# Patient Record
Sex: FEMALE | Race: WHITE | HISPANIC OR LATINO | Employment: OTHER | URBAN - METROPOLITAN AREA
[De-identification: names, ages, dates, MRNs, and addresses within clinical notes are randomized per-mention and may not be internally consistent; named-entity substitution may affect disease eponyms.]

---

## 2019-11-05 ENCOUNTER — HOSPITAL ENCOUNTER (INPATIENT)
Facility: HOSPITAL | Age: 54
LOS: 13 days | Discharge: NURSING, REHAB OR LONG-TERM CARE | DRG: 208 | End: 2019-11-18
Attending: EMERGENCY MEDICINE | Admitting: FAMILY MEDICINE
Payer: MEDICAID

## 2019-11-05 DIAGNOSIS — R79.89 POSITIVE D DIMER: ICD-10-CM

## 2019-11-05 DIAGNOSIS — Z11.3 SCREEN FOR STD (SEXUALLY TRANSMITTED DISEASE): ICD-10-CM

## 2019-11-05 DIAGNOSIS — R79.89 ELEVATED TROPONIN: ICD-10-CM

## 2019-11-05 DIAGNOSIS — Z76.89 ENCOUNTER TO ESTABLISH CARE: ICD-10-CM

## 2019-11-05 DIAGNOSIS — J96.90 RESPIRATORY FAILURE REQUIRING INTUBATION: ICD-10-CM

## 2019-11-05 DIAGNOSIS — Z01.818 ENCOUNTER FOR INTUBATION: ICD-10-CM

## 2019-11-05 DIAGNOSIS — J96.02 ACUTE RESPIRATORY FAILURE WITH HYPOXIA AND HYPERCARBIA: ICD-10-CM

## 2019-11-05 DIAGNOSIS — I50.9 NEW ONSET OF CONGESTIVE HEART FAILURE: ICD-10-CM

## 2019-11-05 DIAGNOSIS — E87.70 HYPERVOLEMIA, UNSPECIFIED HYPERVOLEMIA TYPE: ICD-10-CM

## 2019-11-05 DIAGNOSIS — J96.01 ACUTE RESPIRATORY FAILURE WITH HYPOXIA AND HYPERCARBIA: ICD-10-CM

## 2019-11-05 DIAGNOSIS — R06.02 SHORTNESS OF BREATH: ICD-10-CM

## 2019-11-05 DIAGNOSIS — I21.4 NSTEMI (NON-ST ELEVATED MYOCARDIAL INFARCTION): ICD-10-CM

## 2019-11-05 DIAGNOSIS — I50.9 CHF (CONGESTIVE HEART FAILURE): ICD-10-CM

## 2019-11-05 DIAGNOSIS — N17.9 AKI (ACUTE KIDNEY INJURY): ICD-10-CM

## 2019-11-05 DIAGNOSIS — I16.1 HYPERTENSIVE EMERGENCY: Primary | ICD-10-CM

## 2019-11-05 DIAGNOSIS — N17.9 ACUTE RENAL FAILURE, UNSPECIFIED ACUTE RENAL FAILURE TYPE: ICD-10-CM

## 2019-11-05 DIAGNOSIS — N04.9 NEPHROTIC SYNDROME: ICD-10-CM

## 2019-11-05 DIAGNOSIS — N17.9 ACUTE RENAL FAILURE: ICD-10-CM

## 2019-11-05 DIAGNOSIS — N17.0 ACUTE RENAL FAILURE WITH TUBULAR NECROSIS: ICD-10-CM

## 2019-11-05 LAB
ALBUMIN SERPL BCP-MCNC: 2.7 G/DL (ref 3.5–5.2)
ALLENS TEST: ABNORMAL
ALLENS TEST: ABNORMAL
ALP SERPL-CCNC: 89 U/L (ref 55–135)
ALT SERPL W/O P-5'-P-CCNC: 15 U/L (ref 10–44)
AMORPH CRY URNS QL MICRO: ABNORMAL
AMPHET+METHAMPHET UR QL: NEGATIVE
ANION GAP SERPL CALC-SCNC: 12 MMOL/L (ref 8–16)
ANION GAP SERPL CALC-SCNC: 8 MMOL/L (ref 8–16)
ANION GAP SERPL CALC-SCNC: 9 MMOL/L (ref 8–16)
AST SERPL-CCNC: 25 U/L (ref 10–40)
BACTERIA #/AREA URNS HPF: ABNORMAL /HPF
BARBITURATES UR QL SCN>200 NG/ML: NEGATIVE
BASOPHILS # BLD AUTO: 0.03 K/UL (ref 0–0.2)
BASOPHILS NFR BLD: 0.4 % (ref 0–1.9)
BENZODIAZ UR QL SCN>200 NG/ML: NEGATIVE
BILIRUB SERPL-MCNC: 0.3 MG/DL (ref 0.1–1)
BILIRUB UR QL STRIP: NEGATIVE
BNP SERPL-MCNC: 1165 PG/ML (ref 0–99)
BUN SERPL-MCNC: 47 MG/DL (ref 6–20)
BUN SERPL-MCNC: 50 MG/DL (ref 6–20)
BUN SERPL-MCNC: 51 MG/DL (ref 6–20)
BZE UR QL SCN: NEGATIVE
CALCIUM SERPL-MCNC: 8.4 MG/DL (ref 8.7–10.5)
CALCIUM SERPL-MCNC: 8.6 MG/DL (ref 8.7–10.5)
CALCIUM SERPL-MCNC: 8.7 MG/DL (ref 8.7–10.5)
CANNABINOIDS UR QL SCN: NEGATIVE
CHLORIDE SERPL-SCNC: 112 MMOL/L (ref 95–110)
CHLORIDE SERPL-SCNC: 113 MMOL/L (ref 95–110)
CHLORIDE SERPL-SCNC: 114 MMOL/L (ref 95–110)
CLARITY UR: CLEAR
CO2 SERPL-SCNC: 16 MMOL/L (ref 23–29)
CO2 SERPL-SCNC: 19 MMOL/L (ref 23–29)
CO2 SERPL-SCNC: 20 MMOL/L (ref 23–29)
COLOR UR: YELLOW
CREAT SERPL-MCNC: 3.2 MG/DL (ref 0.5–1.4)
CREAT SERPL-MCNC: 3.3 MG/DL (ref 0.5–1.4)
CREAT SERPL-MCNC: 3.4 MG/DL (ref 0.5–1.4)
CREAT UR-MCNC: 21.7 MG/DL (ref 15–325)
CREAT UR-MCNC: 23.1 MG/DL (ref 15–325)
D DIMER PPP IA.FEU-MCNC: 2.6 MG/L FEU
DELSYS: ABNORMAL
DELSYS: ABNORMAL
DIFFERENTIAL METHOD: ABNORMAL
EOSINOPHIL # BLD AUTO: 0.1 K/UL (ref 0–0.5)
EOSINOPHIL NFR BLD: 1.7 % (ref 0–8)
ERYTHROCYTE [DISTWIDTH] IN BLOOD BY AUTOMATED COUNT: 14.1 % (ref 11.5–14.5)
ERYTHROCYTE [SEDIMENTATION RATE] IN BLOOD BY WESTERGREN METHOD: 16 MM/H
ERYTHROCYTE [SEDIMENTATION RATE] IN BLOOD BY WESTERGREN METHOD: 28 MM/H
EST. GFR  (AFRICAN AMERICAN): 17 ML/MIN/1.73 M^2
EST. GFR  (AFRICAN AMERICAN): 17 ML/MIN/1.73 M^2
EST. GFR  (AFRICAN AMERICAN): 18 ML/MIN/1.73 M^2
EST. GFR  (NON AFRICAN AMERICAN): 14 ML/MIN/1.73 M^2
EST. GFR  (NON AFRICAN AMERICAN): 15 ML/MIN/1.73 M^2
EST. GFR  (NON AFRICAN AMERICAN): 16 ML/MIN/1.73 M^2
ESTIMATED AVG GLUCOSE: 120 MG/DL (ref 68–131)
FIO2: 50
FIO2: 60
GLUCOSE SERPL-MCNC: 126 MG/DL (ref 70–110)
GLUCOSE SERPL-MCNC: 186 MG/DL (ref 70–110)
GLUCOSE SERPL-MCNC: 80 MG/DL (ref 70–110)
GLUCOSE UR QL STRIP: ABNORMAL
HAV IGM SERPL QL IA: NEGATIVE
HBA1C MFR BLD HPLC: 5.8 % (ref 4–5.6)
HBV CORE IGM SERPL QL IA: NEGATIVE
HBV SURFACE AG SERPL QL IA: NEGATIVE
HCO3 UR-SCNC: 18.3 MMOL/L (ref 24–28)
HCO3 UR-SCNC: 19.1 MMOL/L (ref 24–28)
HCT VFR BLD AUTO: 28.2 % (ref 37–48.5)
HCV AB SERPL QL IA: NEGATIVE
HGB BLD-MCNC: 8.7 G/DL (ref 12–16)
HGB UR QL STRIP: ABNORMAL
HIV 1+2 AB+HIV1 P24 AG SERPL QL IA: NEGATIVE
HYALINE CASTS #/AREA URNS LPF: 0 /LPF
INR PPP: 1.1 (ref 0.8–1.2)
KETONES UR QL STRIP: NEGATIVE
LACTATE SERPL-SCNC: 0.6 MMOL/L (ref 0.5–2.2)
LEUKOCYTE ESTERASE UR QL STRIP: NEGATIVE
LYMPHOCYTES # BLD AUTO: 0.9 K/UL (ref 1–4.8)
LYMPHOCYTES NFR BLD: 12.7 % (ref 18–48)
MAGNESIUM SERPL-MCNC: 1.8 MG/DL (ref 1.6–2.6)
MCH RBC QN AUTO: 27 PG (ref 27–31)
MCHC RBC AUTO-ENTMCNC: 30.9 G/DL (ref 32–36)
MCV RBC AUTO: 88 FL (ref 82–98)
METHADONE UR QL SCN>300 NG/ML: NEGATIVE
MICROSCOPIC COMMENT: ABNORMAL
MIN VOL: 11
MODE: ABNORMAL
MODE: ABNORMAL
MONOCYTES # BLD AUTO: 0.6 K/UL (ref 0.3–1)
MONOCYTES NFR BLD: 8.3 % (ref 4–15)
NEUTROPHILS # BLD AUTO: 5.5 K/UL (ref 1.8–7.7)
NEUTROPHILS NFR BLD: 76.9 % (ref 38–73)
NITRITE UR QL STRIP: NEGATIVE
OPIATES UR QL SCN: NEGATIVE
PCO2 BLDA: 38.7 MMHG (ref 35–45)
PCO2 BLDA: 38.8 MMHG (ref 35–45)
PCP UR QL SCN>25 NG/ML: NEGATIVE
PEEP: 10
PEEP: 5
PH SMN: 7.28 [PH] (ref 7.35–7.45)
PH SMN: 7.3 [PH] (ref 7.35–7.45)
PH UR STRIP: 6 [PH] (ref 5–8)
PHOSPHATE SERPL-MCNC: 4.6 MG/DL (ref 2.7–4.5)
PIP: 35
PLATELET # BLD AUTO: 195 K/UL (ref 150–350)
PMV BLD AUTO: 13.1 FL (ref 9.2–12.9)
PO2 BLDA: 103 MMHG (ref 80–100)
PO2 BLDA: 214 MMHG (ref 80–100)
POC BE: -7 MMOL/L
POC BE: -8 MMOL/L
POC SATURATED O2: 100 % (ref 95–100)
POC SATURATED O2: 97 % (ref 95–100)
POC TCO2: 19 MMOL/L (ref 23–27)
POC TCO2: 20 MMOL/L (ref 23–27)
POCT GLUCOSE: 112 MG/DL (ref 70–110)
POCT GLUCOSE: 169 MG/DL (ref 70–110)
POCT GLUCOSE: 84 MG/DL (ref 70–110)
POTASSIUM SERPL-SCNC: 3.9 MMOL/L (ref 3.5–5.1)
POTASSIUM SERPL-SCNC: 4 MMOL/L (ref 3.5–5.1)
POTASSIUM SERPL-SCNC: 4.6 MMOL/L (ref 3.5–5.1)
PROT SERPL-MCNC: 6.1 G/DL (ref 6–8.4)
PROT UR QL STRIP: ABNORMAL
PROT UR-MCNC: 191 MG/DL (ref 0–15)
PROT/CREAT UR: 8.8 MG/G{CREAT} (ref 0–0.2)
PROTHROMBIN TIME: 11.4 SEC (ref 9–12.5)
RBC # BLD AUTO: 3.22 M/UL (ref 4–5.4)
RBC #/AREA URNS HPF: 19 /HPF (ref 0–4)
RHEUMATOID FACT SERPL-ACNC: <10 IU/ML (ref 0–15)
SAMPLE: ABNORMAL
SAMPLE: ABNORMAL
SITE: ABNORMAL
SITE: ABNORMAL
SODIUM SERPL-SCNC: 140 MMOL/L (ref 136–145)
SODIUM SERPL-SCNC: 141 MMOL/L (ref 136–145)
SODIUM SERPL-SCNC: 142 MMOL/L (ref 136–145)
SODIUM UR-SCNC: 119 MMOL/L (ref 20–250)
SP GR UR STRIP: 1.02 (ref 1–1.03)
SP02: 100
SQUAMOUS #/AREA URNS HPF: 6 /HPF
TOXICOLOGY INFORMATION: NORMAL
TROPONIN I SERPL DL<=0.01 NG/ML-MCNC: 0.18 NG/ML (ref 0–0.03)
TROPONIN I SERPL DL<=0.01 NG/ML-MCNC: 0.2 NG/ML (ref 0–0.03)
TROPONIN I SERPL DL<=0.01 NG/ML-MCNC: 0.2 NG/ML (ref 0–0.03)
TROPONIN I SERPL DL<=0.01 NG/ML-MCNC: 0.21 NG/ML (ref 0–0.03)
TSH SERPL DL<=0.005 MIU/L-ACNC: 1.94 UIU/ML (ref 0.4–4)
URN SPEC COLLECT METH UR: ABNORMAL
UROBILINOGEN UR STRIP-ACNC: NEGATIVE EU/DL
UUN UR-MCNC: 145 MG/DL (ref 140–1050)
VT: 360
VT: 450
WBC # BLD AUTO: 7.15 K/UL (ref 3.9–12.7)
WBC #/AREA URNS HPF: 1 /HPF (ref 0–5)

## 2019-11-05 PROCEDURE — 93010 ELECTROCARDIOGRAM REPORT: CPT | Mod: 76,59,, | Performed by: STUDENT IN AN ORGANIZED HEALTH CARE EDUCATION/TRAINING PROGRAM

## 2019-11-05 PROCEDURE — 80048 BASIC METABOLIC PNL TOTAL CA: CPT

## 2019-11-05 PROCEDURE — 83605 ASSAY OF LACTIC ACID: CPT

## 2019-11-05 PROCEDURE — 83735 ASSAY OF MAGNESIUM: CPT

## 2019-11-05 PROCEDURE — 93005 ELECTROCARDIOGRAM TRACING: CPT

## 2019-11-05 PROCEDURE — 94660 CPAP INITIATION&MGMT: CPT

## 2019-11-05 PROCEDURE — 84484 ASSAY OF TROPONIN QUANT: CPT

## 2019-11-05 PROCEDURE — 25000003 PHARM REV CODE 250: Performed by: STUDENT IN AN ORGANIZED HEALTH CARE EDUCATION/TRAINING PROGRAM

## 2019-11-05 PROCEDURE — 86703 HIV-1/HIV-2 1 RESULT ANTBDY: CPT

## 2019-11-05 PROCEDURE — 85025 COMPLETE CBC W/AUTO DIFF WBC: CPT

## 2019-11-05 PROCEDURE — 63600175 PHARM REV CODE 636 W HCPCS: Performed by: STUDENT IN AN ORGANIZED HEALTH CARE EDUCATION/TRAINING PROGRAM

## 2019-11-05 PROCEDURE — 25000003 PHARM REV CODE 250: Performed by: EMERGENCY MEDICINE

## 2019-11-05 PROCEDURE — 99900035 HC TECH TIME PER 15 MIN (STAT)

## 2019-11-05 PROCEDURE — 84100 ASSAY OF PHOSPHORUS: CPT

## 2019-11-05 PROCEDURE — 84156 ASSAY OF PROTEIN URINE: CPT

## 2019-11-05 PROCEDURE — 83880 ASSAY OF NATRIURETIC PEPTIDE: CPT

## 2019-11-05 PROCEDURE — 82803 BLOOD GASES ANY COMBINATION: CPT

## 2019-11-05 PROCEDURE — 80053 COMPREHEN METABOLIC PANEL: CPT

## 2019-11-05 PROCEDURE — 86038 ANTINUCLEAR ANTIBODIES: CPT

## 2019-11-05 PROCEDURE — 96365 THER/PROPH/DIAG IV INF INIT: CPT | Mod: 59

## 2019-11-05 PROCEDURE — 36600 WITHDRAWAL OF ARTERIAL BLOOD: CPT

## 2019-11-05 PROCEDURE — 83036 HEMOGLOBIN GLYCOSYLATED A1C: CPT

## 2019-11-05 PROCEDURE — 93010 EKG 12-LEAD: ICD-10-PCS | Mod: ,,, | Performed by: STUDENT IN AN ORGANIZED HEALTH CARE EDUCATION/TRAINING PROGRAM

## 2019-11-05 PROCEDURE — 85610 PROTHROMBIN TIME: CPT

## 2019-11-05 PROCEDURE — 84443 ASSAY THYROID STIM HORMONE: CPT

## 2019-11-05 PROCEDURE — 99291 CRITICAL CARE FIRST HOUR: CPT | Mod: 25

## 2019-11-05 PROCEDURE — 93010 ELECTROCARDIOGRAM REPORT: CPT | Mod: ,,, | Performed by: STUDENT IN AN ORGANIZED HEALTH CARE EDUCATION/TRAINING PROGRAM

## 2019-11-05 PROCEDURE — 20000000 HC ICU ROOM

## 2019-11-05 PROCEDURE — S0028 INJECTION, FAMOTIDINE, 20 MG: HCPCS | Performed by: STUDENT IN AN ORGANIZED HEALTH CARE EDUCATION/TRAINING PROGRAM

## 2019-11-05 PROCEDURE — 80074 ACUTE HEPATITIS PANEL: CPT

## 2019-11-05 PROCEDURE — 51702 INSERT TEMP BLADDER CATH: CPT | Mod: 59

## 2019-11-05 PROCEDURE — 84484 ASSAY OF TROPONIN QUANT: CPT | Mod: 91

## 2019-11-05 PROCEDURE — 94761 N-INVAS EAR/PLS OXIMETRY MLT: CPT

## 2019-11-05 PROCEDURE — 96375 TX/PRO/DX INJ NEW DRUG ADDON: CPT | Mod: 59

## 2019-11-05 PROCEDURE — 80048 BASIC METABOLIC PNL TOTAL CA: CPT | Mod: 91

## 2019-11-05 PROCEDURE — 94002 VENT MGMT INPAT INIT DAY: CPT

## 2019-11-05 PROCEDURE — 27000221 HC OXYGEN, UP TO 24 HOURS

## 2019-11-05 PROCEDURE — 63600175 PHARM REV CODE 636 W HCPCS: Performed by: EMERGENCY MEDICINE

## 2019-11-05 PROCEDURE — 87040 BLOOD CULTURE FOR BACTERIA: CPT

## 2019-11-05 PROCEDURE — 80307 DRUG TEST PRSMV CHEM ANLYZR: CPT

## 2019-11-05 PROCEDURE — 84300 ASSAY OF URINE SODIUM: CPT

## 2019-11-05 PROCEDURE — 31500 INSERT EMERGENCY AIRWAY: CPT

## 2019-11-05 PROCEDURE — 27000190 HC CPAP FULL FACE MASK W/VALVE

## 2019-11-05 PROCEDURE — 84540 ASSAY OF URINE/UREA-N: CPT

## 2019-11-05 PROCEDURE — 85379 FIBRIN DEGRADATION QUANT: CPT

## 2019-11-05 PROCEDURE — 36415 COLL VENOUS BLD VENIPUNCTURE: CPT

## 2019-11-05 PROCEDURE — 81000 URINALYSIS NONAUTO W/SCOPE: CPT | Mod: 59

## 2019-11-05 PROCEDURE — 63600175 PHARM REV CODE 636 W HCPCS

## 2019-11-05 PROCEDURE — 96366 THER/PROPH/DIAG IV INF ADDON: CPT | Mod: 59

## 2019-11-05 PROCEDURE — 86431 RHEUMATOID FACTOR QUANT: CPT

## 2019-11-05 RX ORDER — PROPOFOL 10 MG/ML
INJECTION, EMULSION INTRAVENOUS
Status: COMPLETED
Start: 2019-11-05 | End: 2019-11-05

## 2019-11-05 RX ORDER — PROPOFOL 10 MG/ML
10 INJECTION, EMULSION INTRAVENOUS CONTINUOUS
Status: DISCONTINUED | OUTPATIENT
Start: 2019-11-05 | End: 2019-11-06

## 2019-11-05 RX ORDER — PROPOFOL 10 MG/ML
10 INJECTION, EMULSION INTRAVENOUS
Status: COMPLETED | OUTPATIENT
Start: 2019-11-05 | End: 2019-11-05

## 2019-11-05 RX ORDER — FUROSEMIDE 10 MG/ML
120 INJECTION INTRAMUSCULAR; INTRAVENOUS 3 TIMES DAILY
Status: DISCONTINUED | OUTPATIENT
Start: 2019-11-05 | End: 2019-11-05

## 2019-11-05 RX ORDER — IBUPROFEN 200 MG
16 TABLET ORAL
Status: DISCONTINUED | OUTPATIENT
Start: 2019-11-05 | End: 2019-11-09

## 2019-11-05 RX ORDER — INSULIN ASPART 100 [IU]/ML
1-10 INJECTION, SOLUTION INTRAVENOUS; SUBCUTANEOUS
Status: DISCONTINUED | OUTPATIENT
Start: 2019-11-05 | End: 2019-11-09

## 2019-11-05 RX ORDER — SODIUM BICARBONATE 650 MG/1
1330 TABLET ORAL 3 TIMES DAILY
Status: DISCONTINUED | OUTPATIENT
Start: 2019-11-05 | End: 2019-11-06

## 2019-11-05 RX ORDER — RAMELTEON 8 MG/1
8 TABLET ORAL NIGHTLY PRN
Status: DISCONTINUED | OUTPATIENT
Start: 2019-11-05 | End: 2019-11-18 | Stop reason: HOSPADM

## 2019-11-05 RX ORDER — GLUCAGON 1 MG
1 KIT INJECTION
Status: DISCONTINUED | OUTPATIENT
Start: 2019-11-05 | End: 2019-11-09

## 2019-11-05 RX ORDER — ROCURONIUM BROMIDE 10 MG/ML
80 INJECTION, SOLUTION INTRAVENOUS ONCE
Status: DISCONTINUED | OUTPATIENT
Start: 2019-11-05 | End: 2019-11-05

## 2019-11-05 RX ORDER — NITROGLYCERIN 20 MG/100ML
10 INJECTION INTRAVENOUS CONTINUOUS
Status: DISCONTINUED | OUTPATIENT
Start: 2019-11-05 | End: 2019-11-06

## 2019-11-05 RX ORDER — SODIUM CHLORIDE 0.9 % (FLUSH) 0.9 %
5 SYRINGE (ML) INJECTION
Status: DISCONTINUED | OUTPATIENT
Start: 2019-11-05 | End: 2019-11-18 | Stop reason: HOSPADM

## 2019-11-05 RX ORDER — FUROSEMIDE 10 MG/ML
120 INJECTION INTRAMUSCULAR; INTRAVENOUS ONCE
Status: COMPLETED | OUTPATIENT
Start: 2019-11-05 | End: 2019-11-05

## 2019-11-05 RX ORDER — ROCURONIUM BROMIDE 10 MG/ML
80 INJECTION, SOLUTION INTRAVENOUS
Status: COMPLETED | OUTPATIENT
Start: 2019-11-05 | End: 2019-11-05

## 2019-11-05 RX ORDER — ACETAMINOPHEN 325 MG/1
650 TABLET ORAL EVERY 4 HOURS PRN
Status: DISCONTINUED | OUTPATIENT
Start: 2019-11-05 | End: 2019-11-18 | Stop reason: HOSPADM

## 2019-11-05 RX ORDER — AMLODIPINE BESYLATE 5 MG/1
10 TABLET ORAL DAILY
Status: DISCONTINUED | OUTPATIENT
Start: 2019-11-05 | End: 2019-11-18 | Stop reason: HOSPADM

## 2019-11-05 RX ORDER — FENTANYL CITRATE-0.9 % NACL/PF 10 MCG/ML
20 PLASTIC BAG, INJECTION (ML) INTRAVENOUS CONTINUOUS
Status: DISCONTINUED | OUTPATIENT
Start: 2019-11-05 | End: 2019-11-06

## 2019-11-05 RX ORDER — NITROGLYCERIN 20 MG/100ML
10 INJECTION INTRAVENOUS CONTINUOUS
Status: DISCONTINUED | OUTPATIENT
Start: 2019-11-05 | End: 2019-11-05

## 2019-11-05 RX ORDER — ETOMIDATE 2 MG/ML
20 INJECTION INTRAVENOUS
Status: COMPLETED | OUTPATIENT
Start: 2019-11-05 | End: 2019-11-05

## 2019-11-05 RX ORDER — ACETAMINOPHEN 325 MG/1
650 TABLET ORAL EVERY 8 HOURS PRN
Status: DISCONTINUED | OUTPATIENT
Start: 2019-11-05 | End: 2019-11-18 | Stop reason: HOSPADM

## 2019-11-05 RX ORDER — FUROSEMIDE 10 MG/ML
80 INJECTION INTRAMUSCULAR; INTRAVENOUS
Status: COMPLETED | OUTPATIENT
Start: 2019-11-05 | End: 2019-11-05

## 2019-11-05 RX ORDER — HEPARIN SODIUM 5000 [USP'U]/ML
5000 INJECTION, SOLUTION INTRAVENOUS; SUBCUTANEOUS EVERY 8 HOURS
Status: DISCONTINUED | OUTPATIENT
Start: 2019-11-05 | End: 2019-11-07

## 2019-11-05 RX ORDER — CEFEPIME HYDROCHLORIDE 1 G/50ML
1 INJECTION, SOLUTION INTRAVENOUS
Status: DISCONTINUED | OUTPATIENT
Start: 2019-11-05 | End: 2019-11-06

## 2019-11-05 RX ORDER — IBUPROFEN 200 MG
24 TABLET ORAL
Status: DISCONTINUED | OUTPATIENT
Start: 2019-11-05 | End: 2019-11-09

## 2019-11-05 RX ORDER — FAMOTIDINE 10 MG/ML
20 INJECTION INTRAVENOUS DAILY
Status: DISCONTINUED | OUTPATIENT
Start: 2019-11-05 | End: 2019-11-06

## 2019-11-05 RX ORDER — NAPROXEN SODIUM 220 MG/1
324 TABLET, FILM COATED ORAL
Status: COMPLETED | OUTPATIENT
Start: 2019-11-05 | End: 2019-11-05

## 2019-11-05 RX ORDER — ONDANSETRON 2 MG/ML
4 INJECTION INTRAMUSCULAR; INTRAVENOUS EVERY 8 HOURS PRN
Status: DISCONTINUED | OUTPATIENT
Start: 2019-11-05 | End: 2019-11-18 | Stop reason: HOSPADM

## 2019-11-05 RX ADMIN — NITROGLYCERIN 10 MCG/MIN: 20 INJECTION INTRAVENOUS at 03:11

## 2019-11-05 RX ADMIN — PROPOFOL 50 MCG/KG/MIN: 10 INJECTION, EMULSION INTRAVENOUS at 03:11

## 2019-11-05 RX ADMIN — ETOMIDATE 20 MG: 2 INJECTION INTRAVENOUS at 05:11

## 2019-11-05 RX ADMIN — AMLODIPINE BESYLATE 10 MG: 5 TABLET ORAL at 01:11

## 2019-11-05 RX ADMIN — SODIUM BICARBONATE 650 MG TABLET 1300 MG: at 09:11

## 2019-11-05 RX ADMIN — PROPOFOL 10 MCG/KG/MIN: 10 INJECTION, EMULSION INTRAVENOUS at 05:11

## 2019-11-05 RX ADMIN — Medication 20 MCG/HR: at 06:11

## 2019-11-05 RX ADMIN — FUROSEMIDE 120 MG: 10 INJECTION, SOLUTION INTRAVENOUS at 07:11

## 2019-11-05 RX ADMIN — ROCURONIUM BROMIDE 80 MG: 10 INJECTION, SOLUTION INTRAVENOUS at 05:11

## 2019-11-05 RX ADMIN — FAMOTIDINE 20 MG: 10 INJECTION, SOLUTION INTRAVENOUS at 09:11

## 2019-11-05 RX ADMIN — HEPARIN SODIUM 5000 UNITS: 5000 INJECTION, SOLUTION INTRAVENOUS; SUBCUTANEOUS at 01:11

## 2019-11-05 RX ADMIN — PROPOFOL: 10 INJECTION, EMULSION INTRAVENOUS at 06:11

## 2019-11-05 RX ADMIN — Medication 20 MCG/HR: at 12:11

## 2019-11-05 RX ADMIN — FUROSEMIDE 10 MG/HR: 10 INJECTION, SOLUTION INTRAMUSCULAR; INTRAVENOUS at 10:11

## 2019-11-05 RX ADMIN — PROPOFOL 50 MCG/KG/MIN: 10 INJECTION, EMULSION INTRAVENOUS at 11:11

## 2019-11-05 RX ADMIN — PROPOFOL 50 MCG/KG/MIN: 10 INJECTION, EMULSION INTRAVENOUS at 09:11

## 2019-11-05 RX ADMIN — SODIUM BICARBONATE 650 MG TABLET 1300 MG: at 03:11

## 2019-11-05 RX ADMIN — FUROSEMIDE 80 MG: 10 INJECTION, SOLUTION INTRAVENOUS at 03:11

## 2019-11-05 RX ADMIN — ASPIRIN 81 MG 324 MG: 81 TABLET ORAL at 07:11

## 2019-11-05 RX ADMIN — HEPARIN SODIUM 5000 UNITS: 5000 INJECTION, SOLUTION INTRAVENOUS; SUBCUTANEOUS at 09:11

## 2019-11-05 RX ADMIN — CEFEPIME HYDROCHLORIDE 1 G: 1 INJECTION, SOLUTION INTRAVENOUS at 09:11

## 2019-11-05 RX ADMIN — PROPOFOL 40 MCG/KG/MIN: 10 INJECTION, EMULSION INTRAVENOUS at 06:11

## 2019-11-05 NOTE — ASSESSMENT & PLAN NOTE
- likely secondary to chronic HTN with acute HTN crisis  - urine lytes, renal US  - avoid nephrotoxic medications and monitor K+ daily, BUN  - control BP as above

## 2019-11-05 NOTE — PROGRESS NOTES
Pre-intubation, patient is hyperoxygenated with 100% FiO2 via Bipap. Patient received meds from RN and patient was bagged by RT. Patient intubated with 7.5 ETT 24 cm at the lip by ED MD. Color change, even chest rise, and condensation in the tube are all noted. Bilateral breath sounds auscultated. Tube is secured with commercial tube hartman. Patient placed on ventilator with documented settings and parameters. Alarms are on and functioning. Ambu bag at the bedside. ABG drawn and reported to ED MD. No new orders at this time.

## 2019-11-05 NOTE — ED NOTES
Pt left hand was moving to grab ETT. Soft wrist restraints placed on patient and propofol increased. Will continue to monitor.

## 2019-11-05 NOTE — ED NOTES
Pt moving extremities and opening eyes. Spoke to Dr. Haas about another medication for sedation because propofol is maxed out at 50mcg. Dr. Haas said he will be adding medication.

## 2019-11-05 NOTE — SUBJECTIVE & OBJECTIVE
No past medical history on file.    No past surgical history on file.    Review of patient's allergies indicates:  No Known Allergies    No current facility-administered medications on file prior to encounter.      Current Outpatient Medications on File Prior to Encounter   Medication Sig    nystatin-triamcinolone (MYCOLOG II) cream Apply topically once daily.     Family History     None        Tobacco Use    Smoking status: Not on file   Substance and Sexual Activity    Alcohol use: Not on file    Drug use: Not on file    Sexual activity: Not on file     Review of Systems   Unable to perform ROS: Intubated     Objective:     Vital Signs (Most Recent):  Pulse: 86 (11/05/19 0602)  Resp: 16 (11/05/19 0602)  BP: (!) 197/92 (11/05/19 0602)  SpO2: 100 % (11/05/19 0602) Vital Signs (24h Range):  Pulse:  [86-94] 86  Resp:  [16-32] 16  SpO2:  [100 %] 100 %  BP: (168-201)/(77-92) 197/92     Weight: 75 kg (165 lb 5.5 oz)  Body mass index is 30.24 kg/m².    Physical Exam   Constitutional:   Intubated and sedated   Cardiovascular: Normal rate and regular rhythm. Exam reveals no friction rub.   No murmur heard.  Pulmonary/Chest:   Crackles on exam   Abdominal: Bowel sounds are normal. She exhibits distension.   Musculoskeletal: She exhibits edema (3+ pitting edema to the abdomen).           Significant Labs:   CBC:   Recent Labs   Lab 11/05/19 0322   WBC 7.15   HGB 8.7*   HCT 28.2*        CMP:   Recent Labs   Lab 11/05/19 0322      K 4.6   *   CO2 16*   *   BUN 47*   CREATININE 3.4*   CALCIUM 8.6*   PROT 6.1   ALBUMIN 2.7*   BILITOT 0.3   ALKPHOS 89   AST 25   ALT 15   ANIONGAP 12   EGFRNONAA 14*     Coagulation:   Recent Labs   Lab 11/05/19 0322   INR 1.1     Lactic Acid: No results for input(s): LACTATE in the last 48 hours.  Troponin:   Recent Labs   Lab 11/05/19 0322   TROPONINI 0.206*       Significant Imaging:   Imaging Results          X-Ray Chest 1 View (Final result)  Result time  11/05/19 06:16:39    Final result by Umberto Quinn MD (11/05/19 06:16:39)                 Impression:      As above.      Electronically signed by: Umberto Quinn MD  Date:    11/05/2019  Time:    06:16             Narrative:    EXAMINATION:  XR CHEST 1 VIEW    CLINICAL HISTORY:  Respiratory failure, unspecified, unspecified whether with hypoxia or hypercapnia    TECHNIQUE:  Single frontal view of the chest was performed.    COMPARISON:  11/05/2019 04:07 hours    FINDINGS:  There has been interval placement of an endotracheal tube with tip terminating approximately 4.0 cm above the lindsay.  Esophagogastric tube has been intervally placed extending below the diaphragm, extending beyond the field of view of the examination.  No significant change in cardiopulmonary status compared to prior examination, noting interstitial edema bilateral pleural effusions.  No evidence of pneumothorax.                               X-Ray Chest AP Portable (Final result)  Result time 11/05/19 05:11:22    Final result by Umberto Quinn MD (11/05/19 05:11:22)                 Impression:      Radiographic findings suggestive of edema/CHF with bilateral pleural effusions and atelectasis/consolidation.      Electronically signed by: Umberto Quinn MD  Date:    11/05/2019  Time:    05:11             Narrative:    EXAMINATION:  XR CHEST AP PORTABLE    CLINICAL HISTORY:  CHF;    TECHNIQUE:  Single frontal view of the chest was performed.    COMPARISON:  None    FINDINGS:  Cardiac monitoring leads overlie the chest.  Cardiomediastinal silhouette appears mildly enlarged.  There are bilateral interstitial opacities suggesting edema.  There is opacification of the bilateral lower lung zones, right more so than left, suggestive of a combination of pleural fluid with associated atelectasis and/or consolidation.  There is no evidence of pneumothorax.  Visualized osseous structures appear intact.

## 2019-11-05 NOTE — SUBJECTIVE & OBJECTIVE
No past medical history on file.    No past surgical history on file.    Review of patient's allergies indicates:  No Known Allergies    Family History     None        Tobacco Use    Smoking status: Not on file   Substance and Sexual Activity    Alcohol use: Not on file    Drug use: Not on file    Sexual activity: Not on file         Review of Systems   Unable to perform ROS: Intubated     Objective:     Vital Signs (Most Recent):  Temp: (S) (!) 95.2 °F (35.1 °C) (11/05/19 0805)  Pulse: 66 (11/05/19 0940)  Resp: (!) 28 (11/05/19 0940)  BP: (!) 156/79 (11/05/19 0940)  SpO2: 99 % (11/05/19 0940) Vital Signs (24h Range):  Temp:  [95.2 °F (35.1 °C)] 95.2 °F (35.1 °C)  Pulse:  [66-94] 66  Resp:  [16-32] 28  SpO2:  [99 %-100 %] 99 %  BP: (140-201)/(70-92) 156/79     Weight: 75 kg (165 lb 5.5 oz)  Body mass index is 30.24 kg/m².      Intake/Output Summary (Last 24 hours) at 11/5/2019 1015  Last data filed at 11/5/2019 0858  Gross per 24 hour   Intake --   Output 525 ml   Net -525 ml       Physical Exam   Constitutional: She appears well-developed and well-nourished.   HENT:   Head: Normocephalic and atraumatic.   Eyes: Pupils are equal, round, and reactive to light. Conjunctivae and EOM are normal.   Neck: Normal range of motion. Neck supple. JVD present.   Cardiovascular: Normal rate, regular rhythm and normal heart sounds. Exam reveals no gallop and no friction rub.   No murmur heard.  Pulmonary/Chest: She exhibits no tenderness.   Intubated, b/l rhonchi in anterior lung fields and crackles in bilateral lateral lung fields. No wheezes noted.    Abdominal: Soft. Bowel sounds are normal. She exhibits no distension. There is no tenderness.   Musculoskeletal: Normal range of motion. She exhibits edema.   +2 pitting edema   Neurological: She is alert.   Unable to assess orientation. RASS -1   Skin: Skin is warm and dry. Capillary refill takes less than 2 seconds.       Vents:  Vent Mode: A/C (11/05/19 0732)  Ventilator  Initiated: Yes (11/05/19 0547)  Set Rate: 16 bmp (11/05/19 0732)  Vt Set: 450 mL (11/05/19 0732)  PEEP/CPAP: 10 cmH20 (11/05/19 0732)  Oxygen Concentration (%): 60 (11/05/19 0547)  Peak Airway Pressure: 30 cmH2O (11/05/19 0732)  Total Ve: 7.54 mL (11/05/19 0732)  F/VT Ratio<105 (RSBI): (!) 35.86 (11/05/19 0732)    Lines/Drains/Airways     Drain                 NG/OG Tube 11/05/19 0535 nasogastric 14 Fr. Right nostril less than 1 day         Urethral Catheter 11/05/19 0601 Latex 14 Fr. less than 1 day          Airway                 Airway - Non-Surgical 11/05/19 0520 Endotracheal Tube less than 1 day          Peripheral Intravenous Line                 Peripheral IV - Single Lumen 11/05/19 0322 18 G Left Antecubital less than 1 day         Peripheral IV - Single Lumen 11/05/19 0549 20 G Right Forearm less than 1 day         Peripheral IV - Single Lumen 11/05/19 0718 20 G Right Hand less than 1 day                Significant Labs:    CBC/Anemia Profile:  Recent Labs   Lab 11/05/19 0322   WBC 7.15   HGB 8.7*   HCT 28.2*      MCV 88   RDW 14.1        Chemistries:  Recent Labs   Lab 11/05/19 0322 11/05/19 0837     --    K 4.6  --    *  --    CO2 16*  --    BUN 47*  --    CREATININE 3.4*  --    CALCIUM 8.6*  --    ALBUMIN 2.7*  --    PROT 6.1  --    BILITOT 0.3  --    ALKPHOS 89  --    ALT 15  --    AST 25  --    MG  --  1.8   PHOS  --  4.6*       ABGs:   Recent Labs   Lab 11/05/19 0553   PH 7.282*   PCO2 38.8   HCO3 18.3*   POCSATURATED 100   BE -8     Blood Culture: No results for input(s): LABBLOO in the last 48 hours.  CMP:   Recent Labs   Lab 11/05/19 0322      K 4.6   *   CO2 16*   *   BUN 47*   CREATININE 3.4*   CALCIUM 8.6*   PROT 6.1   ALBUMIN 2.7*   BILITOT 0.3   ALKPHOS 89   AST 25   ALT 15   ANIONGAP 12   EGFRNONAA 14*     Coagulation:   Recent Labs   Lab 11/05/19  0322   INR 1.1     Lactic Acid:   Recent Labs   Lab 11/05/19  0936   LACTATE 0.6     Respiratory  Culture: No results for input(s): GSRESP, RESPIRATORYC in the last 48 hours.  Troponin:   Recent Labs   Lab 11/05/19  0322 11/05/19  0837   TROPONINI 0.206* 0.184*  0.195*  0.195*       Significant Imaging:   I have reviewed and interpreted all pertinent imaging results/findings within the past 24 hours.

## 2019-11-05 NOTE — H&P
[facility] Medicine  History & Physical    Patient Name: Ping Davenport  MRN: [id number]  Admission Date: 11/5/2019  Attending Physician: Dr. Hanson  Primary Care Provider: Primary Doctor No         Patient information was obtained from relative(s) and ER records.     Subjective:     Principal Problem:Hypertensive emergency    Chief Complaint:   Chief Complaint   Patient presents with    Shortness of Breath     To ER per EMS with c/o SOB and peripheral edema.          HPI: 53 yo female with no past medical history due to being in the ED presenting with 2 week history of worsening of SOB and peripheral edema. Per sister, patient has had elevated blood pressures at home and sister has tried to give Losartan to the patient but it has not improved her blood pressures. Patient has not been able to lay flat due to dyspnea.     In the ED, patient's blood pressure was in the 200/100s. Received Lasix 80mg IV. Patient presented in respiratory distress and placed on Bipap. When patient did not improvement clinically on Bipap, patient was intubated and sedated with Fentanyl and Nitroglycerine drip. Bun/Cr elevated at 47/3.4 up from a baseline of 11/0.9. Trop elevated at 0.206.     No past medical history on file.    No past surgical history on file.    Review of patient's allergies indicates:  No Known Allergies    No current facility-administered medications on file prior to encounter.      Current Outpatient Medications on File Prior to Encounter   Medication Sig    nystatin-triamcinolone (MYCOLOG II) cream Apply topically once daily.     Family History     None        Tobacco Use    Smoking status: Not on file   Substance and Sexual Activity    Alcohol use: Not on file    Drug use: Not on file    Sexual activity: Not on file     Review of Systems   Unable to perform ROS: Intubated     Objective:     Vital Signs (Most Recent):  Pulse: 86 (11/05/19 0602)  Resp: 16 (11/05/19 0602)  BP:  (!) 197/92 (11/05/19 0602)  SpO2: 100 % (11/05/19 0602) Vital Signs (24h Range):  Pulse:  [86-94] 86  Resp:  [16-32] 16  SpO2:  [100 %] 100 %  BP: (168-201)/(77-92) 197/92     Weight: 75 kg (165 lb 5.5 oz)  Body mass index is 30.24 kg/m².    Physical Exam   Constitutional:   Intubated and sedated   Cardiovascular: Normal rate and regular rhythm. Exam reveals no friction rub.   No murmur heard.  Pulmonary/Chest:   Crackles on exam   Abdominal: Bowel sounds are normal. She exhibits distension.   Musculoskeletal: She exhibits edema (3+ pitting edema to the abdomen).           Significant Labs:   CBC:   Recent Labs   Lab 11/05/19 0322   WBC 7.15   HGB 8.7*   HCT 28.2*        CMP:   Recent Labs   Lab 11/05/19 0322      K 4.6   *   CO2 16*   *   BUN 47*   CREATININE 3.4*   CALCIUM 8.6*   PROT 6.1   ALBUMIN 2.7*   BILITOT 0.3   ALKPHOS 89   AST 25   ALT 15   ANIONGAP 12   EGFRNONAA 14*     Coagulation:   Recent Labs   Lab 11/05/19 0322   INR 1.1     Lactic Acid: No results for input(s): LACTATE in the last 48 hours.  Troponin:   Recent Labs   Lab 11/05/19 0322   TROPONINI 0.206*       Significant Imaging:   Imaging Results          X-Ray Chest 1 View (Final result)  Result time 11/05/19 06:16:39    Final result by Umberto Quinn MD (11/05/19 06:16:39)                 Impression:      As above.      Electronically signed by: Umberto Quinn MD  Date:    11/05/2019  Time:    06:16             Narrative:    EXAMINATION:  XR CHEST 1 VIEW    CLINICAL HISTORY:  Respiratory failure, unspecified, unspecified whether with hypoxia or hypercapnia    TECHNIQUE:  Single frontal view of the chest was performed.    COMPARISON:  11/05/2019 04:07 hours    FINDINGS:  There has been interval placement of an endotracheal tube with tip terminating approximately 4.0 cm above the lindsay.  Esophagogastric tube has been intervally placed extending below the diaphragm, extending beyond the field of view of the  examination.  No significant change in cardiopulmonary status compared to prior examination, noting interstitial edema bilateral pleural effusions.  No evidence of pneumothorax.                               X-Ray Chest AP Portable (Final result)  Result time 11/05/19 05:11:22    Final result by Umberto Quinn MD (11/05/19 05:11:22)                 Impression:      Radiographic findings suggestive of edema/CHF with bilateral pleural effusions and atelectasis/consolidation.      Electronically signed by: Umberto Quinn MD  Date:    11/05/2019  Time:    05:11             Narrative:    EXAMINATION:  XR CHEST AP PORTABLE    CLINICAL HISTORY:  CHF;    TECHNIQUE:  Single frontal view of the chest was performed.    COMPARISON:  None    FINDINGS:  Cardiac monitoring leads overlie the chest.  Cardiomediastinal silhouette appears mildly enlarged.  There are bilateral interstitial opacities suggesting edema.  There is opacification of the bilateral lower lung zones, right more so than left, suggestive of a combination of pleural fluid with associated atelectasis and/or consolidation.  There is no evidence of pneumothorax.  Visualized osseous structures appear intact.                                  Assessment/Plan:     53 yo female with no past medical history presenting with a 2 week history of worsening dyspnea and peripheral edema found to be in hypertensive emergency and acute respiratory distress requiring intubation.     NEURO & PSYCH:    Intubated and sedated      CVS:    * Hypertensive emergency  On presentation, BP in the 200/100s  Troponin elevated at 0.206   EKG: NSR  Was placed on Bipap and given Nitro with improvement in BP to the 170/70  Currently on Nitroglycerin drip  Consult Pulmonary  Consult Cardiology     New onset of congestive heart failure  No ECHO   Chest X-ray: Suggestive of edema/CHF with bilateral effusions  3+ pitting edema in bilateral lower extremity to the abdomen  Hx of worsening dyspnea  per sister at bedside  Received Lasix 80mg IV in the ED  Give 1 additional Lasix 120mg IV  Strict I&Os  Fluid restriction 1500cc  Will need ECHO when not critically ill      RESP:      Acute respiratory failure with hypoxia and hypercarbia  2 week history of worsening of breath  Crackles on exam  No improvement with Bipap  Intubated in the ED  Consult Pulm      GI-FEN:   NPO      RENAL & :    Acute renal failure  On admission, BUN/Cr: 47/3.4 elevated from a baseline of 11/0.9  Most likely 2/2 to hypertensive emergency  Avoid nephrotoxic agents  F/U K, Phos, Mg  May need Nephrology consult    ID/HEME:  WBC: 7.15  H/H 8.7/28.2    PPx:  -VTE: Heparin  -GI: Famotidine    CODE STATUS: Full    DISPO: Pending improvement in respiratory status, pulm consult, cards consult    Alisa Resendiz, PGY-2  [facility]  11/05/2019 7:54 AM

## 2019-11-05 NOTE — ASSESSMENT & PLAN NOTE
On presentation, BP in the 200/100s  Troponin elevated at 0.206   EKG: NSR  Was placed on Bipap and given Nitro with improvement in BP to the 170/70  Currently on Nitroglycerin drip

## 2019-11-05 NOTE — ED NOTES
Per Dr. Michele ordered to stop nitro infusion at this time and restarted fentanyl infusion at 20g

## 2019-11-05 NOTE — PROGRESS NOTES
Patient placed on Bipap with documented settings and parameters per Dr. Graff. Alarms are on and functioning. Ambu bag and mask at the bedside. Will continue to monitor.

## 2019-11-05 NOTE — ED NOTES
Dr. Michele paged about pt's SBPof 144. Verbal order to titrate Nitro down and to discontinue fentanyl infusion.

## 2019-11-05 NOTE — ED NOTES
Spoke to Dr Michele in family medicine and ordered to keep SBP above 160. Ordered to titrate the fentanyl infusion.

## 2019-11-05 NOTE — ASSESSMENT & PLAN NOTE
Start PO medications in order to titrate Nitro  - suggest ARB for heart failure as well as HCTZ for hypertension and PO PRN medications such as clonidine or hydralazine and titrate standard medications for SBP <150 in setting of chronic HTN and HTN emergency

## 2019-11-05 NOTE — HPI
Pt is a 55 yo HF pmh HTN, DM who presented to ED with SOB and worsening peripheral edema for 2 weeks with acute worsening of SOB last night. Pt was BIBA and placed on bipap. Received 80 mg and then 120 mg lasix Was ultimately intubated due to respiratory failure. Pulmonology was consulted for vent and critical care management.

## 2019-11-05 NOTE — HPI
53 yo female with no past medical history due to being in the ED presenting with 2 week history of worsening of SOB and peripheral edema. Per sister, patient has had elevated blood pressures at home and sister has tried to give Losartan to the patient but it has not improved her blood pressures. Patient has not been able to lay flat due to dyspnea.     In the ED, patient's blood pressure was in the 200/100s. Received Lasix 80mg IV. Patient presented in respiratory distress and placed on Bipap. When patient did not improvement clinically on Bipap, patient was intubated and sedated with Fentanyl and Nitroglycerine drip. Bun/Cr elevated at 47/3.4 up from a baseline of 11/0.9. Trop elevated at 0.206.

## 2019-11-05 NOTE — ASSESSMENT & PLAN NOTE
2 week history of worsening of breath  Crackles on exam  No improvement with Bipap  Intubated in the ED

## 2019-11-05 NOTE — CONSULTS
[facility]  Pulmonology  Consult Note    Patient Name: Ping Davenport  MRN: [id number]  Admission Date: 11/5/2019  Hospital Length of Stay: 0 days  Code Status: Full Code  Attending Physician: Bucky Chapin MD  Primary Care Provider: Primary Doctor No   Principal Problem: Hypertensive emergency    Inpatient consult to Pulmonology  Consult performed by: Shyam Vogt MD  Consult ordered by: Alisa Resendiz MD        Subjective:     HPI:  Pt is a 53 yo HF pmh HTN, DM who presented to ED with SOB and worsening peripheral edema for 2 weeks with acute worsening of SOB last night. Pt was BIBA and placed on bipap. Received 80 mg and then 120 mg lasix Was ultimately intubated due to respiratory failure. Pulmonology was consulted for vent and critical care management.     No past medical history on file.    No past surgical history on file.    Review of patient's allergies indicates:  No Known Allergies    Family History     None        Tobacco Use    Smoking status: Not on file   Substance and Sexual Activity    Alcohol use: Not on file    Drug use: Not on file    Sexual activity: Not on file         Review of Systems   Unable to perform ROS: Intubated     Objective:     Vital Signs (Most Recent):  Temp: (S) (!) 95.2 °F (35.1 °C) (11/05/19 0805)  Pulse: 66 (11/05/19 0940)  Resp: (!) 28 (11/05/19 0940)  BP: (!) 156/79 (11/05/19 0940)  SpO2: 99 % (11/05/19 0940) Vital Signs (24h Range):  Temp:  [95.2 °F (35.1 °C)] 95.2 °F (35.1 °C)  Pulse:  [66-94] 66  Resp:  [16-32] 28  SpO2:  [99 %-100 %] 99 %  BP: (140-201)/(70-92) 156/79     Weight: 75 kg (165 lb 5.5 oz)  Body mass index is 30.24 kg/m².      Intake/Output Summary (Last 24 hours) at 11/5/2019 1015  Last data filed at 11/5/2019 0858  Gross per 24 hour   Intake --   Output 525 ml   Net -525 ml       Physical Exam   Constitutional: She appears well-developed and well-nourished.   HENT:   Head: Normocephalic and atraumatic.   Eyes: Pupils are  equal, round, and reactive to light. Conjunctivae and EOM are normal.   Neck: Normal range of motion. Neck supple. JVD present.   Cardiovascular: Normal rate, regular rhythm and normal heart sounds. Exam reveals no gallop and no friction rub.   No murmur heard.  Pulmonary/Chest: She exhibits no tenderness.   Intubated, b/l rhonchi in anterior lung fields and crackles in bilateral lateral lung fields. No wheezes noted.    Abdominal: Soft. Bowel sounds are normal. She exhibits no distension. There is no tenderness.   Musculoskeletal: Normal range of motion. She exhibits edema.   +2 pitting edema   Neurological: She is alert.   Unable to assess orientation. RASS -1   Skin: Skin is warm and dry. Capillary refill takes less than 2 seconds.       Vents:  Vent Mode: A/C (11/05/19 0732)  Ventilator Initiated: Yes (11/05/19 0547)  Set Rate: 16 bmp (11/05/19 0732)  Vt Set: 450 mL (11/05/19 0732)  PEEP/CPAP: 10 cmH20 (11/05/19 0732)  Oxygen Concentration (%): 60 (11/05/19 0547)  Peak Airway Pressure: 30 cmH2O (11/05/19 0732)  Total Ve: 7.54 mL (11/05/19 0732)  F/VT Ratio<105 (RSBI): (!) 35.86 (11/05/19 0732)    Lines/Drains/Airways     Drain                 NG/OG Tube 11/05/19 0535 nasogastric 14 Fr. Right nostril less than 1 day         Urethral Catheter 11/05/19 0601 Latex 14 Fr. less than 1 day          Airway                 Airway - Non-Surgical 11/05/19 0520 Endotracheal Tube less than 1 day          Peripheral Intravenous Line                 Peripheral IV - Single Lumen 11/05/19 0322 18 G Left Antecubital less than 1 day         Peripheral IV - Single Lumen 11/05/19 0549 20 G Right Forearm less than 1 day         Peripheral IV - Single Lumen 11/05/19 0718 20 G Right Hand less than 1 day                Significant Labs:    CBC/Anemia Profile:  Recent Labs   Lab 11/05/19 0322   WBC 7.15   HGB 8.7*   HCT 28.2*      MCV 88   RDW 14.1        Chemistries:  Recent Labs   Lab 11/05/19 0322 11/05/19 0837     --     K 4.6  --    *  --    CO2 16*  --    BUN 47*  --    CREATININE 3.4*  --    CALCIUM 8.6*  --    ALBUMIN 2.7*  --    PROT 6.1  --    BILITOT 0.3  --    ALKPHOS 89  --    ALT 15  --    AST 25  --    MG  --  1.8   PHOS  --  4.6*       ABGs:   Recent Labs   Lab 11/05/19  0553   PH 7.282*   PCO2 38.8   HCO3 18.3*   POCSATURATED 100   BE -8     Blood Culture: No results for input(s): LABBLOO in the last 48 hours.  CMP:   Recent Labs   Lab 11/05/19  0322      K 4.6   *   CO2 16*   *   BUN 47*   CREATININE 3.4*   CALCIUM 8.6*   PROT 6.1   ALBUMIN 2.7*   BILITOT 0.3   ALKPHOS 89   AST 25   ALT 15   ANIONGAP 12   EGFRNONAA 14*     Coagulation:   Recent Labs   Lab 11/05/19  0322   INR 1.1     Lactic Acid:   Recent Labs   Lab 11/05/19  0936   LACTATE 0.6     Respiratory Culture: No results for input(s): GSRESP, RESPIRATORYC in the last 48 hours.  Troponin:   Recent Labs   Lab 11/05/19  0322 11/05/19  0837   TROPONINI 0.206* 0.184*  0.195*  0.195*       Significant Imaging:   I have reviewed and interpreted all pertinent imaging results/findings within the past 24 hours.    Assessment/Plan:     * Hypertensive emergency  Start PO medications in order to titrate Nitro  - suggest ARB for heart failure as well as HCTZ for hypertension and PO PRN medications such as clonidine or hydralazine and titrate standard medications for SBP <150 in setting of chronic HTN and HTN emergency  - check UDS and pregnancy test      NSTEMI (non-ST elevated myocardial infarction)  Troponin elevation in setting of Hypertensive emergency and new onset CHF  - trend troponin and EKG q4-6 hour    Acute renal failure  - likely secondary to chronic HTN with acute HTN crisis  - urine lytes, renal US  - avoid nephrotoxic medications and monitor K+ daily, BUN  - control BP as above    Acute respiratory failure with hypoxia and hypercarbia  - secondary to hypertensive emergency in setting of severe HTN and pulmonary edema on XR  -  diurese and blood pressure control as above  - SAT/SBT this evening and tomorrow AM if not able to extubate this evening  - 6ml/kg TV for lung protection   - wean O2 to >90% at lowest possible FIO2    New onset of congestive heart failure  - obtain echo  - spot dose diuresis with lasix 120 mg with net goal of -1.5 to 2L     Neuro:   Sedated with propofol and fentanyl  - perform SAT this evening and in AM if not extubated    CV:   New onset CHF  NSTEMI   HTN emergency   - see above    Pulmonary:  Pulmonary edema  Hypoxic respiratory failure    Renal:   Acute renal failure  - no emergent needs for dialysis; monitor with daily labs    Endo:   - goal -180 while inpatient  - check A1c  - suggest SSI and hypoglycemic protocol    MSK:   - no acute problems    GI:   - no acute issues        Thank you for your consult. I will follow-up with patient. Please contact us if you have any additional questions.     Shyam Vogt MD  Pulmonology  [facility]  Pager: [phone]

## 2019-11-05 NOTE — ASSESSMENT & PLAN NOTE
No ECHO   Chest X-ray: Suggestive of edema/CHF with bilateral effusions  3+ pitting edema in bilateral lower extremity to the abdomen  Hx of worsening dyspnea per sister at bedside  Received Lasix 80mg IV in the ED  Give 1 additional Lasix 120mg IV  Strict I&Os  Fluid restriction 1500cc  Will need ECHO when not critically ill

## 2019-11-05 NOTE — ASSESSMENT & PLAN NOTE
Troponin elevation in setting of Hypertensive emergency and new onset CHF  - trend troponin and EKG q4-6 hour

## 2019-11-05 NOTE — ASSESSMENT & PLAN NOTE
- secondary to hypertensive emergency in setting of severe HTN and pulmonary edema on XR  - diurese and blood pressure control as above  - SAT/SBT this evening and tomorrow AM if not able to extubate this evening  - 6ml/kg TV for lung protection   - wean O2 to >90% at lowest possible FIO2

## 2019-11-05 NOTE — CONSULTS
"[facility] Consult Note    Date of Admit: 2019    Chief Complaint/Reason for Consult     AXEL stage III vs CKD 5    Subjective:      History of Present Illness:  53 yo no PMH, who we were consulted on for AXEL stage III vs CKD 5. History from sister as pt intubated. She says pt has been getting progressively sob for 2 weeks, a/w generalized swelling, most pronounced in LE. She reports before last two weeks she has had episodes of edema and was previously being worked up for SLE though never follow up to get labs. Sister has been giving pt her BP meds. On admit BP was 201/86 and she was intubated for resp distress and Cr was 3.4 (0.6 in ). She was given lasix 80 IV however UOP was minimal and then given 120mg IV with about 150ml UOP over two hrs. BNP 1,165 and cxr consistent w/ volume overload. UA with 2+ protein. Has AGMA with NAGMA. ROS unable to be performed    Past Medical History:  No past medical history on file.    Past Surgical History:  No past surgical history on file.    Allergies:  Review of patient's allergies indicates:  No Known Allergies    Home Medications:  Prior to Admission medications    Medication Sig Start Date End Date Taking? Authorizing Provider   nystatin-triamcinolone (MYCOLOG II) cream Apply topically once daily. 10/13/14   Elise Christianson MD       Family History:  No family history on file.    Social History:  Social History     Tobacco Use    Smoking status: Not on file   Substance Use Topics    Alcohol use: Not on file    Drug use: Not on file       Review of Systems:  Pertinent positives and negatives are listed in HPI.  All other systems are reviewed and are negative.     Objective:   Last 24 Hour Vital Signs:  BP  Min: 150/70  Max: 201/86  Temp  Av.2 °F (35.1 °C)  Min: 95.2 °F (35.1 °C)  Max: 95.2 °F (35.1 °C)  Pulse  Av.7  Min: 71  Max: 94  Resp  Av.1  Min: 16  Max: 32  SpO2  Av.8 %  Min: 99 %  Max: 100 %  Height  Av' 2" (157.5 cm)  Min: 5' " "2" (157.5 cm)  Max: 5' 2" (157.5 cm)  Weight  Av kg (165 lb 5.5 oz)  Min: 75 kg (165 lb 5.5 oz)  Max: 75 kg (165 lb 5.5 oz)  Body mass index is 30.24 kg/m².  I/O last 3 completed shifts:  In: -   Out: 150 [Urine:150]    Physical Examination:  Gen: intubated, sedated  HEENT: ETT in place, dry [place]  Neck: no thyroidmegaly, no LAD  Cardio: RRR, normal S1/S2, no MRG, JVP wnl  Lungs: CTAB  Abd: 2+ pitting edema   Ext: 2+ pitting edema LE  Skin: warm, dry, no rashes noted  Neuro: on propofol, sedated        Laboratory:  Most Recent Data:  CBC:   Lab Results   Component Value Date    WBC 7.15 2019    HGB 8.7 (L) 2019    HCT 28.2 (L) 2019     2019    MCV 88 2019    RDW 14.1 2019     BMP:   Lab Results   Component Value Date     2019    K 4.6 2019     (H) 2019    CO2 16 (L) 2019    BUN 47 (H) 2019    CREATININE 3.4 (H) 2019     (H) 2019    CALCIUM 8.6 (L) 2019     LFTs:   Lab Results   Component Value Date    PROT 6.1 2019    ALBUMIN 2.7 (L) 2019    BILITOT 0.3 2019    AST 25 2019    ALKPHOS 89 2019    ALT 15 2019     Coags:   Lab Results   Component Value Date    INR 1.1 2019     Urinalysis:   Lab Results   Component Value Date    COLORU Yellow 2019    SPECGRAV 1.025 2019    NITRITE Negative 2019    KETONESU Negative 2019    UROBILINOGEN Negative 2019    WBCUA 1 2019       Trended Lab Data:  Recent Labs   Lab 19  0322   WBC 7.15   HGB 8.7*   HCT 28.2*      MCV 88   RDW 14.1      K 4.6   *   CO2 16*   BUN 47*   CREATININE 3.4*   *   PROT 6.1   ALBUMIN 2.7*   BILITOT 0.3   AST 25   ALKPHOS 89   ALT 15       Trended Cardiac Data:  Recent Labs   Lab 19  0322 19  0837   TROPONINI 0.206* 0.195*  0.195*   BNP 1,165*  --          Radiology:  Imaging Results          US Retroperitoneal Complete " (Kidney and (In process)                X-Ray Chest 1 View (Final result)  Result time 11/05/19 06:16:39    Final result by Umberto Quinn MD (11/05/19 06:16:39)                 Impression:      As above.      Electronically signed by: Umberto Quinn MD  Date:    11/05/2019  Time:    06:16             Narrative:    EXAMINATION:  XR CHEST 1 VIEW    CLINICAL HISTORY:  Respiratory failure, unspecified, unspecified whether with hypoxia or hypercapnia    TECHNIQUE:  Single frontal view of the chest was performed.    COMPARISON:  11/05/2019 04:07 hours    FINDINGS:  There has been interval placement of an endotracheal tube with tip terminating approximately 4.0 cm above the lindsay.  Esophagogastric tube has been intervally placed extending below the diaphragm, extending beyond the field of view of the examination.  No significant change in cardiopulmonary status compared to prior examination, noting interstitial edema bilateral pleural effusions.  No evidence of pneumothorax.                               X-Ray Chest AP Portable (Final result)  Result time 11/05/19 05:11:22    Final result by Umberto Quinn MD (11/05/19 05:11:22)                 Impression:      Radiographic findings suggestive of edema/CHF with bilateral pleural effusions and atelectasis/consolidation.      Electronically signed by: Umberto Quinn MD  Date:    11/05/2019  Time:    05:11             Narrative:    EXAMINATION:  XR CHEST AP PORTABLE    CLINICAL HISTORY:  CHF;    TECHNIQUE:  Single frontal view of the chest was performed.    COMPARISON:  None    FINDINGS:  Cardiac monitoring leads overlie the chest.  Cardiomediastinal silhouette appears mildly enlarged.  There are bilateral interstitial opacities suggesting edema.  There is opacification of the bilateral lower lung zones, right more so than left, suggestive of a combination of pleural fluid with associated atelectasis and/or consolidation.  There is no evidence of pneumothorax.   Visualized osseous structures appear intact.                                   Assessment and Plan:      Ping Davenport is a 54 y.o.female with  Patient Active Problem List    Diagnosis Date Noted    Hypertensive emergency 11/05/2019    New onset of congestive heart failure 11/05/2019    Acute respiratory failure with hypoxia and hypercarbia 11/05/2019    Acute renal failure 11/05/2019    Rash 10/13/2014    Encounter to establish care 10/13/2014    Screen for STD (sexually transmitted disease) 10/13/2014        AXEL stage III vs CKD 5  -Etiologies can include cardiorenal, HTN emergency, SLE? (hx from sister)  -AGMA, NAGMA, Cr on admit 3.4  -[place], Needs aggressive diuresis, would add lasix gtt for titration throughout the day to UOP of 1ml/kg/hr. If her UOP doesn't  will need HD. Check FENA, FEUrea and pc ratio as I am concerned she may have nephrotic syndrome and might need future renal bx    AGMA/NAGMA  -Add NaHCO3 tabs for now, recheck BMP in 4 hrs, if no change in acidosis, may initiate HD as above    Hypertensive  Emergency  -On nitro gtt, can add PO regimen enmanuel    [facility] HO IV

## 2019-11-05 NOTE — ED PROVIDER NOTES
Encounter Date: 11/5/2019       History     Chief Complaint   Patient presents with    Shortness of Breath     To ER per EMS with c/o SOB and peripheral edema.       Ping Davenport is a 54 y.o. female who  has no past medical history on file.     Time seen by provider: 3:15 AM.    The patient presents to the ED due to SOB and peripheral edema.   Patient reports symptoms started 2 days ago, and have only worsened. Patient had nausea, but denies fever, chills, chest pain, or vomiting. Patient denies history of heart failure. Patient was given nitrate by EMS PTA with little improvement.  Patient arrived in the ER, moderate respiratory distress, speaks few words sentences, further history review of systems was limited due to acuity of patient condition.    The history is provided by the patient and the EMS personnel.     Review of patient's allergies indicates:  No Known Allergies  No past medical history on file.  No past surgical history on file.  No family history on file.  Social History     Tobacco Use    Smoking status: Not on file   Substance Use Topics    Alcohol use: Not on file    Drug use: Not on file     Review of Systems   Reason unable to perform ROS: Review of systems and history is limited due to patient acute respiratory distress.   Constitutional: Negative for chills and fever.   Respiratory: Positive for shortness of breath.    Cardiovascular: Negative for chest pain.   Gastrointestinal: Positive for nausea. Negative for abdominal pain and vomiting.   All other systems reviewed and are negative.      Physical Exam     Initial Vitals [11/05/19 0325]   BP Pulse Resp Temp SpO2   (!) 201/86 94 (!) 30 -- 100 %      MAP       --         Physical Exam    Nursing note and vitals reviewed.  Constitutional: She is not diaphoretic. No distress.   Chronically ill appearing.   HENT:   Head: Normocephalic and atraumatic.   Eyes: Conjunctivae and EOM are normal.   Neck: Normal range of motion. Neck supple.    Cardiovascular: Regular rhythm and normal heart sounds. Tachycardia present.    Pulmonary/Chest: She is in respiratory distress.   Diffuse crackles bilaterally. Moderate respiratory distress.   Abdominal: Soft. There is no tenderness.   2+ pitting edema on the abdomen.   Musculoskeletal: Normal range of motion. She exhibits no edema or tenderness.   Accessory muscle use. 3+ pitting edema bilaterally.   Neurological: She is alert and oriented to person, place, and time. She has normal strength.   Speaking short sentences.   Skin: Skin is warm and dry. Capillary refill takes less than 2 seconds.         ED Course   Intubation  Date/Time: 11/5/2019 5:47 AM  Performed by: Melissa Graff MD  Authorized by: Melissa Graff MD   Description of findings: Due to no improvement of treatment for CHF exacerbation, patient was intubated   Intubation method: video-assisted  Patient status: paralyzed (RSI)  Preoxygenation: mask  Sedatives: etomidate  Paralytic: rocuronium  Laryngoscope size: Vogt 3  Tube size: 7.5 mm  Tube type: cuffed  Number of attempts: 1  Cricoid pressure: yes  Cords visualized: yes  Post-procedure assessment: chest rise,  ETCO2 monitor and CO2 detector  Breath sounds: rales/crackles,  equal and equal and absent over the epigastrium  Cuff inflated: yes  Tube secured with: ETT hartman  Chest x-ray interpreted by me.  Chest x-ray findings: endotracheal tube in appropriate position  Complications: No  Specimens: No  Implants: No  Comments: Due to no clinical improvement, decision was made to intubate, patient was preoxygenated using BiPAP, she was successfully intubated with 1 attempt with the video mac, no complications.  ET tube confirmed by x-ray, breath sounds ,rising co2        Labs Reviewed   CBC W/ AUTO DIFFERENTIAL - Abnormal; Notable for the following components:       Result Value    RBC 3.22 (*)     Hemoglobin 8.7 (*)     Hematocrit 28.2 (*)     Mean Corpuscular Hemoglobin Conc 30.9 (*)      MPV 13.1 (*)     Lymph # 0.9 (*)     Gran% 76.9 (*)     Lymph% 12.7 (*)     All other components within normal limits   COMPREHENSIVE METABOLIC PANEL - Abnormal; Notable for the following components:    Chloride 112 (*)     CO2 16 (*)     Glucose 186 (*)     BUN, Bld 47 (*)     Creatinine 3.4 (*)     Calcium 8.6 (*)     Albumin 2.7 (*)     eGFR if  17 (*)     eGFR if non  14 (*)     All other components within normal limits   TROPONIN I - Abnormal; Notable for the following components:    Troponin I 0.206 (*)     All other components within normal limits   B-TYPE NATRIURETIC PEPTIDE - Abnormal; Notable for the following components:    BNP 1,165 (*)     All other components within normal limits   PROTIME-INR   D DIMER, QUANTITATIVE   D DIMER, QUANTITATIVE     EKG Readings: (Independently Interpreted)   Sinus rhythm at 94 beats per minute normal intervals, normal axis, no acute ST elevations       Imaging Results          X-Ray Chest AP Portable (Final result)  Result time 11/05/19 05:11:22    Final result by Umberto Quinn MD (11/05/19 05:11:22)                 Impression:      Radiographic findings suggestive of edema/CHF with bilateral pleural effusions and atelectasis/consolidation.      Electronically signed by: Umberto Quinn MD  Date:    11/05/2019  Time:    05:11             Narrative:    EXAMINATION:  XR CHEST AP PORTABLE    CLINICAL HISTORY:  CHF;    TECHNIQUE:  Single frontal view of the chest was performed.    COMPARISON:  None    FINDINGS:  Cardiac monitoring leads overlie the chest.  Cardiomediastinal silhouette appears mildly enlarged.  There are bilateral interstitial opacities suggesting edema.  There is opacification of the bilateral lower lung zones, right more so than left, suggestive of a combination of pleural fluid with associated atelectasis and/or consolidation.  There is no evidence of pneumothorax.  Visualized osseous structures appear intact.                                  Medical Decision Making:   Initial Assessment:   54-year-old female, who just reports a history of hypertension, presents the ER for evaluation shortness of breath. Onset 2 days, progressively worsening, denies any systemic symptoms. On arrival, patient in acute respiratory distress, accessory muscle use, diffuse bilateral crackles noted. Noted to be anasarca with pitting edema in to the abdomen.  She denies ever having an exacerbation like this before.  Noted to have a blood pressure of approximately 200 systolic.  Differential is broad including hypertensive emergency, flash pulmonary edema, CHF exacerbation, pneumonia, ACS/NSTEMI, jeremiah, renal failure versus other cause.  Patient was immediately placed on BiPAP, will start nitro drip and Lasix.  Will obtain blood work and will plan admission.  She remains critically ill.  Clinical Tests:   Lab Tests: Ordered and Reviewed  Radiological Study: Ordered and Reviewed  Medical Tests: Ordered and Reviewed            Scribe Attestation:   Scribe #1: I performed the above scribed service and the documentation accurately describes the services I performed. I attest to the accuracy of the note.    Attending Attestation:         Attending Critical Care:   Critical Care Times:   ==============================================================  · Total Critical Care Time - exclusive of procedural time: 65 minutes.  ==============================================================  Critical care was necessary to treat or prevent imminent or life-threatening deterioration of the following conditions: congestive heart failure and respiratory failure.   The following critical care procedures were done by me (see procedure notes): pulse oximetry.   Critical care was time spent personally by me on the following activities: obtaining history from patient or relative, examination of patient, review of x-rays / CT sent with the patient, review of old charts, ordering lab,  x-rays, and/or EKG, development of treatment plan with patient or relative, ordering and performing treatments and interventions, evaluation of patient's response to treatment, discussions with primary provider, interpretation of cardiac measurements, re-evaluation of patient's conition and end of life discussions.   Critical Care Condition: life-threatening     Physician Attestation for Scribe:  Physician Attestation Statement for Scribe #1: I, Dr. Graff, reviewed documentation, as scribed by Jessica Banerjee in my presence, and it is both accurate and complete.                 ED Course as of Nov 05 0547   Tue Nov 05, 2019   0401 Patient resting comfortably in bed, no acute distress, tolerating BiPAP, blood pressure is improving.  On nitro drip, given Lasix.  Awaiting blood work x-ray will plan admission.    [SE]   0424 Daughter arrived bedside discussed with me, mother asthma in position time.  Daughter has been treating patient with losartan from a her own prescriptions.  Was supposedly supposed to follow-up for lupus, but never did.  I discussed with her patient's critical condition, she understands.  Patient remains critically ill but stable.    [SE]   0444 Patient blood pressure increased to approximately 200 systolic, she remains tachypneic, with minimal improvement in her respiratory status.  We readjusted her BiPAP settings, to 20 and 8, will up titrate her nitro, Lasix already given.  Discussed my concerns with patient and daughter, they agreed to intubation if needed.  Awaiting blood work.    [SE]   0503 RestingIn bed, continues to become fatigued.  Labs reviewed, elevated BNP, elevated troponin, a KI with increased creatinine and BUN.  Patient not improving with treatments.  I will plan to intubate patient.  Family understands and agrees with plan.    [SE]   0531 Patient's successfully intubated one attempt without any complications.  Will place NG tube, confirm placement with x-ray, patient admitted  to [facility].  She remains critically ill.    [SE]      ED Course User Index  [SE] Melissa Graff MD     Clinical Impression:       ICD-10-CM ICD-9-CM   1. NSTEMI (non-ST elevated myocardial infarction) I21.4 410.70   2. Shortness of breath R06.02 786.05   3. Respiratory failure requiring intubation J96.90 518.81   4. Encounter for intubation Z01.818 V72.83   5. AXEL (acute kidney injury) N17.9 584.9   6. Hypertensive emergency I16.1 401.9         Disposition:   Disposition: Admitted  Condition: Serious                       Melissa Graff MD  11/05/19 0550

## 2019-11-05 NOTE — ASSESSMENT & PLAN NOTE
On admission, BUN/Cr: 47/3.4 elevated from a baseline of 11/0.9  Most likely 2/2 to hypertensive emergency  Avoid nephrotoxic agents  F/U K, Phos, Mg

## 2019-11-05 NOTE — ED NOTES
Physician is at the bedside for re-evaluation. Patient appears calm but is still tachypnic. Daughter at the bedside. /81. 200 cc of urine noted on purewick collection container at this time. Will continue to monitor.

## 2019-11-05 NOTE — ED NOTES
Pt report received from OFELIA Alvarez. Pt is intubated with size 7 tube and 23 1/2 at the lip. Pt has NG tube in the right nostril. Pt is sedated and has nitro, propofol, and fentanyl infusing at this time. VSS. Will continue to monitor.

## 2019-11-05 NOTE — ED TRIAGE NOTES
Patient presents to the ED via  EMS from home with reports of having shortness of breath. Patient appears distressed speaking in short sentences. No hx of CHF that the patient is aware of. Denies any fever or chest pain.     Review of patient's allergies indicates:  No Known Allergies     APPEARANCE: Patient is alert, oriented x 4, and restless.  SKIN: Skin is normal for race, warm, and dry. Normal skin turgor and mucous membranes moist.  CARDIAC: +Tachycardia, no murmur heard.   RESPIRATORY: Tachypnea. Breath sounds coarse bilaterally to the bases. Respirations are labored. NRB mask per EMS. Respiratory at the bedside with bipap machine. Dr. Graff speaking to the patient about placing the bipap machine.    GASTRO: Bowel sounds normal, abdomen is soft, no tenderness, and no abdominal distention.  MUSCLE: Full ROM. No bony tenderness or soft tissue tenderness. No obvious deformity.  PERIPHERAL VASCULAR: peripheral pulses present. Normal cap refill. Warm to touch. Pitting edema from the feet radiating up to the lower abdomen  MENTAL STATUS: awake, alert and aware of environment.  ENT: EARS: no obvious drainage. NOSE: no active bleeding. THROAT: no redness or swelling.

## 2019-11-05 NOTE — PROGRESS NOTES
This is an attestation of a separate MICU house staff note from today.    Presented with HTN and acute hypoxic resp failure.  Intubated, pulm edema on cxr.  Family says she has had HTN for some time with no prescribed treatment.  UDS pending, would also check Upreg.  Diuresis, start per tube ARB and HCTZ, wean nitro, TTE now, likely SAT/SBT soon and extubate to bipap.  Renal ultrasound with dopplers.    90 minutes of critical care time was spent in the care of the patient. This included management of organ failures related to critical illness, titration of continuous infusions, management of mechanical ventilation, review of pertinent labs and imaging studies, discussion of the patient with consulting services, and discussion of the patients condition with the family.

## 2019-11-06 LAB
ALBUMIN SERPL BCP-MCNC: 2.2 G/DL (ref 3.5–5.2)
ALP SERPL-CCNC: 68 U/L (ref 55–135)
ALT SERPL W/O P-5'-P-CCNC: 11 U/L (ref 10–44)
ANA SER QL IF: NORMAL
ANION GAP SERPL CALC-SCNC: 9 MMOL/L (ref 8–16)
AORTIC ROOT ANNULUS: 2.88 CM
ASCENDING AORTA: 2.76 CM
AST SERPL-CCNC: 21 U/L (ref 10–40)
AV INDEX (PROSTH): 0.61
AV MEAN GRADIENT: 8 MMHG
AV PEAK GRADIENT: 14 MMHG
AV VALVE AREA: 1.67 CM2
AV VELOCITY RATIO: 0.61
B-HCG UR QL: NEGATIVE
BASOPHILS # BLD AUTO: 0.02 K/UL (ref 0–0.2)
BASOPHILS NFR BLD: 0.2 % (ref 0–1.9)
BILIRUB SERPL-MCNC: 0.2 MG/DL (ref 0.1–1)
BSA FOR ECHO PROCEDURE: 1.81 M2
BUN SERPL-MCNC: 50 MG/DL (ref 6–20)
C3 SERPL-MCNC: 107 MG/DL (ref 50–180)
C4 SERPL-MCNC: 31 MG/DL (ref 11–44)
CALCIUM SERPL-MCNC: 8.7 MG/DL (ref 8.7–10.5)
CHLORIDE SERPL-SCNC: 112 MMOL/L (ref 95–110)
CHOLEST SERPL-MCNC: 218 MG/DL (ref 120–199)
CHOLEST/HDLC SERPL: 5.7 {RATIO} (ref 2–5)
CO2 SERPL-SCNC: 19 MMOL/L (ref 23–29)
CREAT SERPL-MCNC: 3.4 MG/DL (ref 0.5–1.4)
CV ECHO LV RWT: 0.65 CM
DIFFERENTIAL METHOD: ABNORMAL
DOP CALC AO PEAK VEL: 1.86 M/S
DOP CALC AO VTI: 43.18 CM
DOP CALC LVOT AREA: 2.7 CM2
DOP CALC LVOT DIAMETER: 1.86 CM
DOP CALC LVOT PEAK VEL: 1.13 M/S
DOP CALC LVOT STROKE VOLUME: 72.1 CM3
DOP CALCLVOT PEAK VEL VTI: 26.55 CM
E WAVE DECELERATION TIME: 159.41 MSEC
E/A RATIO: 1.11
ECHO LV POSTERIOR WALL: 1.47 CM (ref 0.6–1.1)
EOSINOPHIL # BLD AUTO: 0.2 K/UL (ref 0–0.5)
EOSINOPHIL NFR BLD: 2.4 % (ref 0–8)
ERYTHROCYTE [DISTWIDTH] IN BLOOD BY AUTOMATED COUNT: 14.1 % (ref 11.5–14.5)
EST. GFR  (AFRICAN AMERICAN): 17 ML/MIN/1.73 M^2
EST. GFR  (NON AFRICAN AMERICAN): 14 ML/MIN/1.73 M^2
FRACTIONAL SHORTENING: 23 % (ref 28–44)
GLUCOSE SERPL-MCNC: 79 MG/DL (ref 70–110)
HCT VFR BLD AUTO: 25.8 % (ref 37–48.5)
HDLC SERPL-MCNC: 38 MG/DL (ref 40–75)
HDLC SERPL: 17.4 % (ref 20–50)
HGB BLD-MCNC: 8.1 G/DL (ref 12–16)
INTERVENTRICULAR SEPTUM: 1.19 CM (ref 0.6–1.1)
LA MAJOR: 5.52 CM
LA MINOR: 4.9 CM
LA WIDTH: 4 CM
LDLC SERPL CALC-MCNC: 151.4 MG/DL (ref 63–159)
LEFT ATRIUM SIZE: 4.07 CM
LEFT ATRIUM VOLUME INDEX: 40.7 ML/M2
LEFT ATRIUM VOLUME: 71.84 CM3
LEFT INTERNAL DIMENSION IN SYSTOLE: 3.51 CM (ref 2.1–4)
LEFT VENTRICLE DIASTOLIC VOLUME INDEX: 53.27 ML/M2
LEFT VENTRICLE DIASTOLIC VOLUME: 93.91 ML
LEFT VENTRICLE MASS INDEX: 132 G/M2
LEFT VENTRICLE SYSTOLIC VOLUME INDEX: 29.1 ML/M2
LEFT VENTRICLE SYSTOLIC VOLUME: 51.34 ML
LEFT VENTRICULAR INTERNAL DIMENSION IN DIASTOLE: 4.53 CM (ref 3.5–6)
LEFT VENTRICULAR MASS: 232.51 G
LYMPHOCYTES # BLD AUTO: 1.3 K/UL (ref 1–4.8)
LYMPHOCYTES NFR BLD: 15.1 % (ref 18–48)
MAGNESIUM SERPL-MCNC: 2 MG/DL (ref 1.6–2.6)
MCH RBC QN AUTO: 27.1 PG (ref 27–31)
MCHC RBC AUTO-ENTMCNC: 31.4 G/DL (ref 32–36)
MCV RBC AUTO: 86 FL (ref 82–98)
MONOCYTES # BLD AUTO: 0.7 K/UL (ref 0.3–1)
MONOCYTES NFR BLD: 8 % (ref 4–15)
MV PEAK A VEL: 0.96 M/S
MV PEAK E VEL: 1.07 M/S
NEUTROPHILS # BLD AUTO: 6.4 K/UL (ref 1.8–7.7)
NEUTROPHILS NFR BLD: 74.3 % (ref 38–73)
NONHDLC SERPL-MCNC: 180 MG/DL
PHOSPHATE SERPL-MCNC: 4.6 MG/DL (ref 2.7–4.5)
PISA TR MAX VEL: 2.78 M/S
PLATELET # BLD AUTO: 171 K/UL (ref 150–350)
PMV BLD AUTO: 12.5 FL (ref 9.2–12.9)
POCT GLUCOSE: 79 MG/DL (ref 70–110)
POCT GLUCOSE: 80 MG/DL (ref 70–110)
POCT GLUCOSE: 80 MG/DL (ref 70–110)
POCT GLUCOSE: 99 MG/DL (ref 70–110)
POTASSIUM SERPL-SCNC: 4.4 MMOL/L (ref 3.5–5.1)
PROT 24H UR-MRATE: 36 MG/SPEC (ref 0–100)
PROT SERPL-MCNC: 5.7 G/DL (ref 6–8.4)
PROT UR-MCNC: 144 MG/DL (ref 0–15)
PULM VEIN S/D RATIO: 1.25
PV PEAK D VEL: 0.56 M/S
PV PEAK S VEL: 0.7 M/S
RA MAJOR: 4.54 CM
RA WIDTH: 3.6 CM
RBC # BLD AUTO: 2.99 M/UL (ref 4–5.4)
RETICS/RBC NFR AUTO: 1.8 % (ref 0.5–2.5)
RIGHT VENTRICULAR END-DIASTOLIC DIMENSION: 2.98 CM
SODIUM SERPL-SCNC: 140 MMOL/L (ref 136–145)
STJ: 2.26 CM
TR MAX PG: 31 MMHG
TRICUSPID ANNULAR PLANE SYSTOLIC EXCURSION: 2.04 CM
TRIGL SERPL-MCNC: 143 MG/DL (ref 30–150)
URINE COLLECTION DURATION: ABNORMAL HR
URINE VOLUME: 25 ML
WBC # BLD AUTO: 8.6 K/UL (ref 3.9–12.7)

## 2019-11-06 PROCEDURE — 25000003 PHARM REV CODE 250: Performed by: STUDENT IN AN ORGANIZED HEALTH CARE EDUCATION/TRAINING PROGRAM

## 2019-11-06 PROCEDURE — 20000000 HC ICU ROOM

## 2019-11-06 PROCEDURE — 80061 LIPID PANEL: CPT

## 2019-11-06 PROCEDURE — 86160 COMPLEMENT ANTIGEN: CPT | Mod: 59

## 2019-11-06 PROCEDURE — 84166 PATHOLOGIST INTERPRETATION UPE: ICD-10-PCS | Mod: 26,,, | Performed by: PATHOLOGY

## 2019-11-06 PROCEDURE — 85045 AUTOMATED RETICULOCYTE COUNT: CPT

## 2019-11-06 PROCEDURE — 83735 ASSAY OF MAGNESIUM: CPT

## 2019-11-06 PROCEDURE — 82595 ASSAY OF CRYOGLOBULIN: CPT

## 2019-11-06 PROCEDURE — 84165 PROTEIN E-PHORESIS SERUM: CPT | Mod: 26,,, | Performed by: PATHOLOGY

## 2019-11-06 PROCEDURE — S0028 INJECTION, FAMOTIDINE, 20 MG: HCPCS | Performed by: STUDENT IN AN ORGANIZED HEALTH CARE EDUCATION/TRAINING PROGRAM

## 2019-11-06 PROCEDURE — 84156 ASSAY OF PROTEIN URINE: CPT

## 2019-11-06 PROCEDURE — 81025 URINE PREGNANCY TEST: CPT

## 2019-11-06 PROCEDURE — 84165 PATHOLOGIST INTERPRETATION SPE: ICD-10-PCS | Mod: 26,,, | Performed by: PATHOLOGY

## 2019-11-06 PROCEDURE — 86160 COMPLEMENT ANTIGEN: CPT

## 2019-11-06 PROCEDURE — 84165 PROTEIN E-PHORESIS SERUM: CPT

## 2019-11-06 PROCEDURE — 83520 IMMUNOASSAY QUANT NOS NONAB: CPT | Mod: 59

## 2019-11-06 PROCEDURE — 85025 COMPLETE CBC W/AUTO DIFF WBC: CPT

## 2019-11-06 PROCEDURE — 86780 TREPONEMA PALLIDUM: CPT

## 2019-11-06 PROCEDURE — 63600175 PHARM REV CODE 636 W HCPCS: Performed by: STUDENT IN AN ORGANIZED HEALTH CARE EDUCATION/TRAINING PROGRAM

## 2019-11-06 PROCEDURE — 36415 COLL VENOUS BLD VENIPUNCTURE: CPT

## 2019-11-06 PROCEDURE — 86255 FLUORESCENT ANTIBODY SCREEN: CPT

## 2019-11-06 PROCEDURE — 86060 ANTISTREPTOLYSIN O TITER: CPT

## 2019-11-06 PROCEDURE — 83520 IMMUNOASSAY QUANT NOS NONAB: CPT

## 2019-11-06 PROCEDURE — 80053 COMPREHEN METABOLIC PANEL: CPT

## 2019-11-06 PROCEDURE — 99900035 HC TECH TIME PER 15 MIN (STAT)

## 2019-11-06 PROCEDURE — 84100 ASSAY OF PHOSPHORUS: CPT

## 2019-11-06 PROCEDURE — 84166 PROTEIN E-PHORESIS/URINE/CSF: CPT

## 2019-11-06 PROCEDURE — 84166 PROTEIN E-PHORESIS/URINE/CSF: CPT | Mod: 26,,, | Performed by: PATHOLOGY

## 2019-11-06 RX ORDER — LOSARTAN POTASSIUM 50 MG/1
100 TABLET ORAL DAILY
Status: DISCONTINUED | OUTPATIENT
Start: 2019-11-07 | End: 2019-11-10

## 2019-11-06 RX ORDER — FUROSEMIDE 10 MG/ML
120 INJECTION INTRAMUSCULAR; INTRAVENOUS 2 TIMES DAILY
Status: DISCONTINUED | OUTPATIENT
Start: 2019-11-06 | End: 2019-11-11

## 2019-11-06 RX ORDER — FUROSEMIDE 10 MG/ML
40 INJECTION INTRAMUSCULAR; INTRAVENOUS 3 TIMES DAILY
Status: DISCONTINUED | OUTPATIENT
Start: 2019-11-06 | End: 2019-11-06

## 2019-11-06 RX ORDER — CARVEDILOL 25 MG/1
25 TABLET ORAL 2 TIMES DAILY WITH MEALS
Status: DISCONTINUED | OUTPATIENT
Start: 2019-11-06 | End: 2019-11-06

## 2019-11-06 RX ORDER — FAMOTIDINE 20 MG/1
20 TABLET, FILM COATED ORAL DAILY
Status: DISCONTINUED | OUTPATIENT
Start: 2019-11-07 | End: 2019-11-07

## 2019-11-06 RX ORDER — HYDRALAZINE HYDROCHLORIDE 25 MG/1
25 TABLET, FILM COATED ORAL EVERY 8 HOURS
Status: DISCONTINUED | OUTPATIENT
Start: 2019-11-06 | End: 2019-11-06

## 2019-11-06 RX ORDER — SODIUM BICARBONATE 650 MG/1
2600 TABLET ORAL 3 TIMES DAILY
Status: DISCONTINUED | OUTPATIENT
Start: 2019-11-06 | End: 2019-11-10

## 2019-11-06 RX ORDER — HYDRALAZINE HYDROCHLORIDE 25 MG/1
25 TABLET, FILM COATED ORAL EVERY 8 HOURS PRN
Status: DISCONTINUED | OUTPATIENT
Start: 2019-11-06 | End: 2019-11-06

## 2019-11-06 RX ORDER — LOSARTAN POTASSIUM 50 MG/1
50 TABLET ORAL DAILY
Status: DISCONTINUED | OUTPATIENT
Start: 2019-11-06 | End: 2019-11-06

## 2019-11-06 RX ORDER — CARVEDILOL 25 MG/1
25 TABLET ORAL 2 TIMES DAILY WITH MEALS
Status: DISCONTINUED | OUTPATIENT
Start: 2019-11-06 | End: 2019-11-10

## 2019-11-06 RX ORDER — HYDRALAZINE HYDROCHLORIDE 25 MG/1
50 TABLET, FILM COATED ORAL EVERY 8 HOURS
Status: DISCONTINUED | OUTPATIENT
Start: 2019-11-06 | End: 2019-11-07

## 2019-11-06 RX ADMIN — FUROSEMIDE 120 MG: 10 INJECTION, SOLUTION INTRAVENOUS at 05:11

## 2019-11-06 RX ADMIN — HYDRALAZINE HYDROCHLORIDE 25 MG: 25 TABLET, FILM COATED ORAL at 01:11

## 2019-11-06 RX ADMIN — PROPOFOL 50 MCG/KG/MIN: 10 INJECTION, EMULSION INTRAVENOUS at 02:11

## 2019-11-06 RX ADMIN — CARVEDILOL 25 MG: 25 TABLET, FILM COATED ORAL at 09:11

## 2019-11-06 RX ADMIN — HEPARIN SODIUM 5000 UNITS: 5000 INJECTION, SOLUTION INTRAVENOUS; SUBCUTANEOUS at 05:11

## 2019-11-06 RX ADMIN — SODIUM BICARBONATE 650 MG TABLET 2600 MG: at 02:11

## 2019-11-06 RX ADMIN — LOSARTAN POTASSIUM 50 MG: 50 TABLET ORAL at 09:11

## 2019-11-06 RX ADMIN — AMLODIPINE BESYLATE 10 MG: 5 TABLET ORAL at 08:11

## 2019-11-06 RX ADMIN — FUROSEMIDE 120 MG: 10 INJECTION, SOLUTION INTRAVENOUS at 09:11

## 2019-11-06 RX ADMIN — FUROSEMIDE 20 MG/HR: 10 INJECTION, SOLUTION INTRAMUSCULAR; INTRAVENOUS at 02:11

## 2019-11-06 RX ADMIN — SODIUM BICARBONATE 650 MG TABLET 1300 MG: at 08:11

## 2019-11-06 RX ADMIN — HEPARIN SODIUM 5000 UNITS: 5000 INJECTION, SOLUTION INTRAVENOUS; SUBCUTANEOUS at 01:11

## 2019-11-06 RX ADMIN — HEPARIN SODIUM 5000 UNITS: 5000 INJECTION, SOLUTION INTRAVENOUS; SUBCUTANEOUS at 08:11

## 2019-11-06 RX ADMIN — FAMOTIDINE 20 MG: 10 INJECTION, SOLUTION INTRAVENOUS at 08:11

## 2019-11-06 RX ADMIN — HYDRALAZINE HYDROCHLORIDE 50 MG: 25 TABLET, FILM COATED ORAL at 08:11

## 2019-11-06 RX ADMIN — CARVEDILOL 25 MG: 25 TABLET, FILM COATED ORAL at 05:11

## 2019-11-06 RX ADMIN — SODIUM BICARBONATE 650 MG TABLET 2600 MG: at 08:11

## 2019-11-06 RX ADMIN — PROPOFOL 50 MCG/KG/MIN: 10 INJECTION, EMULSION INTRAVENOUS at 05:11

## 2019-11-06 NOTE — PROGRESS NOTES
Pharmacist Intervention IV to PO Note    Ping Davenport is a 54 y.o. female being treated with IV medication famotidine    Patient Data:    Vital Signs (Most Recent):  Temp: 98.7 °F (37.1 °C) (11/06/19 1530)  Pulse: 74 (11/06/19 1600)  Resp: 19 (11/06/19 1600)  BP: (!) 153/70 (11/06/19 1600)  SpO2: 97 % (11/06/19 1600)   Vital Signs (72h Range):  Temp:  [95.2 °F (35.1 °C)-98.7 °F (37.1 °C)]   Pulse:  []   Resp:  [0-38]   BP: (140-201)/()   SpO2:  [90 %-100 %]      CBC:  Recent Labs   Lab 11/05/19  0322 11/06/19  0318   WBC 7.15 8.60   RBC 3.22* 2.99*   HGB 8.7* 8.1*   HCT 28.2* 25.8*    171   MCV 88 86   MCH 27.0 27.1   MCHC 30.9* 31.4*     CMP:     Recent Labs   Lab 11/05/19  0322 11/05/19  1200 11/05/19  1744 11/06/19  0318   * 126* 80 79   CALCIUM 8.6* 8.7 8.4* 8.7   ALBUMIN 2.7*  --   --  2.2*   PROT 6.1  --   --  5.7*    141 142 140   K 4.6 4.0 3.9 4.4   CO2 16* 19* 20* 19*   * 114* 113* 112*   BUN 47* 51* 50* 50*   CREATININE 3.4* 3.3* 3.2* 3.4*   ALKPHOS 89  --   --  68   ALT 15  --   --  11   AST 25  --   --  21   BILITOT 0.3  --   --  0.2       Dietary Orders:  Diet Orders            Diet full liquid: Full Liquid starting at 11/06 1457            Based on the following criteria, this patient qualifies for intravenous to oral conversion:  [x] The patients gastrointestinal tract is functioning (tolerating medications via oral or enteral route for 24 hours and tolerating food or enteral feeds for 24 hours).  [x] The patient is hemodynamically stable for 24 hours (heart rate <100 beats per minute, systolic blood pressure >99 mm Hg, and respiratory rate <20 breaths per minute).  [x] The patient shows clinical improvement (afebrile for at least 24 hours and white blood cell count downtrending or normalized). Additionally, the patient must be non-neutropenic (absolute neutrophil count >500 cells/mm3).  [x] For antimicrobials, the patient has received IV therapy for at  least 24 hours.    IV medication famotidine 20mg q12h will be changed to oral medication famotidine 20mg daily (renal dose adjusted)    Pharmacist's Name: Charmaine Morfin  Pharmacist's Extension: [phone]

## 2019-11-06 NOTE — PLAN OF CARE
TN met with pt   lives with adult daughter Freedom (can't remember her phone #); sister Alla   and her disabled elderly mother.    Independent prior to admit - no hh, no dme   pt drives -- no pcp      per [facility] notes - pt has been interviewed by Medical Assistance Personnel - should qualify for Medicaid     dx:   SOB, Peripheral edema     This pt will need pcp f/u at d/c    PT/OT pending        11/06/19 5096   Discharge Assessment   Assessment Type Discharge Planning Assessment   Confirmed/corrected address and phone number on facesheet? Yes   Assessment information obtained from? Patient   Expected Length of Stay (days) 3   Communicated expected length of stay with patient/caregiver yes   Prior to hospitilization cognitive status: Alert/Oriented   Prior to hospitalization functional status: Independent   Current cognitive status: Alert/Oriented   Current Functional Status: Needs Assistance   Lives With parent(s);sibling(s);child(valdez), adult   Able to Return to Prior Arrangements yes   Is patient able to care for self after discharge? Unable to determine at this time (comments)   Patient's perception of discharge disposition home or selfcare   Readmission Within the Last 30 Days no previous admission in last 30 days   Patient currently being followed by outpatient case management? No   Patient currently receives any other outside agency services? No   Equipment Currently Used at Home none   Do you have any problems affording any of your prescribed medications? TBD  ([facility] )   Does the patient have transportation home? No  (pt is the primary  in the family )   Discharge Plan A Home;Home with family   DME Needed Upon Discharge    (tbd )   Patient/Family in Agreement with Plan yes

## 2019-11-06 NOTE — PLAN OF CARE
Patient on  with documented settings.  Alarms are set and functioning with adequate volumes.  AMBU bag and mask at bedside.  Will continue to monitor.

## 2019-11-06 NOTE — PLAN OF CARE
Pt received as charted on ventilator flowsheet. Ambu bag and mask at bedside. All alarms are on and functioning properly with adequate volume. Cuff pressure monitored via MLT. ETT size #7.5. Pt with no apparent distress noted. Will continue to monitor.

## 2019-11-06 NOTE — PROGRESS NOTES
This is an attestation of a separate MICU house staff note from today.    On nitro and lasix drips, passing SBT.  RASS 0, CAM -.  Has had norvasc PT so far.  Would give ARB now as, even in the setting of kidney disease, this improves outcomes in patients with HTN and likely CKD and shouldn't adversely affect the patient in the setting of A on CKD.  Then extubate after giving ARB.  If HF on echo, would then start coreg later today.  Wean nitro drip off during all of this.  Continue diuresis.    90 minutes of critical care time was spent in the care of the patient. This included management of organ failures related to critical illness, titration of continuous infusions, management of mechanical ventilation, review of pertinent labs and imaging studies, discussion of the patient with consulting services, and discussion of the patients condition with the patient and family.

## 2019-11-06 NOTE — PROGRESS NOTES
[facility] Consult Note    Date of Admit: 2019        Subjective:    Still intubated though on SBT this AM, on nitro gtt, LE edema mildly improved, good UOP. Follows commands   Objective:   Last 24 Hour Vital Signs:  BP  Min: 140/74  Max: 199/87  Temp  Av.8 °F (36.6 °C)  Min: 96.4 °F (35.8 °C)  Max: 98.3 °F (36.8 °C)  Pulse  Av.9  Min: 57  Max: 97  Resp  Av.2  Min: 0  Max: 38  SpO2  Av.2 %  Min: 93 %  Max: 100 %  Weight  Av.5 kg (192 lb 14.4 oz)  Min: 87.5 kg (192 lb 14.4 oz)  Max: 87.5 kg (192 lb 14.4 oz)  Body mass index is 35.28 kg/m².  I/O last 3 completed shifts:  In: 968.2 [I.V.:908.2; NG/GT:60]  Out: 3455 [Urine:3455]    Physical Examination:  Gen: intubated, appears comfortable  HEENT: ETT in place, dry [place]  Neck: no thyroidmegaly, no LAD  Cardio: RRR, normal S1/S2, no MRG, JVP wnl  Lungs: CTAB  Abd: 2+ pitting edema   Ext: 2+ pitting edema LE  Skin: warm, dry, no rashes noted  Neuro: moves ext        Laboratory:  Most Recent Data:  CBC:   Lab Results   Component Value Date    WBC 8.60 2019    HGB 8.1 (L) 2019    HCT 25.8 (L) 2019     2019    MCV 86 2019    RDW 14.1 2019     BMP:   Lab Results   Component Value Date     2019    K 4.4 2019     (H) 2019    CO2 19 (L) 2019    BUN 50 (H) 2019    CREATININE 3.4 (H) 2019    GLU 79 2019    CALCIUM 8.7 2019    MG 2.0 2019    PHOS 4.6 (H) 2019     LFTs:   Lab Results   Component Value Date    PROT 5.7 (L) 2019    ALBUMIN 2.2 (L) 2019    BILITOT 0.2 2019    AST 21 2019    ALKPHOS 68 2019    ALT 11 2019     Coags:   Lab Results   Component Value Date    INR 1.1 2019     Urinalysis:   Lab Results   Component Value Date    COLORU Yellow 2019    SPECGRAV 1.025 2019    NITRITE Negative 2019    KETONESU Negative 2019    UROBILINOGEN Negative 2019     WBCUA 1 11/05/2019       Trended Lab Data:  Recent Labs   Lab 11/05/19  0322 11/05/19  1200 11/05/19  1744 11/06/19  0318   WBC 7.15  --   --  8.60   HGB 8.7*  --   --  8.1*   HCT 28.2*  --   --  25.8*     --   --  171   MCV 88  --   --  86   RDW 14.1  --   --  14.1    141 142 140   K 4.6 4.0 3.9 4.4   * 114* 113* 112*   CO2 16* 19* 20* 19*   BUN 47* 51* 50* 50*   CREATININE 3.4* 3.3* 3.2* 3.4*   * 126* 80 79   PROT 6.1  --   --  5.7*   ALBUMIN 2.7*  --   --  2.2*   BILITOT 0.3  --   --  0.2   AST 25  --   --  21   ALKPHOS 89  --   --  68   ALT 15  --   --  11       Trended Cardiac Data:  Recent Labs   Lab 11/05/19  0322 11/05/19  0837 11/05/19  1200 11/05/19  1514   TROPONINI 0.206* 0.184*  0.195*  0.195* 0.184*  0.184* 0.179*   BNP 1,165*  --   --   --          Radiology:  Imaging Results          US Lower Extremity Veins Bilateral (Final result)  Result time 11/05/19 16:21:54    Final result by Suzanne Berrios MD (11/05/19 16:21:54)                 Impression:      No evidence of deep venous thrombosis in either lower extremity.      Electronically signed by: Suzanne Berrios MD  Date:    11/05/2019  Time:    16:21             Narrative:    EXAMINATION:  US LOWER EXTREMITY VEINS BILATERAL    CLINICAL HISTORY:  rule out dvt; Acute respiratory failure with hypoxia    TECHNIQUE:  Duplex and color flow Doppler and dynamic compression was performed of the bilateral lower extremity veins was performed.    COMPARISON:  None    FINDINGS:  Right thigh veins: The common femoral, femoral, popliteal, upper greater saphenous, and deep femoral veins are patent and free of thrombus. The veins are normally compressible and have normal phasic flow and augmentation response.    Right calf veins: The visualized calf veins are patent.    Left thigh veins: The common femoral, femoral, popliteal, upper greater saphenous, and deep femoral veins are patent and free of thrombus. The veins are  normally compressible and have normal phasic flow and augmentation response.    Left calf veins: The visualized calf veins are patent.    Miscellaneous: Subcutaneous edema noted lower extremities.                               US Retroperitoneal Complete (Kidney and (Final result)  Result time 11/05/19 09:44:48    Final result by Fransico Yun MD (11/05/19 09:44:48)                 Impression:      No hydronephrosis.    Elevated left renal resistive index, a nonspecific indicator of medical renal disease.    Incidental left pleural effusion.      Electronically signed by: Fransico Yun  Date:    11/05/2019  Time:    09:44             Narrative:    EXAMINATION:  US RETROPERITONEAL COMPLETE    CLINICAL HISTORY:  acute renal failure;    TECHNIQUE:  Ultrasound of the kidneys and urinary bladder was performed including color flow and Doppler evaluation of the kidneys.    COMPARISON:  None available    FINDINGS:  Right kidney: Right kidney measures 12.4 cm.  Resistive index of 0.63.  No hydronephrosis.    Left kidney: Left kidney measures 11.6 cm.  Resistive index of 0.80.  No hydronephrosis    Urinary bladder is unremarkable.  Splenic resistive index of 0.75.  Incidental left pleural effusion.                               X-Ray Chest 1 View (Final result)  Result time 11/05/19 06:16:39    Final result by Umberto Quinn MD (11/05/19 06:16:39)                 Impression:      As above.      Electronically signed by: Umberto Quinn MD  Date:    11/05/2019  Time:    06:16             Narrative:    EXAMINATION:  XR CHEST 1 VIEW    CLINICAL HISTORY:  Respiratory failure, unspecified, unspecified whether with hypoxia or hypercapnia    TECHNIQUE:  Single frontal view of the chest was performed.    COMPARISON:  11/05/2019 04:07 hours    FINDINGS:  There has been interval placement of an endotracheal tube with tip terminating approximately 4.0 cm above the lindsay.  Esophagogastric tube has been intervally placed  extending below the diaphragm, extending beyond the field of view of the examination.  No significant change in cardiopulmonary status compared to prior examination, noting interstitial edema bilateral pleural effusions.  No evidence of pneumothorax.                               X-Ray Chest AP Portable (Final result)  Result time 11/05/19 05:11:22    Final result by Umberto Quinn MD (11/05/19 05:11:22)                 Impression:      Radiographic findings suggestive of edema/CHF with bilateral pleural effusions and atelectasis/consolidation.      Electronically signed by: Umberto Quinn MD  Date:    11/05/2019  Time:    05:11             Narrative:    EXAMINATION:  XR CHEST AP PORTABLE    CLINICAL HISTORY:  CHF;    TECHNIQUE:  Single frontal view of the chest was performed.    COMPARISON:  None    FINDINGS:  Cardiac monitoring leads overlie the chest.  Cardiomediastinal silhouette appears mildly enlarged.  There are bilateral interstitial opacities suggesting edema.  There is opacification of the bilateral lower lung zones, right more so than left, suggestive of a combination of pleural fluid with associated atelectasis and/or consolidation.  There is no evidence of pneumothorax.  Visualized osseous structures appear intact.                                   Assessment and Plan:      Ping Davenport is a 54 y.o.female with  Patient Active Problem List    Diagnosis Date Noted    Hypertensive emergency 11/05/2019    New onset of congestive heart failure 11/05/2019    Acute respiratory failure with hypoxia and hypercarbia 11/05/2019    Acute renal failure 11/05/2019    NSTEMI (non-ST elevated myocardial infarction) 11/05/2019    Rash 10/13/2014    Encounter to establish care 10/13/2014    Screen for STD (sexually transmitted disease) 10/13/2014        AXEL stage III vs CKD 5  -Etiologies can include cardiorenal, HTN emergency, SLE? (hx from sister)  -Initially AGMA, NAGMA, Cr on admit 3.4, has 8.8g  proteinuria  -Nephrotic syndrome. Likely also HF component too.   -3.3L UOP yesterday, Cr slightly inc. . Agree with switching to IV pushes of lasix, would give 120mg bid. Will order serologies and when she is better will need likely need kidney bx    AGMA/NAGMA  -Inc NaHCO3 tabs to 2600 tid, will monitor BP with Na content    Hypertensive  Emergency  -On nitro gtt, and bridging to PO (on coreg, amlodipine). Can add ARB however need to watch K+ closely as although likely CKD, cannot say there is not an AXEL component. Will help with nephrotic range proteinuria    Waldemar Carlson MD  [facility]

## 2019-11-06 NOTE — PLAN OF CARE
Problem: Infection  Goal: Infection Symptom Resolution  Outcome: Ongoing, Progressing     Problem: Adult Inpatient Plan of Care  Goal: Plan of Care Review  Outcome: Ongoing, Progressing  Flowsheets (Taken 11/6/2019 8633)  Plan of Care Reviewed With: patient; sibling     Problem: Restraint, Nonbehavioral (Nonviolent)  Goal: Discontinuation Criteria Achieved  Outcome: Met

## 2019-11-06 NOTE — PLAN OF CARE
Nephrology consult appreciated, will increase Lasix to 120 BID. Drip was discontinued, initiation of Hydralazine 25 TID PO.  NaHCO3 tabs increased to 2600 TID. Patient UOP was approximately 1L today as of 4pm, will continue to monitor. NG tube removed, patient will undergo bedside swallow evaluation. Patient MEGAN negative, complement C-3 and C-4 WNL. Patient Hep A, Hep B, Hep C negative and HIV negative. I patient continues to progress appropriately, we anticipate step down to the floor tonight.

## 2019-11-06 NOTE — PROGRESS NOTES
[facility]  Critical Care Medicine  Progress Note  Patient Name: Ping Davenport  MRN: [id number]  Patient Class: IP- Inpatient  Admission Date: 11/5/2019  3:13 AM  Length of Stay: 1  Attending Physician: Bucky Chapin MD  Primary Care Provider: Primary Doctor No    Subjective:     Principal Problem: Hypertensive emergency    HPI:   55 yo female with no past medical history due to being in the ED presenting with 2 week history of worsening of SOB and peripheral edema. Per sister, patient has had elevated blood pressures at home and sister has tried to give Losartan to the patient but it has not improved her blood pressures. Patient has not been able to lay flat due to dyspnea In the ED, patient's blood pressure was in the 200/100s. Received Lasix 80mg IV. Patient presented in respiratory distress and placed on Bipap. When patient did not improvement clinically on Bipap, patient was intubated and sedated with Fentanyl and Nitroglycerine drip. Bun/Cr elevated at 47/3.4 up from a baseline of 11/0.9. Trop elevated at 0.206.     Interval History: This is day 1 of Ms. Davenport hospital stay. Patient currently intubated and sedated. UOP 3,155 while on the lasix drip. Nephrology on board and will evaluate for possible dialysis. Blood gases improved. CXR to be repeated this morning.     Review of patient's allergies indicates:  No Known Allergies   Scheduled Meds:   amLODIPine  10 mg Oral Daily    ceFEPime (MAXIPIME) IVPB  1 g Intravenous Q24H    famotidine (PF)  20 mg Intravenous Daily    heparin (porcine)  5,000 Units Subcutaneous Q8H    sodium bicarbonate  1,300 mg Oral TID     Continuous Infusions:   fentanyl Stopped (11/06/19 0641)    furosemide (LASIX) 2 mg/mL infusion (titrating) 20 mg/hr (11/06/19 0251)    nitroGLYCERIN 60 mcg/min (11/06/19 0000)    propofol 50 mcg/kg/min (11/06/19 0518)     PRN Meds:.acetaminophen, acetaminophen, Dextrose 10% Bolus, Dextrose 10% Bolus, glucagon  (human recombinant), glucose, glucose, insulin aspart U-100, ondansetron, promethazine (PHENERGAN) IVPB, ramelteon, sodium chloride 0.9%     Review of Systems   Unable to complete     Objective:      Vital Signs (Most Recent):  Temp: 98.1 °F (36.7 °C) (11/06/19 0500)  Pulse: 66 (11/06/19 0630)  Resp: (!) 27 (11/06/19 0630)  BP: (!) 149/70 (11/06/19 0630)  SpO2: 100 % (11/06/19 0630) Vital Signs (24h Range):  Temp:  [95.2 °F (35.1 °C)-98.1 °F (36.7 °C)]   Pulse:  [57-81]   Resp:  [0-38]   BP: (140-199)/()   SpO2:  [96 %-100 %]      Body mass index is 35.28 kg/m².    I & O (Last 24H):    Intake/Output Summary (Last 24 hours) at 11/6/2019 0711  Last data filed at 11/6/2019 0641  Gross per 24 hour   Intake 917.74 ml   Output 3155 ml   Net -2237.26 ml       Physical Exam  General: Intubated and sedated   HEENT: NC/ AT PERRL  Neck: Supple. No JVD. No LAD. No thyromegaly or masses. No bruits.   CV: RRR. NL S1/S2. No m/r/g   Chest: Crackles in the lower lobes b/l   Abd: +BS x 4. Soft. ND/NT. No rebound or guarding. No HSM. No CVA tenderness.    Ext: Peripheral pulses intact. NL ROM.  Neuro:  No focal deficit. DTRs 2+. Neurologic exam limited.   Skin: Intact. No rash. No lesions. Overall color, texture, and turgor wnl for race & age.         Peripheral IV - Single Lumen 11/05/19 0322 18 G Left Antecubital (Active)   Site Assessment Clean;Dry;Intact;No redness;No swelling 11/6/2019  3:07 AM   Line Status Infusing 11/6/2019  3:07 AM   Dressing Status Clean;Dry;Intact 11/6/2019  3:07 AM   Dressing Intervention Dressing reinforced 11/6/2019  3:07 AM   Dressing Change Due 11/09/19 11/6/2019  3:07 AM   Site Change Due 11/09/19 11/5/2019  7:07 PM   Reason Not Rotated Not due 11/6/2019  3:07 AM   Number of days: 1            Peripheral IV - Single Lumen 11/05/19 0549 20 G Right Forearm (Active)   Site Assessment Clean;Dry;Intact;No redness;No swelling 11/6/2019  3:07 AM   Line Status Infusing 11/6/2019  3:07 AM   Dressing  "Status Clean;Dry;Intact 11/6/2019  3:07 AM   Dressing Intervention Dressing reinforced 11/6/2019  3:07 AM   Dressing Change Due 11/09/19 11/6/2019  3:07 AM   Site Change Due 11/09/19 11/5/2019  7:07 PM   Reason Not Rotated Not due 11/6/2019  3:07 AM   Number of days: 1            Peripheral IV - Single Lumen 11/05/19 0718 20 G Right Hand (Active)   Site Assessment Clean;Dry;Intact;No redness;No swelling 11/6/2019  3:07 AM   Line Status Infusing 11/6/2019  3:07 AM   Dressing Status Clean;Dry;Intact 11/6/2019  3:07 AM   Dressing Intervention Dressing reinforced 11/6/2019  3:07 AM   Dressing Change Due 11/09/19 11/6/2019  3:07 AM   Site Change Due 11/09/19 11/5/2019  7:07 PM   Reason Not Rotated Not due 11/6/2019  3:07 AM   Number of days: 0            NG/OG Tube 11/05/19 0535 nasogastric 14 Fr. Right nostril (Active)   Placement Check placement verified by distal tube length measurement;placement verified by aspirate characteristics 11/6/2019  3:07 AM   Tolerance no signs/symptoms of discomfort 11/6/2019  3:07 AM   Securement secured to nostril center 11/6/2019  3:07 AM   Clamp Status/Tolerance clamped 11/6/2019  3:07 AM   Insertion Site Appearance no redness, warmth, tenderness, skin breakdown, drainage 11/6/2019  3:07 AM   Flush/Irrigation flushed w/;water;no resistance met 11/6/2019  3:07 AM   Intake (mL) 30 mL 11/6/2019  3:00 AM   Water Bolus (mL) 30 mL 11/5/2019  1:00 PM   Number of days: 1            Urethral Catheter 11/05/19 0601 Latex 14 Fr. (Active)   Site Assessment Clean;Intact 11/6/2019  3:07 AM   Collection Container Urimeter 11/6/2019  3:07 AM   Securement Method secured to top of thigh w/ adhesive device 11/6/2019  3:07 AM   Catheter Care Performed yes 11/6/2019  3:07 AM   Reason for Continuing Urinary Catheterization Critically ill in ICU requiring intensive monitoring 11/6/2019  3:07 AM   CAUTI Prevention Bundle StatLock in place w 1" slack;Intact seal between catheter & drainage tubing;Drainage " bag/urimeter off the floor;Green sheeting clip in use;No dependent loops or kinks;Drainage bag/urimeter not overfilled (<2/3 full);Drainage bag/urimeter below bladder 11/6/2019  3:07 AM   Output (mL) 150 mL 11/6/2019  6:00 AM   Number of days: 1     Vent Mode: A/C  Oxygen Concentration (%):  [50-60] 50  Resp Rate Total:  [16 br/min-36 br/min] 28 br/min  Vt Set:  [360 mL-450 mL] 360 mL  PEEP/CPAP:  [5 cmH20-10 cmH20] 5 cmH20  Pressure Support:  [0 cmH20] 0 cmH20  Mean Airway Pressure:  [11 cmH20-15 cmH20] 12 cmH20    Recent Labs   Lab 11/05/19 0322 11/06/19 0318   WBC 7.15 8.60   HGB 8.7* 8.1*   HCT 28.2* 25.8*   MCV 88 86   RBC 3.22* 2.99*   MCH 27.0 27.1   MCHC 30.9* 31.4*   RDW 14.1 14.1    171   MPV 13.1* 12.5   GRAN 76.9*  5.5 74.3*  6.4   LYMPH 12.7*  0.9* 15.1*  1.3   MONO 8.3  0.6 8.0  0.7   EOSINOPHIL 1.7 2.4   BASOPHIL 0.4 0.2     Recent Labs   Lab 11/05/19  0322 11/05/19  0837 11/05/19  1200 11/05/19  1744 11/06/19 0318     --  141 142 140   K 4.6  --  4.0 3.9 4.4   *  --  114* 113* 112*   CO2 16*  --  19* 20* 19*   BUN 47*  --  51* 50* 50*   CREATININE 3.4*  --  3.3* 3.2* 3.4*   *  --  126* 80 79   CALCIUM 8.6*  --  8.7 8.4* 8.7   PROT 6.1  --   --   --  5.7*   ALBUMIN 2.7*  --   --   --  2.2*   ALKPHOS 89  --   --   --  68   BILITOT 0.3  --   --   --  0.2   ALT 15  --   --   --  11   AST 25  --   --   --  21   ESTGFRAFRICA 17*  --  17* 18* 17*   EGFRNONAA 14*  --  15* 16* 14*   ANIONGAP 12  --  8 9 9   MG  --  1.8  --   --  2.0   PHOS  --  4.6*  --   --  4.6*     Recent Labs   Lab 11/05/19  0601   COLORU Yellow   APPEARANCEUA Clear   PHUR 6.0   SPECGRAV 1.025   PROTEINUA 2+*   GLUCUA 1+*   KETONESU Negative   BILIRUBINUA Negative   OCCULTUA 2+*   UROBILINOGEN Negative   NITRITE Negative   LEUKOCYTESUR Negative   RBCUA 19*   WBCUA 1   BACTERIA None   SQUAMEPITHEL 6   HYALINECASTS 0   MICROCMT SEE COMMENT     Recent Labs   Lab 11/05/19  0322 11/05/19  0837 11/05/19  1200  11/05/19  1514   TROPONINI 0.206* 0.184*  0.195*  0.195* 0.184*  0.184* 0.179*     Recent Labs   Lab 11/05/19  0322   INR 1.1     Recent Labs   Lab 11/05/19  0837   TSH 1.941     X-ray Chest 1 View    Result Date: 11/5/2019  EXAMINATION: XR CHEST 1 VIEW CLINICAL HISTORY: Respiratory failure, unspecified, unspecified whether with hypoxia or hypercapnia TECHNIQUE: Single frontal view of the chest was performed. COMPARISON: 11/05/2019 04:07 hours FINDINGS: There has been interval placement of an endotracheal tube with tip terminating approximately 4.0 cm above the lindsay.  Esophagogastric tube has been intervally placed extending below the diaphragm, extending beyond the field of view of the examination.  No significant change in cardiopulmonary status compared to prior examination, noting interstitial edema bilateral pleural effusions.  No evidence of pneumothorax.     As above. Electronically signed by: Umberto Quinn MD Date:    11/05/2019 Time:    06:16    Us Retroperitoneal Complete (kidney And    Result Date: 11/5/2019  EXAMINATION: US RETROPERITONEAL COMPLETE CLINICAL HISTORY: acute renal failure; TECHNIQUE: Ultrasound of the kidneys and urinary bladder was performed including color flow and Doppler evaluation of the kidneys. COMPARISON: None available FINDINGS: Right kidney: Right kidney measures 12.4 cm.  Resistive index of 0.63.  No hydronephrosis. Left kidney: Left kidney measures 11.6 cm.  Resistive index of 0.80.  No hydronephrosis Urinary bladder is unremarkable.  Splenic resistive index of 0.75.  Incidental left pleural effusion.     No hydronephrosis. Elevated left renal resistive index, a nonspecific indicator of medical renal disease. Incidental left pleural effusion. Electronically signed by: Fransico Yun Date:    11/05/2019 Time:    09:44    X-ray Chest Ap Portable    Result Date: 11/5/2019  EXAMINATION: XR CHEST AP PORTABLE CLINICAL HISTORY: CHF; TECHNIQUE: Single frontal view of the chest  was performed. COMPARISON: None FINDINGS: Cardiac monitoring leads overlie the chest.  Cardiomediastinal silhouette appears mildly enlarged.  There are bilateral interstitial opacities suggesting edema.  There is opacification of the bilateral lower lung zones, right more so than left, suggestive of a combination of pleural fluid with associated atelectasis and/or consolidation.  There is no evidence of pneumothorax.  Visualized osseous structures appear intact.     Radiographic findings suggestive of edema/CHF with bilateral pleural effusions and atelectasis/consolidation. Electronically signed by: Umberto Quinn MD Date:    11/05/2019 Time:    05:11    Us Lower Extremity Veins Bilateral    Result Date: 11/5/2019  EXAMINATION: US LOWER EXTREMITY VEINS BILATERAL CLINICAL HISTORY: rule out dvt; Acute respiratory failure with hypoxia TECHNIQUE: Duplex and color flow Doppler and dynamic compression was performed of the bilateral lower extremity veins was performed. COMPARISON: None FINDINGS: Right thigh veins: The common femoral, femoral, popliteal, upper greater saphenous, and deep femoral veins are patent and free of thrombus. The veins are normally compressible and have normal phasic flow and augmentation response. Right calf veins: The visualized calf veins are patent. Left thigh veins: The common femoral, femoral, popliteal, upper greater saphenous, and deep femoral veins are patent and free of thrombus. The veins are normally compressible and have normal phasic flow and augmentation response. Left calf veins: The visualized calf veins are patent. Miscellaneous: Subcutaneous edema noted lower extremities.     No evidence of deep venous thrombosis in either lower extremity. Electronically signed by: Suzanne Berrios MD Date:    11/05/2019 Time:    16:21    Microbiology Results (last 7 days)     Procedure Component Value Units Date/Time    Blood culture [[id number]] Collected:  11/05/19 0928    Order Status:   Completed Specimen:  Blood Updated:  11/05/19 2115     Blood Culture, Routine No Growth to date    Blood culture [[id number]] Collected:  11/05/19 0936    Order Status:  Completed Specimen:  Blood Updated:  11/05/19 2115     Blood Culture, Routine No Growth to date          ASSESSMENT/PLAN:  Ms. Davenport is a 54 y.o. female who has no past medical history on file.     Admitted for:     Active Hospital Problems       Hypertensive emergency    New onset of congestive heart failure    Acute respiratory failure with hypoxia and hypercarbia    Acute renal failure    NSTEMI (non-ST elevated myocardial infarction)      NEURO-PSYCH:  Intubated & sedated: Fentanyl and propafol   RASS: 0     PULM:    Flash Pulmonary Edema   CXR with biltaeral pleural effusions   Patient remains on ventilator  Patient continues to receive lasix drip   Will  Follow up repeat CXR this AM    ABG improved.    Possible SBT today     Recent Labs   Lab 11/05/19 0553 11/05/19  1129   PH 7.282* 7.302*   PCO2 38.8 38.7   PO2 214* 103*   HCO3 18.3* 19.1*   POCSATURATED 100 97   BE -8 -7     Vent Mode: A/C  Oxygen Concentration (%):  [50-60] 50  Resp Rate Total:  [16 br/min-36 br/min] 28 br/min  Vt Set:  [360 mL-450 mL] 360 mL  PEEP/CPAP:  [5 cmH20-10 cmH20] 5 cmH20  Pressure Support:  [0 cmH20] 0 cmH20  Mean Airway Pressure:  [11 cmH20-15 cmH20] 12 cmH20    CV:    HTN Emergency   Patient's blood pressure reduced 20% over the first hour   Continue to lower BP 5-15% over the next 23 hours as per protocol   Will begin to reduce Nitroglycerin drip and propofol and transition patient to oral medications   Will follow up with nephrology prior to administration of oral meds   No prior home regimen. However patient was taking losartan prior to arrival  Will continue to monitor     NSTEMI   Troponin .184 and .179 on repeat   No acute ST segment changes   Likely type 2 NSTEMI   Will continue Cardiac monitoring     RENAL-GI-FEN:    Acute Kidney Injury   Possible  acute on chronic Kidney disease   FeUrea 43.2%   Creatinine Increased from 3.2 to 3.4 yesterday   Monitor UOP   UOP net negative 2,2237   Avoid Nephrotoxic medications   Nephrology concern for possible nephrotic syndrome   Possible Dialysis pending nephro eval       ID-HEME-ONC:    Normocytic Anemia   H/H 8.1/25.8 MCV 86   Follow up reticulocyte count  Occult blood stool ordered     ENDO:    Prediabetes   A1c 5.8   BG goal while inpatient 140-180  Mod dose ss   Accuchecks ACHS       SKIN-MSK:  ROM: Unable to evaluate   Rash: none   Ulcers: No ulcers     PPx:  -VTE: Heparin 5,000 subcut qhrs    -GI: Famotidine   -Skin breakdown: Turn pt Q2hrs.   -Deconditioning: Pt/OT   -Aspiration: HOB>30    CODE STATUS: Full Code    DISPO: Will continue lasix drip and optimize blood pressure control on oral meds. Will follow up with nephrology for possible hemodialysis. Patient with good UOP. Follow up CXR. Will need close monitoring for assessment of fluid status.     Carlito Michele MD   [employer] [facility], PGY-2

## 2019-11-06 NOTE — PROGRESS NOTES
U Pulmonology HO-II Progress Note    Subjective:      Ping Davenport is a 54 y.o. female who is being followed by the [facility] IM service at [facility] for acute hypoxic respiratory failure secondary to pulmonary edema.     Patient continues to be intubated. Nitro drip was resumed due to continued elevated BP. Patient thus far has diuresed 3.5 L with Lasix drip. No acute events overnight.      Objective:   Last 24 Hour Vital Signs:  BP  Min: 140/74  Max: 199/87  Temp  Av.2 °F (36.2 °C)  Min: 95.2 °F (35.1 °C)  Max: 98.1 °F (36.7 °C)  Pulse  Av.8  Min: 57  Max: 81  Resp  Av.8  Min: 0  Max: 38  SpO2  Av.3 %  Min: 93 %  Max: 100 %  Weight  Av.5 kg (192 lb 14.4 oz)  Min: 87.5 kg (192 lb 14.4 oz)  Max: 87.5 kg (192 lb 14.4 oz)  I/O last 3 completed shifts:  In: 968.2 [I.V.:908.2; NG/GT:60]  Out: 3455 [Urine:3455]    Physical Examination:   GEN- Intubated and sedated, lying in bed  HEENT- PERRL, corneal present, gag present  NECK- No thyromegaly or other masses  CARDIAC- RRR, no murmurs or rubs  RESP- Coarse breath sounds bilaterally, no wheezes  ABD- Soft, NT, ND, +BS  EXT- No clubbing, cyanosis, or edema; 2+ DP/PT pulses  NEURO- As above, responds to pain, PERRL, corneal and gag present  SKIN- Warm and dry; no rashes    Laboratory:  Laboratory Data:  Trended Lab Data:   Recent Labs   Lab 19  0322 19  0837 19  1200 19  1744 19  0318   WBC 7.15  --   --   --  8.60   HGB 8.7*  --   --   --  8.1*   HCT 28.2*  --   --   --  25.8*     --   --   --  171   MCV 88  --   --   --  86   RDW 14.1  --   --   --  14.1     --  141 142 140   K 4.6  --  4.0 3.9 4.4   *  --  114* 113* 112*   CO2 16*  --  19* 20* 19*   BUN 47*  --  51* 50* 50*   CREATININE 3.4*  --  3.3* 3.2* 3.4*   *  --  126* 80 79   CALCIUM 8.6*  --  8.7 8.4* 8.7   MG  --  1.8  --   --  2.0   PHOS  --  4.6*  --   --  4.6*   PROT 6.1  --   --   --  5.7*   ALBUMIN 2.7*  --    --   --  2.2*   AST 25  --   --   --  21   ALT 15  --   --   --  11   ALKPHOS 89  --   --   --  68   BILITOT 0.3  --   --   --  0.2      Trended Cardiac Data:   Recent Labs   Lab 11/05/19  0322 11/05/19  0837 11/05/19  1200 11/05/19  1514   TROPONINI 0.206* 0.184*  0.195*  0.195* 0.184*  0.184* 0.179*   BNP 1,165*  --   --   --       Trended Cardiac Data:   Recent Labs   Lab 11/05/19  0812 11/05/19  1251 11/05/19 2013   POCTGLUCOSE 169* 112* 84      Other Results:  EKG (my interpretation):none new     Radiology Data:  TTE 11/5/2019 pending final read     Current Medications:     Infusions:   fentanyl Stopped (11/06/19 0641)    furosemide (LASIX) 2 mg/mL infusion (titrating) 20 mg/hr (11/06/19 0700)    nitroGLYCERIN 60 mcg/min (11/06/19 0700)    propofol Stopped (11/06/19 0727)        Scheduled:   amLODIPine  10 mg Oral Daily    ceFEPime (MAXIPIME) IVPB  1 g Intravenous Q24H    famotidine (PF)  20 mg Intravenous Daily    heparin (porcine)  5,000 Units Subcutaneous Q8H    sodium bicarbonate  1,300 mg Oral TID        PRN:  acetaminophen, acetaminophen, Dextrose 10% Bolus, Dextrose 10% Bolus, glucagon (human recombinant), glucose, glucose, insulin aspart U-100, ondansetron, promethazine (PHENERGAN) IVPB, ramelteon, sodium chloride 0.9%    Antibiotics and Day Number of Therapy:  Cefepime 11/5 - current    Assessment:     Patient is a 54 yr old female here with acute hypoxic respiratory failure secondary to hypertensive emergency and new onset heart failure with AXEL vs CKD.     Plan:     Neuro:   Sedated with propofol and fentanyl     CV:   Acute hypoxic respiratory failure 2/2 hypertensive emergency and new-onset heart failure  - Suggest starting ARB today  - Continue diuresis with Lasix. Net neg 2.5 L.   - Follow up TTE. If heart failure present, patient will need to follow up with Cardiology and start Coreg in the future     NSTEMI (non-ST elevated myocardial infarction)  - Troponin elevation in setting  of Hypertensive emergency and new onset CHF  - Troponin remaining stable, no longer need to trend     Pulmonary:  Acute respiratory failure with hypoxia and hypercarbia  - Secondary to hypertensive emergency in setting of severe HTN and pulmonary edema on XR  - diurese and blood pressure control as above  - Current vent settings this AM AC/360/28/5/30%  - SAT/SBT this morning with plans to extubate today     Renal:   Acute renal failure  - No emergent needs for dialysis; monitor with daily labs  - Cr 3.4 today, good urine output. US of kidney with no hydronephrosis or significant abnormality  - Continue diuresis  - Renal on board working up AXEL/CKD and proteinuria     Endo:   - goal -180 while inpatient  - A1c 5.8  - Suggest SSI and hypoglycemic protocol     GI:   - no acute issues    Ppx: Heparin    Duane Zapata MD  Internal/Emergency Medicine, PGY-II

## 2019-11-07 LAB
25(OH)D3+25(OH)D2 SERPL-MCNC: 7 NG/ML (ref 30–96)
ALBUMIN SERPL BCP-MCNC: 2.3 G/DL (ref 3.5–5.2)
ALBUMIN SERPL ELPH-MCNC: 2.42 G/DL (ref 3.35–5.55)
ALP SERPL-CCNC: 73 U/L (ref 55–135)
ALPHA1 GLOB SERPL ELPH-MCNC: 0.39 G/DL (ref 0.17–0.41)
ALPHA2 GLOB SERPL ELPH-MCNC: 0.69 G/DL (ref 0.43–0.99)
ALT SERPL W/O P-5'-P-CCNC: 11 U/L (ref 10–44)
ANION GAP SERPL CALC-SCNC: 13 MMOL/L (ref 8–16)
AST SERPL-CCNC: 23 U/L (ref 10–40)
B-GLOBULIN SERPL ELPH-MCNC: 0.95 G/DL (ref 0.5–1.1)
BASOPHILS # BLD AUTO: 0.02 K/UL (ref 0–0.2)
BASOPHILS NFR BLD: 0.2 % (ref 0–1.9)
BILIRUB SERPL-MCNC: 0.2 MG/DL (ref 0.1–1)
BM IGG SER-ACNC: <0.2 U
BUN SERPL-MCNC: 53 MG/DL (ref 6–20)
CALCIUM SERPL-MCNC: 8.6 MG/DL (ref 8.7–10.5)
CHLORIDE SERPL-SCNC: 109 MMOL/L (ref 95–110)
CK SERPL-CCNC: 172 U/L (ref 20–180)
CO2 SERPL-SCNC: 19 MMOL/L (ref 23–29)
CREAT SERPL-MCNC: 3.9 MG/DL (ref 0.5–1.4)
DIFFERENTIAL METHOD: ABNORMAL
EOSINOPHIL # BLD AUTO: 0.2 K/UL (ref 0–0.5)
EOSINOPHIL NFR BLD: 1.9 % (ref 0–8)
ERYTHROCYTE [DISTWIDTH] IN BLOOD BY AUTOMATED COUNT: 14.5 % (ref 11.5–14.5)
EST. GFR  (AFRICAN AMERICAN): 14 ML/MIN/1.73 M^2
EST. GFR  (NON AFRICAN AMERICAN): 12 ML/MIN/1.73 M^2
GAMMA GLOB SERPL ELPH-MCNC: 0.75 G/DL (ref 0.67–1.58)
GLUCOSE SERPL-MCNC: 80 MG/DL (ref 70–110)
HCT VFR BLD AUTO: 24.7 % (ref 37–48.5)
HGB BLD-MCNC: 7.8 G/DL (ref 12–16)
KAPPA LC SER QL IA: 16.54 MG/DL (ref 0.33–1.94)
KAPPA LC/LAMBDA SER IA: 2.02 (ref 0.26–1.65)
LAMBDA LC SER QL IA: 8.18 MG/DL (ref 0.57–2.63)
LYMPHOCYTES # BLD AUTO: 1.1 K/UL (ref 1–4.8)
LYMPHOCYTES NFR BLD: 11.9 % (ref 18–48)
MAGNESIUM SERPL-MCNC: 1.8 MG/DL (ref 1.6–2.6)
MCH RBC QN AUTO: 27.4 PG (ref 27–31)
MCHC RBC AUTO-ENTMCNC: 31.6 G/DL (ref 32–36)
MCV RBC AUTO: 87 FL (ref 82–98)
MONOCYTES # BLD AUTO: 1.1 K/UL (ref 0.3–1)
MONOCYTES NFR BLD: 11.5 % (ref 4–15)
NEUTROPHILS # BLD AUTO: 6.9 K/UL (ref 1.8–7.7)
NEUTROPHILS NFR BLD: 74.5 % (ref 38–73)
PATHOLOGIST INTERPRETATION SPE: NORMAL
PATHOLOGIST INTERPRETATION UPE: NORMAL
PHOSPHATE SERPL-MCNC: 5.4 MG/DL (ref 2.7–4.5)
PLATELET # BLD AUTO: 173 K/UL (ref 150–350)
PMV BLD AUTO: 13.5 FL (ref 9.2–12.9)
POCT GLUCOSE: 131 MG/DL (ref 70–110)
POCT GLUCOSE: 73 MG/DL (ref 70–110)
POTASSIUM SERPL-SCNC: 4.2 MMOL/L (ref 3.5–5.1)
PROT PATTERN UR ELPH-IMP: NORMAL
PROT SERPL-MCNC: 5.2 G/DL (ref 6–8.4)
PROT SERPL-MCNC: 6.1 G/DL (ref 6–8.4)
PTH-INTACT SERPL-MCNC: 199.5 PG/ML (ref 9–77)
RBC # BLD AUTO: 2.85 M/UL (ref 4–5.4)
SODIUM SERPL-SCNC: 141 MMOL/L (ref 136–145)
WBC # BLD AUTO: 9.29 K/UL (ref 3.9–12.7)

## 2019-11-07 PROCEDURE — 25000003 PHARM REV CODE 250: Performed by: STUDENT IN AN ORGANIZED HEALTH CARE EDUCATION/TRAINING PROGRAM

## 2019-11-07 PROCEDURE — 84100 ASSAY OF PHOSPHORUS: CPT

## 2019-11-07 PROCEDURE — 63600175 PHARM REV CODE 636 W HCPCS: Performed by: STUDENT IN AN ORGANIZED HEALTH CARE EDUCATION/TRAINING PROGRAM

## 2019-11-07 PROCEDURE — 94799 UNLISTED PULMONARY SVC/PX: CPT

## 2019-11-07 PROCEDURE — 99900035 HC TECH TIME PER 15 MIN (STAT)

## 2019-11-07 PROCEDURE — 80053 COMPREHEN METABOLIC PANEL: CPT

## 2019-11-07 PROCEDURE — 86225 DNA ANTIBODY NATIVE: CPT

## 2019-11-07 PROCEDURE — 36415 COLL VENOUS BLD VENIPUNCTURE: CPT

## 2019-11-07 PROCEDURE — 83735 ASSAY OF MAGNESIUM: CPT

## 2019-11-07 PROCEDURE — 83970 ASSAY OF PARATHORMONE: CPT

## 2019-11-07 PROCEDURE — 97163 PT EVAL HIGH COMPLEX 45 MIN: CPT

## 2019-11-07 PROCEDURE — 97530 THERAPEUTIC ACTIVITIES: CPT

## 2019-11-07 PROCEDURE — 82550 ASSAY OF CK (CPK): CPT

## 2019-11-07 PROCEDURE — 94761 N-INVAS EAR/PLS OXIMETRY MLT: CPT

## 2019-11-07 PROCEDURE — 85025 COMPLETE CBC W/AUTO DIFF WBC: CPT

## 2019-11-07 PROCEDURE — 92610 EVALUATE SWALLOWING FUNCTION: CPT

## 2019-11-07 PROCEDURE — 11000001 HC ACUTE MED/SURG PRIVATE ROOM

## 2019-11-07 PROCEDURE — 82306 VITAMIN D 25 HYDROXY: CPT

## 2019-11-07 PROCEDURE — 92526 ORAL FUNCTION THERAPY: CPT

## 2019-11-07 RX ORDER — AMOXICILLIN 250 MG
1 CAPSULE ORAL DAILY PRN
Status: DISCONTINUED | OUTPATIENT
Start: 2019-11-07 | End: 2019-11-18 | Stop reason: HOSPADM

## 2019-11-07 RX ORDER — HYDRALAZINE HYDROCHLORIDE 25 MG/1
75 TABLET, FILM COATED ORAL EVERY 8 HOURS
Status: DISCONTINUED | OUTPATIENT
Start: 2019-11-07 | End: 2019-11-08

## 2019-11-07 RX ADMIN — CARVEDILOL 25 MG: 25 TABLET, FILM COATED ORAL at 05:11

## 2019-11-07 RX ADMIN — HYDRALAZINE HYDROCHLORIDE 50 MG: 25 TABLET, FILM COATED ORAL at 05:11

## 2019-11-07 RX ADMIN — HEPARIN SODIUM 5000 UNITS: 5000 INJECTION, SOLUTION INTRAVENOUS; SUBCUTANEOUS at 01:11

## 2019-11-07 RX ADMIN — AMLODIPINE BESYLATE 10 MG: 5 TABLET ORAL at 09:11

## 2019-11-07 RX ADMIN — SODIUM BICARBONATE 650 MG TABLET 2600 MG: at 09:11

## 2019-11-07 RX ADMIN — FUROSEMIDE 120 MG: 10 INJECTION, SOLUTION INTRAVENOUS at 05:11

## 2019-11-07 RX ADMIN — CARVEDILOL 25 MG: 25 TABLET, FILM COATED ORAL at 04:11

## 2019-11-07 RX ADMIN — HYDRALAZINE HYDROCHLORIDE 75 MG: 25 TABLET, FILM COATED ORAL at 01:11

## 2019-11-07 RX ADMIN — SENNOSIDES AND DOCUSATE SODIUM 1 TABLET: 8.6; 5 TABLET ORAL at 09:11

## 2019-11-07 RX ADMIN — FUROSEMIDE 120 MG: 10 INJECTION, SOLUTION INTRAVENOUS at 09:11

## 2019-11-07 RX ADMIN — ACETAMINOPHEN 650 MG: 325 TABLET ORAL at 01:11

## 2019-11-07 RX ADMIN — SODIUM BICARBONATE 650 MG TABLET 2600 MG: at 04:11

## 2019-11-07 RX ADMIN — HEPARIN SODIUM 5000 UNITS: 5000 INJECTION, SOLUTION INTRAVENOUS; SUBCUTANEOUS at 05:11

## 2019-11-07 RX ADMIN — LOSARTAN POTASSIUM 100 MG: 50 TABLET ORAL at 09:11

## 2019-11-07 NOTE — PLAN OF CARE
Problem: Physical Therapy Goal  Goal: Physical Therapy Goal  Description  Goals to be met by: 2019     Patient will increase functional independence with mobility by performin. Supine to sit with Modified [place]  2. Sit to stand transfer with Modified [place]  3. Bed to chair transfer with Modified [place] using Rolling Walker  4. Gait  x 100 feet with Modified [place] using Rolling Walker.   5. Lower extremity exercise program x12 reps per handout, with supervision     Outcome: Ongoing, Progressing   PT initial evaluation completed. Plan of care and goals established and discussed with patient. Pt with significant deconditioning and generalized weakness; required mod A for sitting postural control; and 1-2 person assist for bed mobility. Pt unable to stand or ambulate at this time d/t weakness and poor arousal.  Pt also needs Occupational Therapy services.    Discharge Recommendation: Inpatient Rehabilitation  DME Recommendation: Defer to IPR

## 2019-11-07 NOTE — NURSING
Dr Michele ([facility]) informed of T 100.  No new orders given. Will continue to monitor

## 2019-11-07 NOTE — NURSING
Pt. Arrived up to floor. VS taken. Oriented to floor and plan of care. Pt verbalized understanding.

## 2019-11-07 NOTE — PROGRESS NOTES
"U Nephrology Progress Note    Date of Admit: 2019        Subjective:   On NC. Reports her arms and back are hurting. Good UOP  Objective:   Last 24 Hour Vital Signs:  BP  Min: 124/58  Max: 168/89  Temp  Av.7 °F (37.1 °C)  Min: 98.2 °F (36.8 °C)  Max: 100 °F (37.8 °C)  Pulse  Av.2  Min: 67  Max: 103  Resp  Av.5  Min: 14  Max: 36  SpO2  Av.3 %  Min: 88 %  Max: 99 %  Height  Av' 2" (157.5 cm)  Min: 5' 2" (157.5 cm)  Max: 5' 2" (157.5 cm)  Body mass index is 35.28 kg/m².  I/O last 3 completed shifts:  In: 977.1 [P.O.:250; I.V.:697.1; NG/GT:30]  Out: 3010 [Urine:3010]    Physical Examination:  Gen: NAD, AAOx3  HEENT: NC in place, dry [place]  Neck: no thyroidmegaly, no LAD  Cardio: RRR, normal S1/S2, no MRG, JVP wnl  Lungs: bibasilar crackles  Abd: abd edema improved  Ext: 2+ pitting edema LE  Skin: warm, dry, no rashes noted  Neuro: moves ext        Laboratory:  Most Recent Data:  CBC:   Lab Results   Component Value Date    WBC 9.29 2019    HGB 7.8 (L) 2019    HCT 24.7 (L) 2019     2019    MCV 87 2019    RDW 14.5 2019     BMP:   Lab Results   Component Value Date     2019    K 4.2 2019     2019    CO2 19 (L) 2019    BUN 53 (H) 2019    CREATININE 3.9 (H) 2019    GLU 80 2019    CALCIUM 8.6 (L) 2019    MG 1.8 2019    PHOS 5.4 (H) 2019     LFTs:   Lab Results   Component Value Date    PROT 6.1 2019    ALBUMIN 2.3 (L) 2019    BILITOT 0.2 2019    AST 23 2019    ALKPHOS 73 2019    ALT 11 2019     Coags:   Lab Results   Component Value Date    INR 1.1 2019     Urinalysis:   Lab Results   Component Value Date    COLORU Yellow 2019    SPECGRAV 1.025 2019    NITRITE Negative 2019    KETONESU Negative 2019    UROBILINOGEN Negative 2019    WBCUA 1 2019       Trended Lab Data:  Recent Labs   Lab 19  0322  " 11/05/19  1744 11/06/19  0318 11/07/19  0417   WBC 7.15  --   --  8.60 9.29   HGB 8.7*  --   --  8.1* 7.8*   HCT 28.2*  --   --  25.8* 24.7*     --   --  171 173   MCV 88  --   --  86 87   RDW 14.1  --   --  14.1 14.5      < > 142 140 141   K 4.6   < > 3.9 4.4 4.2   *   < > 113* 112* 109   CO2 16*   < > 20* 19* 19*   BUN 47*   < > 50* 50* 53*   CREATININE 3.4*   < > 3.2* 3.4* 3.9*   *   < > 80 79 80   PROT 6.1  --   --  5.7* 6.1   ALBUMIN 2.7*  --   --  2.2* 2.3*   BILITOT 0.3  --   --  0.2 0.2   AST 25  --   --  21 23   ALKPHOS 89  --   --  68 73   ALT 15  --   --  11 11    < > = values in this interval not displayed.       Trended Cardiac Data:  Recent Labs   Lab 11/05/19  0322 11/05/19  0837 11/05/19  1200 11/05/19  1514   TROPONINI 0.206* 0.184*  0.195*  0.195* 0.184*  0.184* 0.179*   BNP 1,165*  --   --   --          Radiology:  Imaging Results          US Lower Extremity Veins Bilateral (Final result)  Result time 11/05/19 16:21:54    Final result by Suzanne Berrios MD (11/05/19 16:21:54)                 Impression:      No evidence of deep venous thrombosis in either lower extremity.      Electronically signed by: Suzanne Berrios MD  Date:    11/05/2019  Time:    16:21             Narrative:    EXAMINATION:  US LOWER EXTREMITY VEINS BILATERAL    CLINICAL HISTORY:  rule out dvt; Acute respiratory failure with hypoxia    TECHNIQUE:  Duplex and color flow Doppler and dynamic compression was performed of the bilateral lower extremity veins was performed.    COMPARISON:  None    FINDINGS:  Right thigh veins: The common femoral, femoral, popliteal, upper greater saphenous, and deep femoral veins are patent and free of thrombus. The veins are normally compressible and have normal phasic flow and augmentation response.    Right calf veins: The visualized calf veins are patent.    Left thigh veins: The common femoral, femoral, popliteal, upper greater saphenous, and deep femoral  veins are patent and free of thrombus. The veins are normally compressible and have normal phasic flow and augmentation response.    Left calf veins: The visualized calf veins are patent.    Miscellaneous: Subcutaneous edema noted lower extremities.                               US Retroperitoneal Complete (Kidney and (Final result)  Result time 11/05/19 09:44:48    Final result by Fransico Yun MD (11/05/19 09:44:48)                 Impression:      No hydronephrosis.    Elevated left renal resistive index, a nonspecific indicator of medical renal disease.    Incidental left pleural effusion.      Electronically signed by: Fransico Yun  Date:    11/05/2019  Time:    09:44             Narrative:    EXAMINATION:  US RETROPERITONEAL COMPLETE    CLINICAL HISTORY:  acute renal failure;    TECHNIQUE:  Ultrasound of the kidneys and urinary bladder was performed including color flow and Doppler evaluation of the kidneys.    COMPARISON:  None available    FINDINGS:  Right kidney: Right kidney measures 12.4 cm.  Resistive index of 0.63.  No hydronephrosis.    Left kidney: Left kidney measures 11.6 cm.  Resistive index of 0.80.  No hydronephrosis    Urinary bladder is unremarkable.  Splenic resistive index of 0.75.  Incidental left pleural effusion.                               X-Ray Chest 1 View (Final result)  Result time 11/05/19 06:16:39    Final result by Umberto Quinn MD (11/05/19 06:16:39)                 Impression:      As above.      Electronically signed by: Umberto Quinn MD  Date:    11/05/2019  Time:    06:16             Narrative:    EXAMINATION:  XR CHEST 1 VIEW    CLINICAL HISTORY:  Respiratory failure, unspecified, unspecified whether with hypoxia or hypercapnia    TECHNIQUE:  Single frontal view of the chest was performed.    COMPARISON:  11/05/2019 04:07 hours    FINDINGS:  There has been interval placement of an endotracheal tube with tip terminating approximately 4.0 cm above the  lindsay.  Esophagogastric tube has been intervally placed extending below the diaphragm, extending beyond the field of view of the examination.  No significant change in cardiopulmonary status compared to prior examination, noting interstitial edema bilateral pleural effusions.  No evidence of pneumothorax.                               X-Ray Chest AP Portable (Final result)  Result time 11/05/19 05:11:22    Final result by Umberto Quinn MD (11/05/19 05:11:22)                 Impression:      Radiographic findings suggestive of edema/CHF with bilateral pleural effusions and atelectasis/consolidation.      Electronically signed by: Umberto Quinn MD  Date:    11/05/2019  Time:    05:11             Narrative:    EXAMINATION:  XR CHEST AP PORTABLE    CLINICAL HISTORY:  CHF;    TECHNIQUE:  Single frontal view of the chest was performed.    COMPARISON:  None    FINDINGS:  Cardiac monitoring leads overlie the chest.  Cardiomediastinal silhouette appears mildly enlarged.  There are bilateral interstitial opacities suggesting edema.  There is opacification of the bilateral lower lung zones, right more so than left, suggestive of a combination of pleural fluid with associated atelectasis and/or consolidation.  There is no evidence of pneumothorax.  Visualized osseous structures appear intact.                                   Assessment and Plan:      Ping Davenport is a 54 y.o.female with  Patient Active Problem List    Diagnosis Date Noted    Hypertensive emergency 11/05/2019    New onset of congestive heart failure 11/05/2019    Acute respiratory failure with hypoxia and hypercarbia 11/05/2019    Acute renal failure 11/05/2019    NSTEMI (non-ST elevated myocardial infarction) 11/05/2019    Rash 10/13/2014    Encounter to establish care 10/13/2014    Screen for STD (sexually transmitted disease) 10/13/2014        AXEL stage III vs CKD 5  -Etiologies include cardiorenal, HTN emergency, reports SLE like sx  though MEGAN neg, concerned this is chronic  -Initially AGMA, NAGMA, Cr on admit 3.4, has 8.8g proteinuria  -Nephrotic syndrome. Likely also HF component too.   -1.6L UOP yesterday, net -3.5L, Cr inc to 3.9. ARB can lower GFR however Cr inc significantly. She might have active GN going on. Still awaiting all serologies and will need bx when she is improved clinically. Would cont lasix as current dose    NAGMA  -Likely CKD  -Inc NaHCO3 tabs to 2600 tid, will monitor BP with Na content    Hypertensive  Emergency  -BP well controlled on hydralazine, losartan, coreg and amlodipine    Waldemar Carlson MD  [facility]ph[facility] HO IV

## 2019-11-07 NOTE — NURSING TRANSFER
Nursing Transfer Note      11/7/2019     Transfer To: [id number]    Transfer via bed    Transfer with cardiac monitoring    Transported by patient transporter & this RN    Medicines sent: na     Chart send with patient: Yes    Notified: Alla (sister) @ bedside    Upon arrival to floor: patient oriented to room, call bell in reach and bed in lowest position

## 2019-11-07 NOTE — PT/OT/SLP EVAL
Physical Therapy Evaluation    Patient Name:  Ping Davenport   MRN:  [id number]    Recommendations:     Discharge Recommendations:  rehabilitation facility   Discharge Equipment Recommendations: (Defer to IPR)   Barriers to discharge: Decreased caregiver support    Assessment:     Ping Davenport is a 54 y.o. female admitted with a medical diagnosis of Hypertensive emergency.  She presents with the following impairments/functional limitations:  weakness, impaired functional mobilty, impaired cognition, decreased safety awareness, impaired coordination, impaired cardiopulmonary response to activity, impaired endurance, gait instability, decreased coordination, pain, impaired fine motor, impaired balance, decreased upper extremity function, impaired self care skills, decreased lower extremity function, edema.  PT initial evaluation completed. Plan of care and goals established and discussed with patient. Pt with significant deconditioning and generalized weakness; required mod A for sitting postural control; and 1-2 person assist for bed mobility. Pt unable to stand or ambulate at this time d/t weakness and poor arousal.    Rehab Prognosis: Good; patient would benefit from acute skilled PT services to address these deficits and reach maximum level of function.    Recent Surgery: * No surgery found *      Plan:     During this hospitalization, patient to be seen 6 x/week to address the identified rehab impairments via gait training, therapeutic activities, therapeutic exercises, neuromuscular re-education and progress toward the following goals:    · Plan of Care Expires:  12/07/19    Subjective     Chief Complaint: pain in lower back  Patient/Family Comments/goals: to [place] [facility] [place] have   Pain/Comfort:  · Pain Rating 1: 7/10  · Location - Orientation 1: lower  · Location 1: back    Patients cultural, spiritual, Druze conflicts given the current situation: no    Living Environment:  Pt r/o she lives in  2-floor condo c/ family- daughter, sister and mother; Bed and bath access on 2nd floor. Pt r/o she is caregiver for her sister&mother.  Prior to admission, patients level of function was Independent ADLS, no DME use; drives. Pt r/o inability to climb flight of stairs to 2nd floor nor drive x2 weeks d/t SOB and fatigue; r/o she has been sleeping in recliner on 1st floor.  Equipment used at home: none.  DME owned (not currently used): none.  Upon discharge, patient will have assistance from: limited assistance from family.    Objective:     Communicated with OFELIA Cooney prior to session.  Patient found HOB elevated with bed alarm, telemetry  upon PT entry to room.    General Precautions: Standard, fall   Orthopedic Precautions:N/A   Braces: N/A     Exams:  · Cognitive Exam:  Patient is oriented to Person, Place and drowsy c/ poor arousal; sleeps off during conversations  · Gross Motor Coordination:  impaired c/ functional mobility  · Postural Exam:  Patient presented with the following abnormalities: -       Rounded shoulders  · -       Forward head  · -       Posterior pelvic tilt  · -       unable to maintain sitting postural control c/ forward, side and post lean d/t ?reduced tone or weakness  · RLE ROM: WFL  · RLE Strength: 2-/5 grossly  · LLE ROM: WFL  · LLE Strength: 2-/5 grossly    Functional Mobility:  · Bed Mobility:     · Rolling Right: moderate assistance  · Scooting: maximal assistance  · Supine to Sit: maximal assistance  · Sit to Supine: maximal assistance and of 2 persons  · Transfers:     · Sit to Stand:  total assistance with hand-held assist; pt unable to clear weight off bed; unable to forward weight-shift; activity not completed at this time d/t poor awareness and impaired safety.   · Gait: NA at this time d/t safety  · Balance: static sitting balance- poor- at Mod <> max x12 mins EOB.       Therapeutic Activities and Exercises:  PT aquiles completed c/ progressive mobility as detailed above. Pt  educated on PT role/POC.   BP supine 113/54mmHG; MAP 78 ; BP sitting EOB x 2nd min 110/55mmHG MAP 77; sitting EOB x10th min 118/578 MAP 83; HR maintained at 77/min c/ mobility.  Pt transferred back to supine and bed pan placed c/ Nsg assist.     AM-PAC 6 CLICK MOBILITY  Total Score:10     Patient left HOB elevated with all lines intact, call button in reach, bed alarm on, RN notified and . present.    GOALS:   Multidisciplinary Problems     Physical Therapy Goals        Problem: Physical Therapy Goal    Goal Priority Disciplines Outcome Goal Variances Interventions   Physical Therapy Goal     PT, PT/OT Ongoing, Progressing     Description:  Goals to be met by: 2019     Patient will increase functional independence with mobility by performin. Supine to sit with Modified [place]  2. Sit to stand transfer with Modified [place]  3. Bed to chair transfer with Modified [place] using Rolling Walker  4. Gait  x 100 feet with Modified [place] using Rolling Walker.   5. Lower extremity exercise program x12 reps per handout, with supervision                      History:     History reviewed. No pertinent past medical history.    History reviewed. No pertinent surgical history.    Time Tracking:     PT Received On: 19  PT Start Time: 1448     PT Stop Time: 1514  PT Total Time (min): 26 min c/ Nsg assist    Billable Minutes: Evaluation 16 and Therapeutic Activity 10      Pat Giron PT, DPT  2019

## 2019-11-07 NOTE — PLAN OF CARE
Pt seen in room, sister present. Pt tolerated regular solid texture diet with no audible s/s of dysphagia. SLP did call MD to make aware.   Problem: SLP Goal  Goal: SLP Goal  Description  Short Term Goals:  1. Pt will participate in swallow eval to determine safest PO diet level.    Outcome: Met

## 2019-11-07 NOTE — PROGRESS NOTES
"[facility] Pulmonology HO-II Progress Note    Subjective:      Ping Davenport is a 54 y.o. female who is being followed by the [facility] IM service at [facility] for acute hypoxic respiratory failure secondary to pulmonary edema.       Pt extubated and increased PO anti-hypertensive medications. BP better controlled off nitro gtt on current regimen. Denies chest pain, abdominal pain, SOB, fever, chills, dysuria.      Objective:   Last 24 Hour Vital Signs:  BP  Min: 124/81  Max: 184/86  Temp  Av.4 °F (36.9 °C)  Min: 98.2 °F (36.8 °C)  Max: 98.7 °F (37.1 °C)  Pulse  Av.9  Min: 67  Max: 103  Resp  Av.6  Min: 14  Max: 33  SpO2  Av.8 %  Min: 90 %  Max: 99 %  Height  Av' 2" (157.5 cm)  Min: 5' 2" (157.5 cm)  Max: 5' 2" (157.5 cm)  I/O last 3 completed shifts:  In: 977.1 [P.O.:250; I.V.:697.1; NG/GT:30]  Out: 3010 [Urine:3010]    Physical Examination:   GEN- AOOx3, well developed, well nourished  HEENT- PERRL, atraumatic, normocephalic  NECK- No thyromegaly or other masses  CARDIAC- RRR, no murmurs, rubs or gallops, 1+ pitting edema b/l  RESP- improved breath sounds bilaterally, no respiratory distress or accessory muscle use  ABD- Soft, NT, ND, +BS  EXT- No clubbing, cyanosis, or edema; 2+ DP/PT pulses  NEURO- AAox3, PERRL  SKIN- Warm and dry; no rashes    Laboratory:  Laboratory Data:  Trended Lab Data:   Recent Labs   Lab 19  0322 19  0837  19  1744 19  0318 19  0417   WBC 7.15  --   --   --  8.60 9.29   HGB 8.7*  --   --   --  8.1* 7.8*   HCT 28.2*  --   --   --  25.8* 24.7*     --   --   --  171 173   MCV 88  --   --   --  86 87   RDW 14.1  --   --   --  14.1 14.5     --    < > 142 140 141   K 4.6  --    < > 3.9 4.4 4.2   *  --    < > 113* 112* 109   CO2 16*  --    < > 20* 19* 19*   BUN 47*  --    < > 50* 50* 53*   CREATININE 3.4*  --    < > 3.2* 3.4* 3.9*   *  --    < > 80 79 80   CALCIUM 8.6*  --    < > 8.4* 8.7 8.6*   MG  --  " 1.8  --   --  2.0 1.8   PHOS  --  4.6*  --   --  4.6* 5.4*   PROT 6.1  --   --   --  5.7* 6.1   ALBUMIN 2.7*  --   --   --  2.2* 2.3*   AST 25  --   --   --  21 23   ALT 15  --   --   --  11 11   ALKPHOS 89  --   --   --  68 73   BILITOT 0.3  --   --   --  0.2 0.2    < > = values in this interval not displayed.      Trended Cardiac Data:   Recent Labs   Lab 11/05/19  0322 11/05/19  0837 11/05/19  1200 11/05/19  1514   TROPONINI 0.206* 0.184*  0.195*  0.195* 0.184*  0.184* 0.179*   BNP 1,165*  --   --   --       Trended Cardiac Data:   Recent Labs   Lab 11/05/19  1251 11/05/19  2013 11/06/19  0808 11/06/19  1102 11/06/19  1559 11/06/19  1957   POCTGLUCOSE 112* 84 80 79 80 99      Other Results:  EKG (my interpretation):none new     Radiology Data:  TTE 11/5/2019 showing nml EF with grade 1 diastolic dysfunction.    Current Medications:     Infusions:       Scheduled:   amLODIPine  10 mg Oral Daily    carvedilol  25 mg Oral BID WM    famotidine  20 mg Oral Daily    furosemide  120 mg Intravenous BID    heparin (porcine)  5,000 Units Subcutaneous Q8H    hydrALAZINE  50 mg Oral Q8H    losartan  100 mg Oral Daily    sodium bicarbonate  2,600 mg Oral TID        PRN:  acetaminophen, acetaminophen, Dextrose 10% Bolus, Dextrose 10% Bolus, glucagon (human recombinant), glucose, glucose, insulin aspart U-100, ondansetron, promethazine (PHENERGAN) IVPB, ramelteon, sodium chloride 0.9%    Antibiotics and Day Number of Therapy:  Cefepime 11/5 - 11/6; discontinued    Assessment:     Patient is a 54 yr old female here with acute hypoxic respiratory failure secondary to hypertensive emergency and newly diagnosed grade 1 diastolic dysfunction with AXEL on/vs CKD.     Plan:     Neuro:   - no acute issues     CV:   Acute hypoxic respiratory failure 2/2 hypertensive emergency  - resolved on PO medications, off nitro gtt  - Continue diuresis with Lasix. Goal -1.5-2L  - Grade 1 diastolic dysfunction noted on echo with normal EF,  fluid overload more likely due to severe increase in systolic pressures causing acute CHF in addition to AXEL on/vs CKD     NSTEMI (non-ST elevated myocardial infarction)  - Troponin elevation in setting of Hypertensive emergency  - Troponin remaining stable, no longer need to trend     Pulmonary:  Acute respiratory failure with hypoxia and hypercarbia  - Secondary to hypertensive emergency in setting of severe HTN and pulmonary edema on XR  - diurese and blood pressure control as above  - Currently on RA satting >95%     Renal:   Acute renal failure  - No emergent needs for dialysis; monitor with daily labs  - Cr 3.4 today, good urine output. US of kidney with no hydronephrosis or significant abnormality  - Continue diuresis  - Renal on board working up AXEL/CKD and proteinuria    Metabolic acidosis  - consider RTA in setting of acute renal failure vs CKD  - no diarrhea or signs of shock, lactate on arrival 0.6, no intoxication   - urine lytes will be skewed due to current diuresis  - receiving bicarb per nephro recommendations     Endo:   - goal -180 while inpatient  - A1c 5.8  - can discontinue accuchecks in setting of normal A1c     GI:   - no acute issues    Ppx: Heparin    Can transfer to floor. Will sign off. Thank you for the consult. Call with any questions.     Shyam Vogt MD PGY IV  [facility]  Pager: [phone]

## 2019-11-07 NOTE — PT/OT/SLP EVAL
Speech Language Pathology Evaluation  Bedside Swallow/Discharge     Patient Name:  Ping Davenport   MRN:  [id number]  Admitting Diagnosis: Hypertensive emergency    Recommendations:                 General Recommendations:  Follow-up not indicated  Diet recommendations:  Regular, Thin   Aspiration Precautions: upright for meals, standard guidelines, whole meds with water   General Precautions: Standard, fall(Mosotho speaking)  Communication strategies:  Pashto speaking, broken English noted    History:   Principal Problem:Hypertensive emergency     Chief Complaint:        Chief Complaint   Patient presents with    Shortness of Breath       To ER per EMS with c/o SOB and peripheral edema.           HPI: 53 yo female with no past medical history due to being in the ED presenting with 2 week history of worsening of SOB and peripheral edema. Per sister, patient has had elevated blood pressures at home and sister has tried to give Losartan to the patient but it has not improved her blood pressures. Patient has not been able to lay flat due to dyspnea.      In the ED, patient's blood pressure was in the 200/100s. Received Lasix 80mg IV. Patient presented in respiratory distress and placed on Bipap. When patient did not improvement clinically on Bipap, patient was intubated and sedated with Fentanyl and Nitroglycerine drip. Bun/Cr elevated at 47/3.4 up from a baseline of 11/0.9.    History reviewed. No pertinent past medical history.    History reviewed. No pertinent surgical history.    Social History: Patient lives at home with sister.     Prior Intubation HX:  Intubated 11/5, extubated on 11/7    Modified Barium Swallow: none per EMR    Chest X-Rays:    No significant change in cardiopulmonary status compared to prior examination, noting interstitial edema bilateral pleural effusions.  No evidence of pneumothorax.   Impression:       Prior diet: regular diet and thin liquids    Subjective     Consult received for  "clinical swallow eval this date, SLP did communicate with RN prior to eval/treat.    Patient goals: Pt reported significant back pain, asking for back support while lying supine in bed.   Sister present and stated "she needs a pain pill."    Pain/Comfort:  · Pain Rating 1: 0/10    Objective:   Pt found in room, she is moaning in pain about back. Sister present, attempting to reposition patient.   Pt agreed to PO trials, she had been asking for regular solids earlier.   Pt alert, able to state name and location of where she is currently.   Pt able to state sister's name.     Oral Musculature Evaluation  · Oral Musculature: WFL  · Dentition: present and adequate  · Mucosal Quality: good, adequate  · Mandibular Strength and Mobility: WFL  · Oral Labial Strength and Mobility: WFL  · Lingual Strength and Mobility: WFL  · Buccal Strength and Mobility: WFL  · Volitional Cough: elicited  · Volitional Swallow: timely   · Voice Prior to PO Intake: clear voice    Bedside Swallow Eval: Pt assessed with lunch tray in room.   Consistencies Assessed:  · Thin liquids lemonade by cup, straw sips  · Puree vanilla pudding  · Solids cracker, chicken leg presented to her to hold     Oral Phase:   · WFL   · Timely mastication  · Good labial seal  · Adequate ap transfer    Pharyngeal Phase:   · no overt clinical signs/symptoms of aspiration  · no overt clinical signs/symptoms of pharyngeal dysphagia  · multiple spontaneous swallows   · Timely swallow reflex   · No coughing/choking on PO trials     Treatment: no further ST needs indicated    Assessment:     Ping Davenport is a 54 y.o. female with WFL oral and pharyngeal phase of the swallow, no audible s/s of dysphagia present.     Goals:   Multidisciplinary Problems     SLP Goals     Not on file          Multidisciplinary Problems (Resolved)        Problem: SLP Goal    Goal Priority Disciplines Outcome   SLP Goal   (Resolved)     SLP Met   Description:  Short Term Goals:  1. Pt will " participate in swallow eval to determine safest PO diet level.                     Plan:     · Patient to be seen:      · Plan of Care expires:     · Plan of Care reviewed with:  patient(sister)   · SLP Follow-Up:  No       Discharge recommendations:  (TBD)       Time Tracking:     SLP Treatment Date:   11/07/19  Speech Start Time:  1250  Speech Stop Time:  1310     Speech Total Time (min):  20 min    Billable Minutes: Treatment Swallowing Dysfunction 11 and Eval Swallow and Oral Function 9    TJ Kern, CCC-SLP  11/07/2019

## 2019-11-07 NOTE — PROGRESS NOTES
[facility]  Critical Care Medicine  Progress Note  Patient Name: Ping Davenport  MRN: [id number]  Patient Class: IP- Inpatient  Admission Date: 11/5/2019  3:13 AM  Length of Stay: 2  Attending Physician: Bucky Chapin MD  Primary Care Provider: Primary Doctor No    Subjective:     Principal Problem: Hypertensive emergency    HPI:   53 yo female with no past medical history due to being in the ED presenting with 2 week history of worsening of SOB and peripheral edema. Per sister, patient has had elevated blood pressures at home and sister has tried to give Losartan to the patient but it has not improved her blood pressures. Patient has not been able to lay flat due to dyspnea In the ED, patient's blood pressure was in the 200/100s. Received Lasix 80mg IV. Patient presented in respiratory distress and placed on Bipap. When patient did not improvement clinically on Bipap, patient was intubated and sedated with Fentanyl and Nitroglycerine drip. Bun/Cr elevated at 47/3.4 up from a baseline of 11/0.9. Trop elevated at 0.206.     Interval History:   11/7/19: Yesterday, patient passed SBT and was extubated. Patient has be transitioned off of nitro drip and switched to oral medications. Patient been tolerating diuresis well with 120mg IV lasix pushes. Patient currently on amlodipine, losartan, hydralazine and coreg. Patient doing well this morning with no acute complaints. UOP 1,740. Last BM 11.6. Patient to be transferred to the floor later today.         11/6/19:  Patient currently intubated and sedated. UOP 3,155 while on the lasix drip. Nephrology on board and will evaluate for possible dialysis. Blood gases improved. CXR to be repeated this morning.     Review of patient's allergies indicates:  No Known Allergies   Scheduled Meds:   amLODIPine  10 mg Oral Daily    carvedilol  25 mg Oral BID WM    famotidine  20 mg Oral Daily    furosemide  120 mg Intravenous BID    heparin (porcine)  5,000  Units Subcutaneous Q8H    hydrALAZINE  50 mg Oral Q8H    losartan  100 mg Oral Daily    sodium bicarbonate  2,600 mg Oral TID     Continuous Infusions:    PRN Meds:.acetaminophen, acetaminophen, Dextrose 10% Bolus, Dextrose 10% Bolus, glucagon (human recombinant), glucose, glucose, insulin aspart U-100, ondansetron, promethazine (PHENERGAN) IVPB, ramelteon, sodium chloride 0.9%     Review of Systems   Constitutional: Negative for activity change and appetite change.   HENT: Negative for trouble swallowing.    Eyes: Negative for visual disturbance.   Respiratory: Negative for shortness of breath.    Cardiovascular: Negative for chest pain and palpitations.   Gastrointestinal: Negative for abdominal distention, diarrhea, nausea and vomiting.   Endocrine: Negative for cold intolerance and heat intolerance.   Genitourinary: Negative for flank pain.   Musculoskeletal: Negative for arthralgias.   Skin: Negative for color change.   Allergic/Immunologic: Negative for immunocompromised state.   Neurological: Negative for headaches.   Hematological: Negative for adenopathy.   Psychiatric/Behavioral: Negative for agitation.      Objective:      Vital Signs (Most Recent):  Temp: 98.2 °F (36.8 °C) (11/07/19 0500)  Pulse: 79 (11/07/19 0500)  Resp: 17 (11/07/19 0500)  BP: (!) 162/73 (11/07/19 0500)  SpO2: 99 % (11/07/19 0500) Vital Signs (24h Range):  Temp:  [98.2 °F (36.8 °C)-98.7 °F (37.1 °C)]   Pulse:  []   Resp:  [14-33]   BP: (124-184)/(59-94)   SpO2:  [90 %-100 %]      Body mass index is 35.28 kg/m².    I & O (Last 24H):    Intake/Output Summary (Last 24 hours) at 11/7/2019 0601  Last data filed at 11/7/2019 0500  Gross per 24 hour   Intake 171.71 ml   Output 1740 ml   Net -1568.29 ml       Physical Exam  General: Intubated and sedated   HEENT: NC/ AT PERRL  Neck: Supple. No JVD. No LAD. No thyromegaly or masses. No bruits.   CV: RRR. NL S1/S2. No m/r/g   Chest: Crackles in the lower lobes b/l   Abd: +BS x 4. Soft.  ND/NT. No rebound or guarding. No HSM. No CVA tenderness.    Ext: Peripheral pulses intact. NL ROM.  Neuro:  No focal deficit. DTRs 2+. Neurologic exam limited.   Skin: Intact. No rash. No lesions. Overall color, texture, and turgor wnl for race & age.         Peripheral IV - Single Lumen 11/05/19 0322 18 G Left Antecubital (Active)   Site Assessment Clean;Dry;Intact;No redness;No swelling 11/6/2019  3:07 AM   Line Status Infusing 11/6/2019  3:07 AM   Dressing Status Clean;Dry;Intact 11/6/2019  3:07 AM   Dressing Intervention Dressing reinforced 11/6/2019  3:07 AM   Dressing Change Due 11/09/19 11/6/2019  3:07 AM   Site Change Due 11/09/19 11/5/2019  7:07 PM   Reason Not Rotated Not due 11/6/2019  3:07 AM   Number of days: 1            Peripheral IV - Single Lumen 11/05/19 0549 20 G Right Forearm (Active)   Site Assessment Clean;Dry;Intact;No redness;No swelling 11/6/2019  3:07 AM   Line Status Infusing 11/6/2019  3:07 AM   Dressing Status Clean;Dry;Intact 11/6/2019  3:07 AM   Dressing Intervention Dressing reinforced 11/6/2019  3:07 AM   Dressing Change Due 11/09/19 11/6/2019  3:07 AM   Site Change Due 11/09/19 11/5/2019  7:07 PM   Reason Not Rotated Not due 11/6/2019  3:07 AM   Number of days: 1            Peripheral IV - Single Lumen 11/05/19 0718 20 G Right Hand (Active)   Site Assessment Clean;Dry;Intact;No redness;No swelling 11/6/2019  3:07 AM   Line Status Infusing 11/6/2019  3:07 AM   Dressing Status Clean;Dry;Intact 11/6/2019  3:07 AM   Dressing Intervention Dressing reinforced 11/6/2019  3:07 AM   Dressing Change Due 11/09/19 11/6/2019  3:07 AM   Site Change Due 11/09/19 11/5/2019  7:07 PM   Reason Not Rotated Not due 11/6/2019  3:07 AM   Number of days: 0            NG/OG Tube 11/05/19 0535 nasogastric 14 Fr. Right nostril (Active)   Placement Check placement verified by distal tube length measurement;placement verified by aspirate characteristics 11/6/2019  3:07 AM   Tolerance no signs/symptoms of  "discomfort 11/6/2019  3:07 AM   Securement secured to nostril center 11/6/2019  3:07 AM   Clamp Status/Tolerance clamped 11/6/2019  3:07 AM   Insertion Site Appearance no redness, warmth, tenderness, skin breakdown, drainage 11/6/2019  3:07 AM   Flush/Irrigation flushed w/;water;no resistance met 11/6/2019  3:07 AM   Intake (mL) 30 mL 11/6/2019  3:00 AM   Water Bolus (mL) 30 mL 11/5/2019  1:00 PM   Number of days: 1            Urethral Catheter 11/05/19 0601 Latex 14 Fr. (Active)   Site Assessment Clean;Intact 11/6/2019  3:07 AM   Collection Container Urimeter 11/6/2019  3:07 AM   Securement Method secured to top of thigh w/ adhesive device 11/6/2019  3:07 AM   Catheter Care Performed yes 11/6/2019  3:07 AM   Reason for Continuing Urinary Catheterization Critically ill in ICU requiring intensive monitoring 11/6/2019  3:07 AM   CAUTI Prevention Bundle StatLock in place w 1" slack;Intact seal between catheter & drainage tubing;Drainage bag/urimeter off the floor;Green sheeting clip in use;No dependent loops or kinks;Drainage bag/urimeter not overfilled (<2/3 full);Drainage bag/urimeter below bladder 11/6/2019  3:07 AM   Output (mL) 150 mL 11/6/2019  6:00 AM   Number of days: 1     Vent Mode: Spont  Oxygen Concentration (%):  [30-50] 40  Resp Rate Total:  [11 br/min-28 br/min] 11 br/min  Vt Set:  [360 mL] 360 mL  PEEP/CPAP:  [5 cmH20] 5 cmH20  Pressure Support:  [0 cmH20-5 cmH20] 5 cmH20  Mean Airway Pressure:  [7.2 lsK96-25 cmH20] 7.2 cmH20    Recent Labs   Lab 11/05/19 0322 11/06/19 0318   WBC 7.15 8.60   HGB 8.7* 8.1*   HCT 28.2* 25.8*   MCV 88 86   RBC 3.22* 2.99*   MCH 27.0 27.1   MCHC 30.9* 31.4*   RDW 14.1 14.1    171   MPV 13.1* 12.5   GRAN 76.9*  5.5 74.3*  6.4   LYMPH 12.7*  0.9* 15.1*  1.3   MONO 8.3  0.6 8.0  0.7   EOSINOPHIL 1.7 2.4   BASOPHIL 0.4 0.2     Recent Labs   Lab 11/05/19  0322 11/05/19  0837 11/05/19  1200 11/05/19  1744 11/06/19  0318 11/07/19  0417     --  141 142 140 141 "   K 4.6  --  4.0 3.9 4.4 4.2   *  --  114* 113* 112* 109   CO2 16*  --  19* 20* 19* 19*   BUN 47*  --  51* 50* 50* 53*   CREATININE 3.4*  --  3.3* 3.2* 3.4* 3.9*   *  --  126* 80 79 80   CALCIUM 8.6*  --  8.7 8.4* 8.7 8.6*   PROT 6.1  --   --   --  5.7* 6.1   ALBUMIN 2.7*  --   --   --  2.2* 2.3*   ALKPHOS 89  --   --   --  68 73   BILITOT 0.3  --   --   --  0.2 0.2   ALT 15  --   --   --  11 11   AST 25  --   --   --  21 23   ESTGFRAFRICA 17*  --  17* 18* 17* 14*   EGFRNONAA 14*  --  15* 16* 14* 12*   ANIONGAP 12  --  8 9 9 13   MG  --  1.8  --   --  2.0 1.8   PHOS  --  4.6*  --   --  4.6* 5.4*     Recent Labs   Lab 11/05/19  0601   COLORU Yellow   APPEARANCEUA Clear   PHUR 6.0   SPECGRAV 1.025   PROTEINUA 2+*   GLUCUA 1+*   KETONESU Negative   BILIRUBINUA Negative   OCCULTUA 2+*   UROBILINOGEN Negative   NITRITE Negative   LEUKOCYTESUR Negative   RBCUA 19*   WBCUA 1   BACTERIA None   SQUAMEPITHEL 6   HYALINECASTS 0   MICROCMT SEE COMMENT     Recent Labs   Lab 11/05/19  0322 11/05/19  0837 11/05/19  1200 11/05/19  1514   TROPONINI 0.206* 0.184*  0.195*  0.195* 0.184*  0.184* 0.179*     Recent Labs   Lab 11/05/19  0322   INR 1.1     Recent Labs   Lab 11/05/19  0837   TSH 1.941     X-ray Chest 1 View    Result Date: 11/5/2019  EXAMINATION: XR CHEST 1 VIEW CLINICAL HISTORY: Respiratory failure, unspecified, unspecified whether with hypoxia or hypercapnia TECHNIQUE: Single frontal view of the chest was performed. COMPARISON: 11/05/2019 04:07 hours FINDINGS: There has been interval placement of an endotracheal tube with tip terminating approximately 4.0 cm above the lindsay.  Esophagogastric tube has been intervally placed extending below the diaphragm, extending beyond the field of view of the examination.  No significant change in cardiopulmonary status compared to prior examination, noting interstitial edema bilateral pleural effusions.  No evidence of pneumothorax.     As above. Electronically signed  by: Umberto Quinn MD Date:    11/05/2019 Time:    06:16    Us Retroperitoneal Complete (kidney And    Result Date: 11/5/2019  EXAMINATION: US RETROPERITONEAL COMPLETE CLINICAL HISTORY: acute renal failure; TECHNIQUE: Ultrasound of the kidneys and urinary bladder was performed including color flow and Doppler evaluation of the kidneys. COMPARISON: None available FINDINGS: Right kidney: Right kidney measures 12.4 cm.  Resistive index of 0.63.  No hydronephrosis. Left kidney: Left kidney measures 11.6 cm.  Resistive index of 0.80.  No hydronephrosis Urinary bladder is unremarkable.  Splenic resistive index of 0.75.  Incidental left pleural effusion.     No hydronephrosis. Elevated left renal resistive index, a nonspecific indicator of medical renal disease. Incidental left pleural effusion. Electronically signed by: Fransico Yun Date:    11/05/2019 Time:    09:44    X-ray Chest Ap Portable    Result Date: 11/5/2019  EXAMINATION: XR CHEST AP PORTABLE CLINICAL HISTORY: CHF; TECHNIQUE: Single frontal view of the chest was performed. COMPARISON: None FINDINGS: Cardiac monitoring leads overlie the chest.  Cardiomediastinal silhouette appears mildly enlarged.  There are bilateral interstitial opacities suggesting edema.  There is opacification of the bilateral lower lung zones, right more so than left, suggestive of a combination of pleural fluid with associated atelectasis and/or consolidation.  There is no evidence of pneumothorax.  Visualized osseous structures appear intact.     Radiographic findings suggestive of edema/CHF with bilateral pleural effusions and atelectasis/consolidation. Electronically signed by: Umberto Quinn MD Date:    11/05/2019 Time:    05:11    Us Lower Extremity Veins Bilateral    Result Date: 11/5/2019  EXAMINATION: US LOWER EXTREMITY VEINS BILATERAL CLINICAL HISTORY: rule out dvt; Acute respiratory failure with hypoxia TECHNIQUE: Duplex and color flow Doppler and dynamic compression was  performed of the bilateral lower extremity veins was performed. COMPARISON: None FINDINGS: Right thigh veins: The common femoral, femoral, popliteal, upper greater saphenous, and deep femoral veins are patent and free of thrombus. The veins are normally compressible and have normal phasic flow and augmentation response. Right calf veins: The visualized calf veins are patent. Left thigh veins: The common femoral, femoral, popliteal, upper greater saphenous, and deep femoral veins are patent and free of thrombus. The veins are normally compressible and have normal phasic flow and augmentation response. Left calf veins: The visualized calf veins are patent. Miscellaneous: Subcutaneous edema noted lower extremities.     No evidence of deep venous thrombosis in either lower extremity. Electronically signed by: Suzanne Berrios MD Date:    11/05/2019 Time:    16:21    Microbiology Results (last 7 days)     Procedure Component Value Units Date/Time    Blood culture [[id number]] Collected:  11/05/19 0928    Order Status:  Completed Specimen:  Blood Updated:  11/06/19 1612     Blood Culture, Routine No Growth to date      No Growth to date    Blood culture [[id number]] Collected:  11/05/19 0936    Order Status:  Completed Specimen:  Blood Updated:  11/06/19 1612     Blood Culture, Routine No Growth to date      No Growth to date          ASSESSMENT/PLAN:  Ms. Davenport is a 54 y.o. female who has no past medical history on file.     Admitted for:     Active Hospital Problems       Hypertensive emergency    New onset of congestive heart failure    Acute respiratory failure with hypoxia and hypercarbia    Acute renal failure    NSTEMI (non-ST elevated myocardial infarction)      NEURO-PSYCH:  Intubated & sedated: Fentanyl and propafol   RASS: 0     PULM:    Flash Pulmonary Edema   CXR with biltaeral pleural effusions   Patient remains on ventilator  Patient currently on Lasix 120mg IV BID   CXR on 11/6 showed improved  pulmonary effusions from prior study    Patient extubated on 11/6 and tolerated well.   Patient maintaining O2 sats of 98% on 1.5L o2.     Recent Labs   Lab 11/05/19  0553 11/05/19  1129   PH 7.282* 7.302*   PCO2 38.8 38.7   PO2 214* 103*   HCO3 18.3* 19.1*   POCSATURATED 100 97   BE -8 -7     CV:    HTN Emergency   Patient's blood pressure reduced 20% over the first hour   Continue to lower BP 5-15% over the next 23 hours as per protocol   Will begin to reduce Nitroglycerin drip and propofol and transition patient to oral medications   Will follow up with nephrology prior to administration of oral meds   No prior home regimen. However patient was taking losartan prior to arrival  Patient transitioned to oral amlodipine, coreg, hydralazine and losartan on 11/6   Will adjust regimen to attain goal BP > 140/90   Will continue to monitor     Acute decompensated Heart Failure with preserved ejection Fraction   TTE from 11/5/19: shows EF of 50% and grade 1 diastolic dysfunction   Continue current BP regimen as above   Monitor UOP  Daily weights   Salt restriction   Fluid restriction  > 1,500 ml   Will continue dieurisis     NSTEMI   Troponin .184 and .179 on repeat   No acute ST segment changes   Likely type 2 NSTEMI   Will continue Cardiac monitoring     RENAL-GI-FEN:    Acute Kidney Injury   Possible acute on chronic Kidney disease   FeUrea 43.2%   Creatinine Increased from 3.4 to 3.9   Monitor UOP   UOP net negative 2,7844   Avoid Nephrotoxic medications   Nephrology concern for possible nephrotic syndrome   Will follow up Nephrology recs later today     ID-HEME-ONC:    Normocytic Anemia   H/H 8.1/25.8 MCV 86   Follow up reticulocyte count  Occult blood stool ordered     ENDO:    Prediabetes   A1c 5.8   BG goal while inpatient 140-180  Mod dose ss   Accuchecks ACHS     SKIN-MSK:  ROM: Unable to evaluate   Rash: none   Ulcers: No ulcers     PPx:  -VTE: Heparin 5,000 subcut qhrs    -GI: Famotidine   -Skin breakdown:  Turn pt Q2hrs.   -Deconditioning: Pt/OT   -Aspiration: HOB>30    CODE STATUS: Full Code    DISPO: Currently on Lasix pushes. and optimize blood pressure control on oral meds. Will follow up with nephrology for rising creatinine. Patient with good UOP. Will monitor BP. Likely transfer out of the ICU today.     Carlito Michele MD   [employer] [facility], PGY-2

## 2019-11-08 LAB
ALBUMIN SERPL BCP-MCNC: 2.1 G/DL (ref 3.5–5.2)
ALLENS TEST: ABNORMAL
ALP SERPL-CCNC: 67 U/L (ref 55–135)
ALT SERPL W/O P-5'-P-CCNC: 13 U/L (ref 10–44)
ANCA AB TITR SER IF: NORMAL TITER
ANION GAP SERPL CALC-SCNC: 18 MMOL/L (ref 8–16)
ASO AB SERPL-ACNC: <14 IU/ML
AST SERPL-CCNC: 33 U/L (ref 10–40)
BASOPHILS # BLD AUTO: 0.03 K/UL (ref 0–0.2)
BASOPHILS NFR BLD: 0.3 % (ref 0–1.9)
BILIRUB SERPL-MCNC: 0.2 MG/DL (ref 0.1–1)
BUN SERPL-MCNC: 60 MG/DL (ref 6–20)
CALCIUM SERPL-MCNC: 8.5 MG/DL (ref 8.7–10.5)
CHLORIDE SERPL-SCNC: 107 MMOL/L (ref 95–110)
CO2 SERPL-SCNC: 15 MMOL/L (ref 23–29)
CREAT SERPL-MCNC: 4.7 MG/DL (ref 0.5–1.4)
DELSYS: ABNORMAL
DIFFERENTIAL METHOD: ABNORMAL
DSDNA AB SER-ACNC: NORMAL [IU]/ML
EOSINOPHIL # BLD AUTO: 0.1 K/UL (ref 0–0.5)
EOSINOPHIL NFR BLD: 1 % (ref 0–8)
ERYTHROCYTE [DISTWIDTH] IN BLOOD BY AUTOMATED COUNT: 14.3 % (ref 11.5–14.5)
EST. GFR  (AFRICAN AMERICAN): 11 ML/MIN/1.73 M^2
EST. GFR  (NON AFRICAN AMERICAN): 10 ML/MIN/1.73 M^2
GLUCOSE SERPL-MCNC: 106 MG/DL (ref 70–110)
HCO3 UR-SCNC: 22.7 MMOL/L (ref 24–28)
HCT VFR BLD AUTO: 26 % (ref 37–48.5)
HCT VFR BLD CALC: 23 %PCV (ref 36–54)
HGB BLD-MCNC: 8 G/DL
HGB BLD-MCNC: 8.2 G/DL (ref 12–16)
LACTATE SERPL-SCNC: 0.8 MMOL/L (ref 0.5–2.2)
LYMPHOCYTES # BLD AUTO: 1.4 K/UL (ref 1–4.8)
LYMPHOCYTES NFR BLD: 14.5 % (ref 18–48)
MAGNESIUM SERPL-MCNC: 1.8 MG/DL (ref 1.6–2.6)
MCH RBC QN AUTO: 27 PG (ref 27–31)
MCHC RBC AUTO-ENTMCNC: 31.5 G/DL (ref 32–36)
MCV RBC AUTO: 86 FL (ref 82–98)
MODE: ABNORMAL
MONOCYTES # BLD AUTO: 0.9 K/UL (ref 0.3–1)
MONOCYTES NFR BLD: 9.5 % (ref 4–15)
NEUTROPHILS # BLD AUTO: 7.2 K/UL (ref 1.8–7.7)
NEUTROPHILS NFR BLD: 74.7 % (ref 38–73)
OB PNL STL: POSITIVE
P-ANCA TITR SER IF: NORMAL TITER
PCO2 BLDA: 33 MMHG (ref 35–45)
PH SMN: 7.45 [PH] (ref 7.35–7.45)
PHOSPHATE SERPL-MCNC: 6 MG/DL (ref 2.7–4.5)
PLATELET # BLD AUTO: 189 K/UL (ref 150–350)
PMV BLD AUTO: 12.2 FL (ref 9.2–12.9)
PO2 BLDA: 57 MMHG (ref 40–60)
POC BE: -1 MMOL/L
POC IONIZED CALCIUM: 1.11 MMOL/L (ref 1.06–1.42)
POC SATURATED O2: 91 % (ref 95–100)
POC TCO2: 24 MMOL/L (ref 24–29)
POCT GLUCOSE: 128 MG/DL (ref 70–110)
POCT GLUCOSE: 173 MG/DL (ref 70–110)
POCT GLUCOSE: 90 MG/DL (ref 70–110)
POTASSIUM BLD-SCNC: 3.8 MMOL/L (ref 3.5–5.1)
POTASSIUM SERPL-SCNC: 4.4 MMOL/L (ref 3.5–5.1)
PROT SERPL-MCNC: 5.8 G/DL (ref 6–8.4)
RBC # BLD AUTO: 3.04 M/UL (ref 4–5.4)
SAMPLE: ABNORMAL
SITE: ABNORMAL
SODIUM BLD-SCNC: 138 MMOL/L (ref 136–145)
SODIUM SERPL-SCNC: 140 MMOL/L (ref 136–145)
WBC # BLD AUTO: 9.77 K/UL (ref 3.9–12.7)

## 2019-11-08 PROCEDURE — 97535 SELF CARE MNGMENT TRAINING: CPT

## 2019-11-08 PROCEDURE — 82803 BLOOD GASES ANY COMBINATION: CPT

## 2019-11-08 PROCEDURE — 63600175 PHARM REV CODE 636 W HCPCS: Performed by: STUDENT IN AN ORGANIZED HEALTH CARE EDUCATION/TRAINING PROGRAM

## 2019-11-08 PROCEDURE — 11000001 HC ACUTE MED/SURG PRIVATE ROOM

## 2019-11-08 PROCEDURE — 63600175 PHARM REV CODE 636 W HCPCS: Performed by: FAMILY MEDICINE

## 2019-11-08 PROCEDURE — 97110 THERAPEUTIC EXERCISES: CPT

## 2019-11-08 PROCEDURE — 25000003 PHARM REV CODE 250: Performed by: STUDENT IN AN ORGANIZED HEALTH CARE EDUCATION/TRAINING PROGRAM

## 2019-11-08 PROCEDURE — 84100 ASSAY OF PHOSPHORUS: CPT

## 2019-11-08 PROCEDURE — 97116 GAIT TRAINING THERAPY: CPT

## 2019-11-08 PROCEDURE — 94761 N-INVAS EAR/PLS OXIMETRY MLT: CPT

## 2019-11-08 PROCEDURE — 83605 ASSAY OF LACTIC ACID: CPT

## 2019-11-08 PROCEDURE — 85025 COMPLETE CBC W/AUTO DIFF WBC: CPT

## 2019-11-08 PROCEDURE — 82272 OCCULT BLD FECES 1-3 TESTS: CPT

## 2019-11-08 PROCEDURE — 94799 UNLISTED PULMONARY SVC/PX: CPT

## 2019-11-08 PROCEDURE — 83735 ASSAY OF MAGNESIUM: CPT

## 2019-11-08 PROCEDURE — 63600175 PHARM REV CODE 636 W HCPCS: Mod: JG | Performed by: STUDENT IN AN ORGANIZED HEALTH CARE EDUCATION/TRAINING PROGRAM

## 2019-11-08 PROCEDURE — 36415 COLL VENOUS BLD VENIPUNCTURE: CPT

## 2019-11-08 PROCEDURE — 80053 COMPREHEN METABOLIC PANEL: CPT

## 2019-11-08 PROCEDURE — P9047 ALBUMIN (HUMAN), 25%, 50ML: HCPCS | Mod: JG | Performed by: STUDENT IN AN ORGANIZED HEALTH CARE EDUCATION/TRAINING PROGRAM

## 2019-11-08 PROCEDURE — 97165 OT EVAL LOW COMPLEX 30 MIN: CPT

## 2019-11-08 RX ORDER — ALBUMIN HUMAN 250 G/1000ML
25 SOLUTION INTRAVENOUS 2 TIMES DAILY
Status: COMPLETED | OUTPATIENT
Start: 2019-11-08 | End: 2019-11-09

## 2019-11-08 RX ORDER — SEVELAMER CARBONATE 800 MG/1
800 TABLET, FILM COATED ORAL
Status: DISCONTINUED | OUTPATIENT
Start: 2019-11-08 | End: 2019-11-11

## 2019-11-08 RX ORDER — HYDRALAZINE HYDROCHLORIDE 25 MG/1
50 TABLET, FILM COATED ORAL EVERY 8 HOURS
Status: DISCONTINUED | OUTPATIENT
Start: 2019-11-08 | End: 2019-11-10

## 2019-11-08 RX ORDER — METHYLPREDNISOLONE SODIUM SUCCINATE 1 G/16ML
1000 INJECTION, POWDER, LYOPHILIZED, FOR SOLUTION INTRAMUSCULAR; INTRAVENOUS DAILY
Status: DISCONTINUED | OUTPATIENT
Start: 2019-11-08 | End: 2019-11-08

## 2019-11-08 RX ORDER — ERGOCALCIFEROL 1.25 MG/1
50000 CAPSULE ORAL
Status: DISCONTINUED | OUTPATIENT
Start: 2019-11-08 | End: 2019-11-14

## 2019-11-08 RX ADMIN — LOSARTAN POTASSIUM 100 MG: 50 TABLET ORAL at 09:11

## 2019-11-08 RX ADMIN — FUROSEMIDE 120 MG: 10 INJECTION, SOLUTION INTRAVENOUS at 09:11

## 2019-11-08 RX ADMIN — ALBUMIN HUMAN 25 G: 0.25 SOLUTION INTRAVENOUS at 04:11

## 2019-11-08 RX ADMIN — SODIUM BICARBONATE 650 MG TABLET 2600 MG: at 09:11

## 2019-11-08 RX ADMIN — AMLODIPINE BESYLATE 10 MG: 5 TABLET ORAL at 09:11

## 2019-11-08 RX ADMIN — SODIUM BICARBONATE 650 MG TABLET 2600 MG: at 04:11

## 2019-11-08 RX ADMIN — HYDRALAZINE HYDROCHLORIDE 75 MG: 25 TABLET, FILM COATED ORAL at 06:11

## 2019-11-08 RX ADMIN — FUROSEMIDE 120 MG: 10 INJECTION, SOLUTION INTRAVENOUS at 05:11

## 2019-11-08 RX ADMIN — DEXTROSE: 50 INJECTION, SOLUTION INTRAVENOUS at 06:11

## 2019-11-08 RX ADMIN — CARVEDILOL 25 MG: 25 TABLET, FILM COATED ORAL at 05:11

## 2019-11-08 RX ADMIN — SEVELAMER CARBONATE 800 MG: 800 TABLET, FILM COATED ORAL at 05:11

## 2019-11-08 RX ADMIN — HYDRALAZINE HYDROCHLORIDE 50 MG: 25 TABLET, FILM COATED ORAL at 09:11

## 2019-11-08 RX ADMIN — ERGOCALCIFEROL 50000 UNITS: 1.25 CAPSULE ORAL at 09:11

## 2019-11-08 RX ADMIN — CARVEDILOL 25 MG: 25 TABLET, FILM COATED ORAL at 09:11

## 2019-11-08 RX ADMIN — ACETAMINOPHEN 650 MG: 325 TABLET ORAL at 03:11

## 2019-11-08 NOTE — PROGRESS NOTES
Pt at risk for impaired skin integrity/pressure wounds, no issues at this time, prevention orders in place.

## 2019-11-08 NOTE — PLAN OF CARE
Reviewed Plan of care with patient.  Patient verbalized understanding.  Patient resting quietly. Quinn catheter was placed as ordered for urinary retention.  Pt exhibits generalized edema.  No c/o resp distress or chest pain.  Patientl's   Renal Bx delayed until Monday.  VSS.  Safety measures in place.  No falls or injuries noted.  Patient was encouraged to call for assistance if needed. Will continue to monitor patient's care this shift.

## 2019-11-08 NOTE — PT/OT/SLP EVAL
Occupational Therapy   Evaluation    Name: Ping Davenport  MRN: [id number]  Admitting Diagnosis:  Hypertensive emergency      Recommendations:     Discharge Recommendations: rehabilitation facility  Discharge Equipment Recommendations:  (defer to next level of care )  Barriers to discharge:  Decreased caregiver support, Inaccessible home environment    Assessment:     Ping Davenport is a 54 y.o. female with a medical diagnosis of Hypertensive emergency.  She presents with swelling; deconditioning. Performance deficits affecting function: weakness, impaired self care skills, impaired balance, impaired functional mobilty, impaired endurance, gait instability, decreased lower extremity function, decreased upper extremity function, edema, impaired coordination, impaired skin, pain.       Despite patient's co-morbidities, including HTN, patient was living in community setting functioning at independent level for ADLs, and mobility prior to this event.  There is an expectation of returning to prior level of function to maintain independence thus avoiding readmission.  Patient's clinical condition meets full Inpatient Rehab (IPR) criteria; including the ability to actively participate in 3 hours of therapy.  A lower level of care(SNF) cannot provide the interdisciplinary treatment approach needed.       Rehab Prognosis: Good; patient would benefit from acute skilled OT services to address these deficits and reach maximum level of function.       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    General Precautions: Standard, fall   Orthopedic Precautions:N/A   Braces: N/A     Occupational Performance:    Bed Mobility:    · Patient completed Scooting/Bridging with moderate assistance  · Patient completed Supine to Sit with maximal assistance    Functional Mobility/Transfers:  · Patient completed Sit <> Stand Transfer with moderate assistance  with  rolling walker   · Patient completed Bed <> Chair Transfer using Step Transfer technique with moderate assistance of 2 for safety with rolling walker  · Functional Mobility: Mod A with RW; assist of 2 for safety     Activities of Daily Living:  · Grooming: contact guard assistance hair   · Upper Body Dressing: minimum assistance /mod A   · Lower Body Dressing: total assistance    · Toileting: maximal assistance ; pt with BM on sheets; requesting to perform hygiene; unable ot safely maintain balance and perform at the same time     Cognitive/Visual Perceptual:  Cognitive/Psychosocial Skills:     -       Oriented to: Person, Place, Time and Situation   -       Follows Commands/attention:Follows multistep  commands  -       Communication: clear/fluent  -       Memory: No Deficits noted  -       Safety awareness/insight to disability: intact   -       Mood/Affect/Coping skills/emotional control: Appropriate to situation    Physical Exam:  Balance:    -       fair -   Postural examination/scapula alignment:    -       Rounded shoulders  -       Forward head  Skin integrity: Bruising of BUEs   Edema:  Pitting BUEs and BLEs; weeping noted   Dominant hand:    -       right  Upper Extremity Range of Motion:   BUE ROM WFL for pt's needs    Upper Extremity Strength:  BUE strength grossly 3+/5    Strength:  Fair +   Fine Motor Coordination:    -       Intact    AMPAC 6 Click ADL:  AMPAC Total Score: 15    Treatment & Education:  Pt performing skills as above   Increased time for axs 2/2 deconditiong   Pt reporting dizziness throughout session, however BP WNL   Stood from EOB x 2 trials with Mod A; hygiene performed with max A while standing   Seated rest breaks between standing trials; sat to perform G&H axs with CGA   T/f to b/s chair Mod A of 2 for safety with RW; seated rest break required prior to ambulation trial mod A with RW   Sat in b/s chair with seated rest break prior to completing G&H axs   Education:    Patient left up in chair with all lines intact, call button in reach, chair alarm on, nsg notified and sister present    GOALS:   Multidisciplinary Problems     Occupational Therapy Goals        Problem: Occupational Therapy Goal    Goal Priority Disciplines Outcome Interventions   Occupational Therapy Goal     OT, PT/OT Ongoing, Progressing    Description:  Goals to be met by: 12/8/19     Patient will increase functional independence with ADLs by performing:    LE Dressing with Minimal Assistance.  Grooming while standing with Contact Guard Assistance.  Toileting from toilet with Contact Guard Assistance for hygiene and clothing management.   Supine to sit with Contact Guard Assistance.  Step transfer with Contact Guard Assistance  Toilet transfer to toilet with Contact Guard Assistance.  Increased functional strength to WFL for self care skills and functional mobility.  Upper extremity exercise program x10 reps per handout, with independence.                      History:     History reviewed. No pertinent past medical history.    History reviewed. No pertinent surgical history.    Time Tracking:     OT Date of Treatment: 11/08/19  OT Start Time: 1111  OT Stop Time: 1153  OT Total Time (min): 42 min    Billable Minutes:Evaluation  8  Self Care/Home Management 10 co treatment with PT     Juana Epps OT  11/8/2019

## 2019-11-08 NOTE — PROGRESS NOTES
Pharmacy New Medication Education    Patient and/or Caregiver ACCEPTED medication education.    Pharmacy has provided education on the name, indication, and possible side effects of the medication(s) prescribed, using teach-back method.     Learners of pharmacy medication education includes:  patient      The following medications have also been discussed, during this admission.     Current Facility-Administered Medications   Medication Frequency    acetaminophen tablet 650 mg Q4H PRN    acetaminophen tablet 650 mg Q8H PRN    amLODIPine tablet 10 mg Daily    carvedilol tablet 25 mg BID WM    dextrose 10% (D10W) Bolus PRN    dextrose 10% (D10W) Bolus PRN    ergocalciferol capsule 50,000 Units Q7 Days    furosemide injection 120 mg BID    glucagon (human recombinant) injection 1 mg PRN    glucose chewable tablet 16 g PRN    glucose chewable tablet 24 g PRN    hydrALAZINE tablet 50 mg Q8H    influenza (QUADRIVALENT PF) vaccine 0.5 mL vaccine x 1 dose    insulin aspart U-100 pen 1-10 Units QID (AC + HS) PRN    losartan tablet 100 mg Daily    ondansetron injection 4 mg Q8H PRN    promethazine (PHENERGAN) 6.25 mg in dextrose 5 % 50 mL IVPB Q6H PRN    ramelteon tablet 8 mg Nightly PRN    senna-docusate 8.6-50 mg per tablet 1 tablet Daily PRN    sodium bicarbonate tablet 2,600 mg TID    sodium chloride 0.9% flush 5 mL PRN          Thank you  Pasha Irizarry, PharmD

## 2019-11-08 NOTE — PLAN OF CARE
1915H Patient is awake and orientedx4. Care plan explained and verbalized understanding. VIP care model explained. On room air, O2 saturation 93%. On heart monitor running Sinus Rhythm at 72bpm. IV site to the left antecubital 18G; flushed and saline locked. Right hand 20G infiltrated; removed. Maintained on renal diet. Instructed on NPO after midnight. Maintained on fall precaution. Bed in lowest position, bed alarms on, call light within reach and instructed to call for help when needed. Will continue  to monitor.    Due medications given. Swallows pill one at a time. Female external catheter applied.   2230H No urine output noted. Bladder scan showed >405ml, notified MD with order for in and out catheter.

## 2019-11-08 NOTE — PLAN OF CARE
Problem: Occupational Therapy Goal  Goal: Occupational Therapy Goal  Description  Goals to be met by: 12/8/19     Patient will increase functional independence with ADLs by performing:    LE Dressing with Minimal Assistance.  Grooming while standing with Contact Guard Assistance.  Toileting from toilet with Contact Guard Assistance for hygiene and clothing management.   Supine to sit with Contact Guard Assistance.  Step transfer with Contact Guard Assistance  Toilet transfer to toilet with Contact Guard Assistance.  Increased functional strength to WFL for self care skills and functional mobility.  Upper extremity exercise program x10 reps per handout, with independence.     Outcome: Ongoing, Progressing      Despite patient's co-morbidities, including HTN, patient was living in community setting functioning at independent level for ADLs, and mobility prior to this event.  There is an expectation of returning to prior level of function to maintain independence thus avoiding readmission.  Patient's clinical condition meets full Inpatient Rehab (IPR) criteria; including the ability to actively participate in 3 hours of therapy.  A lower level of care(SNF) cannot provide the interdisciplinary treatment approach needed.

## 2019-11-08 NOTE — CONSULTS
Consult acknowledged. Will renal bx attempt tomorrow. Hold anticoagulation starting now and for at least 24 hrs after bx. NPO at midnight.    Thank you for the opportunity to assist in the care of this patient.      Marcio Holloway MD  [facility]  Pager [phone]

## 2019-11-08 NOTE — PT/OT/SLP PROGRESS
"Physical Therapy Treatment    Patient Name:  Ping Davenport   MRN:  [id number]    Recommendations:     Discharge Recommendations:  rehabilitation facility   Discharge Equipment Recommendations: (Defer to SNF)   Barriers to discharge: Decreased caregiver support    Assessment:     Ping Davenport is a 54 y.o. female admitted with a medical diagnosis of Hypertensive emergency.  She presents with the following impairments/functional limitations:  weakness, gait instability, decreased upper extremity function, impaired cardiopulmonary response to activity, impaired endurance, impaired balance, decreased lower extremity function, impaired coordination, impaired joint extensibility, decreased safety awareness, impaired self care skills, pain, decreased coordination, impaired functional mobilty. Patient is making progress towards goals; all goals addressed as appropriate. Continue with PT POC. Pt able to ambulate 10ft x2 c/ RW at Mod A.; 2nd person for safety.    Rehab Prognosis: Good; patient would benefit from acute skilled PT services to address these deficits and reach maximum level of function.    Recent Surgery: * No surgery found *      Plan:     During this hospitalization, patient to be seen 6 x/week to address the identified rehab impairments via gait training, therapeutic activities, therapeutic exercises, neuromuscular re-education and progress toward the following goals:    · Plan of Care Expires:  12/07/19    Subjective     Chief Complaint: "feels dizzy:"  Patient/Family Comments/goals: Pt agreed to PT POC  Pain/Comfort:  · Pain Rating 1: 0/10  · Pain Rating Post-Intervention 2: 0/10      Objective:     Communicated with [place] prior to session.  Patient found HOB elevated with bed alarm, telemetry upon PT entry to room.     General Precautions: Standard, fall   Orthopedic Precautions:N/A   Braces: N/A     Functional Mobility:  · Bed Mobility:     · Rolling Right: Moderate assistance  · Scooting: " maximal assistance  · Supine to Sit: maximal assistance  · Transfers:     · Sit to Stand:  moderate assistance and of 1-2 persons with rolling walker; c/ bed elevated  · Bed to Chair: minimum assistance and moderate assistance 2 persons with  rolling walker  using  Step Transfer  · Gait: 10ft x2 c/ RW; Pt demo NBOS and reduced laurie step length; Min A to advance RW; VC to increase step laurie step length; postural control c/ gait; safety c/ sit <> stand transition  · Balance: dynamic balance: fair-      AM-PAC 6 CLICK MOBILITY  Turning over in bed (including adjusting bedclothes, sheets and blankets)?: 2  Sitting down on and standing up from a chair with arms (e.g., wheelchair, bedside commode, etc.): 2  Moving from lying on back to sitting on the side of the bed?: 2  Moving to and from a bed to a chair (including a wheelchair)?: 2  Need to walk in hospital room?: 2  Climbing 3-5 steps with a railing?: 2  Basic Mobility Total Score: 12       Therapeutic Activities and Exercises:  Pt instructed in progressive mobility as detailed above; sitting EOB- LE strength exercises LAQx5 4reps ea; ankle pump ex x30 reps c/ rest breaks as needed. Pt r/o being whoozy c/ mobility. BP in supine - 140/63mmHg MAP 91; sitting /61mmHg MAP 88; post transfer to chair 128/59mmHg MAP 85; HR 78/min c/ mobility.    Patient left up in chair with all lines intact, call button in reach, chair alarm on, RN notified and sister present..    GOALS:   Multidisciplinary Problems     Physical Therapy Goals        Problem: Physical Therapy Goal    Goal Priority Disciplines Outcome Goal Variances Interventions   Physical Therapy Goal     PT, PT/OT Ongoing, Progressing     Description:  Goals to be met by: 2019     Patient will increase functional independence with mobility by performin. Supine to sit with Modified [place]  2. Sit to stand transfer with Modified [place]  3. Bed to chair transfer with Modified [place] using  Rolling Walker  4. Gait  x 100 feet with Modified [place] using Rolling Walker.   5. Lower extremity exercise program x12 reps per handout, with supervision                      Time Tracking:     PT Received On: 11/08/19  PT Start Time: 1111     PT Stop Time: 1153  PT Total Time (min): 42 min co-tx c/ OT    Billable Minutes: Gait Training 13, Therapeutic Exercise 10 and Total Time 23    Treatment Type: Treatment  PT/PTA: PT           Pat Giron PT, DPT  11/8/2019

## 2019-11-08 NOTE — PROGRESS NOTES
Progress Note  [facility]  Admit Date: 11/5/2019   LOS: 3 days   SUBJECTIVE:   Follow-up For: Acute Flash Pulmonary Edema     Overnight, no acute events. Patient seen and examined this AM. NPO. Renal Biopsy today by IR. . O2 sats improving, Nephrology is following. PT/OT rec rehab facility.     Review of Systems   Constitutional: Negative for chills and fever.   HENT: Negative for sore throat.    Eyes: Negative for blurred vision and double vision.   Respiratory: Negative for cough and shortness of breath.    Cardiovascular: Negative for chest pain.   Gastrointestinal: Negative for abdominal pain, constipation, diarrhea, heartburn, nausea and vomiting.   Genitourinary: Negative for dysuria and urgency.   Musculoskeletal: Negative for back pain and falls.   Skin: Negative for itching and rash.   Neurological: Negative for headaches.   Endo/Heme/Allergies: Does not bruise/bleed easily.   Psychiatric/Behavioral: The patient is not nervous/anxious.      OBJECTIVE:   Vital Signs (Most Recent)  Temp: 99.3 °F (37.4 °C) (11/08/19 0344)  Pulse: 72 (11/08/19 0604)  Resp: 20 (11/08/19 0344)  BP: (!) 140/65 (11/08/19 0604)  SpO2: 95 % (11/08/19 0420)    I & O (Last 24H):    Intake/Output Summary (Last 24 hours) at 11/8/2019 0642  Last data filed at 11/8/2019 0615  Gross per 24 hour   Intake 555 ml   Output 1029 ml   Net -474 ml     Wt Readings from Last 3 Encounters:   11/05/19 87.5 kg (192 lb 14.4 oz)   10/13/14 72 kg (158 lb 11.2 oz)       Current Diet Order   Procedures    Diet NPO        Physical Exam   Constitutional: She is oriented to person, place, and time and well-developed, well-nourished, and in no distress.   HENT:   Head: Normocephalic and atraumatic.   Eyes: Pupils are equal, round, and reactive to light. EOM are normal.   Neck: Normal range of motion. Neck supple.   Cardiovascular: Normal rate and regular rhythm.   Pulmonary/Chest: Effort normal. She has rales.   Crackles in the lower lobes b/l.     Abdominal: Soft. Bowel sounds are normal.   Musculoskeletal:   1+ pitting edema in the legs b/l.    Neurological: She is alert and oriented to person, place, and time.   Skin: Skin is warm and dry.   Psychiatric: Affect normal.     Laboratory Data:  CBC  Recent Labs   Lab 11/05/19 0322 11/06/19 0318 11/07/19 0417   WBC 7.15 8.60 9.29   RBC 3.22* 2.99* 2.85*   HGB 8.7* 8.1* 7.8*   HCT 28.2* 25.8* 24.7*    171 173   MCV 88 86 87   MCH 27.0 27.1 27.4   MCHC 30.9* 31.4* 31.6*     CMP  Recent Labs   Lab 11/05/19 0322 11/05/19 1744 11/06/19 0318 11/07/19 0417   CALCIUM 8.6*   < > 8.4* 8.7 8.6*   PROT 6.1  --   --  5.7* 6.1      < > 142 140 141   K 4.6   < > 3.9 4.4 4.2   CO2 16*   < > 20* 19* 19*   *   < > 113* 112* 109   BUN 47*   < > 50* 50* 53*   CREATININE 3.4*   < > 3.2* 3.4* 3.9*   ALKPHOS 89  --   --  68 73   ALT 15  --   --  11 11   AST 25  --   --  21 23   BILITOT 0.3  --   --  0.2 0.2    < > = values in this interval not displayed.     POCT-Glucose  POCT Glucose   Date Value Ref Range Status   11/07/2019 131 (H) 70 - 110 mg/dL Final   11/07/2019 73 70 - 110 mg/dL Final   11/06/2019 99 70 - 110 mg/dL Final   11/06/2019 80 70 - 110 mg/dL Final   11/06/2019 79 70 - 110 mg/dL Final   11/06/2019 80 70 - 110 mg/dL Final   11/05/2019 84 70 - 110 mg/dL Final   11/05/2019 112 (H) 70 - 110 mg/dL Final   11/05/2019 169 (H) 70 - 110 mg/dL Final     COAGS  Recent Labs   Lab 11/05/19 0322   INR 1.1     UA  No results for input(s): COLORU, CLARITYU, SPECGRAV, PHUR, PROTEINUA, GLUCOSEU, BLOODU, WBCU, RBCU, BACTERIA, MUCUS in the last 24 hours.    Invalid input(s):  BILIRUBINCON  MICRO  Microbiology Results (last 7 days)     Procedure Component Value Units Date/Time    Blood culture [[id number]] Collected:  11/05/19 0928    Order Status:  Completed Specimen:  Blood Updated:  11/07/19 1612     Blood Culture, Routine No Growth to date      No Growth to date      No Growth to date    Blood culture  [[id number]] Collected:  11/05/19 0936    Order Status:  Completed Specimen:  Blood Updated:  11/07/19 1612     Blood Culture, Routine No Growth to date      No Growth to date      No Growth to date        LIPID PANEL  Lab Results   Component Value Date    CHOL 218 (H) 11/06/2019     Lab Results   Component Value Date    HDL 38 (L) 11/06/2019     Lab Results   Component Value Date    LDLCALC 151.4 11/06/2019     Lab Results   Component Value Date    TRIG 143 11/06/2019     Lab Results   Component Value Date    CHOLHDL 17.4 (L) 11/06/2019       Diagnostic Results:  Imaging Results          US Lower Extremity Veins Bilateral (Final result)  Result time 11/05/19 16:21:54    Final result by Suzanne Berrios MD (11/05/19 16:21:54)                 Impression:      No evidence of deep venous thrombosis in either lower extremity.      Electronically signed by: Suzanne Berrios MD  Date:    11/05/2019  Time:    16:21             Narrative:    EXAMINATION:  US LOWER EXTREMITY VEINS BILATERAL    CLINICAL HISTORY:  rule out dvt; Acute respiratory failure with hypoxia    TECHNIQUE:  Duplex and color flow Doppler and dynamic compression was performed of the bilateral lower extremity veins was performed.    COMPARISON:  None    FINDINGS:  Right thigh veins: The common femoral, femoral, popliteal, upper greater saphenous, and deep femoral veins are patent and free of thrombus. The veins are normally compressible and have normal phasic flow and augmentation response.    Right calf veins: The visualized calf veins are patent.    Left thigh veins: The common femoral, femoral, popliteal, upper greater saphenous, and deep femoral veins are patent and free of thrombus. The veins are normally compressible and have normal phasic flow and augmentation response.    Left calf veins: The visualized calf veins are patent.    Miscellaneous: Subcutaneous edema noted lower extremities.                               US Retroperitoneal  Complete (Kidney and (Final result)  Result time 11/05/19 09:44:48    Final result by Fransico Yun MD (11/05/19 09:44:48)                 Impression:      No hydronephrosis.    Elevated left renal resistive index, a nonspecific indicator of medical renal disease.    Incidental left pleural effusion.      Electronically signed by: Fransico Yun  Date:    11/05/2019  Time:    09:44             Narrative:    EXAMINATION:  US RETROPERITONEAL COMPLETE    CLINICAL HISTORY:  acute renal failure;    TECHNIQUE:  Ultrasound of the kidneys and urinary bladder was performed including color flow and Doppler evaluation of the kidneys.    COMPARISON:  None available    FINDINGS:  Right kidney: Right kidney measures 12.4 cm.  Resistive index of 0.63.  No hydronephrosis.    Left kidney: Left kidney measures 11.6 cm.  Resistive index of 0.80.  No hydronephrosis    Urinary bladder is unremarkable.  Splenic resistive index of 0.75.  Incidental left pleural effusion.                               X-Ray Chest 1 View (Final result)  Result time 11/05/19 06:16:39    Final result by Umberto Quinn MD (11/05/19 06:16:39)                 Impression:      As above.      Electronically signed by: Umberto Quinn MD  Date:    11/05/2019  Time:    06:16             Narrative:    EXAMINATION:  XR CHEST 1 VIEW    CLINICAL HISTORY:  Respiratory failure, unspecified, unspecified whether with hypoxia or hypercapnia    TECHNIQUE:  Single frontal view of the chest was performed.    COMPARISON:  11/05/2019 04:07 hours    FINDINGS:  There has been interval placement of an endotracheal tube with tip terminating approximately 4.0 cm above the lindsay.  Esophagogastric tube has been intervally placed extending below the diaphragm, extending beyond the field of view of the examination.  No significant change in cardiopulmonary status compared to prior examination, noting interstitial edema bilateral pleural effusions.  No evidence of  pneumothorax.                               X-Ray Chest AP Portable (Final result)  Result time 11/05/19 05:11:22    Final result by Umberto uQinn MD (11/05/19 05:11:22)                 Impression:      Radiographic findings suggestive of edema/CHF with bilateral pleural effusions and atelectasis/consolidation.      Electronically signed by: Umberto Quinn MD  Date:    11/05/2019  Time:    05:11             Narrative:    EXAMINATION:  XR CHEST AP PORTABLE    CLINICAL HISTORY:  CHF;    TECHNIQUE:  Single frontal view of the chest was performed.    COMPARISON:  None    FINDINGS:  Cardiac monitoring leads overlie the chest.  Cardiomediastinal silhouette appears mildly enlarged.  There are bilateral interstitial opacities suggesting edema.  There is opacification of the bilateral lower lung zones, right more so than left, suggestive of a combination of pleural fluid with associated atelectasis and/or consolidation.  There is no evidence of pneumothorax.  Visualized osseous structures appear intact.                                ASSESSMENT/PLAN:   Ms. Davenport is a 54 y.o. female who was admitted for HTN emergency which proceeded acute flash pulmonary edema.    Flash Pulmonary Edema   CXR with biltaeral pleural effusions   Patient remains on ventilator  Patient currently on Lasix 120mg IV BID   CXR on 11/6 showed improved pulmonary effusions from prior study    Patient extubated on 11/6 and tolerated well.   Patient maintaining O2 sats of 95% on room air   Edema continues to improve     HTN Emergency   Patient's blood pressure reduced 20% over the first hour   Continue to lower BP 5-15% over the next 23 hours as per protocol   Will begin to reduce Nitroglycerin drip and propofol and transition patient to oral medications   Will follow up with nephrology prior to administration of oral meds   No prior home regimen. However patient was taking losartan prior to arrival  Patient transitioned to oral amlodipine, coreg,  hydralazine and losartan on 11/6   Will adjust regimen to attain goal BP > 140/90      Acute decompensated Heart Failure with preserved ejection Fraction   TTE from 11/5/19: shows EF of 50% and grade 1 diastolic dysfunction   Continue current BP regimen as above   Monitor UOP  Daily weights   Salt restriction   Fluid restriction < 1,500 ml   Will continue dieurisis     Nephrotic Syndrome   Likely 2/2 to HTN   MEGAN, RF negative  C3/C4 normal   Will follow up remaining Serology   Nephrology on board   Renal Biopsy today      NSTEMI   Troponin .184 and .179 on repeat   No acute ST segment changes   Likely type 2 NSTEMI   Will continue Cardiac monitoring      Acute Kidney Injury   Possible acute on chronic Kidney disease   FeUrea 43.2%   Creatinine Increased from 3.4 to 3.9   Monitor UOP   UOP net negative 2,2237   Avoid Nephrotoxic medications   Nephrology concern for possible nephrotic syndrome   Will follow up Nephrology recs later today      Normocytic Anemia   H/H 8.1/25.8 MCV 86   Follow up reticulocyte count  Occult blood stool ordered   Will continue to monitor     Prediabetes   A1c 5.8   BG goal while inpatient 140-180  Mod dose ss   Accuchecks ACHS      -VTE: Heparin 5,000 subcut qhrs held for procedure   -GI: Famotidine   -Skin breakdown: Turn pt Q2hrs.   -Deconditioning: Pt/OT   -Aspiration: HOB>30     CODE STATUS: Full Code     DISPO: Currently on Lasix pushes. and optimize blood pressure control on oral meds. Will follow up with nephrology for rising creatinine. Patient to get Renal Biopsy today. Hold anticoagulation for 24hrs.        Carlito Michele MD   [employer] [facility], PGY-2

## 2019-11-08 NOTE — PLAN OF CARE
Pt awake and alert.  Suction and o2 hooked up at bedside.  Scheduled hydralazine given to help control BP.  Awaiting blood culture.  Complaint of nausea today x1. Diet advanced; tolerated well.  Pt. Stated nausea went away after eating. Informed on NPO status at midnight for procedure.  Pt indicated understanding.

## 2019-11-08 NOTE — PLAN OF CARE
Problem: Physical Therapy Goal  Goal: Physical Therapy Goal  Description  Goals to be met by: 2019     Patient will increase functional independence with mobility by performin. Supine to sit with Modified [place]  2. Sit to stand transfer with Modified [place]  3. Bed to chair transfer with Modified [place] using Rolling Walker  4. Gait  x 100 feet with Modified [place] using Rolling Walker.   5. Lower extremity exercise program x12 reps per handout, with supervision     Outcome: Ongoing, Progressing     Patient is making progress towards goals; all goals addressed as appropriate. Continue with PT POC. Pt able to ambulate 10ft x2 c/ RW at Mod A; 2nd person for safety

## 2019-11-08 NOTE — PROGRESS NOTES
[facility] Progress Note    Date of Admit: 2019        Subjective:   Less sob today than prior days. Kidney bx scheduled today. Bladder scan had ~400ml of urine and in and out was done with ~300. She will get apodaca  Objective:   Last 24 Hour Vital Signs:  BP  Min: 115/59  Max: 140/65  Temp  Av.6 °F (37 °C)  Min: 98.2 °F (36.8 °C)  Max: 99.3 °F (37.4 °C)  Pulse  Av.1  Min: 71  Max: 77  Resp  Av.5  Min: 20  Max: 22  SpO2  Av.6 %  Min: 92 %  Max: 95 %  Body mass index is 35.28 kg/m².  I/O last 3 completed shifts:  In: 805 [P.O.:805]  Out: 1524 [Urine:1524]    Physical Examination:  Gen: NAD, AAOx3  HEENT: NC in place, dry [place]  Neck: no thyroidmegaly, no LAD  Cardio: RRR, normal S1/S2, no MRG, JVP wnl  Lungs: bibasilar crackles though improved  Abd: abd edema improved  Ext: 2+ pitting edema LE  Skin: warm, dry, no rashes noted  Neuro: moves ext        Laboratory:  Most Recent Data:  CBC:   Lab Results   Component Value Date    WBC 9.77 2019    HGB 8.2 (L) 2019    HCT 26.0 (L) 2019     2019    MCV 86 2019    RDW 14.3 2019     BMP:   Lab Results   Component Value Date     2019    K 4.2 2019     2019    CO2 19 (L) 2019    BUN 53 (H) 2019    CREATININE 3.9 (H) 2019    GLU 80 2019    CALCIUM 8.6 (L) 2019    MG 1.8 2019    PHOS 5.4 (H) 2019     LFTs:   Lab Results   Component Value Date    PROT 6.1 2019    ALBUMIN 2.3 (L) 2019    BILITOT 0.2 2019    AST 23 2019    ALKPHOS 73 2019    ALT 11 2019     Coags:   Lab Results   Component Value Date    INR 1.1 2019     Urinalysis:   Lab Results   Component Value Date    COLORU Yellow 2019    SPECGRAV 1.025 2019    NITRITE Negative 2019    KETONESU Negative 2019    UROBILINOGEN Negative 2019    WBCUA 1 2019       Trended Lab Data:  Recent Labs   Lab  11/05/19  0322  11/05/19  1744 11/06/19  0318 11/07/19  0417 11/08/19  0755   WBC 7.15  --   --  8.60 9.29 9.77   HGB 8.7*  --   --  8.1* 7.8* 8.2*   HCT 28.2*  --   --  25.8* 24.7* 26.0*     --   --  171 173 189   MCV 88  --   --  86 87 86   RDW 14.1  --   --  14.1 14.5 14.3      < > 142 140 141  --    K 4.6   < > 3.9 4.4 4.2  --    *   < > 113* 112* 109  --    CO2 16*   < > 20* 19* 19*  --    BUN 47*   < > 50* 50* 53*  --    CREATININE 3.4*   < > 3.2* 3.4* 3.9*  --    *   < > 80 79 80  --    PROT 6.1  --   --  5.7* 6.1  --    ALBUMIN 2.7*  --   --  2.2* 2.3*  --    BILITOT 0.3  --   --  0.2 0.2  --    AST 25  --   --  21 23  --    ALKPHOS 89  --   --  68 73  --    ALT 15  --   --  11 11  --     < > = values in this interval not displayed.       Trended Cardiac Data:  Recent Labs   Lab 11/05/19  0322 11/05/19  0837 11/05/19  1200 11/05/19  1514   TROPONINI 0.206* 0.184*  0.195*  0.195* 0.184*  0.184* 0.179*   BNP 1,165*  --   --   --          Radiology:  Imaging Results          US Lower Extremity Veins Bilateral (Final result)  Result time 11/05/19 16:21:54    Final result by Suzanne Berrios MD (11/05/19 16:21:54)                 Impression:      No evidence of deep venous thrombosis in either lower extremity.      Electronically signed by: Suzanne Berrios MD  Date:    11/05/2019  Time:    16:21             Narrative:    EXAMINATION:  US LOWER EXTREMITY VEINS BILATERAL    CLINICAL HISTORY:  rule out dvt; Acute respiratory failure with hypoxia    TECHNIQUE:  Duplex and color flow Doppler and dynamic compression was performed of the bilateral lower extremity veins was performed.    COMPARISON:  None    FINDINGS:  Right thigh veins: The common femoral, femoral, popliteal, upper greater saphenous, and deep femoral veins are patent and free of thrombus. The veins are normally compressible and have normal phasic flow and augmentation response.    Right calf veins: The visualized  calf veins are patent.    Left thigh veins: The common femoral, femoral, popliteal, upper greater saphenous, and deep femoral veins are patent and free of thrombus. The veins are normally compressible and have normal phasic flow and augmentation response.    Left calf veins: The visualized calf veins are patent.    Miscellaneous: Subcutaneous edema noted lower extremities.                               US Retroperitoneal Complete (Kidney and (Final result)  Result time 11/05/19 09:44:48    Final result by Fransico Yun MD (11/05/19 09:44:48)                 Impression:      No hydronephrosis.    Elevated left renal resistive index, a nonspecific indicator of medical renal disease.    Incidental left pleural effusion.      Electronically signed by: Fransico Yun  Date:    11/05/2019  Time:    09:44             Narrative:    EXAMINATION:  US RETROPERITONEAL COMPLETE    CLINICAL HISTORY:  acute renal failure;    TECHNIQUE:  Ultrasound of the kidneys and urinary bladder was performed including color flow and Doppler evaluation of the kidneys.    COMPARISON:  None available    FINDINGS:  Right kidney: Right kidney measures 12.4 cm.  Resistive index of 0.63.  No hydronephrosis.    Left kidney: Left kidney measures 11.6 cm.  Resistive index of 0.80.  No hydronephrosis    Urinary bladder is unremarkable.  Splenic resistive index of 0.75.  Incidental left pleural effusion.                               X-Ray Chest 1 View (Final result)  Result time 11/05/19 06:16:39    Final result by Umberto Quinn MD (11/05/19 06:16:39)                 Impression:      As above.      Electronically signed by: Umberto Quinn MD  Date:    11/05/2019  Time:    06:16             Narrative:    EXAMINATION:  XR CHEST 1 VIEW    CLINICAL HISTORY:  Respiratory failure, unspecified, unspecified whether with hypoxia or hypercapnia    TECHNIQUE:  Single frontal view of the chest was performed.    COMPARISON:  11/05/2019 04:07  hours    FINDINGS:  There has been interval placement of an endotracheal tube with tip terminating approximately 4.0 cm above the lindsay.  Esophagogastric tube has been intervally placed extending below the diaphragm, extending beyond the field of view of the examination.  No significant change in cardiopulmonary status compared to prior examination, noting interstitial edema bilateral pleural effusions.  No evidence of pneumothorax.                               X-Ray Chest AP Portable (Final result)  Result time 11/05/19 05:11:22    Final result by Umberto Quinn MD (11/05/19 05:11:22)                 Impression:      Radiographic findings suggestive of edema/CHF with bilateral pleural effusions and atelectasis/consolidation.      Electronically signed by: Umberto Quinn MD  Date:    11/05/2019  Time:    05:11             Narrative:    EXAMINATION:  XR CHEST AP PORTABLE    CLINICAL HISTORY:  CHF;    TECHNIQUE:  Single frontal view of the chest was performed.    COMPARISON:  None    FINDINGS:  Cardiac monitoring leads overlie the chest.  Cardiomediastinal silhouette appears mildly enlarged.  There are bilateral interstitial opacities suggesting edema.  There is opacification of the bilateral lower lung zones, right more so than left, suggestive of a combination of pleural fluid with associated atelectasis and/or consolidation.  There is no evidence of pneumothorax.  Visualized osseous structures appear intact.                                   Assessment and Plan:      Ping Davenport is a 54 y.o.female with  Patient Active Problem List    Diagnosis Date Noted    Hypertensive emergency 11/05/2019    New onset of congestive heart failure 11/05/2019    Acute respiratory failure with hypoxia and hypercarbia 11/05/2019    Acute renal failure 11/05/2019    NSTEMI (non-ST elevated myocardial infarction) 11/05/2019    Rash 10/13/2014    Encounter to establish care 10/13/2014    Screen for STD (sexually  transmitted disease) 10/13/2014        AXEL stage III vs CKD 5  -Etiologies include cardiorenal, HTN emergency, reports SLE like sx though MEGAN neg, concerned this is chronic as her PTH his elevated   -Initially AGMA NAGMA, Cr on admit 3.4, has 8.8g proteinuria  -Nephrotic syndrome. Likely also HF component too.   -UOP ok, would cont diuresis as she is still overloaded. Kidney bx today for etiology of CKD and nephrotic syndrome    NAGMA  -Likely CKD  -Cont NaHCO3 tabs to 2600 tid, will monitor BP with Na content    Hypertensive  Emergency  -BP well controlled on hydralazine, losartan, coreg and amlodipine    Waldemar Carlson MD  [facility]

## 2019-11-08 NOTE — PROGRESS NOTES
Pt says she last took ASA 81 mg on 11/5/19 (3 days ago). Recommended hold time on ASA is at least 5 days prior to routine renal biopsy.    1. Will re-attempt on Monday, 11/11/19.   2. Pls hold ASA now and porcine heparin on 11/10/19.   3. NPO at midnight on 11/10/19.    Thank you for the opportunity to assist in the care of this patient.      Marcio Holloway MD  [facility]  Pager [phone]

## 2019-11-09 LAB
ALBUMIN SERPL BCP-MCNC: 2.4 G/DL (ref 3.5–5.2)
ALP SERPL-CCNC: 64 U/L (ref 55–135)
ALT SERPL W/O P-5'-P-CCNC: 15 U/L (ref 10–44)
ANION GAP SERPL CALC-SCNC: 14 MMOL/L (ref 8–16)
AST SERPL-CCNC: 35 U/L (ref 10–40)
BASOPHILS # BLD AUTO: 0.01 K/UL (ref 0–0.2)
BASOPHILS NFR BLD: 0.1 % (ref 0–1.9)
BILIRUB SERPL-MCNC: 0.3 MG/DL (ref 0.1–1)
BUN SERPL-MCNC: 67 MG/DL (ref 6–20)
CALCIUM SERPL-MCNC: 8.3 MG/DL (ref 8.7–10.5)
CHLORIDE SERPL-SCNC: 102 MMOL/L (ref 95–110)
CO2 SERPL-SCNC: 22 MMOL/L (ref 23–29)
CREAT SERPL-MCNC: 5.1 MG/DL (ref 0.5–1.4)
CREAT UR-MCNC: 85.4 MG/DL (ref 15–325)
DIFFERENTIAL METHOD: ABNORMAL
EOSINOPHIL # BLD AUTO: 0 K/UL (ref 0–0.5)
EOSINOPHIL NFR BLD: 0 % (ref 0–8)
ERYTHROCYTE [DISTWIDTH] IN BLOOD BY AUTOMATED COUNT: 14.3 % (ref 11.5–14.5)
EST. GFR  (AFRICAN AMERICAN): 10 ML/MIN/1.73 M^2
EST. GFR  (NON AFRICAN AMERICAN): 9 ML/MIN/1.73 M^2
GLUCOSE SERPL-MCNC: 206 MG/DL (ref 70–110)
HCT VFR BLD AUTO: 27.9 % (ref 37–48.5)
HGB BLD-MCNC: 8.8 G/DL (ref 12–16)
LYMPHOCYTES # BLD AUTO: 0.6 K/UL (ref 1–4.8)
LYMPHOCYTES NFR BLD: 9.1 % (ref 18–48)
MAGNESIUM SERPL-MCNC: 1.9 MG/DL (ref 1.6–2.6)
MCH RBC QN AUTO: 27.1 PG (ref 27–31)
MCHC RBC AUTO-ENTMCNC: 31.5 G/DL (ref 32–36)
MCV RBC AUTO: 86 FL (ref 82–98)
MONOCYTES # BLD AUTO: 0.1 K/UL (ref 0.3–1)
MONOCYTES NFR BLD: 1 % (ref 4–15)
NEUTROPHILS # BLD AUTO: 5.9 K/UL (ref 1.8–7.7)
NEUTROPHILS NFR BLD: 89.8 % (ref 38–73)
PHOSPHATE SERPL-MCNC: 7.1 MG/DL (ref 2.7–4.5)
PLATELET # BLD AUTO: 231 K/UL (ref 150–350)
PMV BLD AUTO: 12.1 FL (ref 9.2–12.9)
POCT GLUCOSE: 189 MG/DL (ref 70–110)
POCT GLUCOSE: 339 MG/DL (ref 70–110)
POTASSIUM SERPL-SCNC: 4.3 MMOL/L (ref 3.5–5.1)
PROT SERPL-MCNC: 6 G/DL (ref 6–8.4)
PROT UR-MCNC: 357 MG/DL (ref 0–15)
PROT/CREAT UR: 4.18 MG/G{CREAT} (ref 0–0.2)
RBC # BLD AUTO: 3.25 M/UL (ref 4–5.4)
SODIUM SERPL-SCNC: 138 MMOL/L (ref 136–145)
WBC # BLD AUTO: 6.67 K/UL (ref 3.9–12.7)

## 2019-11-09 PROCEDURE — 83735 ASSAY OF MAGNESIUM: CPT

## 2019-11-09 PROCEDURE — 97530 THERAPEUTIC ACTIVITIES: CPT

## 2019-11-09 PROCEDURE — 86592 SYPHILIS TEST NON-TREP QUAL: CPT

## 2019-11-09 PROCEDURE — 25000003 PHARM REV CODE 250: Performed by: STUDENT IN AN ORGANIZED HEALTH CARE EDUCATION/TRAINING PROGRAM

## 2019-11-09 PROCEDURE — 80053 COMPREHEN METABOLIC PANEL: CPT

## 2019-11-09 PROCEDURE — 85025 COMPLETE CBC W/AUTO DIFF WBC: CPT

## 2019-11-09 PROCEDURE — 84100 ASSAY OF PHOSPHORUS: CPT

## 2019-11-09 PROCEDURE — 63600175 PHARM REV CODE 636 W HCPCS: Performed by: STUDENT IN AN ORGANIZED HEALTH CARE EDUCATION/TRAINING PROGRAM

## 2019-11-09 PROCEDURE — 11000001 HC ACUTE MED/SURG PRIVATE ROOM

## 2019-11-09 PROCEDURE — 97116 GAIT TRAINING THERAPY: CPT

## 2019-11-09 PROCEDURE — 99900035 HC TECH TIME PER 15 MIN (STAT)

## 2019-11-09 PROCEDURE — 94761 N-INVAS EAR/PLS OXIMETRY MLT: CPT

## 2019-11-09 PROCEDURE — 94799 UNLISTED PULMONARY SVC/PX: CPT

## 2019-11-09 PROCEDURE — P9047 ALBUMIN (HUMAN), 25%, 50ML: HCPCS | Mod: JG

## 2019-11-09 PROCEDURE — 84156 ASSAY OF PROTEIN URINE: CPT

## 2019-11-09 PROCEDURE — 36415 COLL VENOUS BLD VENIPUNCTURE: CPT

## 2019-11-09 PROCEDURE — 63600175 PHARM REV CODE 636 W HCPCS: Mod: JG

## 2019-11-09 PROCEDURE — 63600175 PHARM REV CODE 636 W HCPCS: Performed by: FAMILY MEDICINE

## 2019-11-09 RX ORDER — ALBUMIN HUMAN 250 G/1000ML
SOLUTION INTRAVENOUS
Status: COMPLETED
Start: 2019-11-09 | End: 2019-11-09

## 2019-11-09 RX ORDER — HEPARIN SODIUM 5000 [USP'U]/ML
5000 INJECTION, SOLUTION INTRAVENOUS; SUBCUTANEOUS EVERY 8 HOURS
Status: DISCONTINUED | OUTPATIENT
Start: 2019-11-09 | End: 2019-11-10

## 2019-11-09 RX ORDER — INSULIN ASPART 100 [IU]/ML
6 INJECTION, SOLUTION INTRAVENOUS; SUBCUTANEOUS ONCE
Status: COMPLETED | OUTPATIENT
Start: 2019-11-09 | End: 2019-11-09

## 2019-11-09 RX ADMIN — HYDRALAZINE HYDROCHLORIDE 50 MG: 25 TABLET, FILM COATED ORAL at 09:11

## 2019-11-09 RX ADMIN — FUROSEMIDE 120 MG: 10 INJECTION, SOLUTION INTRAVENOUS at 10:11

## 2019-11-09 RX ADMIN — HYDRALAZINE HYDROCHLORIDE 50 MG: 25 TABLET, FILM COATED ORAL at 05:11

## 2019-11-09 RX ADMIN — LOSARTAN POTASSIUM 100 MG: 50 TABLET ORAL at 10:11

## 2019-11-09 RX ADMIN — DEXTROSE: 50 INJECTION, SOLUTION INTRAVENOUS at 10:11

## 2019-11-09 RX ADMIN — HEPARIN SODIUM 5000 UNITS: 5000 INJECTION, SOLUTION INTRAVENOUS; SUBCUTANEOUS at 01:11

## 2019-11-09 RX ADMIN — CARVEDILOL 25 MG: 25 TABLET, FILM COATED ORAL at 10:11

## 2019-11-09 RX ADMIN — FUROSEMIDE 120 MG: 10 INJECTION, SOLUTION INTRAVENOUS at 05:11

## 2019-11-09 RX ADMIN — INSULIN ASPART 6 UNITS: 100 INJECTION, SOLUTION INTRAVENOUS; SUBCUTANEOUS at 11:11

## 2019-11-09 RX ADMIN — ALBUMIN HUMAN 25 G: 0.25 SOLUTION INTRAVENOUS at 10:11

## 2019-11-09 RX ADMIN — AMLODIPINE BESYLATE 10 MG: 5 TABLET ORAL at 10:11

## 2019-11-09 RX ADMIN — SEVELAMER CARBONATE 800 MG: 800 TABLET, FILM COATED ORAL at 10:11

## 2019-11-09 RX ADMIN — SODIUM BICARBONATE 650 MG TABLET 2600 MG: at 10:11

## 2019-11-09 RX ADMIN — SEVELAMER CARBONATE 800 MG: 800 TABLET, FILM COATED ORAL at 05:11

## 2019-11-09 RX ADMIN — SODIUM BICARBONATE 650 MG TABLET 2600 MG: at 09:11

## 2019-11-09 RX ADMIN — HYDRALAZINE HYDROCHLORIDE 50 MG: 25 TABLET, FILM COATED ORAL at 01:11

## 2019-11-09 RX ADMIN — SODIUM BICARBONATE 650 MG TABLET 2600 MG: at 01:11

## 2019-11-09 RX ADMIN — HEPARIN SODIUM 5000 UNITS: 5000 INJECTION, SOLUTION INTRAVENOUS; SUBCUTANEOUS at 09:11

## 2019-11-09 NOTE — PLAN OF CARE
Problem: Physical Therapy Goal  Goal: Physical Therapy Goal  Description  Goals to be met by: 2019     Patient will increase functional independence with mobility by performin. Supine to sit with Modified [place]  2. Sit to stand transfer with Modified [place]  3. Bed to chair transfer with Modified [place] using Rolling Walker  4. Gait  x 100 feet with Modified [place] using Rolling Walker.   5. Lower extremity exercise program x12 reps per handout, with supervision     Outcome: Ongoing, Progressing    Pt continues to work and progress toward all established goals.

## 2019-11-09 NOTE — PLAN OF CARE
POC reviewed with patient. Patient AAOx4 and denies any pain. IV albumin and methylprednisolone administered. Quinn catheter in place draining yellow cloudy urine. Strict intake and output maintained. Tele monitor in place reading NSR. No red alarms or ectopy noted. Patient placed on renal diet. Urine sample for protein/creatine ratio sent to lab. Bed alarm on, bed locked and low, side rails up x2, and call bell in reach. Plan is for patient to possibly have trialysis catheter placed, if done it would be Monday. Patient verbalize clear understanding of POC.

## 2019-11-09 NOTE — PT/OT/SLP PROGRESS
"Physical Therapy Treatment    Patient Name:  Ping Davenport   MRN:  [id number]    Recommendations:     Discharge Recommendations:  rehabilitation facility   Discharge Equipment Recommendations: (Defer to next level of care)   Barriers to discharge: Inaccessible home and Decreased caregiver support    Assessment:     Ping Davenport is a 54 y.o. female admitted with a medical diagnosis of Hypertensive emergency.  She presents with the following impairments/functional limitations:  weakness, impaired endurance, impaired self care skills, impaired functional mobilty, gait instability, impaired balance, decreased coordination, decreased lower extremity function, pain, decreased safety awareness, decreased ROM, impaired coordination, edema. Pt able to perform ambulation training ~15-20 ft with RW requiring min/mod A with pt demonstrating 2 episodes of LOB posteriorly which required min A to correct. Would benefit from continued PT services to increase pt's independent functional mobility to level prior to admission.    Rehab Prognosis: Good; patient would benefit from acute skilled PT services to address these deficits and reach maximum level of function.    Recent Surgery: * No surgery found *      Plan:     During this hospitalization, patient to be seen 6 x/week to address the identified rehab impairments via gait training, therapeutic activities, therapeutic exercises, neuromuscular re-education and progress toward the following goals:    · Plan of Care Expires:  12/07/19    Subjective     Chief Complaint: None expressed  Patient/Family Comments/goals: "I am worried. I don't think I need to have that procedure. I am urinating fine now".  Pain/Comfort:  · Pain Rating 1: (Did not complain of pain)  · Pain Rating Post-Intervention 1: 0/10      Objective:     Communicated with  nurse prior to session.  Patient found supine with bed alarm, peripheral IV, telemetry upon PT entry to room.     General Precautions: " Standard, fall   Orthopedic Precautions:N/A   Braces: N/A     Functional Mobility:  · Bed Mobility:     · Rolling Left:  contact guard assistance, minimum assistance and  using bed rail  · Rolling Right: contact guard assistance, minimum assistance and using bed rail  · Scooting: contact guard assistance and minimum assistance  · Supine to Sit: minimum assistance, moderate assistance and  HOB elevated using bed rail  · Transfers:     · Sit to Stand:  moderate assistance with rolling walker  · Gait:  ~15-20 ft with RW and requiring min/mod A. 2 posterior losses of balance requiring min A to recover      AM-PAC 6 CLICK MOBILITY  Turning over in bed (including adjusting bedclothes, sheets and blankets)?: 2  Sitting down on and standing up from a chair with arms (e.g., wheelchair, bedside commode, etc.): 3  Moving from lying on back to sitting on the side of the bed?: 2  Moving to and from a bed to a chair (including a wheelchair)?: 3  Need to walk in hospital room?: 3  Climbing 3-5 steps with a railing?: 2  Basic Mobility Total Score: 15       Therapeutic Activities and Exercises:   When entered room pt nervous stating she did not feel she needed to have procedure done as she was urinating fine now. Speaking of a medical procedure discussed earlier with doctors. Reiterated this to nurse(Shakila). Pt requested to be cleaned as she had slight bowel movement. PTA encouraged pt to stand within RW to be cleaned and pt wanted to stay supine and roll/turn left to right. Performed ambulation training ~15-20 ft with RW requiring min/mod A. 2 posterior losses of balance which required min A to correct. Demonstrates decreased step length bilaterally and slow anthony with a slight increased trunk flexion.     Patient left up in chair with all lines intact, call button in reach, chair alarm on,  nurse notified and  nurse present..    GOALS:   Multidisciplinary Problems     Physical Therapy Goals        Problem: Physical Therapy Goal     Goal Priority Disciplines Outcome Goal Variances Interventions   Physical Therapy Goal     PT, PT/OT Ongoing, Progressing     Description:  Goals to be met by: 2019     Patient will increase functional independence with mobility by performin. Supine to sit with Modified [place]  2. Sit to stand transfer with Modified [place]  3. Bed to chair transfer with Modified [place] using Rolling Walker  4. Gait  x 100 feet with Modified [place] using Rolling Walker.   5. Lower extremity exercise program x12 reps per handout, with supervision                      Time Tracking:     PT Received On: 19  PT Start Time: 1143     PT Stop Time: 1210  PT Total Time (min): 27 min     Billable Minutes: Gait Training  15 and Therapeutic Activity  12    Treatment Type: Treatment  PT/PTA: PTA     PTA Visit Number: 1     Yecenia Dunham, VASILIY  2019

## 2019-11-09 NOTE — PLAN OF CARE
1901H Patient is awake and orientedx4. Care plan explained and verbalized understanding. VIP care model explained. On room air, no complaints of shortness of breath. On heart monitor running Sinus Rhythm at 69bpm. IV site to the left antecubital 18G; methylprednisolone running. Fr.1 apodaca catheter to urimeter bag draining yellow urine. Generalized edema still noted. Maintained on renal diet. Instructed on NPO after midnight for possible trialysis catheter placement. Maintained on fall precaution. Bed in lowest position, bed alarms on, call light within reach and instructed to call for help when needed. Will continue  to monitor.     Due medications given. Swallows pill one at a time. Turning every 2 hours, wedge utilized.

## 2019-11-09 NOTE — PROGRESS NOTES
[facility] Medicine Progress Note    Admit Date: 11/5/2019   LOS: 4 days     SUBJECTIVE:     Overnight/interval history:  Patient seen examined this morning on rounds.  94% oxygen saturation on room air.  Urine output approximately 200 cc per 24 hr.  No acute events occurred overnight.  NPO at midnight.    Scheduled Meds:   amLODIPine  10 mg Oral Daily    carvedilol  25 mg Oral BID WM    ergocalciferol  50,000 Units Oral Q7 Days    furosemide  120 mg Intravenous BID    heparin (porcine)  5,000 Units Subcutaneous Q8H    hydrALAZINE  50 mg Oral Q8H    losartan  100 mg Oral Daily    methylPREDNISolone (SOLU-Medrol) IVPB (doses > 250 mg)   Intravenous Daily    sevelamer carbonate  800 mg Oral TID WM    sodium bicarbonate  2,600 mg Oral TID     Continuous Infusions:  PRN Meds:acetaminophen, acetaminophen, influenza, ondansetron, promethazine (PHENERGAN) IVPB, ramelteon, senna-docusate 8.6-50 mg, sodium chloride 0.9%    Review of patient's allergies indicates:  No Known Allergies    Review of Systems  Constitutional: Negative for chills and fever.   HENT: Negative for sore throat.    Eyes: Negative for blurred vision and double vision.   Respiratory: Negative for cough and shortness of breath.    Cardiovascular: Negative for chest pain.   Gastrointestinal: Negative for abdominal pain, constipation, diarrhea, heartburn, nausea and vomiting.   Genitourinary: Negative for dysuria and urgency.   Musculoskeletal: Negative for back pain and falls.   Skin: Negative for itching and rash.   Neurological: Negative for headaches.   Endo/Heme/Allergies: Does not bruise/bleed easily.   Psychiatric/Behavioral: The patient is not nervous/anxious.    OBJECTIVE:     Vital Signs (Most Recent)  Temp: 98.3 °F (36.8 °C) (11/09/19 1153)  Pulse: 71 (11/09/19 1153)  Resp: 18 (11/09/19 1153)  BP: 134/61 (11/09/19 1153)  SpO2: 95 % (11/09/19 1153)    Vital Signs Range (Last 24H):  Temp:  [97.5 °F (36.4 °C)-99.1 °F (37.3 °C)]   Pulse:   [69-78]   Resp:  [16-18]   BP: (129-163)/(60-72)   SpO2:  [93 %-96 %]     I & O (Last 24H):    Intake/Output Summary (Last 24 hours) at 11/9/2019 1156  Last data filed at 11/9/2019 1003  Gross per 24 hour   Intake 430 ml   Output 650 ml   Net -220 ml     Physical Exam:  Constitutional: She is oriented to person, place, and time and well-developed, well-nourished, and in no distress.   HENT:   Head: Normocephalic and atraumatic.   Eyes: Pupils are equal, round, and reactive to light. EOM are normal.   Neck: Normal range of motion. Neck supple.   Cardiovascular: Normal rate and regular rhythm.   Pulmonary/Chest: Effort normal.  No acute respiratory distress  Abdominal: Soft. Bowel sounds are normal.   Musculoskeletal:   1+ pitting edema  Neurological: She is alert and oriented to person, place, and time.   Skin: Skin is warm and dry.   Psychiatric: Affect normal.     Laboratory:  CBC:   Recent Labs   Lab 11/09/19  0552   WBC 6.67   RBC 3.25*   HGB 8.8*   HCT 27.9*      MCV 86   MCH 27.1   MCHC 31.5*     BMP:   Recent Labs   Lab 11/09/19  0552   *      K 4.3      CO2 22*   BUN 67*   CREATININE 5.1*   CALCIUM 8.3*   MG 1.9     CMP:   Recent Labs   Lab 11/09/19  0552   *   CALCIUM 8.3*   ALBUMIN 2.4*   PROT 6.0      K 4.3   CO2 22*      BUN 67*   CREATININE 5.1*   ALKPHOS 64   ALT 15   AST 35   BILITOT 0.3     LFTs:   Recent Labs   Lab 11/09/19  0552   ALT 15   AST 35   ALKPHOS 64   BILITOT 0.3   PROT 6.0   ALBUMIN 2.4*     Coagulation:   Recent Labs   Lab 11/05/19  0322   LABPROT 11.4   INR 1.1     Cardiac markers:   Recent Labs   Lab 11/05/19  1514   TROPONINI 0.179*     ABGs:   Recent Labs   Lab 11/08/19  1653   PH 7.445   PCO2 33.0*   PO2 57   HCO3 22.7*   POCSATURATED 91*   BE -1     Microbiology Results (last 7 days)     Procedure Component Value Units Date/Time    Blood culture [[id number]] Collected:  11/05/19 0928    Order Status:  Completed Specimen:  Blood Updated:   11/08/19 1612     Blood Culture, Routine No Growth to date      No Growth to date      No Growth to date      No Growth to date    Blood culture [[id number]] Collected:  11/05/19 0936    Order Status:  Completed Specimen:  Blood Updated:  11/08/19 1612     Blood Culture, Routine No Growth to date      No Growth to date      No Growth to date      No Growth to date        Specimen (12h ago, onward)    None        Recent Labs   Lab 11/05/19  0601   COLORU Yellow   SPECGRAV 1.025   PHUR 6.0   PROTEINUA 2+*   BACTERIA None   NITRITE Negative   LEUKOCYTESUR Negative   UROBILINOGEN Negative   HYALINECASTS 0       ASSESSMENT/PLAN:     Ms. Davenport is a 54 y.o. female who was admitted for HTN emergency which proceeded acute flash pulmonary edema.     Flash Pulmonary Edema   CXR with bilateral pleural effusions upon admission  Resolving     HTN Emergency   Current BP:  140/65  Resolved     Acute decompensated Heart Failure with preserved ejection Fraction   TTE from 11/5/19: shows EF of 50% and grade 1 diastolic dysfunction   Monitor UOP  Daily weights   Fluid restriction < 1,500 ml   Continue Lasix 120 b.i.d.  Resolving     Nephrotic Syndrome   Most likely secondary to hypertensive nephropathy  MEGAN, RF negative  C3/C4 normal   Follow up Nephrology recommendations  Possible renal biopsy scheduled for Monday November 11, 2019  Please hold heparin on November 10th     NSTEMI   Most likely secondary to type 2 demand ischemia  Resolved     Acute Kidney Injury   FeUrea 43.2%   Creatinine 5.1 this morning  Monitor UOP   Avoid Nephrotoxic medications   Follow up on Nephrology recommendations  Continue to monitor     Normocytic Anemia   Hemoglobin 8.8  Most likely secondary to anemia of chronic disease  Continue to monitor     Prediabetes   A1c 5.8   Fasting glucose 206 this morning most likely secondary to steroid induced hyperglycemia and not diabetes   Discontinue POCT glucose checks    VTE prophylaxis: Heparin     CODE  STATUS: Full Code    Disposition:  Patient is scheduled for renal biopsy on November 11, 2019.    Case discussed both upper level resident as well as attending physician    Gareth Green Jr., D.O.  [facility], [place], [facility]

## 2019-11-10 LAB
ALBUMIN SERPL BCP-MCNC: 2.6 G/DL (ref 3.5–5.2)
ALP SERPL-CCNC: 60 U/L (ref 55–135)
ALT SERPL W/O P-5'-P-CCNC: 17 U/L (ref 10–44)
ANION GAP SERPL CALC-SCNC: 10 MMOL/L (ref 8–16)
AST SERPL-CCNC: 31 U/L (ref 10–40)
BACTERIA BLD CULT: NORMAL
BACTERIA BLD CULT: NORMAL
BASOPHILS # BLD AUTO: 0.01 K/UL (ref 0–0.2)
BASOPHILS NFR BLD: 0.2 % (ref 0–1.9)
BILIRUB SERPL-MCNC: 0.2 MG/DL (ref 0.1–1)
BUN SERPL-MCNC: 86 MG/DL (ref 6–20)
CALCIUM SERPL-MCNC: 8.1 MG/DL (ref 8.7–10.5)
CHLORIDE SERPL-SCNC: 99 MMOL/L (ref 95–110)
CO2 SERPL-SCNC: 27 MMOL/L (ref 23–29)
CREAT SERPL-MCNC: 5.7 MG/DL (ref 0.5–1.4)
DIFFERENTIAL METHOD: ABNORMAL
EOSINOPHIL # BLD AUTO: 0 K/UL (ref 0–0.5)
EOSINOPHIL NFR BLD: 0.2 % (ref 0–8)
ERYTHROCYTE [DISTWIDTH] IN BLOOD BY AUTOMATED COUNT: 14.1 % (ref 11.5–14.5)
EST. GFR  (AFRICAN AMERICAN): 9 ML/MIN/1.73 M^2
EST. GFR  (NON AFRICAN AMERICAN): 8 ML/MIN/1.73 M^2
GLUCOSE SERPL-MCNC: 344 MG/DL (ref 70–110)
HCT VFR BLD AUTO: 25.3 % (ref 37–48.5)
HGB BLD-MCNC: 7.8 G/DL (ref 12–16)
LYMPHOCYTES # BLD AUTO: 0.6 K/UL (ref 1–4.8)
LYMPHOCYTES NFR BLD: 8.8 % (ref 18–48)
MAGNESIUM SERPL-MCNC: 2 MG/DL (ref 1.6–2.6)
MCH RBC QN AUTO: 26.5 PG (ref 27–31)
MCHC RBC AUTO-ENTMCNC: 30.8 G/DL (ref 32–36)
MCV RBC AUTO: 86 FL (ref 82–98)
MONOCYTES # BLD AUTO: 0.5 K/UL (ref 0.3–1)
MONOCYTES NFR BLD: 7.2 % (ref 4–15)
NEUTROPHILS # BLD AUTO: 5.3 K/UL (ref 1.8–7.7)
NEUTROPHILS NFR BLD: 83.6 % (ref 38–73)
PHOSPHATE SERPL-MCNC: 6.7 MG/DL (ref 2.7–4.5)
PLATELET # BLD AUTO: 220 K/UL (ref 150–350)
PMV BLD AUTO: 11.6 FL (ref 9.2–12.9)
POCT GLUCOSE: 309 MG/DL (ref 70–110)
POCT GLUCOSE: 430 MG/DL (ref 70–110)
POTASSIUM SERPL-SCNC: 4.3 MMOL/L (ref 3.5–5.1)
PROT SERPL-MCNC: 5.9 G/DL (ref 6–8.4)
RBC # BLD AUTO: 2.94 M/UL (ref 4–5.4)
SODIUM SERPL-SCNC: 136 MMOL/L (ref 136–145)
WBC # BLD AUTO: 6.38 K/UL (ref 3.9–12.7)

## 2019-11-10 PROCEDURE — 25000003 PHARM REV CODE 250: Performed by: STUDENT IN AN ORGANIZED HEALTH CARE EDUCATION/TRAINING PROGRAM

## 2019-11-10 PROCEDURE — 80053 COMPREHEN METABOLIC PANEL: CPT

## 2019-11-10 PROCEDURE — 63600175 PHARM REV CODE 636 W HCPCS: Performed by: FAMILY MEDICINE

## 2019-11-10 PROCEDURE — 94761 N-INVAS EAR/PLS OXIMETRY MLT: CPT

## 2019-11-10 PROCEDURE — 63600175 PHARM REV CODE 636 W HCPCS: Performed by: STUDENT IN AN ORGANIZED HEALTH CARE EDUCATION/TRAINING PROGRAM

## 2019-11-10 PROCEDURE — 11000001 HC ACUTE MED/SURG PRIVATE ROOM

## 2019-11-10 PROCEDURE — 94799 UNLISTED PULMONARY SVC/PX: CPT

## 2019-11-10 PROCEDURE — 36415 COLL VENOUS BLD VENIPUNCTURE: CPT

## 2019-11-10 PROCEDURE — 83735 ASSAY OF MAGNESIUM: CPT

## 2019-11-10 PROCEDURE — 99900035 HC TECH TIME PER 15 MIN (STAT)

## 2019-11-10 PROCEDURE — 85025 COMPLETE CBC W/AUTO DIFF WBC: CPT

## 2019-11-10 PROCEDURE — 84100 ASSAY OF PHOSPHORUS: CPT

## 2019-11-10 RX ORDER — IBUPROFEN 200 MG
16 TABLET ORAL
Status: DISCONTINUED | OUTPATIENT
Start: 2019-11-10 | End: 2019-11-18 | Stop reason: HOSPADM

## 2019-11-10 RX ORDER — GLUCAGON 1 MG
1 KIT INJECTION
Status: DISCONTINUED | OUTPATIENT
Start: 2019-11-10 | End: 2019-11-10

## 2019-11-10 RX ORDER — INSULIN ASPART 100 [IU]/ML
1-10 INJECTION, SOLUTION INTRAVENOUS; SUBCUTANEOUS
Status: DISCONTINUED | OUTPATIENT
Start: 2019-11-10 | End: 2019-11-18 | Stop reason: HOSPADM

## 2019-11-10 RX ORDER — IBUPROFEN 200 MG
24 TABLET ORAL
Status: DISCONTINUED | OUTPATIENT
Start: 2019-11-10 | End: 2019-11-10

## 2019-11-10 RX ORDER — IBUPROFEN 200 MG
16 TABLET ORAL
Status: DISCONTINUED | OUTPATIENT
Start: 2019-11-10 | End: 2019-11-10

## 2019-11-10 RX ORDER — IBUPROFEN 200 MG
24 TABLET ORAL
Status: DISCONTINUED | OUTPATIENT
Start: 2019-11-10 | End: 2019-11-18 | Stop reason: HOSPADM

## 2019-11-10 RX ORDER — GLUCAGON 1 MG
1 KIT INJECTION
Status: DISCONTINUED | OUTPATIENT
Start: 2019-11-10 | End: 2019-11-18 | Stop reason: HOSPADM

## 2019-11-10 RX ORDER — INSULIN ASPART 100 [IU]/ML
0-5 INJECTION, SOLUTION INTRAVENOUS; SUBCUTANEOUS
Status: DISCONTINUED | OUTPATIENT
Start: 2019-11-10 | End: 2019-11-10

## 2019-11-10 RX ORDER — LOSARTAN POTASSIUM 50 MG/1
50 TABLET ORAL DAILY
Status: DISCONTINUED | OUTPATIENT
Start: 2019-11-11 | End: 2019-11-13

## 2019-11-10 RX ADMIN — DEXTROSE: 50 INJECTION, SOLUTION INTRAVENOUS at 09:11

## 2019-11-10 RX ADMIN — INSULIN ASPART 4 UNITS: 100 INJECTION, SOLUTION INTRAVENOUS; SUBCUTANEOUS at 09:11

## 2019-11-10 RX ADMIN — HEPARIN SODIUM 5000 UNITS: 5000 INJECTION, SOLUTION INTRAVENOUS; SUBCUTANEOUS at 05:11

## 2019-11-10 RX ADMIN — CARVEDILOL 25 MG: 25 TABLET, FILM COATED ORAL at 09:11

## 2019-11-10 RX ADMIN — SEVELAMER CARBONATE 800 MG: 800 TABLET, FILM COATED ORAL at 04:11

## 2019-11-10 RX ADMIN — LOSARTAN POTASSIUM 100 MG: 50 TABLET ORAL at 09:11

## 2019-11-10 RX ADMIN — AMLODIPINE BESYLATE 10 MG: 5 TABLET ORAL at 09:11

## 2019-11-10 RX ADMIN — SEVELAMER CARBONATE 800 MG: 800 TABLET, FILM COATED ORAL at 12:11

## 2019-11-10 RX ADMIN — SEVELAMER CARBONATE 800 MG: 800 TABLET, FILM COATED ORAL at 09:11

## 2019-11-10 RX ADMIN — FUROSEMIDE 120 MG: 10 INJECTION, SOLUTION INTRAVENOUS at 09:11

## 2019-11-10 RX ADMIN — INSULIN ASPART 5 UNITS: 100 INJECTION, SOLUTION INTRAVENOUS; SUBCUTANEOUS at 09:11

## 2019-11-10 RX ADMIN — FUROSEMIDE 120 MG: 10 INJECTION, SOLUTION INTRAVENOUS at 04:11

## 2019-11-10 RX ADMIN — SODIUM BICARBONATE 650 MG TABLET 2600 MG: at 09:11

## 2019-11-10 RX ADMIN — HYDRALAZINE HYDROCHLORIDE 50 MG: 25 TABLET, FILM COATED ORAL at 05:11

## 2019-11-10 NOTE — PROGRESS NOTES
"[facility] Progress Note    Date of Admit: 2019        Subjective:     Reports she continues to feel well today. Reports her breathing is more comfortable. She is responding to diuresis and says she can "see [her] knees again". Denies abdominal pain, chest pain, or subjective fever/chills overnight. No new complaints today.    Objective:   Last 24 Hour Vital Signs:  BP  Min: 119/56  Max: 163/72  Temp  Av.5 °F (36.9 °C)  Min: 97.9 °F (36.6 °C)  Max: 99.1 °F (37.3 °C)  Pulse  Av.8  Min: 67  Max: 78  Resp  Av.2  Min: 16  Max: 18  SpO2  Av.6 %  Min: 93 %  Max: 95 %  Weight  Av.7 kg (184 lb 8.4 oz)  Min: 83.7 kg (184 lb 8.4 oz)  Max: 83.7 kg (184 lb 8.4 oz)  Body mass index is 33.75 kg/m².  I/O last 3 completed shifts:  In: 830 [P.O.:680; I.V.:100; IV Piggyback:50]  Out: 950 [Urine:950]    Physical Examination:  Gen: NAD, AAOx3  HEENT: NC in place, dry [place]  Neck: no thyroidmegaly, no LAD  Cardio: RRR, normal S1/S2, no MRG, JVP wnl  Lungs: bibasilar crackles improving  Abd: abd edema improved  Ext: pitting edema of lower extremities up to shins bilaterally  Skin: warm, dry, no rashes noted  Neuro: moves ext    Laboratory:  Most Recent Data:  CBC:   Lab Results   Component Value Date    WBC 6.67 2019    HGB 8.8 (L) 2019    HCT 27.9 (L) 2019     2019    MCV 86 2019    RDW 14.3 2019     BMP:   Lab Results   Component Value Date     2019    K 4.3 2019     2019    CO2 22 (L) 2019    BUN 67 (H) 2019    CREATININE 5.1 (H) 2019     (H) 2019    CALCIUM 8.3 (L) 2019    MG 1.9 2019    PHOS 7.1 (H) 2019     LFTs:   Lab Results   Component Value Date    PROT 6.0 2019    ALBUMIN 2.4 (L) 2019    BILITOT 0.3 2019    AST 35 2019    ALKPHOS 64 2019    ALT 15 2019     Coags:   Lab Results   Component Value Date    INR 1.1 2019     Urinalysis: "   Lab Results   Component Value Date    COLORU Yellow 11/05/2019    SPECGRAV 1.025 11/05/2019    NITRITE Negative 11/05/2019    KETONESU Negative 11/05/2019    UROBILINOGEN Negative 11/05/2019    WBCUA 1 11/05/2019       Trended Lab Data:  Recent Labs   Lab 11/07/19  0417 11/08/19  0658 11/08/19  0755 11/08/19  1653 11/09/19  0552   WBC 9.29  --  9.77  --  6.67   HGB 7.8*  --  8.2*  --  8.8*   HCT 24.7*  --  26.0* 23* 27.9*     --  189  --  231   MCV 87  --  86  --  86   RDW 14.5  --  14.3  --  14.3    140  --   --  138   K 4.2 4.4  --   --  4.3    107  --   --  102   CO2 19* 15*  --   --  22*   BUN 53* 60*  --   --  67*   CREATININE 3.9* 4.7*  --   --  5.1*   GLU 80 106  --   --  206*   PROT 6.1 5.8*  --   --  6.0   ALBUMIN 2.3* 2.1*  --   --  2.4*   BILITOT 0.2 0.2  --   --  0.3   AST 23 33  --   --  35   ALKPHOS 73 67  --   --  64   ALT 11 13  --   --  15       Trended Cardiac Data:  Recent Labs   Lab 11/05/19  0322 11/05/19  0837 11/05/19  1200 11/05/19  1514   TROPONINI 0.206* 0.184*  0.195*  0.195* 0.184*  0.184* 0.179*   BNP 1,165*  --   --   --          Radiology:  Imaging Results          US Lower Extremity Veins Bilateral (Final result)  Result time 11/05/19 16:21:54    Final result by Suzanne Berrios MD (11/05/19 16:21:54)                 Impression:      No evidence of deep venous thrombosis in either lower extremity.      Electronically signed by: Suzanne Berrios MD  Date:    11/05/2019  Time:    16:21             Narrative:    EXAMINATION:  US LOWER EXTREMITY VEINS BILATERAL    CLINICAL HISTORY:  rule out dvt; Acute respiratory failure with hypoxia    TECHNIQUE:  Duplex and color flow Doppler and dynamic compression was performed of the bilateral lower extremity veins was performed.    COMPARISON:  None    FINDINGS:  Right thigh veins: The common femoral, femoral, popliteal, upper greater saphenous, and deep femoral veins are patent and free of thrombus. The veins are  normally compressible and have normal phasic flow and augmentation response.    Right calf veins: The visualized calf veins are patent.    Left thigh veins: The common femoral, femoral, popliteal, upper greater saphenous, and deep femoral veins are patent and free of thrombus. The veins are normally compressible and have normal phasic flow and augmentation response.    Left calf veins: The visualized calf veins are patent.    Miscellaneous: Subcutaneous edema noted lower extremities.                               US Retroperitoneal Complete (Kidney and (Final result)  Result time 11/05/19 09:44:48    Final result by Fransico Yun MD (11/05/19 09:44:48)                 Impression:      No hydronephrosis.    Elevated left renal resistive index, a nonspecific indicator of medical renal disease.    Incidental left pleural effusion.      Electronically signed by: Fransico Yun  Date:    11/05/2019  Time:    09:44             Narrative:    EXAMINATION:  US RETROPERITONEAL COMPLETE    CLINICAL HISTORY:  acute renal failure;    TECHNIQUE:  Ultrasound of the kidneys and urinary bladder was performed including color flow and Doppler evaluation of the kidneys.    COMPARISON:  None available    FINDINGS:  Right kidney: Right kidney measures 12.4 cm.  Resistive index of 0.63.  No hydronephrosis.    Left kidney: Left kidney measures 11.6 cm.  Resistive index of 0.80.  No hydronephrosis    Urinary bladder is unremarkable.  Splenic resistive index of 0.75.  Incidental left pleural effusion.                               X-Ray Chest 1 View (Final result)  Result time 11/05/19 06:16:39    Final result by Umberto Quinn MD (11/05/19 06:16:39)                 Impression:      As above.      Electronically signed by: Umberto Quinn MD  Date:    11/05/2019  Time:    06:16             Narrative:    EXAMINATION:  XR CHEST 1 VIEW    CLINICAL HISTORY:  Respiratory failure, unspecified, unspecified whether with hypoxia or  hypercapnia    TECHNIQUE:  Single frontal view of the chest was performed.    COMPARISON:  11/05/2019 04:07 hours    FINDINGS:  There has been interval placement of an endotracheal tube with tip terminating approximately 4.0 cm above the lindsay.  Esophagogastric tube has been intervally placed extending below the diaphragm, extending beyond the field of view of the examination.  No significant change in cardiopulmonary status compared to prior examination, noting interstitial edema bilateral pleural effusions.  No evidence of pneumothorax.                               X-Ray Chest AP Portable (Final result)  Result time 11/05/19 05:11:22    Final result by Umberto Quinn MD (11/05/19 05:11:22)                 Impression:      Radiographic findings suggestive of edema/CHF with bilateral pleural effusions and atelectasis/consolidation.      Electronically signed by: Umberto Quinn MD  Date:    11/05/2019  Time:    05:11             Narrative:    EXAMINATION:  XR CHEST AP PORTABLE    CLINICAL HISTORY:  CHF;    TECHNIQUE:  Single frontal view of the chest was performed.    COMPARISON:  None    FINDINGS:  Cardiac monitoring leads overlie the chest.  Cardiomediastinal silhouette appears mildly enlarged.  There are bilateral interstitial opacities suggesting edema.  There is opacification of the bilateral lower lung zones, right more so than left, suggestive of a combination of pleural fluid with associated atelectasis and/or consolidation.  There is no evidence of pneumothorax.  Visualized osseous structures appear intact.                                   Assessment and Plan:      Ping Davenport is a 54 y.o.female with  Patient Active Problem List    Diagnosis Date Noted    Hypertensive emergency 11/05/2019    New onset of congestive heart failure 11/05/2019    Acute respiratory failure with hypoxia and hypercarbia 11/05/2019    Acute renal failure 11/05/2019    NSTEMI (non-ST elevated myocardial  infarction) 11/05/2019    Rash 10/13/2014    Encounter to establish care 10/13/2014    Screen for STD (sexually transmitted disease) 10/13/2014        AXEL stage III vs CKD 5  -Etiologies include cardiorenal, HTN emergency, reports SLE like sx though MEGAN neg, concerned this is chronic as her PTH his elevated   -Initially AGMA, NAGMA, Cr on admit 3.4, has 8.8g proteinuria  -Nephrotic syndrome. Likely also HF component too.   -UOP ok, would cont diuresis as she is still overloaded. Kidney bx targeted for Monday  -continue pulse solumedrol x3 days  -may need trialysis line placement on Monday for projected RRT  -proteinuria improving 8g ->4g  -renal dose meds; avoid NSAIDs    NAGMA  -Likely CKD  -Cont NaHCO3 tabs to 2600 tid, will monitor BP with Na content    Hypertensive  Emergency  -BP better controlled on hydralazine, losartan, coreg and amlodipine    Mineral Bone Disease/CKD eval  Lab Results   Component Value Date    .5 (H) 11/07/2019    CALCIUM 8.3 (L) 11/09/2019    MG 1.9 11/09/2019    PHOS 7.1 (H) 11/09/2019    [id number] 7 (L) 11/07/2019    CO2 22 (L) 11/09/2019   continue vitamin D and sevelamer supplement        Tae Rosa MD  [facility]  IV  [phone]

## 2019-11-10 NOTE — PROGRESS NOTES
Per PT notes, they have made rec's for IPR. Pt has been approved for Medicaid- [facility]. [place] provided pt with list of IPR facilities and asked her to choose at least 3. Pt was very sleepy, thanked the [place] and stated she would look at the list.

## 2019-11-10 NOTE — PLAN OF CARE
1905H Patient is awake and orientedx4. Care plan explained and verbalized understanding. VIP care model explained. On room air, no complaints of shortness of breath. On heart monitor running Sinus Rhythm at 60s-70s. IV site to the left antecubital 18G; flushed and saline locked. Generalized edema still noted. Maintained on renal diet. Maintained on fall precaution. Bed in lowest position, bed alarms on, call light within reach and instructed to call for help when needed. Will continue  to monitor.     Due medications given. Swallows pill one at a time. Turning every 2 hours, wedge utilized.

## 2019-11-10 NOTE — PLAN OF CARE
Patient and VN rounded and discussed various topics including plan of care and medication regimen.  Patient reports seeing her physician today and knows that she will be getting a renal biopsy on Monday.  Education given on NPO status for tomorrow night.  She reports pain currently in her lower back but states that she does not need medication only a pillow for support and positioning help.  Floor nurse Yvette GILBERT notified and aide Maureen for patients request.  All safety measures maintained with the bed locked and in lowest position with alarm on.  The call light and all personal items is within reach. Education given on fall risk and patient verbalizes understanding of call light use for assistance.

## 2019-11-10 NOTE — PLAN OF CARE
1030: VN attempted to round on patient at this time. Patient currently on the phone and requesting VN to return at a later time. Will continue to monitor and attempt to round at a later time.     @1326: VN cued into patients room for rounding. Patient states she is voiding in small amounts; VN notified pt we will continue to monitor and perform bladder scans as ordered. Denies pain at this time.  Fall education provided. Allotted time given for questions - all questions answered. Will continue to monitor and intervene PRN.

## 2019-11-10 NOTE — PROGRESS NOTES
"[facility] Progress Note    Date of Admit: 2019        Subjective:     Reports she continues to feel well today. Reports her breathing is more comfortable. She is responding to diuresis and says she can "see [her] knees again". Denies abdominal pain, chest pain, or subjective fever/chills overnight. No new complaints today.    Objective:   Last 24 Hour Vital Signs:  BP  Min: 112/54  Max: 138/65  Temp  Av.6 °F (36.4 °C)  Min: 97 °F (36.1 °C)  Max: 98.1 °F (36.7 °C)  Pulse  Av.5  Min: 63  Max: 72  Resp  Av  Min: 16  Max: 18  SpO2  Av.6 %  Min: 92 %  Max: 95 %  Body mass index is 33.75 kg/m².  I/O last 3 completed shifts:  In: 675 [P.O.:675]  Out: 950 [Urine:950]    Physical Examination:  Gen: NAD, AAOx3  HEENT: NC in place, dry [place]  Neck: no thyroidmegaly, no LAD  Cardio: RRR, normal S1/S2, no MRG, JVP wnl  Lungs: bibasilar crackles improving  Abd: abd edema improved  Ext: pitting edema of lower extremities up to shins bilaterally  Skin: warm, dry, no rashes noted  Neuro: moves ext    Laboratory:  Most Recent Data:  CBC:   Lab Results   Component Value Date    WBC 6.38 11/10/2019    HGB 7.8 (L) 11/10/2019    HCT 25.3 (L) 11/10/2019     11/10/2019    MCV 86 11/10/2019    RDW 14.1 11/10/2019     BMP:   Lab Results   Component Value Date     11/10/2019    K 4.3 11/10/2019    CL 99 11/10/2019    CO2 27 11/10/2019    BUN 86 (H) 11/10/2019    CREATININE 5.7 (H) 11/10/2019     (H) 11/10/2019    CALCIUM 8.1 (L) 11/10/2019    MG 2.0 11/10/2019    PHOS 6.7 (H) 11/10/2019     LFTs:   Lab Results   Component Value Date    PROT 5.9 (L) 11/10/2019    ALBUMIN 2.6 (L) 11/10/2019    BILITOT 0.2 11/10/2019    AST 31 11/10/2019    ALKPHOS 60 11/10/2019    ALT 17 11/10/2019     Coags:   Lab Results   Component Value Date    INR 1.1 2019     Urinalysis:   Lab Results   Component Value Date    COLORU Yellow 2019    SPECGRAV 1.025 2019    NITRITE Negative 2019    " KETONESU Negative 11/05/2019    UROBILINOGEN Negative 11/05/2019    WBCUA 1 11/05/2019       Trended Lab Data:  Recent Labs   Lab 11/08/19  0658 11/08/19  0755 11/08/19  1653 11/09/19  0552 11/10/19  0526   WBC  --  9.77  --  6.67 6.38   HGB  --  8.2*  --  8.8* 7.8*   HCT  --  26.0* 23* 27.9* 25.3*   PLT  --  189  --  231 220   MCV  --  86  --  86 86   RDW  --  14.3  --  14.3 14.1     --   --  138 136   K 4.4  --   --  4.3 4.3     --   --  102 99   CO2 15*  --   --  22* 27   BUN 60*  --   --  67* 86*   CREATININE 4.7*  --   --  5.1* 5.7*     --   --  206* 344*   PROT 5.8*  --   --  6.0 5.9*   ALBUMIN 2.1*  --   --  2.4* 2.6*   BILITOT 0.2  --   --  0.3 0.2   AST 33  --   --  35 31   ALKPHOS 67  --   --  64 60   ALT 13  --   --  15 17       Trended Cardiac Data:  Recent Labs   Lab 11/05/19  0322 11/05/19  0837 11/05/19  1200 11/05/19  1514   TROPONINI 0.206* 0.184*  0.195*  0.195* 0.184*  0.184* 0.179*   BNP 1,165*  --   --   --          Radiology:  Imaging Results          US Lower Extremity Veins Bilateral (Final result)  Result time 11/05/19 16:21:54    Final result by Suzanne Berrios MD (11/05/19 16:21:54)                 Impression:      No evidence of deep venous thrombosis in either lower extremity.      Electronically signed by: Suzanne Berrios MD  Date:    11/05/2019  Time:    16:21             Narrative:    EXAMINATION:  US LOWER EXTREMITY VEINS BILATERAL    CLINICAL HISTORY:  rule out dvt; Acute respiratory failure with hypoxia    TECHNIQUE:  Duplex and color flow Doppler and dynamic compression was performed of the bilateral lower extremity veins was performed.    COMPARISON:  None    FINDINGS:  Right thigh veins: The common femoral, femoral, popliteal, upper greater saphenous, and deep femoral veins are patent and free of thrombus. The veins are normally compressible and have normal phasic flow and augmentation response.    Right calf veins: The visualized calf veins are  patent.    Left thigh veins: The common femoral, femoral, popliteal, upper greater saphenous, and deep femoral veins are patent and free of thrombus. The veins are normally compressible and have normal phasic flow and augmentation response.    Left calf veins: The visualized calf veins are patent.    Miscellaneous: Subcutaneous edema noted lower extremities.                               US Retroperitoneal Complete (Kidney and (Final result)  Result time 11/05/19 09:44:48    Final result by Fransico Yun MD (11/05/19 09:44:48)                 Impression:      No hydronephrosis.    Elevated left renal resistive index, a nonspecific indicator of medical renal disease.    Incidental left pleural effusion.      Electronically signed by: Fransico Yun  Date:    11/05/2019  Time:    09:44             Narrative:    EXAMINATION:  US RETROPERITONEAL COMPLETE    CLINICAL HISTORY:  acute renal failure;    TECHNIQUE:  Ultrasound of the kidneys and urinary bladder was performed including color flow and Doppler evaluation of the kidneys.    COMPARISON:  None available    FINDINGS:  Right kidney: Right kidney measures 12.4 cm.  Resistive index of 0.63.  No hydronephrosis.    Left kidney: Left kidney measures 11.6 cm.  Resistive index of 0.80.  No hydronephrosis    Urinary bladder is unremarkable.  Splenic resistive index of 0.75.  Incidental left pleural effusion.                               X-Ray Chest 1 View (Final result)  Result time 11/05/19 06:16:39    Final result by Umberto Quinn MD (11/05/19 06:16:39)                 Impression:      As above.      Electronically signed by: Umberto Quinn MD  Date:    11/05/2019  Time:    06:16             Narrative:    EXAMINATION:  XR CHEST 1 VIEW    CLINICAL HISTORY:  Respiratory failure, unspecified, unspecified whether with hypoxia or hypercapnia    TECHNIQUE:  Single frontal view of the chest was performed.    COMPARISON:  11/05/2019 04:07 hours    FINDINGS:  There  has been interval placement of an endotracheal tube with tip terminating approximately 4.0 cm above the lindsay.  Esophagogastric tube has been intervally placed extending below the diaphragm, extending beyond the field of view of the examination.  No significant change in cardiopulmonary status compared to prior examination, noting interstitial edema bilateral pleural effusions.  No evidence of pneumothorax.                               X-Ray Chest AP Portable (Final result)  Result time 11/05/19 05:11:22    Final result by Umberto Quinn MD (11/05/19 05:11:22)                 Impression:      Radiographic findings suggestive of edema/CHF with bilateral pleural effusions and atelectasis/consolidation.      Electronically signed by: Umberto Quinn MD  Date:    11/05/2019  Time:    05:11             Narrative:    EXAMINATION:  XR CHEST AP PORTABLE    CLINICAL HISTORY:  CHF;    TECHNIQUE:  Single frontal view of the chest was performed.    COMPARISON:  None    FINDINGS:  Cardiac monitoring leads overlie the chest.  Cardiomediastinal silhouette appears mildly enlarged.  There are bilateral interstitial opacities suggesting edema.  There is opacification of the bilateral lower lung zones, right more so than left, suggestive of a combination of pleural fluid with associated atelectasis and/or consolidation.  There is no evidence of pneumothorax.  Visualized osseous structures appear intact.                                   Assessment and Plan:      Ping Davenport is a 54 y.o.female with  Patient Active Problem List    Diagnosis Date Noted    Hypertensive emergency 11/05/2019    New onset of congestive heart failure 11/05/2019    Acute respiratory failure with hypoxia and hypercarbia 11/05/2019    Acute renal failure 11/05/2019    NSTEMI (non-ST elevated myocardial infarction) 11/05/2019    Rash 10/13/2014    Encounter to establish care 10/13/2014    Screen for STD (sexually transmitted disease)  10/13/2014        AXEL stage III vs CKD 5 ft nephrotic syndrome  -Etiologies include cardiorenal, HTN emergency, reports SLE like sx though MEGAN neg, concerned this is chronic as her PTH his elevated   -Initially AGMA, NAGMA, Cr on admit 3.4, had 8.8g proteinuria  -Complements wnl. Hep and HIV negative. K/L chains elevated with low total protein (non-specific). FTA antibodies pending  -responding well to methylprednisolone IV x3 days, last day today.  -on losartan + amlodipine for proteinuria  -UOP ok, would cont diuresis as she is still overloaded ft pitting edema.  -Kidney bx targeted for Monday 11/11  -may need trialysis line placement on Monday/Tuesday for projected RRT  -proteinuria improving 8g ->4g w/ steroids  -renal dose meds; avoid NSAIDs    Hypertensive  Emergency  -BP better controlled with volume removal via diuresis; on  losartan, and amlodipine    Mineral Bone Disease/CKD eval  Lab Results   Component Value Date    .5 (H) 11/07/2019    CALCIUM 8.1 (L) 11/10/2019    MG 2.0 11/10/2019    PHOS 6.7 (H) 11/10/2019    [id number] 7 (L) 11/07/2019    CO2 27 11/10/2019   -continue vitamin D and sevelamer supplement  -holding bicarbonate now that NAGMA resolved        Tae Rosa MD  [facility]  IV  [phone]

## 2019-11-10 NOTE — NURSING
Dr. Benjamin notified that bladder scan showed 100 cc of urine and patient only voided 20 cc of urine after 120 mg of IV Lasix. Instructed to continue completing bladder scans Q6 and to notify physician of scan >300 cc. Will continue to monitor.

## 2019-11-10 NOTE — PROGRESS NOTES
Progress Note  [facility]  Admit Date: 11/5/2019   LOS: 5 days   SUBJECTIVE:       No acute overnight events. BM 11/9/19.  CC. Patient seen and examined this AM. Now receiving day 3/3 of steroids. Patient tolerating PO intake. Tolerating PT/OT. Currently with Purewick in place.        Review of Systems   Constitutional: Negative for chills and fever.   HENT: Negative for sore throat.    Eyes: Negative for blurred vision and double vision.   Respiratory: Negative for cough and shortness of breath.    Cardiovascular: Negative for chest pain.   Gastrointestinal: Negative for abdominal pain, constipation, diarrhea, heartburn, nausea and vomiting.   Genitourinary: Negative for dysuria and urgency.   Musculoskeletal: Negative for back pain and falls.   Skin: Negative for itching and rash.   Neurological: Negative for headaches.   Endo/Heme/Allergies: Does not bruise/bleed easily.   Psychiatric/Behavioral: The patient is not nervous/anxious.        OBJECTIVE:   Vital Signs (Most Recent)  Temp: 98.1 °F (36.7 °C) (11/10/19 0450)  Pulse: 72 (11/10/19 0450)  Resp: 16 (11/10/19 0450)  BP: 138/65 (11/10/19 0450)  SpO2: (!) 92 % (11/10/19 0450)    I & O (Last 24H):    Intake/Output Summary (Last 24 hours) at 11/10/2019 0646  Last data filed at 11/10/2019 0600  Gross per 24 hour   Intake 425 ml   Output 950 ml   Net -525 ml     Wt Readings from Last 3 Encounters:   11/09/19 83.7 kg (184 lb 8.4 oz)   10/13/14 72 kg (158 lb 11.2 oz)       Current Diet Order   Procedures    Diet renal        Physical Exam   Constitutional: She is oriented to person, place, and time and well-developed, well-nourished, and in no distress.   HENT:   Head: Normocephalic and atraumatic.   Eyes: Pupils are equal, round, and reactive to light. EOM are normal.   Neck: Normal range of motion. Neck supple.   Cardiovascular: Normal rate, regular rhythm and normal heart sounds.   Pulmonary/Chest: Effort normal.   Crackles in the lower lobes b/l     Abdominal: Soft. Bowel sounds are normal.   Musculoskeletal: Normal range of motion. She exhibits edema.   1 + pitting edema in the lower extremities b/l.    Neurological: She is alert and oriented to person, place, and time.   Skin: Skin is warm and dry.   Psychiatric: Affect normal.     Laboratory Data:  CBC  Recent Labs   Lab 11/08/19  0755 11/08/19  1653 11/09/19  0552 11/10/19  0526   WBC 9.77  --  6.67 6.38   RBC 3.04*  --  3.25* 2.94*   HGB 8.2*  --  8.8* 7.8*   HCT 26.0* 23* 27.9* 25.3*     --  231 220   MCV 86  --  86 86   MCH 27.0  --  27.1 26.5*   MCHC 31.5*  --  31.5* 30.8*     CMP  Recent Labs   Lab 11/07/19  0417 11/08/19  0658 11/09/19  0552   CALCIUM 8.6* 8.5* 8.3*   PROT 6.1 5.8* 6.0    140 138   K 4.2 4.4 4.3   CO2 19* 15* 22*    107 102   BUN 53* 60* 67*   CREATININE 3.9* 4.7* 5.1*   ALKPHOS 73 67 64   ALT 11 13 15   AST 23 33 35   BILITOT 0.2 0.2 0.3     POCT-Glucose  POCT Glucose   Date Value Ref Range Status   11/10/2019 309 (H) 70 - 110 mg/dL Final   11/09/2019 339 (H) 70 - 110 mg/dL Final   11/09/2019 189 (H) 70 - 110 mg/dL Final   11/08/2019 173 (H) 70 - 110 mg/dL Final   11/08/2019 128 (H) 70 - 110 mg/dL Final   11/08/2019 90 70 - 110 mg/dL Final   11/07/2019 131 (H) 70 - 110 mg/dL Final   11/07/2019 73 70 - 110 mg/dL Final     COAGS  Recent Labs   Lab 11/05/19  0322   INR 1.1     UA  No results for input(s): COLORU, CLARITYU, SPECGRAV, PHUR, PROTEINUA, GLUCOSEU, BLOODU, WBCU, RBCU, BACTERIA, MUCUS in the last 24 hours.    Invalid input(s):  [facility]  MICRO  Microbiology Results (last 7 days)     Procedure Component Value Units Date/Time    Blood culture [[id number]] Collected:  11/05/19 0928    Order Status:  Completed Specimen:  Blood Updated:  11/09/19 1612     Blood Culture, Routine No Growth to date      No Growth to date      No Growth to date      No Growth to date      No Growth to date    Blood culture [[id number]] Collected:  11/05/19 0936    Order Status:   Completed Specimen:  Blood Updated:  11/09/19 1612     Blood Culture, Routine No Growth to date      No Growth to date      No Growth to date      No Growth to date      No Growth to date        LIPID PANEL  Lab Results   Component Value Date    CHOL 218 (H) 11/06/2019     Lab Results   Component Value Date    HDL 38 (L) 11/06/2019     Lab Results   Component Value Date    LDLCALC 151.4 11/06/2019     Lab Results   Component Value Date    TRIG 143 11/06/2019     Lab Results   Component Value Date    CHOLHDL 17.4 (L) 11/06/2019       Diagnostic Results:  Imaging Results          US Lower Extremity Veins Bilateral (Final result)  Result time 11/05/19 16:21:54    Final result by Suzanne Berrios MD (11/05/19 16:21:54)                 Impression:      No evidence of deep venous thrombosis in either lower extremity.      Electronically signed by: Suzanne Berrios MD  Date:    11/05/2019  Time:    16:21             Narrative:    EXAMINATION:  US LOWER EXTREMITY VEINS BILATERAL    CLINICAL HISTORY:  rule out dvt; Acute respiratory failure with hypoxia    TECHNIQUE:  Duplex and color flow Doppler and dynamic compression was performed of the bilateral lower extremity veins was performed.    COMPARISON:  None    FINDINGS:  Right thigh veins: The common femoral, femoral, popliteal, upper greater saphenous, and deep femoral veins are patent and free of thrombus. The veins are normally compressible and have normal phasic flow and augmentation response.    Right calf veins: The visualized calf veins are patent.    Left thigh veins: The common femoral, femoral, popliteal, upper greater saphenous, and deep femoral veins are patent and free of thrombus. The veins are normally compressible and have normal phasic flow and augmentation response.    Left calf veins: The visualized calf veins are patent.    Miscellaneous: Subcutaneous edema noted lower extremities.                               US Retroperitoneal Complete (Kidney  and (Final result)  Result time 11/05/19 09:44:48    Final result by Fransico Yun MD (11/05/19 09:44:48)                 Impression:      No hydronephrosis.    Elevated left renal resistive index, a nonspecific indicator of medical renal disease.    Incidental left pleural effusion.      Electronically signed by: Fransico Yun  Date:    11/05/2019  Time:    09:44             Narrative:    EXAMINATION:  US RETROPERITONEAL COMPLETE    CLINICAL HISTORY:  acute renal failure;    TECHNIQUE:  Ultrasound of the kidneys and urinary bladder was performed including color flow and Doppler evaluation of the kidneys.    COMPARISON:  None available    FINDINGS:  Right kidney: Right kidney measures 12.4 cm.  Resistive index of 0.63.  No hydronephrosis.    Left kidney: Left kidney measures 11.6 cm.  Resistive index of 0.80.  No hydronephrosis    Urinary bladder is unremarkable.  Splenic resistive index of 0.75.  Incidental left pleural effusion.                               X-Ray Chest 1 View (Final result)  Result time 11/05/19 06:16:39    Final result by Umberto Quinn MD (11/05/19 06:16:39)                 Impression:      As above.      Electronically signed by: Umberto Quinn MD  Date:    11/05/2019  Time:    06:16             Narrative:    EXAMINATION:  XR CHEST 1 VIEW    CLINICAL HISTORY:  Respiratory failure, unspecified, unspecified whether with hypoxia or hypercapnia    TECHNIQUE:  Single frontal view of the chest was performed.    COMPARISON:  11/05/2019 04:07 hours    FINDINGS:  There has been interval placement of an endotracheal tube with tip terminating approximately 4.0 cm above the lindsay.  Esophagogastric tube has been intervally placed extending below the diaphragm, extending beyond the field of view of the examination.  No significant change in cardiopulmonary status compared to prior examination, noting interstitial edema bilateral pleural effusions.  No evidence of pneumothorax.                                X-Ray Chest AP Portable (Final result)  Result time 11/05/19 05:11:22    Final result by Umberto Quinn MD (11/05/19 05:11:22)                 Impression:      Radiographic findings suggestive of edema/CHF with bilateral pleural effusions and atelectasis/consolidation.      Electronically signed by: Umberto Quinn MD  Date:    11/05/2019  Time:    05:11             Narrative:    EXAMINATION:  XR CHEST AP PORTABLE    CLINICAL HISTORY:  CHF;    TECHNIQUE:  Single frontal view of the chest was performed.    COMPARISON:  None    FINDINGS:  Cardiac monitoring leads overlie the chest.  Cardiomediastinal silhouette appears mildly enlarged.  There are bilateral interstitial opacities suggesting edema.  There is opacification of the bilateral lower lung zones, right more so than left, suggestive of a combination of pleural fluid with associated atelectasis and/or consolidation.  There is no evidence of pneumothorax.  Visualized osseous structures appear intact.                                ASSESSMENT/PLAN:   Ms. Davenport is a 54 y.o. female who was admitted for HTN emergency which proceeded acute flash pulmonary edema.     Flash Pulmonary Edema   CXR with bilateral pleural effusions upon admission  Will continue Lasix 120mg IV BID   Resolving     HTN Emergency   Current BP: 138/65   Remains controlled   Will continue current regimen: Losartan, Coreg, Amlodipine and Hydralazine       Acute decompensated Heart Failure with preserved ejection Fraction   TTE from 11/5/19: shows EF of 50% and grade 1 diastolic dysfunction   Monitor UOP  Daily weights   Fluid restriction < 1,500 ml   Continue Lasix 120 b.i.d.  Resolving     Nephrotic Syndrome   Most likely secondary to hypertensive nephropathy  MEGAN, RF negative  C3/C4 normal   Possible renal biopsy scheduled for Monday November 11, 2019 with IR   Will hold Heparin tonight and no anticoagulation for 24hrs following procedure as per IR recs   May need dialysis    Will monitor UOP  Avoid Nephrotoxic medications   Proteinuria decreasing   Will follow up with Nephrology     NSTEMI   Most likely secondary to type 2 demand ischemia  Resolved  Will obtain Trops and EKG in the setting of Angina      Acute Kidney Injury   FeUrea 43.2%   Creatinine 5.1 this morning  Monitor UOP   Avoid Nephrotoxic medications   Follow up on Nephrology recommendations  Triaylsis/BX Monday   Continue to monitor     Normocytic Anemia   Hemoglobin 8.8  Most likely secondary to anemia of chronic disease  Will continue to monitor      Prediabetes   A1c 5.8   Low dose SS  BG goal 140-180 while inpatient   Accuchecks ACHS.    this AM 2/2 steroids   Will continue to Monitor      VTE prophylaxis: Heparin      CODE STATUS: Full Code     Disposition: Patient continues to receive lasix for resolving b/l pleural effusions occurring 2/2 to HTN emergency. Renal biopsy Monday. Will hold anticoagulation today.  Possible need for dialysis. Will follow up nephro recs. Will need nephrology follow up at [facility].      Case discussed both upper level resident as well as attending physician    Carlito Michele MD   [facility], PGY-2

## 2019-11-10 NOTE — NURSING
Patient still not urinating post apodaca removal. Bladder scan showed >220ml. Notified Dr. Avelar, straight catheter ordered. 200ml output noted from straight catheter.

## 2019-11-10 NOTE — PLAN OF CARE
POC reviewed with patient. Patient AAOx4 and denies any pain. IV methylprednisolone administered. Bladder scan completed Q6 hours. Strict intake and output maintained. Patient has very little urine output. Physician states to notify team if patient retains >300cc of urine. Tele monitor in place reading NSR. No red alarms or ectopy noted. Patient placed on renal diet. Bed alarm on, bed locked and low, side rails up x2, and call bell in reach. Plan is for patient to have trialysis catheter placed tomorrow. Patient will be NPO at midnight for procedure. Patient verbalize clear understanding of POC.

## 2019-11-11 LAB
ALBUMIN SERPL BCP-MCNC: 2.7 G/DL (ref 3.5–5.2)
ALP SERPL-CCNC: 57 U/L (ref 55–135)
ALT SERPL W/O P-5'-P-CCNC: 20 U/L (ref 10–44)
ANION GAP SERPL CALC-SCNC: 14 MMOL/L (ref 8–16)
AST SERPL-CCNC: 26 U/L (ref 10–40)
BASOPHILS # BLD AUTO: 0 K/UL (ref 0–0.2)
BASOPHILS NFR BLD: 0 % (ref 0–1.9)
BILIRUB SERPL-MCNC: 0.3 MG/DL (ref 0.1–1)
BUN SERPL-MCNC: 105 MG/DL (ref 6–20)
CALCIUM SERPL-MCNC: 8.2 MG/DL (ref 8.7–10.5)
CHLORIDE SERPL-SCNC: 97 MMOL/L (ref 95–110)
CO2 SERPL-SCNC: 24 MMOL/L (ref 23–29)
CREAT SERPL-MCNC: 6.5 MG/DL (ref 0.5–1.4)
DIFFERENTIAL METHOD: ABNORMAL
EOSINOPHIL # BLD AUTO: 0 K/UL (ref 0–0.5)
EOSINOPHIL NFR BLD: 0 % (ref 0–8)
ERYTHROCYTE [DISTWIDTH] IN BLOOD BY AUTOMATED COUNT: 14.1 % (ref 11.5–14.5)
EST. GFR  (AFRICAN AMERICAN): 8 ML/MIN/1.73 M^2
EST. GFR  (NON AFRICAN AMERICAN): 7 ML/MIN/1.73 M^2
GLUCOSE SERPL-MCNC: 369 MG/DL (ref 70–110)
HCT VFR BLD AUTO: 25.8 % (ref 37–48.5)
HGB BLD-MCNC: 8.1 G/DL (ref 12–16)
LYMPHOCYTES # BLD AUTO: 0.5 K/UL (ref 1–4.8)
LYMPHOCYTES NFR BLD: 6 % (ref 18–48)
MAGNESIUM SERPL-MCNC: 2.1 MG/DL (ref 1.6–2.6)
MCH RBC QN AUTO: 26.8 PG (ref 27–31)
MCHC RBC AUTO-ENTMCNC: 31.4 G/DL (ref 32–36)
MCV RBC AUTO: 85 FL (ref 82–98)
MONOCYTES # BLD AUTO: 0.5 K/UL (ref 0.3–1)
MONOCYTES NFR BLD: 6.1 % (ref 4–15)
NEUTROPHILS # BLD AUTO: 7 K/UL (ref 1.8–7.7)
NEUTROPHILS NFR BLD: 87.9 % (ref 38–73)
PHOSPHATE SERPL-MCNC: 6.9 MG/DL (ref 2.7–4.5)
PLATELET # BLD AUTO: 236 K/UL (ref 150–350)
PMV BLD AUTO: 11.9 FL (ref 9.2–12.9)
POCT GLUCOSE: 310 MG/DL (ref 70–110)
POCT GLUCOSE: 325 MG/DL (ref 70–110)
POCT GLUCOSE: 390 MG/DL (ref 70–110)
POCT GLUCOSE: 404 MG/DL (ref 70–110)
POCT GLUCOSE: 467 MG/DL (ref 70–110)
POTASSIUM SERPL-SCNC: 4.1 MMOL/L (ref 3.5–5.1)
PROT SERPL-MCNC: 6 G/DL (ref 6–8.4)
RBC # BLD AUTO: 3.02 M/UL (ref 4–5.4)
RPR SER QL: NORMAL
SODIUM SERPL-SCNC: 135 MMOL/L (ref 136–145)
T PALLIDUM AB SER QL IF: NORMAL
WBC # BLD AUTO: 8.14 K/UL (ref 3.9–12.7)

## 2019-11-11 PROCEDURE — 63600175 PHARM REV CODE 636 W HCPCS: Performed by: STUDENT IN AN ORGANIZED HEALTH CARE EDUCATION/TRAINING PROGRAM

## 2019-11-11 PROCEDURE — 84100 ASSAY OF PHOSPHORUS: CPT

## 2019-11-11 PROCEDURE — 97530 THERAPEUTIC ACTIVITIES: CPT

## 2019-11-11 PROCEDURE — 97535 SELF CARE MNGMENT TRAINING: CPT

## 2019-11-11 PROCEDURE — 27000221 HC OXYGEN, UP TO 24 HOURS

## 2019-11-11 PROCEDURE — 11000001 HC ACUTE MED/SURG PRIVATE ROOM

## 2019-11-11 PROCEDURE — 94761 N-INVAS EAR/PLS OXIMETRY MLT: CPT

## 2019-11-11 PROCEDURE — 83735 ASSAY OF MAGNESIUM: CPT

## 2019-11-11 PROCEDURE — 25000003 PHARM REV CODE 250: Performed by: STUDENT IN AN ORGANIZED HEALTH CARE EDUCATION/TRAINING PROGRAM

## 2019-11-11 PROCEDURE — 80053 COMPREHEN METABOLIC PANEL: CPT

## 2019-11-11 PROCEDURE — 99900035 HC TECH TIME PER 15 MIN (STAT)

## 2019-11-11 PROCEDURE — 94799 UNLISTED PULMONARY SVC/PX: CPT

## 2019-11-11 PROCEDURE — 36415 COLL VENOUS BLD VENIPUNCTURE: CPT

## 2019-11-11 PROCEDURE — 97116 GAIT TRAINING THERAPY: CPT

## 2019-11-11 PROCEDURE — 85025 COMPLETE CBC W/AUTO DIFF WBC: CPT

## 2019-11-11 PROCEDURE — 25000003 PHARM REV CODE 250: Performed by: RADIOLOGY

## 2019-11-11 PROCEDURE — 51798 US URINE CAPACITY MEASURE: CPT

## 2019-11-11 PROCEDURE — 63600175 PHARM REV CODE 636 W HCPCS: Performed by: RADIOLOGY

## 2019-11-11 RX ORDER — FENTANYL CITRATE 50 UG/ML
INJECTION, SOLUTION INTRAMUSCULAR; INTRAVENOUS CODE/TRAUMA/SEDATION MEDICATION
Status: COMPLETED | OUTPATIENT
Start: 2019-11-11 | End: 2019-11-11

## 2019-11-11 RX ORDER — FUROSEMIDE 10 MG/ML
160 INJECTION INTRAMUSCULAR; INTRAVENOUS 2 TIMES DAILY
Status: DISCONTINUED | OUTPATIENT
Start: 2019-11-11 | End: 2019-11-18 | Stop reason: HOSPADM

## 2019-11-11 RX ORDER — INSULIN ASPART 100 [IU]/ML
10 INJECTION, SOLUTION INTRAVENOUS; SUBCUTANEOUS ONCE
Status: DISCONTINUED | OUTPATIENT
Start: 2019-11-11 | End: 2019-11-11

## 2019-11-11 RX ORDER — SEVELAMER CARBONATE 800 MG/1
1600 TABLET, FILM COATED ORAL
Status: DISCONTINUED | OUTPATIENT
Start: 2019-11-11 | End: 2019-11-18 | Stop reason: HOSPADM

## 2019-11-11 RX ORDER — PREDNISONE 20 MG/1
80 TABLET ORAL DAILY
Status: DISCONTINUED | OUTPATIENT
Start: 2019-11-11 | End: 2019-11-14

## 2019-11-11 RX ORDER — METOLAZONE 2.5 MG/1
5 TABLET ORAL DAILY
Status: DISCONTINUED | OUTPATIENT
Start: 2019-11-12 | End: 2019-11-13

## 2019-11-11 RX ORDER — METOLAZONE 2.5 MG/1
5 TABLET ORAL DAILY
Status: DISCONTINUED | OUTPATIENT
Start: 2019-11-11 | End: 2019-11-11

## 2019-11-11 RX ORDER — LIDOCAINE HYDROCHLORIDE 10 MG/ML
INJECTION INFILTRATION; PERINEURAL CODE/TRAUMA/SEDATION MEDICATION
Status: COMPLETED | OUTPATIENT
Start: 2019-11-11 | End: 2019-11-11

## 2019-11-11 RX ORDER — INSULIN ASPART 100 [IU]/ML
10 INJECTION, SOLUTION INTRAVENOUS; SUBCUTANEOUS ONCE
Status: COMPLETED | OUTPATIENT
Start: 2019-11-11 | End: 2019-11-11

## 2019-11-11 RX ORDER — MIDAZOLAM HYDROCHLORIDE 1 MG/ML
INJECTION INTRAMUSCULAR; INTRAVENOUS CODE/TRAUMA/SEDATION MEDICATION
Status: COMPLETED | OUTPATIENT
Start: 2019-11-11 | End: 2019-11-11

## 2019-11-11 RX ADMIN — FUROSEMIDE 160 MG: 10 INJECTION, SOLUTION INTRAVENOUS at 05:11

## 2019-11-11 RX ADMIN — INSULIN ASPART 10 UNITS: 100 INJECTION, SOLUTION INTRAVENOUS; SUBCUTANEOUS at 10:11

## 2019-11-11 RX ADMIN — LIDOCAINE HYDROCHLORIDE 5 ML: 10 INJECTION, SOLUTION INFILTRATION; PERINEURAL at 11:11

## 2019-11-11 RX ADMIN — SEVELAMER CARBONATE 1600 MG: 800 TABLET, FILM COATED ORAL at 05:11

## 2019-11-11 RX ADMIN — INSULIN ASPART 10 UNITS: 100 INJECTION, SOLUTION INTRAVENOUS; SUBCUTANEOUS at 12:11

## 2019-11-11 RX ADMIN — AMLODIPINE BESYLATE 10 MG: 5 TABLET ORAL at 10:11

## 2019-11-11 RX ADMIN — INSULIN ASPART 10 UNITS: 100 INJECTION, SOLUTION INTRAVENOUS; SUBCUTANEOUS at 05:11

## 2019-11-11 RX ADMIN — SEVELAMER CARBONATE 1600 MG: 800 TABLET, FILM COATED ORAL at 02:11

## 2019-11-11 RX ADMIN — MIDAZOLAM HYDROCHLORIDE 1 MG: 1 INJECTION, SOLUTION INTRAMUSCULAR; INTRAVENOUS at 11:11

## 2019-11-11 RX ADMIN — INSULIN ASPART 5 UNITS: 100 INJECTION, SOLUTION INTRAVENOUS; SUBCUTANEOUS at 10:11

## 2019-11-11 RX ADMIN — FENTANYL CITRATE 50 MCG: 50 INJECTION, SOLUTION INTRAMUSCULAR; INTRAVENOUS at 11:11

## 2019-11-11 RX ADMIN — ACETAMINOPHEN 650 MG: 325 TABLET ORAL at 02:11

## 2019-11-11 RX ADMIN — LOSARTAN POTASSIUM 50 MG: 50 TABLET ORAL at 10:11

## 2019-11-11 RX ADMIN — PREDNISONE 80 MG: 20 TABLET ORAL at 10:11

## 2019-11-11 RX ADMIN — METOLAZONE 5 MG: 2.5 TABLET ORAL at 10:11

## 2019-11-11 NOTE — PLAN OF CARE
11/11/19 1518   Post-Acute Status   Post-Acute Authorization Placement  ([facility])   Post-Acute Placement Status Referrals Sent  (The sw faxed the pt's info to [facility])

## 2019-11-11 NOTE — PLAN OF CARE
Problem: Physical Therapy Goal  Goal: Physical Therapy Goal  Description  Goals to be met by: 2019     Patient will increase functional independence with mobility by performin. Supine to sit with Modified [place]  2. Sit to stand transfer with Modified [place]  3. Bed to chair transfer with Modified [place] using Rolling Walker  4. Gait  x 100 feet with Modified [place] using Rolling Walker.   5. Lower extremity exercise program x12 reps per handout, with supervision     Outcome: Ongoing, Progressing   Continue working toward goals.

## 2019-11-11 NOTE — PT/OT/SLP PROGRESS
Occupational Therapy   Treatment    Name: Ping Davenport  MRN: [id number]  Admitting Diagnosis:  Hypertensive emergency       Recommendations:     Discharge Recommendations: rehabilitation facility  Discharge Equipment Recommendations:  none  Barriers to discharge:  Decreased caregiver support    Assessment:   Pt found in bed. Pt pleasant and agreeable to working with therapy upon advice of nurse. Pt open to education and compliant in all requests made by therapy.      Ping Davenport is a 54 y.o. female with a medical diagnosis of Hypertensive emergency. Performance deficits affecting function are weakness, impaired endurance, impaired self care skills, impaired functional mobilty, gait instability, impaired balance, decreased coordination, decreased upper extremity function, decreased lower extremity function, decreased ROM.     Rehab Prognosis:  Fair; patient would benefit from acute skilled OT services to address these deficits and reach maximum level of function.       Plan:     Patient to be seen 5 x/week to address the above listed problems via self-care/home management, therapeutic activities, therapeutic exercises  · Plan of Care Expires: 12/08/19  · Plan of Care Reviewed with: patient, sibling    Subjective     Pain/Comfort:  · Pain Rating 1: 0/10  · Pain Rating Post-Intervention 1: 0/10  · Pain Rating 2: 0/10  · Pain Rating Post-Intervention 2: 0/10    Objective:     Communicated with: nurse Andujar prior to session.  Patient found right sidelying with bed alarm, telemetry upon OT entry to room.    General Precautions: Standard, fall   Orthopedic Precautions:N/A   Braces: N/A     Occupational Performance:     Bed Mobility:    · Patient completed Rolling/Turning to Right with contact guard assistance  · Patient completed Supine to Sit with minimum assistance     Functional Mobility/Transfers:  · Patient completed Sit <> Stand Transfer with contact guard assistance  with  rolling walker       Activities  of Daily Living:  · Toileting: contact guard assistance        [facility] 6 Click ADL: 19    Treatment & Education:  Pt found R side lying 2/2 sacral wound. Pt initially hesitant to participate in therapy but willing after nurse advised her that it would be good for her to move. Pt pleasant and compliant in all requests by therapy. Pt educated on necessity of bed mobility to avoid further skin break down. Pt R side lying to EOB /c CGA. Pt requested to attempt to toilet, Pt seated EOB required CGA for safety. Pt ambulated using RW to bedside commode requiring Malik for stability. Pt completed pericare /c set up.Pt ambulated to EOB for acucheck /c CGA. Pt tolerated session well. Cont OT POC.    Patient left seated EOB with nurse Pratima and sister presentEducation:      GOALS:   Multidisciplinary Problems     Occupational Therapy Goals        Problem: Occupational Therapy Goal    Goal Priority Disciplines Outcome Interventions   Occupational Therapy Goal     OT, PT/OT Ongoing, Progressing    Description:  Goals to be met by: 12/8/19     Patient will increase functional independence with ADLs by performing:    LE Dressing with Minimal Assistance.  Grooming while standing with Contact Guard Assistance.  Toileting from toilet with Contact Guard Assistance for hygiene and clothing management.   Supine to sit with Contact Guard Assistance.--MET 11/11/19  Step transfer with Contact Guard Assistance  Toilet transfer to toilet with Contact Guard Assistance.  Increased functional strength to WFL for self care skills and functional mobility.  Upper extremity exercise program x10 reps per handout, with independence.                        Time Tracking:     OT Date of Treatment: 11/11/19  OT Start Time: 1018  OT Stop Time: 1044  OT Total Time (min): 26 min    Billable Minutes:Self Care/Home Management 12  Therapeutic Activity 14    TAYLOR Palmer  11/11/2019

## 2019-11-11 NOTE — PLAN OF CARE
VN rounds: VN cued into pt's room with pt's permission. Pt resting in bed. Fall risk protocol discussed with pt. VN instructed pt to call for assistance. Pt aware and agreeable. NAD noted. Allowed time for questions. No c/o pain. Patient is asking for diet tray. Will call dietary for tray.  Will cont to be available and intervene as needed.     11/11/19 1440   Type of Frequent Check   Type Patient Rounds;Other (see comments)  (vn round)   Safety/Activity   Patient Rounds visualized patient;call light in patient/parent reach   Safety Promotion/Fall Prevention instructed to call staff for mobility;Fall Risk reviewed with patient/family   Pain/Comfort/Sleep   Preferred Pain Scale number (Numeric Rating Pain Scale)   Pain Rating (0-10): Rest 0   Sleep/Rest/Relaxation awake   Assessments (Pre/Post)   Level of Consciousness (AVPU) alert

## 2019-11-11 NOTE — PROGRESS NOTES
Progress Note  [facility]  Admit Date: 11/5/2019   LOS: 6 days   SUBJECTIVE:     No acute overnight events. Patient with UOP 520cc. BM x 1 yesterday. Patient currently NPO for rnela biopsy with IR today. General surgery consulted and triaylsis line to be placed for pending dialysis later today following the IR procedure. Patient states that she otherwise feels well with no new complaints.     Review of Systems   Constitutional: Negative for chills and fever.   HENT: Negative for sore throat.    Eyes: Negative for blurred vision and double vision.   Respiratory: Negative for cough and shortness of breath.    Cardiovascular: Negative for chest pain.   Gastrointestinal: Negative for abdominal pain, constipation, diarrhea, heartburn, nausea and vomiting.   Genitourinary: Negative for dysuria and urgency.   Musculoskeletal: Negative for back pain and falls.   Skin: Negative for itching and rash.   Neurological: Negative for headaches.   Endo/Heme/Allergies: Does not bruise/bleed easily.   Psychiatric/Behavioral: The patient is not nervous/anxious.      OBJECTIVE:   Vital Signs (Most Recent)  Temp: 97.6 °F (36.4 °C) (11/11/19 0714)  Pulse: 69 (11/11/19 0730)  Resp: 18 (11/11/19 0714)  BP: (!) 166/71 (11/11/19 0714)  SpO2: (!) 94 % (11/11/19 0714)    I & O (Last 24H):    Intake/Output Summary (Last 24 hours) at 11/11/2019 0820  Last data filed at 11/11/2019 0521  Gross per 24 hour   Intake 900 ml   Output 520 ml   Net 380 ml     Wt Readings from Last 3 Encounters:   11/11/19 85.5 kg (188 lb 7.9 oz)   10/13/14 72 kg (158 lb 11.2 oz)       Current Diet Order   Procedures    Diet NPO        Physical Exam   Constitutional: She is oriented to person, place, and time and well-developed, well-nourished, and in no distress.   HENT:   Head: Normocephalic and atraumatic.   Eyes: Pupils are equal, round, and reactive to light. EOM are normal.   Neck: Normal range of motion. Neck supple.   Cardiovascular: Normal rate,  regular rhythm and normal heart sounds.   Pulmonary/Chest: Effort normal.   Crackles in the lower lobes b/l    Abdominal: Soft. Bowel sounds are normal.   Musculoskeletal: Normal range of motion. She exhibits edema.   1 + pitting edema in the lower extremities b/l as on prior assessment.    Neurological: She is alert and oriented to person, place, and time.   Skin: Skin is warm and dry.   Psychiatric: Affect normal.     Laboratory Data:  CBC  Recent Labs   Lab 11/09/19  0552 11/10/19  0526 11/11/19  0633   WBC 6.67 6.38 8.14   RBC 3.25* 2.94* 3.02*   HGB 8.8* 7.8* 8.1*   HCT 27.9* 25.3* 25.8*    220 236   MCV 86 86 85   MCH 27.1 26.5* 26.8*   MCHC 31.5* 30.8* 31.4*     CMP  Recent Labs   Lab 11/09/19  0552 11/10/19  0526 11/11/19  0633   CALCIUM 8.3* 8.1* 8.2*   PROT 6.0 5.9* 6.0    136 135*   K 4.3 4.3 4.1   CO2 22* 27 24    99 97   BUN 67* 86* 105*   CREATININE 5.1* 5.7* 6.5*   ALKPHOS 64 60 57   ALT 15 17 20   AST 35 31 26   BILITOT 0.3 0.2 0.3     POCT-Glucose  POCT Glucose   Date Value Ref Range Status   11/11/2019 325 (H) 70 - 110 mg/dL Final   11/10/2019 404 (H) 70 - 110 mg/dL Final   11/10/2019 430 (H) 70 - 110 mg/dL Final   11/10/2019 309 (H) 70 - 110 mg/dL Final   11/09/2019 339 (H) 70 - 110 mg/dL Final   11/09/2019 189 (H) 70 - 110 mg/dL Final   11/08/2019 173 (H) 70 - 110 mg/dL Final   11/08/2019 128 (H) 70 - 110 mg/dL Final   11/08/2019 90 70 - 110 mg/dL Final     COAGS  Recent Labs   Lab 11/05/19  0322   INR 1.1     UA  No results for input(s): COLORU, CLARITYU, SPECGRAV, PHUR, PROTEINUA, GLUCOSEU, BLOODU, WBCU, RBCU, BACTERIA, MUCUS in the last 24 hours.    Invalid input(s):  [facility]  MICRO  Microbiology Results (last 7 days)     Procedure Component Value Units Date/Time    Blood culture [[id number]] Collected:  11/05/19 0936    Order Status:  Completed Specimen:  Blood Updated:  11/10/19 1812     Blood Culture, Routine No growth after 5 days.    Blood culture [[id number]]  Collected:  11/05/19 0928    Order Status:  Completed Specimen:  Blood Updated:  11/10/19 1812     Blood Culture, Routine No growth after 5 days.        LIPID PANEL  Lab Results   Component Value Date    CHOL 218 (H) 11/06/2019     Lab Results   Component Value Date    HDL 38 (L) 11/06/2019     Lab Results   Component Value Date    LDLCALC 151.4 11/06/2019     Lab Results   Component Value Date    TRIG 143 11/06/2019     Lab Results   Component Value Date    CHOLHDL 17.4 (L) 11/06/2019       Diagnostic Results:  Imaging Results          US Lower Extremity Veins Bilateral (Final result)  Result time 11/05/19 16:21:54    Final result by Suzanne Berrios MD (11/05/19 16:21:54)                 Impression:      No evidence of deep venous thrombosis in either lower extremity.      Electronically signed by: Suzanne Berrios MD  Date:    11/05/2019  Time:    16:21             Narrative:    EXAMINATION:  US LOWER EXTREMITY VEINS BILATERAL    CLINICAL HISTORY:  rule out dvt; Acute respiratory failure with hypoxia    TECHNIQUE:  Duplex and color flow Doppler and dynamic compression was performed of the bilateral lower extremity veins was performed.    COMPARISON:  None    FINDINGS:  Right thigh veins: The common femoral, femoral, popliteal, upper greater saphenous, and deep femoral veins are patent and free of thrombus. The veins are normally compressible and have normal phasic flow and augmentation response.    Right calf veins: The visualized calf veins are patent.    Left thigh veins: The common femoral, femoral, popliteal, upper greater saphenous, and deep femoral veins are patent and free of thrombus. The veins are normally compressible and have normal phasic flow and augmentation response.    Left calf veins: The visualized calf veins are patent.    Miscellaneous: Subcutaneous edema noted lower extremities.                               US Retroperitoneal Complete (Kidney and (Final result)  Result time 11/05/19  09:44:48    Final result by Fransico Yun MD (11/05/19 09:44:48)                 Impression:      No hydronephrosis.    Elevated left renal resistive index, a nonspecific indicator of medical renal disease.    Incidental left pleural effusion.      Electronically signed by: Fransico Yun  Date:    11/05/2019  Time:    09:44             Narrative:    EXAMINATION:  US RETROPERITONEAL COMPLETE    CLINICAL HISTORY:  acute renal failure;    TECHNIQUE:  Ultrasound of the kidneys and urinary bladder was performed including color flow and Doppler evaluation of the kidneys.    COMPARISON:  None available    FINDINGS:  Right kidney: Right kidney measures 12.4 cm.  Resistive index of 0.63.  No hydronephrosis.    Left kidney: Left kidney measures 11.6 cm.  Resistive index of 0.80.  No hydronephrosis    Urinary bladder is unremarkable.  Splenic resistive index of 0.75.  Incidental left pleural effusion.                               X-Ray Chest 1 View (Final result)  Result time 11/05/19 06:16:39    Final result by Umberto Quinn MD (11/05/19 06:16:39)                 Impression:      As above.      Electronically signed by: Umberto Quinn MD  Date:    11/05/2019  Time:    06:16             Narrative:    EXAMINATION:  XR CHEST 1 VIEW    CLINICAL HISTORY:  Respiratory failure, unspecified, unspecified whether with hypoxia or hypercapnia    TECHNIQUE:  Single frontal view of the chest was performed.    COMPARISON:  11/05/2019 04:07 hours    FINDINGS:  There has been interval placement of an endotracheal tube with tip terminating approximately 4.0 cm above the lindsay.  Esophagogastric tube has been intervally placed extending below the diaphragm, extending beyond the field of view of the examination.  No significant change in cardiopulmonary status compared to prior examination, noting interstitial edema bilateral pleural effusions.  No evidence of pneumothorax.                               X-Ray Chest AP Portable  (Final result)  Result time 11/05/19 05:11:22    Final result by Umberto Quinn MD (11/05/19 05:11:22)                 Impression:      Radiographic findings suggestive of edema/CHF with bilateral pleural effusions and atelectasis/consolidation.      Electronically signed by: Umberto Quinn MD  Date:    11/05/2019  Time:    05:11             Narrative:    EXAMINATION:  XR CHEST AP PORTABLE    CLINICAL HISTORY:  CHF;    TECHNIQUE:  Single frontal view of the chest was performed.    COMPARISON:  None    FINDINGS:  Cardiac monitoring leads overlie the chest.  Cardiomediastinal silhouette appears mildly enlarged.  There are bilateral interstitial opacities suggesting edema.  There is opacification of the bilateral lower lung zones, right more so than left, suggestive of a combination of pleural fluid with associated atelectasis and/or consolidation.  There is no evidence of pneumothorax.  Visualized osseous structures appear intact.                                ASSESSMENT/PLAN:   Ms. Davenport is a 54 y.o. female who was admitted for HTN emergency which proceeded acute flash pulmonary edema.     Flash Pulmonary Edema   CXR with bilateral pleural effusions upon admission  Will continue Lasix 120mg IV BID   Resolving   Will continue to monitor      HTN Emergency   Current BP controlled   Remains controlled   Will continue current regimen: Losartan, Coreg, Amlodipine and Hydralazine    Currently on losartan and amlodipine      Acute decompensated Heart Failure with preserved ejection Fraction   TTE from 11/5/19: shows EF of 50% and grade 1 diastolic dysfunction   Monitor UOP  Daily weights   Fluid restriction < 1,500 ml   Continue Lasix 120 b.i.d.  Resolving     Nephrotic Syndrome   Most likely secondary to hypertensive nephropathy  MEGAN, RF negative  C3/C4 normal   renal biopsy scheduled for Monday November 11, 2019 with IR   Will hold Heparin tonight and no anticoagulation for 24hrs following procedure as per IR  recs   Will monitor UOP  Avoid Nephrotoxic medications   Trialysis line today   Will follow up with Nephrology     NSTEMI   Most likely secondary to type 2 demand ischemia  Resolved  Will obtain Trops and EKG in the setting of Angina      Acute Kidney Injury   FeUrea 43.2%   Creatinine 5.1 this morning  Monitor UOP   Avoid Nephrotoxic medications   Follow up on Nephrology recommendations  Triaylsis/BX Monday   Continue to monitor     Normocytic Anemia   Hemoglobin 8.1   Most likely secondary to anemia of chronic disease  Will continue to monitor      Prediabetes   A1c 5.8   Low dose SS  BG goal 140-180 while inpatient   Accuchecks ACHS.    this AM 2/2 steroids and 10 units of aspart given   Will continue to Monitor      VTE prophylaxis: Heparin      CODE STATUS: Full Code     Disposition: Renal biopsy today. General surgery consulted for trialysis line for increasing creatinine and worsening renal function. Dialysis today. Will follow up Nephrology recs.      Case discussed both upper level resident as well as attending physician    Carlito Michele MD   [employer] [facility], PGY-2

## 2019-11-11 NOTE — CONSULTS
Inpatient Radiology Pre-procedure Note    History of Present Illness:  Ping Davenport is a 54 y.o. female who presents for native kidney biopsy.  Admission H&P reviewed.  History reviewed. No pertinent past medical history.  History reviewed. No pertinent surgical history.    Review of Systems:   As documented in primary team H&P    Home Meds:   Prior to Admission medications    Medication Sig Start Date End Date Taking? Authorizing Provider   nystatin-triamcinolone (MYCOLOG II) cream Apply topically once daily. 10/13/14   Elise Christianson MD     Scheduled Meds:    amLODIPine  10 mg Oral Daily    ergocalciferol  50,000 Units Oral Q7 Days    furosemide  160 mg Intravenous BID    losartan  50 mg Oral Daily    metOLazone  5 mg Oral Daily    predniSONE  80 mg Oral Daily    sevelamer carbonate  1,600 mg Oral TID WM     Continuous Infusions:   PRN Meds:acetaminophen, acetaminophen, Dextrose 10% Bolus, Dextrose 10% Bolus, glucagon (human recombinant), glucose, glucose, influenza, insulin aspart U-100, ondansetron, promethazine (PHENERGAN) IVPB, ramelteon, senna-docusate 8.6-50 mg, sodium chloride 0.9%  Anticoagulants/Antiplatelets: no anticoagulation    Allergies: Review of patient's allergies indicates:  No Known Allergies  Sedation Hx: have not been any systemic reactions    Labs:  Recent Labs   Lab 11/05/19  0322   INR 1.1       Recent Labs   Lab 11/11/19 0633   WBC 8.14   HGB 8.1*   HCT 25.8*   MCV 85         Recent Labs   Lab 11/11/19 0633   *   *   K 4.1   CL 97   CO2 24   *   CREATININE 6.5*   CALCIUM 8.2*   MG 2.1   ALT 20   AST 26   ALBUMIN 2.7*   BILITOT 0.3         Vitals:  Temp: 97.6 °F (36.4 °C) (11/11/19 0714)  Pulse: 69 (11/11/19 0730)  Resp: 18 (11/11/19 0714)  BP: (!) 166/71 (11/11/19 0714)  SpO2: 95 % (11/11/19 0829)     Physical Exam:  ASA: 3  Mallampati: 2    General: no acute distress  Mental Status: alert and oriented to person, place and time  HEENT:  normocephalic, atraumatic  Chest: unlabored breathing  Heart: regular heart rate  Abdomen: nondistended  Extremity: moves all extremities    Plan: CT guided native kidney biopsy  Sedation Plan: Moderate    Ayush Patel M.D.  Diagnostic and Interventional Radiologist  Department of Radiology  Pager: [phone]

## 2019-11-11 NOTE — PLAN OF CARE
Pt lives at home with sister and daughter, independent with all ADL's prior to admission.  Kidney bx is pending, Nephrology following for possible need for HD.  Referral has been sent to [facility] for review for criteria.  Will cont to follow.       11/11/19 1488   Discharge Reassessment   Assessment Type Discharge Planning Reassessment   Provided patient/caregiver education on the expected discharge date and the discharge plan Yes   Discharge Plan A Home;Home with family   Discharge Plan B Rehab   DME Needed Upon Discharge    (TBD)   Patient choice form signed by patient/caregiver N/A   Can the patient answer the patient profile reliably? Yes, cognitively intact   How does the patient rate their overall health at the present time? Good   Describe the patient's ability to walk at the present time. No restrictions   How often would a person be available to care for the patient? Occasionally   During the past month, has the patient often been bothered by feeling down, depressed or hopeless? No   During the past month, has the patient often been bothered by little interest or pleasure in doing things? No       Hanny Cevallos RN    [phone]

## 2019-11-11 NOTE — PROGRESS NOTES
The Sw met with the pt and she informed the Sw she wants to go to [facility] at d/c. The Sw informed her the info will be sent to [facility]. The pt lives with her dtr [phone])and Alla([phone]. The Sw spoke to Jennie at [facility] and she states she will start working the pt's evaluation up but will not submit until the pt's medically ready. She also states she will come to meet the pt tomorrow.

## 2019-11-11 NOTE — NURSING
Procedure complete. Patient tolerated well. No complications. Patho at bedside to collect specimen. Puncture site to left kidney covered with band aid. No bleeding or hematoma noted. VSS. I will call report to RN on 4th floor.

## 2019-11-11 NOTE — PROCEDURES
Radiology Post-Procedure Note    Pre Op Diagnosis: CKD    Post Op Diagnosis: Same    Procedure: Native kidney biopsy    Procedure performed by: Ayush Patel MD    Written Informed Consent Obtained: Yes  Specimen Removed: YES 4 x 18 gauge cores  Estimated Blood Loss: Minimal    Findings:   Under CT guidance, a 17/18 gauge biopsy system was used to obtain 4 core samples. Tract embolized with Gelfoam slurry. No complications.    Patient tolerated procedure well.    Ayush Patel M.D.  Diagnostic and Interventional Radiologist  Department of Radiology  Pager: [phone]

## 2019-11-11 NOTE — PLAN OF CARE
Problem: Occupational Therapy Goal  Goal: Occupational Therapy Goal  Description  Goals to be met by: 12/8/19     Patient will increase functional independence with ADLs by performing:    LE Dressing with Minimal Assistance.  Grooming while standing with Contact Guard Assistance.  Toileting from toilet with Contact Guard Assistance for hygiene and clothing management.   Supine to sit with Contact Guard Assistance.--MET 11/11/19  Step transfer with Contact Guard Assistance  Toilet transfer to toilet with Contact Guard Assistance.  Increased functional strength to WFL for self care skills and functional mobility.  Upper extremity exercise program x10 reps per handout, with independence.      Outcome: Ongoing, Progressing    Pt found in bed. Pt pleasant and agreeable to working with therapy upon advice of nurse. Pt open to education and compliant in all requests made by therapy.

## 2019-11-11 NOTE — PT/OT/SLP PROGRESS
Physical Therapy Treatment    Patient Name:  Ping Davenport   MRN:  [id number]    Recommendations:     Discharge Recommendations:  rehabilitation facility   Discharge Equipment Recommendations: (defer to [facility])   Barriers to discharge: Inaccessible home and Decreased caregiver support    Assessment:     Ping Davenport is a 54 y.o. female admitted with a medical diagnosis of Hypertensive emergency.  She presents with the following impairments/functional limitations:  weakness, impaired endurance, impaired functional mobilty, impaired self care skills, gait instability, impaired balance, decreased coordination, decreased upper extremity function, decreased lower extremity function, decreased ROM.  Pt demonstrates decreased hip flexion strength when sitting EOB and requires Malik for marching in place.  Pt ambulates with decreased step length and limited distance secondary to decreased strength and endurance.  Pt would continue to benefit from P.T. To address impairments listed above.      Rehab Prognosis: Good; patient would benefit from acute skilled PT services to address these deficits and reach maximum level of function.    Recent Surgery: * No surgery found *      Plan:     During this hospitalization, patient to be seen 6 x/week to address the identified rehab impairments via gait training, therapeutic activities, therapeutic exercises, neuromuscular re-education and progress toward the following goals:    · Plan of Care Expires:  12/07/19    Subjective     Patient/Family Comments/goals: Pt agreed to tx.  Pain/Comfort:  · Pain Rating 1: 0/10  · Pain Rating Post-Intervention 1: 0/10      Objective:     Communicated with RN (Jessie) prior to session.  Patient found supine with bed alarm, telemetry upon PT entry to room.     General Precautions: Standard, fall   Orthopedic Precautions:N/A   Braces:       Functional Mobility:  · Bed Mobility:     · Scooting: stand by assistance and to EOB and back into  bed.  · Supine to Sit: minimum assistance from long sit position, increased time to perform  · Sit to Supine: moderate assistance and BLEs  · Transfers:     · Sit to Stand:  minimum assistance with rolling walker and vc's for hand placement  · Gait: 4ft forward/backward with RW and Malik/close CGA with vc's for sequencing.  Malik for occasional RW management.  After a seated rest, pt ambulated 20ft with Rw and Malik/Close CGA.  Pt ambulates with decreased step length, vc's cues to push RW a little further out prior to stepping, and occasional assist for RW management, especially when turning.  · Balance: sitting fair+/good-,  standing fair/ gait fair-      AM-PAC 6 CLICK MOBILITY  Turning over in bed (including adjusting bedclothes, sheets and blankets)?: 3  Sitting down on and standing up from a chair with arms (e.g., wheelchair, bedside commode, etc.): 3  Moving from lying on back to sitting on the side of the bed?: 3  Moving to and from a bed to a chair (including a wheelchair)?: 3  Need to walk in hospital room?: 3  Climbing 3-5 steps with a railing?: 2  Basic Mobility Total Score: 17       Therapeutic Activities and Exercises:   Seated BLE therex: AP, LAQ, hip flexion(Malik/CGA - R weaker than L), hip ABD/ADD x 15 reps with vc's for proper technique.      Patient left supine with all lines intact, call button in reach, bed alarm on and RN notified..    GOALS:   Multidisciplinary Problems     Physical Therapy Goals        Problem: Physical Therapy Goal    Goal Priority Disciplines Outcome Goal Variances Interventions   Physical Therapy Goal     PT, PT/OT Ongoing, Progressing     Description:  Goals to be met by: 2019     Patient will increase functional independence with mobility by performin. Supine to sit with Modified [place]  2. Sit to stand transfer with Modified [place]  3. Bed to chair transfer with Modified [place] using Rolling Walker  4. Gait  x 100 feet with Modified  [place] using Rolling Walker.   5. Lower extremity exercise program x12 reps per handout, with supervision                      Time Tracking:     PT Received On: 11/11/19  PT Start Time: 1531     PT Stop Time: 1556  PT Total Time (min): 25 min     Billable Minutes: Gait Training 13 and Therapeutic [id number]    Treatment Type: Treatment  PT/PTA: PTA     PTA Visit Number: 2     Ania Hernandez, PTA  11/11/2019

## 2019-11-11 NOTE — PLAN OF CARE
1905H Patient is awake and orientedx4. Care plan explained and verbalized understanding. VIP care model explained. On room air, no complaints of shortness of breath. On heart monitor running Sinus Rhythm at 80s. IV site to the left antecubital 18G; flushed and saline locked. Generalized edema still noted. Maintained on renal diet. Instructed on NPO after midnight. Maintained on fall precaution. Turning every 2 hours, wedge utilized. Bed in lowest position, bed alarms on, call light within reach and instructed to call for help when needed. Will continue  to monitor.

## 2019-11-11 NOTE — CONSULTS
Today`s Date: 11/11/2019     Admit Date: 11/5/2019    Admitting Physician: Bucky Chapin MD    Patient`s Name: Ping Davenport , 54 y.o. female    Reason for consultation  Possible line placement   Patient Active Problem List:     Rash     Encounter to establish care     Screen for STD (sexually transmitted disease)     Hypertensive emergency     New onset of congestive heart failure     Acute respiratory failure with hypoxia and hypercarbia     Acute renal failure     NSTEMI (non-ST elevated myocardial infarction)      History reviewed. No pertinent past medical history.    History reviewed. No pertinent surgical history.    Prior to Admission medications :  Medication nystatin-triamcinolone (MYCOLOG II) cream, Sig Apply topically once daily., Start Date 10/13/14, End Date , Taking? , Authorizing Provider Elise Christianson MD      No current facility-administered medications on file prior to encounter.   Current Outpatient Medications on File Prior to Encounter:  nystatin-triamcinolone (MYCOLOG II) cream, Apply topically once daily., Disp: 15 g, Rfl: 1         Review of patient's allergies indicates:  No Known Allergies    Social History:   reports that she has never smoked. She does not have any smokeless tobacco history on file. She reports that she drank alcohol. She reports that she does not use drugs.     History reviewed.  No pertinent family history.      PHYSICAL EXAMINATION  Temp:  [97 °F (36.1 °C)-97.8 °F (36.6 °C)] 97.6 °F (36.4 °C)  Pulse:  [61-72] 69  Resp:  [16-18] 18  SpO2:  [93 %-100 %] 95 %  BP: (112-166)/(54-74) 166/71    General Condition:   alert x 3    Head & Neck  Anemia: None  Jaundice: None  Neck vein: Not distended  Carotid Bruits: none  Lymph nodes: none palpable  Thyroid: normal    Chest: normal    Heart: normal    Rt. Breast: not examined  Lt. Breast: not examined  Axillary lymph nodes: none    Abdomen: Soft,  None tender with no palpable mass or organ  Hernia: none    Rectal:  Defered    Extremities: normal    Vascular: normal    Specific focus Examination    Imp: crf dm , htn    Plan: patient wants to wait till pathology report comes back for kidney.

## 2019-11-11 NOTE — NURSING
Report to incoming nurse provided, pt stable, denies any pain or discomfort at this time, call light within her reach, bed in low position, bed alarm on. Encourage to call as needed, voices understanding. Continues NPO as ordered.

## 2019-11-11 NOTE — PROGRESS NOTES
The Sw left a message for Florina at [facility] in reference to if they accept this pt's insurance.      2:39pm [facility] received a call from Florina stating they do accept the pt's insurance.

## 2019-11-11 NOTE — PROGRESS NOTES
VN rounds: VN cued into pt's room with pt's permission. Pt resting in bed.Nurse requested to round later, performing bladder scan. Progress notes reviewed.   Will cont to be available and intervene as needed.

## 2019-11-11 NOTE — PROGRESS NOTES
[facility] Progress Note    Date of Admit: 2019        Subjective:     Renal bx today. UOP has not been that good. She reports her breathing feels better and has been walking with no sob.    Objective:   Last 24 Hour Vital Signs:  BP  Min: 112/54  Max: 166/71  Temp  Av.5 °F (36.4 °C)  Min: 97 °F (36.1 °C)  Max: 97.8 °F (36.6 °C)  Pulse  Av.7  Min: 61  Max: 72  Resp  Av  Min: 16  Max: 18  SpO2  Av.9 %  Min: 93 %  Max: 100 %  Weight  Av.5 kg (188 lb 7.9 oz)  Min: 85.5 kg (188 lb 7.9 oz)  Max: 85.5 kg (188 lb 7.9 oz)  Body mass index is 34.48 kg/m².  I/O last 3 completed shifts:  In: 1075 [P.O.:975; I.V.:100]  Out: 720 [Urine:720]    Physical Examination:  Gen: NAD, AAOx3  HEENT: NC in place, dry [place]  Neck: no thyroidmegaly, no LAD  Cardio: RRR, normal S1/S2, no MRG, JVP wnl  Lungs: bibasilar crackles   Abd: abd edema improved  Ext: pitting edema of lower extremities up to shins bilaterally  Skin: warm, dry, no rashes noted  Neuro: moves ext    Laboratory:  Most Recent Data:  CBC:   Lab Results   Component Value Date    WBC 8.14 2019    HGB 8.1 (L) 2019    HCT 25.8 (L) 2019     2019    MCV 85 2019    RDW 14.1 2019     BMP:   Lab Results   Component Value Date     (L) 2019    K 4.1 2019    CL 97 2019    CO2 24 2019     (H) 2019    CREATININE 6.5 (H) 2019     (H) 2019    CALCIUM 8.2 (L) 2019    MG 2.1 2019    PHOS 6.9 (H) 2019     LFTs:   Lab Results   Component Value Date    PROT 6.0 2019    ALBUMIN 2.7 (L) 2019    BILITOT 0.3 2019    AST 26 2019    ALKPHOS 57 2019    ALT 20 2019     Coags:   Lab Results   Component Value Date    INR 1.1 2019     Urinalysis:   Lab Results   Component Value Date    COLORU Yellow 2019    SPECGRAV 1.025 2019    NITRITE Negative 2019    KETONESU Negative 2019     UROBILINOGEN Negative 11/05/2019    WBCUA 1 11/05/2019       Trended Lab Data:  Recent Labs   Lab 11/09/19  0552 11/10/19  0526 11/11/19  0633   WBC 6.67 6.38 8.14   HGB 8.8* 7.8* 8.1*   HCT 27.9* 25.3* 25.8*    220 236   MCV 86 86 85   RDW 14.3 14.1 14.1    136 135*   K 4.3 4.3 4.1    99 97   CO2 22* 27 24   BUN 67* 86* 105*   CREATININE 5.1* 5.7* 6.5*   * 344* 369*   PROT 6.0 5.9* 6.0   ALBUMIN 2.4* 2.6* 2.7*   BILITOT 0.3 0.2 0.3   AST 35 31 26   ALKPHOS 64 60 57   ALT 15 17 20       Trended Cardiac Data:  Recent Labs   Lab 11/05/19  0322 11/05/19  0837 11/05/19  1200 11/05/19  1514   TROPONINI 0.206* 0.184*  0.195*  0.195* 0.184*  0.184* 0.179*   BNP 1,165*  --   --   --          Radiology:  Imaging Results          US Lower Extremity Veins Bilateral (Final result)  Result time 11/05/19 16:21:54    Final result by Suzanne Berrios MD (11/05/19 16:21:54)                 Impression:      No evidence of deep venous thrombosis in either lower extremity.      Electronically signed by: Suzanne Berrios MD  Date:    11/05/2019  Time:    16:21             Narrative:    EXAMINATION:  US LOWER EXTREMITY VEINS BILATERAL    CLINICAL HISTORY:  rule out dvt; Acute respiratory failure with hypoxia    TECHNIQUE:  Duplex and color flow Doppler and dynamic compression was performed of the bilateral lower extremity veins was performed.    COMPARISON:  None    FINDINGS:  Right thigh veins: The common femoral, femoral, popliteal, upper greater saphenous, and deep femoral veins are patent and free of thrombus. The veins are normally compressible and have normal phasic flow and augmentation response.    Right calf veins: The visualized calf veins are patent.    Left thigh veins: The common femoral, femoral, popliteal, upper greater saphenous, and deep femoral veins are patent and free of thrombus. The veins are normally compressible and have normal phasic flow and augmentation response.    Left  calf veins: The visualized calf veins are patent.    Miscellaneous: Subcutaneous edema noted lower extremities.                               US Retroperitoneal Complete (Kidney and (Final result)  Result time 11/05/19 09:44:48    Final result by Fransico Yun MD (11/05/19 09:44:48)                 Impression:      No hydronephrosis.    Elevated left renal resistive index, a nonspecific indicator of medical renal disease.    Incidental left pleural effusion.      Electronically signed by: Fransico Yun  Date:    11/05/2019  Time:    09:44             Narrative:    EXAMINATION:  US RETROPERITONEAL COMPLETE    CLINICAL HISTORY:  acute renal failure;    TECHNIQUE:  Ultrasound of the kidneys and urinary bladder was performed including color flow and Doppler evaluation of the kidneys.    COMPARISON:  None available    FINDINGS:  Right kidney: Right kidney measures 12.4 cm.  Resistive index of 0.63.  No hydronephrosis.    Left kidney: Left kidney measures 11.6 cm.  Resistive index of 0.80.  No hydronephrosis    Urinary bladder is unremarkable.  Splenic resistive index of 0.75.  Incidental left pleural effusion.                               X-Ray Chest 1 View (Final result)  Result time 11/05/19 06:16:39    Final result by Umberto Quinn MD (11/05/19 06:16:39)                 Impression:      As above.      Electronically signed by: Umberto Quinn MD  Date:    11/05/2019  Time:    06:16             Narrative:    EXAMINATION:  XR CHEST 1 VIEW    CLINICAL HISTORY:  Respiratory failure, unspecified, unspecified whether with hypoxia or hypercapnia    TECHNIQUE:  Single frontal view of the chest was performed.    COMPARISON:  11/05/2019 04:07 hours    FINDINGS:  There has been interval placement of an endotracheal tube with tip terminating approximately 4.0 cm above the lindsay.  Esophagogastric tube has been intervally placed extending below the diaphragm, extending beyond the field of view of the examination.   No significant change in cardiopulmonary status compared to prior examination, noting interstitial edema bilateral pleural effusions.  No evidence of pneumothorax.                               X-Ray Chest AP Portable (Final result)  Result time 11/05/19 05:11:22    Final result by Umberto Quinn MD (11/05/19 05:11:22)                 Impression:      Radiographic findings suggestive of edema/CHF with bilateral pleural effusions and atelectasis/consolidation.      Electronically signed by: Umberto Quinn MD  Date:    11/05/2019  Time:    05:11             Narrative:    EXAMINATION:  XR CHEST AP PORTABLE    CLINICAL HISTORY:  CHF;    TECHNIQUE:  Single frontal view of the chest was performed.    COMPARISON:  None    FINDINGS:  Cardiac monitoring leads overlie the chest.  Cardiomediastinal silhouette appears mildly enlarged.  There are bilateral interstitial opacities suggesting edema.  There is opacification of the bilateral lower lung zones, right more so than left, suggestive of a combination of pleural fluid with associated atelectasis and/or consolidation.  There is no evidence of pneumothorax.  Visualized osseous structures appear intact.                                   Assessment and Plan:      Ping Davenport is a 54 y.o.female with  Patient Active Problem List    Diagnosis Date Noted    Hypertensive emergency 11/05/2019    New onset of congestive heart failure 11/05/2019    Acute respiratory failure with hypoxia and hypercarbia 11/05/2019    Acute renal failure 11/05/2019    NSTEMI (non-ST elevated myocardial infarction) 11/05/2019    Rash 10/13/2014    Encounter to establish care 10/13/2014    Screen for STD (sexually transmitted disease) 10/13/2014        AXEL stage III vs CKD 5 with nephrotic syndrome  -Possible etiologies include cardiorenal, HTN emergency, GN  -Initially AGMA, NAGMA, Cr on admit 3.4, had 8.8g proteinuria  -Complements wnl. Hep and HIV negative. K/L chains elevated with  low total protein (non-specific). FTA antibodies pending  -S/p solumedrol 1g x 3 days as concerned for rapid GN process. P/C ratio improved though could be ARB, all serologies neg  -Will go for renal bx, no indication for HD now but may need at some point. Switch to daily prednisone. Will go up on lasix to 160 bid with metolazone in AM    Hyperglycemia  -2/2 steroids, may need small dose of basal insulin in addition to SSI though BG should improve with lower dose of steroids    Hypertensive  Emergency  -BP better controlled with volume removal via diuresis; on  losartan, and amlodipine    Mineral Bone Disease/CKD eval  -Continue vitamin D, inc sevelamer to 1600 tid, please ensure renal diet        Waldemar Carlson MD  [facility]  [facility]

## 2019-11-12 LAB
ALBUMIN SERPL BCP-MCNC: 2.4 G/DL (ref 3.5–5.2)
ALP SERPL-CCNC: 58 U/L (ref 55–135)
ALT SERPL W/O P-5'-P-CCNC: 18 U/L (ref 10–44)
ANION GAP SERPL CALC-SCNC: 16 MMOL/L (ref 8–16)
AST SERPL-CCNC: 19 U/L (ref 10–40)
BASOPHILS # BLD AUTO: 0.01 K/UL (ref 0–0.2)
BASOPHILS NFR BLD: 0.1 % (ref 0–1.9)
BILIRUB SERPL-MCNC: 0.2 MG/DL (ref 0.1–1)
BUN SERPL-MCNC: 118 MG/DL (ref 6–20)
CALCIUM SERPL-MCNC: 8.2 MG/DL (ref 8.7–10.5)
CHLORIDE SERPL-SCNC: 95 MMOL/L (ref 95–110)
CO2 SERPL-SCNC: 20 MMOL/L (ref 23–29)
CREAT SERPL-MCNC: 6.7 MG/DL (ref 0.5–1.4)
DIFFERENTIAL METHOD: ABNORMAL
EOSINOPHIL # BLD AUTO: 0 K/UL (ref 0–0.5)
EOSINOPHIL NFR BLD: 0 % (ref 0–8)
ERYTHROCYTE [DISTWIDTH] IN BLOOD BY AUTOMATED COUNT: 14.2 % (ref 11.5–14.5)
EST. GFR  (AFRICAN AMERICAN): 7 ML/MIN/1.73 M^2
EST. GFR  (NON AFRICAN AMERICAN): 6 ML/MIN/1.73 M^2
GLUCOSE SERPL-MCNC: 583 MG/DL (ref 70–110)
HCT VFR BLD AUTO: 26.3 % (ref 37–48.5)
HGB BLD-MCNC: 8.4 G/DL (ref 12–16)
LYMPHOCYTES # BLD AUTO: 1.6 K/UL (ref 1–4.8)
LYMPHOCYTES NFR BLD: 12.9 % (ref 18–48)
MAGNESIUM SERPL-MCNC: 2 MG/DL (ref 1.6–2.6)
MCH RBC QN AUTO: 27.7 PG (ref 27–31)
MCHC RBC AUTO-ENTMCNC: 31.9 G/DL (ref 32–36)
MCV RBC AUTO: 87 FL (ref 82–98)
MONOCYTES # BLD AUTO: 0.7 K/UL (ref 0.3–1)
MONOCYTES NFR BLD: 5.5 % (ref 4–15)
NEUTROPHILS # BLD AUTO: 9.6 K/UL (ref 1.8–7.7)
NEUTROPHILS NFR BLD: 81.5 % (ref 38–73)
PHOSPHATE SERPL-MCNC: 6.2 MG/DL (ref 2.7–4.5)
PLATELET # BLD AUTO: 234 K/UL (ref 150–350)
PMV BLD AUTO: 12.3 FL (ref 9.2–12.9)
POCT GLUCOSE: 424 MG/DL (ref 70–110)
POCT GLUCOSE: 432 MG/DL (ref 70–110)
POCT GLUCOSE: 482 MG/DL (ref 70–110)
POTASSIUM SERPL-SCNC: 4 MMOL/L (ref 3.5–5.1)
PROT SERPL-MCNC: 5.3 G/DL (ref 6–8.4)
RBC # BLD AUTO: 3.03 M/UL (ref 4–5.4)
SODIUM SERPL-SCNC: 131 MMOL/L (ref 136–145)
WBC # BLD AUTO: 12.05 K/UL (ref 3.9–12.7)

## 2019-11-12 PROCEDURE — 63600175 PHARM REV CODE 636 W HCPCS: Performed by: STUDENT IN AN ORGANIZED HEALTH CARE EDUCATION/TRAINING PROGRAM

## 2019-11-12 PROCEDURE — 80053 COMPREHEN METABOLIC PANEL: CPT

## 2019-11-12 PROCEDURE — 94799 UNLISTED PULMONARY SVC/PX: CPT

## 2019-11-12 PROCEDURE — 83735 ASSAY OF MAGNESIUM: CPT

## 2019-11-12 PROCEDURE — 99900035 HC TECH TIME PER 15 MIN (STAT)

## 2019-11-12 PROCEDURE — 36415 COLL VENOUS BLD VENIPUNCTURE: CPT

## 2019-11-12 PROCEDURE — 25000003 PHARM REV CODE 250: Performed by: STUDENT IN AN ORGANIZED HEALTH CARE EDUCATION/TRAINING PROGRAM

## 2019-11-12 PROCEDURE — 84100 ASSAY OF PHOSPHORUS: CPT

## 2019-11-12 PROCEDURE — 94761 N-INVAS EAR/PLS OXIMETRY MLT: CPT

## 2019-11-12 PROCEDURE — 85025 COMPLETE CBC W/AUTO DIFF WBC: CPT

## 2019-11-12 PROCEDURE — 97535 SELF CARE MNGMENT TRAINING: CPT

## 2019-11-12 PROCEDURE — 97530 THERAPEUTIC ACTIVITIES: CPT

## 2019-11-12 PROCEDURE — 11000001 HC ACUTE MED/SURG PRIVATE ROOM

## 2019-11-12 RX ORDER — CLOTRIMAZOLE AND BETAMETHASONE DIPROPIONATE 10; .64 MG/G; MG/G
CREAM TOPICAL 2 TIMES DAILY
COMMUNITY
End: 2023-01-01

## 2019-11-12 RX ORDER — INSULIN ASPART 100 [IU]/ML
10 INJECTION, SOLUTION INTRAVENOUS; SUBCUTANEOUS ONCE
Status: COMPLETED | OUTPATIENT
Start: 2019-11-12 | End: 2019-11-12

## 2019-11-12 RX ORDER — INSULIN ASPART 100 [IU]/ML
10 INJECTION, SOLUTION INTRAVENOUS; SUBCUTANEOUS
Status: DISCONTINUED | OUTPATIENT
Start: 2019-11-12 | End: 2019-11-12

## 2019-11-12 RX ADMIN — INSULIN ASPART 10 UNITS: 100 INJECTION, SOLUTION INTRAVENOUS; SUBCUTANEOUS at 10:11

## 2019-11-12 RX ADMIN — AMLODIPINE BESYLATE 10 MG: 5 TABLET ORAL at 08:11

## 2019-11-12 RX ADMIN — FUROSEMIDE 160 MG: 10 INJECTION, SOLUTION INTRAVENOUS at 08:11

## 2019-11-12 RX ADMIN — SEVELAMER CARBONATE 1600 MG: 800 TABLET, FILM COATED ORAL at 01:11

## 2019-11-12 RX ADMIN — PREDNISONE 80 MG: 20 TABLET ORAL at 08:11

## 2019-11-12 RX ADMIN — INSULIN ASPART 10 UNITS: 100 INJECTION, SOLUTION INTRAVENOUS; SUBCUTANEOUS at 01:11

## 2019-11-12 RX ADMIN — METOLAZONE 5 MG: 2.5 TABLET ORAL at 04:11

## 2019-11-12 RX ADMIN — INSULIN ASPART 10 UNITS: 100 INJECTION, SOLUTION INTRAVENOUS; SUBCUTANEOUS at 05:11

## 2019-11-12 RX ADMIN — INSULIN ASPART 10 UNITS: 100 INJECTION, SOLUTION INTRAVENOUS; SUBCUTANEOUS at 07:11

## 2019-11-12 RX ADMIN — SEVELAMER CARBONATE 1600 MG: 800 TABLET, FILM COATED ORAL at 05:11

## 2019-11-12 RX ADMIN — SEVELAMER CARBONATE 1600 MG: 800 TABLET, FILM COATED ORAL at 08:11

## 2019-11-12 RX ADMIN — LOSARTAN POTASSIUM 50 MG: 50 TABLET ORAL at 08:11

## 2019-11-12 RX ADMIN — FUROSEMIDE 160 MG: 10 INJECTION, SOLUTION INTRAVENOUS at 05:11

## 2019-11-12 RX ADMIN — INSULIN HUMAN 30 UNITS: 100 INJECTION, SUSPENSION SUBCUTANEOUS at 05:11

## 2019-11-12 NOTE — PLAN OF CARE
Problem: Occupational Therapy Goal  Goal: Occupational Therapy Goal  Description  Goals to be met by: 12/8/19     Patient will increase functional independence with ADLs by performing:    LE Dressing with Minimal Assistance.  Grooming while standing with Contact Guard Assistance.  Toileting from toilet with Contact Guard Assistance for hygiene and clothing management.   Supine to sit with Contact Guard Assistance.--MET 11/11/19  Step transfer with Contact Guard Assistance  Toilet transfer to toilet with Contact Guard Assistance.  Increased functional strength to WFL for self care skills and functional mobility.  Upper extremity exercise program x10 reps per handout, with independence.       Outcome: Ongoing, Progressing   Pt pleasant and happy to work with therapy. Pt willing to participate in short ambulation task prior to sitting in chair. Pt compliant in all requests made by therapy. Pt tolerated session well.

## 2019-11-12 NOTE — PROGRESS NOTES
[facility] Progress Note    Date of Admit: 2019        Subjective:     Tolerated renal bx. Cr continues to increase and only 200ml urine documented. Reports the IV is hurting her    Objective:   Last 24 Hour Vital Signs:  BP  Min: 127/60  Max: 166/72  Temp  Av.7 °F (36.5 °C)  Min: 96.6 °F (35.9 °C)  Max: 98.7 °F (37.1 °C)  Pulse  Av.1  Min: 60  Max: 76  Resp  Av.1  Min: 16  Max: 18  SpO2  Av.1 %  Min: 91 %  Max: 99 %  Weight  Av.3 kg (199 lb 1.2 oz)  Min: 90.3 kg (199 lb 1.2 oz)  Max: 90.3 kg (199 lb 1.2 oz)  Body mass index is 36.41 kg/m².  I/O last 3 completed shifts:  In: 350 [P.O.:350]  Out: 601 [Urine:600; Stool:1]    Physical Examination:  Gen: NAD, AAOx3  HEENT: NC in place, dry [place]  Neck: no thyroidmegaly, no LAD  Cardio: RRR, normal S1/S2, no MRG, JVP wnl  Lungs: bibasilar crackles   Abd: abd edema improved  Ext: pitting edema of lower extremities up to shins bilaterally  Skin: warm, dry, no rashes noted  Neuro: moves ext    Laboratory:  Most Recent Data:  CBC:   Lab Results   Component Value Date    WBC 12.05 2019    HGB 8.4 (L) 2019    HCT 26.3 (L) 2019     2019    MCV 87 2019    RDW 14.2 2019     BMP:   Lab Results   Component Value Date     (L) 2019    K 4.0 2019    CL 95 2019    CO2 20 (L) 2019     (H) 2019    CREATININE 6.7 (H) 2019     (HH) 2019    CALCIUM 8.2 (L) 2019    MG 2.0 2019    PHOS 6.2 (H) 2019     LFTs:   Lab Results   Component Value Date    PROT 5.3 (L) 2019    ALBUMIN 2.4 (L) 2019    BILITOT 0.2 2019    AST 19 2019    ALKPHOS 58 2019    ALT 18 2019     Coags:   Lab Results   Component Value Date    INR 1.1 2019     Urinalysis:   Lab Results   Component Value Date    COLORU Yellow 2019    SPECGRAV 1.025 2019    NITRITE Negative 2019    KETONESU Negative 2019     UROBILINOGEN Negative 11/05/2019    WBCUA 1 11/05/2019       Trended Lab Data:  Recent Labs   Lab 11/10/19  0526 11/11/19  0633 11/12/19  0513   WBC 6.38 8.14 12.05   HGB 7.8* 8.1* 8.4*   HCT 25.3* 25.8* 26.3*    236 234   MCV 86 85 87   RDW 14.1 14.1 14.2    135* 131*   K 4.3 4.1 4.0   CL 99 97 95   CO2 27 24 20*   BUN 86* 105* 118*   CREATININE 5.7* 6.5* 6.7*   * 369* 583*   PROT 5.9* 6.0 5.3*   ALBUMIN 2.6* 2.7* 2.4*   BILITOT 0.2 0.3 0.2   AST 31 26 19   ALKPHOS 60 57 58   ALT 17 20 18       Trended Cardiac Data:  Recent Labs   Lab 11/05/19  1200 11/05/19  1514   TROPONINI 0.184*  0.184* 0.179*         Radiology:  Imaging Results          US Lower Extremity Veins Bilateral (Final result)  Result time 11/05/19 16:21:54    Final result by Suzanne Berrios MD (11/05/19 16:21:54)                 Impression:      No evidence of deep venous thrombosis in either lower extremity.      Electronically signed by: Suzanne Berrios MD  Date:    11/05/2019  Time:    16:21             Narrative:    EXAMINATION:  US LOWER EXTREMITY VEINS BILATERAL    CLINICAL HISTORY:  rule out dvt; Acute respiratory failure with hypoxia    TECHNIQUE:  Duplex and color flow Doppler and dynamic compression was performed of the bilateral lower extremity veins was performed.    COMPARISON:  None    FINDINGS:  Right thigh veins: The common femoral, femoral, popliteal, upper greater saphenous, and deep femoral veins are patent and free of thrombus. The veins are normally compressible and have normal phasic flow and augmentation response.    Right calf veins: The visualized calf veins are patent.    Left thigh veins: The common femoral, femoral, popliteal, upper greater saphenous, and deep femoral veins are patent and free of thrombus. The veins are normally compressible and have normal phasic flow and augmentation response.    Left calf veins: The visualized calf veins are patent.    Miscellaneous: Subcutaneous edema  noted lower extremities.                               US Retroperitoneal Complete (Kidney and (Final result)  Result time 11/05/19 09:44:48    Final result by Fransico Yun MD (11/05/19 09:44:48)                 Impression:      No hydronephrosis.    Elevated left renal resistive index, a nonspecific indicator of medical renal disease.    Incidental left pleural effusion.      Electronically signed by: Fransico Yun  Date:    11/05/2019  Time:    09:44             Narrative:    EXAMINATION:  US RETROPERITONEAL COMPLETE    CLINICAL HISTORY:  acute renal failure;    TECHNIQUE:  Ultrasound of the kidneys and urinary bladder was performed including color flow and Doppler evaluation of the kidneys.    COMPARISON:  None available    FINDINGS:  Right kidney: Right kidney measures 12.4 cm.  Resistive index of 0.63.  No hydronephrosis.    Left kidney: Left kidney measures 11.6 cm.  Resistive index of 0.80.  No hydronephrosis    Urinary bladder is unremarkable.  Splenic resistive index of 0.75.  Incidental left pleural effusion.                               X-Ray Chest 1 View (Final result)  Result time 11/05/19 06:16:39    Final result by Umberto Quinn MD (11/05/19 06:16:39)                 Impression:      As above.      Electronically signed by: Umberto Quinn MD  Date:    11/05/2019  Time:    06:16             Narrative:    EXAMINATION:  XR CHEST 1 VIEW    CLINICAL HISTORY:  Respiratory failure, unspecified, unspecified whether with hypoxia or hypercapnia    TECHNIQUE:  Single frontal view of the chest was performed.    COMPARISON:  11/05/2019 04:07 hours    FINDINGS:  There has been interval placement of an endotracheal tube with tip terminating approximately 4.0 cm above the lindsay.  Esophagogastric tube has been intervally placed extending below the diaphragm, extending beyond the field of view of the examination.  No significant change in cardiopulmonary status compared to prior examination, noting  interstitial edema bilateral pleural effusions.  No evidence of pneumothorax.                               X-Ray Chest AP Portable (Final result)  Result time 11/05/19 05:11:22    Final result by Umberto Quinn MD (11/05/19 05:11:22)                 Impression:      Radiographic findings suggestive of edema/CHF with bilateral pleural effusions and atelectasis/consolidation.      Electronically signed by: Umberto Quinn MD  Date:    11/05/2019  Time:    05:11             Narrative:    EXAMINATION:  XR CHEST AP PORTABLE    CLINICAL HISTORY:  CHF;    TECHNIQUE:  Single frontal view of the chest was performed.    COMPARISON:  None    FINDINGS:  Cardiac monitoring leads overlie the chest.  Cardiomediastinal silhouette appears mildly enlarged.  There are bilateral interstitial opacities suggesting edema.  There is opacification of the bilateral lower lung zones, right more so than left, suggestive of a combination of pleural fluid with associated atelectasis and/or consolidation.  There is no evidence of pneumothorax.  Visualized osseous structures appear intact.                                   Assessment and Plan:      Ping Davenport is a 54 y.o.female with  Patient Active Problem List    Diagnosis Date Noted    Hypertensive emergency 11/05/2019    New onset of congestive heart failure 11/05/2019    Acute respiratory failure with hypoxia and hypercarbia 11/05/2019    Acute renal failure 11/05/2019    NSTEMI (non-ST elevated myocardial infarction) 11/05/2019    Rash 10/13/2014    Encounter to establish care 10/13/2014    Screen for STD (sexually transmitted disease) 10/13/2014        AXEL stage III vs CKD 5 with nephrotic syndrome  -Possible etiologies include cardiorenal, HTN emergency, GN  -Initially AGMA, NAGMA, Cr on admit 3.4, had 8.8g proteinuria  -Complements wnl. Hep and HIV negative. K/L chains elevated with low total protein (non-specific). FTA antibodies pending  -S/p solumedrol 1g x 3 days  as concerned for rapid GN process. P/C ratio improved though could be ARB, all serologies neg, renal bx 11/11, awaiting read  -Cr appears to be peaking though UOP only -22 and she is still volume overloaded despite lasix and metalozone.  Cont steroids for now until path comes back. Keep NPO at midnight, may need HD enmanuel, will reassess    Hyperglycemia  -2/2 steroids, primary team has added NPH. Will cont to monitor    Hypertensive  Emergency  -BP better controlled with volume removal via diuresis; on losartan, and amlodipine. Would increase losartan as steroids likely elevating BP    Mineral Bone Disease/CKD eval  -Continue vitamin D, cont sevelamer to 1600 tid, please ensure renal/diabetic diet        Waldemar Carlson MD  [facility]

## 2019-11-12 NOTE — PROGRESS NOTES
Progress Note  [facility]  Admit Date: 11/5/2019   LOS: 7 days   SUBJECTIVE:     Overnight . Patient given 10 units of aspart will check BG Later today.. Patient with UOP 201cc.  No BM yesterday. Patient tolerated renal biopsy well. Remains on renal diet. Concerning of decreased UOP and will follow up with nephro later today. Patient tolerating PO intake. Ambulating with PT. No new complaints today.             Review of Systems   Constitutional: Negative for chills and fever.   HENT: Negative for sore throat.    Eyes: Negative for blurred vision and double vision.   Respiratory: Negative for cough and shortness of breath.    Cardiovascular: Negative for chest pain.   Gastrointestinal: Negative for abdominal pain, constipation, diarrhea, heartburn, nausea and vomiting.   Genitourinary: Negative for dysuria and urgency.   Musculoskeletal: Negative for back pain and falls.   Skin: Negative for itching and rash.   Neurological: Negative for headaches.   Endo/Heme/Allergies: Does not bruise/bleed easily.   Psychiatric/Behavioral: The patient is not nervous/anxious.      OBJECTIVE:   Vital Signs (Most Recent)  Temp: 97.5 °F (36.4 °C) (11/12/19 0350)  Pulse: 72 (11/12/19 0350)  Resp: 16 (11/12/19 0125)  BP: 138/65 (11/12/19 0350)  SpO2: (!) 94 % (11/12/19 0406)    I & O (Last 24H):    Intake/Output Summary (Last 24 hours) at 11/12/2019 0657  Last data filed at 11/12/2019 0600  Gross per 24 hour   Intake 300 ml   Output 201 ml   Net 99 ml     Wt Readings from Last 3 Encounters:   11/12/19 90.3 kg (199 lb 1.2 oz)   10/13/14 72 kg (158 lb 11.2 oz)       Current Diet Order   Procedures    Diet renal        Physical Exam   Constitutional: She is oriented to person, place, and time and well-developed, well-nourished, and in no distress.   HENT:   Head: Normocephalic and atraumatic.   Eyes: Pupils are equal, round, and reactive to light. EOM are normal.   Neck: Normal range of motion. Neck supple.    Cardiovascular: Normal rate, regular rhythm and normal heart sounds.   Pulmonary/Chest: Effort normal.   Crackles in the lower lobes b/l    Abdominal: Soft. Bowel sounds are normal.   Musculoskeletal: Normal range of motion. She exhibits edema.   1 + pitting edema in the lower extremities b/l as on prior assessment.    Neurological: She is alert and oriented to person, place, and time.   Skin: Skin is warm and dry.   Psychiatric: Affect normal.     Laboratory Data:  CBC  Recent Labs   Lab 11/10/19  0526 11/11/19  0633 11/12/19  0513   WBC 6.38 8.14 12.05   RBC 2.94* 3.02* 3.03*   HGB 7.8* 8.1* 8.4*   HCT 25.3* 25.8* 26.3*    236 234   MCV 86 85 87   MCH 26.5* 26.8* 27.7   MCHC 30.8* 31.4* 31.9*     CMP  Recent Labs   Lab 11/09/19  0552 11/10/19  0526 11/11/19  0633   CALCIUM 8.3* 8.1* 8.2*   PROT 6.0 5.9* 6.0    136 135*   K 4.3 4.3 4.1   CO2 22* 27 24    99 97   BUN 67* 86* 105*   CREATININE 5.1* 5.7* 6.5*   ALKPHOS 64 60 57   ALT 15 17 20   AST 35 31 26   BILITOT 0.3 0.2 0.3     POCT-Glucose  POCT Glucose   Date Value Ref Range Status   11/12/2019 482 (HH) 70 - 110 mg/dL Final   11/11/2019 467 (HH) 70 - 110 mg/dL Final   11/11/2019 390 (H) 70 - 110 mg/dL Final   11/11/2019 310 (H) 70 - 110 mg/dL Final   11/11/2019 325 (H) 70 - 110 mg/dL Final   11/10/2019 404 (H) 70 - 110 mg/dL Final   11/10/2019 430 (H) 70 - 110 mg/dL Final   11/10/2019 309 (H) 70 - 110 mg/dL Final   11/09/2019 339 (H) 70 - 110 mg/dL Final     COAGS  No results for input(s): PT, INR, APTT in the last 168 hours.  UA  No results for input(s): COLORU, CLARITYU, SPECGRAV, PHUR, PROTEINUA, GLUCOSEU, BLOODU, WBCU, RBCU, BACTERIA, MUCUS in the last 24 hours.    Invalid input(s):  BILIRUBINCON  MICRO  Microbiology Results (last 7 days)     Procedure Component Value Units Date/Time    Blood culture [[id number]] Collected:  11/05/19 0936    Order Status:  Completed Specimen:  Blood Updated:  11/10/19 1812     Blood Culture, Routine No  growth after 5 days.    Blood culture [[id number]] Collected:  11/05/19 0928    Order Status:  Completed Specimen:  Blood Updated:  11/10/19 1812     Blood Culture, Routine No growth after 5 days.        LIPID PANEL  Lab Results   Component Value Date    CHOL 218 (H) 11/06/2019     Lab Results   Component Value Date    HDL 38 (L) 11/06/2019     Lab Results   Component Value Date    LDLCALC 151.4 11/06/2019     Lab Results   Component Value Date    TRIG 143 11/06/2019     Lab Results   Component Value Date    CHOLHDL 17.4 (L) 11/06/2019       Diagnostic Results:  Imaging Results          US Lower Extremity Veins Bilateral (Final result)  Result time 11/05/19 16:21:54    Final result by Suzanne Berrios MD (11/05/19 16:21:54)                 Impression:      No evidence of deep venous thrombosis in either lower extremity.      Electronically signed by: Suzanne Berrios MD  Date:    11/05/2019  Time:    16:21             Narrative:    EXAMINATION:  US LOWER EXTREMITY VEINS BILATERAL    CLINICAL HISTORY:  rule out dvt; Acute respiratory failure with hypoxia    TECHNIQUE:  Duplex and color flow Doppler and dynamic compression was performed of the bilateral lower extremity veins was performed.    COMPARISON:  None    FINDINGS:  Right thigh veins: The common femoral, femoral, popliteal, upper greater saphenous, and deep femoral veins are patent and free of thrombus. The veins are normally compressible and have normal phasic flow and augmentation response.    Right calf veins: The visualized calf veins are patent.    Left thigh veins: The common femoral, femoral, popliteal, upper greater saphenous, and deep femoral veins are patent and free of thrombus. The veins are normally compressible and have normal phasic flow and augmentation response.    Left calf veins: The visualized calf veins are patent.    Miscellaneous: Subcutaneous edema noted lower extremities.                               US Retroperitoneal Complete  (Kidney and (Final result)  Result time 11/05/19 09:44:48    Final result by Fransico Yun MD (11/05/19 09:44:48)                 Impression:      No hydronephrosis.    Elevated left renal resistive index, a nonspecific indicator of medical renal disease.    Incidental left pleural effusion.      Electronically signed by: Fransico Yun  Date:    11/05/2019  Time:    09:44             Narrative:    EXAMINATION:  US RETROPERITONEAL COMPLETE    CLINICAL HISTORY:  acute renal failure;    TECHNIQUE:  Ultrasound of the kidneys and urinary bladder was performed including color flow and Doppler evaluation of the kidneys.    COMPARISON:  None available    FINDINGS:  Right kidney: Right kidney measures 12.4 cm.  Resistive index of 0.63.  No hydronephrosis.    Left kidney: Left kidney measures 11.6 cm.  Resistive index of 0.80.  No hydronephrosis    Urinary bladder is unremarkable.  Splenic resistive index of 0.75.  Incidental left pleural effusion.                               X-Ray Chest 1 View (Final result)  Result time 11/05/19 06:16:39    Final result by Umberto Quinn MD (11/05/19 06:16:39)                 Impression:      As above.      Electronically signed by: Umberto Quinn MD  Date:    11/05/2019  Time:    06:16             Narrative:    EXAMINATION:  XR CHEST 1 VIEW    CLINICAL HISTORY:  Respiratory failure, unspecified, unspecified whether with hypoxia or hypercapnia    TECHNIQUE:  Single frontal view of the chest was performed.    COMPARISON:  11/05/2019 04:07 hours    FINDINGS:  There has been interval placement of an endotracheal tube with tip terminating approximately 4.0 cm above the lindsay.  Esophagogastric tube has been intervally placed extending below the diaphragm, extending beyond the field of view of the examination.  No significant change in cardiopulmonary status compared to prior examination, noting interstitial edema bilateral pleural effusions.  No evidence of pneumothorax.                                X-Ray Chest AP Portable (Final result)  Result time 11/05/19 05:11:22    Final result by Umberto Quinn MD (11/05/19 05:11:22)                 Impression:      Radiographic findings suggestive of edema/CHF with bilateral pleural effusions and atelectasis/consolidation.      Electronically signed by: Umberto Quinn MD  Date:    11/05/2019  Time:    05:11             Narrative:    EXAMINATION:  XR CHEST AP PORTABLE    CLINICAL HISTORY:  CHF;    TECHNIQUE:  Single frontal view of the chest was performed.    COMPARISON:  None    FINDINGS:  Cardiac monitoring leads overlie the chest.  Cardiomediastinal silhouette appears mildly enlarged.  There are bilateral interstitial opacities suggesting edema.  There is opacification of the bilateral lower lung zones, right more so than left, suggestive of a combination of pleural fluid with associated atelectasis and/or consolidation.  There is no evidence of pneumothorax.  Visualized osseous structures appear intact.                                ASSESSMENT/PLAN:   Ms. Davenport is a 54 y.o. female who was admitted for HTN emergency which proceeded acute flash pulmonary edema.     Flash Pulmonary Edema   CXR with bilateral pleural effusions upon admission  Will continue Lasix 120mg IV BID   Resolving   Sating 94% on room air   Will continue to monitor      HTN Emergency   Current BP controlled   Remains controlled   Will continue current regimen: Losartan, Coreg, Amlodipine and Hydralazine    Currently on losartan 50mg and amlodipine 10mg PO daily       Acute decompensated Heart Failure with preserved ejection Fraction   TTE from 11/5/19: shows EF of 50% and grade 1 diastolic dysfunction   Monitor UOP  Daily weights   Fluid restriction < 1,500 ml   Continue Lasix 120 b.i.d.  Resolving     Nephrotic Syndrome   Most likely secondary to hypertensive nephropathy  MEGAN, RF negative  C3/C4 normal   renal biopsy scheduled for Monday November 11, 2019 with IR    Will hold Heparin tonight and no anticoagulation for 24hrs following procedure as per IR recs   Will monitor UOP  Avoid Nephrotoxic medications   Will follow up with Nephrology     NSTEMI   Most likely secondary to type 2 demand ischemia  Resolved  Will obtain Trops and EKG in the setting of Angina      Acute Kidney Injury   FeUrea 43.2%   Creatinine 5.1 this morning  Monitor UOP   Avoid Nephrotoxic medications   Follow up on Nephrology recommendations  Biopsy   Continue to monitor     Normocytic Anemia   Hemoglobin 8.4   Most likely secondary to anemia of chronic disease  Will continue to monitor      Prediabetes   A1c 5.8   Low dose SS  BG goal 140-180 while inpatient   Accuchecks ACHS.    this AM 2/2 steroids and 10 units of aspart given   Will continue to Monitor      VTE prophylaxis: Heparin      CODE STATUS: Full Code     Disposition: Tolerated renal biopsy well. BG remains uncontrolled 2/2 steroid use. Will follow up with nephrology.       Case discussed both upper level resident as well as attending physician    Carlito Michele MD   [employer] [facility], PGY-2

## 2019-11-12 NOTE — PT/OT/SLP PROGRESS
Physical Therapy      Patient Name:  Ping Davenport   MRN:  [id number]    Patient declined tx stating she ambulated a lot with O.T. And did not want to ambulate at this time (0431). Will follow-up later this afternoon if time permits.    Ania Hernandez, PTA

## 2019-11-12 NOTE — PLAN OF CARE
11/12/19 1433   Post-Acute Status   Post-Acute Authorization Placement   Post-Acute Placement Status Pending Post-Acute Medical Review  (the sw spoke to Florina at [facility] who states that she will present this pt to her medical director later today. )

## 2019-11-12 NOTE — PLAN OF CARE
VN rounds: VN cued into pt's room with pt's permission. Pt sitting up in chair. . Fall risk protocol discussed with pt. VN instructed pt to call for assistance. Pt aware and agreeable.pt c/o right side chest discomfort when touch area. Stated pain comes and goes and has been present since admission.pt is asymptomatic and   NAD noted.bedside nurse notified.  Allowed time for questions.  Will cont to be available and intervene as needed.     11/12/19 2400   Type of Frequent Check   Type Patient Rounds;Other (see comments)  (vn round)   Safety/Activity   Patient Rounds visualized patient;call light in patient/parent reach   Safety Promotion/Fall Prevention instructed to call staff for mobility;Fall Risk reviewed with patient/family   Activity Management up in chair   Pain/Comfort/Sleep   Preferred Pain Scale number (Numeric Rating Pain Scale)   Pain Body Location chest   Pain Rating (0-10): Rest 1   Sleep/Rest/Relaxation awake   Assessments (Pre/Post)   Level of Consciousness (AVPU) alert

## 2019-11-12 NOTE — PLAN OF CARE
CM visited with pt, verbalizes knowledge about her care plan and treatment.  Remains in agreement with plan for [facility] if approved by insurance. Pt has no other needs at this time.    Hanny Cevallos RN    [phone]

## 2019-11-12 NOTE — NURSING
Plan of care reviewed with patient. Voiced understanding. NSR on monitor with no red alarms noted. Elevated blood sugar today. Insulin adjustments made. No acute distress noted at this time. Side rails x3, bed low, call bell within reach. Maintain bed alarm for patient safety. Patient will be monitored overnight.

## 2019-11-12 NOTE — PLAN OF CARE
Patient aao x3, vss, upset for diet order delay, renal biopsy done, Band-Aid in place, no sob or fatigue noted. BG little elevated , received insulin by sliding scale, family at bedside. Will cont to monitor

## 2019-11-12 NOTE — PLAN OF CARE
0045H Received report Lory YOUNGBLOOD,RN. Patient is on right-side lying position.   0108H Blood sugar recheck >482mg/dl, notified Dr. Cheek with order to give another 10units of aspart. Patient is hungry, MD is okay to give sandwich. IV access intact. Will continue to monitor.

## 2019-11-13 ENCOUNTER — ANESTHESIA (OUTPATIENT)
Dept: SURGERY | Facility: HOSPITAL | Age: 54
DRG: 208 | End: 2019-11-13
Payer: MEDICAID

## 2019-11-13 ENCOUNTER — ANESTHESIA EVENT (OUTPATIENT)
Dept: SURGERY | Facility: HOSPITAL | Age: 54
DRG: 208 | End: 2019-11-13
Payer: MEDICAID

## 2019-11-13 LAB
ALBUMIN SERPL BCP-MCNC: 2.5 G/DL (ref 3.5–5.2)
ALP SERPL-CCNC: 55 U/L (ref 55–135)
ALT SERPL W/O P-5'-P-CCNC: 19 U/L (ref 10–44)
ANION GAP SERPL CALC-SCNC: 15 MMOL/L (ref 8–16)
AST SERPL-CCNC: 17 U/L (ref 10–40)
BASOPHILS # BLD AUTO: 0.01 K/UL (ref 0–0.2)
BASOPHILS NFR BLD: 0.1 % (ref 0–1.9)
BILIRUB SERPL-MCNC: 0.2 MG/DL (ref 0.1–1)
BUN SERPL-MCNC: 110 MG/DL (ref 6–20)
CALCIUM SERPL-MCNC: 8.8 MG/DL (ref 8.7–10.5)
CHLORIDE SERPL-SCNC: 94 MMOL/L (ref 95–110)
CO2 SERPL-SCNC: 22 MMOL/L (ref 23–29)
CREAT SERPL-MCNC: 6.8 MG/DL (ref 0.5–1.4)
DIFFERENTIAL METHOD: ABNORMAL
EOSINOPHIL # BLD AUTO: 0 K/UL (ref 0–0.5)
EOSINOPHIL NFR BLD: 0 % (ref 0–8)
ERYTHROCYTE [DISTWIDTH] IN BLOOD BY AUTOMATED COUNT: 13.8 % (ref 11.5–14.5)
EST. GFR  (AFRICAN AMERICAN): 7 ML/MIN/1.73 M^2
EST. GFR  (NON AFRICAN AMERICAN): 6 ML/MIN/1.73 M^2
GLUCOSE SERPL-MCNC: 556 MG/DL (ref 70–110)
HCT VFR BLD AUTO: 27.2 % (ref 37–48.5)
HGB BLD-MCNC: 8.7 G/DL (ref 12–16)
INR PPP: 1.1 (ref 0.8–1.2)
LYMPHOCYTES # BLD AUTO: 1.4 K/UL (ref 1–4.8)
LYMPHOCYTES NFR BLD: 9.7 % (ref 18–48)
MAGNESIUM SERPL-MCNC: 2.2 MG/DL (ref 1.6–2.6)
MCH RBC QN AUTO: 27.3 PG (ref 27–31)
MCHC RBC AUTO-ENTMCNC: 32 G/DL (ref 32–36)
MCV RBC AUTO: 85 FL (ref 82–98)
MONOCYTES # BLD AUTO: 0.6 K/UL (ref 0.3–1)
MONOCYTES NFR BLD: 4.4 % (ref 4–15)
NEUTROPHILS # BLD AUTO: 11.8 K/UL (ref 1.8–7.7)
NEUTROPHILS NFR BLD: 85.8 % (ref 38–73)
PHOSPHATE SERPL-MCNC: 6.2 MG/DL (ref 2.7–4.5)
PLATELET # BLD AUTO: 229 K/UL (ref 150–350)
PMV BLD AUTO: 11.7 FL (ref 9.2–12.9)
POCT GLUCOSE: 366 MG/DL (ref 70–110)
POCT GLUCOSE: 374 MG/DL (ref 70–110)
POCT GLUCOSE: 398 MG/DL (ref 70–110)
POCT GLUCOSE: 403 MG/DL (ref 70–110)
POCT GLUCOSE: 449 MG/DL (ref 70–110)
POCT GLUCOSE: 467 MG/DL (ref 70–110)
POTASSIUM SERPL-SCNC: 3.7 MMOL/L (ref 3.5–5.1)
PROT SERPL-MCNC: 5.6 G/DL (ref 6–8.4)
PROTHROMBIN TIME: 11.3 SEC (ref 9–12.5)
RBC # BLD AUTO: 3.19 M/UL (ref 4–5.4)
SODIUM SERPL-SCNC: 131 MMOL/L (ref 136–145)
WBC # BLD AUTO: 14.11 K/UL (ref 3.9–12.7)

## 2019-11-13 PROCEDURE — 84100 ASSAY OF PHOSPHORUS: CPT

## 2019-11-13 PROCEDURE — 63600175 PHARM REV CODE 636 W HCPCS: Performed by: STUDENT IN AN ORGANIZED HEALTH CARE EDUCATION/TRAINING PROGRAM

## 2019-11-13 PROCEDURE — 71000033 HC RECOVERY, INTIAL HOUR: Performed by: SURGERY

## 2019-11-13 PROCEDURE — 97530 THERAPEUTIC ACTIVITIES: CPT

## 2019-11-13 PROCEDURE — 94799 UNLISTED PULMONARY SVC/PX: CPT

## 2019-11-13 PROCEDURE — 85025 COMPLETE CBC W/AUTO DIFF WBC: CPT

## 2019-11-13 PROCEDURE — 37000009 HC ANESTHESIA EA ADD 15 MINS: Performed by: SURGERY

## 2019-11-13 PROCEDURE — 63600175 PHARM REV CODE 636 W HCPCS: Performed by: SURGERY

## 2019-11-13 PROCEDURE — 11000001 HC ACUTE MED/SURG PRIVATE ROOM

## 2019-11-13 PROCEDURE — 36000706: Performed by: SURGERY

## 2019-11-13 PROCEDURE — 97110 THERAPEUTIC EXERCISES: CPT

## 2019-11-13 PROCEDURE — 83735 ASSAY OF MAGNESIUM: CPT

## 2019-11-13 PROCEDURE — 25000003 PHARM REV CODE 250: Performed by: STUDENT IN AN ORGANIZED HEALTH CARE EDUCATION/TRAINING PROGRAM

## 2019-11-13 PROCEDURE — 63600175 PHARM REV CODE 636 W HCPCS: Performed by: NURSE ANESTHETIST, CERTIFIED REGISTERED

## 2019-11-13 PROCEDURE — 94761 N-INVAS EAR/PLS OXIMETRY MLT: CPT

## 2019-11-13 PROCEDURE — C1750 CATH, HEMODIALYSIS,LONG-TERM: HCPCS | Performed by: SURGERY

## 2019-11-13 PROCEDURE — 25000003 PHARM REV CODE 250: Performed by: SURGERY

## 2019-11-13 PROCEDURE — 80053 COMPREHEN METABOLIC PANEL: CPT

## 2019-11-13 PROCEDURE — 97530 THERAPEUTIC ACTIVITIES: CPT | Performed by: PHYSICAL THERAPIST

## 2019-11-13 PROCEDURE — C9399 UNCLASSIFIED DRUGS OR BIOLOG: HCPCS | Performed by: STUDENT IN AN ORGANIZED HEALTH CARE EDUCATION/TRAINING PROGRAM

## 2019-11-13 PROCEDURE — 97110 THERAPEUTIC EXERCISES: CPT | Performed by: PHYSICAL THERAPIST

## 2019-11-13 PROCEDURE — 36000707: Performed by: SURGERY

## 2019-11-13 PROCEDURE — 37000008 HC ANESTHESIA 1ST 15 MINUTES: Performed by: SURGERY

## 2019-11-13 PROCEDURE — 94664 DEMO&/EVAL PT USE INHALER: CPT

## 2019-11-13 PROCEDURE — 36415 COLL VENOUS BLD VENIPUNCTURE: CPT

## 2019-11-13 PROCEDURE — 85610 PROTHROMBIN TIME: CPT

## 2019-11-13 DEVICE — CATH DIALYSIS HEMOSPLIT16FR 23: Type: IMPLANTABLE DEVICE | Site: CHEST  WALL | Status: FUNCTIONAL

## 2019-11-13 RX ORDER — LIDOCAINE HCL/PF 100 MG/5ML
SYRINGE (ML) INTRAVENOUS
Status: DISCONTINUED | OUTPATIENT
Start: 2019-11-13 | End: 2019-11-13

## 2019-11-13 RX ORDER — ACETAMINOPHEN 10 MG/ML
1000 INJECTION, SOLUTION INTRAVENOUS ONCE
Status: DISCONTINUED | OUTPATIENT
Start: 2019-11-13 | End: 2019-11-13 | Stop reason: HOSPADM

## 2019-11-13 RX ORDER — INSULIN ASPART 100 [IU]/ML
15 INJECTION, SOLUTION INTRAVENOUS; SUBCUTANEOUS ONCE
Status: COMPLETED | OUTPATIENT
Start: 2019-11-13 | End: 2019-11-13

## 2019-11-13 RX ORDER — HEPARIN SODIUM 1000 [USP'U]/ML
INJECTION, SOLUTION INTRAVENOUS; SUBCUTANEOUS
Status: DISCONTINUED | OUTPATIENT
Start: 2019-11-13 | End: 2019-11-13 | Stop reason: HOSPADM

## 2019-11-13 RX ORDER — INSULIN ASPART 100 [IU]/ML
10 INJECTION, SOLUTION INTRAVENOUS; SUBCUTANEOUS ONCE
Status: DISCONTINUED | OUTPATIENT
Start: 2019-11-13 | End: 2019-11-13

## 2019-11-13 RX ORDER — SODIUM CHLORIDE 9 MG/ML
INJECTION, SOLUTION INTRAVENOUS CONTINUOUS PRN
Status: DISCONTINUED | OUTPATIENT
Start: 2019-11-13 | End: 2019-11-13

## 2019-11-13 RX ORDER — HYDROMORPHONE HYDROCHLORIDE 2 MG/ML
0.5 INJECTION, SOLUTION INTRAMUSCULAR; INTRAVENOUS; SUBCUTANEOUS EVERY 5 MIN PRN
Status: DISCONTINUED | OUTPATIENT
Start: 2019-11-13 | End: 2019-11-13 | Stop reason: HOSPADM

## 2019-11-13 RX ORDER — ONDANSETRON 2 MG/ML
4 INJECTION INTRAMUSCULAR; INTRAVENOUS DAILY PRN
Status: DISCONTINUED | OUTPATIENT
Start: 2019-11-13 | End: 2019-11-13 | Stop reason: HOSPADM

## 2019-11-13 RX ORDER — LOSARTAN POTASSIUM 50 MG/1
100 TABLET ORAL DAILY
Status: DISCONTINUED | OUTPATIENT
Start: 2019-11-13 | End: 2019-11-18 | Stop reason: HOSPADM

## 2019-11-13 RX ORDER — SODIUM CHLORIDE 0.9 % (FLUSH) 0.9 %
3 SYRINGE (ML) INJECTION
Status: DISCONTINUED | OUTPATIENT
Start: 2019-11-13 | End: 2019-11-13 | Stop reason: HOSPADM

## 2019-11-13 RX ORDER — METOLAZONE 2.5 MG/1
10 TABLET ORAL DAILY
Status: DISCONTINUED | OUTPATIENT
Start: 2019-11-14 | End: 2019-11-18 | Stop reason: HOSPADM

## 2019-11-13 RX ORDER — PROPOFOL 10 MG/ML
VIAL (ML) INTRAVENOUS CONTINUOUS PRN
Status: DISCONTINUED | OUTPATIENT
Start: 2019-11-13 | End: 2019-11-13

## 2019-11-13 RX ORDER — DIPHENHYDRAMINE HYDROCHLORIDE 50 MG/ML
12.5 INJECTION INTRAMUSCULAR; INTRAVENOUS EVERY 6 HOURS PRN
Status: DISCONTINUED | OUTPATIENT
Start: 2019-11-13 | End: 2019-11-13 | Stop reason: HOSPADM

## 2019-11-13 RX ORDER — INSULIN ASPART 100 [IU]/ML
10 INJECTION, SOLUTION INTRAVENOUS; SUBCUTANEOUS ONCE
Status: COMPLETED | OUTPATIENT
Start: 2019-11-13 | End: 2019-11-13

## 2019-11-13 RX ADMIN — FUROSEMIDE 160 MG: 10 INJECTION, SOLUTION INTRAVENOUS at 05:11

## 2019-11-13 RX ADMIN — METOLAZONE 5 MG: 2.5 TABLET ORAL at 10:11

## 2019-11-13 RX ADMIN — INSULIN ASPART 10 UNITS: 100 INJECTION, SOLUTION INTRAVENOUS; SUBCUTANEOUS at 11:11

## 2019-11-13 RX ADMIN — SEVELAMER CARBONATE 1600 MG: 800 TABLET, FILM COATED ORAL at 08:11

## 2019-11-13 RX ADMIN — INSULIN ASPART 10 UNITS: 100 INJECTION, SOLUTION INTRAVENOUS; SUBCUTANEOUS at 05:11

## 2019-11-13 RX ADMIN — LOSARTAN POTASSIUM 100 MG: 50 TABLET ORAL at 08:11

## 2019-11-13 RX ADMIN — SEVELAMER CARBONATE 1600 MG: 800 TABLET, FILM COATED ORAL at 05:11

## 2019-11-13 RX ADMIN — SODIUM CHLORIDE: 0.9 INJECTION, SOLUTION INTRAVENOUS at 03:11

## 2019-11-13 RX ADMIN — ACETAMINOPHEN 650 MG: 325 TABLET ORAL at 05:11

## 2019-11-13 RX ADMIN — PREDNISONE 80 MG: 20 TABLET ORAL at 08:11

## 2019-11-13 RX ADMIN — FUROSEMIDE 160 MG: 10 INJECTION, SOLUTION INTRAVENOUS at 08:11

## 2019-11-13 RX ADMIN — INSULIN ASPART 5 UNITS: 100 INJECTION, SOLUTION INTRAVENOUS; SUBCUTANEOUS at 11:11

## 2019-11-13 RX ADMIN — LIDOCAINE HYDROCHLORIDE 100 MG: 20 INJECTION, SOLUTION INTRAVENOUS at 03:11

## 2019-11-13 RX ADMIN — ONDANSETRON 4 MG: 2 INJECTION INTRAMUSCULAR; INTRAVENOUS at 08:11

## 2019-11-13 RX ADMIN — INSULIN ASPART 10 UNITS: 100 INJECTION, SOLUTION INTRAVENOUS; SUBCUTANEOUS at 07:11

## 2019-11-13 RX ADMIN — INSULIN ASPART 15 UNITS: 100 INJECTION, SOLUTION INTRAVENOUS; SUBCUTANEOUS at 06:11

## 2019-11-13 RX ADMIN — PROPOFOL 150 MCG/KG/MIN: 10 INJECTION, EMULSION INTRAVENOUS at 03:11

## 2019-11-13 RX ADMIN — INSULIN DETEMIR 35 UNITS: 100 INJECTION, SOLUTION SUBCUTANEOUS at 11:11

## 2019-11-13 RX ADMIN — AMLODIPINE BESYLATE 10 MG: 5 TABLET ORAL at 08:11

## 2019-11-13 RX ADMIN — DEXTROSE 2 G: 50 INJECTION, SOLUTION INTRAVENOUS at 03:11

## 2019-11-13 RX ADMIN — INSULIN ASPART 10 UNITS: 100 INJECTION, SOLUTION INTRAVENOUS; SUBCUTANEOUS at 08:11

## 2019-11-13 NOTE — PLAN OF CARE
VN rounds: VN cued into pt's room with pt's permission. Pt sitting up in chair. Pt speaks both English and Amharic, however VN communicated with pt in [place] to ensure that she understands her POC. Also provided diabetes education and discussed why she is getting a trialysis cath for dialysis. Pt provided teachback and understands POC. Allowed time for questions. VN instructed pt to call for assistance. Pt aware and agreeable. NAD noted. Bedside nurse notified. Will cont to be available and intervene as needed.        11/13/19 1202   Type of Frequent Check   Type Patient Rounds;Other (see comments)  (VN rounds)   Safety/Activity   Patient Rounds visualized patient;call light in patient/parent reach   Safety Promotion/Fall Prevention assistive device/personal item within reach;instructed to call staff for mobility   Activity Management up in chair   Pain/Comfort/Sleep   Preferred Pain Scale word (verbal rating pain scale)   Pain Rating: Rest 0 - no pain   Sleep/Rest/Relaxation awake;no problem identified   Assessments (Pre/Post)   Level of Consciousness (AVPU) alert

## 2019-11-13 NOTE — PLAN OF CARE
I certify that I was present in the room directing the student in service delivery and guiding them using my skilled judgment. As the co-signing therapist I have reviewed the students documentation and am responsible for the treatment, assessment, and plan.     Problem: Occupational Therapy Goal  Goal: Occupational Therapy Goal  Description  Goals to be met by: 12/8/19     Patient will increase functional independence with ADLs by performing:    LE Dressing with Minimal Assistance.  Grooming while standing with Contact Guard Assistance.  Toileting from toilet with Contact Guard Assistance for hygiene and clothing management.   Supine to sit with Contact Guard Assistance.--MET 11/11/19  Step transfer with Contact Guard Assistance  Toilet transfer to toilet with Contact Guard Assistance.  Increased functional strength to WFL for self care skills and functional mobility.  Upper extremity exercise program x10 reps per handout, with independence.       Outcome: Ongoing, Progressing     Pt pleasant and agreeable to therapy. Pt compliant in all requests by therapy. Pt requested to walk with OT. OT educated pt on PT/OT roles and encouraged pt to wait and walk with PT. Pt tolerated session well.  TAYLOR Palmer

## 2019-11-13 NOTE — PLAN OF CARE
1900 reported no pain, sitting up in chair.  2100 accu ck 500. Dr Blanc ordered 10 u aspart.      2130 Dr Trujillo called, nurse was instructed to ask pt if she wanted the dialysis cath which was scheduled for 11/13 am.  Pt seemed uncertain. She reported that nepho team had discussed cath w/her but she seemed to think that nepho team wanted to still wait.  Dr Trujillo then instructed nurse to contact family practice to  reeducate pt.  Dr Blanc discussed procedure w/pt but still pt was unable to agree to procedure while Dr Blanc was in room. Dr Blanc advised that she will get the nepho team to again reeducate & discuss dialysis cath placement w/pt in morning (11/13).      0530 while changing IV site, nurse casually asked if pt had given any more thought to dialysis cath.  Pt's response was that she had giving it more thought but felt like she wanted to wait til biopsy resulted.     Tele: SR,  HR 80,  No alarms.     Bed in lowest position, wheels locked, non skid socks, ID band worn, personal items and call bell with in reach, bed alarm set.

## 2019-11-13 NOTE — CARE UPDATE
11/13/19 0732   Incentive Spirometer   $ Incentive Spirometer Charges done with encouragement   Administration (IS) mouthpiece   Number of Repetitions (IS) 10   Level Incentive Spirometer (mL) 1000   Patient Tolerance (IS) good

## 2019-11-13 NOTE — ANESTHESIA PREPROCEDURE EVALUATION
11/13/2019  Ping Davenport is a 54 y.o., female with AXEL on CKD5 with nephrotic syndrome requiring HD scheduled for right tunneled catheter insertion 11/13. PMHX of HTN, HFpEF, anemia.     Anesthesia Evaluation    I have reviewed the Patient Summary Reports.    I have reviewed the Nursing Notes.   I have reviewed the Medications.     Review of Systems  Anesthesia Hx:  No problems with previous Anesthesia  History of prior surgery of interest to airway management or planning: Previous anesthesia: MAC Denies Family Hx of Anesthesia complications.   Denies Personal Hx of Anesthesia complications.   Cardiovascular:   Hypertension, poorly controlled Past MI  CHF ECG has been reviewed.    Pulmonary:  Pulmonary Normal    Renal/:   Chronic Renal Disease, ARF, CRI Nephrotic syndrome on steroids   Neurological:  Neurology Normal    Endocrine:   Hyperglycemia 300s-500. On steroids for nephrotic syndrome.       Physical Exam  General:  Well nourished, Obesity    Airway/Jaw/Neck:  Airway Findings: Mouth Opening: Normal Tongue: Normal  General Airway Assessment: Adult  Mallampati: II  TM Distance: 4 - 6 cm  Jaw/Neck Findings:  Neck ROM: Normal ROM      Dental:  Dental Findings: In tact   Chest/Lungs:  Chest/Lungs Findings: Clear to auscultation, Normal Respiratory Rate     Heart/Vascular:  Heart Findings: Rate: Normal  Rhythm: Regular Rhythm  Sounds: Normal        Mental Status:  Mental Status Findings:  Cooperative, Alert and Oriented     ·       TTE 11/5/19    · Concentric left ventricular hypertrophy.  · Mild left atrial enlargement.  · Normal right ventricular systolic function.  · Mild tricuspid regurgitation.  · Left ventricular systolic function. The estimated ejection fraction is 50%  · Mild-to-moderate mitral regurgitation.  · Grade I (mild) left ventricular diastolic dysfunction consistent with impaired  relaxation.  Mechanically ventilated; cannot use inferior caval vein diameter to estimate central venous pressure.    Anesthesia Plan  Type of Anesthesia, risks & benefits discussed:  Anesthesia Type:  MAC  Patient's Preference:   Intra-op Monitoring Plan: standard ASA monitors  Intra-op Monitoring Plan Comments:   Post Op Pain Control Plan: per primary service following discharge from PACU  Post Op Pain Control Plan Comments:   Induction:   IV  Beta Blocker:         Informed Consent: Patient understands risks and agrees with Anesthesia plan.  Questions answered. Anesthesia consent signed with patient.  ASA Score: 3     Day of Surgery Review of History & Physical:  There are no significant changes.          Ready For Surgery From Anesthesia Perspective.

## 2019-11-13 NOTE — PROGRESS NOTES
Progress Note  [facility]  Admit Date: 11/5/2019   LOS: 8 days   SUBJECTIVE:     Overnight  and patient 10 units of aspart given. BG remained elevated at 449 and received 20 units of aspart. Patient to get Determir 35units qPM.   Patient has had increased UOP from yesterday of 801. Patient tolerating PO intake. Concern for worsening renal function explained to patient this morning.     Review of Systems   Constitutional: Negative for chills and fever.   HENT: Negative for sore throat.    Eyes: Negative for blurred vision and double vision.   Respiratory: Negative for cough and shortness of breath.    Cardiovascular: Negative for chest pain.   Gastrointestinal: Negative for abdominal pain, constipation, diarrhea, heartburn, nausea and vomiting.   Genitourinary: Negative for dysuria and urgency.   Musculoskeletal: Negative for back pain and falls.   Skin: Negative for itching and rash.   Neurological: Negative for headaches.   Endo/Heme/Allergies: Does not bruise/bleed easily.   Psychiatric/Behavioral: The patient is not nervous/anxious.      OBJECTIVE:   Vital Signs (Most Recent)  Temp: 97.3 °F (36.3 °C) (11/13/19 0744)  Pulse: 74 (11/13/19 0744)  Resp: 18 (11/13/19 0744)  BP: (!) 152/68 (11/13/19 0744)  SpO2: 95 % (11/13/19 0732)    I & O (Last 24H):    Intake/Output Summary (Last 24 hours) at 11/13/2019 0847  Last data filed at 11/13/2019 0556  Gross per 24 hour   Intake 185 ml   Output 601 ml   Net -416 ml     Wt Readings from Last 3 Encounters:   11/13/19 90.9 kg (200 lb 6.4 oz)   10/13/14 72 kg (158 lb 11.2 oz)       Current Diet Order   Procedures    Diet NPO Except for: Ice Chips, Medication     Order Specific Question:   Except for     Answer:   Ice Chips     Order Specific Question:   Except for     Answer:   Medication        Physical Exam   Constitutional: She is oriented to person, place, and time and well-developed, well-nourished, and in no distress.   HENT:   Head: Normocephalic and  atraumatic.   Eyes: Pupils are equal, round, and reactive to light. EOM are normal.   Neck: Normal range of motion. Neck supple.   Cardiovascular: Normal rate, regular rhythm and normal heart sounds.   Pulmonary/Chest: Effort normal.   Crackles in the lower lobes b/l    Abdominal: Soft. Bowel sounds are normal.   Musculoskeletal: Normal range of motion. She exhibits edema.   2 + pitting edema in the lower extremities b/l as on prior assessment.    Neurological: She is alert and oriented to person, place, and time.   Skin: Skin is warm and dry.   Psychiatric: Affect normal.     Laboratory Data:  CBC  Recent Labs   Lab 11/11/19  0633 11/12/19  0513 11/13/19  0649   WBC 8.14 12.05 14.11*   RBC 3.02* 3.03* 3.19*   HGB 8.1* 8.4* 8.7*   HCT 25.8* 26.3* 27.2*    234 229   MCV 85 87 85   MCH 26.8* 27.7 27.3   MCHC 31.4* 31.9* 32.0     CMP  Recent Labs   Lab 11/11/19  0633 11/12/19  0513 11/13/19  0648   CALCIUM 8.2* 8.2* 8.8   PROT 6.0 5.3* 5.6*   * 131* 131*   K 4.1 4.0 3.7   CO2 24 20* 22*   CL 97 95 94*   * 118* 110*   CREATININE 6.5* 6.7* 6.8*   ALKPHOS 57 58 55   ALT 20 18 19   AST 26 19 17   BILITOT 0.3 0.2 0.2     POCT-Glucose  POCT Glucose   Date Value Ref Range Status   11/13/2019 449 (H) 70 - 110 mg/dL Final   11/13/2019 467 (HH) 70 - 110 mg/dL Final   11/12/2019 432 (H) 70 - 110 mg/dL Final   11/12/2019 424 (H) 70 - 110 mg/dL Final   11/12/2019 482 (HH) 70 - 110 mg/dL Final   11/11/2019 467 (HH) 70 - 110 mg/dL Final   11/11/2019 390 (H) 70 - 110 mg/dL Final   11/11/2019 310 (H) 70 - 110 mg/dL Final   11/11/2019 325 (H) 70 - 110 mg/dL Final   11/10/2019 404 (H) 70 - 110 mg/dL Final   11/10/2019 430 (H) 70 - 110 mg/dL Final     COAGS  No results for input(s): PT, INR, APTT in the last 168 hours.  UA  No results for input(s): COLORU, CLARITYU, SPECGRAV, PHUR, PROTEINUA, GLUCOSEU, BLOODU, WBCU, RBCU, BACTERIA, MUCUS in the last 24 hours.    Invalid input(s):  BILIRUBINCON  MICRO  Microbiology  Results (last 7 days)     Procedure Component Value Units Date/Time    Blood culture [[id number]] Collected:  11/05/19 0936    Order Status:  Completed Specimen:  Blood Updated:  11/10/19 1812     Blood Culture, Routine No growth after 5 days.    Blood culture [[id number]] Collected:  11/05/19 0928    Order Status:  Completed Specimen:  Blood Updated:  11/10/19 1812     Blood Culture, Routine No growth after 5 days.        LIPID PANEL  Lab Results   Component Value Date    CHOL 218 (H) 11/06/2019     Lab Results   Component Value Date    HDL 38 (L) 11/06/2019     Lab Results   Component Value Date    LDLCALC 151.4 11/06/2019     Lab Results   Component Value Date    TRIG 143 11/06/2019     Lab Results   Component Value Date    CHOLHDL 17.4 (L) 11/06/2019       Diagnostic Results:  Imaging Results          US Lower Extremity Veins Bilateral (Final result)  Result time 11/05/19 16:21:54    Final result by Suzanne Berrios MD (11/05/19 16:21:54)                 Impression:      No evidence of deep venous thrombosis in either lower extremity.      Electronically signed by: Suzanne Berrios MD  Date:    11/05/2019  Time:    16:21             Narrative:    EXAMINATION:  US LOWER EXTREMITY VEINS BILATERAL    CLINICAL HISTORY:  rule out dvt; Acute respiratory failure with hypoxia    TECHNIQUE:  Duplex and color flow Doppler and dynamic compression was performed of the bilateral lower extremity veins was performed.    COMPARISON:  None    FINDINGS:  Right thigh veins: The common femoral, femoral, popliteal, upper greater saphenous, and deep femoral veins are patent and free of thrombus. The veins are normally compressible and have normal phasic flow and augmentation response.    Right calf veins: The visualized calf veins are patent.    Left thigh veins: The common femoral, femoral, popliteal, upper greater saphenous, and deep femoral veins are patent and free of thrombus. The veins are normally compressible and have  normal phasic flow and augmentation response.    Left calf veins: The visualized calf veins are patent.    Miscellaneous: Subcutaneous edema noted lower extremities.                               US Retroperitoneal Complete (Kidney and (Final result)  Result time 11/05/19 09:44:48    Final result by Fransico Yun MD (11/05/19 09:44:48)                 Impression:      No hydronephrosis.    Elevated left renal resistive index, a nonspecific indicator of medical renal disease.    Incidental left pleural effusion.      Electronically signed by: Fransico Yun  Date:    11/05/2019  Time:    09:44             Narrative:    EXAMINATION:  US RETROPERITONEAL COMPLETE    CLINICAL HISTORY:  acute renal failure;    TECHNIQUE:  Ultrasound of the kidneys and urinary bladder was performed including color flow and Doppler evaluation of the kidneys.    COMPARISON:  None available    FINDINGS:  Right kidney: Right kidney measures 12.4 cm.  Resistive index of 0.63.  No hydronephrosis.    Left kidney: Left kidney measures 11.6 cm.  Resistive index of 0.80.  No hydronephrosis    Urinary bladder is unremarkable.  Splenic resistive index of 0.75.  Incidental left pleural effusion.                               X-Ray Chest 1 View (Final result)  Result time 11/05/19 06:16:39    Final result by Umberto Quinn MD (11/05/19 06:16:39)                 Impression:      As above.      Electronically signed by: Umberto Quinn MD  Date:    11/05/2019  Time:    06:16             Narrative:    EXAMINATION:  XR CHEST 1 VIEW    CLINICAL HISTORY:  Respiratory failure, unspecified, unspecified whether with hypoxia or hypercapnia    TECHNIQUE:  Single frontal view of the chest was performed.    COMPARISON:  11/05/2019 04:07 hours    FINDINGS:  There has been interval placement of an endotracheal tube with tip terminating approximately 4.0 cm above the lindsay.  Esophagogastric tube has been intervally placed extending below the diaphragm,  extending beyond the field of view of the examination.  No significant change in cardiopulmonary status compared to prior examination, noting interstitial edema bilateral pleural effusions.  No evidence of pneumothorax.                               X-Ray Chest AP Portable (Final result)  Result time 11/05/19 05:11:22    Final result by Umberto Quinn MD (11/05/19 05:11:22)                 Impression:      Radiographic findings suggestive of edema/CHF with bilateral pleural effusions and atelectasis/consolidation.      Electronically signed by: Umberto Quinn MD  Date:    11/05/2019  Time:    05:11             Narrative:    EXAMINATION:  XR CHEST AP PORTABLE    CLINICAL HISTORY:  CHF;    TECHNIQUE:  Single frontal view of the chest was performed.    COMPARISON:  None    FINDINGS:  Cardiac monitoring leads overlie the chest.  Cardiomediastinal silhouette appears mildly enlarged.  There are bilateral interstitial opacities suggesting edema.  There is opacification of the bilateral lower lung zones, right more so than left, suggestive of a combination of pleural fluid with associated atelectasis and/or consolidation.  There is no evidence of pneumothorax.  Visualized osseous structures appear intact.                                ASSESSMENT/PLAN:   Ms. Davenport is a 54 y.o. female who was admitted for HTN emergency which proceeded acute flash pulmonary edema.     Flash Pulmonary Edema   CXR with bilateral pleural effusions upon admission  Will continue Lasix 120mg IV BID   Resolving   Sating 94% on room air   Will continue to monitor      HTN Emergency   Current BP controlled   Remains controlled   Currently on losartan 100mg and amlodipine 10mg PO daily       Acute decompensated Heart Failure with preserved ejection Fraction   TTE from 11/5/19: shows EF of 50% and grade 1 diastolic dysfunction   Monitor UOP  Daily weights   Fluid restriction < 1,500 ml   Continue Lasix 160mg IV b.i.d.  Metolazone 5mg PO daily    Resolving     Nephrotic Syndrome   Most likely secondary to hypertensive nephropathy  MEGAN, RF negative  C3/C4 normal   FTA Abs neg   GBM Ab neg   HIV neg   Will hold Heparin tonight and no anticoagulation for 24hrs following procedure as per IR recs   Will monitor UOP  Avoid Nephrotoxic medications   Will follow up Renal Biopsy   Will follow up with Nephrology     NSTEMI   Most likely secondary to type 2 demand ischemia  Resolved  Will obtain Trops and EKG in the setting of Angina      Acute Kidney Injury on CKD   FeUrea 43.2%   Creatinine   Monitor UOP   Cr worsening  Avoid Nephrotoxic medications   Follow up on Nephrology recommendations   Will continue to monitor     Normocytic Anemia   Stable   Most likely secondary to anemia of chronic disease  Will continue to monitor      Prediabetes   A1c 5.8   BG goal 140-180 while inpatient   NPH 30 unuts   Accuchecks ACHS.   Steroid induced hyperglycemia   Will give aspart PRN   Will continue to Monitor      VTE prophylaxis: Heparin      CODE STATUS: Full Code     Disposition: Cr stabilized. Possible cath placement today. Will follow up with Nephro recs.      Case discussed both upper level resident as well as attending physician    Carlito Michele MD   [employer] [facility], PGY-2

## 2019-11-13 NOTE — PLAN OF CARE
Problem: Physical Therapy Goal  Goal: Physical Therapy Goal  Description  Goals to be met by: 2019     Patient will increase functional independence with mobility by performin. Supine to sit with Modified [place]  2. Sit to stand transfer with Modified [place]  3. Bed to chair transfer with Modified [place] using Rolling Walker  4. Gait  x 100 feet with Modified [place] using Rolling Walker.   5. Lower extremity exercise program x12 reps per handout, with supervision     Outcome: Ongoing, Progressing

## 2019-11-13 NOTE — CONSULTS
[facility]/General Surgery  Consultation Note/ History and Physical      SUBJECTIVE:     Reason for Consultation:   Shortness of Breath (To ER per EMS with c/o SOB and peripheral edema.  )      Chief Complaint:   Per triage note--Shortness of Breath (To ER per EMS with c/o SOB and peripheral edema.  )      History of Present Illness:  Ms. Davenport is a 54 y.o. female with history of CKD, who presented 11/6 with HTN emergency and severe fluid overload requiring intubation in the ED. The patient was placed in the intensive care unit on lasix gtt with nitro gtt. She has since been recovering on the floor with medical management for fluid overload. Surgery has been consulted for placement of a tunneled cath.      lab significant for a cr 6.8, k 3.7, bun 110, phos 6.2, glucose severely elevated at 500 this am.       Allergies:  Review of patient's allergies indicates:   Allergen Reactions    Ketorolac Palpitations       Home Medications:  No current facility-administered medications on file prior to encounter.      Current Outpatient Medications on File Prior to Encounter   Medication Sig    clotrimazole-betamethasone 1-0.05% (LOTRISONE) cream Apply topically 2 (two) times daily.    nystatin-triamcinolone (MYCOLOG II) cream Apply topically once daily.       History reviewed. No pertinent past medical history.  History reviewed. No pertinent surgical history.  History reviewed. No pertinent family history.  Social History     Tobacco Use    Smoking status: Never Smoker   Substance Use Topics    Alcohol use: Not Currently    Drug use: Never        Review of Systems:  Constitutional: no fever or chills  Eyes: no visual changes  ENT: no nasal congestion or sore throat  Respiratory: no cough or shortness of breath  Cardiovascular: no chest pain or palpitations  Gastrointestinal: no nausea or vomiting, no abdominal pain or change in bowel habits  Genitourinary: no hematuria or dysuria  Integument/Breast:  no rash or pruritis  Hematologic/Lymphatic: no easy bruising or lymphadenopathy  Musculoskeletal: no arthralgias or myalgias  Neurological: no seizures or tremors  Behavioral/Psych: no auditory or visual hallucinations  Endocrine: no heat or cold intolerance    OBJECTIVE:     Vital Signs:  Temp: 97.3 °F (36.3 °C) (11/13/19 0744)  Pulse: 74 (11/13/19 0744)  Resp: 18 (11/13/19 0744)  BP: (!) 152/68 (11/13/19 0744)  SpO2: 95 % (11/13/19 0732)    Physical Exam:  General: well developed, well nourished, no distress  HEENT: normocephalic, atraumatic, hearing grossly normal bilaterally, mucous membranes moist, EOM intact, no scleral icterus  Neck: supple, symmetrical, trachea midline, no JVD  Lungs:  clear to auscultation bilaterally and normal respiratory effort  Cardiovascular: regular rate and rhythm.  Extremities: no cyanosis or edema, or clubbing, distal pulses palpable and symmetric  Abdomen: soft, non-tender to palpation, no distention, no masses/hernias  Skin:  Pitting edema 3+  Musculoskeletal:no clubbing, cyanosis, no deformities  Neurologic: No focal numbness or weakness  Psych/Behavioral:  Alert and oriented, appropriate affect.    Laboratory:  Labs Reviewed   CBC W/ AUTO DIFFERENTIAL - Abnormal; Notable for the following components:       Result Value    RBC 3.22 (*)     Hemoglobin 8.7 (*)     Hematocrit 28.2 (*)     Mean Corpuscular Hemoglobin Conc 30.9 (*)     MPV 13.1 (*)     Lymph # 0.9 (*)     Gran% 76.9 (*)     Lymph% 12.7 (*)     All other components within normal limits   COMPREHENSIVE METABOLIC PANEL - Abnormal; Notable for the following components:    Chloride 112 (*)     CO2 16 (*)     Glucose 186 (*)     BUN, Bld 47 (*)     Creatinine 3.4 (*)     Calcium 8.6 (*)     Albumin 2.7 (*)     eGFR if  17 (*)     eGFR if non  14 (*)     All other components within normal limits   TROPONIN I - Abnormal; Notable for the following components:    Troponin I 0.206 (*)     All  other components within normal limits   B-TYPE NATRIURETIC PEPTIDE - Abnormal; Notable for the following components:    BNP 1,165 (*)     All other components within normal limits   D DIMER, QUANTITATIVE - Abnormal; Notable for the following components:    D-Dimer 2.60 (*)     All other components within normal limits   URINALYSIS, REFLEX TO URINE CULTURE - Abnormal; Notable for the following components:    Protein, UA 2+ (*)     Glucose, UA 1+ (*)     Occult Blood UA 2+ (*)     All other components within normal limits    Narrative:     Preferred Collection Type->Urine, Clean Catch   PHOSPHORUS - Abnormal; Notable for the following components:    Phosphorus 4.6 (*)     All other components within normal limits   TROPONIN I - Abnormal; Notable for the following components:    Troponin I 0.184 (*)     All other components within normal limits   HEMOGLOBIN A1C - Abnormal; Notable for the following components:    Hemoglobin A1C 5.8 (*)     All other components within normal limits   URINALYSIS MICROSCOPIC - Abnormal; Notable for the following components:    RBC, UA 19 (*)     Amorphous, UA Many (*)     All other components within normal limits    Narrative:     Preferred Collection Type->Urine, Clean Catch   TROPONIN I - Abnormal; Notable for the following components:    Troponin I 0.195 (*)     All other components within normal limits    Narrative:     STAT, if not done in ED, then at 2nd and 6th hour from  initial draw.   TROPONIN I - Abnormal; Notable for the following components:    Troponin I 0.195 (*)     All other components within normal limits    Narrative:     STAT, if not done in ED, then at 2nd and 6th hour from  initial draw.   PROTEIN / CREATININE RATIO, URINE - Abnormal; Notable for the following components:    Protein, Urine Random 191 (*)     Prot/Creat Ratio, Ur 8.80 (*)     All other components within normal limits   TROPONIN I - Abnormal; Notable for the following components:    Troponin I 0.184  (*)     All other components within normal limits    Narrative:     STAT, if not done in ED, then at 2nd and 6th hour from  initial draw.   TROPONIN I - Abnormal; Notable for the following components:    Troponin I 0.184 (*)     All other components within normal limits    Narrative:     STAT, if not done in ED, then at 2nd and 6th hour from  initial draw.   ISTAT PROCEDURE - Abnormal; Notable for the following components:    POC PH 7.282 (*)     POC PO2 214 (*)     POC HCO3 18.3 (*)     POC TCO2 19 (*)     All other components within normal limits   POCT GLUCOSE - Abnormal; Notable for the following components:    POCT Glucose 169 (*)     All other components within normal limits   ISTAT PROCEDURE - Abnormal; Notable for the following components:    POC PH 7.302 (*)     POC PO2 103 (*)     POC HCO3 19.1 (*)     POC TCO2 20 (*)     All other components within normal limits   POCT GLUCOSE - Abnormal; Notable for the following components:    POCT Glucose 112 (*)     All other components within normal limits   PROTIME-INR   D DIMER, QUANTITATIVE   TSH   MAGNESIUM   DRUG SCREEN PANEL, URINE EMERGENCY   DRUG SCREEN PANEL, URINE EMERGENCY    Narrative:     Preferred Collection Type->Urine, Clean Catch   SODIUM, URINE, RANDOM   UREA NITROGEN, URINE, RANDOM   LACTIC ACID, PLASMA       Diagnostic Results:  Imaging Results          US Lower Extremity Veins Bilateral (Final result)  Result time 11/05/19 16:21:54    Final result by Suzanne Berrios MD (11/05/19 16:21:54)                 Impression:      No evidence of deep venous thrombosis in either lower extremity.      Electronically signed by: Suzanne Berrios MD  Date:    11/05/2019  Time:    16:21             Narrative:    EXAMINATION:  US LOWER EXTREMITY VEINS BILATERAL    CLINICAL HISTORY:  rule out dvt; Acute respiratory failure with hypoxia    TECHNIQUE:  Duplex and color flow Doppler and dynamic compression was performed of the bilateral lower extremity veins  was performed.    COMPARISON:  None    FINDINGS:  Right thigh veins: The common femoral, femoral, popliteal, upper greater saphenous, and deep femoral veins are patent and free of thrombus. The veins are normally compressible and have normal phasic flow and augmentation response.    Right calf veins: The visualized calf veins are patent.    Left thigh veins: The common femoral, femoral, popliteal, upper greater saphenous, and deep femoral veins are patent and free of thrombus. The veins are normally compressible and have normal phasic flow and augmentation response.    Left calf veins: The visualized calf veins are patent.    Miscellaneous: Subcutaneous edema noted lower extremities.                               US Retroperitoneal Complete (Kidney and (Final result)  Result time 11/05/19 09:44:48    Final result by Fransico Yun MD (11/05/19 09:44:48)                 Impression:      No hydronephrosis.    Elevated left renal resistive index, a nonspecific indicator of medical renal disease.    Incidental left pleural effusion.      Electronically signed by: Fransico Yun  Date:    11/05/2019  Time:    09:44             Narrative:    EXAMINATION:  US RETROPERITONEAL COMPLETE    CLINICAL HISTORY:  acute renal failure;    TECHNIQUE:  Ultrasound of the kidneys and urinary bladder was performed including color flow and Doppler evaluation of the kidneys.    COMPARISON:  None available    FINDINGS:  Right kidney: Right kidney measures 12.4 cm.  Resistive index of 0.63.  No hydronephrosis.    Left kidney: Left kidney measures 11.6 cm.  Resistive index of 0.80.  No hydronephrosis    Urinary bladder is unremarkable.  Splenic resistive index of 0.75.  Incidental left pleural effusion.                               X-Ray Chest 1 View (Final result)  Result time 11/05/19 06:16:39    Final result by Umberto Quinn MD (11/05/19 06:16:39)                 Impression:      As above.      Electronically signed  by: Umberto Quinn MD  Date:    11/05/2019  Time:    06:16             Narrative:    EXAMINATION:  XR CHEST 1 VIEW    CLINICAL HISTORY:  Respiratory failure, unspecified, unspecified whether with hypoxia or hypercapnia    TECHNIQUE:  Single frontal view of the chest was performed.    COMPARISON:  11/05/2019 04:07 hours    FINDINGS:  There has been interval placement of an endotracheal tube with tip terminating approximately 4.0 cm above the lindsay.  Esophagogastric tube has been intervally placed extending below the diaphragm, extending beyond the field of view of the examination.  No significant change in cardiopulmonary status compared to prior examination, noting interstitial edema bilateral pleural effusions.  No evidence of pneumothorax.                               X-Ray Chest AP Portable (Final result)  Result time 11/05/19 05:11:22    Final result by Umberto Quinn MD (11/05/19 05:11:22)                 Impression:      Radiographic findings suggestive of edema/CHF with bilateral pleural effusions and atelectasis/consolidation.      Electronically signed by: Umberto Quinn MD  Date:    11/05/2019  Time:    05:11             Narrative:    EXAMINATION:  XR CHEST AP PORTABLE    CLINICAL HISTORY:  CHF;    TECHNIQUE:  Single frontal view of the chest was performed.    COMPARISON:  None    FINDINGS:  Cardiac monitoring leads overlie the chest.  Cardiomediastinal silhouette appears mildly enlarged.  There are bilateral interstitial opacities suggesting edema.  There is opacification of the bilateral lower lung zones, right more so than left, suggestive of a combination of pleural fluid with associated atelectasis and/or consolidation.  There is no evidence of pneumothorax.  Visualized osseous structures appear intact.                                ASSESSMENT/PLAN:     Ms. Davenport is a 54 y.o. female who presented with HTN emergency now with worsening renal function necessatiting dialysis    - PT/INR  ordered  - will place tunneled cath in OR today  - patient consented, discussed r/b/a  - NPO  - rest of management per primary    1. Hypertensive emergency    2. Shortness of breath    3. Respiratory failure requiring intubation    4. Encounter for intubation    5. NSTEMI (non-ST elevated myocardial infarction)    6. AXEL (acute kidney injury)    7. Elevated troponin    8. CHF (congestive heart failure)    9. Positive D dimer    10. Acute respiratory failure with hypoxia and hypercarbia    11. Nephrotic syndrome    12. New onset of congestive heart failure    13. Acute renal failure, unspecified acute renal failure type    14. Encounter to establish care    15. Screen for STD (sexually transmitted disease)        Jefry Mead MD  [facility] Surgery PGY2  11/13/2019     Discussed with Nephrology  Acute renal failure    Will place PermCath    Risk and benefits and consent obtained

## 2019-11-13 NOTE — PT/OT/SLP PROGRESS
Occupational Therapy   Treatment    Name: Ping Davenport  MRN: [id number]  Admitting Diagnosis:  Hypertensive emergency       Recommendations:     Discharge Recommendations: rehabilitation facility  Discharge Equipment Recommendations:  none  Barriers to discharge:  Decreased caregiver support    Assessment:   Pt pleasant and agreeable to therapy. Pt compliant in all requests by therapy. Pt requested to walk with OT. OT educated pt on PT/OT rolls and encouraged pt to wait and walk with PT. Pt tolerated session well.      Ping Davenport is a 54 y.o. female with a medical diagnosis of Hypertensive emergency. Performance deficits affecting function are weakness, impaired self care skills, impaired functional mobilty, gait instability, impaired coordination, impaired balance, decreased upper extremity function, decreased lower extremity function, impaired cardiopulmonary response to activity.     Rehab Prognosis:  Fair; patient would benefit from acute skilled OT services to address these deficits and reach maximum level of function.       Plan:     Patient to be seen 5 x/week to address the above listed problems via self-care/home management, therapeutic activities, therapeutic exercises  · Plan of Care Expires: 12/08/19  · Plan of Care Reviewed with: patient    Subjective     Pain/Comfort:  · Pain Rating 1: 0/10  · Pain Rating Post-Intervention 1: 0/10  · Pain Rating 2: 0/10  · Pain Rating Post-Intervention 2: 0/10    Objective:     Communicated with: Angelica mcmanus prior to session.  Patient found supine with bed alarm, telemetry, peripheral IV upon OT entry to room.    General Precautions: Standard, fall   Orthopedic Precautions:N/A   Braces: N/A     Occupational Performance:     Bed Mobility:    · Patient completed Rolling/Turning to Right with stand by assistance  · Patient completed Supine to Sit with stand by assistance     Functional Mobility/Transfers:  · Patient completed Sit <> Stand Transfer with contact  guard assistance  with  rolling walker   · Patient completed Bed <> Chair Transfer using Step Transfer technique with contact guard assistance with rolling walker    Activities of Daily Living:  · Grooming: stand by assistance pt seated completed G/H /c set up      [facility] 6 Click ADL: 19    Treatment & Education:  Pt found supine in bed. Pt pleasant and agreeable to therapy after encouragement that HD may leave her too tired to participate later this date. Pt completed bed mobility /c SBA. Pt reported no pain but stated she is feeling stronger. Pt noted to have continued edema in extremities. Pt seated EOB educated on importance of movement in recovery process.  Pt EOB>stance /c CGA for safety. Pt ambulated to chair using RW requiring CGA for safety. Pt requested to walk more but pt educated on OT roll in rehab and encouraged to wait and walk with PT. Pt seated in chair completed shoulder ab/ad,horizontal ab/ad using BUE 10x1 rep. Pt completed 1x10 reps of punch outs and bicep curls /c L arm 2/2 new IV in right elbow. Pt tolerated session well. Cont OT POC.    Patient left up in chair with all lines intact and call button in reachEducation:      GOALS:   Multidisciplinary Problems     Occupational Therapy Goals        Problem: Occupational Therapy Goal    Goal Priority Disciplines Outcome Interventions   Occupational Therapy Goal     OT, PT/OT Ongoing, Progressing    Description:  Goals to be met by: 12/8/19     Patient will increase functional independence with ADLs by performing:    LE Dressing with Minimal Assistance.  Grooming while standing with Contact Guard Assistance.  Toileting from toilet with Contact Guard Assistance for hygiene and clothing management.   Supine to sit with Contact Guard Assistance.--MET 11/11/19  Step transfer with Contact Guard Assistance  Toilet transfer to toilet with Contact Guard Assistance.  Increased functional strength to WFL for self care skills and functional mobility.  Upper  extremity exercise program x10 reps per handout, with independence.                        Time Tracking:     OT Date of Treatment: 11/13/19  OT Start Time: 0953  OT Stop Time: 1018  OT Total Time (min): 25 min    Billable Minutes:Therapeutic Activity 13  Therapeutic Exercise 12    TAYLOR Palmer  11/13/2019

## 2019-11-13 NOTE — NURSING
Plan of care reviewed with patient today. Voiced understanding. NSR on monitor with no red alarms noted. Diabetes and dialysis education given by Slovak speaking nurse today to clarify patient understands treatment plan. In surgery at this time for trialysis cath placement.

## 2019-11-13 NOTE — OP NOTE
[facility]  Surgery Department  Operative Note    SUMMARY     Date of Procedure: 11/13/2019     Procedure:   1.  Intraoperative ultrasound interpretation done by me  2.  Fluoroscopy less than 1 our interpretation done by me  3.  Right IJ access tunneled 16 [place]i for 23 cm PermCath placement fluoro guided     Surgeon(s) and Role:     * William Beltran MD - Primary    Assisting Surgeon: Wm Donald    Pre-Operative Diagnosis: AXEL (acute kidney injury) [N17.9]    Post-Operative Diagnosis: Post-Op Diagnosis Codes:     * AXEL (acute kidney injury) [N17.9]    Anesthesia:  Local mac    History and indications:  This is a 54-year-old female was admitted a few days back with acute renal failure.  She was consulted to us for up PermCath placement for dialysis needs.  This was discussed with her.  the benefits and risk clearly explained to her and consent was obtained    Procedure:  The patient was brought to the operating room and was placed in a supine position.  She was given sedation the head was turned to the left side the right side of the neck and the chest was prepped and draped in a usual sterile manner. Time-out was done to verify the ID of the patient the procedure and everyone concurred    Using ultrasound the internal jugular vein on the right side was identified and was found to be patent and compressible.  1% xylocaine was infiltrated and using a 14 gauge needle access was accomplished.  Through the needle a guidewire was inserted and the position was verified using fluoroscopy.  An exit site was chosen 1% xylocaine was infiltrated at the site as well as the trach.  A tunnel was made between the entrance and exit site. The proximal tip of the catheter was brought through the tunnel.  Using fluoroscopy dilators were passed over the guidewire followed by placement of dilator with the sheath.  The dilator and the guidewire was taken out the proximal tip of the dialysis catheter  was inserted through the sheath and the sheath was peeled off.  The tip of the catheter was at the junction of SVC and right atrium.  Blood was withdrawn without any problem and was flushed with heparin saline followed by straight heparin.  The catheter was secured to the skin using a nylon stitches. Fluoroscopy was done to verify the tip as well as the contour of the catheter.  Pressure dressing was applied.  Patient tolerated the procedure well was transferred to the recovery room in stable condition with no complication    Technical Procedures Used:  Seldinger technique    Complications: no    Estimated Blood Loss (EBL): minimal           Implants: permacath 16 fr 23 cms   Specimens:   Specimen (12h ago, onward)    None                  Condition: Stable    Disposition: PACU - hemodynamically stable.    Attestation: I was present for the entire procedure.

## 2019-11-13 NOTE — PLAN OF CARE
Patient AAOX3. VSS. Denies any pain at this time. Dr.Eng Singh to release patient from PACU. Report to Angelica GILBERT with time for questions, verbalized understanding. Patient discharged to floor via stretcher.

## 2019-11-13 NOTE — PROGRESS NOTES
[facility] Progress Note    Date of Admit: 2019        Subjective:     Still only minimal UOP. Very edematous though she reports her breathing is ok.    Objective:   Last 24 Hour Vital Signs:  BP  Min: 127/60  Max: 168/75  Temp  Av.6 °F (36.4 °C)  Min: 96.9 °F (36.1 °C)  Max: 98 °F (36.7 °C)  Pulse  Av.3  Min: 64  Max: 80  Resp  Av.2  Min: 16  Max: 18  SpO2  Av %  Min: 92 %  Max: 95 %  Weight  Av.9 kg (200 lb 6.4 oz)  Min: 90.9 kg (200 lb 6.4 oz)  Max: 90.9 kg (200 lb 6.4 oz)  Body mass index is 36.65 kg/m².  I/O last 3 completed shifts:  In: 485 [P.O.:485]  Out: 1002 [Urine:1000; Stool:2]    Physical Examination:  Gen: NAD, AAOx3  HEENT: NC in place, dry [place]  Neck: no thyroidmegaly, no LAD  Cardio: RRR, normal S1/S2, no MRG, JVP wnl  Lungs: bibasilar crackles   Abd: abd edema improved  Ext: pitting edema of lower extremities up to shins bilaterally  Skin: warm, dry, no rashes noted  Neuro: moves ext    Laboratory:  Most Recent Data:  CBC:   Lab Results   Component Value Date    WBC 14.11 (H) 2019    HGB 8.7 (L) 2019    HCT 27.2 (L) 2019     2019    MCV 85 2019    RDW 13.8 2019     BMP:   Lab Results   Component Value Date     (L) 2019    K 3.7 2019    CL 94 (L) 2019    CO2 22 (L) 2019     (H) 2019    CREATININE 6.8 (H) 2019     (HH) 2019    CALCIUM 8.8 2019    MG 2.2 2019    PHOS 6.2 (H) 2019     LFTs:   Lab Results   Component Value Date    PROT 5.6 (L) 2019    ALBUMIN 2.5 (L) 2019    BILITOT 0.2 2019    AST 17 2019    ALKPHOS 55 2019    ALT 19 2019     Coags:   Lab Results   Component Value Date    INR 1.1 2019     Urinalysis:   Lab Results   Component Value Date    COLORU Yellow 2019    SPECGRAV 1.025 2019    NITRITE Negative 2019    KETONESU Negative 2019    UROBILINOGEN Negative  11/05/2019    WBCUA 1 11/05/2019       Trended Lab Data:  Recent Labs   Lab 11/11/19  0633 11/12/19  0513 11/13/19  0648 11/13/19  0649   WBC 8.14 12.05  --  14.11*   HGB 8.1* 8.4*  --  8.7*   HCT 25.8* 26.3*  --  27.2*    234  --  229   MCV 85 87  --  85   RDW 14.1 14.2  --  13.8   * 131* 131*  --    K 4.1 4.0 3.7  --    CL 97 95 94*  --    CO2 24 20* 22*  --    * 118* 110*  --    CREATININE 6.5* 6.7* 6.8*  --    * 583* 556*  --    PROT 6.0 5.3* 5.6*  --    ALBUMIN 2.7* 2.4* 2.5*  --    BILITOT 0.3 0.2 0.2  --    AST 26 19 17  --    ALKPHOS 57 58 55  --    ALT 20 18 19  --        Trended Cardiac Data:  No results for input(s): TROPONINI, CKTOTAL, CKMB, BNP in the last 168 hours.      Radiology:  Imaging Results          US Lower Extremity Veins Bilateral (Final result)  Result time 11/05/19 16:21:54    Final result by Suzanne Berrios MD (11/05/19 16:21:54)                 Impression:      No evidence of deep venous thrombosis in either lower extremity.      Electronically signed by: Suzanne Berrios MD  Date:    11/05/2019  Time:    16:21             Narrative:    EXAMINATION:  US LOWER EXTREMITY VEINS BILATERAL    CLINICAL HISTORY:  rule out dvt; Acute respiratory failure with hypoxia    TECHNIQUE:  Duplex and color flow Doppler and dynamic compression was performed of the bilateral lower extremity veins was performed.    COMPARISON:  None    FINDINGS:  Right thigh veins: The common femoral, femoral, popliteal, upper greater saphenous, and deep femoral veins are patent and free of thrombus. The veins are normally compressible and have normal phasic flow and augmentation response.    Right calf veins: The visualized calf veins are patent.    Left thigh veins: The common femoral, femoral, popliteal, upper greater saphenous, and deep femoral veins are patent and free of thrombus. The veins are normally compressible and have normal phasic flow and augmentation response.    Left calf  veins: The visualized calf veins are patent.    Miscellaneous: Subcutaneous edema noted lower extremities.                               US Retroperitoneal Complete (Kidney and (Final result)  Result time 11/05/19 09:44:48    Final result by Fransico Yun MD (11/05/19 09:44:48)                 Impression:      No hydronephrosis.    Elevated left renal resistive index, a nonspecific indicator of medical renal disease.    Incidental left pleural effusion.      Electronically signed by: Fransico Yun  Date:    11/05/2019  Time:    09:44             Narrative:    EXAMINATION:  US RETROPERITONEAL COMPLETE    CLINICAL HISTORY:  acute renal failure;    TECHNIQUE:  Ultrasound of the kidneys and urinary bladder was performed including color flow and Doppler evaluation of the kidneys.    COMPARISON:  None available    FINDINGS:  Right kidney: Right kidney measures 12.4 cm.  Resistive index of 0.63.  No hydronephrosis.    Left kidney: Left kidney measures 11.6 cm.  Resistive index of 0.80.  No hydronephrosis    Urinary bladder is unremarkable.  Splenic resistive index of 0.75.  Incidental left pleural effusion.                               X-Ray Chest 1 View (Final result)  Result time 11/05/19 06:16:39    Final result by Umberto Quinn MD (11/05/19 06:16:39)                 Impression:      As above.      Electronically signed by: Umberto Quinn MD  Date:    11/05/2019  Time:    06:16             Narrative:    EXAMINATION:  XR CHEST 1 VIEW    CLINICAL HISTORY:  Respiratory failure, unspecified, unspecified whether with hypoxia or hypercapnia    TECHNIQUE:  Single frontal view of the chest was performed.    COMPARISON:  11/05/2019 04:07 hours    FINDINGS:  There has been interval placement of an endotracheal tube with tip terminating approximately 4.0 cm above the lindsay.  Esophagogastric tube has been intervally placed extending below the diaphragm, extending beyond the field of view of the examination.  No  significant change in cardiopulmonary status compared to prior examination, noting interstitial edema bilateral pleural effusions.  No evidence of pneumothorax.                               X-Ray Chest AP Portable (Final result)  Result time 11/05/19 05:11:22    Final result by Umberto Quinn MD (11/05/19 05:11:22)                 Impression:      Radiographic findings suggestive of edema/CHF with bilateral pleural effusions and atelectasis/consolidation.      Electronically signed by: Umberto Quinn MD  Date:    11/05/2019  Time:    05:11             Narrative:    EXAMINATION:  XR CHEST AP PORTABLE    CLINICAL HISTORY:  CHF;    TECHNIQUE:  Single frontal view of the chest was performed.    COMPARISON:  None    FINDINGS:  Cardiac monitoring leads overlie the chest.  Cardiomediastinal silhouette appears mildly enlarged.  There are bilateral interstitial opacities suggesting edema.  There is opacification of the bilateral lower lung zones, right more so than left, suggestive of a combination of pleural fluid with associated atelectasis and/or consolidation.  There is no evidence of pneumothorax.  Visualized osseous structures appear intact.                                   Assessment and Plan:      Ping Davenport is a 54 y.o.female with  Patient Active Problem List    Diagnosis Date Noted    AXEL (acute kidney injury)     Nephrotic syndrome     Hypertensive emergency 11/05/2019    New onset of congestive heart failure 11/05/2019    Acute respiratory failure with hypoxia and hypercarbia 11/05/2019    Acute renal failure 11/05/2019    NSTEMI (non-ST elevated myocardial infarction) 11/05/2019    Rash 10/13/2014    Encounter to establish care 10/13/2014    Screen for STD (sexually transmitted disease) 10/13/2014        AXEL stage III vs CKD 5 with nephrotic syndrome  -Possible etiologies include cardiorenal, HTN emergency, GN  -Initially AGMA, NAGMA, Cr on admit 3.4, had 8.8g proteinuria  -Complements  wnl. Hep and HIV negative. K/L chains elevated with low total protein (non-specific). FTA antibodies pending  -S/p solumedrol 1g x 3 days as concerned for rapid GN process. P/C ratio improved though could be ARB, all serologies neg, renal bx 11/11, awaiting read  -Cr appears to be peaked however very edematous despite metolazone and lasix. Will initiate HD today after tunneled line placement. Awaiting on renal path still. Cont steroids    Hyperglycemia  -2/2 steroids, primary team has added basal. Will cont to monitor    HTN  -BP better controlled though still above goal. Likely steroids. 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

## 2019-11-13 NOTE — PT/OT/SLP PROGRESS
Physical Therapy Treatment    Patient Name:  Ping Davenport   MRN:  [id number]    Recommendations:     Discharge Recommendations:  rehabilitation facility   Discharge Equipment Recommendations: (defer to IPR)   Barriers to discharge: decreased functional mobility     Assessment:     Ping Davenport is a 54 y.o. female admitted with a medical diagnosis of Hypertensive emergency.  She presents with the following impairments/functional limitations:  weakness, impaired self care skills, impaired balance, impaired endurance, impaired functional mobilty, gait instability, decreased lower extremity function, impaired cardiopulmonary response to activity. Pt sitting in b/s chair at noon, having not eaten today 2/2 pending procedure and sitting for almost 2 hours.  Pt required Mod assist of 1 for sit to stand x 3 with RW.  Pt reported her legs feel weak and did not want to walk 2/2 feeling weak.  Pt performed BLE knee flex with heel lift unilaterally 10 x 2 in standing.  Pt performed BLE LAQ 8 x 2, hip flexion 6 x 2, AP 12 x 2, hip abd 10 x 2 in sitting.      Rehab Prognosis: Good; patient would benefit from acute skilled PT services to address these deficits and reach maximum level of function.    Recent Surgery: * No surgery found *      Plan:     During this hospitalization, patient to be seen 6 x/week to address the identified rehab impairments via gait training, therapeutic activities, therapeutic exercises, neuromuscular re-education, wheelchair management/training and progress toward the following goals:    · Plan of Care Expires:  12/13/19    Subjective     Chief Complaint: legs feel weak, have not been able to eat today  Patient/Family Comments/goals: go for my procedure  Pain/Comfort:  · Pain Rating 1: 0/10      Objective:     Communicated with nurse prior to session.  Patient found up in chair with bed alarm, telemetry, peripheral IV upon PT entry to room.     General Precautions: Standard, fall   Orthopedic  Precautions:N/A   Braces:       Functional Mobility:   Pt sitting in b/s chair at noon, having not eaten today 2/2 pending procedure and sitting for almost 2 hours.  Pt required Mod assist of 1 for sit to stand x 3 with RW.  Pt reported her legs feel weak and did not want to walk 2/2 feeling weak.  Pt performed BLE knee flex with heel lift unilaterally 10 x 2 in standing.  Pt performed BLE LAQ 8 x 2, hip flexion 6 x 2, AP 12 x 2, hip abd 10 x 2 in sitting.        AM-PAC 6 CLICK MOBILITY  Turning over in bed (including adjusting bedclothes, sheets and blankets)?: 3  Sitting down on and standing up from a chair with arms (e.g., wheelchair, bedside commode, etc.): 2  Moving from lying on back to sitting on the side of the bed?: 3  Moving to and from a bed to a chair (including a wheelchair)?: 2  Need to walk in hospital room?: 2  Climbing 3-5 steps with a railing?: 2  Basic Mobility Total Score: 14       Therapeutic Activities and Exercises:   See Functional mobility    Patient left up in chair with call button in reach, chair alarm on and nurse notified..    GOALS:   Multidisciplinary Problems     Physical Therapy Goals        Problem: Physical Therapy Goal    Goal Priority Disciplines Outcome Goal Variances Interventions   Physical Therapy Goal     PT, PT/OT Ongoing, Progressing     Description:  Goals to be met by: 2019     Patient will increase functional independence with mobility by performin. Supine to sit with Modified [place]  2. Sit to stand transfer with Modified [place]  3. Bed to chair transfer with Modified [place] using Rolling Walker  4. Gait  x 100 feet with Modified [place] using Rolling Walker.   5. Lower extremity exercise program x12 reps per handout, with supervision                      Time Tracking:     PT Received On: 19  PT Start Time: 1123     PT Stop Time: 1147  PT Total Time (min): 24 min     Billable Minutes: Therapeutic Activity 12 and Therapeutic  Exercise 12    Treatment Type: Treatment  PT/PTA: PT     PTA Visit Number: 0     Jen Yip, PT  11/13/2019

## 2019-11-13 NOTE — TRANSFER OF CARE
"Anesthesia Transfer of Care Note    Patient: Ping Davenport    Procedure(s) Performed: Procedure(s) (LRB):  Insertion,catheter,tunneled (Right)    Patient location: PACU    Anesthesia Type: MAC    Transport from OR: Transported from OR on room air with adequate spontaneous ventilation    Post pain: adequate analgesia    Post assessment: no apparent anesthetic complications and tolerated procedure well    Post vital signs: stable    Level of consciousness: awake, alert and oriented    Nausea/Vomiting: no nausea/vomiting    Complications: none    Transfer of care protocol was followed      Last vitals:   Visit Vitals  BP (!) 146/65   Pulse 70   Temp 36.4 °C (97.6 °F) (Oral)   Resp 12   Ht 5' 2" (1.575 m)   Wt 90.9 kg (200 lb 6.4 oz)   SpO2 (!) 94%   Breastfeeding? No   BMI 36.65 kg/m²     "

## 2019-11-14 LAB
ALBUMIN SERPL BCP-MCNC: 2.6 G/DL (ref 3.5–5.2)
ALP SERPL-CCNC: 61 U/L (ref 55–135)
ALT SERPL W/O P-5'-P-CCNC: 20 U/L (ref 10–44)
ANION GAP SERPL CALC-SCNC: 13 MMOL/L (ref 8–16)
AST SERPL-CCNC: 20 U/L (ref 10–40)
BASOPHILS # BLD AUTO: 0.01 K/UL (ref 0–0.2)
BASOPHILS NFR BLD: 0.1 % (ref 0–1.9)
BILIRUB SERPL-MCNC: 0.2 MG/DL (ref 0.1–1)
BUN SERPL-MCNC: 112 MG/DL (ref 6–20)
CALCIUM SERPL-MCNC: 9.2 MG/DL (ref 8.7–10.5)
CHLORIDE SERPL-SCNC: 94 MMOL/L (ref 95–110)
CO2 SERPL-SCNC: 24 MMOL/L (ref 23–29)
CREAT SERPL-MCNC: 6.7 MG/DL (ref 0.5–1.4)
DIFFERENTIAL METHOD: ABNORMAL
EOSINOPHIL # BLD AUTO: 0 K/UL (ref 0–0.5)
EOSINOPHIL NFR BLD: 0 % (ref 0–8)
ERYTHROCYTE [DISTWIDTH] IN BLOOD BY AUTOMATED COUNT: 13.8 % (ref 11.5–14.5)
EST. GFR  (AFRICAN AMERICAN): 7 ML/MIN/1.73 M^2
EST. GFR  (NON AFRICAN AMERICAN): 6 ML/MIN/1.73 M^2
GLUCOSE SERPL-MCNC: 376 MG/DL (ref 70–110)
HCT VFR BLD AUTO: 30.1 % (ref 37–48.5)
HGB BLD-MCNC: 9.6 G/DL (ref 12–16)
LYMPHOCYTES # BLD AUTO: 0.6 K/UL (ref 1–4.8)
LYMPHOCYTES NFR BLD: 4.2 % (ref 18–48)
MAGNESIUM SERPL-MCNC: 2.2 MG/DL (ref 1.6–2.6)
MCH RBC QN AUTO: 26.8 PG (ref 27–31)
MCHC RBC AUTO-ENTMCNC: 31.9 G/DL (ref 32–36)
MCV RBC AUTO: 84 FL (ref 82–98)
MONOCYTES # BLD AUTO: 1.4 K/UL (ref 0.3–1)
MONOCYTES NFR BLD: 9.1 % (ref 4–15)
NEUTROPHILS # BLD AUTO: 12.2 K/UL (ref 1.8–7.7)
NEUTROPHILS NFR BLD: 86.6 % (ref 38–73)
PHOSPHATE SERPL-MCNC: 6.2 MG/DL (ref 2.7–4.5)
PLATELET # BLD AUTO: 245 K/UL (ref 150–350)
PMV BLD AUTO: 12.4 FL (ref 9.2–12.9)
POCT GLUCOSE: 222 MG/DL (ref 70–110)
POCT GLUCOSE: 276 MG/DL (ref 70–110)
POCT GLUCOSE: 327 MG/DL (ref 70–110)
POCT GLUCOSE: 353 MG/DL (ref 70–110)
POTASSIUM SERPL-SCNC: 4 MMOL/L (ref 3.5–5.1)
PROT SERPL-MCNC: 5.9 G/DL (ref 6–8.4)
RBC # BLD AUTO: 3.58 M/UL (ref 4–5.4)
SODIUM SERPL-SCNC: 131 MMOL/L (ref 136–145)
WBC # BLD AUTO: 14.86 K/UL (ref 3.9–12.7)

## 2019-11-14 PROCEDURE — 27000221 HC OXYGEN, UP TO 24 HOURS

## 2019-11-14 PROCEDURE — 25000003 PHARM REV CODE 250: Performed by: SURGERY

## 2019-11-14 PROCEDURE — 63600175 PHARM REV CODE 636 W HCPCS: Performed by: STUDENT IN AN ORGANIZED HEALTH CARE EDUCATION/TRAINING PROGRAM

## 2019-11-14 PROCEDURE — 86580 TB INTRADERMAL TEST: CPT | Performed by: STUDENT IN AN ORGANIZED HEALTH CARE EDUCATION/TRAINING PROGRAM

## 2019-11-14 PROCEDURE — 25000003 PHARM REV CODE 250: Performed by: STUDENT IN AN ORGANIZED HEALTH CARE EDUCATION/TRAINING PROGRAM

## 2019-11-14 PROCEDURE — 94799 UNLISTED PULMONARY SVC/PX: CPT

## 2019-11-14 PROCEDURE — 30200315 PPD INTRADERMAL TEST REV CODE 302: Performed by: STUDENT IN AN ORGANIZED HEALTH CARE EDUCATION/TRAINING PROGRAM

## 2019-11-14 PROCEDURE — 84100 ASSAY OF PHOSPHORUS: CPT

## 2019-11-14 PROCEDURE — 63600175 PHARM REV CODE 636 W HCPCS: Performed by: SURGERY

## 2019-11-14 PROCEDURE — 90935 HEMODIALYSIS ONE EVALUATION: CPT

## 2019-11-14 PROCEDURE — 80053 COMPREHEN METABOLIC PANEL: CPT

## 2019-11-14 PROCEDURE — 94761 N-INVAS EAR/PLS OXIMETRY MLT: CPT

## 2019-11-14 PROCEDURE — 97110 THERAPEUTIC EXERCISES: CPT

## 2019-11-14 PROCEDURE — 86704 HEP B CORE ANTIBODY TOTAL: CPT

## 2019-11-14 PROCEDURE — 36415 COLL VENOUS BLD VENIPUNCTURE: CPT

## 2019-11-14 PROCEDURE — 99900035 HC TECH TIME PER 15 MIN (STAT)

## 2019-11-14 PROCEDURE — 11000001 HC ACUTE MED/SURG PRIVATE ROOM

## 2019-11-14 PROCEDURE — 97530 THERAPEUTIC ACTIVITIES: CPT

## 2019-11-14 PROCEDURE — 85025 COMPLETE CBC W/AUTO DIFF WBC: CPT

## 2019-11-14 PROCEDURE — 83735 ASSAY OF MAGNESIUM: CPT

## 2019-11-14 PROCEDURE — 87340 HEPATITIS B SURFACE AG IA: CPT

## 2019-11-14 RX ORDER — SODIUM CHLORIDE 9 MG/ML
INJECTION, SOLUTION INTRAVENOUS
Status: DISCONTINUED | OUTPATIENT
Start: 2019-11-14 | End: 2019-11-18 | Stop reason: HOSPADM

## 2019-11-14 RX ORDER — PREDNISONE 20 MG/1
60 TABLET ORAL DAILY
Status: DISCONTINUED | OUTPATIENT
Start: 2019-11-15 | End: 2019-11-18

## 2019-11-14 RX ORDER — INSULIN ASPART 100 [IU]/ML
15 INJECTION, SOLUTION INTRAVENOUS; SUBCUTANEOUS ONCE
Status: COMPLETED | OUTPATIENT
Start: 2019-11-14 | End: 2019-11-14

## 2019-11-14 RX ORDER — SODIUM CHLORIDE 9 MG/ML
INJECTION, SOLUTION INTRAVENOUS ONCE
Status: COMPLETED | OUTPATIENT
Start: 2019-11-14 | End: 2019-11-14

## 2019-11-14 RX ORDER — INSULIN ASPART 100 [IU]/ML
15 INJECTION, SOLUTION INTRAVENOUS; SUBCUTANEOUS
Status: DISCONTINUED | OUTPATIENT
Start: 2019-11-14 | End: 2019-11-14

## 2019-11-14 RX ORDER — INSULIN ASPART 100 [IU]/ML
10 INJECTION, SOLUTION INTRAVENOUS; SUBCUTANEOUS
Status: DISCONTINUED | OUTPATIENT
Start: 2019-11-15 | End: 2019-11-15

## 2019-11-14 RX ORDER — MUPIROCIN 20 MG/G
OINTMENT TOPICAL 2 TIMES DAILY
Status: DISCONTINUED | OUTPATIENT
Start: 2019-11-14 | End: 2019-11-18 | Stop reason: HOSPADM

## 2019-11-14 RX ORDER — HEPARIN SODIUM 1000 [USP'U]/ML
4100 INJECTION, SOLUTION INTRAVENOUS; SUBCUTANEOUS
Status: DISCONTINUED | OUTPATIENT
Start: 2019-11-14 | End: 2019-11-18 | Stop reason: HOSPADM

## 2019-11-14 RX ADMIN — INSULIN ASPART 3 UNITS: 100 INJECTION, SOLUTION INTRAVENOUS; SUBCUTANEOUS at 09:11

## 2019-11-14 RX ADMIN — TUBERCULIN PURIFIED PROTEIN DERIVATIVE 5 UNITS: 5 INJECTION INTRADERMAL at 04:11

## 2019-11-14 RX ADMIN — LOSARTAN POTASSIUM 100 MG: 50 TABLET ORAL at 08:11

## 2019-11-14 RX ADMIN — METOLAZONE 10 MG: 2.5 TABLET ORAL at 08:11

## 2019-11-14 RX ADMIN — HEPARIN SODIUM 4100 UNITS: 1000 INJECTION, SOLUTION INTRAVENOUS; SUBCUTANEOUS at 01:11

## 2019-11-14 RX ADMIN — AMLODIPINE BESYLATE 10 MG: 5 TABLET ORAL at 08:11

## 2019-11-14 RX ADMIN — INSULIN ASPART 4 UNITS: 100 INJECTION, SOLUTION INTRAVENOUS; SUBCUTANEOUS at 05:11

## 2019-11-14 RX ADMIN — ONDANSETRON 4 MG: 2 INJECTION INTRAMUSCULAR; INTRAVENOUS at 04:11

## 2019-11-14 RX ADMIN — INSULIN ASPART 15 UNITS: 100 INJECTION, SOLUTION INTRAVENOUS; SUBCUTANEOUS at 05:11

## 2019-11-14 RX ADMIN — SODIUM CHLORIDE 500 ML: 0.9 INJECTION, SOLUTION INTRAVENOUS at 12:11

## 2019-11-14 RX ADMIN — INSULIN DETEMIR 35 UNITS: 100 INJECTION, SOLUTION SUBCUTANEOUS at 09:11

## 2019-11-14 RX ADMIN — MUPIROCIN: 20 OINTMENT TOPICAL at 09:11

## 2019-11-14 RX ADMIN — FUROSEMIDE 160 MG: 10 INJECTION, SOLUTION INTRAVENOUS at 05:11

## 2019-11-14 RX ADMIN — SEVELAMER CARBONATE 1600 MG: 800 TABLET, FILM COATED ORAL at 08:11

## 2019-11-14 RX ADMIN — PREDNISONE 80 MG: 20 TABLET ORAL at 08:11

## 2019-11-14 RX ADMIN — SEVELAMER CARBONATE 1600 MG: 800 TABLET, FILM COATED ORAL at 05:11

## 2019-11-14 RX ADMIN — INSULIN ASPART 15 UNITS: 100 INJECTION, SOLUTION INTRAVENOUS; SUBCUTANEOUS at 07:11

## 2019-11-14 NOTE — PLAN OF CARE
POC reviewed with the pt, verbalized understanding.  AAOx3, VSS.  No complaint of pain or any other distress noted throughout night.  On continuous telemetry monitor, SR.  No ectopy noted.  Blood glucose monitored, insulin given as ordered.  HD access remained dry/clean/intact.  Purewick initiated.  Encouraged to turn frequently.  Safety maintained, free of falls throughout shift.  Instructed to call for any assistance.  Will continue to monitor.

## 2019-11-14 NOTE — ANESTHESIA POSTPROCEDURE EVALUATION
Anesthesia Post Evaluation    Patient: Ping Davenport    Procedure(s) Performed: Procedure(s) (LRB):  Insertion,catheter,tunneled (Right)    Final Anesthesia Type: general  Patient location during evaluation: PACU  Patient participation: Yes- Able to Participate  Level of consciousness: awake and alert, oriented and awake  Post-procedure vital signs: reviewed and stable  Pain management: adequate  Airway patency: patent  PONV status at discharge: No PONV  Anesthetic complications: no      Cardiovascular status: blood pressure returned to baseline  Respiratory status: unassisted and room air  Hydration status: euvolemic  Follow-up not needed.          Vitals Value Taken Time   /67 11/13/2019  5:20 PM   Temp 36.4 °C (97.6 °F) 11/13/2019 11:22 AM   Pulse 68 11/13/2019  5:20 PM   Resp 18 11/13/2019  5:20 PM   SpO2 95 % 11/13/2019  5:20 PM         Event Time     Out of Recovery 11/13/2019 17:21:32          Pain/Sung Score: Pain Rating Prior to Med Admin: 2 (11/13/2019  5:53 AM)  Sung Score: 9 (11/13/2019  5:10 PM)

## 2019-11-14 NOTE — PT/OT/SLP PROGRESS
Occupational Therapy  Missed Visit    Patient Name:  Ping Davenport   MRN:  [id number]    Patient not seen today secondary to declining OT svcs this PM due to fatigue from HD. Will follow-up as able.    JADEN Gallardo  11/14/2019

## 2019-11-14 NOTE — PT/OT/SLP PROGRESS
Occupational Therapy      Patient Name:  Ping Davenport   MRN:  [id number]    Patient not seen today secondary to DEANNA at . Will follow-up as able.    JADEN Gallardo  11/14/2019

## 2019-11-14 NOTE — PROGRESS NOTES
Progress Note  [facility]  Admit Date: 11/5/2019   LOS: 9 days   SUBJECTIVE:     No acute overnight events. Patient had PermCath placed yesterday. HD today. Patient tolerating renal diet well. UOP 1,200. Last BM 11/12. Tolerating PT/OT, who also recommend rehab.    Patient seen and examined this AM with no new complaints.     Review of Systems   Constitutional: Negative for chills and fever.   HENT: Negative for sore throat.    Eyes: Negative for blurred vision and double vision.   Respiratory: Negative for cough and shortness of breath.    Cardiovascular: Negative for chest pain.   Gastrointestinal: Negative for abdominal pain, constipation, diarrhea, heartburn, nausea and vomiting.   Genitourinary: Negative for dysuria and urgency.   Musculoskeletal: Negative for back pain and falls.   Skin: Negative for itching and rash.   Neurological: Negative for headaches.   Endo/Heme/Allergies: Does not bruise/bleed easily.   Psychiatric/Behavioral: The patient is not nervous/anxious.        OBJECTIVE:   Vital Signs (Most Recent)  Temp: 98.1 °F (36.7 °C) (11/14/19 0437)  Pulse: 80 (11/14/19 0437)  Resp: 20 (11/14/19 0437)  BP: (!) 178/79 (11/14/19 0437)  SpO2: 97 % (11/14/19 0437)    I & O (Last 24H):    Intake/Output Summary (Last 24 hours) at 11/14/2019 0637  Last data filed at 11/14/2019 0455  Gross per 24 hour   Intake 100 ml   Output 1200 ml   Net -1100 ml     Wt Readings from Last 3 Encounters:   11/14/19 93.5 kg (206 lb 2.1 oz)   10/13/14 72 kg (158 lb 11.2 oz)       Current Diet Order   Procedures    Diet renal        Physical Exam   Constitutional: She is oriented to person, place, and time and well-developed, well-nourished, and in no distress.   HENT:   Head: Normocephalic and atraumatic.   Eyes: Pupils are equal, round, and reactive to light. EOM are normal.   Neck: Normal range of motion. Neck supple.   Cardiovascular: Normal rate and regular rhythm.   Pulmonary/Chest: Effort normal.   Crackles in the  lower lobes b/l    Abdominal: Soft. Bowel sounds are normal.   Musculoskeletal: She exhibits edema.   Notable Anasarca. 2+ pitting edema in the lower extremities b/l      Neurological: She is alert and oriented to person, place, and time.   Skin: Skin is warm and dry.   Psychiatric: Affect normal.     Laboratory Data:  CBC  Recent Labs   Lab 11/12/19  0513 11/13/19  0649 11/14/19  0505   WBC 12.05 14.11* 14.86*   RBC 3.03* 3.19* 3.58*   HGB 8.4* 8.7* 9.6*   HCT 26.3* 27.2* 30.1*    229 245   MCV 87 85 84   MCH 27.7 27.3 26.8*   MCHC 31.9* 32.0 31.9*     CMP  Recent Labs   Lab 11/12/19  0513 11/13/19  0648 11/14/19  0505   CALCIUM 8.2* 8.8 9.2   PROT 5.3* 5.6* 5.9*   * 131* 131*   K 4.0 3.7 4.0   CO2 20* 22* 24   CL 95 94* 94*   * 110* 112*   CREATININE 6.7* 6.8* 6.7*   ALKPHOS 58 55 61   ALT 18 19 20   AST 19 17 20   BILITOT 0.2 0.2 0.2     POCT-Glucose  POCT Glucose   Date Value Ref Range Status   11/14/2019 353 (H) 70 - 110 mg/dL Final   11/13/2019 374 (H) 70 - 110 mg/dL Final   11/13/2019 398 (H) 70 - 110 mg/dL Final   11/13/2019 403 (H) 70 - 110 mg/dL Final   11/13/2019 366 (H) 70 - 110 mg/dL Final   11/13/2019 449 (H) 70 - 110 mg/dL Final   11/13/2019 467 (HH) 70 - 110 mg/dL Final   11/12/2019 432 (H) 70 - 110 mg/dL Final   11/12/2019 424 (H) 70 - 110 mg/dL Final   11/12/2019 482 (HH) 70 - 110 mg/dL Final   11/11/2019 467 (HH) 70 - 110 mg/dL Final   11/11/2019 390 (H) 70 - 110 mg/dL Final   11/11/2019 310 (H) 70 - 110 mg/dL Final     COAGS  Recent Labs   Lab 11/13/19  1102   INR 1.1     MICRO  Microbiology Results (last 7 days)     Procedure Component Value Units Date/Time    Blood culture [[id number]] Collected:  11/05/19 0936    Order Status:  Completed Specimen:  Blood Updated:  11/10/19 1812     Blood Culture, Routine No growth after 5 days.    Blood culture [[id number]] Collected:  11/05/19 0928    Order Status:  Completed Specimen:  Blood Updated:  11/10/19 1812     Blood Culture,  Routine No growth after 5 days.        LIPID PANEL  Lab Results   Component Value Date    CHOL 218 (H) 11/06/2019     Lab Results   Component Value Date    HDL 38 (L) 11/06/2019     Lab Results   Component Value Date    LDLCALC 151.4 11/06/2019     Lab Results   Component Value Date    TRIG 143 11/06/2019     Lab Results   Component Value Date    CHOLHDL 17.4 (L) 11/06/2019       Diagnostic Results:  Imaging Results          US Lower Extremity Veins Bilateral (Final result)  Result time 11/05/19 16:21:54    Final result by Suzanne Berrios MD (11/05/19 16:21:54)                 Impression:      No evidence of deep venous thrombosis in either lower extremity.      Electronically signed by: Suzanne Berrois MD  Date:    11/05/2019  Time:    16:21             Narrative:    EXAMINATION:  US LOWER EXTREMITY VEINS BILATERAL    CLINICAL HISTORY:  rule out dvt; Acute respiratory failure with hypoxia    TECHNIQUE:  Duplex and color flow Doppler and dynamic compression was performed of the bilateral lower extremity veins was performed.    COMPARISON:  None    FINDINGS:  Right thigh veins: The common femoral, femoral, popliteal, upper greater saphenous, and deep femoral veins are patent and free of thrombus. The veins are normally compressible and have normal phasic flow and augmentation response.    Right calf veins: The visualized calf veins are patent.    Left thigh veins: The common femoral, femoral, popliteal, upper greater saphenous, and deep femoral veins are patent and free of thrombus. The veins are normally compressible and have normal phasic flow and augmentation response.    Left calf veins: The visualized calf veins are patent.    Miscellaneous: Subcutaneous edema noted lower extremities.                               US Retroperitoneal Complete (Kidney and (Final result)  Result time 11/05/19 09:44:48    Final result by Fransico Yun MD (11/05/19 09:44:48)                 Impression:      No  hydronephrosis.    Elevated left renal resistive index, a nonspecific indicator of medical renal disease.    Incidental left pleural effusion.      Electronically signed by: Fransico Yun  Date:    11/05/2019  Time:    09:44             Narrative:    EXAMINATION:  US RETROPERITONEAL COMPLETE    CLINICAL HISTORY:  acute renal failure;    TECHNIQUE:  Ultrasound of the kidneys and urinary bladder was performed including color flow and Doppler evaluation of the kidneys.    COMPARISON:  None available    FINDINGS:  Right kidney: Right kidney measures 12.4 cm.  Resistive index of 0.63.  No hydronephrosis.    Left kidney: Left kidney measures 11.6 cm.  Resistive index of 0.80.  No hydronephrosis    Urinary bladder is unremarkable.  Splenic resistive index of 0.75.  Incidental left pleural effusion.                               X-Ray Chest 1 View (Final result)  Result time 11/05/19 06:16:39    Final result by Umberto Quinn MD (11/05/19 06:16:39)                 Impression:      As above.      Electronically signed by: Umberto Quinn MD  Date:    11/05/2019  Time:    06:16             Narrative:    EXAMINATION:  XR CHEST 1 VIEW    CLINICAL HISTORY:  Respiratory failure, unspecified, unspecified whether with hypoxia or hypercapnia    TECHNIQUE:  Single frontal view of the chest was performed.    COMPARISON:  11/05/2019 04:07 hours    FINDINGS:  There has been interval placement of an endotracheal tube with tip terminating approximately 4.0 cm above the lindsay.  Esophagogastric tube has been intervally placed extending below the diaphragm, extending beyond the field of view of the examination.  No significant change in cardiopulmonary status compared to prior examination, noting interstitial edema bilateral pleural effusions.  No evidence of pneumothorax.                               X-Ray Chest AP Portable (Final result)  Result time 11/05/19 05:11:22    Final result by Umberto Quinn MD (11/05/19 05:11:22)                  Impression:      Radiographic findings suggestive of edema/CHF with bilateral pleural effusions and atelectasis/consolidation.      Electronically signed by: Umberto Quinn MD  Date:    11/05/2019  Time:    05:11             Narrative:    EXAMINATION:  XR CHEST AP PORTABLE    CLINICAL HISTORY:  CHF;    TECHNIQUE:  Single frontal view of the chest was performed.    COMPARISON:  None    FINDINGS:  Cardiac monitoring leads overlie the chest.  Cardiomediastinal silhouette appears mildly enlarged.  There are bilateral interstitial opacities suggesting edema.  There is opacification of the bilateral lower lung zones, right more so than left, suggestive of a combination of pleural fluid with associated atelectasis and/or consolidation.  There is no evidence of pneumothorax.  Visualized osseous structures appear intact.                                ASSESSMENT/PLAN:   Ms. Davenport is a 54 y.o. female who was admitted for HTN emergency which proceeded acute flash pulmonary edema.     Flash Pulmonary Edema   Resolved   Maintaining o2 sat on room air    Will continue to monitor      HTN Emergency   Resolved   Patient with underlying essential HTN   Current BP controlled   Currently on losartan 100mg and amlodipine 10mg PO daily    Will adjust regimen today following HD     Acute decompensated Heart Failure with preserved ejection Fraction   Patient now compensated   TTE from 11/5/19: shows EF of 50% and grade 1 diastolic dysfunction   Monitor UOP  Daily weights   Fluid restriction < 1,500 ml   Will continue Lasix 160mg IV b.i.d.  Will continue Metolazone 5mg PO daily      Nephrotic Syndrome   Most likely secondary to hypertensive nephropathy  Proteinuria improving   MEGAN, RF negative  C3/C4 normal   FTA Abs neg   GBM Ab neg   HIV neg    Renal Biopsy shows - severe nodular glomerulosclerosis. 11 out of 18 glomeruli are completly scarred consistent with nodular sclerosis. Appears like diabetic nephropathy but if  not consistent with hx then could be idiopathic glomerular sclerosis as both are identical. 70-80% interstitial fibrosis consistent with severe hypertensive changes. No immunotype staining  Will follow up with Nephrology      NSTEMI   Most likely secondary to type 2 demand ischemia  Resolved  Will obtain Trops and EKG in the setting of Angina      Acute Kidney Injury on CKD   Likely 2/2 to intra-renal disease   FeUrea 43.2%   Variable UOP   Cr worsening, will follow up AM lasbs   PermCath placed in [facility] on 11/13/19 w/o complications   Will need Hemodialysis today   Avoid Nephrotoxic medications   Follow up on Nephrology recommendations      Normocytic Anemia   Stable   Most likely secondary to anemia of chronic disease  Will continue to monitor      Prediabetes   Steroid induced hyperglycemia   A1c 5.8   BG goal 140-180 while inpatient   Accuchecks ACHS.   Determir 35 units   Aspart 10-20 units TID with meals    Will continue to Monitor      VTE prophylaxis: SCDs     CODE STATUS: Full Code     Disposition: HD today. Nephrology following. Awaiting new recs.      Case discussed both upper level resident as well as attending physician     Carlito Michele MD   [employer] [facility], PGY-2

## 2019-11-14 NOTE — PROGRESS NOTES
The Sw spoke to Lyndsay at [facility] and she will come see the pt today or tomorrow. The Sw called and spoke to the hd nurse who states today is the pt's first hd session. The Sw informed Lyndsay of this info who required this for her IPR evaluation. Hanny DIAZ([place])spoke to the pt and she acknowledges she needs IPR and is agreeable to go to the one that can accommodate her.

## 2019-11-14 NOTE — PT/OT/SLP PROGRESS
Physical Therapy      Patient Name:  Ping Davenport   MRN:  [id number]    Patient not seen today secondary to off unit in dialysis. Will follow-up later this afternoon if time permits.    Ania Hernandez, PTA

## 2019-11-14 NOTE — PLAN OF CARE
11/14/19 1055   Post-Acute Status   Post-Acute Authorization Placement   Post-Acute Placement Status Discharge Plan Changed   The Sw spoke to Florina at [facility] who states they will not be able to accept the pt due to a bed crunch and also due to the pt being Medicaid and being a definite hd pt. She states that will be very costly but states she will keep the pt's info.

## 2019-11-14 NOTE — PLAN OF CARE
11/14/19 1446   Post-Acute Status   Post-Acute Authorization Placement   Post-Acute Placement Status Pending Post-Acute Medical Review   The Sw spoke to Mamta at [facility] who states she received the pt's info and she will come to the hospital to assess the pt.

## 2019-11-14 NOTE — PLAN OF CARE
Problem: Physical Therapy Goal  Goal: Physical Therapy Goal  Description  Goals to be met by: 2019     Patient will increase functional independence with mobility by performin. Supine to sit with Modified McKean  2. Sit to stand transfer with Modified McKean  3. Bed to chair transfer with Modified McKean using Rolling Walker  4. Gait  x 100 feet with Modified McKean using Rolling Walker.   5. Lower extremity exercise program x12 reps per handout, with supervision     Outcome: Ongoing, Progressing   Continue working toward goals.

## 2019-11-14 NOTE — PLAN OF CARE
O2 saturation 98% on nasal cannula 1 lpm. Patient refused incentive spirometry at this time; c/o nausea.

## 2019-11-14 NOTE — PROGRESS NOTES
[facility] Progress Note    Date of Admit: 2019        Subjective:     Making urine, no complaints. HD today    Objective:   Last 24 Hour Vital Signs:  BP  Min: 128/65  Max: 178/79  Temp  Av.7 °F (36.5 °C)  Min: 97.3 °F (36.3 °C)  Max: 98.1 °F (36.7 °C)  Pulse  Av.1  Min: 68  Max: 80  Resp  Avg: 15.4  Min: 11  Max: 20  SpO2  Av.3 %  Min: 93 %  Max: 99 %  Weight  Av.3 kg (203 lb 8.9 oz)  Min: 90 kg (198 lb 6.6 oz)  Max: 93.5 kg (206 lb 2.1 oz)  Body mass index is 37.7 kg/m².  I/O last 3 completed shifts:  In: 160 [P.O.:160]  Out: 1801 [Urine:1800; Stool:1]    Physical Examination:  Gen: NAD, AAOx3  HEENT: NC in place, dry mucosa, LIJ tunneled CVC  Neck: no thyroidmegaly, no LAD  Cardio: RRR, normal S1/S2, no MRG, JVP wnl  Lungs: bibasilar crackles   Abd: abd edema improved  Ext: LE edema improved  Skin: warm, dry, no rashes noted  Neuro: moves ext    Laboratory:  Most Recent Data:  CBC:   Lab Results   Component Value Date    WBC 14.86 (H) 2019    HGB 9.6 (L) 2019    HCT 30.1 (L) 2019     2019    MCV 84 2019    RDW 13.8 2019     BMP:   Lab Results   Component Value Date     (L) 2019    K 4.0 2019    CL 94 (L) 2019    CO2 24 2019     (H) 2019    CREATININE 6.7 (H) 2019     (H) 2019    CALCIUM 9.2 2019    MG 2.2 2019    PHOS 6.2 (H) 2019     LFTs:   Lab Results   Component Value Date    PROT 5.9 (L) 2019    ALBUMIN 2.6 (L) 2019    BILITOT 0.2 2019    AST 20 2019    ALKPHOS 61 2019    ALT 20 2019     Coags:   Lab Results   Component Value Date    INR 1.1 2019     Urinalysis:   Lab Results   Component Value Date    COLORU Yellow 2019    SPECGRAV 1.025 2019    NITRITE Negative 2019    KETONESU Negative 2019    UROBILINOGEN Negative 2019    WBCUA 1 2019       Trended Lab Data:  Recent Labs   Lab  11/12/19  0513 11/13/19  0648 11/13/19  0649 11/14/19  0505   WBC 12.05  --  14.11* 14.86*   HGB 8.4*  --  8.7* 9.6*   HCT 26.3*  --  27.2* 30.1*     --  229 245   MCV 87  --  85 84   RDW 14.2  --  13.8 13.8   * 131*  --  131*   K 4.0 3.7  --  4.0   CL 95 94*  --  94*   CO2 20* 22*  --  24   * 110*  --  112*   CREATININE 6.7* 6.8*  --  6.7*   * 556*  --  376*   PROT 5.3* 5.6*  --  5.9*   ALBUMIN 2.4* 2.5*  --  2.6*   BILITOT 0.2 0.2  --  0.2   AST 19 17  --  20   ALKPHOS 58 55  --  61   ALT 18 19  --  20       Trended Cardiac Data:  No results for input(s): TROPONINI, CKTOTAL, CKMB, BNP in the last 168 hours.      Radiology:  Imaging Results          US Lower Extremity Veins Bilateral (Final result)  Result time 11/05/19 16:21:54    Final result by Suzanne Berrios MD (11/05/19 16:21:54)                 Impression:      No evidence of deep venous thrombosis in either lower extremity.      Electronically signed by: Suzanne Berrios MD  Date:    11/05/2019  Time:    16:21             Narrative:    EXAMINATION:  US LOWER EXTREMITY VEINS BILATERAL    CLINICAL HISTORY:  rule out dvt; Acute respiratory failure with hypoxia    TECHNIQUE:  Duplex and color flow Doppler and dynamic compression was performed of the bilateral lower extremity veins was performed.    COMPARISON:  None    FINDINGS:  Right thigh veins: The common femoral, femoral, popliteal, upper greater saphenous, and deep femoral veins are patent and free of thrombus. The veins are normally compressible and have normal phasic flow and augmentation response.    Right calf veins: The visualized calf veins are patent.    Left thigh veins: The common femoral, femoral, popliteal, upper greater saphenous, and deep femoral veins are patent and free of thrombus. The veins are normally compressible and have normal phasic flow and augmentation response.    Left calf veins: The visualized calf veins are patent.    Miscellaneous:  Subcutaneous edema noted lower extremities.                               US Retroperitoneal Complete (Kidney and (Final result)  Result time 11/05/19 09:44:48    Final result by Fransico Yun MD (11/05/19 09:44:48)                 Impression:      No hydronephrosis.    Elevated left renal resistive index, a nonspecific indicator of medical renal disease.    Incidental left pleural effusion.      Electronically signed by: Fransico Yun  Date:    11/05/2019  Time:    09:44             Narrative:    EXAMINATION:  US RETROPERITONEAL COMPLETE    CLINICAL HISTORY:  acute renal failure;    TECHNIQUE:  Ultrasound of the kidneys and urinary bladder was performed including color flow and Doppler evaluation of the kidneys.    COMPARISON:  None available    FINDINGS:  Right kidney: Right kidney measures 12.4 cm.  Resistive index of 0.63.  No hydronephrosis.    Left kidney: Left kidney measures 11.6 cm.  Resistive index of 0.80.  No hydronephrosis    Urinary bladder is unremarkable.  Splenic resistive index of 0.75.  Incidental left pleural effusion.                               X-Ray Chest 1 View (Final result)  Result time 11/05/19 06:16:39    Final result by Umberto Quinn MD (11/05/19 06:16:39)                 Impression:      As above.      Electronically signed by: Umberto Quinn MD  Date:    11/05/2019  Time:    06:16             Narrative:    EXAMINATION:  XR CHEST 1 VIEW    CLINICAL HISTORY:  Respiratory failure, unspecified, unspecified whether with hypoxia or hypercapnia    TECHNIQUE:  Single frontal view of the chest was performed.    COMPARISON:  11/05/2019 04:07 hours    FINDINGS:  There has been interval placement of an endotracheal tube with tip terminating approximately 4.0 cm above the lindsay.  Esophagogastric tube has been intervally placed extending below the diaphragm, extending beyond the field of view of the examination.  No significant change in cardiopulmonary status compared to prior  examination, noting interstitial edema bilateral pleural effusions.  No evidence of pneumothorax.                               X-Ray Chest AP Portable (Final result)  Result time 11/05/19 05:11:22    Final result by Umberto Quinn MD (11/05/19 05:11:22)                 Impression:      Radiographic findings suggestive of edema/CHF with bilateral pleural effusions and atelectasis/consolidation.      Electronically signed by: Umberto Quinn MD  Date:    11/05/2019  Time:    05:11             Narrative:    EXAMINATION:  XR CHEST AP PORTABLE    CLINICAL HISTORY:  CHF;    TECHNIQUE:  Single frontal view of the chest was performed.    COMPARISON:  None    FINDINGS:  Cardiac monitoring leads overlie the chest.  Cardiomediastinal silhouette appears mildly enlarged.  There are bilateral interstitial opacities suggesting edema.  There is opacification of the bilateral lower lung zones, right more so than left, suggestive of a combination of pleural fluid with associated atelectasis and/or consolidation.  There is no evidence of pneumothorax.  Visualized osseous structures appear intact.                                   Assessment and Plan:      Ping Davenport is a 54 y.o.female with  Patient Active Problem List    Diagnosis Date Noted    AXEL (acute kidney injury)     Nephrotic syndrome     Hypertensive emergency 11/05/2019    New onset of congestive heart failure 11/05/2019    Acute respiratory failure with hypoxia and hypercarbia 11/05/2019    Acute renal failure 11/05/2019    NSTEMI (non-ST elevated myocardial infarction) 11/05/2019    Rash 10/13/2014    Encounter to establish care 10/13/2014    Screen for STD (sexually transmitted disease) 10/13/2014        AXEL stage III vs CKD 5 with nephrotic syndrome  -Possible etiologies include cardiorenal, HTN emergency, GN  -Initially AGMA, NAGMA, Cr on admit 3.4, had 8.8g proteinuria  -Serolgies unrevealing, kidney bx with idiopathic glomerular sclerosis  pattern, 8/11 glomeruli sclerosed, also with 70-80% interstitial fibrosis, IF neg  -Cr peaked, tunneled CVC placed, will initiate HD today for volume removal and clearance. Will wean steroids 20mg per week until off    Hypercalcemia  -Corrected ~10.3, will hold ergo for now    Hyperglycemia  -2/2 steroids, primary team has added basal. Will cont to monitor    HTN  -BP better controlled though still above goal. Likely steroids. 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

## 2019-11-14 NOTE — SIGNIFICANT EVENT
Received a call from Dr. Campbell from [facility] regarding kidney biopsy.     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 11 out of 18 glomeruli are completed scarred consistent with nodular sclerosis. Appears like diabetic nephropathy but if not consistent with hx then could be idiopathic glomerular sclerosis as both are identical.     70-80% interstitial fibrosis consistent with severe hypertensive changes. No immunotype staining    Pathology Reading Room: [phone]  Dr. Campbell's pager: [phone]    Alisa Resendiz, PGY-2  [facility]  11/13/2019 7:19 PM

## 2019-11-14 NOTE — PLAN OF CARE
11/14/19 1125   Post-Acute Status   Post-Acute Authorization Placement   Post-Acute Placement Status Pending Post-Acute Clinical Review   Discharge Delays (!) Other  (The pt has Medicaid and she requires hd)   The Sw faxed the pt's info to various IPR's in the area for review. The pt requires hd and she's a Medicaid pt.   11:58am The Sw received a call from Lyndsay([phone])at [facility] and she will come see the pt after she gets out of hd today to speak with her. She states if the pt's agreeable to their facility she can submit to her insurance company today after the visit.

## 2019-11-14 NOTE — PT/OT/SLP PROGRESS
Physical Therapy Treatment    Patient Name:  Ping Davenport   MRN:  [id number]    Recommendations:     Discharge Recommendations:  rehabilitation facility   Discharge Equipment Recommendations: (refer to IPR)   Barriers to discharge: decreased functional mobility    Assessment:     Ping Davenport is a 54 y.o. female admitted with a medical diagnosis of Acute renal failure.  She presents with the following impairments/functional limitations:  weakness, impaired endurance, impaired functional mobilty, impaired self care skills, impaired balance, gait instability, decreased lower extremity function, impaired cardiopulmonary response to activity, decreased upper extremity function.  Pt would continue to benefit from P.T. To address impairments listed above.    Rehab Prognosis: Fair; patient would benefit from acute skilled PT services to address these deficits and reach maximum level of function.    Recent Surgery: Procedure(s) (LRB):  Insertion,catheter,tunneled (Right) 1 Day Post-Op    Plan:     During this hospitalization, patient to be seen 6 x/week to address the identified rehab impairments via gait training, therapeutic activities, therapeutic exercises, neuromuscular re-education, wheelchair management/training and progress toward the following goals:    · Plan of Care Expires:  12/13/19    Subjective       Patient/Family Comments/goals: Pt agreed to tx.    Pain/Comfort:  · Pain Rating 1: 0/10  · Pain Rating Post-Intervention 1: 0/10      Objective:     Communicated with RN (Rebecca) prior to session.  Patient found supine with bed alarm, telemetry, oxygen upon PT entry to room.     General Precautions: Standard, fall   Orthopedic Precautions:N/A   Braces:       Functional Mobility:  · Bed Mobility:     · Rolling Left:  supervision  · Scooting: supervision and to EOB  · Supine to Sit: supervision and with HOB elevated from left side  · Transfers:     · Sit to Stand:  contact guard assistance and minimum  assistance with rolling walker and vc's/tc's for hand placement  · Gait: 30ft with RW and Malik/Close CGA with extended O2 line @ 2lpm.  Pt ambulates with decreased step length, decreased anthony, and vc's and occasional Malik for proper distance from RW secondary to ambulating too close to front of walker.  · Balance: sitting good, standing fair, gait fair/fair-      AM-PAC 6 CLICK MOBILITY  Turning over in bed (including adjusting bedclothes, sheets and blankets)?: 3  Sitting down on and standing up from a chair with arms (e.g., wheelchair, bedside commode, etc.): 3  Moving from lying on back to sitting on the side of the bed?: 3  Moving to and from a bed to a chair (including a wheelchair)?: 3  Need to walk in hospital room?: 3  Climbing 3-5 steps with a railing?: 2  Basic Mobility Total Score: 17       Therapeutic Activities and Exercises:   Seated BLE therex: AP, LAQ, 15 x 2, hip flexion(marching) with Malik 10 x 2 reps, and hip ABD/ADD with BLEs elevated in recliner 15 x 2 with CGA/Malik secondary to weakness.  Static standing with RW and CGA x 1 min prior to gait training.    Patient left up in chair with all lines intact, call button in reach, chair alarm on and RN notified..    GOALS:   Multidisciplinary Problems     Physical Therapy Goals        Problem: Physical Therapy Goal    Goal Priority Disciplines Outcome Goal Variances Interventions   Physical Therapy Goal     PT, PT/OT Ongoing, Progressing     Description:  Goals to be met by: 2019     Patient will increase functional independence with mobility by performin. Supine to sit with Modified [place]  2. Sit to stand transfer with Modified [place]  3. Bed to chair transfer with Modified [place] using Rolling Walker  4. Gait  x 100 feet with Modified [place] using Rolling Walker.   5. Lower extremity exercise program x12 reps per handout, with supervision                      Time Tracking:     PT Received On: 19  PT  Start Time: 1413     PT Stop Time: 1436  PT Total Time (min): 23 min     Billable Minutes: Therapeutic Activity 11 and Therapeutic Exercise 12    Treatment Type: Treatment  PT/PTA: PTA     PTA Visit Number: 1     Ania Hernandez PTA  11/14/2019

## 2019-11-14 NOTE — PLAN OF CARE
Patient AAOx4 able to make needs known.  Received dialysis today for 2 hours, patient.  PPD given in left forearm.  Coverage given for blood glucose check, but improvement noted.  Patient on telemetry.  Up in chair, able to use bedside commode, 2 person assist.  Participated in PT.  Tolerating 2L O2 n/c.  All questions/concerns answered.

## 2019-11-15 LAB
ALBUMIN SERPL BCP-MCNC: 2.6 G/DL (ref 3.5–5.2)
ALP SERPL-CCNC: 79 U/L (ref 55–135)
ALT SERPL W/O P-5'-P-CCNC: 18 U/L (ref 10–44)
ANION GAP SERPL CALC-SCNC: 15 MMOL/L (ref 8–16)
AST SERPL-CCNC: 25 U/L (ref 10–40)
BASOPHILS # BLD AUTO: 0.01 K/UL (ref 0–0.2)
BASOPHILS NFR BLD: 0.1 % (ref 0–1.9)
BILIRUB SERPL-MCNC: 0.3 MG/DL (ref 0.1–1)
BUN SERPL-MCNC: 98 MG/DL (ref 6–20)
CALCIUM SERPL-MCNC: 8.8 MG/DL (ref 8.7–10.5)
CHLORIDE SERPL-SCNC: 94 MMOL/L (ref 95–110)
CO2 SERPL-SCNC: 25 MMOL/L (ref 23–29)
CREAT SERPL-MCNC: 5.6 MG/DL (ref 0.5–1.4)
CRYOGLOB SER QL: NORMAL
DIFFERENTIAL METHOD: ABNORMAL
EOSINOPHIL # BLD AUTO: 0 K/UL (ref 0–0.5)
EOSINOPHIL NFR BLD: 0 % (ref 0–8)
ERYTHROCYTE [DISTWIDTH] IN BLOOD BY AUTOMATED COUNT: 14 % (ref 11.5–14.5)
EST. GFR  (AFRICAN AMERICAN): 9 ML/MIN/1.73 M^2
EST. GFR  (NON AFRICAN AMERICAN): 8 ML/MIN/1.73 M^2
GLUCOSE SERPL-MCNC: 329 MG/DL (ref 70–110)
HBV CORE AB SERPL QL IA: NEGATIVE
HBV SURFACE AG SERPL QL IA: NEGATIVE
HCT VFR BLD AUTO: 28.6 % (ref 37–48.5)
HGB BLD-MCNC: 9.1 G/DL (ref 12–16)
LYMPHOCYTES # BLD AUTO: 0.5 K/UL (ref 1–4.8)
LYMPHOCYTES NFR BLD: 3.6 % (ref 18–48)
MAGNESIUM SERPL-MCNC: 2.1 MG/DL (ref 1.6–2.6)
MCH RBC QN AUTO: 27.1 PG (ref 27–31)
MCHC RBC AUTO-ENTMCNC: 31.8 G/DL (ref 32–36)
MCV RBC AUTO: 85 FL (ref 82–98)
MONOCYTES # BLD AUTO: 1.2 K/UL (ref 0.3–1)
MONOCYTES NFR BLD: 8.3 % (ref 4–15)
NEUTROPHILS # BLD AUTO: 12.6 K/UL (ref 1.8–7.7)
NEUTROPHILS NFR BLD: 88 % (ref 38–73)
PHOSPHATE SERPL-MCNC: 5.1 MG/DL (ref 2.7–4.5)
PLATELET # BLD AUTO: 219 K/UL (ref 150–350)
PMV BLD AUTO: 12.3 FL (ref 9.2–12.9)
POCT GLUCOSE: 203 MG/DL (ref 70–110)
POCT GLUCOSE: 241 MG/DL (ref 70–110)
POCT GLUCOSE: 296 MG/DL (ref 70–110)
POTASSIUM SERPL-SCNC: 4.1 MMOL/L (ref 3.5–5.1)
PROT SERPL-MCNC: 5.9 G/DL (ref 6–8.4)
RBC # BLD AUTO: 3.36 M/UL (ref 4–5.4)
SODIUM SERPL-SCNC: 134 MMOL/L (ref 136–145)
WBC # BLD AUTO: 14.77 K/UL (ref 3.9–12.7)

## 2019-11-15 PROCEDURE — 25000003 PHARM REV CODE 250: Performed by: SURGERY

## 2019-11-15 PROCEDURE — 63600175 PHARM REV CODE 636 W HCPCS: Performed by: STUDENT IN AN ORGANIZED HEALTH CARE EDUCATION/TRAINING PROGRAM

## 2019-11-15 PROCEDURE — 97530 THERAPEUTIC ACTIVITIES: CPT

## 2019-11-15 PROCEDURE — 83735 ASSAY OF MAGNESIUM: CPT

## 2019-11-15 PROCEDURE — 97802 MEDICAL NUTRITION INDIV IN: CPT

## 2019-11-15 PROCEDURE — 25000003 PHARM REV CODE 250: Performed by: STUDENT IN AN ORGANIZED HEALTH CARE EDUCATION/TRAINING PROGRAM

## 2019-11-15 PROCEDURE — 85025 COMPLETE CBC W/AUTO DIFF WBC: CPT

## 2019-11-15 PROCEDURE — 97110 THERAPEUTIC EXERCISES: CPT | Performed by: PHYSICAL THERAPIST

## 2019-11-15 PROCEDURE — 97116 GAIT TRAINING THERAPY: CPT | Performed by: PHYSICAL THERAPIST

## 2019-11-15 PROCEDURE — 99900035 HC TECH TIME PER 15 MIN (STAT)

## 2019-11-15 PROCEDURE — 84100 ASSAY OF PHOSPHORUS: CPT

## 2019-11-15 PROCEDURE — 11000001 HC ACUTE MED/SURG PRIVATE ROOM

## 2019-11-15 PROCEDURE — 63600175 PHARM REV CODE 636 W HCPCS: Performed by: SURGERY

## 2019-11-15 PROCEDURE — 86060 ANTISTREPTOLYSIN O TITER: CPT

## 2019-11-15 PROCEDURE — 94761 N-INVAS EAR/PLS OXIMETRY MLT: CPT

## 2019-11-15 PROCEDURE — 90935 HEMODIALYSIS ONE EVALUATION: CPT

## 2019-11-15 PROCEDURE — 36415 COLL VENOUS BLD VENIPUNCTURE: CPT

## 2019-11-15 PROCEDURE — 80053 COMPREHEN METABOLIC PANEL: CPT

## 2019-11-15 RX ORDER — AMLODIPINE BESYLATE 10 MG/1
10 TABLET ORAL DAILY
Qty: 30 TABLET | Refills: 11 | Status: ON HOLD | OUTPATIENT
Start: 2019-11-15 | End: 2023-01-01 | Stop reason: HOSPADM

## 2019-11-15 RX ORDER — PREDNISONE 20 MG/1
20 TABLET ORAL DAILY
Qty: 7 TABLET | Refills: 0 | Status: SHIPPED | OUTPATIENT
Start: 2019-11-26 | End: 2019-12-03

## 2019-11-15 RX ORDER — CARVEDILOL 12.5 MG/1
12.5 TABLET ORAL 2 TIMES DAILY
Status: DISCONTINUED | OUTPATIENT
Start: 2019-11-15 | End: 2019-11-18

## 2019-11-15 RX ORDER — INSULIN ASPART 100 [IU]/ML
10 INJECTION, SOLUTION INTRAVENOUS; SUBCUTANEOUS
Status: DISCONTINUED | OUTPATIENT
Start: 2019-11-15 | End: 2019-11-18 | Stop reason: HOSPADM

## 2019-11-15 RX ORDER — CARVEDILOL 12.5 MG/1
12.5 TABLET ORAL 2 TIMES DAILY
Qty: 60 TABLET | Refills: 11 | Status: SHIPPED | OUTPATIENT
Start: 2019-11-15 | End: 2019-11-18 | Stop reason: HOSPADM

## 2019-11-15 RX ORDER — INSULIN ASPART 100 [IU]/ML
10 INJECTION, SOLUTION INTRAVENOUS; SUBCUTANEOUS 3 TIMES DAILY
Qty: 4.8 ML | Refills: 0 | Status: ON HOLD | OUTPATIENT
Start: 2019-11-15 | End: 2020-09-10

## 2019-11-15 RX ORDER — LOSARTAN POTASSIUM 100 MG/1
100 TABLET ORAL DAILY
Qty: 90 TABLET | Refills: 3 | Status: ON HOLD | OUTPATIENT
Start: 2019-11-15 | End: 2020-09-10

## 2019-11-15 RX ORDER — ACETAMINOPHEN 325 MG/1
650 TABLET ORAL EVERY 4 HOURS PRN
Refills: 0 | Status: ON HOLD | COMMUNITY
Start: 2019-11-15 | End: 2020-09-10

## 2019-11-15 RX ORDER — INSULIN ASPART 100 [IU]/ML
8 INJECTION, SOLUTION INTRAVENOUS; SUBCUTANEOUS ONCE
Status: DISCONTINUED | OUTPATIENT
Start: 2019-11-15 | End: 2019-11-15

## 2019-11-15 RX ORDER — INSULIN ASPART 100 [IU]/ML
10 INJECTION, SOLUTION INTRAVENOUS; SUBCUTANEOUS ONCE
Status: DISCONTINUED | OUTPATIENT
Start: 2019-11-15 | End: 2019-11-15

## 2019-11-15 RX ORDER — METOLAZONE 10 MG/1
10 TABLET ORAL DAILY
Qty: 30 TABLET | Refills: 11 | Status: ON HOLD | OUTPATIENT
Start: 2019-11-15 | End: 2020-09-10

## 2019-11-15 RX ORDER — INSULIN ASPART 100 [IU]/ML
15 INJECTION, SOLUTION INTRAVENOUS; SUBCUTANEOUS
Status: DISCONTINUED | OUTPATIENT
Start: 2019-11-15 | End: 2019-11-15

## 2019-11-15 RX ORDER — SEVELAMER CARBONATE 800 MG/1
1600 TABLET, FILM COATED ORAL
Qty: 180 TABLET | Refills: 11 | Status: ON HOLD | OUTPATIENT
Start: 2019-11-15 | End: 2020-09-10

## 2019-11-15 RX ORDER — PREDNISONE 20 MG/1
60 TABLET ORAL DAILY
Qty: 6 TABLET | Refills: 0 | Status: SHIPPED | OUTPATIENT
Start: 2019-11-16 | End: 2019-11-18

## 2019-11-15 RX ORDER — PREDNISONE 20 MG/1
20 TABLET ORAL 2 TIMES DAILY
Qty: 14 TABLET | Refills: 0 | Status: SHIPPED | OUTPATIENT
Start: 2019-11-18 | End: 2019-11-25

## 2019-11-15 RX ORDER — HEPARIN SODIUM 1000 [USP'U]/ML
4100 INJECTION, SOLUTION INTRAVENOUS; SUBCUTANEOUS
Qty: 4.1 ML | Refills: 1 | Status: ON HOLD | OUTPATIENT
Start: 2019-11-15 | End: 2020-09-10

## 2019-11-15 RX ORDER — FUROSEMIDE 80 MG/1
160 TABLET ORAL 2 TIMES DAILY
Qty: 120 TABLET | Refills: 11 | Status: ON HOLD | OUTPATIENT
Start: 2019-11-15 | End: 2023-01-01 | Stop reason: HOSPADM

## 2019-11-15 RX ADMIN — MUPIROCIN: 20 OINTMENT TOPICAL at 10:11

## 2019-11-15 RX ADMIN — FUROSEMIDE 160 MG: 10 INJECTION, SOLUTION INTRAVENOUS at 06:11

## 2019-11-15 RX ADMIN — INSULIN ASPART 10 UNITS: 100 INJECTION, SOLUTION INTRAVENOUS; SUBCUTANEOUS at 05:11

## 2019-11-15 RX ADMIN — SEVELAMER CARBONATE 1600 MG: 800 TABLET, FILM COATED ORAL at 08:11

## 2019-11-15 RX ADMIN — LOSARTAN POTASSIUM 100 MG: 50 TABLET ORAL at 01:11

## 2019-11-15 RX ADMIN — PREDNISONE 60 MG: 20 TABLET ORAL at 09:11

## 2019-11-15 RX ADMIN — MUPIROCIN: 20 OINTMENT TOPICAL at 09:11

## 2019-11-15 RX ADMIN — INSULIN ASPART 6 UNITS: 100 INJECTION, SOLUTION INTRAVENOUS; SUBCUTANEOUS at 08:11

## 2019-11-15 RX ADMIN — CARVEDILOL 12.5 MG: 12.5 TABLET, FILM COATED ORAL at 10:11

## 2019-11-15 RX ADMIN — CARVEDILOL 12.5 MG: 12.5 TABLET, FILM COATED ORAL at 01:11

## 2019-11-15 RX ADMIN — INSULIN ASPART 2 UNITS: 100 INJECTION, SOLUTION INTRAVENOUS; SUBCUTANEOUS at 01:11

## 2019-11-15 RX ADMIN — SODIUM CHLORIDE 1000 ML: 0.9 INJECTION, SOLUTION INTRAVENOUS at 10:11

## 2019-11-15 RX ADMIN — INSULIN DETEMIR 35 UNITS: 100 INJECTION, SOLUTION SUBCUTANEOUS at 08:11

## 2019-11-15 RX ADMIN — INSULIN ASPART 2 UNITS: 100 INJECTION, SOLUTION INTRAVENOUS; SUBCUTANEOUS at 10:11

## 2019-11-15 RX ADMIN — AMLODIPINE BESYLATE 10 MG: 5 TABLET ORAL at 01:11

## 2019-11-15 RX ADMIN — SEVELAMER CARBONATE 1600 MG: 800 TABLET, FILM COATED ORAL at 01:11

## 2019-11-15 RX ADMIN — METOLAZONE 10 MG: 2.5 TABLET ORAL at 01:11

## 2019-11-15 RX ADMIN — INSULIN ASPART 4 UNITS: 100 INJECTION, SOLUTION INTRAVENOUS; SUBCUTANEOUS at 05:11

## 2019-11-15 RX ADMIN — HEPARIN SODIUM 4100 UNITS: 1000 INJECTION, SOLUTION INTRAVENOUS; SUBCUTANEOUS at 12:11

## 2019-11-15 RX ADMIN — INSULIN ASPART 10 UNITS: 100 INJECTION, SOLUTION INTRAVENOUS; SUBCUTANEOUS at 01:11

## 2019-11-15 RX ADMIN — SEVELAMER CARBONATE 1600 MG: 800 TABLET, FILM COATED ORAL at 05:11

## 2019-11-15 RX ADMIN — INSULIN ASPART 10 UNITS: 100 INJECTION, SOLUTION INTRAVENOUS; SUBCUTANEOUS at 08:11

## 2019-11-15 NOTE — PLAN OF CARE
POC reviewed with the pt, verbalized understanding.  AAOx3, VSS.  No complaint of pain or any other distress noted throughout night.  On continuous telemetry monitor, SR.  No ectopy noted.  Blood glucose monitored, insulin given as ordered.  HD access remained dry/clean/intact.  Encouraged to turn frequently.  Safety maintained, free of falls throughout shift.  Instructed to call for any assistance.  Will continue to monitor.

## 2019-11-15 NOTE — PROGRESS NOTES
"[facility]  Adult Nutrition  Progress Note    SUMMARY       Recommendations    Recommendation:   1. Encourage intake at meals as tolerated.    Goals:   Pt will consume a tleast 50-75% intake at meals  Nutrition Goal Status: new  Communication of RD Recs: reviewed with RN    Reason for Assessment  Reason For Assessment: length of stay  Diagnosis: (ARF)  Relevant Medical History: none noted  General Information Comments: Pt on Renal diet with fair intake at meals. Started on HD. Pt was in HD at visit-unable to assess NFPE  Nutrition Discharge Planning: pt to d/c on Renal diet    Nutrition Risk Screen  Nutrition Risk Screen: no indicators present    Nutrition/Diet History  Food Preferences: no Zoroastrian or cultural food prefs identfied  Spiritual, Cultural Beliefs, Anglican Practices, Values that Affect Care: no  Factors Affecting Nutritional Intake: None identified at this time    Anthropometrics  Temp: 99.3 °F (37.4 °C)  Height Method: Stated  Height: 5' 2" (157.5 cm)  Height (inches): 62 in  Weight Method: Bed Scale  Weight: 94.1 kg (207 lb 7.3 oz)  Weight (lb): 207.45 lb  Ideal Body Weight (IBW), Female: 110 lb  % Ideal Body Weight, Female (lb): 188.59 lb  BMI (Calculated): 37.9  BMI Grade: 35 - 39.9 - obesity - grade II     Lab/Procedures/Meds  Pertinent Labs Reviewed: reviewed  Pertinent Labs Comments: Na 134L, BUN 98H, Crea 5.6H, Phos 5.1H, Alb 2.6L  Pertinent Medications Reviewed: reviewed  Pertinent Medications Comments: Lasix, insulin, prednisone    Estimated/Assessed Needs  Weight Used For Calorie Calculations: 94.1 kg (207 lb 7.3 oz)  Energy Calorie Requirements (kcal): 1935  Energy Need Method: [place]-St Jeor(x1.3)  Protein Requirements: 75-94g (0.8-1.0g/kg)  Weight Used For Protein Calculations: 94.1 kg (207 lb 7.3 oz)  Estimated Fluid Requirement Method: RDA Method  RDA Method (mL): 1935     Nutrition Prescription Ordered  Current Diet Order: Renal    Evaluation of Received " Nutrient/Fluid Intake  I/O: 625/3000  Energy Calories Required: meeting needs  Protein Required: meeting needs  Fluid Required: meeting needs  Comments: LBM 11/12  % Intake of Estimated Energy Needs: 50 - 75 %  % Meal Intake: 50 - 75 %    Nutrition Risk  Level of Risk/Frequency of Follow-up: (1xweekly)     Assessment and Plan  No nutrition dx at this time     Monitor and Evaluation  Food and Nutrient Intake: food and beverage intake  Food and Nutrient Adminstration: diet order  Physical Activity and Function: nutrition-related ADLs and IADLs  Anthropometric Measurements: weight  Biochemical Data, Medical Tests and Procedures: electrolyte and renal panel  Nutrition-Focused Physical Findings: overall appearance     Malnutrition Assessment  Unable to assess-off unit to HD      Nutrition Follow-Up  RD Follow-up?: Yes

## 2019-11-15 NOTE — PLAN OF CARE
Recommendation:   1. Encourage intake at meals as tolerated.    Goals:   Pt will consume a tleast 50-75% intake at meals  Nutrition Goal Status: new  Communication of RD Recs: reviewed with RN

## 2019-11-15 NOTE — PROGRESS NOTES
The rep from [facility] met with the Sw and Hanny prior to her going to assess the pt. She will write her evaluation and submit to her team to see if the pt will meet criteria for their facility.     10:15am The Sw received a call from Hugh at [facility] who states he came to assess the pt but states they can't accept the pt due to her being on hd. He states they accepted in [facility] before they knew she was an hd pt. He states they will decline it in [facility]. Rubens at [facility] states she will come assess the pt today b/c she was in hd yesterday.      10:45am The Sw received a call from Sudha at [facility] stating the pt has been accepted and they will initiate for the IPR auth.     11:38am The pt has been medically accepted to [facility] and [facility] but she has not been financially approved by her insurance company to go to IPR. The pt's insurance company has to give the auth to either of these IPR's before the pt can admit to the facility. Lyndsay from [facility] is here currently speaking to the pt. The Sw will inform the team once the IPR has been approved by the insurance company so they can write the d/c orders.

## 2019-11-15 NOTE — PT/OT/SLP PROGRESS
Occupational Therapy   Treatment    Name: Ping Davenport  MRN: [id number]  Admitting Diagnosis:  Acute renal failure  2 Days Post-Op    Recommendations:     Discharge Recommendations: rehabilitation facility  Discharge Equipment Recommendations:  (defer)  Barriers to discharge:  Decreased caregiver support    Assessment:   Pt w/ increased mobility skills 2/2 decreased edema this PM. Pt is a great candidate for IPR after acute d/c. Good potential to achieve established goals w/ cont OT per POC.    Ping Davenport is a 54 y.o. female with a medical diagnosis of Acute renal failure.  She presents with . Performance deficits affecting function are weakness, impaired endurance, impaired self care skills, impaired balance, gait instability, impaired functional mobilty, decreased coordination, pain, decreased safety awareness, edema.     Rehab Prognosis:  Good; patient would benefit from acute skilled OT services to address these deficits and reach maximum level of function.       Plan:     Patient to be seen 5 x/week to address the above listed problems via self-care/home management, therapeutic activities, therapeutic exercises  · Plan of Care Expires: 12/08/19  · Plan of Care Reviewed with: patient    Subjective     Pain/Comfort:  · Pain Rating 1: 0/10  · Pain Rating Post-Intervention 1: 0/10    Objective:     Communicated with: nsalexa prior to session.  Patient found up in chair with telemetry upon OT entry to room.    General Precautions: Standard, fall   Orthopedic Precautions:N/A   Braces: N/A     Occupational Performance:     Bed Mobility:    ·      Functional Mobility/Transfers:  · Patient completed Sit <> Stand Transfer with contact guard assistance  with  rolling walker   · Functional Mobility: via RW w/in & immediately outside room w/ CGA    Activities of Daily Living:  ·       AMPAC 6 Click ADL: 19    Treatment & Education:  Pt found [facility] & agreeable to limited OT this PM 2/2 recent PT session. Pt w/o c/o pain  & perf the following: standing via RW w/ CGA; amb via RW w/in & immediately outside room w/ CGA for balance & Min A for RW proximity. Pt returned to b/s chair w/ CGA. Edu/tx re: edema mgmt, general safety techs, safe/proper RW use & HEP. Pt verbalized understanding. Pt left [facility] w/ PCA in room.      Patient left up in chair with all lines intact, call button in reach and PCT presentEducation:      GOALS:   Multidisciplinary Problems     Occupational Therapy Goals        Problem: Occupational Therapy Goal    Goal Priority Disciplines Outcome Interventions   Occupational Therapy Goal     OT, PT/OT Ongoing, Progressing    Description:  Goals to be met by: 12/8/19     Patient will increase functional independence with ADLs by performing:    LE Dressing with Minimal Assistance.  Grooming while standing with Contact Guard Assistance.  Toileting from toilet with Contact Guard Assistance for hygiene and clothing management.   Supine to sit with Contact Guard Assistance.--MET 11/11/19  Step transfer with Contact Guard Assistance  Toilet transfer to toilet with Contact Guard Assistance.  Increased functional strength to WFL for self care skills and functional mobility.  Upper extremity exercise program x10 reps per handout, with independence.                        Time Tracking:     OT Date of Treatment: 11/15/19  OT Start Time: 1510  OT Stop Time: 1521  OT Total Time (min): 11 min    Billable Minutes:Therapeutic Activity 11  Total Time 11    JADEN Gallardo  11/15/2019

## 2019-11-15 NOTE — PROGRESS NOTES
Progress Note  [facility]  Admit Date: 11/5/2019   LOS: 10 days   SUBJECTIVE:     No acute overnight events.  this AM additional 10 units of aspart given. Patient had 2L removed via HD yesterday. Tolerated well.  Patient tolerating PO intake. Patient tolerating PT/OT. Patient to resume HD today.     Review of Systems   Constitutional: Negative for chills and fever.   HENT: Negative for sore throat.    Eyes: Negative for blurred vision and double vision.   Respiratory: Negative for cough and shortness of breath.    Cardiovascular: Negative for chest pain.   Gastrointestinal: Negative for abdominal pain, constipation, diarrhea, heartburn, nausea and vomiting.   Genitourinary: Negative for dysuria and urgency.   Musculoskeletal: Negative for back pain and falls.   Skin: Negative for itching and rash.   Neurological: Negative for headaches.   Endo/Heme/Allergies: Does not bruise/bleed easily.   Psychiatric/Behavioral: The patient is not nervous/anxious.        OBJECTIVE:   Vital Signs (Most Recent)  Temp: 98.5 °F (36.9 °C) (11/15/19 0335)  Pulse: 79 (11/15/19 0405)  Resp: 16 (11/15/19 0335)  BP: (!) 166/75 (11/15/19 0335)  SpO2: 95 % (11/15/19 0335)    I & O (Last 24H):    Intake/Output Summary (Last 24 hours) at 11/15/2019 0654  Last data filed at 11/15/2019 0525  Gross per 24 hour   Intake 625 ml   Output 3000 ml   Net -2375 ml     Wt Readings from Last 3 Encounters:   11/15/19 94.1 kg (207 lb 7.3 oz)   10/13/14 72 kg (158 lb 11.2 oz)       Current Diet Order   Procedures    Diet renal        Physical Exam   Constitutional: She is oriented to person, place, and time and well-developed, well-nourished, and in no distress.   HENT:   Head: Normocephalic and atraumatic.   Eyes: Pupils are equal, round, and reactive to light. EOM are normal.   Neck: Normal range of motion. Neck supple.   Cardiovascular: Normal rate and regular rhythm.   Pulmonary/Chest: Effort normal and breath sounds normal.   Abdominal:  Soft. Bowel sounds are normal.   Musculoskeletal: Normal range of motion.   Neurological: She is alert and oriented to person, place, and time.   Skin: Skin is warm and dry.   Psychiatric: Affect normal.     Laboratory Data:  CBC  Recent Labs   Lab 11/13/19  0649 11/14/19  0505 11/15/19  0537   WBC 14.11* 14.86* 14.77*   RBC 3.19* 3.58* 3.36*   HGB 8.7* 9.6* 9.1*   HCT 27.2* 30.1* 28.6*    245 219   MCV 85 84 85   MCH 27.3 26.8* 27.1   MCHC 32.0 31.9* 31.8*     CMP  Recent Labs   Lab 11/12/19  0513 11/13/19  0648 11/14/19  0505   CALCIUM 8.2* 8.8 9.2   PROT 5.3* 5.6* 5.9*   * 131* 131*   K 4.0 3.7 4.0   CO2 20* 22* 24   CL 95 94* 94*   * 110* 112*   CREATININE 6.7* 6.8* 6.7*   ALKPHOS 58 55 61   ALT 18 19 20   AST 19 17 20   BILITOT 0.2 0.2 0.2     POCT-Glucose  POCT Glucose   Date Value Ref Range Status   11/15/2019 296 (H) 70 - 110 mg/dL Final   11/14/2019 276 (H) 70 - 110 mg/dL Final   11/14/2019 222 (H) 70 - 110 mg/dL Final   11/14/2019 327 (H) 70 - 110 mg/dL Final   11/14/2019 353 (H) 70 - 110 mg/dL Final   11/13/2019 374 (H) 70 - 110 mg/dL Final   11/13/2019 398 (H) 70 - 110 mg/dL Final   11/13/2019 403 (H) 70 - 110 mg/dL Final   11/13/2019 366 (H) 70 - 110 mg/dL Final   11/13/2019 449 (H) 70 - 110 mg/dL Final   11/13/2019 467 (HH) 70 - 110 mg/dL Final   11/12/2019 432 (H) 70 - 110 mg/dL Final   11/12/2019 424 (H) 70 - 110 mg/dL Final     COAGS  Recent Labs   Lab 11/13/19  1102   INR 1.1     UA  No results for input(s): COLORU, CLARITYU, SPECGRAV, PHUR, PROTEINUA, GLUCOSEU, BLOODU, WBCU, RBCU, BACTERIA, MUCUS in the last 24 hours.    Invalid input(s):  BILIRUBINCON  MICRO  Microbiology Results (last 7 days)     Procedure Component Value Units Date/Time    Blood culture [[id number]] Collected:  11/05/19 0936    Order Status:  Completed Specimen:  Blood Updated:  11/10/19 1812     Blood Culture, Routine No growth after 5 days.    Blood culture [[id number]] Collected:  11/05/19 0928    Order  Status:  Completed Specimen:  Blood Updated:  11/10/19 1812     Blood Culture, Routine No growth after 5 days.        LIPID PANEL  Lab Results   Component Value Date    CHOL 218 (H) 11/06/2019     Lab Results   Component Value Date    HDL 38 (L) 11/06/2019     Lab Results   Component Value Date    LDLCALC 151.4 11/06/2019     Lab Results   Component Value Date    TRIG 143 11/06/2019     Lab Results   Component Value Date    CHOLHDL 17.4 (L) 11/06/2019       Diagnostic Results:  Imaging Results          US Lower Extremity Veins Bilateral (Final result)  Result time 11/05/19 16:21:54    Final result by Suzanne Berrios MD (11/05/19 16:21:54)                 Impression:      No evidence of deep venous thrombosis in either lower extremity.      Electronically signed by: Suzanne Berrios MD  Date:    11/05/2019  Time:    16:21             Narrative:    EXAMINATION:  US LOWER EXTREMITY VEINS BILATERAL    CLINICAL HISTORY:  rule out dvt; Acute respiratory failure with hypoxia    TECHNIQUE:  Duplex and color flow Doppler and dynamic compression was performed of the bilateral lower extremity veins was performed.    COMPARISON:  None    FINDINGS:  Right thigh veins: The common femoral, femoral, popliteal, upper greater saphenous, and deep femoral veins are patent and free of thrombus. The veins are normally compressible and have normal phasic flow and augmentation response.    Right calf veins: The visualized calf veins are patent.    Left thigh veins: The common femoral, femoral, popliteal, upper greater saphenous, and deep femoral veins are patent and free of thrombus. The veins are normally compressible and have normal phasic flow and augmentation response.    Left calf veins: The visualized calf veins are patent.    Miscellaneous: Subcutaneous edema noted lower extremities.                               US Retroperitoneal Complete (Kidney and (Final result)  Result time 11/05/19 09:44:48    Final result by  Fransico Yun MD (11/05/19 09:44:48)                 Impression:      No hydronephrosis.    Elevated left renal resistive index, a nonspecific indicator of medical renal disease.    Incidental left pleural effusion.      Electronically signed by: Fransico Yun  Date:    11/05/2019  Time:    09:44             Narrative:    EXAMINATION:  US RETROPERITONEAL COMPLETE    CLINICAL HISTORY:  acute renal failure;    TECHNIQUE:  Ultrasound of the kidneys and urinary bladder was performed including color flow and Doppler evaluation of the kidneys.    COMPARISON:  None available    FINDINGS:  Right kidney: Right kidney measures 12.4 cm.  Resistive index of 0.63.  No hydronephrosis.    Left kidney: Left kidney measures 11.6 cm.  Resistive index of 0.80.  No hydronephrosis    Urinary bladder is unremarkable.  Splenic resistive index of 0.75.  Incidental left pleural effusion.                               X-Ray Chest 1 View (Final result)  Result time 11/05/19 06:16:39    Final result by Umberto Quinn MD (11/05/19 06:16:39)                 Impression:      As above.      Electronically signed by: Umberto Quinn MD  Date:    11/05/2019  Time:    06:16             Narrative:    EXAMINATION:  XR CHEST 1 VIEW    CLINICAL HISTORY:  Respiratory failure, unspecified, unspecified whether with hypoxia or hypercapnia    TECHNIQUE:  Single frontal view of the chest was performed.    COMPARISON:  11/05/2019 04:07 hours    FINDINGS:  There has been interval placement of an endotracheal tube with tip terminating approximately 4.0 cm above the lindsay.  Esophagogastric tube has been intervally placed extending below the diaphragm, extending beyond the field of view of the examination.  No significant change in cardiopulmonary status compared to prior examination, noting interstitial edema bilateral pleural effusions.  No evidence of pneumothorax.                               X-Ray Chest AP Portable (Final result)  Result time  11/05/19 05:11:22    Final result by Umberto Quinn MD (11/05/19 05:11:22)                 Impression:      Radiographic findings suggestive of edema/CHF with bilateral pleural effusions and atelectasis/consolidation.      Electronically signed by: Umberto Quinn MD  Date:    11/05/2019  Time:    05:11             Narrative:    EXAMINATION:  XR CHEST AP PORTABLE    CLINICAL HISTORY:  CHF;    TECHNIQUE:  Single frontal view of the chest was performed.    COMPARISON:  None    FINDINGS:  Cardiac monitoring leads overlie the chest.  Cardiomediastinal silhouette appears mildly enlarged.  There are bilateral interstitial opacities suggesting edema.  There is opacification of the bilateral lower lung zones, right more so than left, suggestive of a combination of pleural fluid with associated atelectasis and/or consolidation.  There is no evidence of pneumothorax.  Visualized osseous structures appear intact.                                ASSESSMENT/PLAN:   Ms. Davenport is a 54 y.o. female who was admitted for HTN emergency which proceeded acute flash pulmonary edema.     Flash Pulmonary Edema   Resolved   Maintaining o2 sat on room air    Will continue to monitor      HTN Emergency   Resolved   Patient with underlying essential HTN   Current BP controlled   Currently on losartan 100mg, amlodipine 10mg PO daily and Coreg 12.5mg PO BID   Will continue to monitor      Acute decompensated Heart Failure with preserved ejection Fraction   Patient now compensated   TTE from 11/5/19: shows EF of 50% and grade 1 diastolic dysfunction   Monitor UOP  Daily weights   Fluid restriction < 1,500 ml   Will continue Lasix 160mg IV b.i.d.  Will continue Metolazone 5mg PO daily      Nephrotic Syndrome   Most likely secondary to hypertensive nephropathy  Proteinuria improving   MEGAN, RF negative  C3/C4 normal   FTA Abs neg   GBM Ab neg   HIV neg    Renal Biopsy shows - severe nodular glomerulosclerosis. 11 out of 18 glomeruli are  completly scarred consistent with nodular sclerosis. Appears like diabetic nephropathy but if not consistent with hx then could be idiopathic glomerular sclerosis as both are identical. 70-80% interstitial fibrosis consistent with severe hypertensive changes. No immunotype staining  Will follow up with Nephrology      NSTEMI   Most likely secondary to type 2 demand ischemia  Resolved  Will obtain Trops and EKG in the setting of Angina      Acute Kidney Injury on CKD   Likely 2/2 to intra-renal disease   FeUrea 43.2%   Variable UOP   Cr worsening, will follow up AM xavi   PermCath placed in RIJ on 11/13/19 w/o complications   Will need Hemodialysis today   Avoid Nephrotoxic medications   Follow up on Nephrology recommendations      Normocytic Anemia   Stable   Most likely secondary to anemia of chronic disease  Will continue to monitor      Prediabetes   Steroid induced hyperglycemia   A1c 5.8   BG goal 140-180 while inpatient   Accuchecks ACHS.   Determir 35 units   Aspart 10 units TID with meals    Will continue to Monitor      VTE prophylaxis: SCDs     CODE STATUS: Full Code     Disposition: HD today. Nephrology following. Will continue to monitor BG.      Case discussed both upper level resident as well as attending physician     Carlito Michele MD   [employer] [facility], PGY-2

## 2019-11-15 NOTE — PLAN OF CARE
[facility]    FACILITY TRANSFER ORDERS      Patient Name: Ping Davenport  YOB: 1965    PCP: Primary Doctor No   PCP Address: None  PCP Phone Number: None  PCP Fax: None    Encounter Date: 11/15/2019    Admit to: [facility]     Vital Signs:  Routine    Diagnoses:   Active Hospital Problems    Diagnosis  POA    *Acute renal failure [N17.9]  Yes    AXEL (acute kidney injury) [N17.9]  Yes    Nephrotic syndrome [N04.9]  Yes    Hypertensive emergency [I16.1]  Yes    New onset of congestive heart failure [I50.9]  Unknown    Acute respiratory failure with hypoxia and hypercarbia [J96.01, J96.02]  Unknown    NSTEMI (non-ST elevated myocardial infarction) [I21.4]  Yes      Resolved Hospital Problems   No resolved problems to display.       Allergies:  Review of patient's allergies indicates:   Allergen Reactions    Ketorolac Palpitations       Diet: renal diet    Activities: Activity as tolerated    Labs: CMP and phos three times weekly      CONSULTS:    Physical Therapy to evaluate and treat.  and Occupational Therapy to evaluate and treat.    MISCELLANEOUS CARE:  n/a    WOUND CARE ORDERS  n/a    Medications: Review discharge medications with patient and family and provide education.      Current Discharge Medication List      START taking these medications    Details   acetaminophen (TYLENOL) 325 MG tablet Take 2 tablets (650 mg total) by mouth every 4 (four) hours as needed.  Refills: 0      amLODIPine (NORVASC) 10 MG tablet Take 1 tablet (10 mg total) by mouth once daily.  Qty: 30 tablet, Refills: 11      carvedilol (COREG) 12.5 MG tablet Take 1 tablet (12.5 mg total) by mouth 2 (two) times daily.  Qty: 60 tablet, Refills: 11      furosemide (LASIX) 80 MG tablet Take 2 tablets (160 mg total) by mouth 2 (two) times daily.  Qty: 120 tablet, Refills: 11      heparin sodium,porcine (HEPARIN, PORCINE,) 1,000 unit/mL injection 4.1 mLs (4,100 Units total) by Intra-Catheter route as needed  (to devan CVC post HD).  Qty: 4.1 mL, Refills: 1      insulin aspart U-100 (NOVOLOG) 100 unit/mL (3 mL) InPn pen Inject 10 Units into the skin 3 (three) times daily. for 16 days  Qty: 4.8 mL, Refills: 0  Start on 11/16/19  Stop 12/2/19      !! insulin detemir U-100 (LEVEMIR FLEXTOUCH) 100 unit/mL (3 mL) SubQ InPn pen Inject 35 Units into the skin once daily. for 2 days  Qty: 0.7 mL, Refills: 0  Start on 11/16/19  Stop on 11/18/19      !! insulin detemir U-100 (LEVEMIR FLEXTOUCH) 100 unit/mL (3 mL) SubQ InPn pen Inject 25 Units into the skin every evening. for 7 days  Qty: 1.75 mL, Refills: 0  Start on 11/18/19  Stop on 11/25/19      !! insulin detemir U-100 (LEVEMIR FLEXTOUCH) 100 unit/mL (3 mL) SubQ InPn pen Inject 10 Units into the skin every evening. for 7 days  Qty: 0.7 mL, Refills: 0  Start on 11/25/19  Stop on 12/2/19        losartan (COZAAR) 100 MG tablet Take 1 tablet (100 mg total) by mouth once daily.  Qty: 90 tablet, Refills: 3      metOLazone (ZAROXOLYN) 10 MG tablet Take 1 tablet (10 mg total) by mouth once daily.  Qty: 30 tablet, Refills: 11      !! predniSONE (DELTASONE) 20 MG tablet Take 3 tablets (60 mg total) by mouth once daily. for 2 days  Qty: 6 tablet, Refills: 0  Start on 11/16/19  Stop on 11/18/19      !! predniSONE (DELTASONE) 20 MG tablet Take 1 tablet (20 mg total) by mouth 2 (two) times daily. for 7 days  Qty: 14 tablet, Refills: 0  Start on 11/18/19  Stop on 11/25/19      !! predniSONE (DELTASONE) 20 MG tablet Take 1 tablet (20 mg total) by mouth once daily. for 7 days  Qty: 7 tablet, Refills: 0  Start on 11/25/19  Stop on 12/2/19      sevelamer carbonate (RENVELA) 800 mg Tab Take 2 tablets (1,600 mg total) by mouth 3 (three) times daily with meals.  Qty: 180 tablet, Refills: 11       !! - Potential duplicate medications found. Please discuss with provider.      CONTINUE these medications which have NOT CHANGED    Details          nystatin-triamcinolone (MYCOLOG II) cream Apply topically  once daily.  Qty: 15 g, Refills: 1                  _________________________________  Neri Haas MD  11/15/2019

## 2019-11-15 NOTE — PROGRESS NOTES
[facility] Progress Note    Date of Admit: 2019        Subjective:     HD yesterday. Tolerated well. Will go again today for goal 3L.No complaints    Objective:   Last 24 Hour Vital Signs:  BP  Min: 100/64  Max: 166/75  Temp  Av.9 °F (36.6 °C)  Min: 97 °F (36.1 °C)  Max: 99.2 °F (37.3 °C)  Pulse  Av.3  Min: 77  Max: 101  Resp  Av.8  Min: 16  Max: 19  SpO2  Av.3 %  Min: 92 %  Max: 97 %  Weight  Av.1 kg (207 lb 7.3 oz)  Min: 94.1 kg (207 lb 7.3 oz)  Max: 94.1 kg (207 lb 7.3 oz)  Body mass index is 37.94 kg/m².  I/O last 3 completed shifts:  In: 725 [P.O.:225; Other:500]  Out: 4000 [Urine:2000; Other:2000]    Physical Examination:  Gen: NAD, AAOx3  HEENT: NC in place, dry mucosa, LIJ tunneled CVC  Neck: no thyroidmegaly, no LAD  Cardio: RRR, normal S1/S2, no MRG, JVP wnl  Lungs: bibasilar crackles   Abd: abd edema improved  Ext: 2+ pitting edema LE bl  Skin: warm, dry, no rashes noted  Neuro: moves ext    Laboratory:  Most Recent Data:  CBC:   Lab Results   Component Value Date    WBC 14.77 (H) 11/15/2019    HGB 9.1 (L) 11/15/2019    HCT 28.6 (L) 11/15/2019     11/15/2019    MCV 85 11/15/2019    RDW 14.0 11/15/2019     BMP:   Lab Results   Component Value Date     (L) 11/15/2019    K 4.1 11/15/2019    CL 94 (L) 11/15/2019    CO2 25 11/15/2019    BUN 98 (H) 11/15/2019    CREATININE 5.6 (H) 11/15/2019     (H) 11/15/2019    CALCIUM 8.8 11/15/2019    MG 2.1 11/15/2019    PHOS 5.1 (H) 11/15/2019     LFTs:   Lab Results   Component Value Date    PROT 5.9 (L) 11/15/2019    ALBUMIN 2.6 (L) 11/15/2019    BILITOT 0.3 11/15/2019    AST 25 11/15/2019    ALKPHOS 79 11/15/2019    ALT 18 11/15/2019     Coags:   Lab Results   Component Value Date    INR 1.1 2019     Urinalysis:   Lab Results   Component Value Date    COLORU Yellow 2019    SPECGRAV 1.025 2019    NITRITE Negative 2019    KETONESU Negative 2019    UROBILINOGEN Negative 2019    WBCUA  1 11/05/2019       Trended Lab Data:  Recent Labs   Lab 11/13/19  0648 11/13/19  0649 11/14/19  0505 11/15/19  0537   WBC  --  14.11* 14.86* 14.77*   HGB  --  8.7* 9.6* 9.1*   HCT  --  27.2* 30.1* 28.6*   PLT  --  229 245 219   MCV  --  85 84 85   RDW  --  13.8 13.8 14.0   *  --  131* 134*   K 3.7  --  4.0 4.1   CL 94*  --  94* 94*   CO2 22*  --  24 25   *  --  112* 98*   CREATININE 6.8*  --  6.7* 5.6*   *  --  376* 329*   PROT 5.6*  --  5.9* 5.9*   ALBUMIN 2.5*  --  2.6* 2.6*   BILITOT 0.2  --  0.2 0.3   AST 17  --  20 25   ALKPHOS 55  --  61 79   ALT 19  --  20 18       Trended Cardiac Data:  No results for input(s): TROPONINI, CKTOTAL, CKMB, BNP in the last 168 hours.      Radiology:  Imaging Results          US Lower Extremity Veins Bilateral (Final result)  Result time 11/05/19 16:21:54    Final result by Suzanne Berrios MD (11/05/19 16:21:54)                 Impression:      No evidence of deep venous thrombosis in either lower extremity.      Electronically signed by: Suzanne Berrios MD  Date:    11/05/2019  Time:    16:21             Narrative:    EXAMINATION:  US LOWER EXTREMITY VEINS BILATERAL    CLINICAL HISTORY:  rule out dvt; Acute respiratory failure with hypoxia    TECHNIQUE:  Duplex and color flow Doppler and dynamic compression was performed of the bilateral lower extremity veins was performed.    COMPARISON:  None    FINDINGS:  Right thigh veins: The common femoral, femoral, popliteal, upper greater saphenous, and deep femoral veins are patent and free of thrombus. The veins are normally compressible and have normal phasic flow and augmentation response.    Right calf veins: The visualized calf veins are patent.    Left thigh veins: The common femoral, femoral, popliteal, upper greater saphenous, and deep femoral veins are patent and free of thrombus. The veins are normally compressible and have normal phasic flow and augmentation response.    Left calf veins: The  visualized calf veins are patent.    Miscellaneous: Subcutaneous edema noted lower extremities.                               US Retroperitoneal Complete (Kidney and (Final result)  Result time 11/05/19 09:44:48    Final result by Fransico Yun MD (11/05/19 09:44:48)                 Impression:      No hydronephrosis.    Elevated left renal resistive index, a nonspecific indicator of medical renal disease.    Incidental left pleural effusion.      Electronically signed by: Fransico Yun  Date:    11/05/2019  Time:    09:44             Narrative:    EXAMINATION:  US RETROPERITONEAL COMPLETE    CLINICAL HISTORY:  acute renal failure;    TECHNIQUE:  Ultrasound of the kidneys and urinary bladder was performed including color flow and Doppler evaluation of the kidneys.    COMPARISON:  None available    FINDINGS:  Right kidney: Right kidney measures 12.4 cm.  Resistive index of 0.63.  No hydronephrosis.    Left kidney: Left kidney measures 11.6 cm.  Resistive index of 0.80.  No hydronephrosis    Urinary bladder is unremarkable.  Splenic resistive index of 0.75.  Incidental left pleural effusion.                               X-Ray Chest 1 View (Final result)  Result time 11/05/19 06:16:39    Final result by Umberto Quinn MD (11/05/19 06:16:39)                 Impression:      As above.      Electronically signed by: Umberto Quinn MD  Date:    11/05/2019  Time:    06:16             Narrative:    EXAMINATION:  XR CHEST 1 VIEW    CLINICAL HISTORY:  Respiratory failure, unspecified, unspecified whether with hypoxia or hypercapnia    TECHNIQUE:  Single frontal view of the chest was performed.    COMPARISON:  11/05/2019 04:07 hours    FINDINGS:  There has been interval placement of an endotracheal tube with tip terminating approximately 4.0 cm above the lindsay.  Esophagogastric tube has been intervally placed extending below the diaphragm, extending beyond the field of view of the examination.  No significant  change in cardiopulmonary status compared to prior examination, noting interstitial edema bilateral pleural effusions.  No evidence of pneumothorax.                               X-Ray Chest AP Portable (Final result)  Result time 11/05/19 05:11:22    Final result by Umberto Quinn MD (11/05/19 05:11:22)                 Impression:      Radiographic findings suggestive of edema/CHF with bilateral pleural effusions and atelectasis/consolidation.      Electronically signed by: Umberto Quinn MD  Date:    11/05/2019  Time:    05:11             Narrative:    EXAMINATION:  XR CHEST AP PORTABLE    CLINICAL HISTORY:  CHF;    TECHNIQUE:  Single frontal view of the chest was performed.    COMPARISON:  None    FINDINGS:  Cardiac monitoring leads overlie the chest.  Cardiomediastinal silhouette appears mildly enlarged.  There are bilateral interstitial opacities suggesting edema.  There is opacification of the bilateral lower lung zones, right more so than left, suggestive of a combination of pleural fluid with associated atelectasis and/or consolidation.  There is no evidence of pneumothorax.  Visualized osseous structures appear intact.                                   Assessment and Plan:      Ping Davenport is a 54 y.o.female with  Patient Active Problem List    Diagnosis Date Noted    AXEL (acute kidney injury)     Nephrotic syndrome     Hypertensive emergency 11/05/2019    New onset of congestive heart failure 11/05/2019    Acute respiratory failure with hypoxia and hypercarbia 11/05/2019    Acute renal failure 11/05/2019    NSTEMI (non-ST elevated myocardial infarction) 11/05/2019    Rash 10/13/2014    Encounter to establish care 10/13/2014    Screen for STD (sexually transmitted disease) 10/13/2014        AXEL stage III vs CKD 5 with nephrotic syndrome  -Possible etiologies include cardiorenal, HTN emergency, GN  -Initially AGMA, NAGMA, Cr on admit 3.4, had 8.8g proteinuria. Ua with amoprhous red  cells dating back to 2016  -Serolgies unrevealing, kidney bx with idiopathic glomerular sclerosis pattern, 8/11 glomeruli sclerosed, also with 70-80% interstitial fibrosis, IF neg  -Cr peaked, tunneled CVC placed, HD initiated 11/14  -Will do HD today (day 2/3) for volume removal. Will be getting placed in IPR where HD can be resumed. Weaning steroids, can dec to 40 mg qd on Monday 11/18    Hypercalcemia  -Corrected ~10.3, will hold ergo for now    Hyperglycemia  -2/2 steroids, primary team has added basal. Will cont to monitor    HTN  -BP better controlled though still above goal. Likely steroids. Can add coreg 12.5 bid    [place] Bone Disease/CKD eval  -cont sevelamer to 1600 tid, please ensure renal/diabetic diet        Waldemar Carlson MD  [facility]

## 2019-11-15 NOTE — PLAN OF CARE
Problem: Physical Therapy Goal  Goal: Physical Therapy Goal  Description  Goals to be met by: 2019     Patient will increase functional independence with mobility by performin. Supine to sit with Modified [place]  2. Sit to stand transfer with Modified [place]  3. Bed to chair transfer with Modified [place] using Rolling Walker  4. Gait  x 100 feet with Modified [place] using Rolling Walker.   5. Lower extremity exercise program x12 reps per handout, with supervision     Outcome: Ongoing, Progressing   Pt would benefit from skilled PT to progress functional mobility tolerance.

## 2019-11-15 NOTE — PT/OT/SLP PROGRESS
Physical Therapy Treatment    Patient Name:  Ping Davenport   MRN:  [id number]    Recommendations:     Discharge Recommendations:  rehabilitation facility   Discharge Equipment Recommendations: (defer to IPR)   Barriers to discharge: Decreased caregiver support and decreased functional mobility tolerance    Assessment:     Ping Davenport is a 54 y.o. female admitted with a medical diagnosis of Acute renal failure.  She presents with the following impairments/functional limitations:  weakness, impaired self care skills, impaired balance, impaired endurance, impaired functional mobilty, gait instability, decreased lower extremity function, impaired cardiopulmonary response to activity. Pt supine to sit with Min assist of 1.  Pt ambulated 40' with RW with CG with short step length and vc to stay inside RW.  Pt sit to stand x 5 with RW with CG.      Rehab Prognosis: Good; patient would benefit from acute skilled PT services to address these deficits and reach maximum level of function.    Recent Surgery: Procedure(s) (LRB):  Insertion,catheter,tunneled (Right) 2 Days Post-Op    Plan:     During this hospitalization, patient to be seen 6 x/week to address the identified rehab impairments via gait training, therapeutic activities, therapeutic exercises, neuromuscular re-education and progress toward the following goals:    · Plan of Care Expires:  12/15/19    Subjective     Chief Complaint: wants to walk  Patient/Family Comments/goals: go to rehab for more therapy  Pain/Comfort:  · Pain Rating 1: 0/10      Objective:     Communicated with nurse prior to session.  Patient found HOB elevated with bed alarm, telemetry, oxygen upon PT entry to room.     General Precautions: Standard, fall   Orthopedic Precautions:N/A   Braces:       Functional Mobility:  Pt supine to sit with Min assist of 1.  Pt ambulated 40' with RW with CG with short step length and vc to stay inside RW.  Pt sit to stand x 5 with RW with  CG.      AM-PAC 6 CLICK MOBILITY  Turning over in bed (including adjusting bedclothes, sheets and blankets)?: 3  Sitting down on and standing up from a chair with arms (e.g., wheelchair, bedside commode, etc.): 3  Moving from lying on back to sitting on the side of the bed?: 3  Moving to and from a bed to a chair (including a wheelchair)?: 3  Need to walk in hospital room?: 3  Climbing 3-5 steps with a railing?: 2  Basic Mobility Total Score: 17       Therapeutic Activities and Exercises:   Pt performed seated BLE LAQ, hip abd and AP 10 x 2 and hip flexion 10 x 1.    Patient left up in chair with all lines intact, call button in reach, chair alarm on and nurse notified..    GOALS:   Multidisciplinary Problems     Physical Therapy Goals        Problem: Physical Therapy Goal    Goal Priority Disciplines Outcome Goal Variances Interventions   Physical Therapy Goal     PT, PT/OT Ongoing, Progressing     Description:  Goals to be met by: 2019     Patient will increase functional independence with mobility by performin. Supine to sit with Modified [place]  2. Sit to stand transfer with Modified [place]  3. Bed to chair transfer with Modified [place] using Rolling Walker  4. Gait  x 100 feet with Modified [place] using Rolling Walker.   5. Lower extremity exercise program x12 reps per handout, with supervision                      Time Tracking:     PT Received On: 11/15/19  PT Start Time: 1452     PT Stop Time: 1515  PT Total Time (min): 23 min     Billable Minutes: Gait Training 10 and Therapeutic Exercise 13    Treatment Type: Treatment  PT/PTA: PT     PTA Visit Number: 0     Jen Yip, PT  11/15/2019

## 2019-11-15 NOTE — PLAN OF CARE
Plan at time of d/c remains inpatient rehab.  [facility] still following, unsure of bed availability at this time.  Multiple referrals to additional rehab facilities, all under review.  Will f/u with primary team once pt accepted.  Pt will require insurance auth for admission at any facility, anticipate d/c next week pending medical stability.    Hanny Cevallos RN    [phone]

## 2019-11-15 NOTE — PLAN OF CARE
Problem: Occupational Therapy Goal  Goal: Occupational Therapy Goal  Description  Goals to be met by: 12/8/19     Patient will increase functional independence with ADLs by performing:    LE Dressing with Minimal Assistance.  Grooming while standing with Contact Guard Assistance.  Toileting from toilet with Contact Guard Assistance for hygiene and clothing management.   Supine to sit with Contact Guard Assistance.--MET 11/11/19  Step transfer with Contact Guard Assistance  Toilet transfer to toilet with Contact Guard Assistance.  Increased functional strength to WFL for self care skills and functional mobility.  Upper extremity exercise program x10 reps per handout, with independence.       Outcome: Ongoing, Progressing   Pt found [facility] & agreeable to limited OT this PM 2/2 recent PT session. Pt w/o c/o pain & perf the following: standing via RW w/ CGA; amb via RW w/in & immediately outside room w/ CGA for balance & Min A for RW proximity. Pt returned to b/s chair w/ CGA. Edu/tx re: edema mgmt, general safety techs, safe/proper RW use & HEP. Pt verbalized understanding. Pt left [facility] w/ PCA in room.    Pt w/ increased mobility skills 2/2 decreased edema this PM. Pt is a great candidate for IPR after acute d/c. Good potential to achieve established goals w/ cont OT per POC.

## 2019-11-15 NOTE — PT/OT/SLP PROGRESS
Occupational Therapy  Missed Visit    Patient Name:  Ping Davenport   MRN:  [id number]    Patient not seen today secondary to DEANNA at . Will follow-up as able.    JADEN Gallardo  11/15/2019

## 2019-11-15 NOTE — PLAN OF CARE
Pt on RA with documented sats. Will continue to monitor.     Ear Wedge Repair Text: A wedge excision was completed by carrying down an excision through the full thickness of the ear and cartilage with an inward facing Burow's triangle. The wound was then closed in a layered fashion.

## 2019-11-16 ENCOUNTER — ANESTHESIA EVENT (OUTPATIENT)
Dept: CARDIOLOGY | Facility: HOSPITAL | Age: 54
DRG: 208 | End: 2019-11-16
Payer: MEDICAID

## 2019-11-16 ENCOUNTER — ANESTHESIA (OUTPATIENT)
Dept: CARDIOLOGY | Facility: HOSPITAL | Age: 54
DRG: 208 | End: 2019-11-16
Payer: MEDICAID

## 2019-11-16 LAB
ALBUMIN SERPL BCP-MCNC: 2.5 G/DL (ref 3.5–5.2)
ALP SERPL-CCNC: 70 U/L (ref 55–135)
ALT SERPL W/O P-5'-P-CCNC: 14 U/L (ref 10–44)
ANION GAP SERPL CALC-SCNC: 11 MMOL/L (ref 8–16)
AST SERPL-CCNC: 25 U/L (ref 10–40)
BASOPHILS # BLD AUTO: 0.02 K/UL (ref 0–0.2)
BASOPHILS NFR BLD: 0.1 % (ref 0–1.9)
BILIRUB SERPL-MCNC: 0.3 MG/DL (ref 0.1–1)
BUN SERPL-MCNC: 75 MG/DL (ref 6–20)
CALCIUM SERPL-MCNC: 8.3 MG/DL (ref 8.7–10.5)
CHLORIDE SERPL-SCNC: 96 MMOL/L (ref 95–110)
CO2 SERPL-SCNC: 26 MMOL/L (ref 23–29)
CREAT SERPL-MCNC: 4.4 MG/DL (ref 0.5–1.4)
DIFFERENTIAL METHOD: ABNORMAL
EOSINOPHIL # BLD AUTO: 0 K/UL (ref 0–0.5)
EOSINOPHIL NFR BLD: 0.1 % (ref 0–8)
ERYTHROCYTE [DISTWIDTH] IN BLOOD BY AUTOMATED COUNT: 14.1 % (ref 11.5–14.5)
EST. GFR  (AFRICAN AMERICAN): 12 ML/MIN/1.73 M^2
EST. GFR  (NON AFRICAN AMERICAN): 11 ML/MIN/1.73 M^2
GLUCOSE SERPL-MCNC: 242 MG/DL (ref 70–110)
HCT VFR BLD AUTO: 26 % (ref 37–48.5)
HGB BLD-MCNC: 8.3 G/DL (ref 12–16)
LYMPHOCYTES # BLD AUTO: 0.9 K/UL (ref 1–4.8)
LYMPHOCYTES NFR BLD: 6.6 % (ref 18–48)
MAGNESIUM SERPL-MCNC: 2 MG/DL (ref 1.6–2.6)
MCH RBC QN AUTO: 27.5 PG (ref 27–31)
MCHC RBC AUTO-ENTMCNC: 31.9 G/DL (ref 32–36)
MCV RBC AUTO: 86 FL (ref 82–98)
MONOCYTES # BLD AUTO: 1.6 K/UL (ref 0.3–1)
MONOCYTES NFR BLD: 11.7 % (ref 4–15)
NEUTROPHILS # BLD AUTO: 10.7 K/UL (ref 1.8–7.7)
NEUTROPHILS NFR BLD: 81.5 % (ref 38–73)
PHOSPHATE SERPL-MCNC: 4.1 MG/DL (ref 2.7–4.5)
PLATELET # BLD AUTO: 192 K/UL (ref 150–350)
PMV BLD AUTO: 12.7 FL (ref 9.2–12.9)
POCT GLUCOSE: 119 MG/DL (ref 70–110)
POCT GLUCOSE: 173 MG/DL (ref 70–110)
POCT GLUCOSE: 210 MG/DL (ref 70–110)
POCT GLUCOSE: 218 MG/DL (ref 70–110)
POCT GLUCOSE: 240 MG/DL (ref 70–110)
POTASSIUM SERPL-SCNC: 3.9 MMOL/L (ref 3.5–5.1)
PROT SERPL-MCNC: 5.6 G/DL (ref 6–8.4)
RBC # BLD AUTO: 3.02 M/UL (ref 4–5.4)
SODIUM SERPL-SCNC: 133 MMOL/L (ref 136–145)
WBC # BLD AUTO: 13.55 K/UL (ref 3.9–12.7)

## 2019-11-16 PROCEDURE — 25000003 PHARM REV CODE 250: Performed by: SURGERY

## 2019-11-16 PROCEDURE — 36000 PLACE NEEDLE IN VEIN: CPT | Performed by: STUDENT IN AN ORGANIZED HEALTH CARE EDUCATION/TRAINING PROGRAM

## 2019-11-16 PROCEDURE — C9399 UNCLASSIFIED DRUGS OR BIOLOG: HCPCS | Performed by: STUDENT IN AN ORGANIZED HEALTH CARE EDUCATION/TRAINING PROGRAM

## 2019-11-16 PROCEDURE — 36415 COLL VENOUS BLD VENIPUNCTURE: CPT

## 2019-11-16 PROCEDURE — 80053 COMPREHEN METABOLIC PANEL: CPT

## 2019-11-16 PROCEDURE — 84100 ASSAY OF PHOSPHORUS: CPT

## 2019-11-16 PROCEDURE — 85025 COMPLETE CBC W/AUTO DIFF WBC: CPT

## 2019-11-16 PROCEDURE — 63600175 PHARM REV CODE 636 W HCPCS: Performed by: SURGERY

## 2019-11-16 PROCEDURE — 11000001 HC ACUTE MED/SURG PRIVATE ROOM

## 2019-11-16 PROCEDURE — 25000003 PHARM REV CODE 250: Performed by: STUDENT IN AN ORGANIZED HEALTH CARE EDUCATION/TRAINING PROGRAM

## 2019-11-16 PROCEDURE — 63600175 PHARM REV CODE 636 W HCPCS: Performed by: STUDENT IN AN ORGANIZED HEALTH CARE EDUCATION/TRAINING PROGRAM

## 2019-11-16 PROCEDURE — 90935 HEMODIALYSIS ONE EVALUATION: CPT

## 2019-11-16 PROCEDURE — 83735 ASSAY OF MAGNESIUM: CPT

## 2019-11-16 RX ADMIN — MUPIROCIN: 20 OINTMENT TOPICAL at 10:11

## 2019-11-16 RX ADMIN — INSULIN ASPART 2 UNITS: 100 INJECTION, SOLUTION INTRAVENOUS; SUBCUTANEOUS at 10:11

## 2019-11-16 RX ADMIN — CARVEDILOL 12.5 MG: 12.5 TABLET, FILM COATED ORAL at 09:11

## 2019-11-16 RX ADMIN — LOSARTAN POTASSIUM 100 MG: 50 TABLET ORAL at 09:11

## 2019-11-16 RX ADMIN — METOLAZONE 10 MG: 2.5 TABLET ORAL at 02:11

## 2019-11-16 RX ADMIN — PREDNISONE 60 MG: 20 TABLET ORAL at 09:11

## 2019-11-16 RX ADMIN — INSULIN ASPART 4 UNITS: 100 INJECTION, SOLUTION INTRAVENOUS; SUBCUTANEOUS at 10:11

## 2019-11-16 RX ADMIN — SODIUM CHLORIDE 1000 ML: 0.9 INJECTION, SOLUTION INTRAVENOUS at 10:11

## 2019-11-16 RX ADMIN — INSULIN DETEMIR 35 UNITS: 100 INJECTION, SOLUTION SUBCUTANEOUS at 10:11

## 2019-11-16 RX ADMIN — FUROSEMIDE 160 MG: 10 INJECTION, SOLUTION INTRAVENOUS at 05:11

## 2019-11-16 RX ADMIN — INSULIN ASPART 2 UNITS: 100 INJECTION, SOLUTION INTRAVENOUS; SUBCUTANEOUS at 05:11

## 2019-11-16 RX ADMIN — MUPIROCIN: 20 OINTMENT TOPICAL at 02:11

## 2019-11-16 RX ADMIN — CARVEDILOL 12.5 MG: 12.5 TABLET, FILM COATED ORAL at 10:11

## 2019-11-16 RX ADMIN — HEPARIN SODIUM 4100 UNITS: 1000 INJECTION, SOLUTION INTRAVENOUS; SUBCUTANEOUS at 01:11

## 2019-11-16 RX ADMIN — INSULIN ASPART 10 UNITS: 100 INJECTION, SOLUTION INTRAVENOUS; SUBCUTANEOUS at 04:11

## 2019-11-16 RX ADMIN — SEVELAMER CARBONATE 1600 MG: 800 TABLET, FILM COATED ORAL at 04:11

## 2019-11-16 RX ADMIN — INSULIN ASPART 10 UNITS: 100 INJECTION, SOLUTION INTRAVENOUS; SUBCUTANEOUS at 10:11

## 2019-11-16 RX ADMIN — AMLODIPINE BESYLATE 10 MG: 5 TABLET ORAL at 09:11

## 2019-11-16 RX ADMIN — INSULIN DETEMIR 10 UNITS: 100 INJECTION, SOLUTION SUBCUTANEOUS at 10:11

## 2019-11-16 NOTE — PROGRESS NOTES
[facility] Progress Note    Date of Admit: 2019        Subjective:     HD day 3/3 today. Tolerated well. No complaints. Had discussion about ongoing HD with pt and tried calling daughter though no answer.will try again enmanuel    Objective:   Last 24 Hour Vital Signs:  BP  Min: 97/76  Max: 169/73  Temp  Av.7 °F (36.5 °C)  Min: 96.7 °F (35.9 °C)  Max: 98.8 °F (37.1 °C)  Pulse  Av.3  Min: 64  Max: 80  Resp  Av.3  Min: 14  Max: 20  SpO2  Av.8 %  Min: 95 %  Max: 97 %  Weight  Av.2 kg (192 lb 3.9 oz)  Min: 86.4 kg (190 lb 7.6 oz)  Max: 88 kg (194 lb 0.1 oz)  Body mass index is 34.84 kg/m².  I/O last 3 completed shifts:  In: 610 [P.O.:210; Other:400]  Out: 5300 [Urine:1900; Other:3400]    Physical Examination:  Gen: NAD, AAOx3  HEENT: NC in place, dry mucosa, LIJ tunneled CVC  Neck: no thyroidmegaly, no LAD  Cardio: RRR, normal S1/S2, no MRG, JVP wnl  Lungs: no o2,no incwob  Abd: abd edema improved  Ext: 2+ pitting edema LE bl  Skin: warm, dry, no rashes noted  Neuro: moves ext    Laboratory:  Most Recent Data:  CBC:   Lab Results   Component Value Date    WBC 13.55 (H) 2019    HGB 8.3 (L) 2019    HCT 26.0 (L) 2019     2019    MCV 86 2019    RDW 14.1 2019     BMP:   Lab Results   Component Value Date     (L) 2019    K 3.9 2019    CL 96 2019    CO2 26 2019    BUN 75 (H) 2019    CREATININE 4.4 (H) 2019     (H) 2019    CALCIUM 8.3 (L) 2019    MG 2.0 2019    PHOS 4.1 2019     LFTs:   Lab Results   Component Value Date    PROT 5.6 (L) 2019    ALBUMIN 2.5 (L) 2019    BILITOT 0.3 2019    AST 25 2019    ALKPHOS 70 2019    ALT 14 2019     Coags:   Lab Results   Component Value Date    INR 1.1 2019     Urinalysis:   Lab Results   Component Value Date    COLORU Yellow 2019    SPECGRAV 1.025 2019    NITRITE Negative 2019     KETONESU Negative 11/05/2019    UROBILINOGEN Negative 11/05/2019    WBCUA 1 11/05/2019       Trended Lab Data:  Recent Labs   Lab 11/14/19  0505 11/15/19  0537 11/16/19  0426   WBC 14.86* 14.77* 13.55*   HGB 9.6* 9.1* 8.3*   HCT 30.1* 28.6* 26.0*    219 192   MCV 84 85 86   RDW 13.8 14.0 14.1   * 134* 133*   K 4.0 4.1 3.9   CL 94* 94* 96   CO2 24 25 26   * 98* 75*   CREATININE 6.7* 5.6* 4.4*   * 329* 242*   PROT 5.9* 5.9* 5.6*   ALBUMIN 2.6* 2.6* 2.5*   BILITOT 0.2 0.3 0.3   AST 20 25 25   ALKPHOS 61 79 70   ALT 20 18 14       Trended Cardiac Data:  No results for input(s): TROPONINI, CKTOTAL, CKMB, BNP in the last 168 hours.      Radiology:  Imaging Results          US Lower Extremity Veins Bilateral (Final result)  Result time 11/05/19 16:21:54    Final result by Suzanne Berrios MD (11/05/19 16:21:54)                 Impression:      No evidence of deep venous thrombosis in either lower extremity.      Electronically signed by: Suzanne Berrios MD  Date:    11/05/2019  Time:    16:21             Narrative:    EXAMINATION:  US LOWER EXTREMITY VEINS BILATERAL    CLINICAL HISTORY:  rule out dvt; Acute respiratory failure with hypoxia    TECHNIQUE:  Duplex and color flow Doppler and dynamic compression was performed of the bilateral lower extremity veins was performed.    COMPARISON:  None    FINDINGS:  Right thigh veins: The common femoral, femoral, popliteal, upper greater saphenous, and deep femoral veins are patent and free of thrombus. The veins are normally compressible and have normal phasic flow and augmentation response.    Right calf veins: The visualized calf veins are patent.    Left thigh veins: The common femoral, femoral, popliteal, upper greater saphenous, and deep femoral veins are patent and free of thrombus. The veins are normally compressible and have normal phasic flow and augmentation response.    Left calf veins: The visualized calf veins are  patent.    Miscellaneous: Subcutaneous edema noted lower extremities.                               US Retroperitoneal Complete (Kidney and (Final result)  Result time 11/05/19 09:44:48    Final result by Fransico Yun MD (11/05/19 09:44:48)                 Impression:      No hydronephrosis.    Elevated left renal resistive index, a nonspecific indicator of medical renal disease.    Incidental left pleural effusion.      Electronically signed by: Fransico Yun  Date:    11/05/2019  Time:    09:44             Narrative:    EXAMINATION:  US RETROPERITONEAL COMPLETE    CLINICAL HISTORY:  acute renal failure;    TECHNIQUE:  Ultrasound of the kidneys and urinary bladder was performed including color flow and Doppler evaluation of the kidneys.    COMPARISON:  None available    FINDINGS:  Right kidney: Right kidney measures 12.4 cm.  Resistive index of 0.63.  No hydronephrosis.    Left kidney: Left kidney measures 11.6 cm.  Resistive index of 0.80.  No hydronephrosis    Urinary bladder is unremarkable.  Splenic resistive index of 0.75.  Incidental left pleural effusion.                               X-Ray Chest 1 View (Final result)  Result time 11/05/19 06:16:39    Final result by Umberto Quinn MD (11/05/19 06:16:39)                 Impression:      As above.      Electronically signed by: Umberto Quinn MD  Date:    11/05/2019  Time:    06:16             Narrative:    EXAMINATION:  XR CHEST 1 VIEW    CLINICAL HISTORY:  Respiratory failure, unspecified, unspecified whether with hypoxia or hypercapnia    TECHNIQUE:  Single frontal view of the chest was performed.    COMPARISON:  11/05/2019 04:07 hours    FINDINGS:  There has been interval placement of an endotracheal tube with tip terminating approximately 4.0 cm above the lindsay.  Esophagogastric tube has been intervally placed extending below the diaphragm, extending beyond the field of view of the examination.  No significant change in cardiopulmonary  status compared to prior examination, noting interstitial edema bilateral pleural effusions.  No evidence of pneumothorax.                               X-Ray Chest AP Portable (Final result)  Result time 11/05/19 05:11:22    Final result by Umberto Quinn MD (11/05/19 05:11:22)                 Impression:      Radiographic findings suggestive of edema/CHF with bilateral pleural effusions and atelectasis/consolidation.      Electronically signed by: Umberto Quinn MD  Date:    11/05/2019  Time:    05:11             Narrative:    EXAMINATION:  XR CHEST AP PORTABLE    CLINICAL HISTORY:  CHF;    TECHNIQUE:  Single frontal view of the chest was performed.    COMPARISON:  None    FINDINGS:  Cardiac monitoring leads overlie the chest.  Cardiomediastinal silhouette appears mildly enlarged.  There are bilateral interstitial opacities suggesting edema.  There is opacification of the bilateral lower lung zones, right more so than left, suggestive of a combination of pleural fluid with associated atelectasis and/or consolidation.  There is no evidence of pneumothorax.  Visualized osseous structures appear intact.                                   Assessment and Plan:      Ping Davenport is a 54 y.o.female with  Patient Active Problem List    Diagnosis Date Noted    AXEL (acute kidney injury)     Nephrotic syndrome     Hypertensive emergency 11/05/2019    New onset of congestive heart failure 11/05/2019    Acute respiratory failure with hypoxia and hypercarbia 11/05/2019    Acute renal failure 11/05/2019    NSTEMI (non-ST elevated myocardial infarction) 11/05/2019    Rash 10/13/2014    Encounter to establish care 10/13/2014    Screen for STD (sexually transmitted disease) 10/13/2014        AXEL stage III vs CKD 5 with nephrotic syndrome  -Possible etiologies include cardiorenal, HTN emergency, GN  -Initially AGMA, NAGMA, Cr on admit 3.4, had 8.8g proteinuria. Ua with amoprhous red cells dating back to  2016  -Serolgies unrevealing, kidney bx with idiopathic glomerular sclerosis pattern, 8/11 glomeruli sclerosed, also with 70-80% interstitial fibrosis, IF neg  -Cr peaked, tunneled CVC placed, HD initiated 11/14  -HD day 3/3. Weaning steroids. Awaiting chair placement and IPR    Hypercalcemia  -Improving, will cont to hold ergo    Hyperglycemia  -2/2 steroids, primary team has added basal. Will cont to monitor    HTN  -BP better controlled though still above goal. Likely steroids and volume related. Cont current regimen    Mineral Bone Disease/CKD eval  -cont sevelamer to 1600 tid, please ensure renal/diabetic diet        Waldemar Carlson MD  [facility]

## 2019-11-16 NOTE — PROGRESS NOTES
[facility] Medicine  Progress Note    Patient Name: Ping Davenport  MRN: [id number]  Patient Class: IP- Inpatient   Admission Date: 11/5/2019  Length of Stay: 11 days  Attending Physician: Bucky Chapin MD  Primary Care Provider: Primary Doctor No        Subjective:     Principal Problem:Acute renal failure        HPI:  55 yo female with no past medical history due to being in the ED presenting with 2 week history of worsening of SOB and peripheral edema. Per sister, patient has had elevated blood pressures at home and sister has tried to give Losartan to the patient but it has not improved her blood pressures. Patient has not been able to lay flat due to dyspnea.     In the ED, patient's blood pressure was in the 200/100s. Received Lasix 80mg IV. Patient presented in respiratory distress and placed on Bipap. When patient did not improvement clinically on Bipap, patient was intubated and sedated with Fentanyl and Nitroglycerine drip. Bun/Cr elevated at 47/3.4 up from a baseline of 11/0.9. Trop elevated at 0.206.       Interval History:   NAEON. S/p HD, will receive more today to remove fluid. Patient is feeling 'stronger' and wanted information on her disease. Is interested in knowing 'stage' of kidney disease.    Review of Systems  Objective:     Vital Signs (Most Recent):  Temp: 98.8 °F (37.1 °C) (11/16/19 0741)  Pulse: 77 (11/16/19 0741)  Resp: 20 (11/16/19 0741)  BP: (!) 164/77 (11/16/19 0741)  SpO2: 96 % (11/16/19 0741) Vital Signs (24h Range):  Temp:  [96.7 °F (35.9 °C)-99.3 °F (37.4 °C)] 98.8 °F (37.1 °C)  Pulse:  [] 77  Resp:  [14-20] 20  SpO2:  [95 %-97 %] 96 %  BP: (119-169)/(59-95) 164/77     Weight: 86.4 kg (190 lb 7.6 oz)  Body mass index is 34.84 kg/m².    Intake/Output Summary (Last 24 hours) at 11/16/2019 0993  Last data filed at 11/16/2019 0409  Gross per 24 hour   Intake 460 ml   Output 4500 ml   Net -4040 ml      Physical Exam    Significant Labs:   Recent  Lab Results       11/16/19  0640   11/16/19  0426   11/15/19  2220   11/15/19  1618   11/15/19  1311        Albumin   2.5           Alkaline Phosphatase   70           ALT   14           Anion Gap   11           AST   25           Baso #   0.02           Basophil%   0.1           BILIRUBIN TOTAL   0.3  Comment:  For infants and newborns, interpretation of results should be based  on gestational age, weight and in agreement with clinical  observations.  Premature Infant recommended reference ranges:  Up to 24 hours.............<8.0 mg/dL  Up to 48 hours............<12.0 mg/dL  3-5 days..................<15.0 mg/dL  6-29 days.................<15.0 mg/dL             BUN, Bld   75           Calcium   8.3           Chloride   96           CO2   26           Creatinine   4.4           Differential Method   Automated           eGFR if    12           eGFR if non    11  Comment:  Calculation used to obtain the estimated glomerular filtration  rate (eGFR) is the CKD-EPI equation.              Eos #   0.0           Eosinophil%   0.1           Glucose   242           Gran # (ANC)   10.7           Gran%   81.5           Hematocrit   26.0           Hemoglobin   8.3           Lymph #   0.9           Lymph%   6.6           Magnesium   2.0           MCH   27.5           MCHC   31.9           MCV   86           Mono #   1.6           Mono%   11.7           MPV   12.7           Phosphorus   4.1           Platelets   192           POCT Glucose 210   218 241 203     Potassium   3.9           PROTEIN TOTAL   5.6           RBC   3.02           RDW   14.1           Sodium   133           WBC   13.55                                Significant Imaging:   Imaging Results          US Lower Extremity Veins Bilateral (Final result)  Result time 11/05/19 16:21:54    Final result by Suzanne Berrios MD (11/05/19 16:21:54)                 Impression:      No evidence of deep venous thrombosis in either lower  extremity.      Electronically signed by: Suzanne Berrios MD  Date:    11/05/2019  Time:    16:21             Narrative:    EXAMINATION:  US LOWER EXTREMITY VEINS BILATERAL    CLINICAL HISTORY:  rule out dvt; Acute respiratory failure with hypoxia    TECHNIQUE:  Duplex and color flow Doppler and dynamic compression was performed of the bilateral lower extremity veins was performed.    COMPARISON:  None    FINDINGS:  Right thigh veins: The common femoral, femoral, popliteal, upper greater saphenous, and deep femoral veins are patent and free of thrombus. The veins are normally compressible and have normal phasic flow and augmentation response.    Right calf veins: The visualized calf veins are patent.    Left thigh veins: The common femoral, femoral, popliteal, upper greater saphenous, and deep femoral veins are patent and free of thrombus. The veins are normally compressible and have normal phasic flow and augmentation response.    Left calf veins: The visualized calf veins are patent.    Miscellaneous: Subcutaneous edema noted lower extremities.                               US Retroperitoneal Complete (Kidney and (Final result)  Result time 11/05/19 09:44:48    Final result by Fransico Yun MD (11/05/19 09:44:48)                 Impression:      No hydronephrosis.    Elevated left renal resistive index, a nonspecific indicator of medical renal disease.    Incidental left pleural effusion.      Electronically signed by: Fransico Yun  Date:    11/05/2019  Time:    09:44             Narrative:    EXAMINATION:  US RETROPERITONEAL COMPLETE    CLINICAL HISTORY:  acute renal failure;    TECHNIQUE:  Ultrasound of the kidneys and urinary bladder was performed including color flow and Doppler evaluation of the kidneys.    COMPARISON:  None available    FINDINGS:  Right kidney: Right kidney measures 12.4 cm.  Resistive index of 0.63.  No hydronephrosis.    Left kidney: Left kidney measures 11.6 cm.  Resistive  index of 0.80.  No hydronephrosis    Urinary bladder is unremarkable.  Splenic resistive index of 0.75.  Incidental left pleural effusion.                               X-Ray Chest 1 View (Final result)  Result time 11/05/19 06:16:39    Final result by Umberto Quinn MD (11/05/19 06:16:39)                 Impression:      As above.      Electronically signed by: Umberto Quinn MD  Date:    11/05/2019  Time:    06:16             Narrative:    EXAMINATION:  XR CHEST 1 VIEW    CLINICAL HISTORY:  Respiratory failure, unspecified, unspecified whether with hypoxia or hypercapnia    TECHNIQUE:  Single frontal view of the chest was performed.    COMPARISON:  11/05/2019 04:07 hours    FINDINGS:  There has been interval placement of an endotracheal tube with tip terminating approximately 4.0 cm above the lindsay.  Esophagogastric tube has been intervally placed extending below the diaphragm, extending beyond the field of view of the examination.  No significant change in cardiopulmonary status compared to prior examination, noting interstitial edema bilateral pleural effusions.  No evidence of pneumothorax.                               X-Ray Chest AP Portable (Final result)  Result time 11/05/19 05:11:22    Final result by Umberto Quinn MD (11/05/19 05:11:22)                 Impression:      Radiographic findings suggestive of edema/CHF with bilateral pleural effusions and atelectasis/consolidation.      Electronically signed by: Umberto Quinn MD  Date:    11/05/2019  Time:    05:11             Narrative:    EXAMINATION:  XR CHEST AP PORTABLE    CLINICAL HISTORY:  CHF;    TECHNIQUE:  Single frontal view of the chest was performed.    COMPARISON:  None    FINDINGS:  Cardiac monitoring leads overlie the chest.  Cardiomediastinal silhouette appears mildly enlarged.  There are bilateral interstitial opacities suggesting edema.  There is opacification of the bilateral lower lung zones, right more so than left,  suggestive of a combination of pleural fluid with associated atelectasis and/or consolidation.  There is no evidence of pneumothorax.  Visualized osseous structures appear intact.                                    Assessment/Plan:   Ms. Davenport is a 54 y.o. female who was admitted for HTN emergency which proceeded acute flash pulmonary edema.     Flash Pulmonary Edema   Resolved   Maintaining o2 sat on room air    Will continue to monitor      HTN Emergency   Resolved   Patient with underlying essential HTN   Current BP controlled   Currently on losartan 100mg, amlodipine 10mg PO daily and Coreg 12.5mg PO BID per recs  Will continue to monitor      Acute decompensated Heart Failure with preserved ejection Fraction   Patient now compensated   TTE from 11/5/19: shows EF of 50% and grade 1 diastolic dysfunction   Monitor UOP  Daily weights   Fluid restriction < 1,500 ml   Will continue Lasix 160mg IV b.i.d.  Will continue Metolazone 5mg PO daily      Nephrotic Syndrome   Most likely secondary to hypertensive nephropathy  Proteinuria improving   MEGAN, RF negative  C3/C4 normal   FTA Abs neg   GBM Ab neg   HIV neg    Renal Biopsy shows - severe nodular glomerulosclerosis. 11 out of 18 glomeruli are completly scarred consistent with nodular sclerosis. Appears like diabetic nephropathy but if not consistent with hx then could be idiopathic glomerular sclerosis as both are identical. 70-80% interstitial fibrosis consistent with severe hypertensive changes. No immunotype staining  Will follow up with Nephrology      NSTEMI   Most likely secondary to type 2 demand ischemia  Resolved  Will obtain Trops and EKG in the setting of Angina      Acute Kidney Injury on CKD   Likely 2/2 to intra-renal disease   FeUrea 43.2%   Variable UOP   Cr worsening, will follow up AM lasbs   PermCath placed in RIJ on 11/13/19 w/o complications   Will need Hemodialysis today   Avoid Nephrotoxic medications   Follow up on Nephrology  recommendations      Normocytic Anemia   Stable   Most likely secondary to anemia of chronic disease  Will continue to monitor      Prediabetes   Steroid induced hyperglycemia   A1c 5.8   BG goal 140-180 while inpatient   Accuchecks ACHS.   Determir 35 units AM, 10units PM  Aspart 10 units TID with meals    Will continue to Monitor      VTE prophylaxis: SCDs     CODE STATUS: Full Code     Disposition: HD today. Nephrology following. Will continue to monitor BG.      Case discussed both upper level resident as well as attending physician  VTE Risk Mitigation (From admission, onward)         Ordered     heparin (porcine) injection 4,100 Units  As needed (PRN)      11/14/19 1238     Place sequential compression device  Until discontinued      11/13/19 1901     IP VTE HIGH RISK PATIENT  Once      11/05/19 0651                      Monique Negron MD  Department of Hospital Medicine   [facility]

## 2019-11-16 NOTE — SUBJECTIVE & OBJECTIVE
Interval History:   NAEON. S/p     Review of Systems  Objective:     Vital Signs (Most Recent):  Temp: 98.8 °F (37.1 °C) (11/16/19 0741)  Pulse: 77 (11/16/19 0741)  Resp: 20 (11/16/19 0741)  BP: (!) 164/77 (11/16/19 0741)  SpO2: 96 % (11/16/19 0741) Vital Signs (24h Range):  Temp:  [96.7 °F (35.9 °C)-99.3 °F (37.4 °C)] 98.8 °F (37.1 °C)  Pulse:  [] 77  Resp:  [14-20] 20  SpO2:  [95 %-97 %] 96 %  BP: (119-169)/(59-95) 164/77     Weight: 86.4 kg (190 lb 7.6 oz)  Body mass index is 34.84 kg/m².    Intake/Output Summary (Last 24 hours) at 11/16/2019 0929  Last data filed at 11/16/2019 0409  Gross per 24 hour   Intake 460 ml   Output 4500 ml   Net -4040 ml      Physical Exam    Significant Labs:   Recent Lab Results       11/16/19  0640   11/16/19  0426   11/15/19  2220   11/15/19  1618   11/15/19  1311        Albumin   2.5           Alkaline Phosphatase   70           ALT   14           Anion Gap   11           AST   25           Baso #   0.02           Basophil%   0.1           BILIRUBIN TOTAL   0.3  Comment:  For infants and newborns, interpretation of results should be based  on gestational age, weight and in agreement with clinical  observations.  Premature Infant recommended reference ranges:  Up to 24 hours.............<8.0 mg/dL  Up to 48 hours............<12.0 mg/dL  3-5 days..................<15.0 mg/dL  6-29 days.................<15.0 mg/dL             BUN, Bld   75           Calcium   8.3           Chloride   96           CO2   26           Creatinine   4.4           Differential Method   Automated           eGFR if    12           eGFR if non    11  Comment:  Calculation used to obtain the estimated glomerular filtration  rate (eGFR) is the CKD-EPI equation.              Eos #   0.0           Eosinophil%   0.1           Glucose   242           Gran # (ANC)   10.7           Gran%   81.5           Hematocrit   26.0           Hemoglobin   8.3           Lymph #   0.9            Lymph%   6.6           Magnesium   2.0           MCH   27.5           MCHC   31.9           MCV   86           Mono #   1.6           Mono%   11.7           MPV   12.7           Phosphorus   4.1           Platelets   192           POCT Glucose 210   218 241 203     Potassium   3.9           PROTEIN TOTAL   5.6           RBC   3.02           RDW   14.1           Sodium   133           WBC   13.55                                Significant Imaging:   Imaging Results          US Lower Extremity Veins Bilateral (Final result)  Result time 11/05/19 16:21:54    Final result by Suzanne Berrios MD (11/05/19 16:21:54)                 Impression:      No evidence of deep venous thrombosis in either lower extremity.      Electronically signed by: Suzanne Berrios MD  Date:    11/05/2019  Time:    16:21             Narrative:    EXAMINATION:  US LOWER EXTREMITY VEINS BILATERAL    CLINICAL HISTORY:  rule out dvt; Acute respiratory failure with hypoxia    TECHNIQUE:  Duplex and color flow Doppler and dynamic compression was performed of the bilateral lower extremity veins was performed.    COMPARISON:  None    FINDINGS:  Right thigh veins: The common femoral, femoral, popliteal, upper greater saphenous, and deep femoral veins are patent and free of thrombus. The veins are normally compressible and have normal phasic flow and augmentation response.    Right calf veins: The visualized calf veins are patent.    Left thigh veins: The common femoral, femoral, popliteal, upper greater saphenous, and deep femoral veins are patent and free of thrombus. The veins are normally compressible and have normal phasic flow and augmentation response.    Left calf veins: The visualized calf veins are patent.    Miscellaneous: Subcutaneous edema noted lower extremities.                               US Retroperitoneal Complete (Kidney and (Final result)  Result time 11/05/19 09:44:48    Final result by Fransico Yun MD (11/05/19  09:44:48)                 Impression:      No hydronephrosis.    Elevated left renal resistive index, a nonspecific indicator of medical renal disease.    Incidental left pleural effusion.      Electronically signed by: Fransico Yun  Date:    11/05/2019  Time:    09:44             Narrative:    EXAMINATION:  US RETROPERITONEAL COMPLETE    CLINICAL HISTORY:  acute renal failure;    TECHNIQUE:  Ultrasound of the kidneys and urinary bladder was performed including color flow and Doppler evaluation of the kidneys.    COMPARISON:  None available    FINDINGS:  Right kidney: Right kidney measures 12.4 cm.  Resistive index of 0.63.  No hydronephrosis.    Left kidney: Left kidney measures 11.6 cm.  Resistive index of 0.80.  No hydronephrosis    Urinary bladder is unremarkable.  Splenic resistive index of 0.75.  Incidental left pleural effusion.                               X-Ray Chest 1 View (Final result)  Result time 11/05/19 06:16:39    Final result by Umberto Quinn MD (11/05/19 06:16:39)                 Impression:      As above.      Electronically signed by: Umberto Quinn MD  Date:    11/05/2019  Time:    06:16             Narrative:    EXAMINATION:  XR CHEST 1 VIEW    CLINICAL HISTORY:  Respiratory failure, unspecified, unspecified whether with hypoxia or hypercapnia    TECHNIQUE:  Single frontal view of the chest was performed.    COMPARISON:  11/05/2019 04:07 hours    FINDINGS:  There has been interval placement of an endotracheal tube with tip terminating approximately 4.0 cm above the lindsay.  Esophagogastric tube has been intervally placed extending below the diaphragm, extending beyond the field of view of the examination.  No significant change in cardiopulmonary status compared to prior examination, noting interstitial edema bilateral pleural effusions.  No evidence of pneumothorax.                               X-Ray Chest AP Portable (Final result)  Result time 11/05/19 05:11:22    Final result  by Umberto Quinn MD (11/05/19 05:11:22)                 Impression:      Radiographic findings suggestive of edema/CHF with bilateral pleural effusions and atelectasis/consolidation.      Electronically signed by: Umberto Quinn MD  Date:    11/05/2019  Time:    05:11             Narrative:    EXAMINATION:  XR CHEST AP PORTABLE    CLINICAL HISTORY:  CHF;    TECHNIQUE:  Single frontal view of the chest was performed.    COMPARISON:  None    FINDINGS:  Cardiac monitoring leads overlie the chest.  Cardiomediastinal silhouette appears mildly enlarged.  There are bilateral interstitial opacities suggesting edema.  There is opacification of the bilateral lower lung zones, right more so than left, suggestive of a combination of pleural fluid with associated atelectasis and/or consolidation.  There is no evidence of pneumothorax.  Visualized osseous structures appear intact.

## 2019-11-16 NOTE — PLAN OF CARE
Plan of care reviewed with patient. AAOx4. Verbal reported of understanding. NSR on monitor with no red alarms noted. Safety maintained. No complaint of pain throughout the shift. Blood glucose closely monitored. Heels off bed. Turned every two hours as tolerated.  Due meds given as ordered. No acute distress noted. Bed in lowest position. Head of bed elevated. Wheels locked. Bed alarm on. Side rails x 2 are up. Call bell within reach. Patient will be monitored overnight.

## 2019-11-16 NOTE — PLAN OF CARE
VN cued into patients room - patient off floor in dialysis at this time - will attempt to round at a later time.

## 2019-11-16 NOTE — PLAN OF CARE
1900 pt up in chair.  Nurse assisted to BSC urinated 600 cc clr yellow urine then back to bed. 2100 , coved w/ 2 units novalog.      Tele: SR,  HR 70,  No alarms.     Bed in lowest position, wheels locked, non skid socks, ID band worn, personal items and call bell with in reach, bed alarm set.

## 2019-11-17 LAB
ALBUMIN SERPL BCP-MCNC: 2.7 G/DL (ref 3.5–5.2)
ALP SERPL-CCNC: 86 U/L (ref 55–135)
ALT SERPL W/O P-5'-P-CCNC: 22 U/L (ref 10–44)
ANION GAP SERPL CALC-SCNC: 10 MMOL/L (ref 8–16)
AST SERPL-CCNC: 36 U/L (ref 10–40)
BASOPHILS # BLD AUTO: 0.02 K/UL (ref 0–0.2)
BASOPHILS NFR BLD: 0.1 % (ref 0–1.9)
BILIRUB SERPL-MCNC: 0.3 MG/DL (ref 0.1–1)
BUN SERPL-MCNC: 57 MG/DL (ref 6–20)
CALCIUM SERPL-MCNC: 8.5 MG/DL (ref 8.7–10.5)
CHLORIDE SERPL-SCNC: 97 MMOL/L (ref 95–110)
CO2 SERPL-SCNC: 26 MMOL/L (ref 23–29)
CREAT SERPL-MCNC: 3.4 MG/DL (ref 0.5–1.4)
DIFFERENTIAL METHOD: ABNORMAL
EOSINOPHIL # BLD AUTO: 0 K/UL (ref 0–0.5)
EOSINOPHIL NFR BLD: 0 % (ref 0–8)
ERYTHROCYTE [DISTWIDTH] IN BLOOD BY AUTOMATED COUNT: 14.3 % (ref 11.5–14.5)
EST. GFR  (AFRICAN AMERICAN): 17 ML/MIN/1.73 M^2
EST. GFR  (NON AFRICAN AMERICAN): 14 ML/MIN/1.73 M^2
GLUCOSE SERPL-MCNC: 141 MG/DL (ref 70–110)
HCT VFR BLD AUTO: 25.5 % (ref 37–48.5)
HGB BLD-MCNC: 8.3 G/DL (ref 12–16)
LYMPHOCYTES # BLD AUTO: 1.4 K/UL (ref 1–4.8)
LYMPHOCYTES NFR BLD: 9 % (ref 18–48)
MAGNESIUM SERPL-MCNC: 1.9 MG/DL (ref 1.6–2.6)
MCH RBC QN AUTO: 27.6 PG (ref 27–31)
MCHC RBC AUTO-ENTMCNC: 32.5 G/DL (ref 32–36)
MCV RBC AUTO: 85 FL (ref 82–98)
MONOCYTES # BLD AUTO: 0.8 K/UL (ref 0.3–1)
MONOCYTES NFR BLD: 5 % (ref 4–15)
NEUTROPHILS # BLD AUTO: 13 K/UL (ref 1.8–7.7)
NEUTROPHILS NFR BLD: 85.9 % (ref 38–73)
PHOSPHATE SERPL-MCNC: 3.4 MG/DL (ref 2.7–4.5)
PLATELET # BLD AUTO: 175 K/UL (ref 150–350)
PMV BLD AUTO: 11.5 FL (ref 9.2–12.9)
POCT GLUCOSE: 114 MG/DL (ref 70–110)
POCT GLUCOSE: 132 MG/DL (ref 70–110)
POCT GLUCOSE: 172 MG/DL (ref 70–110)
POCT GLUCOSE: 222 MG/DL (ref 70–110)
POTASSIUM SERPL-SCNC: 4.2 MMOL/L (ref 3.5–5.1)
PROT SERPL-MCNC: 6 G/DL (ref 6–8.4)
RBC # BLD AUTO: 3.01 M/UL (ref 4–5.4)
SODIUM SERPL-SCNC: 133 MMOL/L (ref 136–145)
WBC # BLD AUTO: 15.73 K/UL (ref 3.9–12.7)

## 2019-11-17 PROCEDURE — 63600175 PHARM REV CODE 636 W HCPCS: Performed by: SURGERY

## 2019-11-17 PROCEDURE — 80053 COMPREHEN METABOLIC PANEL: CPT

## 2019-11-17 PROCEDURE — 83735 ASSAY OF MAGNESIUM: CPT

## 2019-11-17 PROCEDURE — 25000003 PHARM REV CODE 250: Performed by: STUDENT IN AN ORGANIZED HEALTH CARE EDUCATION/TRAINING PROGRAM

## 2019-11-17 PROCEDURE — 63600175 PHARM REV CODE 636 W HCPCS: Performed by: STUDENT IN AN ORGANIZED HEALTH CARE EDUCATION/TRAINING PROGRAM

## 2019-11-17 PROCEDURE — 85025 COMPLETE CBC W/AUTO DIFF WBC: CPT

## 2019-11-17 PROCEDURE — 25000003 PHARM REV CODE 250: Performed by: SURGERY

## 2019-11-17 PROCEDURE — 84100 ASSAY OF PHOSPHORUS: CPT

## 2019-11-17 PROCEDURE — 36415 COLL VENOUS BLD VENIPUNCTURE: CPT

## 2019-11-17 PROCEDURE — 11000001 HC ACUTE MED/SURG PRIVATE ROOM

## 2019-11-17 PROCEDURE — 97530 THERAPEUTIC ACTIVITIES: CPT

## 2019-11-17 RX ORDER — DOCUSATE SODIUM 50 MG/5ML
100 LIQUID ORAL 2 TIMES DAILY
Status: DISCONTINUED | OUTPATIENT
Start: 2019-11-17 | End: 2019-11-18 | Stop reason: HOSPADM

## 2019-11-17 RX ORDER — DOCUSATE SODIUM 50 MG/5ML
100 LIQUID ORAL 2 TIMES DAILY
Status: DISCONTINUED | OUTPATIENT
Start: 2019-11-17 | End: 2019-11-17

## 2019-11-17 RX ORDER — HEPARIN SODIUM 5000 [USP'U]/ML
5000 INJECTION, SOLUTION INTRAVENOUS; SUBCUTANEOUS EVERY 12 HOURS
Status: DISCONTINUED | OUTPATIENT
Start: 2019-11-17 | End: 2019-11-18 | Stop reason: HOSPADM

## 2019-11-17 RX ADMIN — CARVEDILOL 12.5 MG: 12.5 TABLET, FILM COATED ORAL at 09:11

## 2019-11-17 RX ADMIN — DOCUSATE SODIUM 100 MG: 50 LIQUID ORAL at 09:11

## 2019-11-17 RX ADMIN — FUROSEMIDE 160 MG: 10 INJECTION, SOLUTION INTRAVENOUS at 10:11

## 2019-11-17 RX ADMIN — INSULIN ASPART 2 UNITS: 100 INJECTION, SOLUTION INTRAVENOUS; SUBCUTANEOUS at 03:11

## 2019-11-17 RX ADMIN — INSULIN ASPART 10 UNITS: 100 INJECTION, SOLUTION INTRAVENOUS; SUBCUTANEOUS at 03:11

## 2019-11-17 RX ADMIN — PREDNISONE 60 MG: 20 TABLET ORAL at 11:11

## 2019-11-17 RX ADMIN — MUPIROCIN: 20 OINTMENT TOPICAL at 09:11

## 2019-11-17 RX ADMIN — FUROSEMIDE 160 MG: 10 INJECTION, SOLUTION INTRAVENOUS at 06:11

## 2019-11-17 RX ADMIN — METOLAZONE 10 MG: 2.5 TABLET ORAL at 09:11

## 2019-11-17 RX ADMIN — INSULIN DETEMIR 35 UNITS: 100 INJECTION, SOLUTION SUBCUTANEOUS at 10:11

## 2019-11-17 RX ADMIN — SEVELAMER CARBONATE 1600 MG: 800 TABLET, FILM COATED ORAL at 06:11

## 2019-11-17 RX ADMIN — INSULIN DETEMIR 10 UNITS: 100 INJECTION, SOLUTION SUBCUTANEOUS at 09:11

## 2019-11-17 RX ADMIN — HEPARIN SODIUM 5000 UNITS: 5000 INJECTION, SOLUTION INTRAVENOUS; SUBCUTANEOUS at 09:11

## 2019-11-17 RX ADMIN — SENNOSIDES AND DOCUSATE SODIUM 1 TABLET: 8.6; 5 TABLET ORAL at 03:11

## 2019-11-17 RX ADMIN — LOSARTAN POTASSIUM 100 MG: 50 TABLET ORAL at 09:11

## 2019-11-17 RX ADMIN — AMLODIPINE BESYLATE 10 MG: 5 TABLET ORAL at 09:11

## 2019-11-17 RX ADMIN — SEVELAMER CARBONATE 1600 MG: 800 TABLET, FILM COATED ORAL at 11:11

## 2019-11-17 RX ADMIN — HEPARIN SODIUM 5000 UNITS: 5000 INJECTION, SOLUTION INTRAVENOUS; SUBCUTANEOUS at 03:11

## 2019-11-17 RX ADMIN — INSULIN ASPART 2 UNITS: 100 INJECTION, SOLUTION INTRAVENOUS; SUBCUTANEOUS at 09:11

## 2019-11-17 NOTE — PLAN OF CARE
VN cued into patient room for rounding; bedside nurseLory, assisting patient to bedside commode. Will attempt to round at a later time; will be available PRN.

## 2019-11-17 NOTE — PT/OT/SLP PROGRESS
Physical Therapy Treatment    Patient Name:  Ping Davenport   MRN:  [id number]    Recommendations:     Discharge Recommendations:  rehabilitation facility   Discharge Equipment Recommendations: (defer to IPR)   Barriers to discharge: Decreased caregiver support and decreased functional mobility    Assessment:     Ping Davenport is a 54 y.o. female admitted with a medical diagnosis of Acute renal failure.  She presents with the following impairments/functional limitations:  weakness, impaired endurance, impaired self care skills, impaired functional mobilty, impaired balance, gait instability, decreased lower extremity function, decreased safety awareness, edema. Pt would continue to benefit from P.T. To assist pt's return to PLOF.    .    Rehab Prognosis: Good; patient would benefit from acute skilled PT services to address these deficits and reach maximum level of function.    Recent Surgery: Procedure(s) (LRB):  Insertion,catheter,tunneled (Right) 4 Days Post-Op    Plan:     During this hospitalization, patient to be seen 6 x/week to address the identified rehab impairments via gait training, therapeutic activities, therapeutic exercises, neuromuscular re-education and progress toward the following goals:    · Plan of Care Expires:  12/15/19    Subjective     Chief Complaint: Fatigue and constipation  Patient/Family Comments/goals: Pt agreed to tx.  Pain/Comfort:  · Pain Rating 1: 0/10  · Pain Rating Post-Intervention 1: 0/10      Objective:     Communicated with RN (Lory) prior to session.  Patient found left sidelying with telemetry upon PT entry to room.     General Precautions: Standard, fall   Orthopedic Precautions:N/A   Braces:       Functional Mobility:  · Bed Mobility:     · Scooting: supervision and to EOB  · Supine to Sit: supervision  · Sit to Supine: moderate assistance and for BLEs  · Transfers:     · Sit to Stand:  contact guard assistance with rolling walker and x 3 bouts  · Gait: 4 ft  "forward and backward x 2 bouts with RW and CGA  · Balance: sitting good, standing fair+, gait fair      AM-PAC 6 CLICK MOBILITY  Turning over in bed (including adjusting bedclothes, sheets and blankets)?: 3  Sitting down on and standing up from a chair with arms (e.g., wheelchair, bedside commode, etc.): 3  Moving from lying on back to sitting on the side of the bed?: 3  Moving to and from a bed to a chair (including a wheelchair)?: 3  Need to walk in hospital room?: 3  Climbing 3-5 steps with a railing?: 2  Basic Mobility Total Score: 17       Therapeutic Activities and Exercises:   Pt c/o having a "bad night" secondary to constipation.  Pt stated she was given something to help her go to the bathroom and didn't want to ambulate to fat from the b/s commode secondary to fear of "having an accident."  Seated BLE therex: AP, LAQs, hip ABD/ADD with pillow and glut sets 15 x 2 with vc's.  Standing BLE therex with RW: marching in place 15 reps x 2 and heel raises 10 reps x 2 with CGA/Malik for RW stability with therex.  Pt returned to bed supine HOB elevated to eat after tx.    Patient left supine with all lines intact, call button in reach, bed alarm on and RN notified..    GOALS:   Multidisciplinary Problems     Physical Therapy Goals        Problem: Physical Therapy Goal    Goal Priority Disciplines Outcome Goal Variances Interventions   Physical Therapy Goal     PT, PT/OT Ongoing, Progressing     Description:  Goals to be met by: 2019     Patient will increase functional independence with mobility by performin. Supine to sit with Modified [place]  2. Sit to stand transfer with Modified [place]  3. Bed to chair transfer with Modified [place] using Rolling Walker  4. Gait  x 100 feet with Modified [place] using Rolling Walker.   5. Lower extremity exercise program x12 reps per handout, with supervision                      Time Tracking:     PT Received On: 19  PT Start Time: 1356   "   PT Stop Time: 1413  PT Total Time (min): 17 min     Billable Minutes: Therapeutic Activity 17    Treatment Type: Treatment  PT/PTA: PTA     PTA Visit Number: 1     Ania Hernandez PTA  11/17/2019

## 2019-11-17 NOTE — PLAN OF CARE
POC reviewed, pt verbalize understanding. Pt denies pain/discomfort. Patient complains of  constipation administered Senna prn as ordered.  Fall precaution maintained: bed on lowest position, call light within reach, bed alarm set, side rail up x 2, non skid sock on. Will continue to monitor.

## 2019-11-17 NOTE — ANESTHESIA PROCEDURE NOTES
Peripheral IV Insertion    Diagnosis: I87.9 Disorder of vein, unspecified.    Patient location during procedure: floor  Procedure start time: 11/16/2019 4:00 PM  Timeout: 11/16/2019 4:00 PM  Procedure end time: 11/16/2019 4:15 PM    Staffing  Authorizing Provider: Bucky Prince MD  Performing Provider: Bucky Prince MD    Staffing  Other anesthesia staff: Sudha Long MD  Anesthesiologist was present at the time of the procedure.    Preanesthetic Checklist  Completed: patient identified, site marked, surgical consent, pre-op evaluation, timeout performed, IV checked, risks and benefits discussed, monitors and equipment checked and anesthesia consent givenPeripheral IV Insertion  Skin Prep: chlorhexidine gluconate  Local Infiltration: none  Orientation: left  Location: forearm  Catheter Size: 20 G  Catheter placement by Ultrasound guidance. Heme positive aspiration all ports.  Vessel Caliber: small, patent  Vascular Doppler:  not done  Needle advanced into vessel with real time Ultrasound guidance.  Sterile sheath used.Insertion Attempts: 1  Assessment  Dressing: secured with tape and tegaderm  Patient: Tolerated well  Line flushed easily.

## 2019-11-17 NOTE — NURSING
Care plan reviewed with patient, AAOx4, no respiratory distress noted, on room air. NSR per cardiac monitor, no red alarm noted. Denies any pain of discomfort, blood pressure elevated, scheduled hypertensive medications administered, close monitoring maintained, education provided on medication effect and side effect, voices understanding. BM x1 this morning, care provided. Call light within her reach, no apparent distress noted, bed in low position, bed alarm on, educated on the importance of calling as needed, voices understanding, stable at this time.

## 2019-11-17 NOTE — PLAN OF CARE
Problem: Physical Therapy Goal  Goal: Physical Therapy Goal  Description  Goals to be met by: 2019     Patient will increase functional independence with mobility by performin. Supine to sit with Modified [place]  2. Sit to stand transfer with Modified [place]  3. Bed to chair transfer with Modified [place] using Rolling Walker  4. Gait  x 100 feet with Modified [place] using Rolling Walker.   5. Lower extremity exercise program x12 reps per handout, with supervision     Outcome: Ongoing, Progressing   Continue working toward goals.

## 2019-11-17 NOTE — PROGRESS NOTES
Progress Note  [facility]  Admit Date: 11/5/2019   LOS: 12 days   SUBJECTIVE:     No acute overnight events. Tolerated HD well yesterday. Last BM 11/12. Daily bowel regimen added. UOP 1,400.  Patient tolerating physical therapy. Tolerating PO intake. Passing flatus. No new complaints at this time.     Review of Systems   Constitutional: Negative for chills and fever.   HENT: Negative for sore throat.    Eyes: Negative for blurred vision and double vision.   Respiratory: Negative for cough and shortness of breath.    Cardiovascular: Negative for chest pain.   Gastrointestinal: Negative for abdominal pain, constipation, diarrhea, heartburn, nausea and vomiting.   Genitourinary: Negative for dysuria and urgency.   Musculoskeletal: Negative for back pain and falls.   Skin: Negative for itching and rash.   Neurological: Negative for headaches.   Endo/Heme/Allergies: Does not bruise/bleed easily.   Psychiatric/Behavioral: The patient is not nervous/anxious.        OBJECTIVE:   Vital Signs (Most Recent)  Temp: 97.7 °F (36.5 °C) (11/17/19 0535)  Pulse: 90 (11/17/19 0535)  Resp: 18 (11/17/19 0535)  BP: (!) 141/90 (11/17/19 0535)  SpO2: 95 % (11/17/19 0535)    I & O (Last 24H):    Intake/Output Summary (Last 24 hours) at 11/17/2019 0617  Last data filed at 11/16/2019 2100  Gross per 24 hour   Intake 860 ml   Output 4300 ml   Net -3440 ml     Wt Readings from Last 3 Encounters:   11/17/19 85.6 kg (188 lb 11.4 oz)   10/13/14 72 kg (158 lb 11.2 oz)       Current Diet Order   Procedures    Diet renal        Physical Exam   Constitutional: She is oriented to person, place, and time and well-developed, well-nourished, and in no distress.   HENT:   Head: Normocephalic and atraumatic.   Eyes: Pupils are equal, round, and reactive to light. EOM are normal.   Neck: Normal range of motion. Neck supple.   Cardiovascular: Normal rate and regular rhythm.   Pulmonary/Chest: Effort normal and breath sounds normal.   Right Permacath  in place   Abdominal: Soft. Bowel sounds are normal.   Musculoskeletal:   Anasarca improving.    Neurological: She is alert and oriented to person, place, and time.   Skin: Skin is warm and dry.   Psychiatric: Affect normal.     Laboratory Data:  CBC  Recent Labs   Lab 11/14/19  0505 11/15/19  0537 11/16/19  0426   WBC 14.86* 14.77* 13.55*   RBC 3.58* 3.36* 3.02*   HGB 9.6* 9.1* 8.3*   HCT 30.1* 28.6* 26.0*    219 192   MCV 84 85 86   MCH 26.8* 27.1 27.5   MCHC 31.9* 31.8* 31.9*     CMP  Recent Labs   Lab 11/14/19  0505 11/15/19  0537 11/16/19  0426   CALCIUM 9.2 8.8 8.3*   PROT 5.9* 5.9* 5.6*   * 134* 133*   K 4.0 4.1 3.9   CO2 24 25 26   CL 94* 94* 96   * 98* 75*   CREATININE 6.7* 5.6* 4.4*   ALKPHOS 61 79 70   ALT 20 18 14   AST 20 25 25   BILITOT 0.2 0.3 0.3     POCT-Glucose  POCT Glucose   Date Value Ref Range Status   11/16/2019 240 (H) 70 - 110 mg/dL Final   11/16/2019 173 (H) 70 - 110 mg/dL Final   11/16/2019 119 (H) 70 - 110 mg/dL Final   11/16/2019 210 (H) 70 - 110 mg/dL Final   11/15/2019 218 (H) 70 - 110 mg/dL Final   11/15/2019 241 (H) 70 - 110 mg/dL Final   11/15/2019 203 (H) 70 - 110 mg/dL Final   11/15/2019 296 (H) 70 - 110 mg/dL Final   11/14/2019 276 (H) 70 - 110 mg/dL Final   11/14/2019 222 (H) 70 - 110 mg/dL Final   11/14/2019 327 (H) 70 - 110 mg/dL Final     COAGS  Recent Labs   Lab 11/13/19  1102   INR 1.1     UA  No results for input(s): COLORU, CLARITYU, SPECGRAV, PHUR, PROTEINUA, GLUCOSEU, BLOODU, WBCU, RBCU, BACTERIA, MUCUS in the last 24 hours.    Invalid input(s):  [facility]  MICRO  Microbiology Results (last 7 days)     Procedure Component Value Units Date/Time    Blood culture [[id number]] Collected:  11/05/19 0936    Order Status:  Completed Specimen:  Blood Updated:  11/10/19 1812     Blood Culture, Routine No growth after 5 days.    Blood culture [[id number]] Collected:  11/05/19 0928    Order Status:  Completed Specimen:  Blood Updated:  11/10/19 1812     Blood  Culture, Routine No growth after 5 days.        LIPID PANEL  Lab Results   Component Value Date    CHOL 218 (H) 11/06/2019     Lab Results   Component Value Date    HDL 38 (L) 11/06/2019     Lab Results   Component Value Date    LDLCALC 151.4 11/06/2019     Lab Results   Component Value Date    TRIG 143 11/06/2019     Lab Results   Component Value Date    CHOLHDL 17.4 (L) 11/06/2019       Diagnostic Results:  Imaging Results          US Lower Extremity Veins Bilateral (Final result)  Result time 11/05/19 16:21:54    Final result by Suzanne Berrios MD (11/05/19 16:21:54)                 Impression:      No evidence of deep venous thrombosis in either lower extremity.      Electronically signed by: Suzanne Berrios MD  Date:    11/05/2019  Time:    16:21             Narrative:    EXAMINATION:  US LOWER EXTREMITY VEINS BILATERAL    CLINICAL HISTORY:  rule out dvt; Acute respiratory failure with hypoxia    TECHNIQUE:  Duplex and color flow Doppler and dynamic compression was performed of the bilateral lower extremity veins was performed.    COMPARISON:  None    FINDINGS:  Right thigh veins: The common femoral, femoral, popliteal, upper greater saphenous, and deep femoral veins are patent and free of thrombus. The veins are normally compressible and have normal phasic flow and augmentation response.    Right calf veins: The visualized calf veins are patent.    Left thigh veins: The common femoral, femoral, popliteal, upper greater saphenous, and deep femoral veins are patent and free of thrombus. The veins are normally compressible and have normal phasic flow and augmentation response.    Left calf veins: The visualized calf veins are patent.    Miscellaneous: Subcutaneous edema noted lower extremities.                               US Retroperitoneal Complete (Kidney and (Final result)  Result time 11/05/19 09:44:48    Final result by Fransico Yun MD (11/05/19 09:44:48)                 Impression:       No hydronephrosis.    Elevated left renal resistive index, a nonspecific indicator of medical renal disease.    Incidental left pleural effusion.      Electronically signed by: Fransico Yun  Date:    11/05/2019  Time:    09:44             Narrative:    EXAMINATION:  US RETROPERITONEAL COMPLETE    CLINICAL HISTORY:  acute renal failure;    TECHNIQUE:  Ultrasound of the kidneys and urinary bladder was performed including color flow and Doppler evaluation of the kidneys.    COMPARISON:  None available    FINDINGS:  Right kidney: Right kidney measures 12.4 cm.  Resistive index of 0.63.  No hydronephrosis.    Left kidney: Left kidney measures 11.6 cm.  Resistive index of 0.80.  No hydronephrosis    Urinary bladder is unremarkable.  Splenic resistive index of 0.75.  Incidental left pleural effusion.                               X-Ray Chest 1 View (Final result)  Result time 11/05/19 06:16:39    Final result by Umberto Quinn MD (11/05/19 06:16:39)                 Impression:      As above.      Electronically signed by: Umberto Quinn MD  Date:    11/05/2019  Time:    06:16             Narrative:    EXAMINATION:  XR CHEST 1 VIEW    CLINICAL HISTORY:  Respiratory failure, unspecified, unspecified whether with hypoxia or hypercapnia    TECHNIQUE:  Single frontal view of the chest was performed.    COMPARISON:  11/05/2019 04:07 hours    FINDINGS:  There has been interval placement of an endotracheal tube with tip terminating approximately 4.0 cm above the lindsay.  Esophagogastric tube has been intervally placed extending below the diaphragm, extending beyond the field of view of the examination.  No significant change in cardiopulmonary status compared to prior examination, noting interstitial edema bilateral pleural effusions.  No evidence of pneumothorax.                               X-Ray Chest AP Portable (Final result)  Result time 11/05/19 05:11:22    Final result by Umberto Quinn MD (11/05/19  05:11:22)                 Impression:      Radiographic findings suggestive of edema/CHF with bilateral pleural effusions and atelectasis/consolidation.      Electronically signed by: Umberto Quinn MD  Date:    11/05/2019  Time:    05:11             Narrative:    EXAMINATION:  XR CHEST AP PORTABLE    CLINICAL HISTORY:  CHF;    TECHNIQUE:  Single frontal view of the chest was performed.    COMPARISON:  None    FINDINGS:  Cardiac monitoring leads overlie the chest.  Cardiomediastinal silhouette appears mildly enlarged.  There are bilateral interstitial opacities suggesting edema.  There is opacification of the bilateral lower lung zones, right more so than left, suggestive of a combination of pleural fluid with associated atelectasis and/or consolidation.  There is no evidence of pneumothorax.  Visualized osseous structures appear intact.                                ASSESSMENT/PLAN:   Ms. Davenport is a 54 y.o. female who was admitted for HTN emergency which proceeded acute flash pulmonary edema.     Flash Pulmonary Edema   Resolved   Maintaining o2 sat on room air    Will continue to monitor      HTN Emergency   Resolved   Patient with underlying essential HTN   Current BP controlled   Currently on losartan 100mg, amlodipine 10mg PO daily and Coreg 12.5mg PO BID per recs  Will continue to monitor      Acute decompensated Heart Failure with preserved ejection Fraction   Patient now compensated   TTE from 11/5/19: shows EF of 50% and grade 1 diastolic dysfunction   Monitor UOP  Daily weights   Fluid restriction < 1,500 ml   Will continue Lasix 160mg IV b.i.d.  Will continue Metolazone 10mg PO daily      Nephrotic Syndrome   Most likely secondary to hypertensive nephropathy  Proteinuria improving   MEGAN, RF negative  C3/C4 normal   FTA Abs neg   GBM Ab neg   HIV neg    Renal Biopsy shows - severe nodular glomerulosclerosis. 11 out of 18 glomeruli are completly scarred consistent with nodular sclerosis. Appears like  diabetic nephropathy but if not consistent with hx then could be idiopathic glomerular sclerosis as both are identical. 70-80% interstitial fibrosis consistent with severe hypertensive changes. No immunotype staining  Will follow up with Nephrology      NSTEMI   Most likely secondary to type 2 demand ischemia  Resolved  Will obtain Trops and EKG in the setting of Angina      Acute Kidney Injury on CKD   Likely 2/2 to intra-renal disease   FeUrea 43.2%   Variable UOP   Cr worsening, will follow up AM lasbs   PermCath placed in RIJ on 11/13/19 w/o complications   Completed Day 3/3 of Hemodialysis between 11/14 - 11/16 with volume removed (2000mL, 3400mL, 2900mL)  Awaiting chair for continued HD as outpatient   Avoid Nephrotoxic medications   Follow up on Nephrology recommendations      Normocytic Anemia   Stable   Most likely secondary to anemia of chronic disease  Will continue to monitor      Prediabetes   Steroid induced hyperglycemia   A1c 5.8   BG goal 140-180 while inpatient   Accuchecks ACHS.   Determir 35 units AM, 10units PM  Aspart 10 units TID with meals    Will continue to Monitor BG        VTE prophylaxis: SCDs     CODE STATUS: Full Code      Disposition: Awaiting placement. BG better controled. BP improved. Will follow up nephrology recs.      Carlito Michele MD   [employer] [facility], PGY-2

## 2019-11-18 VITALS
SYSTOLIC BLOOD PRESSURE: 164 MMHG | RESPIRATION RATE: 19 BRPM | TEMPERATURE: 99 F | HEART RATE: 78 BPM | DIASTOLIC BLOOD PRESSURE: 71 MMHG | WEIGHT: 186.06 LBS | HEIGHT: 62 IN | BODY MASS INDEX: 34.24 KG/M2 | OXYGEN SATURATION: 95 %

## 2019-11-18 LAB
ALBUMIN SERPL BCP-MCNC: 2.5 G/DL (ref 3.5–5.2)
ALP SERPL-CCNC: 84 U/L (ref 55–135)
ALT SERPL W/O P-5'-P-CCNC: 22 U/L (ref 10–44)
ANION GAP SERPL CALC-SCNC: 13 MMOL/L (ref 8–16)
ASO AB SERPL-ACNC: <14 IU/ML
AST SERPL-CCNC: 31 U/L (ref 10–40)
BASOPHILS NFR BLD: 0 % (ref 0–1.9)
BILIRUB SERPL-MCNC: 0.3 MG/DL (ref 0.1–1)
BUN SERPL-MCNC: 73 MG/DL (ref 6–20)
CALCIUM SERPL-MCNC: 8.4 MG/DL (ref 8.7–10.5)
CHLORIDE SERPL-SCNC: 93 MMOL/L (ref 95–110)
CO2 SERPL-SCNC: 26 MMOL/L (ref 23–29)
CREAT SERPL-MCNC: 4 MG/DL (ref 0.5–1.4)
DIFFERENTIAL METHOD: ABNORMAL
EOSINOPHIL NFR BLD: 0 % (ref 0–8)
ERYTHROCYTE [DISTWIDTH] IN BLOOD BY AUTOMATED COUNT: 14.5 % (ref 11.5–14.5)
EST. GFR  (AFRICAN AMERICAN): 14 ML/MIN/1.73 M^2
EST. GFR  (NON AFRICAN AMERICAN): 12 ML/MIN/1.73 M^2
GLUCOSE SERPL-MCNC: 188 MG/DL (ref 70–110)
HCT VFR BLD AUTO: 26.5 % (ref 37–48.5)
HGB BLD-MCNC: 8.4 G/DL (ref 12–16)
LYMPHOCYTES NFR BLD: 4 % (ref 18–48)
MAGNESIUM SERPL-MCNC: 2 MG/DL (ref 1.6–2.6)
MCH RBC QN AUTO: 27.1 PG (ref 27–31)
MCHC RBC AUTO-ENTMCNC: 31.7 G/DL (ref 32–36)
MCV RBC AUTO: 86 FL (ref 82–98)
MONOCYTES NFR BLD: 2 % (ref 4–15)
NEUTROPHILS NFR BLD: 91 % (ref 38–73)
NEUTS BAND NFR BLD MANUAL: 3 %
PHOSPHATE SERPL-MCNC: 5.1 MG/DL (ref 2.7–4.5)
PLATELET # BLD AUTO: 175 K/UL (ref 150–350)
PLATELET BLD QL SMEAR: ABNORMAL
PMV BLD AUTO: 12.4 FL (ref 9.2–12.9)
POCT GLUCOSE: 175 MG/DL (ref 70–110)
POCT GLUCOSE: 70 MG/DL (ref 70–110)
POTASSIUM SERPL-SCNC: 4 MMOL/L (ref 3.5–5.1)
PROT SERPL-MCNC: 5.6 G/DL (ref 6–8.4)
RBC # BLD AUTO: 3.1 M/UL (ref 4–5.4)
SODIUM SERPL-SCNC: 132 MMOL/L (ref 136–145)
WBC # BLD AUTO: 12.06 K/UL (ref 3.9–12.7)

## 2019-11-18 PROCEDURE — 25000003 PHARM REV CODE 250: Performed by: STUDENT IN AN ORGANIZED HEALTH CARE EDUCATION/TRAINING PROGRAM

## 2019-11-18 PROCEDURE — 83735 ASSAY OF MAGNESIUM: CPT

## 2019-11-18 PROCEDURE — 94761 N-INVAS EAR/PLS OXIMETRY MLT: CPT

## 2019-11-18 PROCEDURE — 86235 NUCLEAR ANTIGEN ANTIBODY: CPT

## 2019-11-18 PROCEDURE — 90471 IMMUNIZATION ADMIN: CPT | Performed by: SURGERY

## 2019-11-18 PROCEDURE — 90686 IIV4 VACC NO PRSV 0.5 ML IM: CPT | Performed by: SURGERY

## 2019-11-18 PROCEDURE — 25000003 PHARM REV CODE 250: Performed by: SURGERY

## 2019-11-18 PROCEDURE — 84100 ASSAY OF PHOSPHORUS: CPT

## 2019-11-18 PROCEDURE — 63600175 PHARM REV CODE 636 W HCPCS: Performed by: SURGERY

## 2019-11-18 PROCEDURE — 97535 SELF CARE MNGMENT TRAINING: CPT

## 2019-11-18 PROCEDURE — 80053 COMPREHEN METABOLIC PANEL: CPT

## 2019-11-18 PROCEDURE — 63600175 PHARM REV CODE 636 W HCPCS: Performed by: STUDENT IN AN ORGANIZED HEALTH CARE EDUCATION/TRAINING PROGRAM

## 2019-11-18 PROCEDURE — 85027 COMPLETE CBC AUTOMATED: CPT

## 2019-11-18 PROCEDURE — 85007 BL SMEAR W/DIFF WBC COUNT: CPT

## 2019-11-18 PROCEDURE — 36415 COLL VENOUS BLD VENIPUNCTURE: CPT

## 2019-11-18 RX ORDER — MINERAL OIL
30 OIL (ML) ORAL ONCE
Status: DISCONTINUED | OUTPATIENT
Start: 2019-11-18 | End: 2019-11-18

## 2019-11-18 RX ORDER — PREDNISONE 20 MG/1
40 TABLET ORAL DAILY
Status: DISCONTINUED | OUTPATIENT
Start: 2019-11-18 | End: 2019-11-18 | Stop reason: HOSPADM

## 2019-11-18 RX ORDER — HYDRALAZINE HYDROCHLORIDE 20 MG/ML
5 INJECTION INTRAMUSCULAR; INTRAVENOUS ONCE
Status: DISCONTINUED | OUTPATIENT
Start: 2019-11-18 | End: 2019-11-18 | Stop reason: HOSPADM

## 2019-11-18 RX ORDER — MINERAL OIL
30 OIL (ML) ORAL ONCE
Status: COMPLETED | OUTPATIENT
Start: 2019-11-18 | End: 2019-11-18

## 2019-11-18 RX ORDER — SYRING-NEEDL,DISP,INSUL,0.3 ML 29 G X1/2"
296 SYRINGE, EMPTY DISPOSABLE MISCELLANEOUS ONCE
Status: COMPLETED | OUTPATIENT
Start: 2019-11-18 | End: 2019-11-18

## 2019-11-18 RX ORDER — LIDOCAINE 50 MG/G
OINTMENT TOPICAL ONCE
Status: DISCONTINUED | OUTPATIENT
Start: 2019-11-18 | End: 2019-11-18 | Stop reason: HOSPADM

## 2019-11-18 RX ORDER — LIDOCAINE HYDROCHLORIDE 40 MG/ML
SOLUTION TOPICAL ONCE
Status: DISCONTINUED | OUTPATIENT
Start: 2019-11-18 | End: 2019-11-18 | Stop reason: RX

## 2019-11-18 RX ORDER — CARVEDILOL 25 MG/1
25 TABLET ORAL 2 TIMES DAILY
Qty: 60 TABLET | Refills: 11 | Status: SHIPPED | OUTPATIENT
Start: 2019-11-18 | End: 2021-01-26 | Stop reason: SDUPTHER

## 2019-11-18 RX ORDER — SIMETHICONE 125 MG
125 TABLET,CHEWABLE ORAL EVERY 6 HOURS PRN
Status: DISCONTINUED | OUTPATIENT
Start: 2019-11-18 | End: 2019-11-18 | Stop reason: HOSPADM

## 2019-11-18 RX ORDER — CARVEDILOL 25 MG/1
25 TABLET ORAL 2 TIMES DAILY
Status: DISCONTINUED | OUTPATIENT
Start: 2019-11-18 | End: 2019-11-18 | Stop reason: HOSPADM

## 2019-11-18 RX ADMIN — INFLUENZA VIRUS VACCINE 0.5 ML: 15; 15; 15; 15 SUSPENSION INTRAMUSCULAR at 04:11

## 2019-11-18 RX ADMIN — METOLAZONE 10 MG: 2.5 TABLET ORAL at 09:11

## 2019-11-18 RX ADMIN — INSULIN DETEMIR 35 UNITS: 100 INJECTION, SOLUTION SUBCUTANEOUS at 09:11

## 2019-11-18 RX ADMIN — SEVELAMER CARBONATE 1600 MG: 800 TABLET, FILM COATED ORAL at 01:11

## 2019-11-18 RX ADMIN — FUROSEMIDE 160 MG: 10 INJECTION, SOLUTION INTRAVENOUS at 09:11

## 2019-11-18 RX ADMIN — INSULIN ASPART 10 UNITS: 100 INJECTION, SOLUTION INTRAVENOUS; SUBCUTANEOUS at 09:11

## 2019-11-18 RX ADMIN — CARVEDILOL 25 MG: 25 TABLET, FILM COATED ORAL at 09:11

## 2019-11-18 RX ADMIN — AMLODIPINE BESYLATE 10 MG: 5 TABLET ORAL at 09:11

## 2019-11-18 RX ADMIN — HEPARIN SODIUM 5000 UNITS: 5000 INJECTION, SOLUTION INTRAVENOUS; SUBCUTANEOUS at 09:11

## 2019-11-18 RX ADMIN — MAGNESIUM CITRATE 296 ML: 1.75 LIQUID ORAL at 03:11

## 2019-11-18 RX ADMIN — SEVELAMER CARBONATE 1600 MG: 800 TABLET, FILM COATED ORAL at 09:11

## 2019-11-18 RX ADMIN — Medication 30 ML: at 04:11

## 2019-11-18 RX ADMIN — LOSARTAN POTASSIUM 100 MG: 50 TABLET ORAL at 09:11

## 2019-11-18 RX ADMIN — PREDNISONE 40 MG: 20 TABLET ORAL at 09:11

## 2019-11-18 RX ADMIN — INSULIN ASPART 2 UNITS: 100 INJECTION, SOLUTION INTRAVENOUS; SUBCUTANEOUS at 09:11

## 2019-11-18 RX ADMIN — SIMETHICONE 125 MG: 125 TABLET, CHEWABLE ORAL at 11:11

## 2019-11-18 RX ADMIN — DOCUSATE SODIUM 100 MG: 50 LIQUID ORAL at 09:11

## 2019-11-18 NOTE — PLAN OF CARE
[facility]     FACILITY TRANSFER ORDERS        Patient Name: Ping Davenport  YOB: 1965     PCP: Primary Doctor No   PCP Address: None  PCP Phone Number: None  PCP Fax: None     Encounter Date: 11/18/2019     Admit to: [facility]     Vital Signs:  Routine     Diagnoses:         Active Hospital Problems     Diagnosis   POA    *Acute renal failure [N17.9]   Yes    AXEL (acute kidney injury) [N17.9]   Yes    Nephrotic syndrome [N04.9]   Yes    Hypertensive emergency [I16.1]   Yes    New onset of congestive heart failure [I50.9]   Unknown    Acute respiratory failure with hypoxia and hypercarbia [J96.01, J96.02]   Unknown    NSTEMI (non-ST elevated myocardial infarction) [I21.4]   Yes       Resolved Hospital Problems   No resolved problems to display.         Allergies:       Review of patient's allergies indicates:   Allergen Reactions    Ketorolac Palpitations         Diet: renal diet     Activities: Activity as tolerated     Labs: CBC, CMP and phos three times weekly       CONSULTS:    Physical Therapy to evaluate and treat.  and Occupational Therapy to evaluate and treat.     MISCELLANEOUS CARE:  n/a     WOUND CARE ORDERS  n/a     Medications: Review discharge medications with patient and family and provide education.           Current Discharge Medication List           START taking these medications     Details   acetaminophen (TYLENOL) 325 MG tablet Take 2 tablets (650 mg total) by mouth every 4 (four) hours as needed.  Refills: 0       amLODIPine (NORVASC) 10 MG tablet Take 1 tablet (10 mg total) by mouth once daily.  Qty: 30 tablet, Refills: 11       carvedilol (COREG) 25 MG tablet Take 1 tablet (25 mg total) by mouth 2 (two) times daily.  Qty: 60 tablet, Refills: 11       furosemide (LASIX) 80 MG tablet Take 2 tablets (160 mg total) by mouth 2 (two) times daily.  Qty: 120 tablet, Refills: 11       heparin sodium,porcine (HEPARIN, PORCINE,) 1,000 unit/mL injection 4.1 mLs (4,100  Units total) by Intra-Catheter route as needed (to devan CVC post HD).  Qty: 4.1 mL, Refills: 1       insulin aspart U-100 (NOVOLOG) 100 unit/mL (3 mL) InPn pen Inject 10 Units into the skin 3 (three) times daily. for 16 days  Qty: 4.8 mL, Refills: 0  Start on 11/16/19  Stop 12/2/19               !! insulin detemir U-100 (LEVEMIR FLEXTOUCH) 100 unit/mL (3 mL) SubQ InPn pen Inject 25 Units into the skin every evening. for 7 days  Qty: 1.75 mL, Refills: 0  Start on 11/18/19  Stop on 11/25/19       !! insulin detemir U-100 (LEVEMIR FLEXTOUCH) 100 unit/mL (3 mL) SubQ InPn pen Inject 10 Units into the skin every evening. for 7 days  Qty: 0.7 mL, Refills: 0  Start on 11/25/19  Stop on 12/2/19          losartan (COZAAR) 100 MG tablet Take 1 tablet (100 mg total) by mouth once daily.  Qty: 90 tablet, Refills: 3       metOLazone (ZAROXOLYN) 10 MG tablet Take 1 tablet (10 mg total) by mouth once daily.  Qty: 30 tablet, Refills: 11               !! predniSONE (DELTASONE) 20 MG tablet Take 1 tablet (20 mg total) by mouth 2 (two) times daily. for 7 days  Qty: 14 tablet, Refills: 0  Start on 11/18/19  Stop on 11/25/19       !! predniSONE (DELTASONE) 20 MG tablet Take 1 tablet (20 mg total) by mouth once daily. for 7 days  Qty: 7 tablet, Refills: 0  Start on 11/25/19  Stop on 12/2/19       sevelamer carbonate (RENVELA) 800 mg Tab Take 2 tablets (1,600 mg total) by mouth 3 (three) times daily with meals.  Qty: 180 tablet, Refills: 11        !! - Potential duplicate medications found. Please discuss with provider.           CONTINUE these medications which have NOT CHANGED     Details             nystatin-triamcinolone (MYCOLOG II) cream Apply topically once daily.  Qty: 15 g, Refills: 1                       _________________________________  Neri Haas MD  11/18/2019

## 2019-11-18 NOTE — PLAN OF CARE
11/18/19 1006   Post-Acute Status   Post-Acute Authorization Placement   Post-Acute Placement Status Pending Payor Review   The Kathleen spoke to Fransisco at [facility] and informed him the p[t has chosen his facility. He states he will reach out to her insurance company again b/c last week when they sent the info they were notified by the insurance company the pt wasn't medically stable for d/c. The Kathleen faxed him the d/c orders and he states he will forward that to the insurance company also. He will call the Sw once he receives the IPR auth.

## 2019-11-18 NOTE — PROGRESS NOTES
[facility] Progress Note    Date of Admit: 2019        Subjective:     No complaints today. Resting. Will dialyze enmanuel    Objective:   Last 24 Hour Vital Signs:  BP  Min: 157/74  Max: 195/81  Temp  Av.3 °F (36.8 °C)  Min: 97.9 °F (36.6 °C)  Max: 99.1 °F (37.3 °C)  Pulse  Av.2  Min: 70  Max: 83  Resp  Av.2  Min: 18  Max: 19  SpO2  Av.1 %  Min: 93 %  Max: 99 %  Weight  Av.4 kg (186 lb 1.1 oz)  Min: 84.4 kg (186 lb 1.1 oz)  Max: 84.4 kg (186 lb 1.1 oz)  Body mass index is 34.03 kg/m².  I/O last 3 completed shifts:  In: 760 [P.O.:760]  Out: 900 [Urine:900]    Physical Examination:  Gen: NAD, AAOx3  HEENT: NC in place, dry mucosa, LIJ tunneled CVC  Neck: no thyroidmegaly, no LAD  Cardio: RRR, normal S1/S2, no MRG, JVP wnl  Lungs: no o2,no incwob  Abd: abd edema improved  Ext: 2+ pitting edema LE bl  Skin: warm, dry, no rashes noted  Neuro: moves ext    Laboratory:  Most Recent Data:  CBC:   Lab Results   Component Value Date    WBC 12.06 2019    HGB 8.4 (L) 2019    HCT 26.5 (L) 2019     2019    MCV 86 2019    RDW 14.5 2019     BMP:   Lab Results   Component Value Date     (L) 2019    K 4.0 2019    CL 93 (L) 2019    CO2 26 2019    BUN 73 (H) 2019    CREATININE 4.0 (H) 2019     (H) 2019    CALCIUM 8.4 (L) 2019    MG 2.0 2019    PHOS 5.1 (H) 2019     LFTs:   Lab Results   Component Value Date    PROT 5.6 (L) 2019    ALBUMIN 2.5 (L) 2019    BILITOT 0.3 2019    AST 31 2019    ALKPHOS 84 2019    ALT 22 2019     Coags:   Lab Results   Component Value Date    INR 1.1 2019     Urinalysis:   Lab Results   Component Value Date    COLORU Yellow 2019    SPECGRAV 1.025 2019    NITRITE Negative 2019    KETONESU Negative 2019    UROBILINOGEN Negative 2019    WBCUA 1 2019       Trended Lab Data:  Recent Labs   Lab  11/16/19  0426 11/17/19  0548 11/18/19  0650   WBC 13.55* 15.73* 12.06   HGB 8.3* 8.3* 8.4*   HCT 26.0* 25.5* 26.5*    175 175   MCV 86 85 86   RDW 14.1 14.3 14.5   * 133* 132*   K 3.9 4.2 4.0   CL 96 97 93*   CO2 26 26 26   BUN 75* 57* 73*   CREATININE 4.4* 3.4* 4.0*   * 141* 188*   PROT 5.6* 6.0 5.6*   ALBUMIN 2.5* 2.7* 2.5*   BILITOT 0.3 0.3 0.3   AST 25 36 31   ALKPHOS 70 86 84   ALT 14 22 22       Trended Cardiac Data:  No results for input(s): TROPONINI, CKTOTAL, CKMB, BNP in the last 168 hours.      Radiology:  Imaging Results          US Lower Extremity Veins Bilateral (Final result)  Result time 11/05/19 16:21:54    Final result by Suzanne Berrios MD (11/05/19 16:21:54)                 Impression:      No evidence of deep venous thrombosis in either lower extremity.      Electronically signed by: Suzanne Berrios MD  Date:    11/05/2019  Time:    16:21             Narrative:    EXAMINATION:  US LOWER EXTREMITY VEINS BILATERAL    CLINICAL HISTORY:  rule out dvt; Acute respiratory failure with hypoxia    TECHNIQUE:  Duplex and color flow Doppler and dynamic compression was performed of the bilateral lower extremity veins was performed.    COMPARISON:  None    FINDINGS:  Right thigh veins: The common femoral, femoral, popliteal, upper greater saphenous, and deep femoral veins are patent and free of thrombus. The veins are normally compressible and have normal phasic flow and augmentation response.    Right calf veins: The visualized calf veins are patent.    Left thigh veins: The common femoral, femoral, popliteal, upper greater saphenous, and deep femoral veins are patent and free of thrombus. The veins are normally compressible and have normal phasic flow and augmentation response.    Left calf veins: The visualized calf veins are patent.    Miscellaneous: Subcutaneous edema noted lower extremities.                               US Retroperitoneal Complete (Kidney and (Final  result)  Result time 11/05/19 09:44:48    Final result by Fransico Yun MD (11/05/19 09:44:48)                 Impression:      No hydronephrosis.    Elevated left renal resistive index, a nonspecific indicator of medical renal disease.    Incidental left pleural effusion.      Electronically signed by: Fransico Yun  Date:    11/05/2019  Time:    09:44             Narrative:    EXAMINATION:  US RETROPERITONEAL COMPLETE    CLINICAL HISTORY:  acute renal failure;    TECHNIQUE:  Ultrasound of the kidneys and urinary bladder was performed including color flow and Doppler evaluation of the kidneys.    COMPARISON:  None available    FINDINGS:  Right kidney: Right kidney measures 12.4 cm.  Resistive index of 0.63.  No hydronephrosis.    Left kidney: Left kidney measures 11.6 cm.  Resistive index of 0.80.  No hydronephrosis    Urinary bladder is unremarkable.  Splenic resistive index of 0.75.  Incidental left pleural effusion.                               X-Ray Chest 1 View (Final result)  Result time 11/05/19 06:16:39    Final result by Umberto Quinn MD (11/05/19 06:16:39)                 Impression:      As above.      Electronically signed by: Umberto Quinn MD  Date:    11/05/2019  Time:    06:16             Narrative:    EXAMINATION:  XR CHEST 1 VIEW    CLINICAL HISTORY:  Respiratory failure, unspecified, unspecified whether with hypoxia or hypercapnia    TECHNIQUE:  Single frontal view of the chest was performed.    COMPARISON:  11/05/2019 04:07 hours    FINDINGS:  There has been interval placement of an endotracheal tube with tip terminating approximately 4.0 cm above the lindsay.  Esophagogastric tube has been intervally placed extending below the diaphragm, extending beyond the field of view of the examination.  No significant change in cardiopulmonary status compared to prior examination, noting interstitial edema bilateral pleural effusions.  No evidence of pneumothorax.                                X-Ray Chest AP Portable (Final result)  Result time 11/05/19 05:11:22    Final result by Umberto Quinn MD (11/05/19 05:11:22)                 Impression:      Radiographic findings suggestive of edema/CHF with bilateral pleural effusions and atelectasis/consolidation.      Electronically signed by: Umberto Quinn MD  Date:    11/05/2019  Time:    05:11             Narrative:    EXAMINATION:  XR CHEST AP PORTABLE    CLINICAL HISTORY:  CHF;    TECHNIQUE:  Single frontal view of the chest was performed.    COMPARISON:  None    FINDINGS:  Cardiac monitoring leads overlie the chest.  Cardiomediastinal silhouette appears mildly enlarged.  There are bilateral interstitial opacities suggesting edema.  There is opacification of the bilateral lower lung zones, right more so than left, suggestive of a combination of pleural fluid with associated atelectasis and/or consolidation.  There is no evidence of pneumothorax.  Visualized osseous structures appear intact.                                   Assessment and Plan:      Ping Davenport is a 54 y.o.female with  Patient Active Problem List    Diagnosis Date Noted    Hypervolemia     AXEL (acute kidney injury)     Nephrotic syndrome     Hypertensive emergency 11/05/2019    New onset of congestive heart failure 11/05/2019    Acute respiratory failure with hypoxia and hypercarbia 11/05/2019    Acute renal failure 11/05/2019    NSTEMI (non-ST elevated myocardial infarction) 11/05/2019    Rash 10/13/2014    Encounter to establish care 10/13/2014    Screen for STD (sexually transmitted disease) 10/13/2014        AXEL stage III vs CKD 5 with nephrotic syndrome  -Possible etiologies include cardiorenal, HTN emergency, GN  -Initially AGMA, NAGMA, Cr on admit 3.4, had 8.8g proteinuria. Ua with amoprhous red cells dating back to 2016  -Serolgies unrevealing, kidney bx with idiopathic glomerular sclerosis pattern, 8/11 glomeruli sclerosed, also with 70-80%  interstitial fibrosis, IF neg  -Cr peaked, tunneled CVC placed, HD initiated 11/14  -Cont TTS. Weaning steroids. Awaiting chair placement and IPR. Would recheck kidney size with radiology and order anti-Scl. If placed, can go to rehab    Hypercalcemia  -Improving, will cont to hold ergo    Hyperglycemia  -2/2 steroids, primary team has added basal. Will cont to monitor    HTN  -BP better controlled though still above goal. Likely steroids and volume related. Cont current regimen    Mineral Bone Disease/CKD eval  -cont sevelamer to 1600 tid, please ensure renal/diabetic diet        Waldemar Carlson MD  [facility]

## 2019-11-18 NOTE — NURSING
Discharge information and education provided, patient voices understanding. Cardiac monitoring and IV catheter discontinued. Discharged via wheelchair van, pt sister at bedside. No acute distress noted.

## 2019-11-18 NOTE — PLAN OF CARE
[facility]     FACILITY TRANSFER ORDERS        Patient Name: Ping Davenport  YOB: 1965     PCP: Primary Doctor No   PCP Address: None  PCP Phone Number: None  PCP Fax: None     Encounter Date: 11/18/2019     Admit to: [facility]      Vital Signs:  Routine     Diagnoses:         Active Hospital Problems     Diagnosis   POA    *Acute renal failure [N17.9]   Yes    AXEL (acute kidney injury) [N17.9]   Yes    Nephrotic syndrome [N04.9]   Yes    Hypertensive emergency [I16.1]   Yes    New onset of congestive heart failure [I50.9]   Unknown    Acute respiratory failure with hypoxia and hypercarbia [J96.01, J96.02]   Unknown    NSTEMI (non-ST elevated myocardial infarction) [I21.4]   Yes       Resolved Hospital Problems   No resolved problems to display.         Allergies:       Review of patient's allergies indicates:   Allergen Reactions    Ketorolac Palpitations         Diet: renal diet     Activities: Activity as tolerated     Labs: CBC, CMP and phos three times weekly       CONSULTS:    Physical Therapy to evaluate and treat.  and Occupational Therapy to evaluate and treat.     MISCELLANEOUS CARE:  n/a     WOUND CARE ORDERS  n/a     Medications: Review discharge medications with patient and family and provide education.           Current Discharge Medication List           START taking these medications     Details   acetaminophen (TYLENOL) 325 MG tablet Take 2 tablets (650 mg total) by mouth every 4 (four) hours as needed.  Refills: 0       amLODIPine (NORVASC) 10 MG tablet Take 1 tablet (10 mg total) by mouth once daily.  Qty: 30 tablet, Refills: 11       carvedilol (COREG) 25 MG tablet Take 1 tablet (25 mg total) by mouth 2 (two) times daily.  Qty: 60 tablet, Refills: 11       furosemide (LASIX) 80 MG tablet Take 2 tablets (160 mg total) by mouth 2 (two) times daily.  Qty: 120 tablet, Refills: 11       heparin sodium,porcine (HEPARIN, PORCINE,) 1,000 unit/mL injection 4.1 mLs  (4,100 Units total) by Intra-Catheter route as needed (to devan CVC post HD).  Qty: 4.1 mL, Refills: 1       insulin aspart U-100 (NOVOLOG) 100 unit/mL (3 mL) InPn pen Inject 10 Units into the skin 3 (three) times daily. for 16 days  Qty: 4.8 mL, Refills: 0  Start on 11/16/19  Stop 12/2/19               !! insulin detemir U-100 (LEVEMIR FLEXTOUCH) 100 unit/mL (3 mL) SubQ InPn pen Inject 25 Units into the skin every evening. for 7 days  Qty: 1.75 mL, Refills: 0  Start on 11/18/19  Stop on 11/25/19       !! insulin detemir U-100 (LEVEMIR FLEXTOUCH) 100 unit/mL (3 mL) SubQ InPn pen Inject 10 Units into the skin every evening. for 7 days  Qty: 0.7 mL, Refills: 0  Start on 11/25/19  Stop on 12/2/19          losartan (COZAAR) 100 MG tablet Take 1 tablet (100 mg total) by mouth once daily.  Qty: 90 tablet, Refills: 3       metOLazone (ZAROXOLYN) 10 MG tablet Take 1 tablet (10 mg total) by mouth once daily.  Qty: 30 tablet, Refills: 11               !! predniSONE (DELTASONE) 20 MG tablet Take 1 tablet (20 mg total) by mouth 2 (two) times daily. for 7 days  Qty: 14 tablet, Refills: 0  Start on 11/18/19  Stop on 11/25/19       !! predniSONE (DELTASONE) 20 MG tablet Take 1 tablet (20 mg total) by mouth once daily. for 7 days  Qty: 7 tablet, Refills: 0  Start on 11/25/19  Stop on 12/2/19       sevelamer carbonate (RENVELA) 800 mg Tab Take 2 tablets (1,600 mg total) by mouth 3 (three) times daily with meals.  Qty: 180 tablet, Refills: 11        !! - Potential duplicate medications found. Please discuss with provider.           CONTINUE these medications which have NOT CHANGED     Details             nystatin-triamcinolone (MYCOLOG II) cream Apply topically once daily.  Qty: 15 g, Refills: 1                       _________________________________  Neri Haas MD  11/18/2019

## 2019-11-18 NOTE — PLAN OF CARE
Problem: Occupational Therapy Goal  Goal: Occupational Therapy Goal  Description  Goals to be met by: 12/8/19     Patient will increase functional independence with ADLs by performing:    LE Dressing with Minimal Assistance.  Grooming while standing with Contact Guard Assistance.  Toileting from toilet with Contact Guard Assistance for hygiene and clothing management.   Supine to sit with Contact Guard Assistance.--MET 11/11/19  Step transfer with Contact Guard Assistance  Toilet transfer to toilet with Contact Guard Assistance.  Increased functional strength to WFL for self care skills and functional mobility.  Upper extremity exercise program x10 reps per handout, with independence.        11/18/2019 1157 by TAYLOR Palmer  Outcome: Ongoing, Progressing  11/18/2019 1156 by TAYLOR Palmer  Reactivated   Pt found in bed but requesting to use toilet. Pt ambulated to toilet /c RW and CGA. Pt urinated but was unsuccessful in BM. Pt strained for almost 20 min on toilet /s success. Pt returned to bed to left side-lie to encourage greater success of BM later this date.

## 2019-11-18 NOTE — PLAN OF CARE
Pt accepted to [facility] for admission today, nurse to call report to [phone].  W/c van transport arranged via [facility] for transport at 3:30, awaiting acceptance.      Pt and sister at bedside and aware of d/c today, in agreement with plan for transfer today.  Pt did express hesitation as she is now having results from stool softeners given past two days, aware this will not impede her transfer.       11/18/19 1411   Final Note   Assessment Type Final Discharge Note   Anticipated Discharge Disposition Rehab  ([facility])   Hospital Follow Up  Appt(s) scheduled? No   Discharge plans and expectations educations in teach back method with documentation complete? Yes   Right Care Referral Info   Post Acute Recommendation IRF       D/c packet with pt's blue folder.

## 2019-11-18 NOTE — DISCHARGE INSTRUCTIONS
Central Line (Central Venous Access Device) ([facility])      Hemodialysis Access, Caring for Your ([facility])      Hemodialysis ([facility])      Chronic Kidney Disease, Diet for ([facility])      Foods, Avoiding High-Sodium: Kidney Disease ([facility])      Getting the Right Amount of Protein, Kidney Disease ([facility])      Heart Failure, What is ([facility])      Heart Failure, Discharge Instructions for ([facility])      Heart Failure: Warning Signs of a Flare-Up ([facility])      Influenza Virus Vaccine injection ([facility])

## 2019-11-18 NOTE — PT/OT/SLP PROGRESS
Occupational Therapy   Treatment    Name: Ping Davenport  MRN: [id number]  Admitting Diagnosis:  Acute renal failure  5 Days Post-Op    Recommendations:     Discharge Recommendations: rehabilitation facility  Discharge Equipment Recommendations:  (defer)  Barriers to discharge:  Decreased caregiver support    Assessment:   Pt found in bed but requesting to use toilet. Pt ambulated to toilet /c RW and CGA. Pt urinated but was unsuccessful in BM. Pt strained for almost 20 min on toilet /s success. Pt returned to bed to left side-lie to encourage greater success of BM later this date.       Ping Davenport is a 54 y.o. female with a medical diagnosis of Acute renal failure. Performance deficits affecting function are weakness, impaired endurance, impaired self care skills, impaired functional mobilty, impaired balance, gait instability, decreased safety awareness, decreased lower extremity function.     Rehab Prognosis:  Fair; patient would benefit from acute skilled OT services to address these deficits and reach maximum level of function.       Plan:     Patient to be seen 5 x/week to address the above listed problems via self-care/home management, therapeutic activities, therapeutic exercises  · Plan of Care Expires: 12/08/19  · Plan of Care Reviewed with: patient    Subjective     Pain/Comfort:  · Pain Rating 1: 0/10(pt reporting pain 2/2 flatulance)  · Pain Rating Post-Intervention 1: 0/10  · Pain Rating 2: 0/10  · Pain Rating Post-Intervention 2: 0/10    Objective:     Communicated with: nurse Parsons prior to session.  Patient found right sidelying with telemetry, SCD upon OT entry to room.    General Precautions: Standard, fall   Orthopedic Precautions:N/A   Braces: N/A     Occupational Performance:     Bed Mobility:    · Patient completed Rolling/Turning to Left with  stand by assistance  · Patient completed Scooting/Bridging with stand by assistance  · Patient completed Supine to Sit with contact guard  assistance  · Patient completed Sit to Supine with contact guard assistance     Functional Mobility/Transfers:  · Patient completed Sit <> Stand Transfer with contact guard assistance  with  rolling walker   · Patient completed Toilet Transfer Step Transfer technique with contact guard assistance with  rolling walker      Activities of Daily Living:  Toileting:sup for hygiene      [facility] 6 Click ADL: 19    Treatment & Education:  Pt found R side lying /c nursing students present. Pt expressed need to use toilet upon therapy entry. Pt rolled L /c SBA and sit>stand /c CGA. Pt used RW to ambulate to toilet. Pt seated on toilet attempted to produce BM. Pt seated on toilet for 20 min not successful but did urinate. Pt moaning and heavy breathing while laboring to BM on toilet. Pt educated on importance of continuing to breathe and not lean back while seated on toilet.  Pt /s complaint of pain this date but complained of occasional pain 2/2 gas. Pt required Arturo for weakness upon return ambulation to bed. Pt left L side lying to increase odds of BM later today.    Patient left left sidelying with call button in reach and bed alarm onEducation:      GOALS:   Multidisciplinary Problems     Occupational Therapy Goals        Problem: Occupational Therapy Goal    Goal Priority Disciplines Outcome Interventions   Occupational Therapy Goal     OT, PT/OT Ongoing, Progressing    Description:  Goals to be met by: 12/8/19     Patient will increase functional independence with ADLs by performing:    LE Dressing with Minimal Assistance.  Grooming while standing with Contact Guard Assistance.  Toileting from toilet with Contact Guard Assistance for hygiene and clothing management.   Supine to sit with Contact Guard Assistance.--MET 11/11/19  Step transfer with Contact Guard Assistance  Toilet transfer to toilet with Contact Guard Assistance.  Increased functional strength to WFL for self care skills and functional mobility.  Upper  extremity exercise program x10 reps per handout, with independence.                         Time Tracking:     OT Date of Treatment: 11/18/19  OT Start Time: 1114  OT Stop Time: 1142  OT Total Time (min): 28 min    Billable Minutes:Self Care/Home Management 28    TAYLOR Palmer  11/18/2019

## 2019-11-18 NOTE — DISCHARGE SUMMARY
[facility]   Discharge Summary      Patient Name: Ping Davenport  MRN: [id number]  Admission Date: 11/5/2019  Hospital Length of Stay: 13 days  Discharge Date and Time:  11/18/2019 5:42 PM  Attending Physician: Matilde att. providers found   Discharging Provider: Carlito Michele MD  Primary Care Provider: Primary Doctor Matilde     HPI:   55 yo female with no past medical history due to being in the ED presenting with 2 week history of worsening of SOB and peripheral edema. Per sister, patient has had elevated blood pressures at home and sister has tried to give Losartan to the patient but it has not improved her blood pressures. Patient has not been able to lay flat due to dyspnea.     In the ED, patient's blood pressure was in the 200/100s. Received Lasix 80mg IV. Patient presented in respiratory distress and placed on Bipap. When patient did not improve clinically on Bipap.  Patient was intubated and sedated with Fentanyl and Nitroglycerine drip. Bun/Cr elevated at 47/3.4 up from a baseline of 11/0.9. Trop elevated at 0.206. D-dimer was 2.60.    Procedure(s) (LRB):  Insertion,catheter,tunneled (Right)     Hospital Course:   Patient presented with HTN emergency which then lead to an acute flash pulmonary edema. Patient was intuabted and sedated and placed on mechanical ventilation. Upon admission to the hospital nephrology wanted a workup for nephrotic syndrome given significant proteinuria. MEAGN, RF, C3/C4 complement, RPR, HIV, anti-GBM, ANCA and Hepatitis were negative.. Pt was started on Cefepime and BP was kept around 160/90 with the drip on the in the ICU. FeNa score was calculated to be 12.8%. Pt was extubated the following day and hydralazine 25 PO TID was started for HTN while stopping the Nitro drip. Medications given were increased to Coreg 25 BID, norvasc 10, hydralazine 50 TID, Losartan 50 and increased to 100 the following day. Pt was transferred to the floor for further  monitoring of BUN/Cr and HTN. BP was controlled on the floor. Hydralazine was increased to 75 TID. Lasix was increased to 120 BID IV. Pt was started on a renal diet and was able to tolerate PO intake well. With worsening BUN/Cr, nephrology wanted the pt to undergo a renal biopsy for further evaluation. Anticoagulation was held 24hrs prior to the procedure, per IR's request. On 11/8, per nephrology, the pt was started on methylprednisone 1g for 3 days and given 2 doses of 25g albumin for coverage against a possible Nephrotic syndrome. Hydralazine was held in the PM b/c the pt's BP was 110/50. Creatinine continued to worsen throughout the pt's stay. Discussion for hemodialysis was initiated with the pt. After receiving steroids the pt started to have increased diuresis so line placement was deferred until further evaluation by nephrology. Due to the steroids, the Pt's blood sugars were in the 300s overnight on 11/10. Aspart was given for elevated BG and titrated to achieve control. A renal biopsy was performed due to worsening kidney function. 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 11 out of 18 glomeruli are completed scarred consistent with nodular sclerosis. Appears like diabetic nephropathy but if not consistent with hx then could be idiopathic glomerular sclerosis as both are identical. 70-80% interstitial fibrosis consistent with severe hypertensive changes. No immunotype staining Pt tolerated the procedure well and was started on prednisone 80mg PO, which would continued to be weaned over the next several weeks. . Lasix 160 IV BID and metolazone 5mg PO daily were added to the pt's medication regimen. Metolazone later increased to 10mg PO daily. Patient continued to maintain adequate UOP. Patient had  tunneled CV catheter placed 11/13 and the pt was started on hemodialysis the following day and continued for the next 3 days with volume removed of approx 8L and total of net negative 15L  during this admission. Patient tolerated HD well and will continue HD as outpatient on T/Th/Sat. Patient accepted to inpatient rehab and will have close follow up with [facility] nephrology on discharge.     Consults:   Consults (From admission, onward)        Status Ordering Provider     Case Management  Once     Provider:  (Not yet assigned)    Acknowledged TEJAS CABALLERO     Inpatient consult to Interventional Radiology  Once     Provider:  (Not yet assigned)    Completed DOUG MULLINS     Inpatient consult to Interventional Radiology  Once     Provider:  (Not yet assigned)    Completed LUI RESENDEZ     Inpatient consult to Nephrology-[facility]  Once     Provider:  (Not yet assigned)    Completed PRADEEP LU     Inpatient consult to Pulmonology  Once     Provider:  (Not yet assigned)    Completed PRADEEP LU     Inpatient consult to Social Work  Once     Provider:  (Not yet assigned)    Acknowledged DOUG MULLINS          Significant Diagnostic Studies:     Pending Diagnostic Studies:     Procedure Component Value Units Date/Time    Anti-scleroderma antibody [[id number]] Collected:  11/18/19 1322    Order Status:  Sent Lab Status:  In process Updated:  11/18/19 1641    Specimen:  Blood     IR Ultrasound Guidance [[id number]]     Order Status:  Sent Lab Status:  No result     IR Ultrasound Guidance [[id number]]     Order Status:  Sent Lab Status:  No result     Specimen to Pathology, Radiology Kidney, [place] biopsy [[id number]] Collected:  11/11/19 1221    Order Status:  Sent Lab Status:  In process Updated:  11/11/19 1322        Final Active Diagnoses:    Diagnosis Date Noted POA    PRINCIPAL PROBLEM:  Acute renal failure [N17.9] 11/05/2019 Yes    Hypervolemia [E87.70]  Yes    AXEL (acute kidney injury) [N17.9]  Yes    Nephrotic syndrome [N04.9]  Yes    Hypertensive emergency [I16.1] 11/05/2019 Yes    New onset of congestive heart failure [I50.9] 11/05/2019 Unknown    Acute respiratory  failure with hypoxia and hypercarbia [J96.01, J96.02] 11/05/2019 Unknown    NSTEMI (non-ST elevated myocardial infarction) [I21.4] 11/05/2019 Yes      Problems Resolved During this Admission:      Discharged Condition: stable    Disposition: Inpatient [facility]     Follow Up:  Follow-up Information     Schedule an appointment as soon as possible for a visit with Ana Luisa Calderon MD.    Specialty:  Nephrology  Contact information:  [address]  [address]  [facility]  [place] [id number] [phone]                 Patient Instructions:      Ambulatory referral to Nephrology   Referral Priority: Routine Referral Type: Consultation   Referral Reason: Specialty Services Required   Referred to Provider: ANA LUISA CALDERON Requested Specialty: Nephrology   Number of Visits Requested: 1     Diet renal     Notify your health care provider if you experience any of the following:  difficulty breathing or increased cough     Notify your health care provider if you experience any of the following:  redness, tenderness, or signs of infection (pain, swelling, redness, odor or green/yellow discharge around incision site)     Notify your health care provider if you experience any of the following:  persistent nausea and vomiting or diarrhea     Notify your health care provider if you experience any of the following:  temperature >100.4     Notify your health care provider if you experience any of the following:  persistent dizziness, light-headedness, or visual disturbances     Notify your health care provider if you experience any of the following:  severe persistent headache     Notify your health care provider if you experience any of the following:  increased confusion or weakness     Activity as tolerated     Medications:   Ping Davenport   Home Medication Instructions INEZ:[id number]    Printed on:11/18/19 7963   Medication Information                      acetaminophen (TYLENOL) 325 MG tablet  Take 2  tablets (650 mg total) by mouth every 4 (four) hours as needed.             amLODIPine (NORVASC) 10 MG tablet  Take 1 tablet (10 mg total) by mouth once daily.             carvedilol (COREG) 25 MG tablet  Take 1 tablet (25 mg total) by mouth 2 (two) times daily.             clotrimazole-betamethasone 1-0.05% (LOTRISONE) cream  Apply topically 2 (two) times daily.             furosemide (LASIX) 80 MG tablet  Take 2 tablets (160 mg total) by mouth 2 (two) times daily.             heparin sodium,porcine (HEPARIN, PORCINE,) 1,000 unit/mL injection  4.1 mLs (4,100 Units total) by Intra-Catheter route as needed (to devan CVC post HD).             insulin aspart U-100 (NOVOLOG) 100 unit/mL (3 mL) InPn pen  Inject 10 Units into the skin 3 (three) times daily. for 16 days             insulin detemir U-100 (LEVEMIR FLEXTOUCH) 100 unit/mL (3 mL) SubQ InPn pen  Inject 25 Units into the skin every evening. for 7 days             insulin detemir U-100 (LEVEMIR FLEXTOUCH) 100 unit/mL (3 mL) SubQ InPn pen  Inject 10 Units into the skin every evening. for 7 days             insulin detemir U-100 (LEVEMIR FLEXTOUCH) 100 unit/mL (3 mL) SubQ InPn pen  Inject 35 Units into the skin once daily. for 2 days             losartan (COZAAR) 100 MG tablet  Take 1 tablet (100 mg total) by mouth once daily.             metOLazone (ZAROXOLYN) 10 MG tablet  Take 1 tablet (10 mg total) by mouth once daily.             nystatin-triamcinolone (MYCOLOG II) cream  Apply topically once daily.             predniSONE (DELTASONE) 20 MG tablet  Take 3 tablets (60 mg total) by mouth once daily. for 2 days             predniSONE (DELTASONE) 20 MG tablet  Take 1 tablet (20 mg total) by mouth 2 (two) times daily. for 7 days             predniSONE (DELTASONE) 20 MG tablet  Take 1 tablet (20 mg total) by mouth once daily. for 7 days             sevelamer carbonate (RENVELA) 800 mg Tab  Take 2 tablets (1,600 mg total) by mouth 3 (three) times daily with meals.                  Carlito Michele MD  [facility], PGY-2   [facility]

## 2019-11-18 NOTE — PT/OT/SLP PROGRESS
Physical Therapy      Patient Name:  Ping Davenport   MRN:  [id number]    Patient asked PTA to return secondary to having frequent BMs and needing to be cleaned at this time.  RN present. Will follow-up later this afternoon.    Ania Hernandez, PTA

## 2019-11-18 NOTE — NURSING
Patient has not eaten dinner yet, states she will eat later, refuses blood sugar check at this time, requesting to have it checked later, stable at this time, no apparent distress noted.

## 2019-11-18 NOTE — PROGRESS NOTES
The Sw spoke to Fransisco at [facility] who states he just received the auth from the pt's insurance company. He states he will call the Sw back with a bed assignment and contact info to call report. The Sw notified the team and they state they will go to the pt's room and inform her b/c the pt's requesting to stay until tomorrow per her nurse. The team states the pt's medically stable for transport today and will speak with her. The Sw informed the pt's nurse of this info mentioned above. Willi)is also going to the pt's room to speak with her in reference to this. The pt signed her pt choice form and the Sw placed it in her chart. The pt's in agreement with the d/c plan today for [facility].

## 2019-11-18 NOTE — PLAN OF CARE
11/18/19 0840   Post-Acute Status   Post-Acute Authorization Placement   Post-Acute Placement Status Pending Payor Review   The Sw received a call from Nayeli(UR Nurse Reviewer)for the pt's insurance company who states she received an IPR request from [place] and [place] and wanted to know which one the pt wants to go to. The Sw spoke to the pt and notified her of the above mentioned info and she informed the Sw according to the team she's not ready for d/c. The Sw encouraged her to choose one even though she feels she may not be ready for d/c b/c d/c planning starts the moment the pt enters the hospital. The pt acknowledged understanding but still wouldn't give the Sw a preference. The pt states she wants to speak with her sister and the team first b/c she was informed she will have to get hd 3 more time before she's ready for d/c. The pt remembers the liaisons coming to speak with her from [facility] and [place] but hasn't made a decision yet on the location. The team walked in as the sw was speaking to the pt and she informed them the sw was on the phone and she doesn't have a clear understanding on her d/c date b/c she thought she was not ready. The team states they will speak with Nephrology and call the Sw back in reference to this matter.     9:49am The Sw and Hanny DIAZ([place])met with the pt and she states her decision will be [facility] IPR. The sw spoke to the pt's sister Alla([phone])b/c the pt asked the Sw to call and speak with her about the choice she made. The pt's sister was given the location and she states she wants the pt to make the decision and she states she will visit her wherever she goes. They both agreed on [facility] b/c of the location and both stated [place] is too far away and parking will be difficult for them. The Sw left a message for Nayeli informing her the pt's choice is [facility] and asked her to return the call when she received the message. The Sw informed the pt and her sister the pt will be able to d/c  once insurance gives the IPR auth but the Sw informed both of them it must be approved by insurance for the pt to d/c to an IPR and both acknowledged understanding. The Sw also informed them d/c could be as early as today or tomorrow if approved .

## 2019-11-18 NOTE — PROGRESS NOTES
Progress Note  [facility]  Admit Date: 11/5/2019   LOS: 13 days   SUBJECTIVE:     No acute overnight events. Patient continues to tolerating PO intake. Good UOP 800cc recorded + 1BM yesterday. Tolerating physical therapy well. Passing flatus. No new complaints at this time.     Review of Systems   Constitutional: Negative for chills and fever.   HENT: Negative for sore throat.    Eyes: Negative for blurred vision and double vision.   Respiratory: Negative for cough and shortness of breath.    Cardiovascular: Negative for chest pain.   Gastrointestinal: Negative for abdominal pain, constipation, diarrhea, heartburn, nausea and vomiting.   Genitourinary: Negative for dysuria and urgency.   Musculoskeletal: Negative for back pain and falls.   Skin: Negative for itching and rash.   Neurological: Negative for headaches.   Endo/Heme/Allergies: Does not bruise/bleed easily.   Psychiatric/Behavioral: The patient is not nervous/anxious.      OBJECTIVE:   Vital Signs (Most Recent)  Temp: 98.3 °F (36.8 °C) (11/18/19 0509)  Pulse: 80 (11/18/19 0509)  Resp: 18 (11/18/19 0509)  BP: (!) 158/73 (11/18/19 0509)  SpO2: 98 % (11/18/19 0509)    I & O (Last 24H):    Intake/Output Summary (Last 24 hours) at 11/18/2019 0650  Last data filed at 11/17/2019 2002  Gross per 24 hour   Intake 520 ml   Output 800 ml   Net -280 ml     Wt Readings from Last 3 Encounters:   11/17/19 85.6 kg (188 lb 11.4 oz)   10/13/14 72 kg (158 lb 11.2 oz)       Current Diet Order   Procedures    Diet renal        Physical Exam   Constitutional: She is oriented to person, place, and time and well-developed, well-nourished, and in no distress.   HENT:   Head: Normocephalic and atraumatic.   Eyes: Pupils are equal, round, and reactive to light. EOM are normal.   Neck: Normal range of motion. Neck supple.   Cardiovascular: Normal rate and regular rhythm.   Pulmonary/Chest: Effort normal and breath sounds normal.   Right Permacath in place   Abdominal: Soft.  Bowel sounds are normal.   Musculoskeletal: She exhibits edema.   Anasarca improving. Lower extremities with 1+ pitting edema.    Neurological: She is alert and oriented to person, place, and time.   Skin: Skin is warm and dry.   Psychiatric: Affect normal.     Laboratory Data:  CBC  Recent Labs   Lab 11/15/19  0537 11/16/19 0426 11/17/19  0548   WBC 14.77* 13.55* 15.73*   RBC 3.36* 3.02* 3.01*   HGB 9.1* 8.3* 8.3*   HCT 28.6* 26.0* 25.5*    192 175   MCV 85 86 85   MCH 27.1 27.5 27.6   MCHC 31.8* 31.9* 32.5     CMP  Recent Labs   Lab 11/15/19  0537 11/16/19  0426 11/17/19  0548   CALCIUM 8.8 8.3* 8.5*   PROT 5.9* 5.6* 6.0   * 133* 133*   K 4.1 3.9 4.2   CO2 25 26 26   CL 94* 96 97   BUN 98* 75* 57*   CREATININE 5.6* 4.4* 3.4*   ALKPHOS 79 70 86   ALT 18 14 22   AST 25 25 36   BILITOT 0.3 0.3 0.3     POCT-Glucose  POCT Glucose   Date Value Ref Range Status   11/18/2019 175 (H) 70 - 110 mg/dL Final   11/17/2019 222 (H) 70 - 110 mg/dL Final   11/17/2019 172 (H) 70 - 110 mg/dL Final   11/17/2019 114 (H) 70 - 110 mg/dL Final   11/17/2019 132 (H) 70 - 110 mg/dL Final   11/16/2019 240 (H) 70 - 110 mg/dL Final   11/16/2019 173 (H) 70 - 110 mg/dL Final   11/16/2019 119 (H) 70 - 110 mg/dL Final   11/16/2019 210 (H) 70 - 110 mg/dL Final   11/15/2019 218 (H) 70 - 110 mg/dL Final   11/15/2019 241 (H) 70 - 110 mg/dL Final   11/15/2019 203 (H) 70 - 110 mg/dL Final     COAGS  Recent Labs   Lab 11/13/19  1102   INR 1.1     UA  No results for input(s): COLORU, CLARITYU, SPECGRAV, PHUR, PROTEINUA, GLUCOSEU, BLOODU, WBCU, RBCU, BACTERIA, MUCUS in the last 24 hours.    Invalid input(s):  BILIRUBINCON  MICRO  Microbiology Results (last 7 days)     ** No results found for the last 168 hours. **        LIPID PANEL  Lab Results   Component Value Date    CHOL 218 (H) 11/06/2019     Lab Results   Component Value Date    HDL 38 (L) 11/06/2019     Lab Results   Component Value Date    LDLCALC 151.4 11/06/2019     Lab Results    Component Value Date    TRIG 143 11/06/2019     Lab Results   Component Value Date    CHOLHDL 17.4 (L) 11/06/2019       Diagnostic Results:  Imaging Results          US Lower Extremity Veins Bilateral (Final result)  Result time 11/05/19 16:21:54    Final result by Suzanne Berrios MD (11/05/19 16:21:54)                 Impression:      No evidence of deep venous thrombosis in either lower extremity.      Electronically signed by: Suzanne Berrios MD  Date:    11/05/2019  Time:    16:21             Narrative:    EXAMINATION:  US LOWER EXTREMITY VEINS BILATERAL    CLINICAL HISTORY:  rule out dvt; Acute respiratory failure with hypoxia    TECHNIQUE:  Duplex and color flow Doppler and dynamic compression was performed of the bilateral lower extremity veins was performed.    COMPARISON:  None    FINDINGS:  Right thigh veins: The common femoral, femoral, popliteal, upper greater saphenous, and deep femoral veins are patent and free of thrombus. The veins are normally compressible and have normal phasic flow and augmentation response.    Right calf veins: The visualized calf veins are patent.    Left thigh veins: The common femoral, femoral, popliteal, upper greater saphenous, and deep femoral veins are patent and free of thrombus. The veins are normally compressible and have normal phasic flow and augmentation response.    Left calf veins: The visualized calf veins are patent.    Miscellaneous: Subcutaneous edema noted lower extremities.                               US Retroperitoneal Complete (Kidney and (Final result)  Result time 11/05/19 09:44:48    Final result by Fransico Yun MD (11/05/19 09:44:48)                 Impression:      No hydronephrosis.    Elevated left renal resistive index, a nonspecific indicator of medical renal disease.    Incidental left pleural effusion.      Electronically signed by: Fransico Yun  Date:    11/05/2019  Time:    09:44             Narrative:     EXAMINATION:  US RETROPERITONEAL COMPLETE    CLINICAL HISTORY:  acute renal failure;    TECHNIQUE:  Ultrasound of the kidneys and urinary bladder was performed including color flow and Doppler evaluation of the kidneys.    COMPARISON:  None available    FINDINGS:  Right kidney: Right kidney measures 12.4 cm.  Resistive index of 0.63.  No hydronephrosis.    Left kidney: Left kidney measures 11.6 cm.  Resistive index of 0.80.  No hydronephrosis    Urinary bladder is unremarkable.  Splenic resistive index of 0.75.  Incidental left pleural effusion.                               X-Ray Chest 1 View (Final result)  Result time 11/05/19 06:16:39    Final result by Umberto Quinn MD (11/05/19 06:16:39)                 Impression:      As above.      Electronically signed by: Umberto Quinn MD  Date:    11/05/2019  Time:    06:16             Narrative:    EXAMINATION:  XR CHEST 1 VIEW    CLINICAL HISTORY:  Respiratory failure, unspecified, unspecified whether with hypoxia or hypercapnia    TECHNIQUE:  Single frontal view of the chest was performed.    COMPARISON:  11/05/2019 04:07 hours    FINDINGS:  There has been interval placement of an endotracheal tube with tip terminating approximately 4.0 cm above the lindsay.  Esophagogastric tube has been intervally placed extending below the diaphragm, extending beyond the field of view of the examination.  No significant change in cardiopulmonary status compared to prior examination, noting interstitial edema bilateral pleural effusions.  No evidence of pneumothorax.                               X-Ray Chest AP Portable (Final result)  Result time 11/05/19 05:11:22    Final result by Umberto Quinn MD (11/05/19 05:11:22)                 Impression:      Radiographic findings suggestive of edema/CHF with bilateral pleural effusions and atelectasis/consolidation.      Electronically signed by: Umberto Quinn MD  Date:    11/05/2019  Time:    05:11             Narrative:     EXAMINATION:  XR CHEST AP PORTABLE    CLINICAL HISTORY:  CHF;    TECHNIQUE:  Single frontal view of the chest was performed.    COMPARISON:  None    FINDINGS:  Cardiac monitoring leads overlie the chest.  Cardiomediastinal silhouette appears mildly enlarged.  There are bilateral interstitial opacities suggesting edema.  There is opacification of the bilateral lower lung zones, right more so than left, suggestive of a combination of pleural fluid with associated atelectasis and/or consolidation.  There is no evidence of pneumothorax.  Visualized osseous structures appear intact.                                ASSESSMENT/PLAN:   Ms. Davenport is a 54 y.o. female who was admitted for HTN emergency which proceeded acute flash pulmonary edema. BP controlled.  Now on HD and tolerating well. Continues to make urine. Remains in stable condition.      Flash Pulmonary Edema   Resolved   Maintaining o2 sat on room air    Will continue to monitor      HTN Emergency   Resolved   Patient with underlying essential HTN   Current BP controlled   Currently on losartan 100mg, amlodipine 10mg PO daily and Coreg 12.5mg PO BID per recs  Will continue to monitor      Acute decompensated Heart Failure with preserved ejection Fraction   Patient now compensated   TTE from 11/5/19: shows EF of 50% and grade 1 diastolic dysfunction   Monitor UOP  Daily weights   Fluid restriction < 1,500 ml   Will continue Lasix 160mg IV b.i.d.  Will continue Metolazone 10mg PO daily      Nephrotic Syndrome   Most likely secondary to hypertensive nephropathy  Proteinuria improving   MEGAN, RF negative  C3/C4 normal   FTA Abs neg   GBM Ab neg   HIV neg    Renal Biopsy shows - severe nodular glomerulosclerosis. 11 out of 18 glomeruli are completly scarred consistent with nodular sclerosis. Appears like diabetic nephropathy but if not consistent with hx then could be idiopathic glomerular sclerosis as both are identical. 70-80% interstitial fibrosis consistent with  severe hypertensive changes. No immunotype staining  Will follow up with Nephrology      NSTEMI   Most likely secondary to type 2 demand ischemia  Resolved  Will obtain Trops and EKG in the setting of Angina      Acute Kidney Injury on CKD   Likely 2/2 to intra-renal disease   FeUrea 43.2%   Variable UOP   Cr worsening, will follow up AM lasbs   PermCath placed in RIJ on 11/13/19 w/o complications   Completed Day 3/3 of Hemodialysis between 11/14 - 11/16 with volume removed (2000mL, 3400mL, 2900mL)  Awaiting chair for continued HD as outpatient   Avoid Nephrotoxic medications   Follow up on Nephrology recommendations      Normocytic Anemia   Stable   Most likely secondary to anemia of chronic disease  Will continue to monitor      Prediabetes   Steroid induced hyperglycemia   A1c 5.8   BG goal 140-180 while inpatient   Accuchecks ACHS.   Determir 35 units AM, 10units PM  Aspart 10 units TID with meals    Will continue to Monitor BG        VTE prophylaxis: SCDs     CODE STATUS: Full Code      Disposition: Awaiting insurance approval for placement to inpatient rehab. Blood pressure and blood sugar better controlled. Continuing steroid wean. Will need continued HD as outpatient.      Carlito Michele MD   [employer] [facility], PGY-2

## 2019-11-18 NOTE — PT/OT/SLP PROGRESS
Physical Therapy      Patient Name:  Ping Davenport   MRN:  [id number]    Patient sitting on toilet upon entering the room.  RN entered, reports pt has blood in her stool.  Pt declined tx at this time. Will follow-up next tx date.    Ania Hernandez, PTA

## 2019-11-18 NOTE — PLAN OF CARE
Reported no pain, edema to LE going down and reported SOB improving.  2100 accu ck 222, covered w/2 u novalog and 10 detemir.     Tele: SR,  HR 80,  No alarms.     Bed in lowest position, wheels locked, non skid socks, ID band worn, personal items and call bell with in reach, bed alarm set.

## 2019-11-18 NOTE — NURSING
Report to incoming nurse provided, pt stable, denies any pain or discomfort at this time, call light within her reach, bed in low position, bed alarm on. Encourage to call as needed, voices understanding.

## 2019-11-20 LAB — ENA SCL70 AB SER-ACNC: 2 UNITS

## 2019-12-04 DIAGNOSIS — I10 HYPERTENSION: Primary | ICD-10-CM

## 2019-12-04 DIAGNOSIS — G72.81 CRITICAL ILLNESS MYOPATHY: ICD-10-CM

## 2019-12-04 DIAGNOSIS — N17.9 AKI (ACUTE KIDNEY INJURY): ICD-10-CM

## 2019-12-12 ENCOUNTER — NURSE TRIAGE (OUTPATIENT)
Dept: ADMINISTRATIVE | Facility: CLINIC | Age: 54
End: 2019-12-12

## 2019-12-13 NOTE — TELEPHONE ENCOUNTER
"    Reason for Disposition   [1] Systolic BP  AND [2] taking blood pressure medications AND [3] dizzy, lightheaded or weak    Additional Information   Negative: Started suddenly after an allergic medicine, an allergic food, or bee sting   Negative: Shock suspected (e.g., cold/pale/clammy skin, too weak to stand, low BP, rapid pulse)   Negative: Difficult to awaken or acting confused (e.g., disoriented, slurred speech)   Negative: Fainted   Negative: [1] Systolic BP < 90 AND [2] dizzy, lightheaded, or weak   Negative: Chest pain   Negative: Bleeding (e.g., vomiting blood, rectal bleeding or tarry stools, severe vaginal bleeding)(Exception: fainted from sight of small amount of blood; small cut or abrasion)   Negative: Extra heart beats or heart is beating fast  (i.e., "palpitations")   Negative: Sounds like a life-threatening emergency to the triager   Negative: [1] Systolic BP < 80 AND [2] NOT dizzy, lightheaded or weak   Negative: Abdominal pain   Negative: Fever > 100.5 F (38.1 C)   Negative: Major surgery in the past month   Negative: [1] Drinking very little AND [2] dehydration suspected (e.g., no urine > 12 hours, very dry mouth, very lightheaded)   Negative: [1] Fall in systolic BP > 20 mm Hg from normal AND [2] dizzy, lightheaded, or weak   Negative: Patient sounds very sick or weak to the triager   Negative: [1] Systolic BP < 90 AND [2] NOT dizzy, lightheaded or weak    Protocols used: LOW BLOOD PRESSURE-A-    Pt called to report she started Nifedipine 90 mg today in addition to other blood pressure medication and stated Her BP dropped to 90/55. Pt stated her BP now is 114/59 now and HR 74, but she feels weak and dizzy Stated her BP is usually high and was in the 140's earlier today. Pt stated she has been in bed with her feet elevated. Per triage protocol, advised to see Physician within 4 hours. Pt verbalized understanding.  "

## 2020-01-24 LAB
ADEQUACY: NORMAL
FINAL PATHOLOGIC DIAGNOSIS: NORMAL

## 2020-02-21 ENCOUNTER — HOSPITAL ENCOUNTER (OUTPATIENT)
Dept: RADIOLOGY | Facility: HOSPITAL | Age: 55
Discharge: HOME | End: 2020-02-21
Attending: INTERNAL MEDICINE
Payer: MEDICAID

## 2020-02-21 DIAGNOSIS — N18.6 END STAGE RENAL DISEASE: ICD-10-CM

## 2020-02-21 PROCEDURE — 93985 US VEIN MAPPING: ICD-10-PCS | Mod: 26,,, | Performed by: RADIOLOGY

## 2020-02-21 PROCEDURE — 93985 DUP-SCAN HEMO COMPL BI STD: CPT | Mod: TC

## 2020-02-21 PROCEDURE — 93985 DUP-SCAN HEMO COMPL BI STD: CPT | Mod: 26,,, | Performed by: RADIOLOGY

## 2020-06-05 ENCOUNTER — HOSPITAL ENCOUNTER (EMERGENCY)
Facility: HOSPITAL | Age: 55
Discharge: HOME | End: 2020-06-05
Attending: EMERGENCY MEDICINE
Payer: MEDICAID

## 2020-06-05 VITALS
BODY MASS INDEX: 34.24 KG/M2 | SYSTOLIC BLOOD PRESSURE: 176 MMHG | HEART RATE: 69 BPM | OXYGEN SATURATION: 99 % | DIASTOLIC BLOOD PRESSURE: 77 MMHG | HEIGHT: 62 IN | WEIGHT: 186.06 LBS | RESPIRATION RATE: 20 BRPM | TEMPERATURE: 98 F

## 2020-06-05 DIAGNOSIS — T82.9XXA COMPLICATION OF VASCULAR DIALYSIS CATHETER, UNSPECIFIED COMPLICATION, INITIAL ENCOUNTER: Primary | ICD-10-CM

## 2020-06-05 LAB
ALBUMIN SERPL BCP-MCNC: 3.8 G/DL (ref 3.5–5.2)
ALP SERPL-CCNC: 84 U/L (ref 55–135)
ALT SERPL W/O P-5'-P-CCNC: 18 U/L (ref 10–44)
ANION GAP SERPL CALC-SCNC: 12 MMOL/L (ref 8–16)
AST SERPL-CCNC: 22 U/L (ref 10–40)
BASOPHILS # BLD AUTO: 0.04 K/UL (ref 0–0.2)
BASOPHILS NFR BLD: 0.6 % (ref 0–1.9)
BILIRUB SERPL-MCNC: 0.4 MG/DL (ref 0.1–1)
BUN SERPL-MCNC: 96 MG/DL (ref 6–20)
CALCIUM SERPL-MCNC: 9.4 MG/DL (ref 8.7–10.5)
CHLORIDE SERPL-SCNC: 107 MMOL/L (ref 95–110)
CO2 SERPL-SCNC: 22 MMOL/L (ref 23–29)
CREAT SERPL-MCNC: 5.4 MG/DL (ref 0.5–1.4)
DIFFERENTIAL METHOD: ABNORMAL
EOSINOPHIL # BLD AUTO: 0.3 K/UL (ref 0–0.5)
EOSINOPHIL NFR BLD: 4.2 % (ref 0–8)
ERYTHROCYTE [DISTWIDTH] IN BLOOD BY AUTOMATED COUNT: 15.1 % (ref 11.5–14.5)
EST. GFR  (AFRICAN AMERICAN): 9 ML/MIN/1.73 M^2
EST. GFR  (NON AFRICAN AMERICAN): 8 ML/MIN/1.73 M^2
GLUCOSE SERPL-MCNC: 95 MG/DL (ref 70–110)
HCT VFR BLD AUTO: 36.1 % (ref 37–48.5)
HGB BLD-MCNC: 11.6 G/DL (ref 12–16)
IMM GRANULOCYTES # BLD AUTO: 0.01 K/UL (ref 0–0.04)
IMM GRANULOCYTES NFR BLD AUTO: 0.2 % (ref 0–0.5)
LYMPHOCYTES # BLD AUTO: 1.7 K/UL (ref 1–4.8)
LYMPHOCYTES NFR BLD: 27 % (ref 18–48)
MCH RBC QN AUTO: 29.1 PG (ref 27–31)
MCHC RBC AUTO-ENTMCNC: 32.1 G/DL (ref 32–36)
MCV RBC AUTO: 91 FL (ref 82–98)
MONOCYTES # BLD AUTO: 0.7 K/UL (ref 0.3–1)
MONOCYTES NFR BLD: 10.7 % (ref 4–15)
NEUTROPHILS # BLD AUTO: 3.5 K/UL (ref 1.8–7.7)
NEUTROPHILS NFR BLD: 57.3 % (ref 38–73)
NRBC BLD-RTO: 0 /100 WBC
PLATELET # BLD AUTO: 162 K/UL (ref 150–350)
PMV BLD AUTO: 12.4 FL (ref 9.2–12.9)
POTASSIUM SERPL-SCNC: 4 MMOL/L (ref 3.5–5.1)
PROT SERPL-MCNC: 7.6 G/DL (ref 6–8.4)
RBC # BLD AUTO: 3.98 M/UL (ref 4–5.4)
SODIUM SERPL-SCNC: 141 MMOL/L (ref 136–145)
WBC # BLD AUTO: 6.18 K/UL (ref 3.9–12.7)

## 2020-06-05 PROCEDURE — 96374 THER/PROPH/DIAG INJ IV PUSH: CPT

## 2020-06-05 PROCEDURE — 85025 COMPLETE CBC W/AUTO DIFF WBC: CPT

## 2020-06-05 PROCEDURE — 63600175 PHARM REV CODE 636 W HCPCS: Mod: JG | Performed by: EMERGENCY MEDICINE

## 2020-06-05 PROCEDURE — 99284 EMERGENCY DEPT VISIT MOD MDM: CPT | Mod: 25

## 2020-06-05 PROCEDURE — 80053 COMPREHEN METABOLIC PANEL: CPT

## 2020-06-05 RX ORDER — HEPARIN SODIUM 1000 [USP'U]/ML
2000 INJECTION, SOLUTION INTRAVENOUS; SUBCUTANEOUS
Status: COMPLETED | OUTPATIENT
Start: 2020-06-05 | End: 2020-06-05

## 2020-06-05 RX ADMIN — ALTEPLASE 2 MG: 2.2 INJECTION, POWDER, LYOPHILIZED, FOR SOLUTION INTRAVENOUS at 03:06

## 2020-06-05 RX ADMIN — HEPARIN SODIUM 2000 UNITS: 1000 INJECTION, SOLUTION INTRAVENOUS; SUBCUTANEOUS at 06:06

## 2020-06-05 NOTE — ED NOTES
Patient identifiers verified and correct for Ping Davenport.    LOC: The patient is awake, alert and aware of environment with an appropriate affect, the patient is oriented x 3 and speaking appropriately.  APPEARANCE: Patient resting comfortably and in no acute distress, patient is clean and well groomed, patient's clothing is properly fastened.  SKIN: The skin is warm and dry, color consistent with ethnicity, patient has normal skin turgor and moist mucus membranes, skin intact, no breakdown or bruising noted.  MUSCULOSKELETAL: Patient moving all extremities spontaneously, no obvious swelling or deformities noted.  RESPIRATORY: Airway is open and patent, respirations are spontaneous, patient has a normal effort and rate, no accessory muscle use noted, bilateral breath sounds.  CARDIAC: Patient has a normal rate and regular rhythm, no periphreal edema noted, capillary refill < 3 seconds.  ABDOMEN: Soft and non tender to palpation, no distention noted, normoactive bowel sounds present in all four quadrants.  NEUROLOGIC: PERRL, eyes open spontaneously, behavior appropriate to situation, follows commands, facial expression symmetrical, bilateral hand grasp equal and even, purposeful motor response noted, normal sensation in all extremities when touched with a finger.

## 2020-06-05 NOTE — ED NOTES
When getting ready to flush veinous port with heparin I attempted to aspirate arterial line again. I was able to aspirate 5 ml of blood and flush with 10ml ns

## 2020-06-05 NOTE — ED PROVIDER NOTES
Encounter Date: 6/5/2020    SCRIBE #1 NOTE: I, John Lawler , am scribing for, and in the presence of,   . I have scribed the entire note.       History     Chief Complaint   Patient presents with    Vascular Access Problem     pt states that she was sent from [facility] for a consult with Dr Sherman for clotted dialysis catheter, last completed dialysis was Tuesday per pt     Hypertension     Ping Davenport is a 55 y.o. female who  has no past medical history on file.     The patient presents to the ED due to dialysis catheter being clogged. She is a Tuesday/Thursday/Saturday dialysis patient. Her last successful dialysis was June 2nd. The patient had her catheter placed approximately around November of 2019. Patient reported that she is here for consult about her catheter with            The history is provided by the patient.     Review of patient's allergies indicates:   Allergen Reactions    Phenergan [promethazine] Other (See Comments)     Restless legs    Zofran [ondansetron hcl (pf)] Other (See Comments)     Constipation     Ketorolac Palpitations     No past medical history on file.  No past surgical history on file.  No family history on file.  Social History     Tobacco Use    Smoking status: Never Smoker   Substance Use Topics    Alcohol use: Not Currently    Drug use: Never     Review of Systems   Endocrine:        +Catheter Clogged   All other systems reviewed and are negative.      Physical Exam     Initial Vitals [06/05/20 1148]   BP Pulse Resp Temp SpO2   (!) 226/100 84 20 98 °F (36.7 °C) 95 %      MAP       --         Physical Exam    Nursing note and vitals reviewed.  Constitutional: She appears well-developed. She is not diaphoretic. No distress.   Catheter to anterior chest clogged  cite clear    HENT:   Head: Normocephalic and atraumatic.   Mouth/Throat: Oropharynx is clear and moist.   Eyes: EOM are normal. Pupils are equal, round, and reactive to light.   Neck: No  tracheal deviation present.   Cardiovascular: Normal rate, regular rhythm, normal heart sounds and intact distal pulses.   Pulmonary/Chest: Breath sounds normal. No stridor. No respiratory distress. She has no wheezes.   Right deltopectoral aspect with James catheter in place clear, no surrounding erythema or drainage   Abdominal: Soft. Bowel sounds are normal. She exhibits no distension and no mass. There is no tenderness.   Musculoskeletal: Normal range of motion. She exhibits no edema.   Neurological: She is alert and oriented to person, place, and time. She has normal strength. No cranial nerve deficit or sensory deficit.   Skin: Skin is warm and dry. Capillary refill takes less than 2 seconds. No pallor.   Psychiatric: She has a normal mood and affect. Her behavior is normal. Thought content normal.         ED Course   Procedures  Labs Reviewed   CBC W/ AUTO DIFFERENTIAL - Abnormal; Notable for the following components:       Result Value    RBC 3.98 (*)     Hemoglobin 11.6 (*)     Hematocrit 36.1 (*)     RDW 15.1 (*)     All other components within normal limits   COMPREHENSIVE METABOLIC PANEL - Abnormal; Notable for the following components:    CO2 22 (*)     BUN, Bld 96 (*)     Creatinine 5.4 (*)     eGFR if  9 (*)     eGFR if non  8 (*)     All other components within normal limits          Imaging Results    None          Medical Decision Making:   ED Management:  6:03 PM  Dr. Davis had recommended instillation of 2.0 and 2.1 cc TPA in the venous and arterial limb respectively for one hour. Both limbs had been flushable but unable to aspirate from the arterial one, even after TPA therapy. Dr. Davis then recommended instillation of 2,000 units of heparin in the venous side and to leave the TPA in the arterial limb (though unable to aspirate anyways) and instruct patient to report to Dialysis tomorrow. If problems persist, patient will go to Surgery for a line  replacement.     6:15 PM  Nurse informed me that now able to aspirate from arterial limb as well so, after removing the TPA from the arterial limb, will instill 2,000 units Heparin in this catheter limb as well                                 Clinical Impression:       ICD-10-CM ICD-9-CM   1. Complication of vascular dialysis catheter, unspecified complication, initial encounter T82.9XXA 996.1                   I, Dr. Navdeep Jerry, personally performed the services described in this documentation. All medical record entries made by the scribe were at my direction and in my presence.  I have reviewed the chart and agree that the record reflects my personal performance and is accurate and complete           Navdeep Jerry MD  06/05/20 1810       Navdeep Jerry MD  06/05/20 1817

## 2020-06-05 NOTE — ED NOTES
After letting TPA instill for 35-40 mins veinous port was able to aspirate blood from kit and flush 10ml of NS. Arterial line did not pull back, I allowed TPA to instill longer.

## 2020-06-25 ENCOUNTER — HOSPITAL ENCOUNTER (EMERGENCY)
Facility: HOSPITAL | Age: 55
Discharge: HOME | End: 2020-06-25
Attending: EMERGENCY MEDICINE
Payer: MEDICAID

## 2020-06-25 VITALS
DIASTOLIC BLOOD PRESSURE: 89 MMHG | OXYGEN SATURATION: 100 % | RESPIRATION RATE: 20 BRPM | TEMPERATURE: 99 F | SYSTOLIC BLOOD PRESSURE: 193 MMHG | HEART RATE: 68 BPM

## 2020-06-25 DIAGNOSIS — R07.81 RIB PAIN ON RIGHT SIDE: ICD-10-CM

## 2020-06-25 DIAGNOSIS — S42.201A CLOSED FRACTURE OF PROXIMAL END OF RIGHT HUMERUS, UNSPECIFIED FRACTURE MORPHOLOGY, INITIAL ENCOUNTER: Primary | ICD-10-CM

## 2020-06-25 DIAGNOSIS — M25.561 RIGHT KNEE PAIN: ICD-10-CM

## 2020-06-25 DIAGNOSIS — M25.511 RIGHT SHOULDER PAIN: ICD-10-CM

## 2020-06-25 PROCEDURE — 96372 THER/PROPH/DIAG INJ SC/IM: CPT

## 2020-06-25 PROCEDURE — 25000003 PHARM REV CODE 250: Performed by: PHYSICIAN ASSISTANT

## 2020-06-25 PROCEDURE — 63600175 PHARM REV CODE 636 W HCPCS: Performed by: PHYSICIAN ASSISTANT

## 2020-06-25 PROCEDURE — 99284 EMERGENCY DEPT VISIT MOD MDM: CPT | Mod: 25

## 2020-06-25 RX ORDER — ACETAMINOPHEN 325 MG/1
650 TABLET ORAL
Status: COMPLETED | OUTPATIENT
Start: 2020-06-25 | End: 2020-06-25

## 2020-06-25 RX ORDER — OXYCODONE AND ACETAMINOPHEN 10; 325 MG/1; MG/1
1 TABLET ORAL EVERY 4 HOURS PRN
Qty: 18 TABLET | Refills: 0 | Status: SHIPPED | OUTPATIENT
Start: 2020-06-25 | End: 2020-06-27 | Stop reason: SDUPTHER

## 2020-06-25 RX ORDER — MORPHINE SULFATE 4 MG/ML
4 INJECTION, SOLUTION INTRAMUSCULAR; INTRAVENOUS
Status: COMPLETED | OUTPATIENT
Start: 2020-06-25 | End: 2020-06-25

## 2020-06-25 RX ADMIN — ACETAMINOPHEN 650 MG: 325 TABLET ORAL at 01:06

## 2020-06-25 RX ADMIN — MORPHINE SULFATE 4 MG: 4 INJECTION INTRAVENOUS at 02:06

## 2020-06-25 NOTE — ED PROVIDER NOTES
Encounter Date: 6/25/2020       History     Chief Complaint   Patient presents with    Fall     PT reports she was walking to dialysis and tripped over debri. Pt states she hit her right arm and right knee. PT denies hitting her head or LOC. PT has limited ROM of right arm. PT did not recieve dialysis.      Ping Davenport, a 55 y.o. female  has no past medical history on file.     She presents to the ED evaluation of right arm, rib and knee pain after a slip and fall on uneven pavement while walking outside the hospital.  Patient states that she did not hit her head and there was no LOC.  Patient was not able  to bear weight to Alex.  Denies any history of fracture or surgery to site complaint.          Review of patient's allergies indicates:   Allergen Reactions    Phenergan [promethazine] Other (See Comments)     Restless legs    Zofran [ondansetron hcl (pf)] Other (See Comments)     Constipation     Ketorolac Palpitations     No past medical history on file.  No past surgical history on file.  No family history on file.  Social History     Tobacco Use    Smoking status: Never Smoker   Substance Use Topics    Alcohol use: Not Currently    Drug use: Never     Review of Systems   Constitutional: Negative for fever.   Musculoskeletal: Positive for arthralgias and gait problem. Negative for back pain, neck pain and neck stiffness.   Skin: Positive for wound.   Neurological: Negative for weakness and numbness.       Physical Exam     Initial Vitals [06/25/20 1259]   BP Pulse Resp Temp SpO2   (!) 210/95 75 (!) 22 98.9 °F (37.2 °C) 100 %      MAP       --         Physical Exam    Nursing note and vitals reviewed.  Constitutional: She appears well-developed and well-nourished. She is not diaphoretic. No distress.   HENT:   Head: Normocephalic and atraumatic.   Right Ear: External ear normal.   Left Ear: External ear normal.   Nose: Nose normal.   Eyes: Conjunctivae and EOM are normal.   Neck: Normal range of  motion.   Cardiovascular: Normal rate and regular rhythm.   Pulmonary/Chest: She exhibits tenderness and bony tenderness. She exhibits no crepitus and no edema.   Musculoskeletal:      Right shoulder: She exhibits decreased range of motion, tenderness, bony tenderness and pain. She exhibits no swelling, no effusion, no crepitus, no deformity, no laceration, no spasm, normal pulse and normal strength.      Right elbow: Normal.     Right hip: Normal.      Right knee: She exhibits decreased range of motion. She exhibits no swelling, no effusion, no ecchymosis, no deformity, no laceration, no erythema, normal alignment, no LCL laxity, normal patellar mobility, no bony tenderness, normal meniscus and no MCL laxity. Tenderness found. Medial joint line tenderness noted. No lateral joint line, no MCL, no LCL and no patellar tendon tenderness noted.        Legs:    Neurological: She is alert and oriented to person, place, and time.   Skin: Skin is warm and dry. Capillary refill takes less than 2 seconds. No rash noted. No erythema.   Psychiatric: She has a normal mood and affect. Thought content normal.         ED Course   Procedures  Labs Reviewed - No data to display       Imaging Results          X-Ray Shoulder Trauma Right (Final result)  Result time 06/25/20 14:30:27    Final result by Tao Patel MD (06/25/20 14:30:27)                 Impression:      1. Right proximal humerus fracture with anterior displacement and impaction.  2. Mild anterior subluxation of the humeral head without dislocation.      Electronically signed by: Tao Patel MD  Date:    06/25/2020  Time:    14:30             Narrative:    EXAMINATION:  XR SHOULDER TRAUMA 3 VIEW RIGHT; XR RIBS 2 VIEW RIGHT    CLINICAL HISTORY:  Pain in right shoulder; Pleurodynia    TECHNIQUE:  Multiple views of the right shoulder and right-sided ribs.    COMPARISON:  None.    FINDINGS:  There is a complete fracture through the right proximal humerus with mild  associated anterior displacement and impaction.  There is anterior subluxation of the humeral head without definite dislocation.  No suspicious lytic or blastic lesions.  AC joint is intact.  Mild patchy pulmonary parenchymal ground-glass attenuation noted.  Right-sided central venous catheter with tip projecting over the cavoatrial junction.  Probable glenohumeral joint effusion.  No rib fractures.                               X-Ray Ribs 2 View Right (Final result)  Result time 06/25/20 14:30:27    Final result by Tao Patel MD (06/25/20 14:30:27)                 Impression:      1. Right proximal humerus fracture with anterior displacement and impaction.  2. Mild anterior subluxation of the humeral head without dislocation.      Electronically signed by: Tao Patel MD  Date:    06/25/2020  Time:    14:30             Narrative:    EXAMINATION:  XR SHOULDER TRAUMA 3 VIEW RIGHT; XR RIBS 2 VIEW RIGHT    CLINICAL HISTORY:  Pain in right shoulder; Pleurodynia    TECHNIQUE:  Multiple views of the right shoulder and right-sided ribs.    COMPARISON:  None.    FINDINGS:  There is a complete fracture through the right proximal humerus with mild associated anterior displacement and impaction.  There is anterior subluxation of the humeral head without definite dislocation.  No suspicious lytic or blastic lesions.  AC joint is intact.  Mild patchy pulmonary parenchymal ground-glass attenuation noted.  Right-sided central venous catheter with tip projecting over the cavoatrial junction.  Probable glenohumeral joint effusion.  No rib fractures.                               X-Ray Knee 3 View Right (Final result)  Result time 06/25/20 14:23:11    Final result by Tao Patel MD (06/25/20 14:23:11)                 Impression:      1. No acute displaced fractures.      Electronically signed by: Tao Patel MD  Date:    06/25/2020  Time:    14:23             Narrative:    EXAMINATION:  XR KNEE 3 VIEW  RIGHT    CLINICAL HISTORY:  Pain in right knee    TECHNIQUE:  AP, lateral, and oblique views of the right knee were performed.    COMPARISON:  None.    FINDINGS:  No acute displaced fracture.  No suspicious lytic or blastic lesions.  Cartilage spaces are grossly preserved.  There is trace fluid within the suprapatellar joint recess.  There is suspected edema within Hoffa's fat pad.  Scattered vascular calcifications are identified.  No radiopaque foreign bodies.                                 Medical Decision Making:   Initial Assessment:   Fall, arm, rib and knee pain   Differential Diagnosis:   Fracture, dislocation, contusion   Clinical Tests:   Radiological Study: Ordered and Reviewed  ED Management:  Pt presents to ED for evaluation of right arm, rib and knee pain after fall.  NVI.  Contusion to right knee as well as abrasion to left.  Ibuprofen given.  Tetanus updated.  X-ray shows right proximal humerus fracture with anterior displacement and impaction.  Dr. Jerry spoke with Dr. Waite who recommended sling placement.  Patient provided wheelchair d/t knee pain.  Instructed on f/u.  She verbalized understanding and agreement with plan.                                   Clinical Impression:       ICD-10-CM ICD-9-CM   1. Closed fracture of proximal end of right humerus, unspecified fracture morphology, initial encounter  S42.201A 812.00   2. Right shoulder pain  M25.511 719.41   3. Rib pain on right side  R07.81 786.50   4. Right knee pain  M25.561 719.46                                Abi Crews PA-C  06/25/20 2006

## 2020-06-25 NOTE — ED NOTES
Pt reports that she was walking through the hospital to dialysis and fell. Pt reports that when she fell she hit her R knee and R arm. Pt states that when she fell her R arm may have been hyperextended and reports that she's been having a lot of pain in her R upper arm, shoulder, ribs, and her R knee. Pt denies hitting her head during fall and denies any LOC. Pt's peripheral pulses 2+ and equal, cap refill <3 sec. Pt has equal hand  strength. 2 small, circular abrasions noted to R knee. No obvious deformity noted to shoulder, arm, or knee.

## 2020-06-26 ENCOUNTER — NURSE TRIAGE (OUTPATIENT)
Dept: ADMINISTRATIVE | Facility: CLINIC | Age: 55
End: 2020-06-26

## 2020-06-26 NOTE — TELEPHONE ENCOUNTER
Patient c/o right arm pain, 10/10. Tripped and fell yesterday. Patient was seen in ED yesterday and diagnosed with a fractured humerus, a splint was placed, and patient has to f/u with ortho. Care advice discussed. Understanding verbalized.     Reason for Disposition   [1] SEVERE pain AND [2] not improved 2 hours after pain medicine/ice packs    Additional Information   Negative: Serious injury with multiple fractures   Negative: [1] Major bleeding (e.g., actively dripping or spurting) AND [2] can't be stopped   Negative: Sounds like a life-threatening emergency to the triager   Negative: Wound looks infected   Negative: Arm pain from overuse (e.g., sports, lifting, physical work)   Negative: Arm pain not from an injury   Negative: Shoulder injury is main concern   Negative: Elbow injury is main concern   Negative: Wrist or hand injury is main concern   Negative: Finger injury is main concern   Negative: Bullet wound, stabbed by knife, or other serious penetrating wound   Negative: Looks like a broken bone (crooked or deformed)   Negative: Looks like a dislocated joint   Negative: Can't move injured arm at all   Negative: [1] Bleeding AND [2] won't stop after 10 minutes of direct pressure (using correct technique)   Negative: Skin is split open or gaping  (or length > 1/2 inch or 12 mm)   Negative: [1] Dirt in the wound AND [2] not removed with 15 minutes of scrubbing   Negative: Sounds like a serious injury to the triager    Protocols used: ARM INJURY-A-

## 2020-06-27 ENCOUNTER — HOSPITAL ENCOUNTER (EMERGENCY)
Facility: HOSPITAL | Age: 55
Discharge: HOME | End: 2020-06-27
Attending: EMERGENCY MEDICINE
Payer: MEDICAID

## 2020-06-27 VITALS
TEMPERATURE: 100 F | WEIGHT: 160 LBS | DIASTOLIC BLOOD PRESSURE: 81 MMHG | HEART RATE: 76 BPM | HEIGHT: 60 IN | SYSTOLIC BLOOD PRESSURE: 179 MMHG | RESPIRATION RATE: 18 BRPM | BODY MASS INDEX: 31.41 KG/M2 | OXYGEN SATURATION: 96 %

## 2020-06-27 DIAGNOSIS — Z99.2 ESRD NEEDING DIALYSIS: ICD-10-CM

## 2020-06-27 DIAGNOSIS — N18.6 ESRD NEEDING DIALYSIS: ICD-10-CM

## 2020-06-27 DIAGNOSIS — S42.291A OTHER CLOSED DISPLACED FRACTURE OF PROXIMAL END OF RIGHT HUMERUS, INITIAL ENCOUNTER: Primary | ICD-10-CM

## 2020-06-27 LAB
ALBUMIN SERPL BCP-MCNC: 3.4 G/DL (ref 3.5–5.2)
ANION GAP SERPL CALC-SCNC: 13 MMOL/L (ref 8–16)
BASOPHILS # BLD AUTO: 0.03 K/UL (ref 0–0.2)
BASOPHILS NFR BLD: 0.3 % (ref 0–1.9)
BUN SERPL-MCNC: 69 MG/DL (ref 6–20)
CALCIUM SERPL-MCNC: 9.3 MG/DL (ref 8.7–10.5)
CHLORIDE SERPL-SCNC: 101 MMOL/L (ref 95–110)
CO2 SERPL-SCNC: 27 MMOL/L (ref 23–29)
CREAT SERPL-MCNC: 5.9 MG/DL (ref 0.5–1.4)
DIFFERENTIAL METHOD: ABNORMAL
EOSINOPHIL # BLD AUTO: 0.1 K/UL (ref 0–0.5)
EOSINOPHIL NFR BLD: 0.8 % (ref 0–8)
ERYTHROCYTE [DISTWIDTH] IN BLOOD BY AUTOMATED COUNT: 14.1 % (ref 11.5–14.5)
EST. GFR  (AFRICAN AMERICAN): 9 ML/MIN/1.73 M^2
EST. GFR  (NON AFRICAN AMERICAN): 7 ML/MIN/1.73 M^2
GLUCOSE SERPL-MCNC: 135 MG/DL (ref 70–110)
HCT VFR BLD AUTO: 31.7 % (ref 37–48.5)
HGB BLD-MCNC: 10 G/DL (ref 12–16)
IMM GRANULOCYTES # BLD AUTO: 0.05 K/UL (ref 0–0.04)
IMM GRANULOCYTES NFR BLD AUTO: 0.5 % (ref 0–0.5)
LYMPHOCYTES # BLD AUTO: 1.1 K/UL (ref 1–4.8)
LYMPHOCYTES NFR BLD: 11.3 % (ref 18–48)
MCH RBC QN AUTO: 28.9 PG (ref 27–31)
MCHC RBC AUTO-ENTMCNC: 31.5 G/DL (ref 32–36)
MCV RBC AUTO: 92 FL (ref 82–98)
MONOCYTES # BLD AUTO: 0.9 K/UL (ref 0.3–1)
MONOCYTES NFR BLD: 8.8 % (ref 4–15)
NEUTROPHILS # BLD AUTO: 7.8 K/UL (ref 1.8–7.7)
NEUTROPHILS NFR BLD: 78.3 % (ref 38–73)
NRBC BLD-RTO: 0 /100 WBC
PHOSPHATE SERPL-MCNC: 5 MG/DL (ref 2.7–4.5)
PLATELET # BLD AUTO: 114 K/UL (ref 150–350)
PMV BLD AUTO: 12.8 FL (ref 9.2–12.9)
POTASSIUM SERPL-SCNC: 3.7 MMOL/L (ref 3.5–5.1)
RBC # BLD AUTO: 3.46 M/UL (ref 4–5.4)
SODIUM SERPL-SCNC: 141 MMOL/L (ref 136–145)
WBC # BLD AUTO: 10.01 K/UL (ref 3.9–12.7)

## 2020-06-27 PROCEDURE — 85025 COMPLETE CBC W/AUTO DIFF WBC: CPT

## 2020-06-27 PROCEDURE — 80069 RENAL FUNCTION PANEL: CPT

## 2020-06-27 PROCEDURE — 63600175 PHARM REV CODE 636 W HCPCS: Performed by: INTERNAL MEDICINE

## 2020-06-27 PROCEDURE — G0257 UNSCHED DIALYSIS ESRD PT HOS: HCPCS

## 2020-06-27 PROCEDURE — 99284 EMERGENCY DEPT VISIT MOD MDM: CPT | Mod: 25

## 2020-06-27 PROCEDURE — 25000003 PHARM REV CODE 250: Performed by: INTERNAL MEDICINE

## 2020-06-27 RX ORDER — OXYCODONE AND ACETAMINOPHEN 10; 325 MG/1; MG/1
1 TABLET ORAL EVERY 4 HOURS PRN
Qty: 18 TABLET | Refills: 0 | Status: SHIPPED | OUTPATIENT
Start: 2020-06-27 | End: 2020-06-27 | Stop reason: SDUPTHER

## 2020-06-27 RX ORDER — OXYCODONE AND ACETAMINOPHEN 5; 325 MG/1; MG/1
1 TABLET ORAL
Status: COMPLETED | OUTPATIENT
Start: 2020-06-27 | End: 2020-06-27

## 2020-06-27 RX ORDER — METOCLOPRAMIDE 10 MG/1
10 TABLET ORAL 3 TIMES DAILY PRN
Qty: 15 TABLET | Refills: 0 | Status: SHIPPED | OUTPATIENT
Start: 2020-06-27 | End: 2020-06-27 | Stop reason: SDUPTHER

## 2020-06-27 RX ORDER — SODIUM CHLORIDE 9 MG/ML
INJECTION, SOLUTION INTRAVENOUS
Status: DISCONTINUED | OUTPATIENT
Start: 2020-06-27 | End: 2020-06-28 | Stop reason: HOSPADM

## 2020-06-27 RX ORDER — MUPIROCIN 20 MG/G
OINTMENT TOPICAL 2 TIMES DAILY
Status: DISCONTINUED | OUTPATIENT
Start: 2020-06-27 | End: 2020-06-28 | Stop reason: HOSPADM

## 2020-06-27 RX ORDER — SODIUM CHLORIDE 9 MG/ML
INJECTION, SOLUTION INTRAVENOUS ONCE
Status: DISCONTINUED | OUTPATIENT
Start: 2020-06-27 | End: 2020-06-27

## 2020-06-27 RX ORDER — ACETAMINOPHEN 325 MG/1
325 TABLET ORAL
Status: COMPLETED | OUTPATIENT
Start: 2020-06-27 | End: 2020-06-27

## 2020-06-27 RX ORDER — METOCLOPRAMIDE 10 MG/1
10 TABLET ORAL 3 TIMES DAILY PRN
Qty: 15 TABLET | Refills: 0 | Status: ON HOLD | OUTPATIENT
Start: 2020-06-27 | End: 2020-09-10

## 2020-06-27 RX ORDER — OXYCODONE AND ACETAMINOPHEN 10; 325 MG/1; MG/1
1 TABLET ORAL EVERY 4 HOURS PRN
Qty: 18 TABLET | Refills: 0 | Status: ON HOLD | OUTPATIENT
Start: 2020-06-27 | End: 2020-09-10

## 2020-06-27 RX ORDER — HEPARIN SODIUM 1000 [USP'U]/ML
4100 INJECTION, SOLUTION INTRAVENOUS; SUBCUTANEOUS
Status: DISCONTINUED | OUTPATIENT
Start: 2020-06-27 | End: 2020-06-28 | Stop reason: HOSPADM

## 2020-06-27 RX ADMIN — OXYCODONE HYDROCHLORIDE AND ACETAMINOPHEN 1 TABLET: 5; 325 TABLET ORAL at 05:06

## 2020-06-27 RX ADMIN — ACETAMINOPHEN 325 MG: 325 TABLET ORAL at 06:06

## 2020-06-27 RX ADMIN — HEPARIN SODIUM 4100 UNITS: 1000 INJECTION, SOLUTION INTRAVENOUS; SUBCUTANEOUS at 06:06

## 2020-06-27 RX ADMIN — SODIUM CHLORIDE 1000 ML: 0.9 INJECTION, SOLUTION INTRAVENOUS at 05:06

## 2020-06-27 NOTE — ED PROVIDER NOTES
Encounter Date: 6/27/2020       History     Chief Complaint   Patient presents with    dialysis     pt hasnt had dialysis since tuesday.     Patient is a 55-year-old female with end-stage renal disease who says she has not been to dialysis in 4 days.  She complains of generalized weakness.  She denies shortness of breath.  No fever or cough.  No nausea or vomiting.  Patient was on her way to dialysis 2 days ago when she fell sustaining a fracture of her proximal humerus.        Review of patient's allergies indicates:   Allergen Reactions    Phenergan [promethazine] Other (See Comments)     Restless legs    Zofran [ondansetron hcl (pf)] Other (See Comments)     Constipation     Ketorolac Palpitations     Past Medical History:   Diagnosis Date    Diabetes mellitus     Hypertension     Renal disorder      History reviewed. No pertinent surgical history.  History reviewed. No pertinent family history.  Social History     Tobacco Use    Smoking status: Never Smoker   Substance Use Topics    Alcohol use: Not Currently    Drug use: Never     Review of Systems   Constitutional: Positive for fatigue. Negative for chills and fever.   Respiratory: Negative for shortness of breath.    Cardiovascular: Negative for chest pain.   Gastrointestinal: Negative for nausea and vomiting.   Neurological: Positive for weakness.   All other systems reviewed and are negative.      Physical Exam     Initial Vitals [06/27/20 1449]   BP Pulse Resp Temp SpO2   (!) 148/80 64 16 99.3 °F (37.4 °C) 97 %      MAP       --         Physical Exam    Nursing note and vitals reviewed.  Constitutional: No distress.   HENT:   Head: Atraumatic.   Eyes: EOM are normal.   Neck: Neck supple.   Cardiovascular: Normal rate, regular rhythm and normal heart sounds.   Pulmonary/Chest:   Diminished breath sounds at bases bilaterally.   Abdominal: Soft. There is no abdominal tenderness.   Musculoskeletal:      Comments: Right arm in sling.   Neurological:  She is alert and oriented to person, place, and time.   Psychiatric: Thought content normal.         ED Course   Procedures  Labs Reviewed - No data to display       Imaging Results    None          Medical Decision Making:   ED Management:  55-year-old female with end-stage renal disease needing dialysis.  This was discussed with her nephrologist, Dr. Jc, who will accept the patient upstairs in the dialysis unit.  Patient return to the ED after dialysis and if stable will be discharged.                                 Clinical Impression:       ICD-10-CM ICD-9-CM   1. ESRD needing dialysis  N18.6 585.6    Z99.2                                 Quinn Benjamin MD  06/28/20 0645

## 2020-06-27 NOTE — ED NOTES
Patient identifiers verified and correct for Ping Davenport.    LOC: The patient is awake, alert and aware of environment with an appropriate affect, the patient is oriented x 3 and speaking appropriately.  APPEARANCE: Patient resting comfortably and in no acute distress, patient is clean and well groomed, patient's clothing is properly fastened.  SKIN: The skin is warm and dry, color consistent with ethnicity, patient has normal skin turgor and moist mucus membranes, skin intact, no breakdown or bruising noted.  MUSCULOSKELETAL: SEE ASSESSMENT  RESPIRATORY: Airway is open and patent, respirations are spontaneous, patient has a normal effort and rate, no accessory muscle use noted, bilateral breath sounds.  CARDIAC: Patient has a normal rate and regular rhythm, no periphreal edema noted, capillary refill < 3 seconds.  ABDOMEN: Soft and non tender to palpation, no distention noted, normoactive bowel sounds present in all four quadrants.  NEUROLOGIC: PERRL,  eyes open spontaneously, behavior appropriate to situation, follows commands, facial expression symmetrical, bilateral hand grasp equal and even, purposeful motor response noted, normal sensation in all extremities when touched with a finger.

## 2020-06-28 NOTE — CONSULTS
NEPHROLOGY CONSULT NOTE    HPI & INTERVAL HISTORY:    Past Medical History:   Diagnosis Date    Diabetes mellitus     Hypertension     Renal disorder       History reviewed. No pertinent surgical history.   Review of patient's allergies indicates:   Allergen Reactions    Phenergan [promethazine] Other (See Comments)     Restless legs    Zofran [ondansetron hcl (pf)] Other (See Comments)     Constipation     Ketorolac Palpitations      (Not in a hospital admission)      Social History     Socioeconomic History    Marital status:      Spouse name: Not on file    Number of children: Not on file    Years of education: Not on file    Highest education level: Not on file   Occupational History    Not on file   Social Needs    Financial resource strain: Not on file    Food insecurity     Worry: Not on file     Inability: Not on file    Transportation needs     Medical: Not on file     Non-medical: Not on file   Tobacco Use    Smoking status: Never Smoker   Substance and Sexual Activity    Alcohol use: Not Currently    Drug use: Never    Sexual activity: Not Currently     Partners: Male   Lifestyle    Physical activity     Days per week: Not on file     Minutes per session: Not on file    Stress: Not on file   Relationships    Social connections     Talks on phone: Not on file     Gets together: Not on file     Attends [employer] service: Not on file     Active member of club or organization: Not on file     Attends meetings of clubs or organizations: Not on file     Relationship status: Not on file   Other Topics Concern    Not on file   Social History Narrative    Not on file        MEDS   mupirocin   Nasal BID                 CONTINOUS INFUSIONS:      Intake/Output Summary (Last 24 hours) at 6/27/2020 1943  Last data filed at 6/27/2020 1820  Gross per 24 hour   Intake 400 ml   Output 880 ml   Net -480 ml        HEMODYNAMICS:    Temp:  [99.3 °F (37.4 °C)] 99.3 °F (37.4 °C)  Pulse:  [60-94]  70  Resp:  [16-18] 18  SpO2:  [97 %] 97 %  BP: (113-148)/(55-85) 120/74   Gen: NAD  Weakness  Pain -  right upper arm, right knee  No SOB  No cough  No CP  No nausea  No vomiting    Cards: Pulse 60  Pul: diminished breath sounds  Abdomen soft   Ext: no edema   Skin: dry   Dialysis Access:  Right IJ tunnelled catheter  LABS   Lab Results   Component Value Date    WBC 10.01 06/27/2020    HGB 10.0 (L) 06/27/2020    HCT 31.7 (L) 06/27/2020    MCV 92 06/27/2020     (L) 06/27/2020        Recent Labs   Lab 06/27/20  1620   *   CALCIUM 9.3   ALBUMIN 3.4*      K 3.7   CO2 27      BUN 69*   CREATININE 5.9*      Lab Results   Component Value Date    .5 (H) 11/07/2019    CALCIUM 9.3 06/27/2020    PHOS 5.0 (H) 06/27/2020      No results found for: IRON, TIBC, FERRITIN     ABG  No results for input(s): PH, PO2, PCO2, HCO3, BE in the last 168 hours.      IMAGING:  CXR    ASSESSMENT / PLAN      Right proximal humerus fracture with anterior displacement and impaction.   Mild anterior subluxation of the humeral head without dislocation.   Follow up - Orthopedic.   ESRD.  Metabolic bone disease.  Anemia.  Poor nutrition.  HTN.  Tolerated Dialysis.  Needs transportation to the outpatient dialysis.

## 2020-06-29 ENCOUNTER — TELEPHONE (OUTPATIENT)
Dept: ORTHOPEDICS | Facility: CLINIC | Age: 55
End: 2020-06-29

## 2020-06-29 NOTE — TELEPHONE ENCOUNTER
Left message returning patients call, stating that Dr Waite does not take new patient Medicaid at this time.

## 2020-06-29 NOTE — TELEPHONE ENCOUNTER
----- Message from Soy Jackson sent at 6/29/2020  8:21 AM CDT -----  Type:  Needs Medical Advice    Who Called:   Reason:Patient has a referral but my system wouldn't let me schedule  Would the patient rather a call back or a response via [facility]? callback  Best Call Back Number: [phone]  Additional Information: none

## 2020-07-01 ENCOUNTER — HOSPITAL ENCOUNTER (EMERGENCY)
Facility: HOSPITAL | Age: 55
Discharge: HOME | End: 2020-07-01
Attending: EMERGENCY MEDICINE
Payer: MEDICAID

## 2020-07-01 VITALS
TEMPERATURE: 99 F | DIASTOLIC BLOOD PRESSURE: 66 MMHG | HEART RATE: 68 BPM | BODY MASS INDEX: 26.13 KG/M2 | SYSTOLIC BLOOD PRESSURE: 150 MMHG | HEIGHT: 62 IN | RESPIRATION RATE: 20 BRPM | OXYGEN SATURATION: 97 % | WEIGHT: 142 LBS

## 2020-07-01 DIAGNOSIS — S82.034A CLOSED NONDISPLACED TRANSVERSE FRACTURE OF RIGHT PATELLA, INITIAL ENCOUNTER: Primary | ICD-10-CM

## 2020-07-01 DIAGNOSIS — S42.294A OTHER CLOSED NONDISPLACED FRACTURE OF PROXIMAL END OF RIGHT HUMERUS, INITIAL ENCOUNTER: ICD-10-CM

## 2020-07-01 DIAGNOSIS — R07.81 RIB PAIN: ICD-10-CM

## 2020-07-01 DIAGNOSIS — N18.6 ESRD (END STAGE RENAL DISEASE): ICD-10-CM

## 2020-07-01 LAB
ALBUMIN SERPL BCP-MCNC: 3 G/DL (ref 3.5–5.2)
ANION GAP SERPL CALC-SCNC: 12 MMOL/L (ref 8–16)
BUN SERPL-MCNC: 104 MG/DL (ref 6–20)
CALCIUM SERPL-MCNC: 9.1 MG/DL (ref 8.7–10.5)
CHLORIDE SERPL-SCNC: 104 MMOL/L (ref 95–110)
CO2 SERPL-SCNC: 22 MMOL/L (ref 23–29)
CREAT SERPL-MCNC: 8.7 MG/DL (ref 0.5–1.4)
EST. GFR  (AFRICAN AMERICAN): 5 ML/MIN/1.73 M^2
EST. GFR  (NON AFRICAN AMERICAN): 5 ML/MIN/1.73 M^2
GLUCOSE SERPL-MCNC: 115 MG/DL (ref 70–110)
PHOSPHATE SERPL-MCNC: 7.1 MG/DL (ref 2.7–4.5)
POTASSIUM SERPL-SCNC: 4.5 MMOL/L (ref 3.5–5.1)
SODIUM SERPL-SCNC: 138 MMOL/L (ref 136–145)

## 2020-07-01 PROCEDURE — 63600175 PHARM REV CODE 636 W HCPCS: Performed by: INTERNAL MEDICINE

## 2020-07-01 PROCEDURE — G0257 UNSCHED DIALYSIS ESRD PT HOS: HCPCS

## 2020-07-01 PROCEDURE — 86706 HEP B SURFACE ANTIBODY: CPT

## 2020-07-01 PROCEDURE — 93005 ELECTROCARDIOGRAM TRACING: CPT | Mod: 59

## 2020-07-01 PROCEDURE — 25000003 PHARM REV CODE 250: Performed by: EMERGENCY MEDICINE

## 2020-07-01 PROCEDURE — 29505 APPLICATION LONG LEG SPLINT: CPT | Mod: RT

## 2020-07-01 PROCEDURE — 93010 ELECTROCARDIOGRAM REPORT: CPT | Mod: ,,, | Performed by: INTERNAL MEDICINE

## 2020-07-01 PROCEDURE — 93010 EKG 12-LEAD: ICD-10-PCS | Mod: ,,, | Performed by: INTERNAL MEDICINE

## 2020-07-01 PROCEDURE — 87340 HEPATITIS B SURFACE AG IA: CPT

## 2020-07-01 PROCEDURE — 99285 EMERGENCY DEPT VISIT HI MDM: CPT | Mod: 25

## 2020-07-01 PROCEDURE — 80069 RENAL FUNCTION PANEL: CPT

## 2020-07-01 RX ORDER — ACETAMINOPHEN 325 MG/1
650 TABLET ORAL
Status: DISCONTINUED | OUTPATIENT
Start: 2020-07-01 | End: 2020-07-01

## 2020-07-01 RX ORDER — SODIUM CHLORIDE 9 MG/ML
INJECTION, SOLUTION INTRAVENOUS
Status: DISCONTINUED | OUTPATIENT
Start: 2020-07-01 | End: 2020-07-02 | Stop reason: HOSPADM

## 2020-07-01 RX ORDER — OXYCODONE AND ACETAMINOPHEN 5; 325 MG/1; MG/1
2 TABLET ORAL
Status: DISCONTINUED | OUTPATIENT
Start: 2020-07-01 | End: 2020-07-01

## 2020-07-01 RX ORDER — MUPIROCIN 20 MG/G
OINTMENT TOPICAL 2 TIMES DAILY
Status: DISCONTINUED | OUTPATIENT
Start: 2020-07-01 | End: 2020-07-02 | Stop reason: HOSPADM

## 2020-07-01 RX ORDER — OXYCODONE AND ACETAMINOPHEN 5; 325 MG/1; MG/1
1 TABLET ORAL
Status: COMPLETED | OUTPATIENT
Start: 2020-07-01 | End: 2020-07-01

## 2020-07-01 RX ORDER — SODIUM CHLORIDE 9 MG/ML
INJECTION, SOLUTION INTRAVENOUS ONCE
Status: DISCONTINUED | OUTPATIENT
Start: 2020-07-01 | End: 2020-07-02 | Stop reason: HOSPADM

## 2020-07-01 RX ORDER — HEPARIN SODIUM 1000 [USP'U]/ML
4100 INJECTION, SOLUTION INTRAVENOUS; SUBCUTANEOUS ONCE
Status: COMPLETED | OUTPATIENT
Start: 2020-07-01 | End: 2020-07-01

## 2020-07-01 RX ORDER — PROCHLORPERAZINE EDISYLATE 5 MG/ML
10 INJECTION INTRAMUSCULAR; INTRAVENOUS
Status: DISCONTINUED | OUTPATIENT
Start: 2020-07-01 | End: 2020-07-02 | Stop reason: HOSPADM

## 2020-07-01 RX ADMIN — HEPARIN SODIUM 4100 UNITS: 1000 INJECTION, SOLUTION INTRAVENOUS; SUBCUTANEOUS at 06:07

## 2020-07-01 RX ADMIN — OXYCODONE HYDROCHLORIDE AND ACETAMINOPHEN 1 TABLET: 5; 325 TABLET ORAL at 08:07

## 2020-07-01 NOTE — ED NOTES
Pt currently in dialysis. Anticipating dialysis to take two hours. Waiting on CT of knee when patient returns.

## 2020-07-01 NOTE — ED TRIAGE NOTES
Present to ED due to missed Dialysis yesterday, normal dialysis days Tuesday, Thursday and Saturday.  PCP referred patient to ED for Dialysis today.

## 2020-07-01 NOTE — PLAN OF CARE
met with patient at bedside with bedside nurse, Caty to complete assessment. Patient verified demographics and lives with her sister Alla Davenport, [phone], and her mother. Patient stated that her mother and sister both are disabled and do not drive. Prior to admission and fall patient stated she was independent and driving. Patient states she does have medical equipment (W/C and walker) within the home, no HH services. Patient has no issues affording medication and no additional social needs at this time.    SW spoke with patient about setting up medicaid transportation through [employer]. SW spoke with customer care rep, Marcus, [phone], who informed SW that patient's medicaid transportation benefit is currently showing in use with another service. SW was directed to call [employer] benefit line to confirm.    SW called [employer], [phone], spoke with Ghazala, regarding transportation benefits. Ghazala reached out to the transportation benefit department line and then informed SW to call back in the morning , transportation department closed for 5 PM. SW was informed by Ghazala that patient benefit was active and that she did not see anything on the patient's account that indicated a service for transportation was being used. Ghazala stated that SW can call in the morning and that there should ne no issues  setting up transport for Saturday although it is a holiday. Ghazala informed PETR to ask for her directly to be of assistance.    PETR will call Medicaid transportation in the morning to set up transportation for the weekend chair time, Saturday at noon. Petr will set up [facility] transport for tomorrow's dialysis chair time, Thursday for 12 noon.     Patient notified. Lyndsey RODRIGES notified of plan.      ** 6:04 PM- PETR called [facility], [phone], spoke with Dayanna about setting transport for patient. Dayanna informed PETR that if patient was being  discharged home that it was possible that [facility] order would be cancelled because of the request being submitted upon today's encounter. PETR was informed to call [facility] to set transportation for tomorrow's HD chair time appointment.    SW called [facility], [phone], spoke with  Alejandra, wheelchair van set up for 11 A.M for patient tomorrow, to transport to noon appointment. Patient notified.

## 2020-07-02 ENCOUNTER — TELEPHONE (OUTPATIENT)
Dept: SURGERY | Facility: CLINIC | Age: 55
End: 2020-07-02

## 2020-07-02 LAB — HBV SURFACE AG SERPL QL IA: NEGATIVE

## 2020-07-02 NOTE — PLAN OF CARE
informed patient of scheduled Ortho appointment for 7/8/2020 at 1:40 PM. SW spoke with [facility] , rep scheduled  for 12:45 PM. Patient notified.    PETR spoke with [facility] rep and scheduled pickup for patient to return home. SW was informed transportation will be there within the hour. Patient notified.

## 2020-07-02 NOTE — TELEPHONE ENCOUNTER
----- Message from Melvina Garcia sent at 7/2/2020 11:37 AM CDT -----  Regarding: PD cath line  Contact: evelyn Samuel from [facility] can be reached at [phone]      Called to request a pd cath be put on pt.  And referral will be submitted.      Thank you!      7/2/20  1:47pm  Spoke to Lizzie with [facility]. Stated patient needs consultation for PD catheter. I advised Lizzie that patient would have to see Dr. Yang or Dr. Jo at [place] location due to her type of insurance. Lizzie stated patient does not want to travel that far so she will try another surgeon. Verbalized understanding.

## 2020-07-02 NOTE — NURSING
CASE MANAGEMENT UPDATE NOTE 7/25/2020    Orders noted for Home Health, patient has no perference for home health services. Spoke with Agustina at [facility] [phone] and she can see the orders in Epic and has accepted the patient  And states will admit on Friday for PT.

## 2020-07-02 NOTE — ED PROVIDER NOTES
Encounter Date: 7/1/2020       History     Chief Complaint   Patient presents with    Knee Pain     Seen here last Thursday after a fall in the MOB sustaining right humeral fracture. States knee xray at that time negative for fracture but continues to c/o painful weightbearing. Patient also adds that she missed dialysis yesterday and thinks she needs to be dialized. Presents awake, alert, oriented. Right arm in sling, strong radial pulse. Right knee without deformity with strong distal pulses. Denies SOB.      HPI   This is a 55 y.o. female who has a past medical history of Diabetes mellitus, Hypertension, and Renal disorder.     The patient presents to the Emergency Department with right knee pain.  Patient had a fall last Thursday in the medical office building, sustaining a right humeral fracture that was diagnosed in the ED here.  Patient had x-rays of the knee at that time which were unremarkable.  Patient came in 2 days ago for missed dialysis secondary to transport issues from her new injury.  Patient drives herself, and has no assistance from family.  Taxi cab was called at that time but unable to assist patient into their car.    Patient presents today for persistent knee pain which is anteriorly, associated with mild swelling.  Patient also having some nausea and vomiting from the car ride over.  Patient missed her dialysis yesterday and is feeling mildly short of breath. Denies any CP.        Review of patient's allergies indicates:   Allergen Reactions    Phenergan [promethazine] Other (See Comments)     Restless legs    Zofran [ondansetron hcl (pf)] Other (See Comments)     Constipation     Ketorolac Palpitations     Past Medical History:   Diagnosis Date    Diabetes mellitus     Hypertension     Renal disorder      No past surgical history on file.  History reviewed. No pertinent family history.  Social History     Tobacco Use    Smoking status: Never Smoker   Substance Use Topics    Alcohol  use: Not Currently    Drug use: Never     Review of Systems   Constitutional: Positive for activity change, appetite change and fatigue. Negative for fever.   HENT: Negative.    Respiratory: Positive for shortness of breath.    Cardiovascular: Negative for chest pain.   Gastrointestinal: Positive for nausea and vomiting. Negative for abdominal pain.   Musculoskeletal: Positive for arthralgias, gait problem and joint swelling.   Skin: Positive for color change.   Neurological: Positive for weakness.   Psychiatric/Behavioral: Positive for sleep disturbance. The patient is nervous/anxious.        Physical Exam     Initial Vitals [07/01/20 1551]   BP Pulse Resp Temp SpO2   (!) 160/74 69 15 98.7 °F (37.1 °C) 95 %      MAP       --         Physical Exam    Nursing note and vitals reviewed.  Constitutional: She appears well-developed and well-nourished. She is not diaphoretic. No distress.   HENT:   Head: Normocephalic and atraumatic.   Mouth/Throat: Oropharynx is clear and moist.   Eyes: Conjunctivae are normal. Pupils are equal, round, and reactive to light.   Neck: Neck supple.   Cardiovascular: Normal rate, regular rhythm, normal heart sounds and intact distal pulses.   2+ radial pulses RUE   Pulmonary/Chest: Breath sounds normal. No respiratory distress. She has no wheezes. She has no rhonchi. She has no rales.   Musculoskeletal: Tenderness present. No edema.      Right knee: She exhibits decreased range of motion, swelling and effusion. She exhibits no deformity. Tenderness found.      Comments: RUE in sling.  There is noted dependent swelling to the right arm with ecchymosis.    RLE:  There is tenderness on palpation of the patella.  No tenderness to the anterior joint line.  Patient is mildly weak but still able to extend the leg.   Neurological: She is alert and oriented to person, place, and time.   Normal  strength and SILT intact to RUE   Skin: Skin is warm and dry. Capillary refill takes less than 2  seconds. No rash noted.   Normal cap refill to the RUE   Psychiatric:   Patient has mildly depressed mood, mildly anxious         ED Course   Procedures  Labs Reviewed   RENAL FUNCTION PANEL - Abnormal; Notable for the following components:       Result Value    Glucose 115 (*)     CO2 22 (*)     BUN, Bld 104 (*)     Creatinine 8.7 (*)     Albumin 3.0 (*)     Phosphorus 7.1 (*)     eGFR if  5 (*)     eGFR if non  5 (*)     All other components within normal limits   HEPATITIS B SURFACE ANTIBODY, QUAL/QUANT   HEPATITIS B SURFACE ANTIGEN          Imaging Results          CT Knee Without Contrast Right (Final result)  Result time 07/01/20 20:33:03    Final result by Marjan Hinton MD (07/01/20 20:33:03)                 Impression:      Acute nondisplaced patellar fracture.      Electronically signed by: Marjan Hinton MD  Date:    07/01/2020  Time:    20:33             Narrative:    EXAMINATION:  CT KNEE WITHOUT CONTRAST RIGHT    CLINICAL HISTORY:  Knee pain, effusion or neg xray;    TECHNIQUE:  CT of the right knee was obtained without the use of IV contrast.  3D reconstructions were obtained.    COMPARISON:  Right knee radiographs from 06/25/2020.    FINDINGS:  There is acute nondisplaced fracture of the inferior pole of the patella.  Small suprapatellar joint effusion is seen.  Edema is seen involving the subcutaneous soft tissues at the anterior aspect of the knee with mild Hoffa's fat pad edema.  No additional fractures or dislocation identified.                               X-Ray Ribs 2 View Right (Final result)  Result time 07/01/20 17:38:04    Final result by Marjan Hinton MD (07/01/20 17:38:04)                 Impression:      No acute right-sided rib fractures seen.    Redemonstration of subacute right humeral neck fracture.      Electronically signed by: Marjan Hinton MD  Date:    07/01/2020  Time:    17:38             Narrative:    EXAMINATION:  XR RIBS 2 VIEW  RIGHT    CLINICAL HISTORY:  Pleurodynia    TECHNIQUE:  Two views of the right ribs were performed.    COMPARISON:  06/25/2020.    FINDINGS:  Redemonstration of acute/subacute impacted humeral neck fracture.  No additional fractures are seen.  No acute displaced right-sided rib fractures are identified.  Right lung is relatively clear.  Vascular catheter is in similar position.                                                   ED Course as of Jul 02 1453   Wed Jul 01, 2020   1615 This is an emergent evaluation of a 55 y.o.female patient with presentation of persistent right knee pain s/p fall.  Initial x-ray last week was negative for fracture.  Patient has pain on patellar palpation and weakness with extension.  Patient also missed dialysis yesterday, due to transport issues from disability from her fall and broken arm.      Initial differentials include but are not limited to:  Knee contusion, knee effusion, patellar fracture or other knee fracture, ESRD requiring transfusion, electrolyte abnormality.     Plan:  CT right knee, right rib x-ray, dialysis with nephrology consult, case management consultation for transportation issues to go to outpatient dialysis      [NP]   1620 Spoke with , Hanna, who will call her team member to assist with outpatient transportation    [NP]      ED Course User Index  [NP] Long Solorio MD          Patient had dialysis without complications.  Patient did require Percocet during the procedure which is typical for her.    Patient returned and had CT of the right knee which showed a nondisplaced fracture of the right patella.  Patient was placed in a knee immobilizer by me and RN at bedside.  With minimal assistance (hand holding), patient was able to stand and walk 20ft.  Cane was not available to us at this time, but will work on getting her a device at home.    Case management/ to assist with transportation to dialysis tomorrow and from now on.  They are  also working on home health and PT for the patient. THis has been discussed with the patient, who is aware of the transportation for dialysis tomorrow. Follow up with PCP, orthopedist and       Clinical Impression:       ICD-10-CM ICD-9-CM   1. Closed nondisplaced transverse fracture of right patella, initial encounter  S82.034A 822.0   2. ESRD (end stage renal disease)  N18.6 585.6   3. Rib pain  R07.81 786.50   4. Other closed nondisplaced fracture of proximal end of right humerus, initial encounter  S42.294A 812.09             ED Disposition Condition    Discharge Stable        ED Prescriptions     None        Follow-up Information    None                                    Long Solorio MD  07/02/20 8447

## 2020-07-02 NOTE — PLAN OF CARE
called [place] [facility] [employer] [facility], spoke with Beronica, was able to schedule three consecutive appointment rides for patient to dialysis appointment.    Pickup time for 11:15 AM  and return home  for 4:15 PM: 7/4/2020 ticket number: [id number], 7/7/2020 ticket number [id number], and 7/9/2020 ticket number [id number].     Ni informed SW that in order to have recurring appointments set up she would transfer to 'Standing Order' department.     PETR spoke with [employer] representative, Cande, [phone], was able to set standing order for patient T/TH/SA at 11:15 AM pickup to [facility], and 4:15 PM return to home. Patient notified.

## 2020-07-02 NOTE — NURSING
CASE MANAGEMENT  Patient has refused home health, and I have informed the team, Dr. Solorio. Patient follow up made with Dr. Andujar on wed July 8th at 140pm

## 2020-07-02 NOTE — DISCHARGE INSTRUCTIONS
Go to dialysis tomorrow as scheduled.  We are working on your transportation for future dialysis.  I have put in a consult for home health and PT.    You are allowed to bear weight as tolerated on the right leg. Use a cane to walk with at all times.

## 2020-07-06 LAB
HBV SURFACE AB SER QL IA: POSITIVE
HBV SURFACE AB SERPL IA-ACNC: 126 MIU/ML

## 2020-07-07 ENCOUNTER — HOSPITAL ENCOUNTER (EMERGENCY)
Facility: HOSPITAL | Age: 55
Discharge: HOME | End: 2020-07-07
Payer: MEDICAID

## 2020-07-07 VITALS
DIASTOLIC BLOOD PRESSURE: 66 MMHG | TEMPERATURE: 98 F | BODY MASS INDEX: 28.61 KG/M2 | OXYGEN SATURATION: 98 % | SYSTOLIC BLOOD PRESSURE: 142 MMHG | WEIGHT: 155.44 LBS | HEART RATE: 55 BPM | HEIGHT: 62 IN | RESPIRATION RATE: 18 BRPM

## 2020-07-07 LAB
ALBUMIN SERPL BCP-MCNC: 3.3 G/DL (ref 3.5–5.2)
ANION GAP SERPL CALC-SCNC: 11 MMOL/L (ref 8–16)
BASOPHILS # BLD AUTO: 0.04 K/UL (ref 0–0.2)
BASOPHILS NFR BLD: 0.6 % (ref 0–1.9)
BUN SERPL-MCNC: 74 MG/DL (ref 6–20)
CALCIUM SERPL-MCNC: 9.8 MG/DL (ref 8.7–10.5)
CHLORIDE SERPL-SCNC: 96 MMOL/L (ref 95–110)
CO2 SERPL-SCNC: 28 MMOL/L (ref 23–29)
CREAT SERPL-MCNC: 6.7 MG/DL (ref 0.5–1.4)
DIFFERENTIAL METHOD: ABNORMAL
EOSINOPHIL # BLD AUTO: 0.4 K/UL (ref 0–0.5)
EOSINOPHIL NFR BLD: 5.3 % (ref 0–8)
ERYTHROCYTE [DISTWIDTH] IN BLOOD BY AUTOMATED COUNT: 13.6 % (ref 11.5–14.5)
EST. GFR  (AFRICAN AMERICAN): 7 ML/MIN/1.73 M^2
EST. GFR  (NON AFRICAN AMERICAN): 6 ML/MIN/1.73 M^2
GLUCOSE SERPL-MCNC: 169 MG/DL (ref 70–110)
HCT VFR BLD AUTO: 30.2 % (ref 37–48.5)
HGB BLD-MCNC: 9.6 G/DL (ref 12–16)
IMM GRANULOCYTES # BLD AUTO: 0.03 K/UL (ref 0–0.04)
IMM GRANULOCYTES NFR BLD AUTO: 0.4 % (ref 0–0.5)
LYMPHOCYTES # BLD AUTO: 1.2 K/UL (ref 1–4.8)
LYMPHOCYTES NFR BLD: 16.5 % (ref 18–48)
MCH RBC QN AUTO: 28.7 PG (ref 27–31)
MCHC RBC AUTO-ENTMCNC: 31.8 G/DL (ref 32–36)
MCV RBC AUTO: 90 FL (ref 82–98)
MONOCYTES # BLD AUTO: 0.7 K/UL (ref 0.3–1)
MONOCYTES NFR BLD: 9.7 % (ref 4–15)
NEUTROPHILS # BLD AUTO: 4.9 K/UL (ref 1.8–7.7)
NEUTROPHILS NFR BLD: 67.5 % (ref 38–73)
NRBC BLD-RTO: 0 /100 WBC
PHOSPHATE SERPL-MCNC: 5 MG/DL (ref 2.7–4.5)
PLATELET # BLD AUTO: 187 K/UL (ref 150–350)
PMV BLD AUTO: 11.8 FL (ref 9.2–12.9)
POTASSIUM SERPL-SCNC: 4.1 MMOL/L (ref 3.5–5.1)
RBC # BLD AUTO: 3.34 M/UL (ref 4–5.4)
SODIUM SERPL-SCNC: 135 MMOL/L (ref 136–145)
WBC # BLD AUTO: 7.2 K/UL (ref 3.9–12.7)

## 2020-07-07 PROCEDURE — 25000003 PHARM REV CODE 250: Performed by: INTERNAL MEDICINE

## 2020-07-07 PROCEDURE — 85025 COMPLETE CBC W/AUTO DIFF WBC: CPT

## 2020-07-07 PROCEDURE — 99284 EMERGENCY DEPT VISIT MOD MDM: CPT | Mod: 25

## 2020-07-07 PROCEDURE — 96374 THER/PROPH/DIAG INJ IV PUSH: CPT

## 2020-07-07 PROCEDURE — 99281 EMR DPT VST MAYX REQ PHY/QHP: CPT | Mod: 25

## 2020-07-07 PROCEDURE — G0257 UNSCHED DIALYSIS ESRD PT HOS: HCPCS

## 2020-07-07 PROCEDURE — 63600175 PHARM REV CODE 636 W HCPCS: Mod: JG | Performed by: INTERNAL MEDICINE

## 2020-07-07 PROCEDURE — 80069 RENAL FUNCTION PANEL: CPT

## 2020-07-07 RX ORDER — SODIUM CHLORIDE 9 MG/ML
INJECTION, SOLUTION INTRAVENOUS ONCE
Status: COMPLETED | OUTPATIENT
Start: 2020-07-07 | End: 2020-07-07

## 2020-07-07 RX ORDER — MUPIROCIN 20 MG/G
OINTMENT TOPICAL 2 TIMES DAILY
Status: DISCONTINUED | OUTPATIENT
Start: 2020-07-07 | End: 2020-07-07 | Stop reason: HOSPADM

## 2020-07-07 RX ADMIN — ALTEPLASE 4 MG: 2.2 INJECTION, POWDER, LYOPHILIZED, FOR SOLUTION INTRAVENOUS at 03:07

## 2020-07-07 RX ADMIN — SODIUM CHLORIDE: 0.9 INJECTION, SOLUTION INTRAVENOUS at 02:07

## 2020-07-07 NOTE — NURSING
1 L fluid removed during HD. Tolerated well. VSS. NAD. CVC lines locked with CATH JUANITA and lines capped and labeled. CVC dressing changed.

## 2020-07-07 NOTE — NURSING
Pt's usual transportation from chronic unit, [facility], set up for transportation to  pt from ED 16:30-16:45. ED RN Taya notified. Pt will finish HD at 16:00.

## 2020-07-11 ENCOUNTER — HOSPITAL ENCOUNTER (EMERGENCY)
Facility: HOSPITAL | Age: 55
Discharge: HOME | End: 2020-07-11
Attending: EMERGENCY MEDICINE
Payer: MEDICAID

## 2020-07-11 VITALS
HEIGHT: 62 IN | BODY MASS INDEX: 28.61 KG/M2 | DIASTOLIC BLOOD PRESSURE: 70 MMHG | RESPIRATION RATE: 18 BRPM | HEART RATE: 66 BPM | WEIGHT: 155.44 LBS | OXYGEN SATURATION: 96 % | SYSTOLIC BLOOD PRESSURE: 140 MMHG | TEMPERATURE: 98 F

## 2020-07-11 DIAGNOSIS — Z78.9 PROBLEM WITH VASCULAR ACCESS: Primary | ICD-10-CM

## 2020-07-11 LAB
ALBUMIN SERPL BCP-MCNC: 3.2 G/DL (ref 3.5–5.2)
ANION GAP SERPL CALC-SCNC: 12 MMOL/L (ref 8–16)
BUN SERPL-MCNC: 77 MG/DL (ref 6–20)
CALCIUM SERPL-MCNC: 9.3 MG/DL (ref 8.7–10.5)
CHLORIDE SERPL-SCNC: 107 MMOL/L (ref 95–110)
CO2 SERPL-SCNC: 18 MMOL/L (ref 23–29)
CREAT SERPL-MCNC: 5.9 MG/DL (ref 0.5–1.4)
EST. GFR  (AFRICAN AMERICAN): 9 ML/MIN/1.73 M^2
EST. GFR  (NON AFRICAN AMERICAN): 7 ML/MIN/1.73 M^2
GLUCOSE SERPL-MCNC: 128 MG/DL (ref 70–110)
PHOSPHATE SERPL-MCNC: 4.1 MG/DL (ref 2.7–4.5)
POTASSIUM SERPL-SCNC: 4.6 MMOL/L (ref 3.5–5.1)
SODIUM SERPL-SCNC: 137 MMOL/L (ref 136–145)

## 2020-07-11 PROCEDURE — 99281 EMR DPT VST MAYX REQ PHY/QHP: CPT

## 2020-07-11 PROCEDURE — 80069 RENAL FUNCTION PANEL: CPT

## 2020-07-11 NOTE — ED PROVIDER NOTES
Encounter Date: 7/11/2020       History     Chief Complaint   Patient presents with    Vascular Access Problem     pt was unable to receive dialysis at [facility] today through her dialysis catheter. Cath flow was applied, but pt was brought to the ER to have emergent dialysis.      Ms. Davenport presented for her regular scheduled hemodialysis today.  They had difficulty accessing her dialysis catheter which is a tunnel catheter inserted at the right upper chest wall.  They instilled Cathflo and instructed her to come to the ED to receive hemodialysis.  She normally goes to dialysis on Tuesdays, Thursdays, and Saturdays.  Her last dialysis was four days ago and was performed here at the hospital after they had similar troubles access in her catheter at her hemodialysis unit.  She missed her treatment two days ago because she was having abdominal discomfort and constipation which has since resolved after taking laxatives.  She denies fever and chills.  She denies abdominal pain, nausea, and vomiting.  She denies headache and dizziness.    The history is provided by the patient. No  was used.     Review of patient's allergies indicates:   Allergen Reactions    Phenergan [promethazine] Other (See Comments)     Restless legs    Reglan [metoclopramide hcl]     Zofran [ondansetron hcl (pf)] Other (See Comments)     Constipation     Ketorolac Palpitations     Past Medical History:   Diagnosis Date    Diabetes mellitus     Hypertension     Renal disorder      History reviewed. No pertinent surgical history.  History reviewed. No pertinent family history.  Social History     Tobacco Use    Smoking status: Never Smoker   Substance Use Topics    Alcohol use: Not Currently    Drug use: Never     Review of Systems   Constitutional: Negative for chills and fever.   Eyes: Negative for visual disturbance.   Respiratory: Negative for cough and shortness of breath.    Cardiovascular: Negative for chest pain  and palpitations.   Gastrointestinal: Negative for abdominal pain, nausea and vomiting.   Genitourinary: Negative for dysuria.   Musculoskeletal: Negative for arthralgias and myalgias.   Neurological: Negative for dizziness and headaches.       Physical Exam     Initial Vitals [07/11/20 1324]   BP Pulse Resp Temp SpO2   (!) 161/72 65 18 98.1 °F (36.7 °C) 95 %      MAP       --         Physical Exam    Nursing note and vitals reviewed.  Constitutional: She appears well-developed and well-nourished. She is not diaphoretic. No distress.   HENT:   Head: Normocephalic and atraumatic.   Mouth/Throat: Oropharynx is clear and moist.   Eyes: Conjunctivae are normal. No scleral icterus.   Neck: No JVD present.   Cardiovascular: Normal rate, regular rhythm and normal heart sounds.   No murmur heard.  Pulmonary/Chest: Breath sounds normal. No stridor. No respiratory distress. She has no wheezes. She has no rhonchi. She has no rales.   Abdominal: Soft. She exhibits no distension. There is no abdominal tenderness.   Neurological: She is alert and oriented to person, place, and time. GCS score is 15. GCS eye subscore is 4. GCS verbal subscore is 5. GCS motor subscore is 6.   Skin: Skin is warm and dry. No pallor.   No erythema, induration, drainage, or tenderness at insertion site of tunnel catheter at the right upper chest.         ED Course   Procedures  Labs Reviewed   RENAL FUNCTION PANEL - Abnormal; Notable for the following components:       Result Value    Glucose 128 (*)     CO2 18 (*)     BUN, Bld 77 (*)     Creatinine 5.9 (*)     Albumin 3.2 (*)     eGFR if  9 (*)     eGFR if non  7 (*)     All other components within normal limits          Imaging Results    None          Medical Decision Making:   Clinical Tests:   Lab Tests: Ordered and Reviewed    ED nursing staff was able to easily flush the patient's dialysis catheter.    The patient has no complaints, exam findings, or lab findings  that indicate a need for emergent hemodialysis.                                 Clinical Impression:       ICD-10-CM ICD-9-CM   1. Problem with vascular access  Z78.9 V49.9         Disposition:   Disposition: Discharged  Condition: Stable                        Joe Young III, MD  07/11/20 0797

## 2020-07-11 NOTE — DISCHARGE INSTRUCTIONS
Return to the ER immediately few have severe weakness, shortness of breath, chest pain, or have any other concerns.

## 2020-07-11 NOTE — ED NOTES
Patient identifiers verified and correct for Ping Davenport.    LOC: The patient is awake, alert and aware of environment with an appropriate affect, the patient is oriented x 3 and speaking appropriately.  APPEARANCE: Patient resting comfortably and in no acute distress, patient is clean and well groomed, patient's clothing is properly fastened.  SKIN: The skin is warm and dry, color consistent with ethnicity, patient has normal skin turgor and moist mucus membranes, skin intact, no breakdown or bruising noted.  MUSCULOSKELETAL: SEE ASSESSMENT.  RESPIRATORY: Airway is open and patent, respirations are spontaneous, patient has a normal effort and rate, no accessory muscle use noted, bilateral breath sounds **.  CARDIAC: Patient has a normal rate and regular rhythm, no periphreal edema noted, capillary refill < 3 seconds.  ABDOMEN: Soft and non tender to palpation, no distention noted, normoactive bowel sounds present in all four quadrants.  NEUROLOGIC: PERRL,  eyes open spontaneously, behavior appropriate to situation, follows commands, facial expression symmetrical, bilateral hand grasp equal and even, purposeful motor response noted, normal sensation in all extremities when touched with a finger.

## 2020-07-11 NOTE — ED TRIAGE NOTES
Pt reports she went to dialysis today, because she has not been dialyzed since 07/07. Pt states dialysis could not due to her line not being able to flush even after instilling cath flow. PT in no acute distress.

## 2020-07-12 NOTE — ED NOTES
Pt lying on stretcher awaiting transport home.  No complaints voiced at this time. SR up Bed locked and low CB in reach.

## 2020-07-12 NOTE — ED NOTES
Recd report, assumed care at this time. Pt lying on bed NAD. Awaiting transport home. Pt has no complaints at this time. SR up Bed locked and low CB in reach.

## 2020-07-12 NOTE — ED NOTES
Pt still waiting for transport home. AAOx4, stable, no complaints voiced at this time. SR up Bed locked and low CB in reach.

## 2020-07-23 ENCOUNTER — SSC ENCOUNTER (OUTPATIENT)
Dept: ADMINISTRATIVE | Facility: OTHER | Age: 55
End: 2020-07-23

## 2020-07-23 NOTE — PROGRESS NOTES
Please note the following patient's information was forwarded to the Medicaid ([place] Medicaid,  [employer], [employer], [facility], [facility], [facility]) Case Management office.    Please contact Outpatient Complex Care Management at ext [phone] with any questions.    Thank you,    Odalys Haque, [facility]  Outpatient Case Mgmnt  [phone]

## 2020-07-29 ENCOUNTER — HOSPITAL ENCOUNTER (OUTPATIENT)
Dept: RADIOLOGY | Facility: HOSPITAL | Age: 55
Discharge: HOME | End: 2020-07-29
Attending: ORTHOPAEDIC SURGERY
Payer: MEDICAID

## 2020-07-29 DIAGNOSIS — S42.291A CLOSED FRACTURE OF HEAD OF RIGHT HUMERUS, INITIAL ENCOUNTER: ICD-10-CM

## 2020-07-29 PROCEDURE — 73200 CT UPPER EXTREMITY W/O DYE: CPT | Mod: TC,RT

## 2020-07-29 PROCEDURE — 73200 CT SHOULDER WITHOUT CONTRAST RIGHT: ICD-10-PCS | Mod: 26,RT,, | Performed by: RADIOLOGY

## 2020-07-29 PROCEDURE — 73200 CT UPPER EXTREMITY W/O DYE: CPT | Mod: 26,RT,, | Performed by: RADIOLOGY

## 2020-08-07 ENCOUNTER — TELEPHONE (OUTPATIENT)
Dept: SURGERY | Facility: CLINIC | Age: 55
End: 2020-08-07

## 2020-08-07 NOTE — TELEPHONE ENCOUNTER
----- Message from Loc Guerrero sent at 8/7/2020 11:43 AM CDT -----  Regarding: /Jasmin [facility]  Mr.Jason called in to speak with someone at the office regarding scheduling an appointment. He would like a call back from the office and can be reached at    [phone]      8/7/20  1:14pm  Attempted to return call to patient. No answer. Message left via voicemail.

## 2020-09-09 ENCOUNTER — HOSPITAL ENCOUNTER (EMERGENCY)
Facility: HOSPITAL | Age: 55
Discharge: HOME | End: 2020-09-09
Attending: EMERGENCY MEDICINE
Payer: MEDICAID

## 2020-09-09 DIAGNOSIS — Z99.2 ESRD ON HEMODIALYSIS: ICD-10-CM

## 2020-09-09 DIAGNOSIS — N18.6 ESRD ON HEMODIALYSIS: ICD-10-CM

## 2020-09-09 DIAGNOSIS — Z78.9 PROBLEM WITH VASCULAR ACCESS: Primary | ICD-10-CM

## 2020-09-09 DIAGNOSIS — R06.02 SHORTNESS OF BREATH: ICD-10-CM

## 2020-09-09 LAB
ALBUMIN SERPL BCP-MCNC: 3.8 G/DL (ref 3.5–5.2)
ALP SERPL-CCNC: 89 U/L (ref 55–135)
ALT SERPL W/O P-5'-P-CCNC: 15 U/L (ref 10–44)
ANION GAP SERPL CALC-SCNC: 15 MMOL/L (ref 8–16)
APTT BLDCRRT: 28.2 SEC (ref 21–32)
AST SERPL-CCNC: 18 U/L (ref 10–40)
BASOPHILS # BLD AUTO: 0.05 K/UL (ref 0–0.2)
BASOPHILS NFR BLD: 0.7 % (ref 0–1.9)
BILIRUB SERPL-MCNC: 0.4 MG/DL (ref 0.1–1)
BNP SERPL-MCNC: 410 PG/ML (ref 0–99)
BUN SERPL-MCNC: 110 MG/DL (ref 6–20)
CALCIUM SERPL-MCNC: 8.9 MG/DL (ref 8.7–10.5)
CHLORIDE SERPL-SCNC: 108 MMOL/L (ref 95–110)
CO2 SERPL-SCNC: 15 MMOL/L (ref 23–29)
CREAT SERPL-MCNC: 7.7 MG/DL (ref 0.5–1.4)
DIFFERENTIAL METHOD: ABNORMAL
EOSINOPHIL # BLD AUTO: 0.1 K/UL (ref 0–0.5)
EOSINOPHIL NFR BLD: 1.8 % (ref 0–8)
ERYTHROCYTE [DISTWIDTH] IN BLOOD BY AUTOMATED COUNT: 15.2 % (ref 11.5–14.5)
EST. GFR  (AFRICAN AMERICAN): 6 ML/MIN/1.73 M^2
EST. GFR  (NON AFRICAN AMERICAN): 5 ML/MIN/1.73 M^2
GLUCOSE SERPL-MCNC: 104 MG/DL (ref 70–110)
HCT VFR BLD AUTO: 25.7 % (ref 37–48.5)
HGB BLD-MCNC: 8.3 G/DL (ref 12–16)
IMM GRANULOCYTES # BLD AUTO: 0.03 K/UL (ref 0–0.04)
IMM GRANULOCYTES NFR BLD AUTO: 0.4 % (ref 0–0.5)
INR PPP: 1.1 (ref 0.8–1.2)
LYMPHOCYTES # BLD AUTO: 0.9 K/UL (ref 1–4.8)
LYMPHOCYTES NFR BLD: 12.5 % (ref 18–48)
MCH RBC QN AUTO: 30.1 PG (ref 27–31)
MCHC RBC AUTO-ENTMCNC: 32.3 G/DL (ref 32–36)
MCV RBC AUTO: 93 FL (ref 82–98)
MONOCYTES # BLD AUTO: 0.8 K/UL (ref 0.3–1)
MONOCYTES NFR BLD: 11 % (ref 4–15)
NEUTROPHILS # BLD AUTO: 5.2 K/UL (ref 1.8–7.7)
NEUTROPHILS NFR BLD: 73.6 % (ref 38–73)
NRBC BLD-RTO: 0 /100 WBC
PLATELET # BLD AUTO: 147 K/UL (ref 150–350)
PMV BLD AUTO: 12.2 FL (ref 9.2–12.9)
POTASSIUM SERPL-SCNC: 4.6 MMOL/L (ref 3.5–5.1)
PROT SERPL-MCNC: 7.7 G/DL (ref 6–8.4)
PROTHROMBIN TIME: 11.4 SEC (ref 9–12.5)
RBC # BLD AUTO: 2.76 M/UL (ref 4–5.4)
SARS-COV-2 RDRP RESP QL NAA+PROBE: NEGATIVE
SODIUM SERPL-SCNC: 138 MMOL/L (ref 136–145)
TROPONIN I SERPL DL<=0.01 NG/ML-MCNC: 0.06 NG/ML (ref 0–0.03)
WBC # BLD AUTO: 7.12 K/UL (ref 3.9–12.7)

## 2020-09-09 PROCEDURE — 85730 THROMBOPLASTIN TIME PARTIAL: CPT

## 2020-09-09 PROCEDURE — 85610 PROTHROMBIN TIME: CPT

## 2020-09-09 PROCEDURE — 84484 ASSAY OF TROPONIN QUANT: CPT

## 2020-09-09 PROCEDURE — 93005 ELECTROCARDIOGRAM TRACING: CPT

## 2020-09-09 PROCEDURE — 93010 ELECTROCARDIOGRAM REPORT: CPT | Mod: ,,, | Performed by: INTERNAL MEDICINE

## 2020-09-09 PROCEDURE — 80053 COMPREHEN METABOLIC PANEL: CPT

## 2020-09-09 PROCEDURE — 93010 EKG 12-LEAD: ICD-10-PCS | Mod: ,,, | Performed by: INTERNAL MEDICINE

## 2020-09-09 PROCEDURE — 27201247 HC HEMODIALYSIS, SET-UP & CANCEL

## 2020-09-09 PROCEDURE — U0002 COVID-19 LAB TEST NON-CDC: HCPCS

## 2020-09-09 PROCEDURE — 36593 DECLOT VASCULAR DEVICE: CPT

## 2020-09-09 PROCEDURE — 85025 COMPLETE CBC W/AUTO DIFF WBC: CPT

## 2020-09-09 PROCEDURE — 99285 EMERGENCY DEPT VISIT HI MDM: CPT | Mod: 25

## 2020-09-09 PROCEDURE — 83880 ASSAY OF NATRIURETIC PEPTIDE: CPT

## 2020-09-09 NOTE — FIRST PROVIDER EVALUATION
Emergency Department TeleTriage Encounter Note      CHIEF COMPLAINT    Chief Complaint   Patient presents with    Vascular Access Problem     pt states she has been unable to be dialyzed at [facility] since last Tuesday, due to issues with her catheter. Sees Dr. Jc. Pt reports mild chest heaviness and bilateral leg swelling. Denies SOB.       VITAL SIGNS   Initial Vitals [09/09/20 1824]   BP Pulse Resp Temp SpO2   115/78 69 20 98.3 °F (36.8 °C) 99 %      MAP       --            ALLERGIES    Review of patient's allergies indicates:   Allergen Reactions    Phenergan [promethazine] Other (See Comments)     Restless legs    Reglan [metoclopramide hcl]     Zofran [ondansetron hcl (pf)] Other (See Comments)     Constipation     Ketorolac Palpitations       PROVIDER TRIAGE NOTE  55-year-old female presents to the ED for evaluation of shortness of breath and chest discomfort.  She has also noticed worsening swelling to bilateral legs.  Patient reports that they have not been able to access her port since last Tuesday.  Last dialysis was over 1 week ago.    Initial orders will be placed and care will be transferred to an alternate provider when patient is roomed for a full evaluation. Any additional orders and the final disposition will be determined by that provider.      ORDERS  Labs Reviewed   B-TYPE NATRIURETIC PEPTIDE   CBC W/ AUTO DIFFERENTIAL   COMPREHENSIVE METABOLIC PANEL   APTT   PROTIME-INR   TROPONIN I   SARS-COV-2 RNA AMPLIFICATION, QUAL       ED Orders (720h ago, onward)    Start Ordered     Status Ordering Provider    09/09/20 1835 09/09/20 1834  X-Ray Chest AP Portable  1 time imaging      Ordered NANCY PARKER    09/09/20 1835 09/09/20 1834  COVID-19 Rapid Screening  STAT  Collect    Ordered NANCY PARKER    09/09/20 1835 09/09/20 1834  EKG 12-lead  Once      Ordered NANCY PARKER    09/09/20 1834 09/09/20 1834  Cardiac Monitoring - Adult  Continuous     Comments: Notify Physician If:     Ordered JOHNYKUTTY, NANCY    09/09/20 1834 09/09/20 1834  Pulse Oximetry Continuous  Continuous      Ordered JOHNYKUTTY, NANCY    09/09/20 1834 09/09/20 1834  Troponin I  STAT  Collect    Ordered JOHNYKUTTY, NANCY    09/09/20 1833 09/09/20 1834  Brain natriuretic peptide  STAT  Collect    Ordered JOHNYKUTTY, NANCY    09/09/20 1833 09/09/20 1834  CBC auto differential  STAT  Collect    Ordered JOHNYKUTTY, NANCY    09/09/20 1833 09/09/20 1834  Comprehensive metabolic panel  STAT  Collect    Ordered JOHNYKUTTY, NANCY    09/09/20 1833 09/09/20 1834  APTT  STAT  Collect    Ordered JOHNYKUTTY, NANCY    09/09/20 1833 09/09/20 1834  Protime-INR  STAT  Collect    Ordered JOHNYKUTTY, NANCY    09/09/20 1833 09/09/20 1834  Insert Saline lock IV  Once      Ordered JOHNYKUTTY, NANCY            Virtual Visit Note: The provider triage portion of this emergency department evaluation and documentation was performed via [employer], a HIPAA-compliant telemedicine application, in concert with a tele-presenter in the room. A face to face patient evaluation with one of my colleagues will occur once the patient is placed in an emergency department room.      DISCLAIMER: This note was prepared with [employer] voice recognition transcription software. Garbled syntax, mangled pronouns, and other bizarre constructions may be attributed to that software system.

## 2020-09-10 ENCOUNTER — HOSPITAL ENCOUNTER (OUTPATIENT)
Facility: HOSPITAL | Age: 55
LOS: 1 days | Discharge: HOME | End: 2020-09-13
Attending: EMERGENCY MEDICINE | Admitting: FAMILY MEDICINE
Payer: MEDICAID

## 2020-09-10 VITALS
OXYGEN SATURATION: 99 % | SYSTOLIC BLOOD PRESSURE: 159 MMHG | BODY MASS INDEX: 30.31 KG/M2 | DIASTOLIC BLOOD PRESSURE: 90 MMHG | TEMPERATURE: 98 F | RESPIRATION RATE: 18 BRPM | WEIGHT: 164.69 LBS | HEIGHT: 62 IN | HEART RATE: 66 BPM

## 2020-09-10 DIAGNOSIS — T82.49XD CLOTTED DIALYSIS ACCESS, SUBSEQUENT ENCOUNTER: ICD-10-CM

## 2020-09-10 DIAGNOSIS — T82.49XA CLOTTED DIALYSIS ACCESS: ICD-10-CM

## 2020-09-10 DIAGNOSIS — T82.49XS CLOTTED DIALYSIS ACCESS, SEQUELA: ICD-10-CM

## 2020-09-10 DIAGNOSIS — I10 ESSENTIAL HYPERTENSION: ICD-10-CM

## 2020-09-10 DIAGNOSIS — N17.9 AKI (ACUTE KIDNEY INJURY): Primary | ICD-10-CM

## 2020-09-10 DIAGNOSIS — N18.6 END STAGE RENAL DISEASE: ICD-10-CM

## 2020-09-10 DIAGNOSIS — E11.21 TYPE 2 DIABETES MELLITUS WITH DIABETIC NEPHROPATHY, UNSPECIFIED WHETHER LONG TERM INSULIN USE: ICD-10-CM

## 2020-09-10 PROBLEM — E11.9 DIABETES MELLITUS: Status: ACTIVE | Noted: 2020-09-10

## 2020-09-10 LAB
ESTIMATED AVG GLUCOSE: 105 MG/DL (ref 68–131)
HBA1C MFR BLD HPLC: 5.3 % (ref 4–5.6)
MAGNESIUM SERPL-MCNC: 2.5 MG/DL (ref 1.6–2.6)
PHOSPHATE SERPL-MCNC: 6.1 MG/DL (ref 2.7–4.5)
TSH SERPL DL<=0.005 MIU/L-ACNC: 3.2 UIU/ML (ref 0.4–4)

## 2020-09-10 PROCEDURE — 96374 THER/PROPH/DIAG INJ IV PUSH: CPT

## 2020-09-10 PROCEDURE — 83036 HEMOGLOBIN GLYCOSYLATED A1C: CPT

## 2020-09-10 PROCEDURE — 36415 COLL VENOUS BLD VENIPUNCTURE: CPT

## 2020-09-10 PROCEDURE — 84100 ASSAY OF PHOSPHORUS: CPT

## 2020-09-10 PROCEDURE — 63600175 PHARM REV CODE 636 W HCPCS: Performed by: INTERNAL MEDICINE

## 2020-09-10 PROCEDURE — 93010 EKG 12-LEAD: ICD-10-PCS | Mod: ,,, | Performed by: INTERNAL MEDICINE

## 2020-09-10 PROCEDURE — 93010 ELECTROCARDIOGRAM REPORT: CPT | Mod: ,,, | Performed by: INTERNAL MEDICINE

## 2020-09-10 PROCEDURE — 63600175 PHARM REV CODE 636 W HCPCS: Performed by: STUDENT IN AN ORGANIZED HEALTH CARE EDUCATION/TRAINING PROGRAM

## 2020-09-10 PROCEDURE — 96372 THER/PROPH/DIAG INJ SC/IM: CPT | Mod: 59

## 2020-09-10 PROCEDURE — 99285 EMERGENCY DEPT VISIT HI MDM: CPT | Mod: 25

## 2020-09-10 PROCEDURE — G0378 HOSPITAL OBSERVATION PER HR: HCPCS

## 2020-09-10 PROCEDURE — 36593 DECLOT VASCULAR DEVICE: CPT

## 2020-09-10 PROCEDURE — 25000003 PHARM REV CODE 250: Performed by: STUDENT IN AN ORGANIZED HEALTH CARE EDUCATION/TRAINING PROGRAM

## 2020-09-10 PROCEDURE — 27201247 HC HEMODIALYSIS, SET-UP & CANCEL

## 2020-09-10 PROCEDURE — 84443 ASSAY THYROID STIM HORMONE: CPT

## 2020-09-10 PROCEDURE — 93005 ELECTROCARDIOGRAM TRACING: CPT

## 2020-09-10 PROCEDURE — 83735 ASSAY OF MAGNESIUM: CPT

## 2020-09-10 RX ORDER — HEPARIN SODIUM 5000 [USP'U]/ML
5000 INJECTION, SOLUTION INTRAVENOUS; SUBCUTANEOUS ONCE
Status: COMPLETED | OUTPATIENT
Start: 2020-09-10 | End: 2020-09-10

## 2020-09-10 RX ORDER — CARVEDILOL 25 MG/1
25 TABLET ORAL 2 TIMES DAILY
Status: DISCONTINUED | OUTPATIENT
Start: 2020-09-10 | End: 2020-09-13 | Stop reason: HOSPADM

## 2020-09-10 RX ORDER — FUROSEMIDE 40 MG/1
160 TABLET ORAL 2 TIMES DAILY
Status: DISCONTINUED | OUTPATIENT
Start: 2020-09-10 | End: 2020-09-13 | Stop reason: HOSPADM

## 2020-09-10 RX ORDER — ACETAMINOPHEN 325 MG/1
650 TABLET ORAL EVERY 8 HOURS PRN
Status: DISCONTINUED | OUTPATIENT
Start: 2020-09-10 | End: 2020-09-13 | Stop reason: HOSPADM

## 2020-09-10 RX ORDER — HEPARIN SODIUM 5000 [USP'U]/ML
5000 INJECTION, SOLUTION INTRAVENOUS; SUBCUTANEOUS EVERY 8 HOURS
Status: DISCONTINUED | OUTPATIENT
Start: 2020-09-10 | End: 2020-09-10

## 2020-09-10 RX ORDER — GLUCAGON 1 MG
1 KIT INJECTION
Status: DISCONTINUED | OUTPATIENT
Start: 2020-09-10 | End: 2020-09-13

## 2020-09-10 RX ORDER — SEVELAMER CARBONATE 800 MG/1
800 TABLET, FILM COATED ORAL
Status: DISCONTINUED | OUTPATIENT
Start: 2020-09-11 | End: 2020-09-10

## 2020-09-10 RX ORDER — AMLODIPINE BESYLATE 5 MG/1
10 TABLET ORAL DAILY
Status: DISCONTINUED | OUTPATIENT
Start: 2020-09-11 | End: 2020-09-13 | Stop reason: HOSPADM

## 2020-09-10 RX ORDER — AMOXICILLIN 250 MG
1 CAPSULE ORAL 2 TIMES DAILY
Status: DISCONTINUED | OUTPATIENT
Start: 2020-09-10 | End: 2020-09-12

## 2020-09-10 RX ORDER — SEVELAMER CARBONATE 800 MG/1
1600 TABLET, FILM COATED ORAL
Status: DISCONTINUED | OUTPATIENT
Start: 2020-09-11 | End: 2020-09-13 | Stop reason: HOSPADM

## 2020-09-10 RX ORDER — FUROSEMIDE 10 MG/ML
120 INJECTION INTRAMUSCULAR; INTRAVENOUS ONCE
Status: COMPLETED | OUTPATIENT
Start: 2020-09-10 | End: 2020-09-10

## 2020-09-10 RX ORDER — MUPIROCIN 20 MG/G
OINTMENT TOPICAL 2 TIMES DAILY
Status: DISCONTINUED | OUTPATIENT
Start: 2020-09-10 | End: 2020-09-13 | Stop reason: HOSPADM

## 2020-09-10 RX ORDER — INSULIN ASPART 100 [IU]/ML
1-10 INJECTION, SOLUTION INTRAVENOUS; SUBCUTANEOUS
Status: DISCONTINUED | OUTPATIENT
Start: 2020-09-10 | End: 2020-09-13

## 2020-09-10 RX ORDER — SODIUM CHLORIDE 9 MG/ML
INJECTION, SOLUTION INTRAVENOUS
Status: DISCONTINUED | OUTPATIENT
Start: 2020-09-10 | End: 2020-09-13 | Stop reason: HOSPADM

## 2020-09-10 RX ORDER — TALC
9 POWDER (GRAM) TOPICAL NIGHTLY PRN
Status: DISCONTINUED | OUTPATIENT
Start: 2020-09-10 | End: 2020-09-13 | Stop reason: HOSPADM

## 2020-09-10 RX ORDER — SODIUM CHLORIDE 0.9 % (FLUSH) 0.9 %
5 SYRINGE (ML) INJECTION
Status: DISCONTINUED | OUTPATIENT
Start: 2020-09-10 | End: 2020-09-13 | Stop reason: HOSPADM

## 2020-09-10 RX ORDER — METOLAZONE 2.5 MG/1
10 TABLET ORAL DAILY
Status: DISCONTINUED | OUTPATIENT
Start: 2020-09-11 | End: 2020-09-13 | Stop reason: HOSPADM

## 2020-09-10 RX ORDER — SODIUM CHLORIDE 9 MG/ML
INJECTION, SOLUTION INTRAVENOUS ONCE
Status: COMPLETED | OUTPATIENT
Start: 2020-09-10 | End: 2020-09-11

## 2020-09-10 RX ORDER — IBUPROFEN 200 MG
16 TABLET ORAL
Status: DISCONTINUED | OUTPATIENT
Start: 2020-09-10 | End: 2020-09-13

## 2020-09-10 RX ORDER — ACETAMINOPHEN 325 MG/1
650 TABLET ORAL EVERY 4 HOURS PRN
Status: DISCONTINUED | OUTPATIENT
Start: 2020-09-10 | End: 2020-09-13 | Stop reason: HOSPADM

## 2020-09-10 RX ORDER — IBUPROFEN 200 MG
24 TABLET ORAL
Status: DISCONTINUED | OUTPATIENT
Start: 2020-09-10 | End: 2020-09-13

## 2020-09-10 RX ORDER — HYDRALAZINE HYDROCHLORIDE 20 MG/ML
10 INJECTION INTRAMUSCULAR; INTRAVENOUS EVERY 6 HOURS PRN
Status: DISCONTINUED | OUTPATIENT
Start: 2020-09-10 | End: 2020-09-11 | Stop reason: SDUPTHER

## 2020-09-10 RX ORDER — LOSARTAN POTASSIUM 50 MG/1
100 TABLET ORAL DAILY
Status: DISCONTINUED | OUTPATIENT
Start: 2020-09-11 | End: 2020-09-13 | Stop reason: HOSPADM

## 2020-09-10 RX ADMIN — HYDRALAZINE HYDROCHLORIDE 10 MG: 20 INJECTION INTRAMUSCULAR; INTRAVENOUS at 07:09

## 2020-09-10 RX ADMIN — CARVEDILOL 25 MG: 25 TABLET, FILM COATED ORAL at 10:09

## 2020-09-10 RX ADMIN — FUROSEMIDE 160 MG: 40 TABLET ORAL at 10:09

## 2020-09-10 RX ADMIN — FUROSEMIDE 120 MG: 10 INJECTION, SOLUTION INTRAVENOUS at 07:09

## 2020-09-10 RX ADMIN — HEPARIN SODIUM 5000 UNITS: 5000 INJECTION INTRAVENOUS; SUBCUTANEOUS at 10:09

## 2020-09-10 RX ADMIN — ALTEPLASE 4 MG: 2.2 INJECTION, POWDER, LYOPHILIZED, FOR SOLUTION INTRAVENOUS at 12:09

## 2020-09-10 NOTE — ED PROVIDER NOTES
Encounter Date: 9/10/2020    SCRIBE #1 NOTE: I, Robert Irizarry, am scribing for, and in the presence of, Oskar Enriquez MD.       History     Chief Complaint   Patient presents with    Vascular Access Problem     pt has been unable to get dialysis since last Tuesday. Was seen here last night and was told to return this morning for dialysis and possible new dialysis access     This is a 55 year-old  F who has a past medical history of Diabetes mellitus, Hypertension, and Renal disorder, presents with chief complaint of vascular access problem. Patient has not been able to get dialysis since 09/01 and only completed 2 of the 4 hours of dialysis treatment at that time. Patient reports she had a right sided chest cath 3 weeks ago and recently had the left chest cath placed. States she was told she needed to get another access placed by dialysis center. She admits to generalized swelling of the body, leg swelling, and swelling to face and bilateral eyes which she believes is fluid retention. She does take Lasix. Patient was seen in the ER last night and told to return in the morning for dialysis and possible dialysis access replacement.           The history is provided by the patient.     Review of patient's allergies indicates:   Allergen Reactions    Phenergan [promethazine] Other (See Comments)     Restless legs    Reglan [metoclopramide hcl]     Zofran [ondansetron hcl (pf)] Other (See Comments)     Constipation     Ketorolac Palpitations     Past Medical History:   Diagnosis Date    Diabetes mellitus     Hypertension     Renal disorder      History reviewed. No pertinent surgical history.  No family history on file.  Social History     Tobacco Use    Smoking status: Never Smoker   Substance Use Topics    Alcohol use: Not Currently     Frequency: Never     Drinks per session: Patient refused     Binge frequency: Never    Drug use: Never     Review of Systems   Constitutional: Negative for fever.    HENT: Negative for facial swelling and sore throat.    Respiratory: Negative for shortness of breath.    Cardiovascular: Positive for leg swelling. Negative for chest pain.   Gastrointestinal: Negative for nausea.   Genitourinary: Negative for dysuria.   Musculoskeletal: Negative for back pain.   Skin: Negative for rash.   Neurological: Negative for weakness.   Hematological: Does not bruise/bleed easily.   All other systems reviewed and are negative.      Physical Exam     Initial Vitals   BP Pulse Resp Temp SpO2   09/10/20 1100 09/10/20 1028 09/10/20 1028 09/10/20 1028 09/10/20 1028   131/81 79 17 99.2 °F (37.3 °C) 100 %      MAP       --                Physical Exam    Nursing note and vitals reviewed.  Constitutional: She appears well-developed and well-nourished.   HENT:   Head: Normocephalic and atraumatic.   Eyes: EOM are normal. Pupils are equal, round, and reactive to light.   Neck: Normal range of motion. Neck supple.   Cardiovascular: Normal rate, regular rhythm, normal heart sounds and intact distal pulses.   Pulmonary/Chest: Breath sounds normal. No respiratory distress. She has no wheezes.   Dialysis cath to the left chest, no signs of infection, CDI    Lungs clear bilaterally     Abdominal: Soft. She exhibits no distension. There is no abdominal tenderness.   Musculoskeletal: Normal range of motion. Edema (1+ pitting edema bilaterally) present.   Neurological: She is alert and oriented to person, place, and time.   Skin: Skin is warm and dry.         ED Course   Procedures  Labs Reviewed - No data to display       Imaging Results    None          Medical Decision Making:   Clinical Tests:   Lab Tests: Reviewed  Radiological Study: Reviewed  ED Management:  - discussed case with Dr. RANJITH Jc  - attempted to get pt dialyzed, but not able to conduct 2/2 to catheter malfunction   - will admit [facility] likely to replace catheter in the AM  - pt in agreement with plan for admission                     ED Course as of Sep 10 1835   u Sep 10, 2020   1127 Spoke to Dr. Jc...dialysis nurse to come to ED to test dialysis catheter with likely dialysis to follow.     [LC]      ED Course User Index  [LC] Oskar Enriquez MD            Clinical Impression:       ICD-10-CM ICD-9-CM   1. AXEL (acute kidney injury)  N17.9 584.9   2. End stage renal disease  N18.6 585.6               ED Disposition Condition    Admit              I, Oskar Enriquez,  personally performed the services described in this documentation. All medical record entries made by the scribe were at my direction and in my presence.  I have reviewed the chart and agree that the record reflects my personal performance and is accurate and complete. Oskar Enriquez M.D. 6:36 PM09/10/2020               Oskar Enriquez MD  09/10/20 1422

## 2020-09-10 NOTE — ASSESSMENT & PLAN NOTE
- Steroid induced hyperglycemia per medical records  2019 A1c 5.8       Plan:  - diabetic diet  - insulin aspart  - pending A1c  - keep glucose 140-180

## 2020-09-10 NOTE — HPI
This is a 55 year-old  Female with PMH of DM, HTN, ESRD presenting to the ED for left IJ vascular access problem. She gets dialyzed T/TH/Sat at [facility] but since 9/1 she reports 2/4 hours of dialysis treatment at that time. Since then she has been unable to get dialysis, reporting dialysis center told her to get new access. She has been taking lasix since unable to dialyze. She reports the LIJ was placed 3 weeks ago. She admits to generalized swelling of the body, primarily in the face including bilateral eyelids and cheeks. She reported to the ED last night but told to report in this morning for an attempt of dialysis. Unsuccessful attempt in the ED. Surgery and nephrology consulted.     In the ED, /89, HR 69 on RA SaO2 100%   Cr 7.7  troponin 0.059  CXR No acute cardiopulmonary process identified.  EKG Sinus rhythm with 1st degree A-V block

## 2020-09-10 NOTE — ED PROVIDER NOTES
Encounter Date: 9/9/2020       History     Chief Complaint   Patient presents with    Vascular Access Problem     pt states she has been unable to be dialyzed at [facility] since last Tuesday, due to issues with her catheter. Sees Dr. Jc. Pt reports mild chest heaviness and bilateral leg swelling. Denies SOB.     The patient presents the emergency department stating that her dialysis catheter does not work.  She states she went to dialysis Tuesday September 1st and her catheter worked.  On Thursday September 3rd, the catheter did not work and no dialysis was performed.  The patient states that she is full of fluid now and needs to be dialyzed.        Review of patient's allergies indicates:   Allergen Reactions    Phenergan [promethazine] Other (See Comments)     Restless legs    Reglan [metoclopramide hcl]     Zofran [ondansetron hcl (pf)] Other (See Comments)     Constipation     Ketorolac Palpitations     Past Medical History:   Diagnosis Date    Diabetes mellitus     Hypertension     Renal disorder      History reviewed. No pertinent surgical history.  No family history on file.  Social History     Tobacco Use    Smoking status: Never Smoker   Substance Use Topics    Alcohol use: Not Currently     Frequency: Never     Drinks per session: Patient refused     Binge frequency: Never    Drug use: Never     Review of Systems   Constitutional: Negative for fever.   HENT: Negative for sore throat.    Respiratory: Positive for chest tightness. Negative for shortness of breath.    Cardiovascular: Positive for leg swelling. Negative for chest pain.   Gastrointestinal: Negative for abdominal pain and nausea.   Genitourinary: Positive for difficulty urinating. Negative for dysuria.   Musculoskeletal: Negative for back pain.   Skin: Negative for rash.   Neurological: Negative for weakness.   Hematological: Does not bruise/bleed easily.       Physical Exam     Initial Vitals [09/09/20 1824]   BP Pulse Resp Temp SpO2    115/78 69 20 98.3 °F (36.8 °C) 99 %      MAP       --         Physical Exam    Nursing note and vitals reviewed.  Constitutional: She appears well-developed and well-nourished.   HENT:   Head: Normocephalic and atraumatic.   Mouth/Throat: Oropharynx is clear and moist.   Eyes: Conjunctivae and EOM are normal. Pupils are equal, round, and reactive to light.   Neck: Normal range of motion. Neck supple.   Cardiovascular: Normal rate, regular rhythm, normal heart sounds and intact distal pulses. Exam reveals no gallop and no friction rub.    No murmur heard.  Pulmonary/Chest: Breath sounds normal.   Abdominal: Soft. Bowel sounds are normal. She exhibits no distension. There is no abdominal tenderness. There is no rebound and no guarding.   Musculoskeletal: Normal range of motion. Edema (1+ pitting edema to her bilateral lower extremities) present. No tenderness.   Lymphadenopathy:     She has no cervical adenopathy.   Neurological: She is alert and oriented to person, place, and time. She has normal strength and normal reflexes.   Skin: Skin is warm and dry.   Psychiatric: She has a normal mood and affect. Her behavior is normal. Judgment and thought content normal.         ED Course   Procedures  Labs Reviewed   B-TYPE NATRIURETIC PEPTIDE - Abnormal; Notable for the following components:       Result Value     (*)     All other components within normal limits   CBC W/ AUTO DIFFERENTIAL - Abnormal; Notable for the following components:    RBC 2.76 (*)     Hemoglobin 8.3 (*)     Hematocrit 25.7 (*)     RDW 15.2 (*)     Platelets 147 (*)     Lymph # 0.9 (*)     Gran% 73.6 (*)     Lymph% 12.5 (*)     All other components within normal limits   COMPREHENSIVE METABOLIC PANEL - Abnormal; Notable for the following components:    CO2 15 (*)     BUN, Bld 110 (*)     Creatinine 7.7 (*)     eGFR if  6 (*)     eGFR if non  5 (*)     All other components within normal limits   TROPONIN I -  Abnormal; Notable for the following components:    Troponin I 0.059 (*)     All other components within normal limits   APTT   PROTIME-INR   SARS-COV-2 RNA AMPLIFICATION, QUAL     EKG Readings: (Independently Interpreted)   Initial: 1913. Rhythm: Normal Sinus Rhythm. Heart Rate: 66. Ectopy: No Ectopy. Conduction: Normal. ST Segments: Normal ST Segments. T Waves: Normal. Clinical Impression: Normal Sinus Rhythm       Imaging Results          X-Ray Chest AP Portable (Final result)  Result time 09/09/20 19:22:25    Final result by Marjan Hinton MD (09/09/20 19:22:25)                 Impression:      No acute cardiopulmonary process identified.      Electronically signed by: Marjan Hinton MD  Date:    09/09/2020  Time:    19:22             Narrative:    EXAMINATION:  XR CHEST AP PORTABLE    CLINICAL HISTORY:  Shortness of breath    TECHNIQUE:  Single frontal view of the chest was performed.    COMPARISON:  November 2019.    FINDINGS:  Cardiac silhouette is stable in size.  Left-sided dialysis catheter is visualized with distal tip over the SVC.  Lungs are symmetrically expanded.  No evidence of new focal consolidative process, pneumothorax, or significant pleural effusion.  No acute osseous abnormality identified.                                                   ED Course as of Sep 09 2311   Wed Sep 09, 2020   2309 Case discussed with Dr. Jc.  She feels the patient does not need emergent dialysis tonight however she will need access.  Dr. Jc recommends the patient comes back to the emergency department tomorrow morning at 8:00 a.m. for dialysis or vascular access and dialysis.     [ST]      ED Course User Index  [ST] Lilian Jhaveri MD            Clinical Impression:       ICD-10-CM ICD-9-CM   1. Problem with vascular access  Z78.9 V49.9   2. Shortness of breath  R06.02 786.05   3. ESRD on hemodialysis  N18.6 585.6    Z99.2 V45.11             ED Disposition Condition    Discharge Stable        ED Prescriptions      None        Follow-up Information    None                                      Lilian Jhaveri MD  09/09/20 1660

## 2020-09-10 NOTE — ASSESSMENT & PLAN NOTE
ESRD 4  Bun 110 Cr 7.7  Baseline Bun 77  Cr 5.9  On lasix in between dialysis days    Plan:  - nephrology consulted  - will get dialyzed after

## 2020-09-10 NOTE — ED TRIAGE NOTES
Pt reports her chest feels heavy as if something is sitting on it. Pt reports she has been having this feeling off and on since she has not been able to get dialyzed since Tuesday. Pt reports she has noticed some bilat leg swelling to her lower legs. Pt states she has been having issues with her dialysis port and thTA the reason for her not being able to get dialyzed. Denies N/V/H at this time. Pt reports chest discomfort 2/10. Will cont to monitor.

## 2020-09-10 NOTE — NURSING
Cathflow to dwell for 60 minutes to both ports per MD order. Patient states she is hungry. Explained need to be NPO at this time and possibility of surgical intervention later today. States she understands.

## 2020-09-10 NOTE — CONSULTS
[facility]  Consultation Note    SUBJECTIVE:     Reason for Consultation:   Dialysis access    History of Present Illness:  55 year old female with ESRD on HD qTTSat who presented today to the ED with volume overload and was sent for HD to the dialysis unit. However, left IJ catheter placed at [facility] was not functioning. Catheter was placed about 3 weeks ago and since then it had been giving problems with flow. Per dialysis nurse she tried today cath flow and catheter hasn't been aspirating at all. She tried reverse flow and didn't work. Nephrology evaluated patient and consulted us for evaluation. Patient has been referred also to a surgeon for peritoneal dialysis catheter. However, hasnt been able to start this mode of dialysis because patient has social issues with transportation. Patient also missed the past 2 dialysis because of transportation issues. After conversation, patient states would prefer to have tunneled catheter and peritoneal dialysis catheter tomorrow. Nephrology agrees with plan.     Only previous abdominal surgery is   Has never had fistula created, only hx of catheters are 2 tunneled one at R IJ and current Left IJ  Has never had MI/heart problems  Doesn't take blood thinners  Sleeps almost sitting up at this time due to respiratory fluid overload.    Allergies:  Review of patient's allergies indicates:   Allergen Reactions    Phenergan [promethazine] Other (See Comments)     Restless legs    Reglan [metoclopramide hcl]     Zofran [ondansetron hcl (pf)] Other (See Comments)     Constipation     Ketorolac Palpitations       Home Medications:  No current facility-administered medications on file prior to encounter.      Current Outpatient Medications on File Prior to Encounter   Medication Sig    amLODIPine (NORVASC) 10 MG tablet Take 1 tablet (10 mg total) by mouth once daily.    B complex-vitamin C-folic acid (SELENE-EMILIA/NEPHRO-EMILIA) 0.8 mg Tab TAKE 1 TABLET BY MOUTH  ONCE A DAY    carvediloL (COREG) 25 MG tablet Take 1 tablet (25 mg total) by mouth 2 (two) times daily.    docusate sodium (COLACE) 100 MG capsule Take 1 tablet by mouth every day    furosemide (LASIX) 80 MG tablet Take 2 tablets (160 mg total) by mouth 2 (two) times daily.    sevelamer HCL (RENAGEL) 800 MG Tab TAKE 2 TABLETS BY MOUTH THREE TIMES A DAY WITH MEALS    acetaminophen (TYLENOL) 325 MG tablet Take 2 tablets (650 mg total) by mouth every 4 (four) hours as needed.    clotrimazole-betamethasone 1-0.05% (LOTRISONE) cream Apply topically 2 (two) times daily.    heparin sodium,porcine (HEPARIN, PORCINE,) 1,000 unit/mL injection 4.1 mLs (4,100 Units total) by Intra-Catheter route as needed (to devan CVC post HD).    insulin aspart U-100 (NOVOLOG) 100 unit/mL (3 mL) InPn pen Inject 10 Units into the skin 3 (three) times daily. for 16 days    insulin detemir U-100 (LEVEMIR FLEXTOUCH) 100 unit/mL (3 mL) SubQ InPn pen Inject 10 Units into the skin every evening. for 7 days    lactulose (CHRONULAC) 10 gram/15 mL solution Take 15ml by mouth every day    losartan (COZAAR) 100 MG tablet Take 1 tablet (100 mg total) by mouth once daily.    metoclopramide HCl (REGLAN) 10 MG tablet Take 1 tablet (10 mg total) by mouth 3 (three) times daily as needed (nausea and vomitting).    metOLazone (ZAROXOLYN) 10 MG tablet Take 1 tablet (10 mg total) by mouth once daily.    NIFEdipine (PROCARDIA-XL) 90 MG (OSM) 24 hr tablet Take 1 tablet (90 mg total) by mouth once daily.    nystatin-triamcinolone (MYCOLOG II) cream Apply topically once daily.    omeprazole (PRILOSEC) 20 MG capsule Take 1 capsule (20 mg total) by mouth before breakfast.    oxyCODONE-acetaminophen (PERCOCET)  mg per tablet Take 1 tablet by mouth every 4 (four) hours as needed for Pain.    sevelamer carbonate (RENVELA) 800 mg Tab Take 2 tablets (1,600 mg total) by mouth 3 (three) times daily with meals.       Past Medical History:   Diagnosis  Date    Diabetes mellitus     Hypertension     Renal disorder      History reviewed. No pertinent surgical history.  No family history on file.  Social History     Tobacco Use    Smoking status: Never Smoker   Substance Use Topics    Alcohol use: Not Currently     Frequency: Never     Drinks per session: Patient refused     Binge frequency: Never    Drug use: Never        Review of Systems:  Constitutional: no fever or chills  Eyes: no visual changes  ENT: no nasal congestion or sore throat  Respiratory: positive for shortness of breath upon exertion  Cardiovascular: no chest pain or palpitations  Gastrointestinal: no nausea or vomiting, no abdominal pain or change in bowel habits  Endocrine: no heat or cold intolerance    OBJECTIVE:     Vital Signs:  Temp: 99.2 °F (37.3 °C) (09/10/20 1028)  Pulse: 69 (09/10/20 1100)  Resp: 18 (09/10/20 1100)  BP: 131/81 (09/10/20 1100)  SpO2: 100 % (09/10/20 1100)    Physical Exam:  NAD, AAOx3, nontoxic appearing  Normal respiratory effort on room air, crackles  RRR  Abdomen soft, nontender, nondistended  Moving all extremities    Laboratory:  Labs Reviewed - No data to display    Diagnostic Results:  Imaging Results    None         ASSESSMENT:     56 yo F with ESRD, DM, HTN who has problems with access. Patient with social issues that prevent her from having accessible medical care.     PLAN:     - npo midnight  - verbal consent obtained after explaining risks, benefits and alternatives  OR tomorrow for placement of tunneled HD catheter   - we will perform laparoscopic peritoneal dialysis catheter outpatient  Needs follow up with us after surgery tomorrow    Pau Hermosillo MD  PGY-2, [facility]  Neuroendocrine Surgery

## 2020-09-10 NOTE — ED NOTES
"Pt is alert and oriented. Pt states last Tuesday she only received 2 of her 4 hours of dialysis for an unknown reason. She did not go to dialysis on Thursday, Saturday, or Tuesday. She states she does not have a reason. Pt states she called her dialysis center yesterday and they told her to come to the ER. Pt came to the ER yesterday to find out if she can get dialysis at the hospital because "they can't do anything for me there". Pt had her access site changed to the left chest 3 weeks ago. Pt denies n/v/d, dizziness, chest pain, sob.   "

## 2020-09-10 NOTE — CONSULTS
NEPHROLOGY CONSULT NOTE    HPI & INTERVAL HISTORY:    Past Medical History:   Diagnosis Date    Diabetes mellitus     Hypertension     Renal disorder       History reviewed. No pertinent surgical history.   Review of patient's allergies indicates:   Allergen Reactions    Phenergan [promethazine] Other (See Comments)     Restless legs    Reglan [metoclopramide hcl]     Zofran [ondansetron hcl (pf)] Other (See Comments)     Constipation     Ketorolac Palpitations      (Not in a hospital admission)      Social History     Socioeconomic History    Marital status:      Spouse name: Not on file    Number of children: Not on file    Years of education: Not on file    Highest education level: Not on file   Occupational History    Not on file   Social Needs    Financial resource strain: Not on file    Food insecurity     Worry: Not on file     Inability: Not on file    Transportation needs     Medical: Not on file     Non-medical: Not on file   Tobacco Use    Smoking status: Never Smoker   Substance and Sexual Activity    Alcohol use: Not Currently     Frequency: Never     Drinks per session: Patient refused     Binge frequency: Never    Drug use: Never    Sexual activity: Not Currently     Partners: Male   Lifestyle    Physical activity     Days per week: Patient refused     Minutes per session: 0 min    Stress: Only a little   Relationships    Social connections     Talks on phone: Patient refused     Gets together: Patient refused     Attends Anabaptism service: Not on file     Active member of club or organization: Patient refused     Attends meetings of clubs or organizations: Patient refused     Relationship status:    Other Topics Concern    Not on file   Social History Narrative    Not on file        MEDS   sodium chloride 0.9%   Intravenous Once    furosemide (LASIX) IV  120 mg Intravenous Once    mupirocin   Nasal BID                  CONTINOUS INFUSIONS:    No intake or  output data in the 24 hours ending 09/10/20 1403     HEMODYNAMICS:    Temp:  [97.9 °F (36.6 °C)-99.2 °F (37.3 °C)] 99.2 °F (37.3 °C)  Pulse:  [63-79] 79  Resp:  [17-20] 17  SpO2:  [99 %-100 %] 100 %  BP: (115-159)/(78-90) 159/90   Gen: NAD  SOB with exertion  Edema  No fever  No chills  No CP  No cough  No nausea  No vomiting  No diarrhea  Cardiology : pulse 79  Pulmonary : diminished breath sounds  Abdomen soft   Extremities edema      LABS   Lab Results   Component Value Date    WBC 7.12 09/09/2020    HGB 8.3 (L) 09/09/2020    HCT 25.7 (L) 09/09/2020    MCV 93 09/09/2020     (L) 09/09/2020        Recent Labs   Lab 09/09/20 2059      CALCIUM 8.9   ALBUMIN 3.8   PROT 7.7      K 4.6   CO2 15*      *   CREATININE 7.7*   ALKPHOS 89   ALT 15   AST 18   BILITOT 0.4      Lab Results   Component Value Date    .5 (H) 11/07/2019    CALCIUM 8.9 09/09/2020    PHOS 4.1 07/11/2020      No results found for: IRON, TIBC, FERRITIN     ABG  No results for input(s): PH, PO2, PCO2, HCO3, BE in the last 168 hours.      IMAGING:  CXR    ASSESSMENT / PLAN  ESRD  Malfunctioning Left IJ tunneled  catheter malfunctioning  Volume overload  Metabolic acidosis  Azotemia    Metabolic bone disease  Anemia secondary to ESRD  Hb 8.3  Poor nutrition  /90  Needs dialysis treatment  Consult surgery for hemodialysis access  Scheduled for tunneled catheter in am

## 2020-09-10 NOTE — NURSING
Attempted to withdraw cathflow from both CVC ports. Venous port aspirates. Flushed with 10mL NS. Arterial port does not aspirate. Not flushed due to altepase dwelling in port. Dr. Jc at BS. Plan for consult to Dr. Pfeiffer for surgery/cvc replacement. Patient updated and involved in POC. States she understands and agrees with plan.

## 2020-09-10 NOTE — ASSESSMENT & PLAN NOTE
HTN    Plan:  -restart home meds  - monitor BP  - -Hydralazine 10mg q6 prn for SBP > 170  - SBP 130s-160s, continue meds

## 2020-09-10 NOTE — HOSPITAL COURSE
9/11 in OR for  placement of tunneled HD catheter     9/12 tunneled HD catheter placed yesterday and tolerated dialysis. Planned for another round today    9/13 patient discharged, she has an outpatient chair

## 2020-09-10 NOTE — ED NOTES
Pt returned from dialysis via stretcher with transport. Per dialysis nurse pt did not receive dialysis today due to a clotted access port. She is a internal medicine admit scheduled for surgery tomorrow.

## 2020-09-10 NOTE — SUBJECTIVE & OBJECTIVE
Past Medical History:   Diagnosis Date    Diabetes mellitus     Hypertension     Renal disorder        History reviewed. No pertinent surgical history.    Review of patient's allergies indicates:   Allergen Reactions    Phenergan [promethazine] Other (See Comments)     Restless legs    Reglan [metoclopramide hcl]     Zofran [ondansetron hcl (pf)] Other (See Comments)     Constipation     Ketorolac Palpitations       No current facility-administered medications on file prior to encounter.      Current Outpatient Medications on File Prior to Encounter   Medication Sig    amLODIPine (NORVASC) 10 MG tablet Take 1 tablet (10 mg total) by mouth once daily.    B complex-vitamin C-folic acid (SELENE-EMILIA/NEPHRO-EMILIA) 0.8 mg Tab TAKE 1 TABLET BY MOUTH ONCE A DAY    carvediloL (COREG) 25 MG tablet Take 1 tablet (25 mg total) by mouth 2 (two) times daily.    docusate sodium (COLACE) 100 MG capsule Take 1 tablet by mouth every day    furosemide (LASIX) 80 MG tablet Take 2 tablets (160 mg total) by mouth 2 (two) times daily.    sevelamer HCL (RENAGEL) 800 MG Tab TAKE 2 TABLETS BY MOUTH THREE TIMES A DAY WITH MEALS    acetaminophen (TYLENOL) 325 MG tablet Take 2 tablets (650 mg total) by mouth every 4 (four) hours as needed.    clotrimazole-betamethasone 1-0.05% (LOTRISONE) cream Apply topically 2 (two) times daily.    heparin sodium,porcine (HEPARIN, PORCINE,) 1,000 unit/mL injection 4.1 mLs (4,100 Units total) by Intra-Catheter route as needed (to devan CVC post HD).    insulin aspart U-100 (NOVOLOG) 100 unit/mL (3 mL) InPn pen Inject 10 Units into the skin 3 (three) times daily. for 16 days    insulin detemir U-100 (LEVEMIR FLEXTOUCH) 100 unit/mL (3 mL) SubQ InPn pen Inject 10 Units into the skin every evening. for 7 days    lactulose (CHRONULAC) 10 gram/15 mL solution Take 15ml by mouth every day    losartan (COZAAR) 100 MG tablet Take 1 tablet (100 mg total) by mouth once daily.    metoclopramide HCl  (REGLAN) 10 MG tablet Take 1 tablet (10 mg total) by mouth 3 (three) times daily as needed (nausea and vomitting).    metOLazone (ZAROXOLYN) 10 MG tablet Take 1 tablet (10 mg total) by mouth once daily.    NIFEdipine (PROCARDIA-XL) 90 MG (OSM) 24 hr tablet Take 1 tablet (90 mg total) by mouth once daily.    nystatin-triamcinolone (MYCOLOG II) cream Apply topically once daily.    omeprazole (PRILOSEC) 20 MG capsule Take 1 capsule (20 mg total) by mouth before breakfast.    oxyCODONE-acetaminophen (PERCOCET)  mg per tablet Take 1 tablet by mouth every 4 (four) hours as needed for Pain.    sevelamer carbonate (RENVELA) 800 mg Tab Take 2 tablets (1,600 mg total) by mouth 3 (three) times daily with meals.     Family History     None        Tobacco Use    Smoking status: Never Smoker   Substance and Sexual Activity    Alcohol use: Not Currently     Frequency: Never     Drinks per session: Patient refused     Binge frequency: Never    Drug use: Never    Sexual activity: Not Currently     Partners: Male     Review of Systems   Constitutional: Negative for chills and fever.   HENT: Negative for drooling, rhinorrhea and trouble swallowing.    Eyes: Negative for discharge and redness.   Respiratory: Negative for choking and shortness of breath.    Cardiovascular: Negative for chest pain and palpitations.   Gastrointestinal: Negative for abdominal distention, constipation, diarrhea, nausea and vomiting.   Genitourinary: Negative for dysuria, flank pain and hematuria.   Musculoskeletal: Negative for neck stiffness.   Neurological: Negative for dizziness, light-headedness and headaches.   Psychiatric/Behavioral: Negative for confusion and self-injury.     Objective:     Vital Signs (Most Recent):  Temp: 99.2 °F (37.3 °C) (09/10/20 1028)  Pulse: 69 (09/10/20 1100)  Resp: 18 (09/10/20 1100)  BP: 131/81 (09/10/20 1100)  SpO2: 100 % (09/10/20 1100) Vital Signs (24h Range):  Temp:  [97.9 °F (36.6 °C)-99.2 °F (37.3 °C)]  99.2 °F (37.3 °C)  Pulse:  [63-79] 69  Resp:  [17-20] 18  SpO2:  [99 %-100 %] 100 %  BP: (115-159)/(78-90) 131/81     Weight: 74.8 kg (165 lb)  Body mass index is 30.18 kg/m².    Physical Exam  Vitals signs and nursing note reviewed.   Constitutional:       Appearance: Normal appearance. She is not ill-appearing.   HENT:      Head: Normocephalic and atraumatic.      Comments: Facial swelling  Eyes:      Extraocular Movements: Extraocular movements intact.      Conjunctiva/sclera: Conjunctivae normal.      Comments: Swollen bilateral eyelids   Neck:      Musculoskeletal: Normal range of motion.   Cardiovascular:      Rate and Rhythm: Normal rate and regular rhythm.      Pulses: Normal pulses.      Heart sounds: Normal heart sounds. No murmur.   Pulmonary:      Effort: Pulmonary effort is normal.      Breath sounds: Normal breath sounds. No wheezing.   Abdominal:      General: Bowel sounds are normal.      Palpations: Abdomen is soft.      Tenderness: There is no abdominal tenderness. There is no right CVA tenderness or left CVA tenderness.   Musculoskeletal:      Right lower leg: No edema.      Left lower leg: No edema.      Comments: 1+ lower extremity swelling   Skin:     Comments: Right subclavicular scar with dressing on top  Left IJ catheter   Neurological:      General: No focal deficit present.      Mental Status: She is alert and oriented to person, place, and time.   Psychiatric:         Mood and Affect: Mood normal.         Behavior: Behavior normal.             Significant Labs:   A1C: No results for input(s): HGBA1C in the last 4320 hours.  CBC:   Recent Labs   Lab 09/09/20 2059   WBC 7.12   HGB 8.3*   HCT 25.7*   *     CMP:   Recent Labs   Lab 09/09/20 2059      K 4.6      CO2 15*      *   CREATININE 7.7*   CALCIUM 8.9   PROT 7.7   ALBUMIN 3.8   BILITOT 0.4   ALKPHOS 89   AST 18   ALT 15   ANIONGAP 15   EGFRNONAA 5*     Troponin:   Recent Labs   Lab 09/09/20 2059    TROPONINI 0.059*       Significant Imaging: I have reviewed all pertinent imaging results/findings within the past 24 hours.

## 2020-09-10 NOTE — H&P
[facility] Hospital Medicine  History & Physical    Patient Name: Ping Davenport  MRN: [id number]  Admission Date: 9/10/2020  Attending Physician: Kareen Benjamin MD   Primary Care Provider: Primary Doctor No         Patient information was obtained from patient, past medical records and ER records.     Subjective:     Principal Problem:Clotted dialysis access    Chief Complaint:   Chief Complaint   Patient presents with    Vascular Access Problem     pt has been unable to get dialysis since last Tuesday. Was seen here last night and was told to return this morning for dialysis and possible new dialysis access        HPI: This is a 55 year-old  Female with PMH of DM, HTN, ESRD presenting to the ED for vascular access problem. She gets dialyzed T/TH/Sat at [facility] but since 9/1 she reports 2/4 hours of dialysis treatment at that time. Since then she has been unable to get dialysis, reporting dialysis center told her to get new access. She has been taking lasix since unable to dialyze. She reports the LIJ was placed 3 weeks ago. She admits to generalized swelling of the body, primarily in the face including bilateral eyelids and cheeks. She reported to the ED last night but told to report in this morning for an attempt of dialysis. Unsuccessful attempt in the ED. Surgery and nephrology consulted.     In the ED, /89, HR 69 on RA SaO2 100%   Cr 1.7  troponin 0.059  UA showed positive nitrites  CXR No acute cardiopulmonary process identified.  EKG Sinus rhythm with 1st degree A-V block       Past Medical History:   Diagnosis Date    Diabetes mellitus     Hypertension     Renal disorder        History reviewed. No pertinent surgical history.    Review of patient's allergies indicates:   Allergen Reactions    Phenergan [promethazine] Other (See Comments)     Restless legs    Reglan [metoclopramide hcl]     Zofran [ondansetron hcl (pf)] Other (See Comments)      Constipation     Ketorolac Palpitations       No current facility-administered medications on file prior to encounter.      Current Outpatient Medications on File Prior to Encounter   Medication Sig    amLODIPine (NORVASC) 10 MG tablet Take 1 tablet (10 mg total) by mouth once daily.    B complex-vitamin C-folic acid (SELENE-EMILIA/NEPHRO-EMILIA) 0.8 mg Tab TAKE 1 TABLET BY MOUTH ONCE A DAY    carvediloL (COREG) 25 MG tablet Take 1 tablet (25 mg total) by mouth 2 (two) times daily.    docusate sodium (COLACE) 100 MG capsule Take 1 tablet by mouth every day    furosemide (LASIX) 80 MG tablet Take 2 tablets (160 mg total) by mouth 2 (two) times daily.    sevelamer HCL (RENAGEL) 800 MG Tab TAKE 2 TABLETS BY MOUTH THREE TIMES A DAY WITH MEALS    acetaminophen (TYLENOL) 325 MG tablet Take 2 tablets (650 mg total) by mouth every 4 (four) hours as needed.    clotrimazole-betamethasone 1-0.05% (LOTRISONE) cream Apply topically 2 (two) times daily.    heparin sodium,porcine (HEPARIN, PORCINE,) 1,000 unit/mL injection 4.1 mLs (4,100 Units total) by Intra-Catheter route as needed (to devan CVC post HD).    insulin aspart U-100 (NOVOLOG) 100 unit/mL (3 mL) InPn pen Inject 10 Units into the skin 3 (three) times daily. for 16 days    insulin detemir U-100 (LEVEMIR FLEXTOUCH) 100 unit/mL (3 mL) SubQ InPn pen Inject 10 Units into the skin every evening. for 7 days    lactulose (CHRONULAC) 10 gram/15 mL solution Take 15ml by mouth every day    losartan (COZAAR) 100 MG tablet Take 1 tablet (100 mg total) by mouth once daily.    metoclopramide HCl (REGLAN) 10 MG tablet Take 1 tablet (10 mg total) by mouth 3 (three) times daily as needed (nausea and vomitting).    metOLazone (ZAROXOLYN) 10 MG tablet Take 1 tablet (10 mg total) by mouth once daily.    NIFEdipine (PROCARDIA-XL) 90 MG (OSM) 24 hr tablet Take 1 tablet (90 mg total) by mouth once daily.    nystatin-triamcinolone (MYCOLOG II) cream Apply topically once daily.     omeprazole (PRILOSEC) 20 MG capsule Take 1 capsule (20 mg total) by mouth before breakfast.    oxyCODONE-acetaminophen (PERCOCET)  mg per tablet Take 1 tablet by mouth every 4 (four) hours as needed for Pain.    sevelamer carbonate (RENVELA) 800 mg Tab Take 2 tablets (1,600 mg total) by mouth 3 (three) times daily with meals.     Family History     None        Tobacco Use    Smoking status: Never Smoker   Substance and Sexual Activity    Alcohol use: Not Currently     Frequency: Never     Drinks per session: Patient refused     Binge frequency: Never    Drug use: Never    Sexual activity: Not Currently     Partners: Male     Review of Systems   Constitutional: Negative for chills and fever.   HENT: Negative for drooling, rhinorrhea and trouble swallowing.    Eyes: Negative for discharge and redness.   Respiratory: Negative for choking and shortness of breath.    Cardiovascular: Negative for chest pain and palpitations.   Gastrointestinal: Negative for abdominal distention, constipation, diarrhea, nausea and vomiting.   Genitourinary: Negative for dysuria, flank pain and hematuria.   Musculoskeletal: Negative for neck stiffness.   Neurological: Negative for dizziness, light-headedness and headaches.   Psychiatric/Behavioral: Negative for confusion and self-injury.     Objective:     Vital Signs (Most Recent):  Temp: 99.2 °F (37.3 °C) (09/10/20 1028)  Pulse: 69 (09/10/20 1100)  Resp: 18 (09/10/20 1100)  BP: 131/81 (09/10/20 1100)  SpO2: 100 % (09/10/20 1100) Vital Signs (24h Range):  Temp:  [97.9 °F (36.6 °C)-99.2 °F (37.3 °C)] 99.2 °F (37.3 °C)  Pulse:  [63-79] 69  Resp:  [17-20] 18  SpO2:  [99 %-100 %] 100 %  BP: (115-159)/(78-90) 131/81     Weight: 74.8 kg (165 lb)  Body mass index is 30.18 kg/m².    Physical Exam  Vitals signs and nursing note reviewed.   Constitutional:       Appearance: Normal appearance. She is not ill-appearing.   HENT:      Head: Normocephalic and atraumatic.      Comments: Facial  swelling  Eyes:      Extraocular Movements: Extraocular movements intact.      Conjunctiva/sclera: Conjunctivae normal.      Comments: Swollen bilateral eyelids   Neck:      Musculoskeletal: Normal range of motion.   Cardiovascular:      Rate and Rhythm: Normal rate and regular rhythm.      Pulses: Normal pulses.      Heart sounds: Normal heart sounds. No murmur.   Pulmonary:      Effort: Pulmonary effort is normal.      Breath sounds: Normal breath sounds. No wheezing.   Abdominal:      General: Bowel sounds are normal.      Palpations: Abdomen is soft.      Tenderness: There is no abdominal tenderness. There is no right CVA tenderness or left CVA tenderness.   Musculoskeletal:      Right lower leg: No edema.      Left lower leg: No edema.      Comments: 1+ lower extremity swelling   Skin:     Comments: Right subclavicular scar with dressing on top  Left IJ catheter   Neurological:      General: No focal deficit present.      Mental Status: She is alert and oriented to person, place, and time.   Psychiatric:         Mood and Affect: Mood normal.         Behavior: Behavior normal.             Significant Labs:   A1C: No results for input(s): HGBA1C in the last 4320 hours.  CBC:   Recent Labs   Lab 09/09/20 2059   WBC 7.12   HGB 8.3*   HCT 25.7*   *     CMP:   Recent Labs   Lab 09/09/20 2059      K 4.6      CO2 15*      *   CREATININE 7.7*   CALCIUM 8.9   PROT 7.7   ALBUMIN 3.8   BILITOT 0.4   ALKPHOS 89   AST 18   ALT 15   ANIONGAP 15   EGFRNONAA 5*     Troponin:   Recent Labs   Lab 09/09/20 2059   TROPONINI 0.059*       Significant Imaging: I have reviewed all pertinent imaging results/findings within the past 24 hours.    Assessment/Plan:     * Clotted dialysis access  - RIJ catheter clotted since 9/1  - ED attempt in regaining return, unsuccessful    Plan:  - surgery consulted  - scheduled for placement of tunneled HD catheter and laparoscopic peritoneal dialysis  catheter        Diabetes mellitus  - Steroid induced hyperglycemia per medical records  2019 A1c 5.8       Plan:  - diabetic diet  - insulin aspart  - pending A1c  - keep glucose 140-180      Essential hypertension  HTN    Plan:  -restart home meds  - monitor BP  - -Hydralazine 10mg q6 prn for SBP > 170  - SBP 130s-160s, continue meds      ESRD (end stage renal disease)  ESRD 4  Bun 110 Cr 1.7  On lasix in between dialysis days    Plan:  - nephrology consulted  - will get dialyzed after      AXEL (acute kidney injury)   Cr 1.7  Baseline 77 Cr 5.9    Plan:  - IVF  - HD after surgery      VTE Risk Mitigation (From admission, onward)    None             Bee Gillespie MD  Department of Hospital Medicine   [facility]

## 2020-09-10 NOTE — DISCHARGE INSTRUCTIONS
Case discussed with Dr. Babita kaminski.  She request you return to the emergency department at 8:00 a.m. tomorrow morning for temporary dialysis access and dialysis.  Do not eat or drink anything after midnight tonight.

## 2020-09-10 NOTE — ASSESSMENT & PLAN NOTE
- LIJ catheter clotted since 9/1  - ED attempt in regaining return, unsuccessful    Plan:  - surgery consulted  - scheduled for RIJ catheter access replacement in the am

## 2020-09-11 ENCOUNTER — ANESTHESIA (OUTPATIENT)
Dept: SURGERY | Facility: HOSPITAL | Age: 55
End: 2020-09-11
Payer: MEDICAID

## 2020-09-11 ENCOUNTER — ANESTHESIA EVENT (OUTPATIENT)
Dept: SURGERY | Facility: HOSPITAL | Age: 55
End: 2020-09-11
Payer: MEDICAID

## 2020-09-11 PROBLEM — N17.9 AKI (ACUTE KIDNEY INJURY): Status: ACTIVE | Noted: 2020-09-11

## 2020-09-11 LAB
ALBUMIN SERPL BCP-MCNC: 3.6 G/DL (ref 3.5–5.2)
ALP SERPL-CCNC: 93 U/L (ref 55–135)
ALT SERPL W/O P-5'-P-CCNC: 13 U/L (ref 10–44)
ANION GAP SERPL CALC-SCNC: 15 MMOL/L (ref 8–16)
AST SERPL-CCNC: 18 U/L (ref 10–40)
BASOPHILS # BLD AUTO: 0.04 K/UL (ref 0–0.2)
BASOPHILS NFR BLD: 0.7 % (ref 0–1.9)
BILIRUB SERPL-MCNC: 0.3 MG/DL (ref 0.1–1)
BUN SERPL-MCNC: 120 MG/DL (ref 6–20)
CALCIUM SERPL-MCNC: 8.7 MG/DL (ref 8.7–10.5)
CHLORIDE SERPL-SCNC: 112 MMOL/L (ref 95–110)
CO2 SERPL-SCNC: 14 MMOL/L (ref 23–29)
CREAT SERPL-MCNC: 8.2 MG/DL (ref 0.5–1.4)
DIFFERENTIAL METHOD: ABNORMAL
EOSINOPHIL # BLD AUTO: 0.2 K/UL (ref 0–0.5)
EOSINOPHIL NFR BLD: 2.9 % (ref 0–8)
ERYTHROCYTE [DISTWIDTH] IN BLOOD BY AUTOMATED COUNT: 15 % (ref 11.5–14.5)
EST. GFR  (AFRICAN AMERICAN): 6 ML/MIN/1.73 M^2
EST. GFR  (NON AFRICAN AMERICAN): 5 ML/MIN/1.73 M^2
GLUCOSE SERPL-MCNC: 104 MG/DL (ref 70–110)
HCT VFR BLD AUTO: 24.7 % (ref 37–48.5)
HGB BLD-MCNC: 8 G/DL (ref 12–16)
IMM GRANULOCYTES # BLD AUTO: 0.05 K/UL (ref 0–0.04)
IMM GRANULOCYTES NFR BLD AUTO: 0.9 % (ref 0–0.5)
LYMPHOCYTES # BLD AUTO: 1.1 K/UL (ref 1–4.8)
LYMPHOCYTES NFR BLD: 20.9 % (ref 18–48)
MAGNESIUM SERPL-MCNC: 2.6 MG/DL (ref 1.6–2.6)
MCH RBC QN AUTO: 29.6 PG (ref 27–31)
MCHC RBC AUTO-ENTMCNC: 32.4 G/DL (ref 32–36)
MCV RBC AUTO: 92 FL (ref 82–98)
MONOCYTES # BLD AUTO: 0.6 K/UL (ref 0.3–1)
MONOCYTES NFR BLD: 11.2 % (ref 4–15)
NEUTROPHILS # BLD AUTO: 3.5 K/UL (ref 1.8–7.7)
NEUTROPHILS NFR BLD: 63.4 % (ref 38–73)
NRBC BLD-RTO: 0 /100 WBC
PHOSPHATE SERPL-MCNC: 6.1 MG/DL (ref 2.7–4.5)
PLATELET # BLD AUTO: 139 K/UL (ref 150–350)
PMV BLD AUTO: 12.3 FL (ref 9.2–12.9)
POCT GLUCOSE: 100 MG/DL (ref 70–110)
POCT GLUCOSE: 107 MG/DL (ref 70–110)
POCT GLUCOSE: 148 MG/DL (ref 70–110)
POCT GLUCOSE: 91 MG/DL (ref 70–110)
POTASSIUM SERPL-SCNC: 4.3 MMOL/L (ref 3.5–5.1)
PROT SERPL-MCNC: 7.3 G/DL (ref 6–8.4)
RBC # BLD AUTO: 2.7 M/UL (ref 4–5.4)
SODIUM SERPL-SCNC: 141 MMOL/L (ref 136–145)
TROPONIN I SERPL DL<=0.01 NG/ML-MCNC: 0.05 NG/ML (ref 0–0.03)
WBC # BLD AUTO: 5.46 K/UL (ref 3.9–12.7)

## 2020-09-11 PROCEDURE — 71000033 HC RECOVERY, INTIAL HOUR: Performed by: SURGERY

## 2020-09-11 PROCEDURE — 25000003 PHARM REV CODE 250: Performed by: SURGERY

## 2020-09-11 PROCEDURE — 25000003 PHARM REV CODE 250: Performed by: STUDENT IN AN ORGANIZED HEALTH CARE EDUCATION/TRAINING PROGRAM

## 2020-09-11 PROCEDURE — 25000003 PHARM REV CODE 250: Performed by: NURSE ANESTHETIST, CERTIFIED REGISTERED

## 2020-09-11 PROCEDURE — 37000008 HC ANESTHESIA 1ST 15 MINUTES: Performed by: SURGERY

## 2020-09-11 PROCEDURE — 63600175 PHARM REV CODE 636 W HCPCS: Performed by: STUDENT IN AN ORGANIZED HEALTH CARE EDUCATION/TRAINING PROGRAM

## 2020-09-11 PROCEDURE — 36415 COLL VENOUS BLD VENIPUNCTURE: CPT

## 2020-09-11 PROCEDURE — G0378 HOSPITAL OBSERVATION PER HR: HCPCS

## 2020-09-11 PROCEDURE — 36000707: Performed by: SURGERY

## 2020-09-11 PROCEDURE — 90935 HEMODIALYSIS ONE EVALUATION: CPT

## 2020-09-11 PROCEDURE — 83735 ASSAY OF MAGNESIUM: CPT

## 2020-09-11 PROCEDURE — 84484 ASSAY OF TROPONIN QUANT: CPT

## 2020-09-11 PROCEDURE — 63600175 PHARM REV CODE 636 W HCPCS: Performed by: NURSE ANESTHETIST, CERTIFIED REGISTERED

## 2020-09-11 PROCEDURE — C1769 GUIDE WIRE: HCPCS | Performed by: SURGERY

## 2020-09-11 PROCEDURE — 37000009 HC ANESTHESIA EA ADD 15 MINS: Performed by: SURGERY

## 2020-09-11 PROCEDURE — 96376 TX/PRO/DX INJ SAME DRUG ADON: CPT | Mod: 59

## 2020-09-11 PROCEDURE — 63600175 PHARM REV CODE 636 W HCPCS: Performed by: SURGERY

## 2020-09-11 PROCEDURE — 00532 ANES ACCESS CTR VENOUS CRCJ: CPT | Performed by: SURGERY

## 2020-09-11 PROCEDURE — 80053 COMPREHEN METABOLIC PANEL: CPT

## 2020-09-11 PROCEDURE — 71000039 HC RECOVERY, EACH ADD'L HOUR: Performed by: SURGERY

## 2020-09-11 PROCEDURE — 36000706: Performed by: SURGERY

## 2020-09-11 PROCEDURE — 85025 COMPLETE CBC W/AUTO DIFF WBC: CPT

## 2020-09-11 PROCEDURE — 25000003 PHARM REV CODE 250: Performed by: ANESTHESIOLOGY

## 2020-09-11 PROCEDURE — 84100 ASSAY OF PHOSPHORUS: CPT

## 2020-09-11 PROCEDURE — 63600175 PHARM REV CODE 636 W HCPCS: Performed by: ANESTHESIOLOGY

## 2020-09-11 PROCEDURE — 94761 N-INVAS EAR/PLS OXIMETRY MLT: CPT

## 2020-09-11 PROCEDURE — 27000221 HC OXYGEN, UP TO 24 HOURS

## 2020-09-11 PROCEDURE — C1750 CATH, HEMODIALYSIS,LONG-TERM: HCPCS | Performed by: SURGERY

## 2020-09-11 DEVICE — CATH DIALYSIS HEMOSPLIT16FR 31: Type: IMPLANTABLE DEVICE | Site: NECK | Status: FUNCTIONAL

## 2020-09-11 RX ORDER — BUPIVACAINE HYDROCHLORIDE 2.5 MG/ML
INJECTION, SOLUTION EPIDURAL; INFILTRATION; INTRACAUDAL
Status: DISCONTINUED | OUTPATIENT
Start: 2020-09-11 | End: 2020-09-11 | Stop reason: HOSPADM

## 2020-09-11 RX ORDER — PROPOFOL 10 MG/ML
VIAL (ML) INTRAVENOUS CONTINUOUS PRN
Status: DISCONTINUED | OUTPATIENT
Start: 2020-09-11 | End: 2020-09-11

## 2020-09-11 RX ORDER — SODIUM CHLORIDE 0.9 % (FLUSH) 0.9 %
10 SYRINGE (ML) INJECTION
Status: DISCONTINUED | OUTPATIENT
Start: 2020-09-11 | End: 2020-09-13

## 2020-09-11 RX ORDER — OXYCODONE HYDROCHLORIDE 5 MG/1
5 TABLET ORAL ONCE
Status: COMPLETED | OUTPATIENT
Start: 2020-09-11 | End: 2020-09-11

## 2020-09-11 RX ORDER — HEPARIN SODIUM 1000 [USP'U]/ML
4900 INJECTION, SOLUTION INTRAVENOUS; SUBCUTANEOUS
Status: DISCONTINUED | OUTPATIENT
Start: 2020-09-11 | End: 2020-09-13 | Stop reason: HOSPADM

## 2020-09-11 RX ORDER — SODIUM CHLORIDE 0.9 % (FLUSH) 0.9 %
10 SYRINGE (ML) INJECTION
Status: DISCONTINUED | OUTPATIENT
Start: 2020-09-11 | End: 2020-09-13 | Stop reason: HOSPADM

## 2020-09-11 RX ORDER — HYDROMORPHONE HYDROCHLORIDE 2 MG/ML
0.5 INJECTION, SOLUTION INTRAMUSCULAR; INTRAVENOUS; SUBCUTANEOUS EVERY 5 MIN PRN
Status: DISCONTINUED | OUTPATIENT
Start: 2020-09-11 | End: 2020-09-11 | Stop reason: HOSPADM

## 2020-09-11 RX ORDER — HEPARIN SODIUM 1000 [USP'U]/ML
INJECTION, SOLUTION INTRAVENOUS; SUBCUTANEOUS
Status: DISCONTINUED | OUTPATIENT
Start: 2020-09-11 | End: 2020-09-11 | Stop reason: HOSPADM

## 2020-09-11 RX ORDER — HYDRALAZINE HYDROCHLORIDE 20 MG/ML
10 INJECTION INTRAMUSCULAR; INTRAVENOUS EVERY 6 HOURS PRN
Status: DISCONTINUED | OUTPATIENT
Start: 2020-09-11 | End: 2020-09-13 | Stop reason: HOSPADM

## 2020-09-11 RX ORDER — PROCHLORPERAZINE EDISYLATE 5 MG/ML
5 INJECTION INTRAMUSCULAR; INTRAVENOUS EVERY 4 HOURS PRN
Status: DISCONTINUED | OUTPATIENT
Start: 2020-09-11 | End: 2020-09-13 | Stop reason: HOSPADM

## 2020-09-11 RX ORDER — PROPOFOL 10 MG/ML
VIAL (ML) INTRAVENOUS
Status: DISCONTINUED | OUTPATIENT
Start: 2020-09-11 | End: 2020-09-11

## 2020-09-11 RX ORDER — MIDAZOLAM HYDROCHLORIDE 1 MG/ML
INJECTION INTRAMUSCULAR; INTRAVENOUS
Status: DISCONTINUED | OUTPATIENT
Start: 2020-09-11 | End: 2020-09-11

## 2020-09-11 RX ORDER — FENTANYL CITRATE 50 UG/ML
INJECTION, SOLUTION INTRAMUSCULAR; INTRAVENOUS
Status: DISCONTINUED | OUTPATIENT
Start: 2020-09-11 | End: 2020-09-11

## 2020-09-11 RX ORDER — LIDOCAINE HCL/PF 100 MG/5ML
SYRINGE (ML) INTRAVENOUS
Status: DISCONTINUED | OUTPATIENT
Start: 2020-09-11 | End: 2020-09-11

## 2020-09-11 RX ORDER — LIDOCAINE HYDROCHLORIDE 10 MG/ML
INJECTION, SOLUTION EPIDURAL; INFILTRATION; INTRACAUDAL; PERINEURAL
Status: DISCONTINUED | OUTPATIENT
Start: 2020-09-11 | End: 2020-09-11 | Stop reason: HOSPADM

## 2020-09-11 RX ORDER — ONDANSETRON 2 MG/ML
4 INJECTION INTRAMUSCULAR; INTRAVENOUS ONCE AS NEEDED
Status: DISCONTINUED | OUTPATIENT
Start: 2020-09-11 | End: 2020-09-11

## 2020-09-11 RX ADMIN — PROPOFOL 30 MG: 10 INJECTION, EMULSION INTRAVENOUS at 07:09

## 2020-09-11 RX ADMIN — SODIUM CHLORIDE 400 ML: 0.9 INJECTION, SOLUTION INTRAVENOUS at 03:09

## 2020-09-11 RX ADMIN — SODIUM CHLORIDE: 0.9 INJECTION, SOLUTION INTRAVENOUS at 07:09

## 2020-09-11 RX ADMIN — FENTANYL CITRATE 50 MCG: 50 INJECTION, SOLUTION INTRAMUSCULAR; INTRAVENOUS at 07:09

## 2020-09-11 RX ADMIN — PROPOFOL 75 MCG/KG/MIN: 10 INJECTION, EMULSION INTRAVENOUS at 07:09

## 2020-09-11 RX ADMIN — ACETAMINOPHEN 650 MG: 325 TABLET ORAL at 09:09

## 2020-09-11 RX ADMIN — HYDRALAZINE HYDROCHLORIDE 10 MG: 20 INJECTION INTRAMUSCULAR; INTRAVENOUS at 04:09

## 2020-09-11 RX ADMIN — LIDOCAINE HYDROCHLORIDE 100 MG: 20 INJECTION, SOLUTION INTRAVENOUS at 07:09

## 2020-09-11 RX ADMIN — HYDRALAZINE HYDROCHLORIDE 10 MG: 20 INJECTION INTRAMUSCULAR; INTRAVENOUS at 06:09

## 2020-09-11 RX ADMIN — OXYCODONE HYDROCHLORIDE 5 MG: 5 TABLET ORAL at 10:09

## 2020-09-11 RX ADMIN — CEFOXITIN 1 G: 1 INJECTION, POWDER, FOR SOLUTION INTRAVENOUS at 07:09

## 2020-09-11 RX ADMIN — HYDROMORPHONE HYDROCHLORIDE 0.5 MG: 2 INJECTION, SOLUTION INTRAMUSCULAR; INTRAVENOUS; SUBCUTANEOUS at 09:09

## 2020-09-11 RX ADMIN — SEVELAMER CARBONATE 1600 MG: 800 TABLET, FILM COATED ORAL at 04:09

## 2020-09-11 RX ADMIN — DOCUSATE SODIUM - SENNOSIDES 1 TABLET: 50; 8.6 TABLET, FILM COATED ORAL at 08:09

## 2020-09-11 RX ADMIN — MUPIROCIN: 20 OINTMENT TOPICAL at 08:09

## 2020-09-11 RX ADMIN — CARVEDILOL 25 MG: 25 TABLET, FILM COATED ORAL at 08:09

## 2020-09-11 RX ADMIN — FUROSEMIDE 160 MG: 40 TABLET ORAL at 08:09

## 2020-09-11 RX ADMIN — MIDAZOLAM HYDROCHLORIDE 2 MG: 1 INJECTION, SOLUTION INTRAMUSCULAR; INTRAVENOUS at 07:09

## 2020-09-11 NOTE — PROGRESS NOTES
[facility] Medicine  Progress Note    Patient Name: Ping Davenport  MRN: [id number]  Patient Class: OP- Observation   Admission Date: 9/10/2020  Length of Stay: 1 days  Attending Physician: Kareen Benjamin MD  Primary Care Provider: Primary Doctor No        Subjective:     Principal Problem:Clotted dialysis access        HPI:  This is a 55 year-old  Female with PMH of DM, HTN, ESRD presenting to the ED for left IJ vascular access problem. She gets dialyzed T/TH/Sat at [facility] but since 9/1 she reports 2/4 hours of dialysis treatment at that time. Since then she has been unable to get dialysis, reporting dialysis center told her to get new access. She has been taking lasix since unable to dialyze. She reports the LIJ was placed 3 weeks ago. She admits to generalized swelling of the body, primarily in the face including bilateral eyelids and cheeks. She reported to the ED last night but told to report in this morning for an attempt of dialysis. Unsuccessful attempt in the ED. Surgery and nephrology consulted.     In the ED, /89, HR 69 on RA SaO2 100%   Cr 7.7  troponin 0.059  CXR No acute cardiopulmonary process identified.  EKG Sinus rhythm with 1st degree A-V block       Overview/Hospital Course:  9/11 in OR for  placement of tunneled HD catheter     Interval History: NAEON.     Review of Systems   Unable to perform ROS: Other     Objective:     Vital Signs (Most Recent):  Temp: 98.7 °F (37.1 °C) (09/11/20 0500)  Pulse: 78 (09/11/20 0605)  Resp: 18 (09/11/20 0500)  BP: (!) 164/74 (09/11/20 0605)  SpO2: 96 % (09/11/20 0500) Vital Signs (24h Range):  Temp:  [97.3 °F (36.3 °C)-99.2 °F (37.3 °C)] 98.7 °F (37.1 °C)  Pulse:  [69-83] 78  Resp:  [17-20] 18  SpO2:  [96 %-100 %] 96 %  BP: (131-182)/() 164/74     Weight: 76.3 kg (168 lb 3.4 oz)  Body mass index is 30.77 kg/m².    Intake/Output Summary (Last 24 hours) at 9/11/2020 0837  Last data filed at  9/11/2020 0600  Gross per 24 hour   Intake 125 ml   Output 400 ml   Net -275 ml      Physical Exam  Vitals signs and nursing note reviewed.   Constitutional:       Comments: Unable to perform PE due to patient in operating room.         Significant Labs:   A1C:   Recent Labs   Lab 09/10/20  2114   HGBA1C 5.3     CBC:   Recent Labs   Lab 09/09/20 2059 09/11/20  0416   WBC 7.12 5.46   HGB 8.3* 8.0*   HCT 25.7* 24.7*   * 139*     CMP:   Recent Labs   Lab 09/09/20 2059 09/11/20  0416    141   K 4.6 4.3    112*   CO2 15* 14*    104   * 120*   CREATININE 7.7* 8.2*   CALCIUM 8.9 8.7   PROT 7.7 7.3   ALBUMIN 3.8 3.6   BILITOT 0.4 0.3   ALKPHOS 89 93   AST 18 18   ALT 15 13   ANIONGAP 15 15   EGFRNONAA 5* 5*     Lactic Acid: No results for input(s): LACTATE in the last 48 hours.  Magnesium:   Recent Labs   Lab 09/10/20  2114 09/11/20  0416   MG 2.5 2.6     POCT Glucose:   Recent Labs   Lab 09/10/20  2258 09/11/20  0502   POCTGLUCOSE 148* 100       Significant Imaging: I have reviewed all pertinent imaging results/findings within the past 24 hours.      Assessment/Plan:      * Clotted dialysis access  - LIJ catheter clotted since 9/1  - ED attempt in regaining return, unsuccessful    Plan:  - surgery consulted  -currently in OR for permacath  - needs scheduling later for peritoneal dialysis catheter        AXEL (acute kidney injury)        Diabetes mellitus  - Steroid induced hyperglycemia per medical records  2019 A1c 5.8       Plan:  - diabetic diet  - insulin aspart  - pending A1c  - keep glucose 140-180      Essential hypertension  HTN    Plan:  -restart home meds  - monitor BP  - -Hydralazine 10mg q6 prn for SBP > 170  - SBP 130s-160s, continue meds      End stage renal disease  ESRD 4  Bun 110 Cr 7.7  Baseline Bun 77  Cr 5.9  On lasix in between dialysis days    Plan:  - nephrology consulted  - will get dialyzed after        VTE Risk Mitigation (From admission, onward)         Ordered      IP VTE HIGH RISK PATIENT  Once      09/10/20 2059     Place sequential compression device  Until discontinued      09/10/20 2059                Discharge Planning   JACKELYN:      Code Status: Full Code   Is the patient medically ready for discharge?:     Reason for patient still in hospital (select all that apply): Treatment and Consult recommendations                     Bee Gillespie MD  Department of Hospital Medicine   [facility]

## 2020-09-11 NOTE — PLAN OF CARE
Problem: Adult Inpatient Plan of Care  Goal: Plan of Care Review  Outcome: Ongoing, Progressing  Labs, notes and care plan reviewed

## 2020-09-11 NOTE — TRANSFER OF CARE
"Anesthesia Transfer of Care Note    Patient: Ping Davenport    Procedure(s) Performed: Procedure(s) (LRB):  INSERTION, CATHETER, HEMODIALYSIS (Left)    Patient location: PACU    Anesthesia Type: MAC    Transport from OR: Transported from OR on room air with adequate spontaneous ventilation    Post pain: adequate analgesia    Post assessment: no apparent anesthetic complications    Post vital signs: stable    Level of consciousness: sedated    Nausea/Vomiting: no nausea/vomiting    Complications: none    Transfer of care protocol was followed      Last vitals:   Visit Vitals  BP (!) 175/79   Pulse 76   Temp 36.6 °C (97.9 °F)   Resp 13   Ht 5' 2" (1.575 m)   Wt 76.3 kg (168 lb 3.4 oz)   SpO2 97%   Breastfeeding No   BMI 30.77 kg/m²     "

## 2020-09-11 NOTE — PLAN OF CARE
Signed out from pacu per Dr Morfin. Report called to Jailene [facility]/4A. Transported to IP dialysis via stretcher,sr upx2.

## 2020-09-11 NOTE — PT/OT/SLP PROGRESS
Occupational Therapy  Missed Eval    Patient Name:  Ping Davenport   MRN:  [id number]    Patient not seen today secondary to Unavailable (Comment)(DEANNA in OR this AM). Will follow-up as able.    JADEN Gallardo  9/11/2020

## 2020-09-11 NOTE — PLAN OF CARE
Problem: Adult Inpatient Plan of Care  Goal: Chart Reviewed  Outcome: Ongoing, Progressing   VN completed admission questions with patient. Plan of care was discussed including VTE prophylaxis of heparin.  All questions answered.  Patient aware that she is not to eat after midnight.  Education given on safety measures and using call light before getting out of bed by herself. Patient verbalized understanding. Bed locked and in lowest position. Alarm on.

## 2020-09-11 NOTE — ANESTHESIA POSTPROCEDURE EVALUATION
Anesthesia Post Evaluation    Patient: Ping Davenport    Procedure(s) Performed: Procedure(s) (LRB):  INSERTION, CATHETER, HEMODIALYSIS (Left)    Final Anesthesia Type: MAC    Patient location during evaluation: PACU  Patient participation: Yes- Able to Participate  Level of consciousness: awake and alert  Post-procedure vital signs: reviewed and stable  Pain management: adequate  Airway patency: patent    PONV status at discharge: No PONV  Anesthetic complications: no      Cardiovascular status: blood pressure returned to baseline  Respiratory status: unassisted  Hydration status: euvolemic  Follow-up not needed.          Vitals Value Taken Time   /79 09/11/20 1045   Temp 36.6 °C (97.9 °F) 09/11/20 1045   Pulse 80 09/11/20 1045   Resp 18 09/11/20 1045   SpO2 99 % 09/11/20 1045         Event Time   Out of Recovery 09/11/2020 10:45:00         Pain/Sung Score: Pain Rating Prior to Med Admin: 5 (9/11/2020 10:24 AM)  Pain Rating Post Med Admin: 5 (9/11/2020 10:25 AM)  Sung Score: 8 (9/11/2020 10:25 AM)

## 2020-09-11 NOTE — PT/OT/SLP PROGRESS
Physical Therapy      Patient Name:  Ping Davenport   MRN:  [id number]    Patient not seen today secondary to not being in room all day. Will follow-up as able.    Jen Yip, PT

## 2020-09-11 NOTE — PLAN OF CARE
Plan of care reviewed with patient. Normal sinus rhythm on continuous heart monitor, no true red alarms. Patient remained NPO for permacath placement.  Blood glucose maintained per MD orders. Patient requested a pure wick because she says she is too weak to walk to bathroom. Educated patient that being immobile can cause increase weakness. Suggested a bedside commode. Patient said she used a bedside commode at home and she's unable to use it. Pure wick in place draining clear yellow urine.   Safety maintained at this time. Bed in lowest position, side rails up x 2. Call light in reach.  Encouraged patient to use call light for assistance .Verbalized understanding. Will continue to monitor patient.

## 2020-09-11 NOTE — SUBJECTIVE & OBJECTIVE
Interval History: NAEON.     Review of Systems   Unable to perform ROS: Other     Objective:     Vital Signs (Most Recent):  Temp: 98.7 °F (37.1 °C) (09/11/20 0500)  Pulse: 78 (09/11/20 0605)  Resp: 18 (09/11/20 0500)  BP: (!) 164/74 (09/11/20 0605)  SpO2: 96 % (09/11/20 0500) Vital Signs (24h Range):  Temp:  [97.3 °F (36.3 °C)-99.2 °F (37.3 °C)] 98.7 °F (37.1 °C)  Pulse:  [69-83] 78  Resp:  [17-20] 18  SpO2:  [96 %-100 %] 96 %  BP: (131-182)/() 164/74     Weight: 76.3 kg (168 lb 3.4 oz)  Body mass index is 30.77 kg/m².    Intake/Output Summary (Last 24 hours) at 9/11/2020 0849  Last data filed at 9/11/2020 0600  Gross per 24 hour   Intake 125 ml   Output 400 ml   Net -275 ml      Physical Exam  Vitals signs and nursing note reviewed.   Constitutional:       Comments: Unable to perform PE due to patient in operating room.         Significant Labs:   A1C:   Recent Labs   Lab 09/10/20  2114   HGBA1C 5.3     CBC:   Recent Labs   Lab 09/09/20 2059 09/11/20  0416   WBC 7.12 5.46   HGB 8.3* 8.0*   HCT 25.7* 24.7*   * 139*     CMP:   Recent Labs   Lab 09/09/20 2059 09/11/20  0416    141   K 4.6 4.3    112*   CO2 15* 14*    104   * 120*   CREATININE 7.7* 8.2*   CALCIUM 8.9 8.7   PROT 7.7 7.3   ALBUMIN 3.8 3.6   BILITOT 0.4 0.3   ALKPHOS 89 93   AST 18 18   ALT 15 13   ANIONGAP 15 15   EGFRNONAA 5* 5*     Lactic Acid: No results for input(s): LACTATE in the last 48 hours.  Magnesium:   Recent Labs   Lab 09/10/20  2114 09/11/20  0416   MG 2.5 2.6     POCT Glucose:   Recent Labs   Lab 09/10/20  2258 09/11/20  0502   POCTGLUCOSE 148* 100       Significant Imaging: I have reviewed all pertinent imaging results/findings within the past 24 hours.

## 2020-09-11 NOTE — ASSESSMENT & PLAN NOTE
- LIJ catheter clotted since 9/1  - ED attempt in regaining return, unsuccessful    Plan:  - surgery consulted  -currently in OR for permacath  - needs scheduling later for peritoneal dialysis catheter

## 2020-09-11 NOTE — H&P
[facility] Hospital Medicine  History & Physical    Patient Name: Ping Davenport  MRN: [id number]  Admission Date: 9/10/2020  Attending Physician: Kareen Benjamin MD   Primary Care Provider: Primary Doctor No         Patient information was obtained from patient, past medical records and ER records.     Subjective:     Principal Problem:Clotted dialysis access    Chief Complaint:   Chief Complaint   Patient presents with    Vascular Access Problem     pt has been unable to get dialysis since last Tuesday. Was seen here last night and was told to return this morning for dialysis and possible new dialysis access        HPI: This is a 55 year-old  Female with PMH of DM, HTN, ESRD presenting to the ED for left IJ vascular access problem. She gets dialyzed T/TH/Sat at [facility] [place] [facility] but since 9/1 she reports 2/4 hours of dialysis treatment at that time. Since then she has been unable to get dialysis, reporting dialysis center told her to get new access. She has been taking lasix since unable to dialyze. She reports the LIJ was placed 3 weeks ago. She admits to generalized swelling of the body, primarily in the face including bilateral eyelids and cheeks. She reported to the ED last night but told to report in this morning for an attempt of dialysis. Unsuccessful attempt in the ED. Surgery and nephrology consulted.     In the ED, /89, HR 69 on RA SaO2 100%   Cr 7.7  troponin 0.059  CXR No acute cardiopulmonary process identified.  EKG Sinus rhythm with 1st degree A-V block       Past Medical History:   Diagnosis Date    Diabetes mellitus     Hypertension     Renal disorder        History reviewed. No pertinent surgical history.    Review of patient's allergies indicates:   Allergen Reactions    Phenergan [promethazine] Other (See Comments)     Restless legs    Reglan [metoclopramide hcl]     Zofran [ondansetron hcl (pf)] Other (See Comments)     Constipation     Ketorolac  Palpitations       No current facility-administered medications on file prior to encounter.      Current Outpatient Medications on File Prior to Encounter   Medication Sig    amLODIPine (NORVASC) 10 MG tablet Take 1 tablet (10 mg total) by mouth once daily.    B complex-vitamin C-folic acid (SELENE-EMILIA/NEPHRO-EMILIA) 0.8 mg Tab TAKE 1 TABLET BY MOUTH ONCE A DAY    carvediloL (COREG) 25 MG tablet Take 1 tablet (25 mg total) by mouth 2 (two) times daily.    docusate sodium (COLACE) 100 MG capsule Take 1 tablet by mouth every day    furosemide (LASIX) 80 MG tablet Take 2 tablets (160 mg total) by mouth 2 (two) times daily.    sevelamer HCL (RENAGEL) 800 MG Tab TAKE 2 TABLETS BY MOUTH THREE TIMES A DAY WITH MEALS    acetaminophen (TYLENOL) 325 MG tablet Take 2 tablets (650 mg total) by mouth every 4 (four) hours as needed.    clotrimazole-betamethasone 1-0.05% (LOTRISONE) cream Apply topically 2 (two) times daily.    heparin sodium,porcine (HEPARIN, PORCINE,) 1,000 unit/mL injection 4.1 mLs (4,100 Units total) by Intra-Catheter route as needed (to devan CVC post HD).    insulin aspart U-100 (NOVOLOG) 100 unit/mL (3 mL) InPn pen Inject 10 Units into the skin 3 (three) times daily. for 16 days    insulin detemir U-100 (LEVEMIR FLEXTOUCH) 100 unit/mL (3 mL) SubQ InPn pen Inject 10 Units into the skin every evening. for 7 days    lactulose (CHRONULAC) 10 gram/15 mL solution Take 15ml by mouth every day    losartan (COZAAR) 100 MG tablet Take 1 tablet (100 mg total) by mouth once daily.    metoclopramide HCl (REGLAN) 10 MG tablet Take 1 tablet (10 mg total) by mouth 3 (three) times daily as needed (nausea and vomitting).    metOLazone (ZAROXOLYN) 10 MG tablet Take 1 tablet (10 mg total) by mouth once daily.    NIFEdipine (PROCARDIA-XL) 90 MG (OSM) 24 hr tablet Take 1 tablet (90 mg total) by mouth once daily.    nystatin-triamcinolone (MYCOLOG II) cream Apply topically once daily.    omeprazole (PRILOSEC) 20 MG  capsule Take 1 capsule (20 mg total) by mouth before breakfast.    oxyCODONE-acetaminophen (PERCOCET)  mg per tablet Take 1 tablet by mouth every 4 (four) hours as needed for Pain.    sevelamer carbonate (RENVELA) 800 mg Tab Take 2 tablets (1,600 mg total) by mouth 3 (three) times daily with meals.     Family History     None        Tobacco Use    Smoking status: Never Smoker   Substance and Sexual Activity    Alcohol use: Not Currently     Frequency: Never     Drinks per session: Patient refused     Binge frequency: Never    Drug use: Never    Sexual activity: Not Currently     Partners: Male     Review of Systems   Constitutional: Negative for chills and fever.   HENT: Negative for drooling, rhinorrhea and trouble swallowing.    Eyes: Negative for discharge and redness.   Respiratory: Negative for choking and shortness of breath.    Cardiovascular: Negative for chest pain and palpitations.   Gastrointestinal: Negative for abdominal distention, constipation, diarrhea, nausea and vomiting.   Genitourinary: Negative for dysuria, flank pain and hematuria.   Musculoskeletal: Negative for neck stiffness.   Neurological: Negative for dizziness, light-headedness and headaches.   Psychiatric/Behavioral: Negative for confusion and self-injury.     Objective:     Vital Signs (Most Recent):  Temp: 99.2 °F (37.3 °C) (09/10/20 1028)  Pulse: 69 (09/10/20 1100)  Resp: 18 (09/10/20 1100)  BP: 131/81 (09/10/20 1100)  SpO2: 100 % (09/10/20 1100) Vital Signs (24h Range):  Temp:  [97.9 °F (36.6 °C)-99.2 °F (37.3 °C)] 99.2 °F (37.3 °C)  Pulse:  [63-79] 69  Resp:  [17-20] 18  SpO2:  [99 %-100 %] 100 %  BP: (115-159)/(78-90) 131/81     Weight: 74.8 kg (165 lb)  Body mass index is 30.18 kg/m².    Physical Exam  Vitals signs and nursing note reviewed.   Constitutional:       Appearance: Normal appearance. She is not ill-appearing.   HENT:      Head: Normocephalic and atraumatic.      Comments: Facial swelling  Eyes:       Extraocular Movements: Extraocular movements intact.      Conjunctiva/sclera: Conjunctivae normal.      Comments: Swollen bilateral eyelids   Neck:      Musculoskeletal: Normal range of motion.   Cardiovascular:      Rate and Rhythm: Normal rate and regular rhythm.      Pulses: Normal pulses.      Heart sounds: Normal heart sounds. No murmur.   Pulmonary:      Effort: Pulmonary effort is normal.      Breath sounds: Normal breath sounds. No wheezing.   Abdominal:      General: Bowel sounds are normal.      Palpations: Abdomen is soft.      Tenderness: There is no abdominal tenderness. There is no right CVA tenderness or left CVA tenderness.   Musculoskeletal:      Right lower leg: No edema.      Left lower leg: No edema.      Comments: 1+ lower extremity swelling   Skin:     Comments: Right subclavicular scar with dressing on top  Left IJ catheter   Neurological:      General: No focal deficit present.      Mental Status: She is alert and oriented to person, place, and time.   Psychiatric:         Mood and Affect: Mood normal.         Behavior: Behavior normal.             Significant Labs:   A1C: No results for input(s): HGBA1C in the last 4320 hours.  CBC:   Recent Labs   Lab 09/09/20 2059   WBC 7.12   HGB 8.3*   HCT 25.7*   *     CMP:   Recent Labs   Lab 09/09/20 2059      K 4.6      CO2 15*      *   CREATININE 7.7*   CALCIUM 8.9   PROT 7.7   ALBUMIN 3.8   BILITOT 0.4   ALKPHOS 89   AST 18   ALT 15   ANIONGAP 15   EGFRNONAA 5*     Troponin:   Recent Labs   Lab 09/09/20 2059   TROPONINI 0.059*       Significant Imaging: I have reviewed all pertinent imaging results/findings within the past 24 hours.    Assessment/Plan:     * Clotted dialysis access  - LIJ catheter clotted since 9/1  - ED attempt in regaining return, unsuccessful    Plan:  - surgery consulted  - scheduled for RIJ catheter access replacement in the am        Diabetes mellitus  - Steroid induced hyperglycemia per  medical records  2019 A1c 5.8       Plan:  - diabetic diet  - insulin aspart  - pending A1c  - keep glucose 140-180      Essential hypertension  HTN    Plan:  -restart home meds  - monitor BP  - -Hydralazine 10mg q6 prn for SBP > 170  - SBP 130s-160s, continue meds      ESRD (end stage renal disease)  ESRD 4  Bun 110 Cr 7.7  Baseline Bun 77  Cr 5.9  On lasix in between dialysis days    Plan:  - nephrology consulted  - will get dialyzed after        VTE Risk Mitigation (From admission, onward)    None             Bee Gillespie MD  Department of Hospital Medicine   [facility]

## 2020-09-11 NOTE — PROGRESS NOTES
Progress Note    Admit Date: 9/10/2020   LOS: 1 day     SUBJECTIVE:     No acute events overnight. .  No nausea or vomiting.  No fevers or chills.    Npo     Scheduled Meds:   sodium chloride 0.9%   Intravenous Once    amLODIPine  10 mg Oral Daily    carvediloL  25 mg Oral BID    ceFOXItin (MEFOXIN) IVPB  1 g Intravenous Once    furosemide  160 mg Oral BID    losartan  100 mg Oral Daily    metOLazone  10 mg Oral Daily    mupirocin   Nasal BID    senna-docusate 8.6-50 mg  1 tablet Oral BID    sevelamer carbonate  1,600 mg Oral TID WM    vitamin renal formula (B-complex-vitamin c-folic acid)  1 capsule Oral Daily     Continuous Infusions:  PRN Meds:sodium chloride 0.9%, acetaminophen, acetaminophen, dextrose 50%, dextrose 50%, glucagon (human recombinant), glucose, glucose, hydrALAZINE, influenza, insulin aspart U-100, melatonin, sodium chloride 0.9%    Review of patient's allergies indicates:   Allergen Reactions    Phenergan [promethazine] Other (See Comments)     Restless legs    Reglan [metoclopramide hcl]     Zofran [ondansetron hcl (pf)] Other (See Comments)     Constipation     Ketorolac Palpitations         OBJECTIVE:     Vital Signs (Most Recent)  Temp: 98.7 °F (37.1 °C) (09/11/20 0500)  Pulse: 78 (09/11/20 0605)  Resp: 18 (09/11/20 0500)  BP: (!) 164/74 (09/11/20 0605)  SpO2: 96 % (09/11/20 0500)    Vital Signs Range (Last 24H):  Temp:  [97.3 °F (36.3 °C)-99.2 °F (37.3 °C)]   Pulse:  [69-83]   Resp:  [17-20]   BP: (131-182)/()   SpO2:  [96 %-100 %]     I & O (Last 24H):    Intake/Output Summary (Last 24 hours) at 9/11/2020 0649  Last data filed at 9/11/2020 0600  Gross per 24 hour   Intake 125 ml   Output 400 ml   Net -275 ml       Physical Exam:  General: alert, awake, no distress  Chest: crackles bilaterally  Abdomen: soft depressible, nontender, nondistended  Lower extremities: moving all extremities except her right   She has decrease     LABS  CBC  Recent Labs   Lab 09/09/20 2059  09/11/20 0416   WBC 7.12 5.46   RBC 2.76* 2.70*   HGB 8.3* 8.0*   HCT 25.7* 24.7*   * 139*   MCV 93 92   MCH 30.1 29.6   MCHC 32.3 32.4     CMP  Recent Labs   Lab 09/09/20 2059 09/11/20 0416    104   CALCIUM 8.9 8.7   ALBUMIN 3.8 3.6   PROT 7.7 7.3    141   K 4.6 4.3   CO2 15* 14*    112*   * 120*   CREATININE 7.7* 8.2*   ALKPHOS 89 93   ALT 15 13   AST 18 18   BILITOT 0.4 0.3       Recent Labs   Lab 09/09/20  2059 09/10/20  2114 09/11/20  0416   CALCIUM 8.9  --  8.7   MG  --  2.5 2.6   PHOS  --  6.1* 6.1*         POCT-Glucose  POCT Glucose   Date Value Ref Range Status   09/11/2020 100 70 - 110 mg/dL Final   09/10/2020 148 (H) 70 - 110 mg/dL Final       COAGS  Recent Labs   Lab 09/09/20 2059   INR 1.1   APTT 28.2       CE  Recent Labs   Lab 09/09/20 2059   TROPONINI 0.059*     BNP  Recent Labs   Lab 09/09/20 2059   *       ABGs  No results for input(s): PH, PCO2, PO2, HCO3, POCSATURATED, BE in the last 24 hours.    UA  No results for input(s): COLORU, CLARITYU, SPECGRAV, PHUR, PROTEINUA, GLUCOSEU, BILIRUBINCON, BLOODU, WBCU, RBCU, BACTERIA, MUCUS, NITRITE, LEUKOCYTESUR, UROBILINOGEN, HYALINECASTS in the last 168 hours.    LAST HbA1c  Lab Results   Component Value Date    HGBA1C 5.3 09/10/2020       ASSESSMENT/PLAN:     55 y.o.female with ESRD consulted for HD cath      Plan: OR today for HD cath placement    Pau Hermosillo MD  PGY-2, [employer] General Surgery  Neuroendocrine Surgery

## 2020-09-11 NOTE — NURSING
pt up from dialysis. Vital signs recorded. Emesis and nauseated. MD offered nausea medication and pt said she just wanted food. Renal diet initiated. Safety maintained.

## 2020-09-11 NOTE — PT/OT/SLP PROGRESS
Occupational Therapy  Missed Eval    Patient Name:  Ping Davenport   MRN:  [id number]    Patient not seen today secondary to Other (Comment)(DEANNA in OR) this PM. Will follow-up Sat.    JADEN Gallardo  9/11/2020

## 2020-09-11 NOTE — ANESTHESIA PREPROCEDURE EVALUATION
09/11/2020  Ping Davenport is a 55 y.o., female ESRD, HTN, DM2, and HFpEF scheduled for tunneled HD cath insertion.     Past Medical History:   Diagnosis Date    Diabetes mellitus     Hypertension     Renal disorder      History reviewed. No pertinent surgical history.       Pre-op Assessment    I have reviewed the Patient Summary Reports.     I have reviewed the Nursing Notes.    I have reviewed the Medications.     Review of Systems  Anesthesia Hx:  No problems with previous Anesthesia  History of prior surgery of interest to airway management or planning: Previous anesthesia: MAC Denies Family Hx of Anesthesia complications.   Denies Personal Hx of Anesthesia complications.   Hematology/Oncology:         -- Anemia (Hg 8):   Cardiovascular:   Hypertension, poorly controlled Past MI  CHF ECG has been reviewed.    Pulmonary:  Pulmonary Normal    Renal/:   Chronic Renal Disease, ESRD    Hepatic/GI:  Hepatic/GI Normal    Neurological:  Neurology Normal    Endocrine:   Diabetes        Physical Exam  General:  Well nourished, Obesity    Airway/Jaw/Neck:  Airway Findings: Mouth Opening: Normal Tongue: Normal  General Airway Assessment: Adult  Mallampati: II  TM Distance: 4 - 6 cm  Jaw/Neck Findings:  Neck ROM: Normal ROM      Dental:  Dental Findings: In tact   Chest/Lungs:  Chest/Lungs Findings: Clear to auscultation, Normal Respiratory Rate     Heart/Vascular:  Heart Findings: Rate: Normal  Rhythm: Regular Rhythm  Sounds: Normal        Mental Status:  Mental Status Findings:  Cooperative, Alert and Oriented       Lab Results   Component Value Date    WBC 5.46 09/11/2020    HGB 8.0 (L) 09/11/2020    HCT 24.7 (L) 09/11/2020     (L) 09/11/2020    ALT 13 09/11/2020    AST 18 09/11/2020     09/11/2020    K 4.3 09/11/2020     (H) 09/11/2020    CREATININE 8.2 (H) 09/11/2020     (H)  09/11/2020    CO2 14 (L) 09/11/2020    INR 1.1 09/09/2020     09/11/2020    HGBA1C 5.3 09/10/2020       TTE 11/5/19    · Concentric left ventricular hypertrophy.  · Mild left atrial enlargement.  · Normal right ventricular systolic function.  · Mild tricuspid regurgitation.  · Left ventricular systolic function. The estimated ejection fraction is 50%  · Mild-to-moderate mitral regurgitation.  · Grade I (mild) left ventricular diastolic dysfunction consistent with impaired relaxation.  Mechanically ventilated; cannot use inferior caval vein diameter to estimate central venous pressure.      Anesthesia Plan  Type of Anesthesia, risks & benefits discussed:  Anesthesia Type:  MAC, general  Patient's Preference:   Intra-op Monitoring Plan: standard ASA monitors  Intra-op Monitoring Plan Comments:   Post Op Pain Control Plan: per primary service following discharge from PACU  Post Op Pain Control Plan Comments:   Induction:   IV  Beta Blocker:  Patient is on a Beta-Blocker and has received one dose within the past 24 hours (No further documentation required).       Informed Consent: Patient understands risks and agrees with Anesthesia plan.  Questions answered. Anesthesia consent signed with patient.  ASA Score: 3     Day of Surgery Review of History & Physical:  There are no significant changes.          Ready For Surgery From Anesthesia Perspective.

## 2020-09-11 NOTE — NURSING
Mitely,monitor tech notified of antihypertensive IVP per prn. Pt noted SR on tele prior to and post administration. NO change in status. Med given IVP over 3 minutes. Nurse made aware to monitor post VS.stated she will.

## 2020-09-12 LAB
ALBUMIN SERPL BCP-MCNC: 3.4 G/DL (ref 3.5–5.2)
ALP SERPL-CCNC: 82 U/L (ref 55–135)
ALT SERPL W/O P-5'-P-CCNC: 13 U/L (ref 10–44)
ANION GAP SERPL CALC-SCNC: 13 MMOL/L (ref 8–16)
AST SERPL-CCNC: 19 U/L (ref 10–40)
BASOPHILS # BLD AUTO: 0.03 K/UL (ref 0–0.2)
BASOPHILS NFR BLD: 0.5 % (ref 0–1.9)
BILIRUB SERPL-MCNC: 0.4 MG/DL (ref 0.1–1)
BUN SERPL-MCNC: 37 MG/DL (ref 6–20)
CALCIUM SERPL-MCNC: 8.9 MG/DL (ref 8.7–10.5)
CHLORIDE SERPL-SCNC: 103 MMOL/L (ref 95–110)
CO2 SERPL-SCNC: 22 MMOL/L (ref 23–29)
CREAT SERPL-MCNC: 3.7 MG/DL (ref 0.5–1.4)
DIFFERENTIAL METHOD: ABNORMAL
EOSINOPHIL # BLD AUTO: 0 K/UL (ref 0–0.5)
EOSINOPHIL NFR BLD: 0.5 % (ref 0–8)
ERYTHROCYTE [DISTWIDTH] IN BLOOD BY AUTOMATED COUNT: 15.2 % (ref 11.5–14.5)
EST. GFR  (AFRICAN AMERICAN): 15 ML/MIN/1.73 M^2
EST. GFR  (NON AFRICAN AMERICAN): 13 ML/MIN/1.73 M^2
GLUCOSE SERPL-MCNC: 87 MG/DL (ref 70–110)
HCT VFR BLD AUTO: 24.9 % (ref 37–48.5)
HGB BLD-MCNC: 8 G/DL (ref 12–16)
IMM GRANULOCYTES # BLD AUTO: 0.04 K/UL (ref 0–0.04)
IMM GRANULOCYTES NFR BLD AUTO: 0.7 % (ref 0–0.5)
LYMPHOCYTES # BLD AUTO: 0.9 K/UL (ref 1–4.8)
LYMPHOCYTES NFR BLD: 15.2 % (ref 18–48)
MAGNESIUM SERPL-MCNC: 2.2 MG/DL (ref 1.6–2.6)
MCH RBC QN AUTO: 29.7 PG (ref 27–31)
MCHC RBC AUTO-ENTMCNC: 32.1 G/DL (ref 32–36)
MCV RBC AUTO: 93 FL (ref 82–98)
MONOCYTES # BLD AUTO: 0.8 K/UL (ref 0.3–1)
MONOCYTES NFR BLD: 14.3 % (ref 4–15)
NEUTROPHILS # BLD AUTO: 3.9 K/UL (ref 1.8–7.7)
NEUTROPHILS NFR BLD: 68.8 % (ref 38–73)
NRBC BLD-RTO: 0 /100 WBC
PHOSPHATE SERPL-MCNC: 4.8 MG/DL (ref 2.7–4.5)
PLATELET # BLD AUTO: 135 K/UL (ref 150–350)
PMV BLD AUTO: 12.3 FL (ref 9.2–12.9)
POCT GLUCOSE: 117 MG/DL (ref 70–110)
POCT GLUCOSE: 131 MG/DL (ref 70–110)
POTASSIUM SERPL-SCNC: 3.6 MMOL/L (ref 3.5–5.1)
PROT SERPL-MCNC: 7.2 G/DL (ref 6–8.4)
RBC # BLD AUTO: 2.69 M/UL (ref 4–5.4)
SODIUM SERPL-SCNC: 138 MMOL/L (ref 136–145)
WBC # BLD AUTO: 5.61 K/UL (ref 3.9–12.7)

## 2020-09-12 PROCEDURE — 83735 ASSAY OF MAGNESIUM: CPT

## 2020-09-12 PROCEDURE — 36415 COLL VENOUS BLD VENIPUNCTURE: CPT

## 2020-09-12 PROCEDURE — 25000003 PHARM REV CODE 250: Performed by: SURGERY

## 2020-09-12 PROCEDURE — 84100 ASSAY OF PHOSPHORUS: CPT

## 2020-09-12 PROCEDURE — 90935 HEMODIALYSIS ONE EVALUATION: CPT

## 2020-09-12 PROCEDURE — G0378 HOSPITAL OBSERVATION PER HR: HCPCS

## 2020-09-12 PROCEDURE — 80053 COMPREHEN METABOLIC PANEL: CPT

## 2020-09-12 PROCEDURE — 94761 N-INVAS EAR/PLS OXIMETRY MLT: CPT

## 2020-09-12 PROCEDURE — 85025 COMPLETE CBC W/AUTO DIFF WBC: CPT

## 2020-09-12 PROCEDURE — 63600175 PHARM REV CODE 636 W HCPCS: Mod: TB | Performed by: INTERNAL MEDICINE

## 2020-09-12 RX ORDER — AMOXICILLIN 250 MG
1 CAPSULE ORAL 2 TIMES DAILY
Status: DISCONTINUED | OUTPATIENT
Start: 2020-09-12 | End: 2020-09-13 | Stop reason: HOSPADM

## 2020-09-12 RX ADMIN — SEVELAMER CARBONATE 1600 MG: 800 TABLET, FILM COATED ORAL at 08:09

## 2020-09-12 RX ADMIN — FUROSEMIDE 160 MG: 40 TABLET ORAL at 09:09

## 2020-09-12 RX ADMIN — FUROSEMIDE 160 MG: 40 TABLET ORAL at 08:09

## 2020-09-12 RX ADMIN — SEVELAMER CARBONATE 1600 MG: 800 TABLET, FILM COATED ORAL at 05:09

## 2020-09-12 RX ADMIN — AMLODIPINE BESYLATE 10 MG: 5 TABLET ORAL at 08:09

## 2020-09-12 RX ADMIN — MUPIROCIN: 20 OINTMENT TOPICAL at 08:09

## 2020-09-12 RX ADMIN — METOLAZONE 10 MG: 2.5 TABLET ORAL at 08:09

## 2020-09-12 RX ADMIN — HEPARIN SODIUM 4900 UNITS: 1000 INJECTION, SOLUTION INTRAVENOUS; SUBCUTANEOUS at 03:09

## 2020-09-12 RX ADMIN — EPOETIN ALFA-EPBX 10000 UNITS: 10000 INJECTION, SOLUTION INTRAVENOUS; SUBCUTANEOUS at 03:09

## 2020-09-12 RX ADMIN — MUPIROCIN: 20 OINTMENT TOPICAL at 09:09

## 2020-09-12 RX ADMIN — CARVEDILOL 25 MG: 25 TABLET, FILM COATED ORAL at 08:09

## 2020-09-12 RX ADMIN — NEPHROCAP 1 CAPSULE: 1 CAP ORAL at 08:09

## 2020-09-12 RX ADMIN — CARVEDILOL 25 MG: 25 TABLET, FILM COATED ORAL at 09:09

## 2020-09-12 RX ADMIN — SODIUM CHLORIDE 400 ML: 0.9 INJECTION, SOLUTION INTRAVENOUS at 03:09

## 2020-09-12 NOTE — ASSESSMENT & PLAN NOTE
- Steroid induced hyperglycemia per medical records  2019 A1c 5.8       Plan:  - diabetic diet  - insulin aspart  - pending A1c  - keep glucose 140-180  -9/12 Am glucose 117

## 2020-09-12 NOTE — NURSING
Notified Dr. Negron that pt has been nausated and had vomit x2 so far.  Pt has allergy to zofran, phenergan, and reglan.  Asked if NP orders anything else.  MD will review the order.

## 2020-09-12 NOTE — PROGRESS NOTES
[facility] Medicine  Progress Note    Patient Name: Ping Davenport  MRN: [id number]  Patient Class: OP- Observation   Admission Date: 9/10/2020  Length of Stay: 1 days  Attending Physician: Kareen Benjamin MD  Primary Care Provider: Primary Doctor No        Subjective:     Principal Problem:Clotted dialysis access        HPI:  This is a 55 year-old  Female with PMH of DM, HTN, ESRD presenting to the ED for left IJ vascular access problem. She gets dialyzed T/TH/Sat at [facility] but since 9/1 she reports 2/4 hours of dialysis treatment at that time. Since then she has been unable to get dialysis, reporting dialysis center told her to get new access. She has been taking lasix since unable to dialyze. She reports the LIJ was placed 3 weeks ago. She admits to generalized swelling of the body, primarily in the face including bilateral eyelids and cheeks. She reported to the ED last night but told to report in this morning for an attempt of dialysis. Unsuccessful attempt in the ED. Surgery and nephrology consulted.     In the ED, /89, HR 69 on RA SaO2 100%   Cr 7.7  troponin 0.059  CXR No acute cardiopulmonary process identified.  EKG Sinus rhythm with 1st degree A-V block       Overview/Hospital Course:  9/11 in OR for  placement of tunneled HD catheter     9/12 tunneled HD catheter placed yesterday and tolerated dialysis. Planned for another round today    Interval History: nausea and x2 emesis. Refused anti nausea meds. Feels better this morning    Review of Systems   Constitutional: Negative for activity change and fever.   HENT: Negative for drooling, rhinorrhea and sore throat.    Respiratory: Negative for apnea and shortness of breath.    Cardiovascular: Negative for chest pain and palpitations.   Gastrointestinal: Negative for abdominal distention, constipation, diarrhea, nausea and vomiting.   Genitourinary: Negative for dysuria and urgency.    Musculoskeletal: Negative for neck stiffness.   Neurological: Negative for dizziness, numbness and headaches.   Psychiatric/Behavioral: Negative for agitation and confusion.     Objective:     Vital Signs (Most Recent):  Temp: 99.5 °F (37.5 °C) (09/12/20 0719)  Pulse: 66 (09/12/20 0750)  Resp: 18 (09/12/20 0719)  BP: (!) 153/63 (09/12/20 0719)  SpO2: 97 % (09/12/20 0735) Vital Signs (24h Range):  Temp:  [97.1 °F (36.2 °C)-99.5 °F (37.5 °C)] 99.5 °F (37.5 °C)  Pulse:  [66-88] 66  Resp:  [16-20] 18  SpO2:  [93 %-100 %] 97 %  BP: (121-193)/(63-98) 153/63     Weight: 73.1 kg (161 lb 2.5 oz)  Body mass index is 29.48 kg/m².    Intake/Output Summary (Last 24 hours) at 9/12/2020 0855  Last data filed at 9/12/2020 0500  Gross per 24 hour   Intake 650 ml   Output 3300 ml   Net -2650 ml      Physical Exam  Vitals signs and nursing note reviewed.   Constitutional:       General: She is not in acute distress.     Appearance: Normal appearance. She is not ill-appearing.   HENT:      Head: Normocephalic and atraumatic.      Mouth/Throat:      Mouth: Mucous membranes are moist.   Eyes:      Conjunctiva/sclera: Conjunctivae normal.   Cardiovascular:      Rate and Rhythm: Normal rate and regular rhythm.      Pulses: Normal pulses.      Heart sounds: Normal heart sounds. No murmur.   Pulmonary:      Effort: Pulmonary effort is normal.      Breath sounds: Normal breath sounds. No wheezing.   Abdominal:      General: Bowel sounds are normal.      Palpations: Abdomen is soft.      Tenderness: There is no abdominal tenderness.   Musculoskeletal:      Comments: Reduced lower extremity edema and facial edema   Skin:     General: Skin is warm.   Neurological:      General: No focal deficit present.      Mental Status: She is alert and oriented to person, place, and time.   Psychiatric:         Mood and Affect: Mood normal.         Significant Labs:   CBC:   Recent Labs   Lab 09/11/20  0416 09/12/20  0426   WBC 5.46 5.61   HGB 8.0* 8.0*    HCT 24.7* 24.9*   * 135*     CMP:   Recent Labs   Lab 09/11/20  0416 09/12/20  0426    138   K 4.3 3.6   * 103   CO2 14* 22*    87   * 37*   CREATININE 8.2* 3.7*   CALCIUM 8.7 8.9   PROT 7.3 7.2   ALBUMIN 3.6 3.4*   BILITOT 0.3 0.4   ALKPHOS 93 82   AST 18 19   ALT 13 13   ANIONGAP 15 13   EGFRNONAA 5* 13*     POCT Glucose:   Recent Labs   Lab 09/11/20  1653 09/11/20  2211 09/12/20  0552   POCTGLUCOSE 107 91 117*       Significant Imaging: I have reviewed all pertinent imaging results/findings within the past 24 hours.      Assessment/Plan:      * Clotted dialysis access  - LIJ catheter clotted since 9/1  - ED attempt in regaining return, unsuccessful    Plan:  - surgery consulted  -permacath placed 9/11  - will schedule for peritoneal catheter in future  -replaced        AXEL (acute kidney injury)        Diabetes mellitus  - Steroid induced hyperglycemia per medical records  2019 A1c 5.8       Plan:  - diabetic diet  - insulin aspart  - pending A1c  - keep glucose 140-180  -9/12 Am glucose 117       Essential hypertension  HTN    Plan:  -restart home meds  - monitor BP  - -Hydralazine 10mg q6 prn for SBP > 170  - SBP 130s-160s, continue meds      End stage renal disease  ESRD 4  9/12 Bun 37 Cr 3.7: much improved from admission   Cr 7.7  Baseline Bun 77  Cr 5.9  On lasix in between dialysis days    Plan:  - nephrology consulted  - finished 1st round of dialysis after replacement of catheter  - scheduled for 2nd round        VTE Risk Mitigation (From admission, onward)         Ordered     heparin (porcine) injection 4,900 Units  As needed (PRN)      09/11/20 1535     IP VTE HIGH RISK PATIENT  Once      09/10/20 2059     Place sequential compression device  Until discontinued      09/10/20 2059                Discharge Planning   JACKELYN:      Code Status: Full Code   Is the patient medically ready for discharge?:     Reason for patient still in hospital (select all that apply):  Patient trending condition                     Bee Gillespie MD  Department of Hospital Medicine   [facility]

## 2020-09-12 NOTE — NURSING
Patient AAOx3. Safety maintained. Medications administered per order. Dialysis completed 9/12/2020, 2.3L of fluid removed. Instructed to call for assistance as needed.

## 2020-09-12 NOTE — PLAN OF CARE
Patient with x2 vomiting, allergy to zofran, phenergan, reglan. Will give Compazine 5mg q4H PRN starting now for emesis, continue to monitor.     Monique Negron MD   [employer] [facility] PGY2

## 2020-09-12 NOTE — PT/OT/SLP PROGRESS
Physical Therapy  Missed Visit/ eval      Patient Name:  Ping Davenport   MRN:  [id number]    Patient not seen today secondary to Dialysis when attempted evaluation at 11:15 Will follow-up as able    Rocio Krishna, PT   9/12/2020

## 2020-09-12 NOTE — ASSESSMENT & PLAN NOTE
ESRD 4  9/12 Bun 37 Cr 3.7: much improved from admission   Cr 7.7  Baseline Bun 77  Cr 5.9  On lasix in between dialysis days    Plan:  - nephrology consulted  - finished 1st round of dialysis after replacement of catheter  - scheduled for 2nd round

## 2020-09-12 NOTE — ASSESSMENT & PLAN NOTE
- LIJ catheter clotted since 9/1  - ED attempt in regaining return, unsuccessful    Plan:  - surgery consulted  -permacath placed 9/11  - will schedule for peritoneal catheter in future  -replaced

## 2020-09-12 NOTE — SUBJECTIVE & OBJECTIVE
Interval History: nausea and x2 emesis. Refused anti nausea meds. Feels better this morning    Review of Systems   Constitutional: Negative for activity change and fever.   HENT: Negative for drooling, rhinorrhea and sore throat.    Respiratory: Negative for apnea and shortness of breath.    Cardiovascular: Negative for chest pain and palpitations.   Gastrointestinal: Negative for abdominal distention, constipation, diarrhea, nausea and vomiting.   Genitourinary: Negative for dysuria and urgency.   Musculoskeletal: Negative for neck stiffness.   Neurological: Negative for dizziness, numbness and headaches.   Psychiatric/Behavioral: Negative for agitation and confusion.     Objective:     Vital Signs (Most Recent):  Temp: 99.5 °F (37.5 °C) (09/12/20 0719)  Pulse: 66 (09/12/20 0750)  Resp: 18 (09/12/20 0719)  BP: (!) 153/63 (09/12/20 0719)  SpO2: 97 % (09/12/20 0735) Vital Signs (24h Range):  Temp:  [97.1 °F (36.2 °C)-99.5 °F (37.5 °C)] 99.5 °F (37.5 °C)  Pulse:  [66-88] 66  Resp:  [16-20] 18  SpO2:  [93 %-100 %] 97 %  BP: (121-193)/(63-98) 153/63     Weight: 73.1 kg (161 lb 2.5 oz)  Body mass index is 29.48 kg/m².    Intake/Output Summary (Last 24 hours) at 9/12/2020 0855  Last data filed at 9/12/2020 0500  Gross per 24 hour   Intake 650 ml   Output 3300 ml   Net -2650 ml      Physical Exam  Vitals signs and nursing note reviewed.   Constitutional:       General: She is not in acute distress.     Appearance: Normal appearance. She is not ill-appearing.   HENT:      Head: Normocephalic and atraumatic.      Mouth/Throat:      Mouth: Mucous membranes are moist.   Eyes:      Conjunctiva/sclera: Conjunctivae normal.   Cardiovascular:      Rate and Rhythm: Normal rate and regular rhythm.      Pulses: Normal pulses.      Heart sounds: Normal heart sounds. No murmur.   Pulmonary:      Effort: Pulmonary effort is normal.      Breath sounds: Normal breath sounds. No wheezing.   Abdominal:      General: Bowel sounds are normal.       Palpations: Abdomen is soft.      Tenderness: There is no abdominal tenderness.   Musculoskeletal:      Comments: Reduced lower extremity edema and facial edema   Skin:     General: Skin is warm.   Neurological:      General: No focal deficit present.      Mental Status: She is alert and oriented to person, place, and time.   Psychiatric:         Mood and Affect: Mood normal.         Significant Labs:   CBC:   Recent Labs   Lab 09/11/20 0416 09/12/20 0426   WBC 5.46 5.61   HGB 8.0* 8.0*   HCT 24.7* 24.9*   * 135*     CMP:   Recent Labs   Lab 09/11/20 0416 09/12/20 0426    138   K 4.3 3.6   * 103   CO2 14* 22*    87   * 37*   CREATININE 8.2* 3.7*   CALCIUM 8.7 8.9   PROT 7.3 7.2   ALBUMIN 3.6 3.4*   BILITOT 0.3 0.4   ALKPHOS 93 82   AST 18 19   ALT 13 13   ANIONGAP 15 13   EGFRNONAA 5* 13*     POCT Glucose:   Recent Labs   Lab 09/11/20  1653 09/11/20  2211 09/12/20  0552   POCTGLUCOSE 107 91 117*       Significant Imaging: I have reviewed all pertinent imaging results/findings within the past 24 hours.

## 2020-09-12 NOTE — PT/OT/SLP PROGRESS
Occupational Therapy      Patient Name:  Ping Davenport   MRN:  [id number]    11:15 AM Patient not seen today secondary to Dialysis. Will follow-up as able.    Nay Montemayor OT  9/12/2020

## 2020-09-12 NOTE — PLAN OF CARE
POC reviewed with the pt, verbalized understanding.  VSS.  AAOx3.  Mild grogginess noted, MD aware.  Vomiting x2 in the evening, pt refused pharmaceutical intervention.  No other distress or any other complaint of pain throughout night.  PIVs remained intact.  Permacath on LIJ remained intact with old blood drainage, possible dialysis in am to back on pt's routine dialysis session.  On continuous telemetry monitor, SR.  No ectopy noted.  Blood glucose monitored, no insulin needed.  Purewick changed.  Encouraged to turn frequently.  Safety maintained, free of falls throughout shift.  Instructed to call for any assistance.  Will continue to monitor.

## 2020-09-13 VITALS
BODY MASS INDEX: 29.53 KG/M2 | SYSTOLIC BLOOD PRESSURE: 161 MMHG | HEIGHT: 62 IN | OXYGEN SATURATION: 99 % | WEIGHT: 160.5 LBS | TEMPERATURE: 97 F | RESPIRATION RATE: 16 BRPM | HEART RATE: 69 BPM | DIASTOLIC BLOOD PRESSURE: 72 MMHG

## 2020-09-13 PROBLEM — E11.9 DIABETES MELLITUS: Status: RESOLVED | Noted: 2020-09-10 | Resolved: 2020-09-13

## 2020-09-13 PROBLEM — T82.49XA CLOTTED DIALYSIS ACCESS: Status: RESOLVED | Noted: 2020-09-10 | Resolved: 2020-09-13

## 2020-09-13 PROBLEM — N17.9 AKI (ACUTE KIDNEY INJURY): Status: RESOLVED | Noted: 2020-09-11 | Resolved: 2020-09-13

## 2020-09-13 LAB
ALBUMIN SERPL BCP-MCNC: 3.3 G/DL (ref 3.5–5.2)
ALP SERPL-CCNC: 76 U/L (ref 55–135)
ALT SERPL W/O P-5'-P-CCNC: 12 U/L (ref 10–44)
ANION GAP SERPL CALC-SCNC: 10 MMOL/L (ref 8–16)
AST SERPL-CCNC: 17 U/L (ref 10–40)
BASOPHILS # BLD AUTO: 0.04 K/UL (ref 0–0.2)
BASOPHILS NFR BLD: 0.6 % (ref 0–1.9)
BILIRUB SERPL-MCNC: 0.3 MG/DL (ref 0.1–1)
BUN SERPL-MCNC: 24 MG/DL (ref 6–20)
CALCIUM SERPL-MCNC: 8.5 MG/DL (ref 8.7–10.5)
CHLORIDE SERPL-SCNC: 104 MMOL/L (ref 95–110)
CO2 SERPL-SCNC: 18 MMOL/L (ref 23–29)
CREAT SERPL-MCNC: 3.1 MG/DL (ref 0.5–1.4)
DIFFERENTIAL METHOD: ABNORMAL
EOSINOPHIL # BLD AUTO: 0.1 K/UL (ref 0–0.5)
EOSINOPHIL NFR BLD: 1.6 % (ref 0–8)
ERYTHROCYTE [DISTWIDTH] IN BLOOD BY AUTOMATED COUNT: 14.6 % (ref 11.5–14.5)
EST. GFR  (AFRICAN AMERICAN): 19 ML/MIN/1.73 M^2
EST. GFR  (NON AFRICAN AMERICAN): 16 ML/MIN/1.73 M^2
GLUCOSE SERPL-MCNC: 107 MG/DL (ref 70–110)
HCT VFR BLD AUTO: 25.4 % (ref 37–48.5)
HGB BLD-MCNC: 8 G/DL (ref 12–16)
IMM GRANULOCYTES # BLD AUTO: 0.08 K/UL (ref 0–0.04)
IMM GRANULOCYTES NFR BLD AUTO: 1.1 % (ref 0–0.5)
LYMPHOCYTES # BLD AUTO: 1.3 K/UL (ref 1–4.8)
LYMPHOCYTES NFR BLD: 19.2 % (ref 18–48)
MAGNESIUM SERPL-MCNC: 2 MG/DL (ref 1.6–2.6)
MCH RBC QN AUTO: 29.6 PG (ref 27–31)
MCHC RBC AUTO-ENTMCNC: 31.5 G/DL (ref 32–36)
MCV RBC AUTO: 94 FL (ref 82–98)
MONOCYTES # BLD AUTO: 1 K/UL (ref 0.3–1)
MONOCYTES NFR BLD: 14.9 % (ref 4–15)
NEUTROPHILS # BLD AUTO: 4.4 K/UL (ref 1.8–7.7)
NEUTROPHILS NFR BLD: 62.6 % (ref 38–73)
NRBC BLD-RTO: 0 /100 WBC
PHOSPHATE SERPL-MCNC: 3.3 MG/DL (ref 2.7–4.5)
PLATELET # BLD AUTO: 136 K/UL (ref 150–350)
PMV BLD AUTO: 12.4 FL (ref 9.2–12.9)
POTASSIUM SERPL-SCNC: 4.1 MMOL/L (ref 3.5–5.1)
PROT SERPL-MCNC: 7 G/DL (ref 6–8.4)
RBC # BLD AUTO: 2.7 M/UL (ref 4–5.4)
SODIUM SERPL-SCNC: 132 MMOL/L (ref 136–145)
WBC # BLD AUTO: 6.99 K/UL (ref 3.9–12.7)

## 2020-09-13 PROCEDURE — 96372 THER/PROPH/DIAG INJ SC/IM: CPT

## 2020-09-13 PROCEDURE — 97110 THERAPEUTIC EXERCISES: CPT | Performed by: PHYSICAL THERAPIST

## 2020-09-13 PROCEDURE — 36415 COLL VENOUS BLD VENIPUNCTURE: CPT

## 2020-09-13 PROCEDURE — 97162 PT EVAL MOD COMPLEX 30 MIN: CPT | Performed by: PHYSICAL THERAPIST

## 2020-09-13 PROCEDURE — G0378 HOSPITAL OBSERVATION PER HR: HCPCS

## 2020-09-13 PROCEDURE — 94761 N-INVAS EAR/PLS OXIMETRY MLT: CPT

## 2020-09-13 PROCEDURE — 80053 COMPREHEN METABOLIC PANEL: CPT

## 2020-09-13 PROCEDURE — 25000003 PHARM REV CODE 250: Performed by: INTERNAL MEDICINE

## 2020-09-13 PROCEDURE — 97535 SELF CARE MNGMENT TRAINING: CPT

## 2020-09-13 PROCEDURE — 83735 ASSAY OF MAGNESIUM: CPT

## 2020-09-13 PROCEDURE — 25000003 PHARM REV CODE 250: Performed by: SURGERY

## 2020-09-13 PROCEDURE — 63600175 PHARM REV CODE 636 W HCPCS: Performed by: STUDENT IN AN ORGANIZED HEALTH CARE EDUCATION/TRAINING PROGRAM

## 2020-09-13 PROCEDURE — 97116 GAIT TRAINING THERAPY: CPT | Performed by: PHYSICAL THERAPIST

## 2020-09-13 PROCEDURE — 84100 ASSAY OF PHOSPHORUS: CPT

## 2020-09-13 PROCEDURE — 97165 OT EVAL LOW COMPLEX 30 MIN: CPT

## 2020-09-13 PROCEDURE — 85025 COMPLETE CBC W/AUTO DIFF WBC: CPT

## 2020-09-13 RX ORDER — LOSARTAN POTASSIUM 50 MG/1
100 TABLET ORAL DAILY
Qty: 180 TABLET | Refills: 3 | Status: SHIPPED | OUTPATIENT
Start: 2020-09-13 | End: 2023-01-01

## 2020-09-13 RX ORDER — CEFOXITIN SODIUM 2 G/50ML
2 INJECTION, SOLUTION INTRAVENOUS
Status: CANCELLED | OUTPATIENT
Start: 2020-09-13

## 2020-09-13 RX ORDER — HYDRALAZINE HYDROCHLORIDE 10 MG/1
10 TABLET, FILM COATED ORAL EVERY 8 HOURS
Status: DISCONTINUED | OUTPATIENT
Start: 2020-09-13 | End: 2020-09-13 | Stop reason: HOSPADM

## 2020-09-13 RX ORDER — CEFOXITIN SODIUM 2 G/50ML
2 INJECTION, SOLUTION INTRAVENOUS
Status: DISCONTINUED | OUTPATIENT
Start: 2020-09-13 | End: 2020-09-13 | Stop reason: HOSPADM

## 2020-09-13 RX ORDER — LOSARTAN POTASSIUM 100 MG/1
100 TABLET ORAL DAILY
Qty: 90 TABLET | Refills: 3 | Status: SHIPPED | OUTPATIENT
Start: 2020-09-13 | End: 2020-09-13 | Stop reason: HOSPADM

## 2020-09-13 RX ORDER — HEPARIN SODIUM 5000 [USP'U]/ML
5000 INJECTION, SOLUTION INTRAVENOUS; SUBCUTANEOUS ONCE
Status: COMPLETED | OUTPATIENT
Start: 2020-09-13 | End: 2020-09-13

## 2020-09-13 RX ORDER — HEPARIN SODIUM 5000 [USP'U]/ML
5000 INJECTION, SOLUTION INTRAVENOUS; SUBCUTANEOUS
Status: CANCELLED | OUTPATIENT
Start: 2020-09-13

## 2020-09-13 RX ADMIN — METOLAZONE 10 MG: 2.5 TABLET ORAL at 09:09

## 2020-09-13 RX ADMIN — HEPARIN SODIUM 5000 UNITS: 5000 INJECTION INTRAVENOUS; SUBCUTANEOUS at 09:09

## 2020-09-13 RX ADMIN — MUPIROCIN: 20 OINTMENT TOPICAL at 09:09

## 2020-09-13 RX ADMIN — AMLODIPINE BESYLATE 10 MG: 5 TABLET ORAL at 09:09

## 2020-09-13 RX ADMIN — CARVEDILOL 25 MG: 25 TABLET, FILM COATED ORAL at 09:09

## 2020-09-13 RX ADMIN — FUROSEMIDE 160 MG: 40 TABLET ORAL at 09:09

## 2020-09-13 RX ADMIN — HYDRALAZINE HYDROCHLORIDE 10 MG: 10 TABLET, FILM COATED ORAL at 12:09

## 2020-09-13 NOTE — PLAN OF CARE
Problem: Adult Inpatient Plan of Care  Goal: Plan of Care Review  Outcome: Ongoing, Progressing  VIRTUAL NURSE:  Labs, notes, orders, and careplan reviewed.

## 2020-09-13 NOTE — PLAN OF CARE
AVS and educational attachments shared with patient wife via [employer]. Discussed thoroughly. Patient verbalized clear understanding using teach back method. Notified bedside nurse of completion of education. At present no distress noted. Patient to be discharged via w/c with escort service and family with all of patient's belonings. Will cont to be available to patient and family and intervene prn.  '

## 2020-09-13 NOTE — DISCHARGE INSTRUCTIONS
Losartan tablets (English) View Edit Remove  Hemodialysis Access, Caring for Your (English) View Edit Remove  Hemodialysis (English) View Edit Remove  Hemodialysis (English) View Edit Remove  Chronic Kidney Disease, Diet for (English) View Edit Remove

## 2020-09-13 NOTE — PLAN OF CARE
"Vomited approximately 100 ml of creamy colored emesis. States"I feel nauseated but I don't want the medication.requested crackers and a regular Sprite.Austin GILBERT AC called and requested Sprite.States" I will bring a couple cans up for her." will continue to monitor.  "

## 2020-09-13 NOTE — PLAN OF CARE
Problem: Physical Therapy Goal  Goal: Physical Therapy Goal  Description: Goals to be met by: 10/10/20    Patient will increase functional independence with mobility by performin.  Ambulate 200' without AD with no LOB  2.  Sit in chair 2 hours  3.  Ind left ankle eversion isometric and AP exercises.  4.  Ind left shoulder isometric IR/ER and AROM IR/ER and pain free shoulder flex/ext (reach forward) elbow flex/ext and scap squeeze exercise without scapular elevation substitution pattern.    Outcome: Ongoing, Progressing

## 2020-09-13 NOTE — PT/OT/SLP EVAL
Physical Therapy Evaluation    Patient Name:  Ping Davenport   MRN:  [id number]    Recommendations:     Discharge Recommendations:  outpatient PT   Discharge Equipment Recommendations: none   Barriers to discharge: decreased functional mobility safety    Assessment:     Pign Davenport is a 55 y.o. female admitted with a medical diagnosis of Clotted dialysis access with history of right humerus and right patella fracture in late June 2020. Pt was educated to do exercises on her own 2/2 not wanting to expose her to other people at Outpatient Therapy.  She presents with the following impairments/functional limitations:  weakness, impaired functional mobilty, impaired cardiopulmonary response to activity, impaired endurance, impaired balance, decreased upper extremity function, pain, gait instability, impaired joint extensibility, impaired self care skills, decreased lower extremity function, decreased ROM. Pt supine to/from sit with Mod Ind.  Pt ambulated 80' with CG/supervision with one loss of balance when turning to sit.      Rehab Prognosis: Good; patient would benefit from acute skilled PT services to address these deficits and reach maximum level of function.    Recent Surgery: Procedure(s) (LRB):  INSERTION, CATHETER, HEMODIALYSIS (Left) 2 Days Post-Op    Plan:     During this hospitalization, patient to be seen 5 x/week to address the identified rehab impairments via gait training, therapeutic activities, therapeutic exercises, neuromuscular re-education and progress toward the following goals:    · Plan of Care Expires:  10/12/20    Subjective     Chief Complaint: right shoulder/upper arm pain and right tibialis anterior/shin splint type pain  Patient/Family Comments/goals: be able to use stick shift better while driving  Pain/Comfort:  · Pain Rating 1: (Pt c/o right shoulder/upper arm pain with motion primarily and left anterior tibialis muscle pain at tibial osseous attachment primarily, pain not  rated)  · Pain Addressed 1: Reposition, Distraction, Cessation of Activity(Pt educated in non painful shoulder and ankle exercises)    Patients cultural, spiritual, Mandaen conflicts given the current situation: no    Living Environment:  Pt lives with her sister, daughter and Mom in a 2 SH with a ramp to enter.  Family depends on her to drive a stick shift car which is very difficult 2/2 RUE pain and dysfunction post humerus fracture.  Prior to admission, patients level of function was Mod Ind, using w/c only when not feeling strong post dialysis.  Equipment used at home: shower chair, wheelchair.  DME owned (not currently used): rolling walker and bedside commode.  Upon discharge, patient will have assistance from family.    Objective:     Communicated with nurse prior to session.  Patient found HOB elevated with telemetry  upon PT entry to room.    General Precautions: Standard, fall   Orthopedic Precautions:    Braces:       Exams:  · Pt is oriented x 4.  Pt is limited to 50 degrees of right shoulder flexion and abd, 55 degrees of external rotation at 30 degrees of abduction.  Pt demonstrates RUE scapular substitution patterns with all attempts to elevate RUE.  Pt has BLE AROM WFL grossly.  Pt  RLE eversion strength at 2+/5 with remaining strength in BLE's and 3+ to 5/5 .  Pt has moderate TTP in RLE anterior tibialis and lateral RUE arm and shoulder.      Functional Mobility:   Pt supine to/from sit with Mod Ind.  Pt ambulated 80' with CG/supervision with one loss of balance when turning to sit.     Therapeutic Activities and Exercises:   Pt was educated in and performed RLE AP x 20 and RLE ankle eversion isometric x 10, holding for 3 seconds.    AM-PAC 6 CLICK MOBILITY  Total Score:21     Patient left HOB elevated with all lines intact, call button in reach, bed alarm on and nurse notified.    GOALS:   Multidisciplinary Problems     Physical Therapy Goals        Problem: Physical Therapy Goal    Goal  Priority Disciplines Outcome Goal Variances Interventions   Physical Therapy Goal     PT, PT/OT Ongoing, Progressing     Description: Goals to be met by: 10/10/20    Patient will increase functional independence with mobility by performin.  Ambulate 200' without AD with no LOB  2.  Sit in chair 2 hours  3.  Ind left ankle eversion isometric and AP exercises.  4.  Ind left shoulder isometric IR/ER and AROM IR/ER and pain free shoulder flex/ext (reach forward) elbow flex/ext and scap squeeze exercise without scapular elevation substitution pattern.                     History:     Past Medical History:   Diagnosis Date    Diabetes mellitus     Hypertension     Renal disorder        History reviewed. No pertinent surgical history.    Time Tracking:     PT Received On: 20  PT Start Time: 1020     PT Stop Time:  1059  PT Total Time (min): 39 min     Billable Minutes: Evaluation 14, Gait Training 12 and Therapeutic Exercise 13      Jen Yip, PT  2020

## 2020-09-13 NOTE — PT/OT/SLP EVAL
Occupational Therapy   Evaluation    Name: Ping Davenport  MRN: [id number]  Admitting Diagnosis:  Clotted dialysis access 2 Days Post-Op    Recommendations:     Discharge Recommendations: outpatient OT, outpatient PT  Discharge Equipment Recommendations:  none  Barriers to discharge:  None    Assessment:     Ping Davenport is a 55 y.o. female with a medical diagnosis of Clotted dialysis access.  She presents with RUE ROM deficits; deconditioning. Performance deficits affecting function: weakness, gait instability, impaired balance, impaired endurance, impaired self care skills, impaired functional mobilty, decreased upper extremity function, decreased lower extremity function, decreased ROM.      Pt would benefit from cont OT services in order to maximize functional independence. Pt requesting OP therapy services at d/c. Owns recommended DME.    Rehab Prognosis: Good; patient would benefit from acute skilled OT services to address these deficits and reach maximum level of function.       Plan:     Patient to be seen 3 x/week to address the above listed problems via self-care/home management, therapeutic activities, therapeutic exercises  · Plan of Care Expires: 10/13/20  · Plan of Care Reviewed with: patient    Subjective     Chief Complaint: pt reports N&V last night; states this is the first time she has eaten and kept her food down   Patient/Family Comments/goals: begin OP therapy; states it was not started in june since accident/fall because of COVID    Occupational Profile:  Living Environment: with sister, mother, and dghtr in 2 story home, ramp to enter, bed and bath upstairs, however, pt has been sleeping on the sofa and using the WIS downstairs  Previous level of function: pt reports assist required for UBD/LBD 2/2 deficits in RUE ROM as well as R knee issues from fall; mod I/independent other ADLs; does not utilize DME for ambulation; states she drives (however, car is a standard and she steers while  her sister shifts gears 2/2 RUE ROM deficits)   Equipment Used at Home:  shower chair, wheelchair, bedside commode, walker, rolling(pt reports currently only using shower chair at this time)  Assistance upon Discharge: reports she assists in care of her sister and mother, however, her sister is able to assist her with dsg     Pain/Comfort:  · Pain Rating 1: 0/10    Patients cultural, spiritual, Religion conflicts given the current situation:      Objective:     Communicated with: nsg prior to session.   General Precautions: Standard, fall   Orthopedic Precautions:N/A   Braces: N/A     Occupational Performance:    Bed Mobility:    · Patient completed Scooting/Bridging with supervision  · Patient completed Supine to Sit with supervision  · Patient completed Sit to Supine with supervision    Functional Mobility/Transfers:  · Patient completed Sit <> Stand Transfer with contact guard assistance  with  rolling walker   · Patient completed Toilet Transfer Step Transfer technique with contact guard assistance with  rolling walker  · Functional Mobility: CGA with RW; 1 LOB at sink with CGA for recovery     Activities of Daily Living:  · Grooming: contact guard assistance at sink   · Lower Body Dressing: total assistance      Cognitive/Visual Perceptual:  Appears WFL; questionable historian at times       Physical Exam:  Balance:    -       WFL sitting balance; CGA standing   Postural examination/scapula alignment:    -       Rounded shoulders  -       Forward head  Skin integrity: Wound L lateral neck   Dominant hand:    -       Right, however, states since arm injury, she has been utilizing her L hand more   Upper Extremity Range of Motion:  LUE WFL for pt's needs; RUE limited ~ 90 shoulder flexion with impaired internal and external rotation   Upper Extremity Strength:  LUE grossly 4 to 4-/5; RUE not formally assessed 2/2 pt reports of pain; appears grossly 4- to 4/5 within range based on observed function     Strength:  Good     AMPAC 6 Click ADL:  AMPAC Total Score: 18    Treatment & Education:  Pt performed skills as above   Requesting to finish breakfast while speaking with therapist regarding current situation and therapy services   Bed mobility as above   Functional mobility in room with RW; pt slightly with impaired safety awareness with use of RW and requires CGA  CGA standing at sink with 1 LOB and CGA to regain balance   Education:    Patient left supine with all lines intact, call button in reach, bed alarm on, nsg notified and PCT  present    GOALS:   Multidisciplinary Problems     Occupational Therapy Goals        Problem: Occupational Therapy Goal    Goal Priority Disciplines Outcome Interventions   Occupational Therapy Goal     OT, PT/OT Ongoing, Progressing    Description: Goals to be met by: 10/13/20     Patient will increase functional independence with ADLs by performing:    LE Dressing with Modified [place].  Grooming while standing with Modified [place].  Toileting from toilet with Modified [place] for hygiene and clothing management.   Supine to sit with Modified [place].  Step transfer with Modified [place]  Toilet transfer to toilet with Modified [place].  Increased functional strength to WFL for self care skills and functional mobility.  Upper extremity exercise program x10 reps per handout, with independence.                     History:     Past Medical History:   Diagnosis Date    Diabetes mellitus     Hypertension     Renal disorder        History reviewed. No pertinent surgical history.    Time Tracking:     OT Date of Treatment: 09/13/20  OT Start Time: 0825  OT Stop Time: 0848  OT Total Time (min): 23 min    Billable Minutes:Evaluation 10  Self Care/Home Management 13    Juana Epps OT  9/13/2020

## 2020-09-13 NOTE — PROGRESS NOTES
[facility] Hospital Medicine  Progress Note    Patient Name: Ping Davenport  MRN: [id number]  Patient Class: OP- Observation   Admission Date: 9/10/2020  Length of Stay: 1 days  Attending Physician: Kareen Benjamin MD  Primary Care Provider: Primary Doctor No        Subjective:     Principal Problem:Clotted dialysis access        HPI:  This is a 55 year-old  Female with PMH of DM, HTN, ESRD presenting to the ED for left IJ vascular access problem. She gets dialyzed T/TH/Sat at [facility] but since 9/1 she reports 2/4 hours of dialysis treatment at that time. Since then she has been unable to get dialysis, reporting dialysis center told her to get new access. She has been taking lasix since unable to dialyze. She reports the LIJ was placed 3 weeks ago. She admits to generalized swelling of the body, primarily in the face including bilateral eyelids and cheeks. She reported to the ED last night but told to report in this morning for an attempt of dialysis. Unsuccessful attempt in the ED. Surgery and nephrology consulted.     In the ED, /89, HR 69 on RA SaO2 100%   Cr 7.7  troponin 0.059  CXR No acute cardiopulmonary process identified.  EKG Sinus rhythm with 1st degree A-V block       Overview/Hospital Course:  9/11 in OR for  placement of tunneled HD catheter     9/12 tunneled HD catheter placed yesterday and tolerated dialysis. Planned for another round today    Interval History: Patient seen and examined at bedside. No acute events overnight. Patient was calm and responded appropriately. She denied any worsening complaints. Patient denied CP, SOB, Cough, HA, Dizziness, F/C, Abd pain, numbness/tingling, GI or  changes.    Patient had one episode of nonbloody/nonbilious emesis yesterday night 3 hours after eating dinner. She denied any abdominal pain/vomiting this AM.     Review of Systems   Constitutional: Negative for activity change and fever.   HENT: Negative for  drooling, rhinorrhea and sore throat.    Respiratory: Negative for apnea and shortness of breath.    Cardiovascular: Negative for chest pain and palpitations.   Gastrointestinal: Negative for abdominal distention, constipation, diarrhea, nausea and vomiting.   Genitourinary: Negative for dysuria and urgency.   Musculoskeletal: Negative for neck stiffness.   Neurological: Negative for dizziness, numbness and headaches.   Psychiatric/Behavioral: Negative for agitation and confusion.     Objective:     Vital Signs (Most Recent):  Temp: 98.8 °F (37.1 °C) (09/12/20 2333)  Pulse: 64 (09/13/20 0400)  Resp: 18 (09/12/20 2333)  BP: (!) 146/68 (09/12/20 2333)  SpO2: 99 % (09/12/20 2333) Vital Signs (24h Range):  Temp:  [96.6 °F (35.9 °C)-99.5 °F (37.5 °C)] 98.8 °F (37.1 °C)  Pulse:  [62-89] 64  Resp:  [16-20] 18  SpO2:  [97 %-99 %] 99 %  BP: (110-177)/(63-96) 146/68     Weight: 73.1 kg (161 lb 2.5 oz)  Body mass index is 29.48 kg/m².    Intake/Output Summary (Last 24 hours) at 9/13/2020 0435  Last data filed at 9/12/2020 1545  Gross per 24 hour   Intake 650 ml   Output 3050 ml   Net -2400 ml      Physical Exam  Vitals signs and nursing note reviewed.   Constitutional:       General: She is not in acute distress.     Appearance: Normal appearance. She is not ill-appearing.   HENT:      Head: Normocephalic and atraumatic.      Mouth/Throat:      Mouth: Mucous membranes are moist.   Eyes:      Conjunctiva/sclera: Conjunctivae normal.   Cardiovascular:      Rate and Rhythm: Normal rate and regular rhythm.      Pulses: Normal pulses.      Heart sounds: Normal heart sounds. No murmur. No gallop.    Pulmonary:      Effort: Pulmonary effort is normal.      Breath sounds: Normal breath sounds. No wheezing.   Abdominal:      General: Bowel sounds are normal. There is no distension.      Palpations: Abdomen is soft. There is no mass.      Tenderness: There is no abdominal tenderness. There is no guarding or rebound.      Hernia: No  hernia is present.   Musculoskeletal:      Comments: Reduced lower extremity edema and facial edema   Skin:     General: Skin is warm.   Neurological:      General: No focal deficit present.      Mental Status: She is alert and oriented to person, place, and time.   Psychiatric:         Mood and Affect: Mood normal.         Significant Labs:   A1C:   Recent Labs   Lab 09/10/20  2114   HGBA1C 5.3     Bilirubin:   Recent Labs   Lab 09/09/20 2059 09/11/20  0416 09/12/20  0426   BILITOT 0.4 0.3 0.4     BMP:   Recent Labs   Lab 09/12/20  0426   GLU 87      K 3.6      CO2 22*   BUN 37*   CREATININE 3.7*   CALCIUM 8.9   MG 2.2     CBC:   Recent Labs   Lab 09/12/20 0426   WBC 5.61   HGB 8.0*   HCT 24.9*   *     CMP:   Recent Labs   Lab 09/12/20  0426      K 3.6      CO2 22*   GLU 87   BUN 37*   CREATININE 3.7*   CALCIUM 8.9   PROT 7.2   ALBUMIN 3.4*   BILITOT 0.4   ALKPHOS 82   AST 19   ALT 13   ANIONGAP 13   EGFRNONAA 13*     Magnesium:   Recent Labs   Lab 09/12/20  0426   MG 2.2     Pathology Results  (Last 10 years)               11/11/19 1221  Specimen to Pathology, Radiology Kidney, [place] biopsy Final result        POCT Glucose:   Recent Labs   Lab 09/11/20  2211 09/12/20  0552 09/12/20  1631   POCTGLUCOSE 91 117* 131*     TSH:   Recent Labs   Lab 09/10/20  2114   TSH 3.199     All pertinent labs within the past 24 hours have been reviewed.    Significant Imaging: I have reviewed and interpreted all pertinent imaging results/findings within the past 24 hours.   Imaging Results    None             Assessment/Plan:      Essential hypertension  HTN    Plan:  -restart home meds  - monitor BP  - -Hydralazine 10mg q6 prn for SBP > 170  - SBP 130s-160s, continue meds      End stage renal disease  ESRD 4  9/13 Bun 24 Cr 3.1: much improved from admission   Cr 7.7  Baseline Bun 77  Cr 5.9  On lasix in between dialysis days    Plan:  - nephrology consulted  - finished 1st round of  dialysis after replacement of catheter  - scheduled for 2nd round        VTE Risk Mitigation (From admission, onward)         Ordered     heparin (porcine) injection 4,900 Units  As needed (PRN)      09/11/20 1535     IP VTE HIGH RISK PATIENT  Once      09/10/20 2059     Place sequential compression device  Until discontinued      09/10/20 2059                Discharge Planning   JACKELYN: 9/13/2020     Code Status: Full Code   Is the patient medically ready for discharge?:     Reason for patient still in hospital (select all that apply): Patient trending condition                     Bharat Stoddard DO  Department of Hospital Medicine   [facility]

## 2020-09-13 NOTE — PLAN OF CARE
Problem: Occupational Therapy Goal  Goal: Occupational Therapy Goal  Description: Goals to be met by: 10/13/20     Patient will increase functional independence with ADLs by performing:    LE Dressing with Modified [place].  Grooming while standing with Modified [place].  Toileting from toilet with Modified [place] for hygiene and clothing management.   Supine to sit with Modified [place].  Step transfer with Modified [place]  Toilet transfer to toilet with Modified [place].  Increased functional strength to WFL for self care skills and functional mobility.  Upper extremity exercise program x10 reps per handout, with independence.    Outcome: Ongoing, Progressing       Pt would benefit from cont OT services in order to maximize functional independence. Pt requesting OP therapy services at d/c. Owns recommended DME.

## 2020-09-13 NOTE — PROGRESS NOTES
Progress Note  Nephrology      Consult Requested By: Kareen Benjamin MD      SUBJECTIVE:     Overnight events  Patient is a 55 y.o. female     Patient Active Problem List   Diagnosis    Rash    Encounter to establish care    Screen for STD (sexually transmitted disease)    Hypertensive emergency    New onset of congestive heart failure    Acute respiratory failure with hypoxia and hypercarbia    Acute renal failure    NSTEMI (non-ST elevated myocardial infarction)    Nephrotic syndrome    Hypervolemia    Clotted dialysis access    End stage renal disease    Essential hypertension    AXEL (acute kidney injury)     Past Medical History:   Diagnosis Date    Diabetes mellitus     Hypertension     Renal disorder               OBJECTIVE:     Vitals:    09/13/20 0740 09/13/20 0804 09/13/20 0851 09/13/20 1157   BP:   (!) 158/70    BP Location:   Left arm    Patient Position:   Sitting    Pulse: 64  76 75   Resp:   16    Temp:   98.5 °F (36.9 °C)    TempSrc:   Oral    SpO2:  98% 99%    Weight:       Height:           Temp: 98.5 °F (36.9 °C) (09/13/20 0851)  Pulse: 75 (09/13/20 1157)  Resp: 16 (09/13/20 0851)  BP: (!) 158/70 (09/13/20 0851)  SpO2: 99 % (09/13/20 0851)    Date 09/13/20 0700 - 09/14/20 0659   Shift 6044-0680 9915-9971 9103-0662 24 Hour Total   INTAKE   P.O. 375   375   Shift Total(mL/kg) 375(5.2)   375(5.2)   OUTPUT   Shift Total(mL/kg)       Weight (kg) 72.8 72.8 72.8 72.8             Medications:   amLODIPine  10 mg Oral Daily    carvediloL  25 mg Oral BID    ceFOXItin (MEFOXIN) IVPB  2 g Intravenous 30 Min Pre-Op    furosemide  160 mg Oral BID    hydrALAZINE  10 mg Oral Q8H    losartan  100 mg Oral Daily    metOLazone  10 mg Oral Daily    mupirocin   Nasal BID    senna-docusate 8.6-50 mg  1 tablet Oral BID    sevelamer carbonate  1,600 mg Oral TID WM    vitamin renal formula (B-complex-vitamin c-folic acid)  1 capsule Oral Daily                 Physical Exam:  General  appearance:NAD  Feeling better  Lungs: diminished breath sounds  Heart: pulse 75  Abdomen: soft  Extremities: edema  Skin: dry      Laboratory:  ABG  Labs reviewed  No results found for this or any previous visit (from the past 336 hour(s)).  Recent Results (from the past 336 hour(s))   CBC with Automated Differential    Collection Time: 09/13/20  4:16 AM   Result Value Ref Range    WBC 6.99 3.90 - 12.70 K/uL    Hemoglobin 8.0 (L) 12.0 - 16.0 g/dL    Hematocrit 25.4 (L) 37.0 - 48.5 %    Platelets 136 (L) 150 - 350 K/uL   CBC with Automated Differential    Collection Time: 09/12/20  4:26 AM   Result Value Ref Range    WBC 5.61 3.90 - 12.70 K/uL    Hemoglobin 8.0 (L) 12.0 - 16.0 g/dL    Hematocrit 24.9 (L) 37.0 - 48.5 %    Platelets 135 (L) 150 - 350 K/uL   CBC with Automated Differential    Collection Time: 09/11/20  4:16 AM   Result Value Ref Range    WBC 5.46 3.90 - 12.70 K/uL    Hemoglobin 8.0 (L) 12.0 - 16.0 g/dL    Hematocrit 24.7 (L) 37.0 - 48.5 %    Platelets 139 (L) 150 - 350 K/uL     Urinalysis  No results for input(s): COLORU, CLARITYU, SPECGRAV, PHUR, PROTEINUA, GLUCOSEU, BILIRUBINCON, BLOODU, WBCU, RBCU, BACTERIA, MUCUS, NITRITE, LEUKOCYTESUR, UROBILINOGEN, HYALINECASTS in the last 24 hours.    Diagnostic Results:  X-Ray: Reviewed  US: Reviewed  Echo: Reviewed  ACCESS    ASSESSMENT/PLAN:   ESRD.  Metabolic bone disease.  Anemia.  HTN.  Add hydralazine.  Discussed renal, low sodium diet  Fluid restriction.  Scheduled for HD tomorrow  in outpatient [facility] unit.  Follow up with surgery for peritoneal catheter placement.

## 2020-09-13 NOTE — PLAN OF CARE
Safety maintained, bed alarm on and call bell in reach. Telemetry monitoring in progress. NSR HR 66. No distress noted.

## 2020-09-13 NOTE — ASSESSMENT & PLAN NOTE
ESRD 4  9/13 Bun 24 Cr 3.1: much improved from admission   Cr 7.7  Baseline Bun 77  Cr 5.9  On lasix in between dialysis days    Plan:  - nephrology consulted  - finished 1st round of dialysis after replacement of catheter  - scheduled for 2nd round

## 2020-09-13 NOTE — PLAN OF CARE
09/13/20 1514   Final Note   Assessment Type Final Discharge Note   Anticipated Discharge Disposition Home   What phone number can be called within the next 1-3 days to see how you are doing after discharge? [phone]   Hospital Follow Up  Appt(s) scheduled? Yes   Discharge plans and expectations educations in teach back method with documentation complete? Yes   Right Care Referral Info   Post Acute Recommendation No Care

## 2020-09-13 NOTE — SUBJECTIVE & OBJECTIVE
Interval History: Patient seen and examined at bedside. No acute events overnight. Patient was calm and responded appropriately. She denied any worsening complaints. Patient denied CP, SOB, Cough, HA, Dizziness, F/C, Abd pain, numbness/tingling, GI or  changes.    Patient had one episode of nonbloody/nonbilious emesis yesterday night 3 hours after eating dinner. She denied any abdominal pain/vomiting this AM.     Review of Systems   Constitutional: Negative for activity change and fever.   HENT: Negative for drooling, rhinorrhea and sore throat.    Respiratory: Negative for apnea and shortness of breath.    Cardiovascular: Negative for chest pain and palpitations.   Gastrointestinal: Negative for abdominal distention, constipation, diarrhea, nausea and vomiting.   Genitourinary: Negative for dysuria and urgency.   Musculoskeletal: Negative for neck stiffness.   Neurological: Negative for dizziness, numbness and headaches.   Psychiatric/Behavioral: Negative for agitation and confusion.     Objective:     Vital Signs (Most Recent):  Temp: 98.8 °F (37.1 °C) (09/12/20 2333)  Pulse: 64 (09/13/20 0400)  Resp: 18 (09/12/20 2333)  BP: (!) 146/68 (09/12/20 2333)  SpO2: 99 % (09/12/20 2333) Vital Signs (24h Range):  Temp:  [96.6 °F (35.9 °C)-99.5 °F (37.5 °C)] 98.8 °F (37.1 °C)  Pulse:  [62-89] 64  Resp:  [16-20] 18  SpO2:  [97 %-99 %] 99 %  BP: (110-177)/(63-96) 146/68     Weight: 73.1 kg (161 lb 2.5 oz)  Body mass index is 29.48 kg/m².    Intake/Output Summary (Last 24 hours) at 9/13/2020 0435  Last data filed at 9/12/2020 1545  Gross per 24 hour   Intake 650 ml   Output 3050 ml   Net -2400 ml      Physical Exam  Vitals signs and nursing note reviewed.   Constitutional:       General: She is not in acute distress.     Appearance: Normal appearance. She is not ill-appearing.   HENT:      Head: Normocephalic and atraumatic.      Mouth/Throat:      Mouth: Mucous membranes are moist.   Eyes:      Conjunctiva/sclera:  Conjunctivae normal.   Cardiovascular:      Rate and Rhythm: Normal rate and regular rhythm.      Pulses: Normal pulses.      Heart sounds: Normal heart sounds. No murmur. No gallop.    Pulmonary:      Effort: Pulmonary effort is normal.      Breath sounds: Normal breath sounds. No wheezing.   Abdominal:      General: Bowel sounds are normal. There is no distension.      Palpations: Abdomen is soft. There is no mass.      Tenderness: There is no abdominal tenderness. There is no guarding or rebound.      Hernia: No hernia is present.   Musculoskeletal:      Comments: Reduced lower extremity edema and facial edema   Skin:     General: Skin is warm.   Neurological:      General: No focal deficit present.      Mental Status: She is alert and oriented to person, place, and time.   Psychiatric:         Mood and Affect: Mood normal.         Significant Labs:   A1C:   Recent Labs   Lab 09/10/20  2114   HGBA1C 5.3     Bilirubin:   Recent Labs   Lab 09/09/20 2059 09/11/20  0416 09/12/20  0426   BILITOT 0.4 0.3 0.4     BMP:   Recent Labs   Lab 09/12/20  0426   GLU 87      K 3.6      CO2 22*   BUN 37*   CREATININE 3.7*   CALCIUM 8.9   MG 2.2     CBC:   Recent Labs   Lab 09/12/20 0426   WBC 5.61   HGB 8.0*   HCT 24.9*   *     CMP:   Recent Labs   Lab 09/12/20  0426      K 3.6      CO2 22*   GLU 87   BUN 37*   CREATININE 3.7*   CALCIUM 8.9   PROT 7.2   ALBUMIN 3.4*   BILITOT 0.4   ALKPHOS 82   AST 19   ALT 13   ANIONGAP 13   EGFRNONAA 13*     Magnesium:   Recent Labs   Lab 09/12/20  0426   MG 2.2     Pathology Results  (Last 10 years)               11/11/19 1221  Specimen to Pathology, Radiology Kidney, [place] biopsy Final result        POCT Glucose:   Recent Labs   Lab 09/11/20  2211 09/12/20  0552 09/12/20  1631   POCTGLUCOSE 91 117* 131*     TSH:   Recent Labs   Lab 09/10/20  2114   TSH 3.199     All pertinent labs within the past 24 hours have been reviewed.    Significant Imaging: I have  reviewed and interpreted all pertinent imaging results/findings within the past 24 hours.   Imaging Results    None

## 2020-09-13 NOTE — PROGRESS NOTES
Because of the stom status there is a possibility of cancellation of cases. So if pt desires permananet dialysis access will plan later.    Cancel NPO    F/u in office

## 2020-09-13 NOTE — PLAN OF CARE
Pt received on RA.  SPO2  98%.  Pt in no apparent respiratory distress.  Will continue to monitor.

## 2020-09-13 NOTE — ASSESSMENT & PLAN NOTE
- LIJ catheter clotted since 9/1  - ED attempt in regaining return, unsuccessful    Plan:  - surgery consulted  - permacath placed 9/11  - will schedule for peritoneal catheter in future  - replaced

## 2020-09-13 NOTE — PLAN OF CARE
VN note: VN completed AVS and attachments and notified bedside nurse, Amaya. Will cont to be available and intervene prn.

## 2020-09-13 NOTE — PLAN OF CARE
Safety maintained, bed alarm on and call bell in reach. Telemetry monitoring in progress. Sinus Rhythm with 1st degree AVB noted.No red alarms or ectopy.will continue to monitor.

## 2020-09-13 NOTE — NURSING
Patient discharged from telemetry unit.  Awaiting transportation, family member will be picking patient up.  IV removed from left arm, no bleeding or drainage from site. Site clean, 2x2 applied and cover with a coband   Tele leads removed, tele box clean and returned to [facility].  FARNAZ at this time no complaints of pain or discomfort.  Purwick removed, no redness or irritation noted.  Patient has all personal belongings.  Sister came to pick patient up from facility   Left via wheelchair.  VN cued into patients room earlier in the day to go over discharge summary.

## 2020-09-15 NOTE — DISCHARGE SUMMARY
[facility] Hospital Medicine  Discharge Summary      Patient Name: Ping Davenport  MRN: [id number]  Admission Date: 9/10/2020  Hospital Length of Stay: 1 days  Discharge Date and Time: 9/13/2020  5:49 PM  Attending Physician: No att. providers found   Discharging Provider: Claudy Juarez MD  Primary Care Provider: Primary Doctor Matilde      HPI:   This is a 55 year-old  Female with PMH of DM, HTN, ESRD presenting to the ED for left IJ vascular access problem. She gets dialyzed T/TH/Sat at [facility] but since 9/1 she reports 2/4 hours of dialysis treatment at that time. Since then she has been unable to get dialysis, reporting dialysis center told her to get new access. She has been taking lasix since unable to dialyze. She reports the LIJ was placed 3 weeks ago. She admits to generalized swelling of the body, primarily in the face including bilateral eyelids and cheeks. She reported to the ED last night but told to report in this morning for an attempt of dialysis. Unsuccessful attempt in the ED. Surgery and nephrology consulted.     In the ED, /89, HR 69 on RA SaO2 100%   Cr 7.7  troponin 0.059  CXR No acute cardiopulmonary process identified.  EKG Sinus rhythm with 1st degree A-V block       Procedure(s) (LRB):  INSERTION, CATHETER, HEMODIALYSIS (Left)      Hospital Course:   9/11 in OR for  placement of tunneled HD catheter     9/12 tunneled HD catheter placed yesterday and tolerated dialysis. Planned for another round today    9/13 patient discharged, she has an outpatient chair     Consults:   Consults (From admission, onward)        Status Ordering Provider     Inpatient consult to General Surgery  Once     Provider:  FLOWER Tan MD    Completed ANA LUISA CALDERON     Inpatient consult to Nephrology  Once     Provider:  Ana Luisa Calderon MD    Completed REYNA SALAZAR          No new Assessment & Plan notes have been filed under this hospital service since  the last note was generated.  Service: Hospital Medicine    Final Active Diagnoses:    Diagnosis Date Noted POA    End stage renal disease [N18.6] 09/10/2020 Yes    Essential hypertension [I10] 09/10/2020 Yes      Problems Resolved During this Admission:    Diagnosis Date Noted Date Resolved POA    PRINCIPAL PROBLEM:  Clotted dialysis access [T82.49XA] 09/10/2020 09/13/2020 Yes    AXEL (acute kidney injury) [N17.9] 09/11/2020 09/13/2020 Yes    Diabetes mellitus [E11.9] 09/10/2020 09/13/2020 Yes       Discharged Condition: stable    Disposition: Home or Self Care    Follow Up:  Follow-up Information     [facility].    Specialty: Emergency Medicine  Why: As needed, If symptoms worsen  Contact information:  [address]  [facility] [address] [phone]           Duane Jc PA-C. Schedule an appointment as soon as possible for a visit in 1 week.    Specialty: Family Medicine  Why: hospital follow up  Contact information:  [address]  [address]  [facility] [address] [phone]                 Patient Instructions:   No discharge procedures on file.    Significant Diagnostic Studies: Labs: CMP No results for input(s): NA, K, CL, CO2, GLU, BUN, CREATININE, CALCIUM, PROT, ALBUMIN, BILITOT, ALKPHOS, AST, ALT, ANIONGAP, ESTGFRAFRICA, EGFRNONAA in the last 48 hours., CBC No results for input(s): WBC, HGB, HCT, PLT in the last 48 hours. and All labs within the past 24 hours have been reviewed    Pending Diagnostic Studies:     None         Medications:  Reconciled Home Medications:      Medication List      CHANGE how you take these medications    losartan 50 MG tablet  Commonly known as: COZAAR  Take 2 tablets (100 mg total) by mouth once daily.  What changed: medication strength        CONTINUE taking these medications    amLODIPine 10 MG tablet  Commonly known as: NORVASC  [place] gabbi tableta (10 mg en total) por vía oral diariamente.  (Take 1 tablet (10 mg total) by mouth  once daily.)     carvediloL 25 MG tablet  Commonly known as: COREG  [place] gabbi tableta (25 mg en total) por vía oral 2 veces al día.  (Take 1 tablet (25 mg total) by mouth 2 (two) times daily.)     clotrimazole-betamethasone 1-0.05% cream  Commonly known as: LOTRISONE  Apply topically 2 (two) times daily.     furosemide 80 MG tablet  Commonly known as: LASIX  Take 2 tablets (160 mg total) by mouth 2 (two) times daily.     lactulose 10 gram/15 mL solution  Commonly known as: CHRONULAC  Take 15ml by mouth every day     SELENE-EMILIA 0.8 mg Tab  Generic drug: B complex-vitamin C-folic acid  TAKE 1 TABLET BY MOUTH ONCE A DAY     RenageL 800 MG Tab  Generic drug: sevelamer HCL  TAKE 2 TABLETS BY MOUTH THREE TIMES A DAY WITH MEALS     STOOL SOFTENER 100 MG capsule  Generic drug: docusate sodium  [place] gabbi tableta por vía oral diariamente.  (Take 1 tablet by mouth every day)            Indwelling Lines/Drains at time of discharge:   Lines/Drains/Airways     Central Venous Catheter Line            Permacath 09/11/20 0859 left internal jugular 4 days                Time spent on the discharge of patient: 33 minutes  Patient was seen and examined on the date of discharge and determined to be suitable for discharge.         Claudy Juarez MD  Department of Hospital Medicine  [facility]

## 2020-12-21 RX ORDER — SEVELAMER HYDROCHLORIDE 800 MG/1
TABLET, FILM COATED ORAL
Qty: 540 TABLET | Refills: 5 | Status: CANCELLED | OUTPATIENT
Start: 2020-12-21

## 2021-01-26 RX ORDER — CARVEDILOL 25 MG/1
25 TABLET ORAL 2 TIMES DAILY
Qty: 60 TABLET | Refills: 5 | Status: SHIPPED | OUTPATIENT
Start: 2021-01-26 | End: 2023-01-01 | Stop reason: SDUPTHER

## 2021-03-18 RX ORDER — FUROSEMIDE 80 MG/1
160 TABLET ORAL 2 TIMES DAILY
Qty: 120 TABLET | Refills: 11 | Status: CANCELLED | OUTPATIENT
Start: 2021-03-18 | End: 2022-03-18

## 2021-03-18 RX ORDER — FUROSEMIDE 80 MG/1
160 TABLET ORAL 2 TIMES DAILY
Qty: 120 TABLET | Refills: 11 | OUTPATIENT
Start: 2021-03-18 | End: 2022-03-18

## 2022-01-01 ENCOUNTER — TELEPHONE (OUTPATIENT)
Dept: CARDIOLOGY | Facility: CLINIC | Age: 57
End: 2022-01-01
Payer: MEDICAID

## 2022-01-30 DIAGNOSIS — N18.6 ESRD (END STAGE RENAL DISEASE): Primary | ICD-10-CM

## 2022-01-31 ENCOUNTER — HOSPITAL ENCOUNTER (OUTPATIENT)
Dept: RADIOLOGY | Facility: HOSPITAL | Age: 57
Discharge: HOME | End: 2022-01-31
Attending: INTERNAL MEDICINE
Payer: MEDICAID

## 2022-01-31 DIAGNOSIS — N18.6 ESRD (END STAGE RENAL DISEASE): ICD-10-CM

## 2022-01-31 PROCEDURE — 71046 X-RAY EXAM CHEST 2 VIEWS: CPT | Mod: 26,,, | Performed by: RADIOLOGY

## 2022-01-31 PROCEDURE — 71046 X-RAY EXAM CHEST 2 VIEWS: CPT | Mod: TC,FY

## 2022-01-31 PROCEDURE — 71046 XR CHEST PA AND LATERAL: ICD-10-PCS | Mod: 26,,, | Performed by: RADIOLOGY

## 2022-04-06 ENCOUNTER — TELEPHONE (OUTPATIENT)
Dept: TRANSPLANT | Facility: CLINIC | Age: 57
End: 2022-04-06

## 2022-04-07 ENCOUNTER — TELEPHONE (OUTPATIENT)
Dept: TRANSPLANT | Facility: CLINIC | Age: 57
End: 2022-04-07
Payer: MEDICAID

## 2022-04-13 ENCOUNTER — HOSPITAL ENCOUNTER (OUTPATIENT)
Dept: RADIOLOGY | Facility: HOSPITAL | Age: 57
Discharge: HOME | End: 2022-04-13
Attending: NURSE PRACTITIONER
Payer: MEDICAID

## 2022-04-13 DIAGNOSIS — R05.9 COUGH: ICD-10-CM

## 2022-04-13 DIAGNOSIS — N18.6 END STAGE RENAL DISEASE: Primary | ICD-10-CM

## 2022-04-13 DIAGNOSIS — N18.6 END STAGE RENAL DISEASE: ICD-10-CM

## 2022-04-13 PROCEDURE — 71046 X-RAY EXAM CHEST 2 VIEWS: CPT | Mod: 26,NTX,, | Performed by: RADIOLOGY

## 2022-04-13 PROCEDURE — 71046 X-RAY EXAM CHEST 2 VIEWS: CPT | Mod: TC,FY,TXP

## 2022-04-13 PROCEDURE — 71046 XR CHEST PA AND LATERAL: ICD-10-PCS | Mod: 26,NTX,, | Performed by: RADIOLOGY

## 2022-04-18 ENCOUNTER — TELEPHONE (OUTPATIENT)
Dept: TRANSPLANT | Facility: CLINIC | Age: 57
End: 2022-04-18
Payer: MEDICAID

## 2022-04-18 NOTE — TELEPHONE ENCOUNTER
"Dialysis Compliance:    SW received adherence form back from pt's dialysis center. Form completed by Natty Ferreira LMSW stating, "Pt comes to treatment late and signs off early most treatments." Jhon reports pt has had 0 missed treatments. Jhon reports no concerns with caregivers and transportation. Jhon reports no psychiatric and/or psychosocial concerns.     SW remains available.       ----- Message from Natacha Kumari sent at 4/13/2022  1:31 PM CDT -----  Regarding: Hello Team!  Hello Team!    Please verify with dialysis unit, patient with notes in referral of possible non-compliance with medical treatment plan/dialysis [late]. Please verify compliance and if ok to proceed with referral for kidney transplant. Thank you.        "

## 2022-04-19 ENCOUNTER — DOCUMENTATION ONLY (OUTPATIENT)
Dept: TRANSPLANT | Facility: CLINIC | Age: 57
End: 2022-04-19
Payer: MEDICAID

## 2022-04-19 ENCOUNTER — TELEPHONE (OUTPATIENT)
Dept: TRANSPLANT | Facility: CLINIC | Age: 57
End: 2022-04-19
Payer: MEDICAID

## 2022-04-19 NOTE — TELEPHONE ENCOUNTER
"----- Message from Natacha Kumari sent at 4/18/2022  4:52 PM CDT -----  Regarding: FW: Hello Team!    ----- Message -----  From: Shakila Tanner LMSW  Sent: 4/18/2022   3:18 PM CDT  To: Natacha Kumari  Subject: RE: Hello Team!                                  SW received adherence form back from pt's dialysis center. Form completed by Natty Ferreira LMSW stating, "Pt comes to treatment late and signs off early most treatments." Jhon reports pt has had 0 missed treatments. Jhon reports no concerns with caregivers and transportation. Jhon reports no psychiatric and/or psychosocial concerns.         PETR recommends pt shows three months of better dialysis compliance before proceeding w/ referral  Shakila  ----- Message -----  From: Natacha Kumari  Sent: 4/13/2022   1:32 PM CDT  To: [facility]  Subject: Hello Team!                                      Hello Team!    Please verify with dialysis unit, patient with notes in referral of possible non-compliance with medical treatment plan/dialysis [late]. Please verify compliance and if ok to proceed with referral for kidney transplant. Thank you.          "

## 2022-12-29 NOTE — TELEPHONE ENCOUNTER
Called pt back in regards to this message.      Spoke with pt sister  Informed our office accepting new pt with medicaid   Provided number to establish care: [phone]    ND

## 2022-12-30 NOTE — TELEPHONE ENCOUNTER
----- Message from Yari Vargas sent at 12/29/2022  4:56 PM CST -----  Regarding: concerns  Name of Who is Calling: Alla, sister           What is the request in detail: Alla is requesting a call back to find out if patient can be seen with medicaid insurance. She is a dialysis patient with no PCP. A referral was sent over from the clinic. She want to be seen in [place].            Can the clinic reply by [facility]: No           What Number to Call Back if not in [facility]: [phone]

## 2023-01-01 ENCOUNTER — TELEPHONE (OUTPATIENT)
Dept: CARDIOTHORACIC SURGERY | Facility: CLINIC | Age: 58
End: 2023-01-01
Payer: MEDICAID

## 2023-01-01 ENCOUNTER — NURSE TRIAGE (OUTPATIENT)
Dept: ADMINISTRATIVE | Facility: CLINIC | Age: 58
End: 2023-01-01
Payer: MEDICAID

## 2023-01-01 ENCOUNTER — ANTI-COAG VISIT (OUTPATIENT)
Dept: CARDIOLOGY | Facility: CLINIC | Age: 58
End: 2023-01-01
Payer: MEDICAID

## 2023-01-01 ENCOUNTER — PES CALL (OUTPATIENT)
Dept: ADMINISTRATIVE | Facility: CLINIC | Age: 58
End: 2023-01-01
Payer: MEDICAID

## 2023-01-01 ENCOUNTER — TELEPHONE (OUTPATIENT)
Dept: CARDIOLOGY | Facility: CLINIC | Age: 58
End: 2023-01-01
Payer: MEDICAID

## 2023-01-01 ENCOUNTER — LAB VISIT (OUTPATIENT)
Dept: LAB | Facility: HOSPITAL | Age: 58
End: 2023-01-01
Attending: INTERNAL MEDICINE
Payer: MEDICAID

## 2023-01-01 ENCOUNTER — PATIENT MESSAGE (OUTPATIENT)
Dept: CARDIOLOGY | Facility: CLINIC | Age: 58
End: 2023-01-01
Payer: MEDICAID

## 2023-01-01 ENCOUNTER — OFFICE VISIT (OUTPATIENT)
Dept: CARDIOLOGY | Facility: CLINIC | Age: 58
End: 2023-01-01
Payer: MEDICAID

## 2023-01-01 ENCOUNTER — PATIENT MESSAGE (OUTPATIENT)
Dept: ADMINISTRATIVE | Facility: CLINIC | Age: 58
End: 2023-01-01
Payer: MEDICAID

## 2023-01-01 ENCOUNTER — HOSPITAL ENCOUNTER (INPATIENT)
Facility: HOSPITAL | Age: 58
LOS: 7 days | Discharge: HOME | DRG: 280 | End: 2023-02-09
Attending: EMERGENCY MEDICINE | Admitting: HOSPITALIST
Payer: MEDICAID

## 2023-01-01 ENCOUNTER — DOCUMENTATION ONLY (OUTPATIENT)
Dept: CARDIOTHORACIC SURGERY | Facility: CLINIC | Age: 58
End: 2023-01-01
Payer: MEDICAID

## 2023-01-01 ENCOUNTER — TELEPHONE (OUTPATIENT)
Dept: PRIMARY CARE CLINIC | Facility: CLINIC | Age: 58
End: 2023-01-01
Payer: MEDICAID

## 2023-01-01 ENCOUNTER — ANESTHESIA EVENT (OUTPATIENT)
Dept: INTENSIVE CARE | Facility: HOSPITAL | Age: 58
DRG: 189 | End: 2023-01-01
Payer: MEDICAID

## 2023-01-01 ENCOUNTER — HOSPITAL ENCOUNTER (INPATIENT)
Facility: HOSPITAL | Age: 58
LOS: 3 days | Discharge: HOME | DRG: 193 | End: 2023-06-26
Attending: EMERGENCY MEDICINE | Admitting: STUDENT IN AN ORGANIZED HEALTH CARE EDUCATION/TRAINING PROGRAM
Payer: MEDICAID

## 2023-01-01 ENCOUNTER — ANESTHESIA (OUTPATIENT)
Dept: INTENSIVE CARE | Facility: HOSPITAL | Age: 58
DRG: 189 | End: 2023-01-01
Payer: MEDICAID

## 2023-01-01 ENCOUNTER — LAB VISIT (OUTPATIENT)
Dept: LAB | Facility: HOSPITAL | Age: 58
End: 2023-01-01
Payer: MEDICAID

## 2023-01-01 ENCOUNTER — HOSPITAL ENCOUNTER (EMERGENCY)
Facility: HOSPITAL | Age: 58
Discharge: HOME | End: 2023-03-15
Attending: EMERGENCY MEDICINE
Payer: MEDICAID

## 2023-01-01 ENCOUNTER — HOSPITAL ENCOUNTER (OUTPATIENT)
Dept: CARDIOLOGY | Facility: HOSPITAL | Age: 58
Discharge: HOME | End: 2023-03-30
Attending: THORACIC SURGERY (CARDIOTHORACIC VASCULAR SURGERY)
Payer: MEDICAID

## 2023-01-01 ENCOUNTER — HOSPITAL ENCOUNTER (OUTPATIENT)
Dept: CARDIOLOGY | Facility: HOSPITAL | Age: 58
Discharge: HOME | End: 2023-05-02
Attending: INTERNAL MEDICINE
Payer: MEDICAID

## 2023-01-01 ENCOUNTER — PATIENT OUTREACH (OUTPATIENT)
Dept: ADMINISTRATIVE | Facility: CLINIC | Age: 58
End: 2023-01-01
Payer: MEDICAID

## 2023-01-01 ENCOUNTER — HOSPITAL ENCOUNTER (INPATIENT)
Facility: HOSPITAL | Age: 58
LOS: 4 days | DRG: 189 | End: 2023-07-18
Attending: EMERGENCY MEDICINE | Admitting: STUDENT IN AN ORGANIZED HEALTH CARE EDUCATION/TRAINING PROGRAM
Payer: MEDICAID

## 2023-01-01 ENCOUNTER — HOSPITAL ENCOUNTER (EMERGENCY)
Facility: HOSPITAL | Age: 58
Discharge: HOME | End: 2023-03-07
Attending: EMERGENCY MEDICINE
Payer: MEDICAID

## 2023-01-01 ENCOUNTER — OFFICE VISIT (OUTPATIENT)
Dept: CARDIOTHORACIC SURGERY | Facility: CLINIC | Age: 58
End: 2023-01-01
Payer: MEDICAID

## 2023-01-01 ENCOUNTER — OFFICE VISIT (OUTPATIENT)
Dept: PRIMARY CARE CLINIC | Facility: CLINIC | Age: 58
End: 2023-01-01
Payer: MEDICAID

## 2023-01-01 ENCOUNTER — TELEPHONE (OUTPATIENT)
Dept: CARDIOLOGY | Facility: HOSPITAL | Age: 58
End: 2023-01-01
Payer: MEDICAID

## 2023-01-01 ENCOUNTER — HOSPITAL ENCOUNTER (OUTPATIENT)
Dept: CARDIOLOGY | Facility: HOSPITAL | Age: 58
Discharge: HOME | End: 2023-07-10
Attending: INTERNAL MEDICINE
Payer: MEDICAID

## 2023-01-01 ENCOUNTER — OFFICE VISIT (OUTPATIENT)
Dept: URGENT CARE | Facility: CLINIC | Age: 58
End: 2023-01-01
Payer: MEDICAID

## 2023-01-01 VITALS
TEMPERATURE: 98 F | DIASTOLIC BLOOD PRESSURE: 67 MMHG | HEART RATE: 63 BPM | SYSTOLIC BLOOD PRESSURE: 130 MMHG | RESPIRATION RATE: 16 BRPM | OXYGEN SATURATION: 94 %

## 2023-01-01 VITALS
OXYGEN SATURATION: 92 % | BODY MASS INDEX: 28.28 KG/M2 | HEIGHT: 62 IN | DIASTOLIC BLOOD PRESSURE: 68 MMHG | RESPIRATION RATE: 20 BRPM | WEIGHT: 153.69 LBS | SYSTOLIC BLOOD PRESSURE: 138 MMHG | TEMPERATURE: 98 F | HEART RATE: 60 BPM

## 2023-01-01 VITALS
SYSTOLIC BLOOD PRESSURE: 155 MMHG | HEIGHT: 62 IN | RESPIRATION RATE: 18 BRPM | HEART RATE: 64 BPM | DIASTOLIC BLOOD PRESSURE: 70 MMHG | OXYGEN SATURATION: 92 % | WEIGHT: 153 LBS | BODY MASS INDEX: 28.16 KG/M2

## 2023-01-01 VITALS
RESPIRATION RATE: 18 BRPM | DIASTOLIC BLOOD PRESSURE: 36 MMHG | OXYGEN SATURATION: 98 % | SYSTOLIC BLOOD PRESSURE: 62 MMHG | TEMPERATURE: 98 F | HEART RATE: 87 BPM | HEIGHT: 62 IN | BODY MASS INDEX: 28.52 KG/M2 | WEIGHT: 155 LBS

## 2023-01-01 VITALS
RESPIRATION RATE: 18 BRPM | HEART RATE: 70 BPM | OXYGEN SATURATION: 95 % | DIASTOLIC BLOOD PRESSURE: 65 MMHG | TEMPERATURE: 97 F | SYSTOLIC BLOOD PRESSURE: 136 MMHG | WEIGHT: 210.31 LBS | BODY MASS INDEX: 38.7 KG/M2 | HEIGHT: 62 IN

## 2023-01-01 VITALS — BODY MASS INDEX: 38.64 KG/M2 | HEART RATE: 74 BPM | HEIGHT: 62 IN | WEIGHT: 210 LBS

## 2023-01-01 VITALS
HEART RATE: 63 BPM | DIASTOLIC BLOOD PRESSURE: 80 MMHG | BODY MASS INDEX: 28.52 KG/M2 | SYSTOLIC BLOOD PRESSURE: 148 MMHG | HEIGHT: 62 IN | WEIGHT: 155 LBS

## 2023-01-01 VITALS
SYSTOLIC BLOOD PRESSURE: 161 MMHG | BODY MASS INDEX: 27.99 KG/M2 | HEART RATE: 68 BPM | OXYGEN SATURATION: 96 % | TEMPERATURE: 97 F | DIASTOLIC BLOOD PRESSURE: 71 MMHG | HEIGHT: 62 IN | RESPIRATION RATE: 18 BRPM | WEIGHT: 152.13 LBS

## 2023-01-01 VITALS
TEMPERATURE: 98 F | RESPIRATION RATE: 20 BRPM | WEIGHT: 155 LBS | BODY MASS INDEX: 28.35 KG/M2 | HEART RATE: 58 BPM | OXYGEN SATURATION: 94 % | SYSTOLIC BLOOD PRESSURE: 151 MMHG | DIASTOLIC BLOOD PRESSURE: 73 MMHG

## 2023-01-01 VITALS
HEART RATE: 65 BPM | DIASTOLIC BLOOD PRESSURE: 64 MMHG | OXYGEN SATURATION: 95 % | SYSTOLIC BLOOD PRESSURE: 138 MMHG | BODY MASS INDEX: 28.8 KG/M2 | HEIGHT: 62 IN | WEIGHT: 156.5 LBS

## 2023-01-01 VITALS
WEIGHT: 153.25 LBS | BODY MASS INDEX: 28.2 KG/M2 | HEIGHT: 62 IN | TEMPERATURE: 98 F | OXYGEN SATURATION: 97 % | DIASTOLIC BLOOD PRESSURE: 67 MMHG | RESPIRATION RATE: 16 BRPM | SYSTOLIC BLOOD PRESSURE: 156 MMHG | HEART RATE: 67 BPM

## 2023-01-01 DIAGNOSIS — Z99.2 ESRD (END STAGE RENAL DISEASE) ON DIALYSIS: ICD-10-CM

## 2023-01-01 DIAGNOSIS — J96.21 ACUTE ON CHRONIC RESPIRATORY FAILURE WITH HYPOXIA: ICD-10-CM

## 2023-01-01 DIAGNOSIS — Z79.01 LONG TERM (CURRENT) USE OF ANTICOAGULANTS: ICD-10-CM

## 2023-01-01 DIAGNOSIS — I23.6 LV (LEFT VENTRICULAR) MURAL THROMBUS FOLLOWING MI: Primary | ICD-10-CM

## 2023-01-01 DIAGNOSIS — N18.6 ESRD (END STAGE RENAL DISEASE) ON DIALYSIS: ICD-10-CM

## 2023-01-01 DIAGNOSIS — I23.6 LV (LEFT VENTRICULAR) MURAL THROMBUS FOLLOWING MI: ICD-10-CM

## 2023-01-01 DIAGNOSIS — U07.1 COVID-19 VIRUS DETECTED: ICD-10-CM

## 2023-01-01 DIAGNOSIS — I25.10 CORONARY ARTERY DISEASE INVOLVING NATIVE CORONARY ARTERY OF NATIVE HEART WITHOUT ANGINA PECTORIS: ICD-10-CM

## 2023-01-01 DIAGNOSIS — R07.9 CHEST PAIN: ICD-10-CM

## 2023-01-01 DIAGNOSIS — J96.01 ACUTE RESPIRATORY FAILURE WITH HYPOXIA: Primary | ICD-10-CM

## 2023-01-01 DIAGNOSIS — Z01.818 PRE-OP TESTING: ICD-10-CM

## 2023-01-01 DIAGNOSIS — I50.42 CHRONIC COMBINED SYSTOLIC AND DIASTOLIC HEART FAILURE: Chronic | ICD-10-CM

## 2023-01-01 DIAGNOSIS — R06.02 SOB (SHORTNESS OF BREATH): ICD-10-CM

## 2023-01-01 DIAGNOSIS — R79.89 ELEVATED TROPONIN: ICD-10-CM

## 2023-01-01 DIAGNOSIS — J18.9 PNEUMONIA OF RIGHT LOWER LOBE DUE TO INFECTIOUS ORGANISM: ICD-10-CM

## 2023-01-01 DIAGNOSIS — Z79.01 LONG TERM (CURRENT) USE OF ANTICOAGULANTS: Primary | ICD-10-CM

## 2023-01-01 DIAGNOSIS — E87.70 HYPERVOLEMIA: ICD-10-CM

## 2023-01-01 DIAGNOSIS — E78.5 HYPERLIPIDEMIA, UNSPECIFIED HYPERLIPIDEMIA TYPE: ICD-10-CM

## 2023-01-01 DIAGNOSIS — I46.9 PEA (PULSELESS ELECTRICAL ACTIVITY): ICD-10-CM

## 2023-01-01 DIAGNOSIS — R23.3 BRUISING, SPONTANEOUS: Primary | ICD-10-CM

## 2023-01-01 DIAGNOSIS — U07.1 LAB TEST POSITIVE FOR DETECTION OF COVID-19 VIRUS: ICD-10-CM

## 2023-01-01 DIAGNOSIS — N18.6 ESRD (END STAGE RENAL DISEASE) ON DIALYSIS: Primary | ICD-10-CM

## 2023-01-01 DIAGNOSIS — T88.4XXA HARD TO INTUBATE, INITIAL ENCOUNTER: Primary | ICD-10-CM

## 2023-01-01 DIAGNOSIS — I25.10 CORONARY ARTERY DISEASE INVOLVING NATIVE CORONARY ARTERY OF NATIVE HEART WITHOUT ANGINA PECTORIS: Primary | ICD-10-CM

## 2023-01-01 DIAGNOSIS — R06.00 DYSPNEA: ICD-10-CM

## 2023-01-01 DIAGNOSIS — R52 BODY ACHES: Primary | ICD-10-CM

## 2023-01-01 DIAGNOSIS — I50.41 ACUTE COMBINED SYSTOLIC AND DIASTOLIC HEART FAILURE: ICD-10-CM

## 2023-01-01 DIAGNOSIS — I10 ESSENTIAL HYPERTENSION: ICD-10-CM

## 2023-01-01 DIAGNOSIS — J96.21 ACUTE ON CHRONIC RESPIRATORY FAILURE WITH HYPOXIA: Primary | ICD-10-CM

## 2023-01-01 DIAGNOSIS — I50.9 CHF (CONGESTIVE HEART FAILURE): ICD-10-CM

## 2023-01-01 DIAGNOSIS — Z78.9 PROBLEM WITH VASCULAR ACCESS: Primary | ICD-10-CM

## 2023-01-01 DIAGNOSIS — I50.9 NEW ONSET OF CONGESTIVE HEART FAILURE: ICD-10-CM

## 2023-01-01 DIAGNOSIS — Z99.2 ESRD (END STAGE RENAL DISEASE) ON DIALYSIS: Primary | ICD-10-CM

## 2023-01-01 DIAGNOSIS — I50.42 CHRONIC COMBINED SYSTOLIC AND DIASTOLIC HEART FAILURE: ICD-10-CM

## 2023-01-01 DIAGNOSIS — I21.4 NSTEMI (NON-ST ELEVATED MYOCARDIAL INFARCTION): ICD-10-CM

## 2023-01-01 DIAGNOSIS — J90 PLEURAL EFFUSION: ICD-10-CM

## 2023-01-01 DIAGNOSIS — R53.81 DEBILITY: ICD-10-CM

## 2023-01-01 DIAGNOSIS — I25.118 CORONARY ARTERY DISEASE OF NATIVE ARTERY OF NATIVE HEART WITH STABLE ANGINA PECTORIS: Chronic | ICD-10-CM

## 2023-01-01 DIAGNOSIS — R06.02 SHORTNESS OF BREATH: ICD-10-CM

## 2023-01-01 DIAGNOSIS — I50.9 ACUTE ON CHRONIC CONGESTIVE HEART FAILURE, UNSPECIFIED HEART FAILURE TYPE: ICD-10-CM

## 2023-01-01 LAB
25(OH)D3+25(OH)D2 SERPL-MCNC: 22 NG/ML (ref 30–96)
ACID FAST MOD KINY STN SPEC: NORMAL
ALBUMIN SERPL BCP-MCNC: 2.3 G/DL (ref 3.5–5.2)
ALBUMIN SERPL BCP-MCNC: 3.3 G/DL (ref 3.5–5.2)
ALBUMIN SERPL BCP-MCNC: 3.4 G/DL (ref 3.5–5.2)
ALBUMIN SERPL BCP-MCNC: 3.6 G/DL (ref 3.5–5.2)
ALBUMIN SERPL BCP-MCNC: 4 G/DL (ref 3.5–5.2)
ALBUMIN SERPL BCP-MCNC: 4 G/DL (ref 3.5–5.2)
ALBUMIN SERPL BCP-MCNC: 4.2 G/DL (ref 3.5–5.2)
ALLENS TEST: ABNORMAL
ALP SERPL-CCNC: 50 U/L (ref 55–135)
ALP SERPL-CCNC: 58 U/L (ref 55–135)
ALP SERPL-CCNC: 58 U/L (ref 55–135)
ALP SERPL-CCNC: 60 U/L (ref 55–135)
ALP SERPL-CCNC: 67 U/L (ref 55–135)
ALP SERPL-CCNC: 67 U/L (ref 55–135)
ALP SERPL-CCNC: 68 U/L (ref 55–135)
ALP SERPL-CCNC: 73 U/L (ref 55–135)
ALP SERPL-CCNC: 76 U/L (ref 55–135)
ALT SERPL W/O P-5'-P-CCNC: 10 U/L (ref 10–44)
ALT SERPL W/O P-5'-P-CCNC: 11 U/L (ref 10–44)
ALT SERPL W/O P-5'-P-CCNC: 12 U/L (ref 10–44)
ALT SERPL W/O P-5'-P-CCNC: 12 U/L (ref 10–44)
ALT SERPL W/O P-5'-P-CCNC: 31 U/L (ref 10–44)
ALT SERPL W/O P-5'-P-CCNC: 7 U/L (ref 10–44)
ALT SERPL W/O P-5'-P-CCNC: 8 U/L (ref 10–44)
ALT SERPL W/O P-5'-P-CCNC: 8 U/L (ref 10–44)
ALT SERPL W/O P-5'-P-CCNC: 9 U/L (ref 10–44)
ANION GAP SERPL CALC-SCNC: 11 MMOL/L (ref 8–16)
ANION GAP SERPL CALC-SCNC: 12 MMOL/L (ref 8–16)
ANION GAP SERPL CALC-SCNC: 13 MMOL/L (ref 8–16)
ANION GAP SERPL CALC-SCNC: 14 MMOL/L (ref 8–16)
ANION GAP SERPL CALC-SCNC: 15 MMOL/L (ref 8–16)
ANION GAP SERPL CALC-SCNC: 15 MMOL/L (ref 8–16)
ANION GAP SERPL CALC-SCNC: 16 MMOL/L (ref 8–16)
ANION GAP SERPL CALC-SCNC: 16 MMOL/L (ref 8–16)
ANION GAP SERPL CALC-SCNC: 17 MMOL/L (ref 8–16)
ANISOCYTOSIS BLD QL SMEAR: SLIGHT
APPEARANCE FLD: NORMAL
APTT BLDCRRT: 32.9 SEC (ref 21–32)
APTT BLDCRRT: 33.4 SEC (ref 21–32)
APTT BLDCRRT: 37.1 SEC (ref 21–32)
APTT BLDCRRT: 38.4 SEC (ref 21–32)
APTT BLDCRRT: 39.1 SEC (ref 21–32)
APTT BLDCRRT: 39.2 SEC (ref 21–32)
APTT BLDCRRT: 39.3 SEC (ref 21–32)
APTT BLDCRRT: 40.4 SEC (ref 21–32)
APTT BLDCRRT: 40.5 SEC (ref 21–32)
APTT BLDCRRT: 42.6 SEC (ref 21–32)
APTT BLDCRRT: 44 SEC (ref 21–32)
APTT BLDCRRT: 46.7 SEC (ref 21–32)
APTT BLDCRRT: 54.1 SEC (ref 21–32)
AST SERPL-CCNC: 17 U/L (ref 10–40)
AST SERPL-CCNC: 17 U/L (ref 10–40)
AST SERPL-CCNC: 18 U/L (ref 10–40)
AST SERPL-CCNC: 22 U/L (ref 10–40)
AST SERPL-CCNC: 22 U/L (ref 10–40)
AST SERPL-CCNC: 53 U/L (ref 10–40)
AV INDEX (PROSTH): 0.39
AV MEAN GRADIENT: 10 MMHG
AV PEAK GRADIENT: 17 MMHG
AV VALVE AREA: 1.24 CM2
AV VELOCITY RATIO: 0.41
BACTERIA BLD CULT: NORMAL
BACTERIA BLD CULT: NORMAL
BASOPHILS # BLD AUTO: 0.02 K/UL (ref 0–0.2)
BASOPHILS # BLD AUTO: 0.03 K/UL (ref 0–0.2)
BASOPHILS # BLD AUTO: 0.04 K/UL (ref 0–0.2)
BASOPHILS # BLD AUTO: 0.05 K/UL (ref 0–0.2)
BASOPHILS # BLD AUTO: 0.06 K/UL (ref 0–0.2)
BASOPHILS # BLD AUTO: 0.06 K/UL (ref 0–0.2)
BASOPHILS # BLD AUTO: 0.07 K/UL (ref 0–0.2)
BASOPHILS # BLD AUTO: 0.08 K/UL (ref 0–0.2)
BASOPHILS # BLD AUTO: ABNORMAL K/UL (ref 0–0.2)
BASOPHILS NFR BLD: 0 % (ref 0–1.9)
BASOPHILS NFR BLD: 0.3 % (ref 0–1.9)
BASOPHILS NFR BLD: 0.4 % (ref 0–1.9)
BASOPHILS NFR BLD: 0.4 % (ref 0–1.9)
BASOPHILS NFR BLD: 0.6 % (ref 0–1.9)
BASOPHILS NFR BLD: 0.7 % (ref 0–1.9)
BASOPHILS NFR BLD: 0.7 % (ref 0–1.9)
BASOPHILS NFR BLD: 0.8 % (ref 0–1.9)
BASOPHILS NFR BLD: 0.9 % (ref 0–1.9)
BASOPHILS NFR BLD: 1 % (ref 0–1.9)
BASOPHILS NFR BLD: 1.2 % (ref 0–1.9)
BASOPHILS NFR BLD: 1.3 % (ref 0–1.9)
BILIRUB SERPL-MCNC: 0.5 MG/DL (ref 0.1–1)
BILIRUB SERPL-MCNC: 0.6 MG/DL (ref 0.1–1)
BILIRUB SERPL-MCNC: 0.7 MG/DL (ref 0.1–1)
BILIRUB SERPL-MCNC: 1 MG/DL (ref 0.1–1)
BNP SERPL-MCNC: >4900 PG/ML (ref 0–99)
BODY FLD TYPE: NORMAL
BSA FOR ECHO PROCEDURE: 1.74 M2
BUN SERPL-MCNC: 19 MG/DL (ref 6–20)
BUN SERPL-MCNC: 22 MG/DL (ref 6–20)
BUN SERPL-MCNC: 22 MG/DL (ref 6–20)
BUN SERPL-MCNC: 25 MG/DL (ref 6–20)
BUN SERPL-MCNC: 26 MG/DL (ref 6–20)
BUN SERPL-MCNC: 30 MG/DL (ref 6–20)
BUN SERPL-MCNC: 31 MG/DL (ref 6–20)
BUN SERPL-MCNC: 32 MG/DL (ref 6–20)
BUN SERPL-MCNC: 33 MG/DL (ref 6–20)
BUN SERPL-MCNC: 33 MG/DL (ref 6–20)
BUN SERPL-MCNC: 35 MG/DL (ref 6–20)
BUN SERPL-MCNC: 39 MG/DL (ref 6–20)
BUN SERPL-MCNC: 41 MG/DL (ref 6–20)
BUN SERPL-MCNC: 56 MG/DL (ref 6–20)
BUN SERPL-MCNC: 59 MG/DL (ref 6–20)
BUN SERPL-MCNC: 60 MG/DL (ref 6–20)
BUN SERPL-MCNC: 67 MG/DL (ref 6–20)
BUN SERPL-MCNC: 67 MG/DL (ref 6–20)
BURR CELLS BLD QL SMEAR: ABNORMAL
CALCIUM SERPL-MCNC: 10 MG/DL (ref 8.7–10.5)
CALCIUM SERPL-MCNC: 10.5 MG/DL (ref 8.7–10.5)
CALCIUM SERPL-MCNC: 10.8 MG/DL (ref 8.7–10.5)
CALCIUM SERPL-MCNC: 7.9 MG/DL (ref 8.7–10.5)
CALCIUM SERPL-MCNC: 8.6 MG/DL (ref 8.7–10.5)
CALCIUM SERPL-MCNC: 8.8 MG/DL (ref 8.7–10.5)
CALCIUM SERPL-MCNC: 9.2 MG/DL (ref 8.7–10.5)
CALCIUM SERPL-MCNC: 9.2 MG/DL (ref 8.7–10.5)
CALCIUM SERPL-MCNC: 9.3 MG/DL (ref 8.7–10.5)
CALCIUM SERPL-MCNC: 9.3 MG/DL (ref 8.7–10.5)
CALCIUM SERPL-MCNC: 9.4 MG/DL (ref 8.7–10.5)
CALCIUM SERPL-MCNC: 9.4 MG/DL (ref 8.7–10.5)
CALCIUM SERPL-MCNC: 9.5 MG/DL (ref 8.7–10.5)
CALCIUM SERPL-MCNC: 9.6 MG/DL (ref 8.7–10.5)
CALCIUM SERPL-MCNC: 9.6 MG/DL (ref 8.7–10.5)
CALCIUM SERPL-MCNC: 9.7 MG/DL (ref 8.7–10.5)
CALCIUM SERPL-MCNC: 9.8 MG/DL (ref 8.7–10.5)
CALCIUM SERPL-MCNC: 9.8 MG/DL (ref 8.7–10.5)
CFR FLOW - ANTERIOR: 1.35
CFR FLOW - INFERIOR: 1.02
CFR FLOW - LATERAL: 0.99
CFR FLOW - MAX: 1.63
CFR FLOW - MIN: 0.63
CFR FLOW - SEPTAL: 0.92
CFR FLOW - WHOLE HEART: 1.07
CHLORIDE SERPL-SCNC: 101 MMOL/L (ref 95–110)
CHLORIDE SERPL-SCNC: 102 MMOL/L (ref 95–110)
CHLORIDE SERPL-SCNC: 108 MMOL/L (ref 95–110)
CHLORIDE SERPL-SCNC: 94 MMOL/L (ref 95–110)
CHLORIDE SERPL-SCNC: 94 MMOL/L (ref 95–110)
CHLORIDE SERPL-SCNC: 95 MMOL/L (ref 95–110)
CHLORIDE SERPL-SCNC: 96 MMOL/L (ref 95–110)
CHLORIDE SERPL-SCNC: 97 MMOL/L (ref 95–110)
CHLORIDE SERPL-SCNC: 98 MMOL/L (ref 95–110)
CHLORIDE SERPL-SCNC: 99 MMOL/L (ref 95–110)
CO2 SERPL-SCNC: 13 MMOL/L (ref 23–29)
CO2 SERPL-SCNC: 15 MMOL/L (ref 23–29)
CO2 SERPL-SCNC: 16 MMOL/L (ref 23–29)
CO2 SERPL-SCNC: 17 MMOL/L (ref 23–29)
CO2 SERPL-SCNC: 19 MMOL/L (ref 23–29)
CO2 SERPL-SCNC: 21 MMOL/L (ref 23–29)
CO2 SERPL-SCNC: 21 MMOL/L (ref 23–29)
CO2 SERPL-SCNC: 23 MMOL/L (ref 23–29)
CO2 SERPL-SCNC: 26 MMOL/L (ref 23–29)
CO2 SERPL-SCNC: 27 MMOL/L (ref 23–29)
CO2 SERPL-SCNC: 31 MMOL/L (ref 23–29)
COLOR FLD: YELLOW
CREAT SERPL-MCNC: 3.4 MG/DL (ref 0.5–1.4)
CREAT SERPL-MCNC: 3.8 MG/DL (ref 0.5–1.4)
CREAT SERPL-MCNC: 3.8 MG/DL (ref 0.5–1.4)
CREAT SERPL-MCNC: 4.1 MG/DL (ref 0.5–1.4)
CREAT SERPL-MCNC: 4.1 MG/DL (ref 0.5–1.4)
CREAT SERPL-MCNC: 4.7 MG/DL (ref 0.5–1.4)
CREAT SERPL-MCNC: 4.7 MG/DL (ref 0.5–1.4)
CREAT SERPL-MCNC: 4.8 MG/DL (ref 0.5–1.4)
CREAT SERPL-MCNC: 4.9 MG/DL (ref 0.5–1.4)
CREAT SERPL-MCNC: 5.2 MG/DL (ref 0.5–1.4)
CREAT SERPL-MCNC: 5.4 MG/DL (ref 0.5–1.4)
CREAT SERPL-MCNC: 5.8 MG/DL (ref 0.5–1.4)
CREAT SERPL-MCNC: 6.1 MG/DL (ref 0.5–1.4)
CREAT SERPL-MCNC: 6.7 MG/DL (ref 0.5–1.4)
CREAT SERPL-MCNC: 6.9 MG/DL (ref 0.5–1.4)
CREAT SERPL-MCNC: 7.1 MG/DL (ref 0.5–1.4)
CREAT SERPL-MCNC: 7.7 MG/DL (ref 0.5–1.4)
CREAT SERPL-MCNC: 7.8 MG/DL (ref 0.5–1.4)
CRP SERPL-MCNC: 6.5 MG/L (ref 0–8.2)
CTP QC/QA: YES
CV ECHO LV RWT: 0.44 CM
CV ECHO LV RWT: 0.54 CM
CV PHARM DOSE: 0.4 MG
CV STRESS BASE HR: 63 BPM
DELSYS: ABNORMAL
DIASTOLIC BLOOD PRESSURE: 80 MMHG
DIFFERENTIAL METHOD: ABNORMAL
DOP CALC AO PEAK VEL: 2.07 M/S
DOP CALC AO VTI: 43.6 CM
DOP CALC LVOT AREA: 2.2 CM2
DOP CALC LVOT AREA: 3.1 CM2
DOP CALC LVOT DIAMETER: 1.66 CM
DOP CALC LVOT DIAMETER: 2 CM
DOP CALC LVOT PEAK VEL: 0.84 M/S
DOP CALC LVOT STROKE VOLUME: 54.01 CM3
DOP CALC MV VTI: 25.9 CM
DOP CALCLVOT PEAK VEL VTI: 17.2 CM
E WAVE DECELERATION TIME: 194.58 MSEC
E/A RATIO: 1.81
E/E' RATIO: 24.89 M/S
ECHO LV POSTERIOR WALL: 1.18 CM (ref 0.6–1.1)
ECHO LV POSTERIOR WALL: 1.4 CM (ref 0.6–1.1)
EJECTION FRACTION- HIGH: 59 %
EJECTION FRACTION- HIGH: 59 %
EJECTION FRACTION: 20 %
EJECTION FRACTION: 25 %
END DIASTOLIC INDEX-HIGH: 155 ML/M2
END DIASTOLIC INDEX-HIGH: 155 ML/M2
END DIASTOLIC INDEX-LOW: 91 ML/M2
END DIASTOLIC INDEX-LOW: 91 ML/M2
END SYSTOLIC INDEX-HIGH: 78 ML/M2
END SYSTOLIC INDEX-HIGH: 78 ML/M2
END SYSTOLIC INDEX-LOW: 40 ML/M2
END SYSTOLIC INDEX-LOW: 40 ML/M2
EOSINOPHIL # BLD AUTO: 0 K/UL (ref 0–0.5)
EOSINOPHIL # BLD AUTO: 0.1 K/UL (ref 0–0.5)
EOSINOPHIL # BLD AUTO: 0.2 K/UL (ref 0–0.5)
EOSINOPHIL # BLD AUTO: 0.2 K/UL (ref 0–0.5)
EOSINOPHIL # BLD AUTO: 0.3 K/UL (ref 0–0.5)
EOSINOPHIL # BLD AUTO: 0.4 K/UL (ref 0–0.5)
EOSINOPHIL # BLD AUTO: 0.4 K/UL (ref 0–0.5)
EOSINOPHIL # BLD AUTO: 0.5 K/UL (ref 0–0.5)
EOSINOPHIL # BLD AUTO: 0.6 K/UL (ref 0–0.5)
EOSINOPHIL # BLD AUTO: ABNORMAL K/UL (ref 0–0.5)
EOSINOPHIL NFR BLD: 0.1 % (ref 0–8)
EOSINOPHIL NFR BLD: 0.8 % (ref 0–8)
EOSINOPHIL NFR BLD: 2.1 % (ref 0–8)
EOSINOPHIL NFR BLD: 2.6 % (ref 0–8)
EOSINOPHIL NFR BLD: 3 % (ref 0–8)
EOSINOPHIL NFR BLD: 3.4 % (ref 0–8)
EOSINOPHIL NFR BLD: 3.7 % (ref 0–8)
EOSINOPHIL NFR BLD: 3.8 % (ref 0–8)
EOSINOPHIL NFR BLD: 4.2 % (ref 0–8)
EOSINOPHIL NFR BLD: 4.4 % (ref 0–8)
EOSINOPHIL NFR BLD: 4.6 % (ref 0–8)
EOSINOPHIL NFR BLD: 5.4 % (ref 0–8)
EOSINOPHIL NFR BLD: 6.3 % (ref 0–8)
EOSINOPHIL NFR BLD: 6.3 % (ref 0–8)
EOSINOPHIL NFR BLD: 7 % (ref 0–8)
EOSINOPHIL NFR BLD: 7.8 % (ref 0–8)
EOSINOPHIL NFR BLD: 8.9 % (ref 0–8)
ERYTHROCYTE [DISTWIDTH] IN BLOOD BY AUTOMATED COUNT: 14.8 % (ref 11.5–14.5)
ERYTHROCYTE [DISTWIDTH] IN BLOOD BY AUTOMATED COUNT: 14.9 % (ref 11.5–14.5)
ERYTHROCYTE [DISTWIDTH] IN BLOOD BY AUTOMATED COUNT: 14.9 % (ref 11.5–14.5)
ERYTHROCYTE [DISTWIDTH] IN BLOOD BY AUTOMATED COUNT: 15.1 % (ref 11.5–14.5)
ERYTHROCYTE [DISTWIDTH] IN BLOOD BY AUTOMATED COUNT: 15.2 % (ref 11.5–14.5)
ERYTHROCYTE [DISTWIDTH] IN BLOOD BY AUTOMATED COUNT: 15.3 % (ref 11.5–14.5)
ERYTHROCYTE [DISTWIDTH] IN BLOOD BY AUTOMATED COUNT: 15.4 % (ref 11.5–14.5)
ERYTHROCYTE [DISTWIDTH] IN BLOOD BY AUTOMATED COUNT: 15.4 % (ref 11.5–14.5)
ERYTHROCYTE [DISTWIDTH] IN BLOOD BY AUTOMATED COUNT: 15.9 % (ref 11.5–14.5)
ERYTHROCYTE [DISTWIDTH] IN BLOOD BY AUTOMATED COUNT: 15.9 % (ref 11.5–14.5)
ERYTHROCYTE [DISTWIDTH] IN BLOOD BY AUTOMATED COUNT: 16.5 % (ref 11.5–14.5)
ERYTHROCYTE [DISTWIDTH] IN BLOOD BY AUTOMATED COUNT: 16.5 % (ref 11.5–14.5)
ERYTHROCYTE [DISTWIDTH] IN BLOOD BY AUTOMATED COUNT: 17 % (ref 11.5–14.5)
ERYTHROCYTE [DISTWIDTH] IN BLOOD BY AUTOMATED COUNT: 17.2 % (ref 11.5–14.5)
ERYTHROCYTE [DISTWIDTH] IN BLOOD BY AUTOMATED COUNT: 17.4 % (ref 11.5–14.5)
ERYTHROCYTE [DISTWIDTH] IN BLOOD BY AUTOMATED COUNT: 17.6 % (ref 11.5–14.5)
ERYTHROCYTE [SEDIMENTATION RATE] IN BLOOD BY WESTERGREN METHOD: 25 MM/H
EST. GFR  (NO RACE VARIABLE): 10 ML/MIN/1.73 M^2
EST. GFR  (NO RACE VARIABLE): 12 ML/MIN/1.73 M^2
EST. GFR  (NO RACE VARIABLE): 12 ML/MIN/1.73 M^2
EST. GFR  (NO RACE VARIABLE): 13 ML/MIN/1.73 M^2
EST. GFR  (NO RACE VARIABLE): 13 ML/MIN/1.73 M^2
EST. GFR  (NO RACE VARIABLE): 15 ML/MIN/1.73 M^2
EST. GFR  (NO RACE VARIABLE): 6 ML/MIN/1.73 M^2
EST. GFR  (NO RACE VARIABLE): 7 ML/MIN/1.73 M^2
EST. GFR  (NO RACE VARIABLE): 7 ML/MIN/1.73 M^2
EST. GFR  (NO RACE VARIABLE): 8 ML/MIN/1.73 M^2
EST. GFR  (NO RACE VARIABLE): 9 ML/MIN/1.73 M^2
EST. GFR  (NO RACE VARIABLE): 9 ML/MIN/1.73 M^2
FERRITIN SERPL-MCNC: 2021 NG/ML (ref 20–300)
FIO2: 21 %
FLOW: 4
FRACTIONAL SHORTENING: 17 % (ref 28–44)
FRACTIONAL SHORTENING: 18 % (ref 28–44)
GLUCOSE SERPL-MCNC: 101 MG/DL (ref 70–110)
GLUCOSE SERPL-MCNC: 103 MG/DL (ref 70–110)
GLUCOSE SERPL-MCNC: 107 MG/DL (ref 70–110)
GLUCOSE SERPL-MCNC: 122 MG/DL (ref 70–110)
GLUCOSE SERPL-MCNC: 129 MG/DL (ref 70–110)
GLUCOSE SERPL-MCNC: 140 MG/DL (ref 70–110)
GLUCOSE SERPL-MCNC: 78 MG/DL (ref 70–110)
GLUCOSE SERPL-MCNC: 80 MG/DL (ref 70–110)
GLUCOSE SERPL-MCNC: 83 MG/DL (ref 70–110)
GLUCOSE SERPL-MCNC: 84 MG/DL (ref 70–110)
GLUCOSE SERPL-MCNC: 84 MG/DL (ref 70–110)
GLUCOSE SERPL-MCNC: 87 MG/DL (ref 70–110)
GLUCOSE SERPL-MCNC: 90 MG/DL (ref 70–110)
GLUCOSE SERPL-MCNC: 90 MG/DL (ref 70–110)
GLUCOSE SERPL-MCNC: 95 MG/DL (ref 70–110)
GLUCOSE SERPL-MCNC: 95 MG/DL (ref 70–110)
GLUCOSE SERPL-MCNC: 96 MG/DL (ref 70–110)
GLUCOSE SERPL-MCNC: 98 MG/DL (ref 70–110)
GRAM STN SPEC: NORMAL
GRAM STN SPEC: NORMAL
HAV IGM SERPL QL IA: NORMAL
HBV CORE AB SERPL QL IA: NORMAL
HBV CORE IGM SERPL QL IA: NORMAL
HBV SURFACE AG SERPL QL IA: NORMAL
HBV SURFACE AG SERPL QL IA: NORMAL
HCO3 UR-SCNC: 26.7 MMOL/L (ref 24–28)
HCT VFR BLD AUTO: 20.9 % (ref 37–48.5)
HCT VFR BLD AUTO: 29.6 % (ref 37–48.5)
HCT VFR BLD AUTO: 29.7 % (ref 37–48.5)
HCT VFR BLD AUTO: 30.6 % (ref 37–48.5)
HCT VFR BLD AUTO: 30.7 % (ref 37–48.5)
HCT VFR BLD AUTO: 30.8 % (ref 37–48.5)
HCT VFR BLD AUTO: 30.8 % (ref 37–48.5)
HCT VFR BLD AUTO: 31.2 % (ref 37–48.5)
HCT VFR BLD AUTO: 31.4 % (ref 37–48.5)
HCT VFR BLD AUTO: 31.6 % (ref 37–48.5)
HCT VFR BLD AUTO: 31.6 % (ref 37–48.5)
HCT VFR BLD AUTO: 32.3 % (ref 37–48.5)
HCT VFR BLD AUTO: 32.4 % (ref 37–48.5)
HCT VFR BLD AUTO: 32.9 % (ref 37–48.5)
HCT VFR BLD AUTO: 34.2 % (ref 37–48.5)
HCT VFR BLD AUTO: 34.3 % (ref 37–48.5)
HCT VFR BLD AUTO: 34.9 % (ref 37–48.5)
HCT VFR BLD AUTO: 35.9 % (ref 37–48.5)
HCV AB SERPL QL IA: NORMAL
HGB BLD-MCNC: 10 G/DL (ref 12–16)
HGB BLD-MCNC: 10.1 G/DL (ref 12–16)
HGB BLD-MCNC: 10.3 G/DL (ref 12–16)
HGB BLD-MCNC: 10.5 G/DL (ref 12–16)
HGB BLD-MCNC: 10.5 G/DL (ref 12–16)
HGB BLD-MCNC: 10.6 G/DL (ref 12–16)
HGB BLD-MCNC: 11 G/DL (ref 12–16)
HGB BLD-MCNC: 11.1 G/DL (ref 12–16)
HGB BLD-MCNC: 11.3 G/DL (ref 12–16)
HGB BLD-MCNC: 11.4 G/DL (ref 12–16)
HGB BLD-MCNC: 6.4 G/DL (ref 12–16)
HGB BLD-MCNC: 9.5 G/DL (ref 12–16)
HGB BLD-MCNC: 9.8 G/DL (ref 12–16)
HGB BLD-MCNC: 9.9 G/DL (ref 12–16)
HYPOCHROMIA BLD QL SMEAR: ABNORMAL
IMM GRANULOCYTES # BLD AUTO: 0.02 K/UL (ref 0–0.04)
IMM GRANULOCYTES # BLD AUTO: 0.03 K/UL (ref 0–0.04)
IMM GRANULOCYTES # BLD AUTO: 0.05 K/UL (ref 0–0.04)
IMM GRANULOCYTES # BLD AUTO: 0.06 K/UL (ref 0–0.04)
IMM GRANULOCYTES # BLD AUTO: 0.06 K/UL (ref 0–0.04)
IMM GRANULOCYTES # BLD AUTO: 0.12 K/UL (ref 0–0.04)
IMM GRANULOCYTES # BLD AUTO: 0.19 K/UL (ref 0–0.04)
IMM GRANULOCYTES # BLD AUTO: 0.19 K/UL (ref 0–0.04)
IMM GRANULOCYTES # BLD AUTO: ABNORMAL K/UL (ref 0–0.04)
IMM GRANULOCYTES NFR BLD AUTO: 0.3 % (ref 0–0.5)
IMM GRANULOCYTES NFR BLD AUTO: 0.3 % (ref 0–0.5)
IMM GRANULOCYTES NFR BLD AUTO: 0.4 % (ref 0–0.5)
IMM GRANULOCYTES NFR BLD AUTO: 0.5 % (ref 0–0.5)
IMM GRANULOCYTES NFR BLD AUTO: 0.5 % (ref 0–0.5)
IMM GRANULOCYTES NFR BLD AUTO: 0.7 % (ref 0–0.5)
IMM GRANULOCYTES NFR BLD AUTO: 0.8 % (ref 0–0.5)
IMM GRANULOCYTES NFR BLD AUTO: 0.8 % (ref 0–0.5)
IMM GRANULOCYTES NFR BLD AUTO: 0.9 % (ref 0–0.5)
IMM GRANULOCYTES NFR BLD AUTO: 1 % (ref 0–0.5)
IMM GRANULOCYTES NFR BLD AUTO: 1.1 % (ref 0–0.5)
IMM GRANULOCYTES NFR BLD AUTO: 1.1 % (ref 0–0.5)
IMM GRANULOCYTES NFR BLD AUTO: 2.7 % (ref 0–0.5)
IMM GRANULOCYTES NFR BLD AUTO: 3.2 % (ref 0–0.5)
IMM GRANULOCYTES NFR BLD AUTO: ABNORMAL % (ref 0–0.5)
INFLUENZA A, MOLECULAR: NEGATIVE
INFLUENZA B, MOLECULAR: NEGATIVE
INR PPP: 1.3 (ref 0.8–1.2)
INR PPP: 1.3 (ref 0.8–1.2)
INR PPP: 1.4 (ref 0.8–1.2)
INR PPP: 1.4 (ref 0.8–1.2)
INR PPP: 1.5 (ref 0.8–1.2)
INR PPP: 1.6 (ref 0.8–1.2)
INR PPP: 1.7 (ref 0.8–1.2)
INR PPP: 2 (ref 0.8–1.2)
INR PPP: 2 (ref 0.8–1.2)
INR PPP: 2.2 (ref 0.8–1.2)
INR PPP: 2.3 (ref 0.8–1.2)
INR PPP: 2.6 (ref 0.8–1.2)
INR PPP: 2.7 (ref 0.8–1.2)
INR PPP: 2.7 (ref 0.8–1.2)
INR PPP: 2.8 (ref 0.8–1.2)
INR PPP: 2.9 (ref 0.8–1.2)
INR PPP: 3.1 (ref 0.8–1.2)
INR PPP: 3.1 (ref 0.8–1.2)
INR PPP: 3.4 (ref 0.8–1.2)
INR PPP: 3.4 (ref 0.8–1.2)
INTERVENTRICULAR SEPTUM: 1.13 CM (ref 0.6–1.1)
INTERVENTRICULAR SEPTUM: 1.27 CM (ref 0.6–1.1)
IRON SERPL-MCNC: 70 UG/DL (ref 30–160)
IVC DIAMETER: 2.61 CM
LA MAJOR: 5.51 CM
LA MAJOR: 5.72 CM
LA MINOR: 4.71 CM
LA WIDTH: 3.9 CM
LACTATE SERPL-SCNC: 0.5 MMOL/L (ref 0.5–2.2)
LACTATE SERPL-SCNC: 0.6 MMOL/L (ref 0.5–2.2)
LACTATE SERPL-SCNC: 9.9 MMOL/L (ref 0.5–2.2)
LEFT ATRIUM SIZE: 3.62 CM
LEFT ATRIUM SIZE: 4.41 CM
LEFT ATRIUM VOLUME INDEX MOD: 34.4 ML/M2
LEFT ATRIUM VOLUME INDEX: 43.7 ML/M2
LEFT ATRIUM VOLUME MOD: 58.42 CM3
LEFT ATRIUM VOLUME: 74.25 CM3
LEFT INTERNAL DIMENSION IN SYSTOLE: 4.25 CM (ref 2.1–4)
LEFT INTERNAL DIMENSION IN SYSTOLE: 4.5 CM (ref 2.1–4)
LEFT VENTRICLE DIASTOLIC VOLUME INDEX: 83.51 ML/M2
LEFT VENTRICLE DIASTOLIC VOLUME: 127.03 ML
LEFT VENTRICLE DIASTOLIC VOLUME: 141.97 ML
LEFT VENTRICLE MASS INDEX: 148 G/M2
LEFT VENTRICLE SYSTOLIC VOLUME INDEX: 54.5 ML/M2
LEFT VENTRICLE SYSTOLIC VOLUME: 80.96 ML
LEFT VENTRICLE SYSTOLIC VOLUME: 92.63 ML
LEFT VENTRICULAR INTERNAL DIMENSION IN DIASTOLE: 5.16 CM (ref 3.5–6)
LEFT VENTRICULAR INTERNAL DIMENSION IN DIASTOLE: 5.41 CM (ref 3.5–6)
LEFT VENTRICULAR MASS: 251.66 G
LEFT VENTRICULAR MASS: 285.69 G
LPM: 15
LV LATERAL E/E' RATIO: 18.67 M/S
LV SEPTAL E/E' RATIO: 37.33 M/S
LVOT MG: 1.77 MMHG
LVOT MV: 0.63 CM/S
LYMPHOCYTES # BLD AUTO: 0.5 K/UL (ref 1–4.8)
LYMPHOCYTES # BLD AUTO: 0.6 K/UL (ref 1–4.8)
LYMPHOCYTES # BLD AUTO: 0.7 K/UL (ref 1–4.8)
LYMPHOCYTES # BLD AUTO: 0.7 K/UL (ref 1–4.8)
LYMPHOCYTES # BLD AUTO: 0.8 K/UL (ref 1–4.8)
LYMPHOCYTES # BLD AUTO: 1 K/UL (ref 1–4.8)
LYMPHOCYTES # BLD AUTO: 1 K/UL (ref 1–4.8)
LYMPHOCYTES # BLD AUTO: ABNORMAL K/UL (ref 1–4.8)
LYMPHOCYTES NFR BLD: 10.3 % (ref 18–48)
LYMPHOCYTES NFR BLD: 10.6 % (ref 18–48)
LYMPHOCYTES NFR BLD: 11.3 % (ref 18–48)
LYMPHOCYTES NFR BLD: 11.9 % (ref 18–48)
LYMPHOCYTES NFR BLD: 11.9 % (ref 18–48)
LYMPHOCYTES NFR BLD: 14.1 % (ref 18–48)
LYMPHOCYTES NFR BLD: 17.1 % (ref 18–48)
LYMPHOCYTES NFR BLD: 26 % (ref 18–48)
LYMPHOCYTES NFR BLD: 5.2 % (ref 18–48)
LYMPHOCYTES NFR BLD: 7 % (ref 18–48)
LYMPHOCYTES NFR BLD: 7.9 % (ref 18–48)
LYMPHOCYTES NFR BLD: 8 % (ref 18–48)
LYMPHOCYTES NFR BLD: 8.1 % (ref 18–48)
LYMPHOCYTES NFR BLD: 9 % (ref 18–48)
LYMPHOCYTES NFR BLD: 9.3 % (ref 18–48)
LYMPHOCYTES NFR BLD: 9.5 % (ref 18–48)
LYMPHOCYTES NFR BLD: 9.7 % (ref 18–48)
LYMPHOCYTES NFR FLD MANUAL: 15 %
MAGNESIUM SERPL-MCNC: 1.8 MG/DL (ref 1.6–2.6)
MAGNESIUM SERPL-MCNC: 1.9 MG/DL (ref 1.6–2.6)
MAGNESIUM SERPL-MCNC: 2 MG/DL (ref 1.6–2.6)
MAGNESIUM SERPL-MCNC: 2.1 MG/DL (ref 1.6–2.6)
MAGNESIUM SERPL-MCNC: 2.2 MG/DL (ref 1.6–2.6)
MAGNESIUM SERPL-MCNC: 2.3 MG/DL (ref 1.6–2.6)
MAGNESIUM SERPL-MCNC: 2.5 MG/DL (ref 1.6–2.6)
MAGNESIUM SERPL-MCNC: 2.5 MG/DL (ref 1.6–2.6)
MCH RBC QN AUTO: 29.5 PG (ref 27–31)
MCH RBC QN AUTO: 29.9 PG (ref 27–31)
MCH RBC QN AUTO: 29.9 PG (ref 27–31)
MCH RBC QN AUTO: 30 PG (ref 27–31)
MCH RBC QN AUTO: 30 PG (ref 27–31)
MCH RBC QN AUTO: 30.1 PG (ref 27–31)
MCH RBC QN AUTO: 30.2 PG (ref 27–31)
MCH RBC QN AUTO: 30.3 PG (ref 27–31)
MCH RBC QN AUTO: 30.4 PG (ref 27–31)
MCH RBC QN AUTO: 30.5 PG (ref 27–31)
MCH RBC QN AUTO: 30.6 PG (ref 27–31)
MCH RBC QN AUTO: 30.7 PG (ref 27–31)
MCH RBC QN AUTO: 30.7 PG (ref 27–31)
MCH RBC QN AUTO: 30.8 PG (ref 27–31)
MCH RBC QN AUTO: 31 PG (ref 27–31)
MCHC RBC AUTO-ENTMCNC: 30.6 G/DL (ref 32–36)
MCHC RBC AUTO-ENTMCNC: 31.8 G/DL (ref 32–36)
MCHC RBC AUTO-ENTMCNC: 32 G/DL (ref 32–36)
MCHC RBC AUTO-ENTMCNC: 32.1 G/DL (ref 32–36)
MCHC RBC AUTO-ENTMCNC: 32.2 G/DL (ref 32–36)
MCHC RBC AUTO-ENTMCNC: 32.2 G/DL (ref 32–36)
MCHC RBC AUTO-ENTMCNC: 32.4 G/DL (ref 32–36)
MCHC RBC AUTO-ENTMCNC: 32.5 G/DL (ref 32–36)
MCHC RBC AUTO-ENTMCNC: 32.6 G/DL (ref 32–36)
MCHC RBC AUTO-ENTMCNC: 32.6 G/DL (ref 32–36)
MCHC RBC AUTO-ENTMCNC: 32.8 G/DL (ref 32–36)
MCHC RBC AUTO-ENTMCNC: 32.9 G/DL (ref 32–36)
MCHC RBC AUTO-ENTMCNC: 33.1 G/DL (ref 32–36)
MCV RBC AUTO: 91 FL (ref 82–98)
MCV RBC AUTO: 92 FL (ref 82–98)
MCV RBC AUTO: 93 FL (ref 82–98)
MCV RBC AUTO: 94 FL (ref 82–98)
MCV RBC AUTO: 94 FL (ref 82–98)
MCV RBC AUTO: 95 FL (ref 82–98)
MCV RBC AUTO: 95 FL (ref 82–98)
MCV RBC AUTO: 96 FL (ref 82–98)
MCV RBC AUTO: 98 FL (ref 82–98)
METAMYELOCYTES NFR BLD MANUAL: 1 %
MODE: ABNORMAL
MONOCYTES # BLD AUTO: 0.7 K/UL (ref 0.3–1)
MONOCYTES # BLD AUTO: 0.8 K/UL (ref 0.3–1)
MONOCYTES # BLD AUTO: 0.9 K/UL (ref 0.3–1)
MONOCYTES # BLD AUTO: 0.9 K/UL (ref 0.3–1)
MONOCYTES # BLD AUTO: 1 K/UL (ref 0.3–1)
MONOCYTES # BLD AUTO: 1 K/UL (ref 0.3–1)
MONOCYTES # BLD AUTO: 1.1 K/UL (ref 0.3–1)
MONOCYTES # BLD AUTO: 1.1 K/UL (ref 0.3–1)
MONOCYTES # BLD AUTO: 1.2 K/UL (ref 0.3–1)
MONOCYTES # BLD AUTO: 1.3 K/UL (ref 0.3–1)
MONOCYTES # BLD AUTO: ABNORMAL K/UL (ref 0.3–1)
MONOCYTES NFR BLD: 1 % (ref 4–15)
MONOCYTES NFR BLD: 10.5 % (ref 4–15)
MONOCYTES NFR BLD: 10.9 % (ref 4–15)
MONOCYTES NFR BLD: 11.7 % (ref 4–15)
MONOCYTES NFR BLD: 12.2 % (ref 4–15)
MONOCYTES NFR BLD: 13.4 % (ref 4–15)
MONOCYTES NFR BLD: 13.6 % (ref 4–15)
MONOCYTES NFR BLD: 14.3 % (ref 4–15)
MONOCYTES NFR BLD: 14.4 % (ref 4–15)
MONOCYTES NFR BLD: 14.5 % (ref 4–15)
MONOCYTES NFR BLD: 14.8 % (ref 4–15)
MONOCYTES NFR BLD: 15.2 % (ref 4–15)
MONOCYTES NFR BLD: 17 % (ref 4–15)
MONOCYTES NFR BLD: 17.9 % (ref 4–15)
MONOCYTES NFR BLD: 18.3 % (ref 4–15)
MONOCYTES NFR BLD: 18.8 % (ref 4–15)
MONOCYTES NFR BLD: 9.8 % (ref 4–15)
MONOS+MACROS NFR FLD MANUAL: 65 %
MV A" WAVE DURATION": 114.18 MSEC
MV MEAN GRADIENT: 1 MMHG
MV PEAK A VEL: 0.62 M/S
MV PEAK E VEL: 1.12 M/S
MV PEAK GRADIENT: 5 MMHG
MV STENOSIS PRESSURE HALF TIME: 56.43 MS
MV VALVE AREA BY CONTINUITY EQUATION: 2.09 CM2
MV VALVE AREA P 1/2 METHOD: 3.9 CM2
NEUTROPHILS # BLD AUTO: 11.2 K/UL (ref 1.8–7.7)
NEUTROPHILS # BLD AUTO: 3.2 K/UL (ref 1.8–7.7)
NEUTROPHILS # BLD AUTO: 3.3 K/UL (ref 1.8–7.7)
NEUTROPHILS # BLD AUTO: 3.4 K/UL (ref 1.8–7.7)
NEUTROPHILS # BLD AUTO: 3.4 K/UL (ref 1.8–7.7)
NEUTROPHILS # BLD AUTO: 3.5 K/UL (ref 1.8–7.7)
NEUTROPHILS # BLD AUTO: 3.7 K/UL (ref 1.8–7.7)
NEUTROPHILS # BLD AUTO: 3.7 K/UL (ref 1.8–7.7)
NEUTROPHILS # BLD AUTO: 4.3 K/UL (ref 1.8–7.7)
NEUTROPHILS # BLD AUTO: 4.3 K/UL (ref 1.8–7.7)
NEUTROPHILS # BLD AUTO: 4.5 K/UL (ref 1.8–7.7)
NEUTROPHILS # BLD AUTO: 4.6 K/UL (ref 1.8–7.7)
NEUTROPHILS # BLD AUTO: 5 K/UL (ref 1.8–7.7)
NEUTROPHILS # BLD AUTO: 5.1 K/UL (ref 1.8–7.7)
NEUTROPHILS # BLD AUTO: 5.1 K/UL (ref 1.8–7.7)
NEUTROPHILS # BLD AUTO: 8.1 K/UL (ref 1.8–7.7)
NEUTROPHILS NFR BLD: 53.1 % (ref 38–73)
NEUTROPHILS NFR BLD: 60.1 % (ref 38–73)
NEUTROPHILS NFR BLD: 63 % (ref 38–73)
NEUTROPHILS NFR BLD: 64.5 % (ref 38–73)
NEUTROPHILS NFR BLD: 64.7 % (ref 38–73)
NEUTROPHILS NFR BLD: 67.6 % (ref 38–73)
NEUTROPHILS NFR BLD: 67.7 % (ref 38–73)
NEUTROPHILS NFR BLD: 68 % (ref 38–73)
NEUTROPHILS NFR BLD: 69.1 % (ref 38–73)
NEUTROPHILS NFR BLD: 69.2 % (ref 38–73)
NEUTROPHILS NFR BLD: 69.5 % (ref 38–73)
NEUTROPHILS NFR BLD: 72 % (ref 38–73)
NEUTROPHILS NFR BLD: 73.2 % (ref 38–73)
NEUTROPHILS NFR BLD: 73.8 % (ref 38–73)
NEUTROPHILS NFR BLD: 77.6 % (ref 38–73)
NEUTROPHILS NFR BLD: 79.7 % (ref 38–73)
NEUTROPHILS NFR BLD: 83.6 % (ref 38–73)
NEUTROPHILS NFR FLD MANUAL: 20 %
NEUTS BAND NFR BLD MANUAL: 6 %
NRBC BLD-RTO: 0 /100 WBC
NRBC BLD-RTO: 2 /100 WBC
NUC REST DIASTOLIC VOLUME INDEX: 194
NUC REST DIASTOLIC VOLUME INDEX: 217
NUC REST EJECTION FRACTION: 25
NUC REST EJECTION FRACTION: 28
NUC REST SYSTOLIC VOLUME INDEX: 140
NUC REST SYSTOLIC VOLUME INDEX: 163
NUC STRESS DIASTOLIC VOLUME INDEX: 215
NUC STRESS EJECTION FRACTION: 27 %
NUC STRESS SYSTOLIC VOLUME INDEX: 157
OHS CV CPX 85 PERCENT MAX PREDICTED HEART RATE MALE: 131
OHS CV CPX MAX PREDICTED HEART RATE: 154
OHS CV CPX PATIENT IS FEMALE: 1
OHS CV CPX PATIENT IS MALE: 0
OHS CV CPX PEAK DIASTOLIC BLOOD PRESSURE: 54 MMHG
OHS CV CPX PEAK HEAR RATE: 60 BPM
OHS CV CPX PEAK RATE PRESSURE PRODUCT: 7740
OHS CV CPX PEAK SYSTOLIC BLOOD PRESSURE: 129 MMHG
OHS CV CPX PERCENT MAX PREDICTED HEART RATE ACHIEVED: 39
OHS CV CPX RATE PRESSURE PRODUCT PRESENTING: 9324
OVALOCYTES BLD QL SMEAR: ABNORMAL
PARTIAL- SCAR % MYOCARDIUM 1: 10 %
PARTIAL- VIABLE % MYOCARDIUM 1: 49 %
PCO2 BLDA: 43.2 MMHG (ref 35–45)
PCO2 BLDA: 50.5 MMHG (ref 35–45)
PERFUSION DEFECT 1 SIZE IN %: 48 %
PERFUSION DEFECT 1 SIZE IN %: 59 %
PERFUSION DEFECT SIZE WORSENS % 1: 64 %
PH SMN: 7.38 [PH] (ref 7.35–7.45)
PH SMN: 7.4 [PH] (ref 7.35–7.45)
PHOSPHATE SERPL-MCNC: 3.3 MG/DL (ref 2.7–4.5)
PHOSPHATE SERPL-MCNC: 4.4 MG/DL (ref 2.7–4.5)
PHOSPHATE SERPL-MCNC: 4.6 MG/DL (ref 2.7–4.5)
PHOSPHATE SERPL-MCNC: 5 MG/DL (ref 2.7–4.5)
PHOSPHATE SERPL-MCNC: 5.1 MG/DL (ref 2.7–4.5)
PHOSPHATE SERPL-MCNC: 5.3 MG/DL (ref 2.7–4.5)
PHOSPHATE SERPL-MCNC: 5.5 MG/DL (ref 2.7–4.5)
PHOSPHATE SERPL-MCNC: 5.7 MG/DL (ref 2.7–4.5)
PHOSPHATE SERPL-MCNC: 6.1 MG/DL (ref 2.7–4.5)
PHOSPHATE SERPL-MCNC: 6.4 MG/DL (ref 2.7–4.5)
PHOSPHATE SERPL-MCNC: 6.6 MG/DL (ref 2.7–4.5)
PHOSPHATE SERPL-MCNC: 6.7 MG/DL (ref 2.7–4.5)
PISA TR MAX VEL: 3.39 M/S
PLATELET # BLD AUTO: 103 K/UL (ref 150–450)
PLATELET # BLD AUTO: 110 K/UL (ref 150–450)
PLATELET # BLD AUTO: 115 K/UL (ref 150–450)
PLATELET # BLD AUTO: 116 K/UL (ref 150–450)
PLATELET # BLD AUTO: 116 K/UL (ref 150–450)
PLATELET # BLD AUTO: 118 K/UL (ref 150–450)
PLATELET # BLD AUTO: 120 K/UL (ref 150–450)
PLATELET # BLD AUTO: 122 K/UL (ref 150–450)
PLATELET # BLD AUTO: 127 K/UL (ref 150–450)
PLATELET # BLD AUTO: 127 K/UL (ref 150–450)
PLATELET # BLD AUTO: 128 K/UL (ref 150–450)
PLATELET # BLD AUTO: 133 K/UL (ref 150–450)
PLATELET # BLD AUTO: 138 K/UL (ref 150–450)
PLATELET # BLD AUTO: 142 K/UL (ref 150–450)
PLATELET # BLD AUTO: 142 K/UL (ref 150–450)
PLATELET # BLD AUTO: 143 K/UL (ref 150–450)
PLATELET # BLD AUTO: 145 K/UL (ref 150–450)
PLATELET # BLD AUTO: 93 K/UL (ref 150–450)
PLATELET BLD QL SMEAR: ABNORMAL
PMV BLD AUTO: 11.2 FL (ref 9.2–12.9)
PMV BLD AUTO: 11.4 FL (ref 9.2–12.9)
PMV BLD AUTO: 11.4 FL (ref 9.2–12.9)
PMV BLD AUTO: 11.6 FL (ref 9.2–12.9)
PMV BLD AUTO: 11.7 FL (ref 9.2–12.9)
PMV BLD AUTO: 11.7 FL (ref 9.2–12.9)
PMV BLD AUTO: 11.8 FL (ref 9.2–12.9)
PMV BLD AUTO: 11.9 FL (ref 9.2–12.9)
PMV BLD AUTO: 12 FL (ref 9.2–12.9)
PMV BLD AUTO: 12.1 FL (ref 9.2–12.9)
PMV BLD AUTO: 12.3 FL (ref 9.2–12.9)
PMV BLD AUTO: 12.5 FL (ref 9.2–12.9)
PO2 BLDA: 41.9 MMHG (ref 40–60)
PO2 BLDA: 44 MMHG (ref 40–60)
POC BASE DEFICIT: 3.7 MMOL/L (ref -2–2)
POC BE: 2 MMOL/L
POC HCO3: 29.6 MMOL/L (ref 24–28)
POC MOLECULAR INFLUENZA A AGN: NEGATIVE
POC MOLECULAR INFLUENZA B AGN: NEGATIVE
POC PERFORMED BY: ABNORMAL
POC SATURATED O2: 72 % (ref 95–100)
POC SATURATED O2: 79 % (ref 95–100)
POC TCO2: 28 MMOL/L (ref 24–29)
POCT GLUCOSE: 112 MG/DL (ref 70–110)
POCT GLUCOSE: 122 MG/DL (ref 70–110)
POCT GLUCOSE: 134 MG/DL (ref 70–110)
POCT GLUCOSE: 154 MG/DL (ref 70–110)
POCT GLUCOSE: 66 MG/DL (ref 70–110)
POCT GLUCOSE: 74 MG/DL (ref 70–110)
POCT GLUCOSE: 75 MG/DL (ref 70–110)
POCT GLUCOSE: 88 MG/DL (ref 70–110)
POIKILOCYTOSIS BLD QL SMEAR: SLIGHT
POTASSIUM SERPL-SCNC: 3.5 MMOL/L (ref 3.5–5.1)
POTASSIUM SERPL-SCNC: 3.6 MMOL/L (ref 3.5–5.1)
POTASSIUM SERPL-SCNC: 3.8 MMOL/L (ref 3.5–5.1)
POTASSIUM SERPL-SCNC: 3.9 MMOL/L (ref 3.5–5.1)
POTASSIUM SERPL-SCNC: 4 MMOL/L (ref 3.5–5.1)
POTASSIUM SERPL-SCNC: 4 MMOL/L (ref 3.5–5.1)
POTASSIUM SERPL-SCNC: 4.1 MMOL/L (ref 3.5–5.1)
POTASSIUM SERPL-SCNC: 4.1 MMOL/L (ref 3.5–5.1)
POTASSIUM SERPL-SCNC: 4.3 MMOL/L (ref 3.5–5.1)
POTASSIUM SERPL-SCNC: 4.4 MMOL/L (ref 3.5–5.1)
POTASSIUM SERPL-SCNC: 4.4 MMOL/L (ref 3.5–5.1)
POTASSIUM SERPL-SCNC: 4.5 MMOL/L (ref 3.5–5.1)
POTASSIUM SERPL-SCNC: 4.5 MMOL/L (ref 3.5–5.1)
POTASSIUM SERPL-SCNC: 4.6 MMOL/L (ref 3.5–5.1)
POTASSIUM SERPL-SCNC: 4.7 MMOL/L (ref 3.5–5.1)
POTASSIUM SERPL-SCNC: 5.6 MMOL/L (ref 3.5–5.1)
PROCALCITONIN SERPL IA-MCNC: 0.45 NG/ML
PROCALCITONIN SERPL IA-MCNC: 1.31 NG/ML
PROT SERPL-MCNC: 5.7 G/DL (ref 6–8.4)
PROT SERPL-MCNC: 7.4 G/DL (ref 6–8.4)
PROT SERPL-MCNC: 7.7 G/DL (ref 6–8.4)
PROT SERPL-MCNC: 7.8 G/DL (ref 6–8.4)
PROT SERPL-MCNC: 7.9 G/DL (ref 6–8.4)
PROT SERPL-MCNC: 7.9 G/DL (ref 6–8.4)
PROT SERPL-MCNC: 8.1 G/DL (ref 6–8.4)
PROT SERPL-MCNC: 8.2 G/DL (ref 6–8.4)
PROT SERPL-MCNC: 8.5 G/DL (ref 6–8.4)
PROTHROMBIN TIME: 13 SEC (ref 9–12.5)
PROTHROMBIN TIME: 13 SEC (ref 9–12.5)
PROTHROMBIN TIME: 14.3 SEC (ref 9–12.5)
PROTHROMBIN TIME: 14.6 SEC (ref 9–12.5)
PROTHROMBIN TIME: 15.1 SEC (ref 9–12.5)
PROTHROMBIN TIME: 16.4 SEC (ref 9–12.5)
PROTHROMBIN TIME: 17.4 SEC (ref 9–12.5)
PROTHROMBIN TIME: 20.3 SEC (ref 9–12.5)
PROTHROMBIN TIME: 20.3 SEC (ref 9–12.5)
PROTHROMBIN TIME: 21.7 SEC (ref 9–12.5)
PROTHROMBIN TIME: 21.7 SEC (ref 9–12.5)
PROTHROMBIN TIME: 21.8 SEC (ref 9–12.5)
PROTHROMBIN TIME: 21.8 SEC (ref 9–12.5)
PROTHROMBIN TIME: 22.5 SEC (ref 9–12.5)
PROTHROMBIN TIME: 22.8 SEC (ref 9–12.5)
PROTHROMBIN TIME: 25.4 SEC (ref 9–12.5)
PROTHROMBIN TIME: 26.1 SEC (ref 9–12.5)
PROTHROMBIN TIME: 26.3 SEC (ref 9–12.5)
PROTHROMBIN TIME: 27.6 SEC (ref 9–12.5)
PROTHROMBIN TIME: 27.9 SEC (ref 9–12.5)
PROTHROMBIN TIME: 30.6 SEC (ref 9–12.5)
PROTHROMBIN TIME: 30.6 SEC (ref 9–12.5)
PROTHROMBIN TIME: 33 SEC (ref 9–12.5)
PROTHROMBIN TIME: 33 SEC (ref 9–12.5)
PTH-INTACT SERPL-MCNC: 343.2 PG/ML (ref 9–77)
PV MV: 0.89 M/S
PV PEAK VELOCITY: 1.31 CM/S
RA MAJOR: 5.48 CM
RA MAJOR: 5.51 CM
RA PRESSURE: 15 MMHG
RA WIDTH: 4.77 CM
RBC # BLD AUTO: 2.14 M/UL (ref 4–5.4)
RBC # BLD AUTO: 3.2 M/UL (ref 4–5.4)
RBC # BLD AUTO: 3.22 M/UL (ref 4–5.4)
RBC # BLD AUTO: 3.25 M/UL (ref 4–5.4)
RBC # BLD AUTO: 3.32 M/UL (ref 4–5.4)
RBC # BLD AUTO: 3.33 M/UL (ref 4–5.4)
RBC # BLD AUTO: 3.33 M/UL (ref 4–5.4)
RBC # BLD AUTO: 3.36 M/UL (ref 4–5.4)
RBC # BLD AUTO: 3.4 M/UL (ref 4–5.4)
RBC # BLD AUTO: 3.42 M/UL (ref 4–5.4)
RBC # BLD AUTO: 3.44 M/UL (ref 4–5.4)
RBC # BLD AUTO: 3.45 M/UL (ref 4–5.4)
RBC # BLD AUTO: 3.48 M/UL (ref 4–5.4)
RBC # BLD AUTO: 3.49 M/UL (ref 4–5.4)
RBC # BLD AUTO: 3.58 M/UL (ref 4–5.4)
RBC # BLD AUTO: 3.58 M/UL (ref 4–5.4)
RBC # BLD AUTO: 3.67 M/UL (ref 4–5.4)
RBC # BLD AUTO: 3.75 M/UL (ref 4–5.4)
REST FLOW - ANTERIOR: 1.08 CC/MIN/G
REST FLOW - INFERIOR: 0.46 CC/MIN/G
REST FLOW - LATERAL: 0.91 CC/MIN/G
REST FLOW - MAX: 1.62 CC/MIN/G
REST FLOW - MIN: 0.25 CC/MIN/G
REST FLOW - SEPTAL: 0.51 CC/MIN/G
REST FLOW - WHOLE HEART: 0.74 CC/MIN/G
RETIRED EF AND QEF - SEE NOTES: 47 %
RETIRED EF AND QEF - SEE NOTES: 47 %
RIGHT VENTRICULAR END-DIASTOLIC DIMENSION: 3.64 CM
SAMPLE: ABNORMAL
SARS-COV-2 AG RESP QL IA.RAPID: POSITIVE
SARS-COV-2 RDRP RESP QL NAA+PROBE: NEGATIVE
SARS-COV-2 RDRP RESP QL NAA+PROBE: NEGATIVE
SATURATED IRON: 32 % (ref 20–50)
SITE: ABNORMAL
SODIUM SERPL-SCNC: 128 MMOL/L (ref 136–145)
SODIUM SERPL-SCNC: 129 MMOL/L (ref 136–145)
SODIUM SERPL-SCNC: 129 MMOL/L (ref 136–145)
SODIUM SERPL-SCNC: 132 MMOL/L (ref 136–145)
SODIUM SERPL-SCNC: 133 MMOL/L (ref 136–145)
SODIUM SERPL-SCNC: 134 MMOL/L (ref 136–145)
SODIUM SERPL-SCNC: 134 MMOL/L (ref 136–145)
SODIUM SERPL-SCNC: 135 MMOL/L (ref 136–145)
SODIUM SERPL-SCNC: 135 MMOL/L (ref 136–145)
SODIUM SERPL-SCNC: 137 MMOL/L (ref 136–145)
SODIUM SERPL-SCNC: 137 MMOL/L (ref 136–145)
SODIUM SERPL-SCNC: 138 MMOL/L (ref 136–145)
SODIUM SERPL-SCNC: 138 MMOL/L (ref 136–145)
SODIUM SERPL-SCNC: 139 MMOL/L (ref 136–145)
SODIUM SERPL-SCNC: 139 MMOL/L (ref 136–145)
SODIUM SERPL-SCNC: 140 MMOL/L (ref 136–145)
SP02: 97
SPECIMEN SOURCE: ABNORMAL
SPECIMEN SOURCE: NORMAL
STRESS FLOW - ANTERIOR: 1.47 CC/MIN/G
STRESS FLOW - INFERIOR: 0.46 CC/MIN/G
STRESS FLOW - LATERAL: 0.93 CC/MIN/G
STRESS FLOW - MAX: 2.17 CC/MIN/G
STRESS FLOW - MIN: 0.26 CC/MIN/G
STRESS FLOW - SEPTAL: 0.48 CC/MIN/G
STRESS FLOW - WHOLE HEART: 0.84 CC/MIN/G
SYSTOLIC BLOOD PRESSURE: 148 MMHG
TDI LATERAL: 0.06 M/S
TDI SEPTAL: 0.03 M/S
TDI: 0.05 M/S
TOTAL IRON BINDING CAPACITY: 221 UG/DL (ref 250–450)
TR MAX PG: 46 MMHG
TRANSFERRIN SERPL-MCNC: 149 MG/DL (ref 200–375)
TROPONIN I SERPL DL<=0.01 NG/ML-MCNC: 0.41 NG/ML (ref 0–0.03)
TROPONIN I SERPL DL<=0.01 NG/ML-MCNC: 0.42 NG/ML (ref 0–0.03)
TROPONIN I SERPL DL<=0.01 NG/ML-MCNC: 0.48 NG/ML (ref 0–0.03)
TROPONIN I SERPL DL<=0.01 NG/ML-MCNC: 0.84 NG/ML (ref 0–0.03)
TROPONIN I SERPL DL<=0.01 NG/ML-MCNC: 0.85 NG/ML (ref 0–0.03)
TROPONIN I SERPL DL<=0.01 NG/ML-MCNC: 0.89 NG/ML (ref 0–0.03)
TROPONIN I SERPL DL<=0.01 NG/ML-MCNC: 0.91 NG/ML (ref 0–0.03)
TROPONIN I SERPL DL<=0.01 NG/ML-MCNC: 1.21 NG/ML (ref 0–0.03)
TROPONIN I SERPL DL<=0.01 NG/ML-MCNC: 1.32 NG/ML (ref 0–0.03)
TROPONIN I SERPL DL<=0.01 NG/ML-MCNC: 1.98 NG/ML (ref 0–0.03)
TV REST PULMONARY ARTERY PRESSURE: 61 MMHG
VANCOMYCIN SERPL-MCNC: 25.2 UG/ML
WBC # BLD AUTO: 10.15 K/UL (ref 3.9–12.7)
WBC # BLD AUTO: 13.41 K/UL (ref 3.9–12.7)
WBC # BLD AUTO: 4.94 K/UL (ref 3.9–12.7)
WBC # BLD AUTO: 4.96 K/UL (ref 3.9–12.7)
WBC # BLD AUTO: 5.04 K/UL (ref 3.9–12.7)
WBC # BLD AUTO: 5.08 K/UL (ref 3.9–12.7)
WBC # BLD AUTO: 5.28 K/UL (ref 3.9–12.7)
WBC # BLD AUTO: 5.37 K/UL (ref 3.9–12.7)
WBC # BLD AUTO: 5.43 K/UL (ref 3.9–12.7)
WBC # BLD AUTO: 6.02 K/UL (ref 3.9–12.7)
WBC # BLD AUTO: 6.29 K/UL (ref 3.9–12.7)
WBC # BLD AUTO: 6.39 K/UL (ref 3.9–12.7)
WBC # BLD AUTO: 6.48 K/UL (ref 3.9–12.7)
WBC # BLD AUTO: 6.56 K/UL (ref 3.9–12.7)
WBC # BLD AUTO: 6.77 K/UL (ref 3.9–12.7)
WBC # BLD AUTO: 6.77 K/UL (ref 3.9–12.7)
WBC # BLD AUTO: 6.91 K/UL (ref 3.9–12.7)
WBC # BLD AUTO: 7.14 K/UL (ref 3.9–12.7)
WBC # FLD: 485 /CU MM

## 2023-01-01 PROCEDURE — 3078F DIAST BP <80 MM HG: CPT | Mod: CPTII,S$GLB,, | Performed by: FAMILY MEDICINE

## 2023-01-01 PROCEDURE — 99152 PR MOD CONSCIOUS SEDATION, SAME PHYS, 5+ YRS, FIRST 15 MIN: ICD-10-PCS | Mod: ,,, | Performed by: INTERNAL MEDICINE

## 2023-01-01 PROCEDURE — 99152 MOD SED SAME PHYS/QHP 5/>YRS: CPT | Mod: ,,, | Performed by: INTERNAL MEDICINE

## 2023-01-01 PROCEDURE — G0378 HOSPITAL OBSERVATION PER HR: HCPCS

## 2023-01-01 PROCEDURE — 99999 PR PBB SHADOW E&M-EST. PATIENT-LVL II: ICD-10-PCS | Mod: PBBFAC,,, | Performed by: INTERNAL MEDICINE

## 2023-01-01 PROCEDURE — 93793 ANTICOAG MGMT PT WARFARIN: CPT | Mod: ,,, | Performed by: PHARMACIST

## 2023-01-01 PROCEDURE — 3077F PR MOST RECENT SYSTOLIC BLOOD PRESSURE >= 140 MM HG: ICD-10-PCS | Mod: CPTII,,, | Performed by: THORACIC SURGERY (CARDIOTHORACIC VASCULAR SURGERY)

## 2023-01-01 PROCEDURE — 80048 BASIC METABOLIC PNL TOTAL CA: CPT | Mod: 91 | Performed by: INTERNAL MEDICINE

## 2023-01-01 PROCEDURE — 86704 HEP B CORE ANTIBODY TOTAL: CPT | Performed by: STUDENT IN AN ORGANIZED HEALTH CARE EDUCATION/TRAINING PROGRAM

## 2023-01-01 PROCEDURE — 27000221 HC OXYGEN, UP TO 24 HOURS

## 2023-01-01 PROCEDURE — 99233 PR SUBSEQUENT HOSPITAL CARE,LEVL III: ICD-10-PCS | Mod: ,,, | Performed by: NURSE PRACTITIONER

## 2023-01-01 PROCEDURE — 85025 COMPLETE CBC W/AUTO DIFF WBC: CPT | Performed by: STUDENT IN AN ORGANIZED HEALTH CARE EDUCATION/TRAINING PROGRAM

## 2023-01-01 PROCEDURE — 25000003 PHARM REV CODE 250: Performed by: STUDENT IN AN ORGANIZED HEALTH CARE EDUCATION/TRAINING PROGRAM

## 2023-01-01 PROCEDURE — 85730 THROMBOPLASTIN TIME PARTIAL: CPT | Performed by: HOSPITALIST

## 2023-01-01 PROCEDURE — 25000003 PHARM REV CODE 250: Performed by: NURSE PRACTITIONER

## 2023-01-01 PROCEDURE — 99900035 HC TECH TIME PER 15 MIN (STAT)

## 2023-01-01 PROCEDURE — 85610 PROTHROMBIN TIME: CPT | Performed by: NURSE PRACTITIONER

## 2023-01-01 PROCEDURE — 84157 ASSAY OF PROTEIN OTHER: CPT

## 2023-01-01 PROCEDURE — 36415 COLL VENOUS BLD VENIPUNCTURE: CPT | Performed by: STUDENT IN AN ORGANIZED HEALTH CARE EDUCATION/TRAINING PROGRAM

## 2023-01-01 PROCEDURE — 25000003 PHARM REV CODE 250: Performed by: HOSPITALIST

## 2023-01-01 PROCEDURE — 94761 N-INVAS EAR/PLS OXIMETRY MLT: CPT

## 2023-01-01 PROCEDURE — 84100 ASSAY OF PHOSPHORUS: CPT | Performed by: STUDENT IN AN ORGANIZED HEALTH CARE EDUCATION/TRAINING PROGRAM

## 2023-01-01 PROCEDURE — 99999 PR PBB SHADOW E&M-EST. PATIENT-LVL III: CPT | Mod: PBBFAC,,, | Performed by: THORACIC SURGERY (CARDIOTHORACIC VASCULAR SURGERY)

## 2023-01-01 PROCEDURE — 93010 EKG 12-LEAD: ICD-10-PCS | Mod: ,,, | Performed by: INTERNAL MEDICINE

## 2023-01-01 PROCEDURE — 36415 COLL VENOUS BLD VENIPUNCTURE: CPT | Performed by: INTERNAL MEDICINE

## 2023-01-01 PROCEDURE — 25000003 PHARM REV CODE 250: Performed by: INTERNAL MEDICINE

## 2023-01-01 PROCEDURE — 93793 PR ANTICOAGULANT MGMT FOR PT TAKING WARFARIN: ICD-10-PCS | Mod: ,,,

## 2023-01-01 PROCEDURE — 1160F RVW MEDS BY RX/DR IN RCRD: CPT | Mod: CPTII,S$GLB,, | Performed by: FAMILY MEDICINE

## 2023-01-01 PROCEDURE — 88305 TISSUE EXAM BY PATHOLOGIST: CPT | Mod: 26,,, | Performed by: STUDENT IN AN ORGANIZED HEALTH CARE EDUCATION/TRAINING PROGRAM

## 2023-01-01 PROCEDURE — C1752 CATH,HEMODIALYSIS,SHORT-TERM: HCPCS

## 2023-01-01 PROCEDURE — 80048 BASIC METABOLIC PNL TOTAL CA: CPT | Performed by: INTERNAL MEDICINE

## 2023-01-01 PROCEDURE — 63600175 PHARM REV CODE 636 W HCPCS: Performed by: INTERNAL MEDICINE

## 2023-01-01 PROCEDURE — 78434 AQMBF PET REST & RX STRESS: CPT

## 2023-01-01 PROCEDURE — 93010 ELECTROCARDIOGRAM REPORT: CPT | Mod: ,,, | Performed by: INTERNAL MEDICINE

## 2023-01-01 PROCEDURE — 1111F DSCHRG MED/CURRENT MED MERGE: CPT | Mod: CPTII,,, | Performed by: THORACIC SURGERY (CARDIOTHORACIC VASCULAR SURGERY)

## 2023-01-01 PROCEDURE — 78429 CARDIAC PET SCAN VIABILITY (CUPID ONLY): ICD-10-PCS | Mod: 26,,, | Performed by: INTERNAL MEDICINE

## 2023-01-01 PROCEDURE — 99233 SBSQ HOSP IP/OBS HIGH 50: CPT | Mod: ,,, | Performed by: INTERNAL MEDICINE

## 2023-01-01 PROCEDURE — 99213 OFFICE O/P EST LOW 20 MIN: CPT | Mod: PBBFAC | Performed by: THORACIC SURGERY (CARDIOTHORACIC VASCULAR SURGERY)

## 2023-01-01 PROCEDURE — 97166 OT EVAL MOD COMPLEX 45 MIN: CPT

## 2023-01-01 PROCEDURE — 99203 OFFICE O/P NEW LOW 30 MIN: CPT | Mod: S$GLB,,, | Performed by: FAMILY MEDICINE

## 2023-01-01 PROCEDURE — 11000001 HC ACUTE MED/SURG PRIVATE ROOM

## 2023-01-01 PROCEDURE — 63700000 PHARM REV CODE 250 ALT 637 W/O HCPCS: Performed by: EMERGENCY MEDICINE

## 2023-01-01 PROCEDURE — 36415 COLL VENOUS BLD VENIPUNCTURE: CPT | Performed by: HOSPITALIST

## 2023-01-01 PROCEDURE — 36415 COLL VENOUS BLD VENIPUNCTURE: CPT | Performed by: NURSE PRACTITIONER

## 2023-01-01 PROCEDURE — 83735 ASSAY OF MAGNESIUM: CPT | Performed by: INTERNAL MEDICINE

## 2023-01-01 PROCEDURE — 87206 SMEAR FLUORESCENT/ACID STAI: CPT

## 2023-01-01 PROCEDURE — 83735 ASSAY OF MAGNESIUM: CPT | Performed by: EMERGENCY MEDICINE

## 2023-01-01 PROCEDURE — 3008F BODY MASS INDEX DOCD: CPT | Mod: CPTII,,, | Performed by: INTERNAL MEDICINE

## 2023-01-01 PROCEDURE — 82306 VITAMIN D 25 HYDROXY: CPT | Performed by: INTERNAL MEDICINE

## 2023-01-01 PROCEDURE — 84100 ASSAY OF PHOSPHORUS: CPT | Performed by: INTERNAL MEDICINE

## 2023-01-01 PROCEDURE — 85610 PROTHROMBIN TIME: CPT | Performed by: STUDENT IN AN ORGANIZED HEALTH CARE EDUCATION/TRAINING PROGRAM

## 2023-01-01 PROCEDURE — 93793 PR ANTICOAGULANT MGMT FOR PT TAKING WARFARIN: ICD-10-PCS | Mod: ,,, | Performed by: PHARMACIST

## 2023-01-01 PROCEDURE — 3066F NEPHROPATHY DOC TX: CPT | Mod: CPTII,,, | Performed by: INTERNAL MEDICINE

## 2023-01-01 PROCEDURE — 85610 PROTHROMBIN TIME: CPT | Performed by: INTERNAL MEDICINE

## 2023-01-01 PROCEDURE — 87635 SARS-COV-2 COVID-19 AMP PRB: CPT | Performed by: EMERGENCY MEDICINE

## 2023-01-01 PROCEDURE — 63600175 PHARM REV CODE 636 W HCPCS: Performed by: STUDENT IN AN ORGANIZED HEALTH CARE EDUCATION/TRAINING PROGRAM

## 2023-01-01 PROCEDURE — 84484 ASSAY OF TROPONIN QUANT: CPT | Performed by: EMERGENCY MEDICINE

## 2023-01-01 PROCEDURE — 1111F PR DISCHARGE MEDS RECONCILED W/ CURRENT OUTPATIENT MED LIST: ICD-10-PCS | Mod: CPTII,,, | Performed by: THORACIC SURGERY (CARDIOTHORACIC VASCULAR SURGERY)

## 2023-01-01 PROCEDURE — 3078F DIAST BP <80 MM HG: CPT | Mod: CPTII,,, | Performed by: INTERNAL MEDICINE

## 2023-01-01 PROCEDURE — 83605 ASSAY OF LACTIC ACID: CPT | Performed by: STUDENT IN AN ORGANIZED HEALTH CARE EDUCATION/TRAINING PROGRAM

## 2023-01-01 PROCEDURE — 83735 ASSAY OF MAGNESIUM: CPT | Performed by: STUDENT IN AN ORGANIZED HEALTH CARE EDUCATION/TRAINING PROGRAM

## 2023-01-01 PROCEDURE — 82803 BLOOD GASES ANY COMBINATION: CPT

## 2023-01-01 PROCEDURE — 88112 CYTOPATH CELL ENHANCE TECH: CPT | Mod: 26,,, | Performed by: STUDENT IN AN ORGANIZED HEALTH CARE EDUCATION/TRAINING PROGRAM

## 2023-01-01 PROCEDURE — 80053 COMPREHEN METABOLIC PANEL: CPT | Performed by: NURSE PRACTITIONER

## 2023-01-01 PROCEDURE — 80053 COMPREHEN METABOLIC PANEL: CPT | Mod: 91 | Performed by: INTERNAL MEDICINE

## 2023-01-01 PROCEDURE — 80048 BASIC METABOLIC PNL TOTAL CA: CPT | Performed by: STUDENT IN AN ORGANIZED HEALTH CARE EDUCATION/TRAINING PROGRAM

## 2023-01-01 PROCEDURE — 82728 ASSAY OF FERRITIN: CPT | Performed by: STUDENT IN AN ORGANIZED HEALTH CARE EDUCATION/TRAINING PROGRAM

## 2023-01-01 PROCEDURE — 80100016 HC MAINTENANCE HEMODIALYSIS

## 2023-01-01 PROCEDURE — 4010F PR ACE/ARB THEARPY RXD/TAKEN: ICD-10-PCS | Mod: CPTII,,, | Performed by: THORACIC SURGERY (CARDIOTHORACIC VASCULAR SURGERY)

## 2023-01-01 PROCEDURE — 99285 EMERGENCY DEPT VISIT HI MDM: CPT | Mod: 25

## 2023-01-01 PROCEDURE — 97162 PT EVAL MOD COMPLEX 30 MIN: CPT

## 2023-01-01 PROCEDURE — 94660 CPAP INITIATION&MGMT: CPT

## 2023-01-01 PROCEDURE — 85610 PROTHROMBIN TIME: CPT | Performed by: EMERGENCY MEDICINE

## 2023-01-01 PROCEDURE — 89051 BODY FLUID CELL COUNT: CPT

## 2023-01-01 PROCEDURE — 87206 SMEAR FLUORESCENT/ACID STAI: CPT | Mod: 91

## 2023-01-01 PROCEDURE — 84145 PROCALCITONIN (PCT): CPT | Performed by: STUDENT IN AN ORGANIZED HEALTH CARE EDUCATION/TRAINING PROGRAM

## 2023-01-01 PROCEDURE — 85025 COMPLETE CBC W/AUTO DIFF WBC: CPT | Performed by: EMERGENCY MEDICINE

## 2023-01-01 PROCEDURE — 85025 COMPLETE CBC W/AUTO DIFF WBC: CPT | Performed by: HOSPITALIST

## 2023-01-01 PROCEDURE — 88305 TISSUE EXAM BY PATHOLOGIST: CPT | Performed by: STUDENT IN AN ORGANIZED HEALTH CARE EDUCATION/TRAINING PROGRAM

## 2023-01-01 PROCEDURE — 31500 AD HOC INTUBATION: ICD-10-PCS | Mod: ,,, | Performed by: ANESTHESIOLOGY

## 2023-01-01 PROCEDURE — 84466 ASSAY OF TRANSFERRIN: CPT | Performed by: STUDENT IN AN ORGANIZED HEALTH CARE EDUCATION/TRAINING PROGRAM

## 2023-01-01 PROCEDURE — 85027 COMPLETE CBC AUTOMATED: CPT | Performed by: HOSPITALIST

## 2023-01-01 PROCEDURE — 82945 GLUCOSE OTHER FLUID: CPT

## 2023-01-01 PROCEDURE — 99233 SBSQ HOSP IP/OBS HIGH 50: CPT | Mod: ,,, | Performed by: NURSE PRACTITIONER

## 2023-01-01 PROCEDURE — 99203 PR OFFICE/OUTPT VISIT, NEW, LEVL III, 30-44 MIN: ICD-10-PCS | Mod: S$GLB,,, | Performed by: FAMILY MEDICINE

## 2023-01-01 PROCEDURE — 12000002 HC ACUTE/MED SURGE SEMI-PRIVATE ROOM

## 2023-01-01 PROCEDURE — 86140 C-REACTIVE PROTEIN: CPT | Performed by: EMERGENCY MEDICINE

## 2023-01-01 PROCEDURE — 84484 ASSAY OF TROPONIN QUANT: CPT | Mod: 91 | Performed by: STUDENT IN AN ORGANIZED HEALTH CARE EDUCATION/TRAINING PROGRAM

## 2023-01-01 PROCEDURE — 99205 OFFICE O/P NEW HI 60 MIN: CPT | Mod: S$PBB,,, | Performed by: THORACIC SURGERY (CARDIOTHORACIC VASCULAR SURGERY)

## 2023-01-01 PROCEDURE — 99999 PR PBB SHADOW E&M-EST. PATIENT-LVL III: ICD-10-PCS | Mod: PBBFAC,,, | Performed by: INTERNAL MEDICINE

## 2023-01-01 PROCEDURE — 84484 ASSAY OF TROPONIN QUANT: CPT | Performed by: HOSPITALIST

## 2023-01-01 PROCEDURE — 3075F SYST BP GE 130 - 139MM HG: CPT | Mod: CPTII,,, | Performed by: INTERNAL MEDICINE

## 2023-01-01 PROCEDURE — 84484 ASSAY OF TROPONIN QUANT: CPT | Performed by: STUDENT IN AN ORGANIZED HEALTH CARE EDUCATION/TRAINING PROGRAM

## 2023-01-01 PROCEDURE — 84145 PROCALCITONIN (PCT): CPT | Performed by: EMERGENCY MEDICINE

## 2023-01-01 PROCEDURE — 93005 ELECTROCARDIOGRAM TRACING: CPT

## 2023-01-01 PROCEDURE — 80053 COMPREHEN METABOLIC PANEL: CPT | Performed by: EMERGENCY MEDICINE

## 2023-01-01 PROCEDURE — 93454 CORONARY ARTERY ANGIO S&I: CPT | Mod: 26,,, | Performed by: INTERNAL MEDICINE

## 2023-01-01 PROCEDURE — 25000003 PHARM REV CODE 250: Performed by: EMERGENCY MEDICINE

## 2023-01-01 PROCEDURE — 63600175 PHARM REV CODE 636 W HCPCS: Performed by: EMERGENCY MEDICINE

## 2023-01-01 PROCEDURE — 99214 PR OFFICE/OUTPT VISIT, EST, LEVL IV, 30-39 MIN: ICD-10-PCS | Mod: S$PBB,,, | Performed by: INTERNAL MEDICINE

## 2023-01-01 PROCEDURE — 3066F PR DOCUMENTATION OF TREATMENT FOR NEPHROPATHY: ICD-10-PCS | Mod: CPTII,,, | Performed by: THORACIC SURGERY (CARDIOTHORACIC VASCULAR SURGERY)

## 2023-01-01 PROCEDURE — 80053 COMPREHEN METABOLIC PANEL: CPT | Performed by: STUDENT IN AN ORGANIZED HEALTH CARE EDUCATION/TRAINING PROGRAM

## 2023-01-01 PROCEDURE — C1894 INTRO/SHEATH, NON-LASER: HCPCS | Performed by: INTERNAL MEDICINE

## 2023-01-01 PROCEDURE — 93454 PR CATH PLACE/CORONARY ANGIO, IMG SUPER/INTERP: ICD-10-PCS | Mod: 26,,, | Performed by: INTERNAL MEDICINE

## 2023-01-01 PROCEDURE — 93308 ECHO (CUPID ONLY): ICD-10-PCS | Mod: 26,,, | Performed by: INTERNAL MEDICINE

## 2023-01-01 PROCEDURE — 4010F ACE/ARB THERAPY RXD/TAKEN: CPT | Mod: CPTII,,, | Performed by: INTERNAL MEDICINE

## 2023-01-01 PROCEDURE — 83605 ASSAY OF LACTIC ACID: CPT | Performed by: HOSPITALIST

## 2023-01-01 PROCEDURE — 3078F PR MOST RECENT DIASTOLIC BLOOD PRESSURE < 80 MM HG: ICD-10-PCS | Mod: CPTII,,, | Performed by: INTERNAL MEDICINE

## 2023-01-01 PROCEDURE — 3008F PR BODY MASS INDEX (BMI) DOCUMENTED: ICD-10-PCS | Mod: CPTII,S$GLB,, | Performed by: FAMILY MEDICINE

## 2023-01-01 PROCEDURE — 87811 SARS CORONAVIRUS 2 ANTIGEN POCT, MANUAL READ: ICD-10-PCS | Mod: QW,S$GLB,, | Performed by: FAMILY MEDICINE

## 2023-01-01 PROCEDURE — 97535 SELF CARE MNGMENT TRAINING: CPT | Mod: CO

## 2023-01-01 PROCEDURE — 1159F PR MEDICATION LIST DOCUMENTED IN MEDICAL RECORD: ICD-10-PCS | Mod: CPTII,,, | Performed by: INTERNAL MEDICINE

## 2023-01-01 PROCEDURE — 27000190 HC CPAP FULL FACE MASK W/VALVE

## 2023-01-01 PROCEDURE — 78431 MYOCRD IMG PET RST&STRS CT: CPT

## 2023-01-01 PROCEDURE — 25500020 PHARM REV CODE 255: Performed by: INTERNAL MEDICINE

## 2023-01-01 PROCEDURE — 3078F PR MOST RECENT DIASTOLIC BLOOD PRESSURE < 80 MM HG: ICD-10-PCS | Mod: CPTII,,, | Performed by: THORACIC SURGERY (CARDIOTHORACIC VASCULAR SURGERY)

## 2023-01-01 PROCEDURE — 78434 CARDIAC PET SCAN STRESS (CUPID ONLY): ICD-10-PCS | Mod: 26,,, | Performed by: INTERNAL MEDICINE

## 2023-01-01 PROCEDURE — 88112 CYTOPATH CELL ENHANCE TECH: CPT | Performed by: STUDENT IN AN ORGANIZED HEALTH CARE EDUCATION/TRAINING PROGRAM

## 2023-01-01 PROCEDURE — 3078F PR MOST RECENT DIASTOLIC BLOOD PRESSURE < 80 MM HG: ICD-10-PCS | Mod: CPTII,S$GLB,, | Performed by: FAMILY MEDICINE

## 2023-01-01 PROCEDURE — 78434 AQMBF PET REST & RX STRESS: CPT | Mod: 26,,, | Performed by: INTERNAL MEDICINE

## 2023-01-01 PROCEDURE — 85025 COMPLETE CBC W/AUTO DIFF WBC: CPT | Performed by: NURSE PRACTITIONER

## 2023-01-01 PROCEDURE — 20000000 HC ICU ROOM

## 2023-01-01 PROCEDURE — 99999 PR PBB SHADOW E&M-EST. PATIENT-LVL III: ICD-10-PCS | Mod: PBBFAC,,, | Performed by: THORACIC SURGERY (CARDIOTHORACIC VASCULAR SURGERY)

## 2023-01-01 PROCEDURE — 80202 ASSAY OF VANCOMYCIN: CPT | Performed by: STUDENT IN AN ORGANIZED HEALTH CARE EDUCATION/TRAINING PROGRAM

## 2023-01-01 PROCEDURE — 84484 ASSAY OF TROPONIN QUANT: CPT | Performed by: NURSE PRACTITIONER

## 2023-01-01 PROCEDURE — 80074 ACUTE HEPATITIS PANEL: CPT | Performed by: STUDENT IN AN ORGANIZED HEALTH CARE EDUCATION/TRAINING PROGRAM

## 2023-01-01 PROCEDURE — 31500 INSERT EMERGENCY AIRWAY: CPT | Performed by: ANESTHESIOLOGY

## 2023-01-01 PROCEDURE — C8924 2D TTE W OR W/O FOL W/CON,FU: HCPCS

## 2023-01-01 PROCEDURE — 87040 BLOOD CULTURE FOR BACTERIA: CPT | Performed by: EMERGENCY MEDICINE

## 2023-01-01 PROCEDURE — 87502 INFLUENZA DNA AMP PROBE: CPT | Performed by: NURSE PRACTITIONER

## 2023-01-01 PROCEDURE — C1769 GUIDE WIRE: HCPCS | Performed by: INTERNAL MEDICINE

## 2023-01-01 PROCEDURE — 87502 INFLUENZA DNA AMP PROBE: CPT

## 2023-01-01 PROCEDURE — 96365 THER/PROPH/DIAG IV INF INIT: CPT

## 2023-01-01 PROCEDURE — 87502 POCT INFLUENZA A/B MOLECULAR: ICD-10-PCS | Mod: QW,S$GLB,, | Performed by: FAMILY MEDICINE

## 2023-01-01 PROCEDURE — 99213 OFFICE O/P EST LOW 20 MIN: CPT | Mod: PBBFAC,PO | Performed by: INTERNAL MEDICINE

## 2023-01-01 PROCEDURE — 87015 SPECIMEN INFECT AGNT CONCNTJ: CPT

## 2023-01-01 PROCEDURE — 88305 TISSUE EXAM BY PATHOLOGIST: ICD-10-PCS | Mod: 26,,, | Performed by: STUDENT IN AN ORGANIZED HEALTH CARE EDUCATION/TRAINING PROGRAM

## 2023-01-01 PROCEDURE — 93016 CV STRESS TEST SUPVJ ONLY: CPT | Mod: ,,, | Performed by: INTERNAL MEDICINE

## 2023-01-01 PROCEDURE — 93454 CORONARY ARTERY ANGIO S&I: CPT | Performed by: INTERNAL MEDICINE

## 2023-01-01 PROCEDURE — 90935 HEMODIALYSIS ONE EVALUATION: CPT

## 2023-01-01 PROCEDURE — 99233 PR SUBSEQUENT HOSPITAL CARE,LEVL III: ICD-10-PCS | Mod: ,,, | Performed by: INTERNAL MEDICINE

## 2023-01-01 PROCEDURE — C1887 CATHETER, GUIDING: HCPCS | Performed by: INTERNAL MEDICINE

## 2023-01-01 PROCEDURE — 87811 SARS-COV-2 COVID19 W/OPTIC: CPT | Mod: QW,S$GLB,, | Performed by: FAMILY MEDICINE

## 2023-01-01 PROCEDURE — 97535 SELF CARE MNGMENT TRAINING: CPT

## 2023-01-01 PROCEDURE — 63700000 PHARM REV CODE 250 ALT 637 W/O HCPCS: Performed by: STUDENT IN AN ORGANIZED HEALTH CARE EDUCATION/TRAINING PROGRAM

## 2023-01-01 PROCEDURE — 93018 CV STRESS TEST I&R ONLY: CPT | Mod: ,,, | Performed by: INTERNAL MEDICINE

## 2023-01-01 PROCEDURE — 4010F PR ACE/ARB THEARPY RXD/TAKEN: ICD-10-PCS | Mod: CPTII,,, | Performed by: INTERNAL MEDICINE

## 2023-01-01 PROCEDURE — 3008F PR BODY MASS INDEX (BMI) DOCUMENTED: ICD-10-PCS | Mod: CPTII,,, | Performed by: THORACIC SURGERY (CARDIOTHORACIC VASCULAR SURGERY)

## 2023-01-01 PROCEDURE — 85025 COMPLETE CBC W/AUTO DIFF WBC: CPT | Performed by: INTERNAL MEDICINE

## 2023-01-01 PROCEDURE — 99223 PR INITIAL HOSPITAL CARE,LEVL III: ICD-10-PCS | Mod: ,,, | Performed by: NURSE PRACTITIONER

## 2023-01-01 PROCEDURE — 83735 ASSAY OF MAGNESIUM: CPT | Performed by: NURSE PRACTITIONER

## 2023-01-01 PROCEDURE — 93018 CARDIAC PET SCAN STRESS (CUPID ONLY): ICD-10-PCS | Mod: ,,, | Performed by: INTERNAL MEDICINE

## 2023-01-01 PROCEDURE — 3075F PR MOST RECENT SYSTOLIC BLOOD PRESS GE 130-139MM HG: ICD-10-PCS | Mod: CPTII,,, | Performed by: INTERNAL MEDICINE

## 2023-01-01 PROCEDURE — 31500 INSERT EMERGENCY AIRWAY: CPT

## 2023-01-01 PROCEDURE — 63600175 PHARM REV CODE 636 W HCPCS: Performed by: NURSE PRACTITIONER

## 2023-01-01 PROCEDURE — 87070 CULTURE OTHR SPECIMN AEROBIC: CPT

## 2023-01-01 PROCEDURE — 99999 PR PBB SHADOW E&M-EST. PATIENT-LVL II: CPT | Mod: PBBFAC,,, | Performed by: INTERNAL MEDICINE

## 2023-01-01 PROCEDURE — 80048 BASIC METABOLIC PNL TOTAL CA: CPT | Performed by: NURSE PRACTITIONER

## 2023-01-01 PROCEDURE — 78431 MYOCRD IMG PET RST&STRS CT: CPT | Mod: 26,,, | Performed by: INTERNAL MEDICINE

## 2023-01-01 PROCEDURE — 99223 1ST HOSP IP/OBS HIGH 75: CPT | Mod: ,,, | Performed by: NURSE PRACTITIONER

## 2023-01-01 PROCEDURE — 96374 THER/PROPH/DIAG INJ IV PUSH: CPT

## 2023-01-01 PROCEDURE — 83615 LACTATE (LD) (LDH) ENZYME: CPT

## 2023-01-01 PROCEDURE — 83735 ASSAY OF MAGNESIUM: CPT | Mod: 91 | Performed by: INTERNAL MEDICINE

## 2023-01-01 PROCEDURE — 84484 ASSAY OF TROPONIN QUANT: CPT | Mod: 91 | Performed by: INTERNAL MEDICINE

## 2023-01-01 PROCEDURE — 96375 TX/PRO/DX INJ NEW DRUG ADDON: CPT

## 2023-01-01 PROCEDURE — 3066F NEPHROPATHY DOC TX: CPT | Mod: CPTII,,, | Performed by: THORACIC SURGERY (CARDIOTHORACIC VASCULAR SURGERY)

## 2023-01-01 PROCEDURE — 1111F DSCHRG MED/CURRENT MED MERGE: CPT | Mod: CPTII,,, | Performed by: INTERNAL MEDICINE

## 2023-01-01 PROCEDURE — 78431 CARDIAC PET SCAN STRESS (CUPID ONLY): ICD-10-PCS | Mod: 26,,, | Performed by: INTERNAL MEDICINE

## 2023-01-01 PROCEDURE — 99283 EMERGENCY DEPT VISIT LOW MDM: CPT

## 2023-01-01 PROCEDURE — 87205 SMEAR GRAM STAIN: CPT

## 2023-01-01 PROCEDURE — 83880 ASSAY OF NATRIURETIC PEPTIDE: CPT | Performed by: EMERGENCY MEDICINE

## 2023-01-01 PROCEDURE — 97116 GAIT TRAINING THERAPY: CPT

## 2023-01-01 PROCEDURE — 3066F PR DOCUMENTATION OF TREATMENT FOR NEPHROPATHY: ICD-10-PCS | Mod: CPTII,,, | Performed by: INTERNAL MEDICINE

## 2023-01-01 PROCEDURE — 93308 TTE F-UP OR LMTD: CPT | Mod: 26,,, | Performed by: INTERNAL MEDICINE

## 2023-01-01 PROCEDURE — 63600175 PHARM REV CODE 636 W HCPCS: Performed by: THORACIC SURGERY (CARDIOTHORACIC VASCULAR SURGERY)

## 2023-01-01 PROCEDURE — 93016 CARDIAC PET SCAN STRESS (CUPID ONLY): ICD-10-PCS | Mod: ,,, | Performed by: INTERNAL MEDICINE

## 2023-01-01 PROCEDURE — 1111F PR DISCHARGE MEDS RECONCILED W/ CURRENT OUTPATIENT MED LIST: ICD-10-PCS | Mod: CPTII,,, | Performed by: INTERNAL MEDICINE

## 2023-01-01 PROCEDURE — 99205 PR OFFICE/OUTPT VISIT, NEW, LEVL V, 60-74 MIN: ICD-10-PCS | Mod: S$PBB,,, | Performed by: THORACIC SURGERY (CARDIOTHORACIC VASCULAR SURGERY)

## 2023-01-01 PROCEDURE — A9552 F18 FDG: HCPCS

## 2023-01-01 PROCEDURE — 87116 MYCOBACTERIA CULTURE: CPT

## 2023-01-01 PROCEDURE — 85730 THROMBOPLASTIN TIME PARTIAL: CPT | Mod: 91 | Performed by: HOSPITALIST

## 2023-01-01 PROCEDURE — 3077F SYST BP >= 140 MM HG: CPT | Mod: CPTII,,, | Performed by: THORACIC SURGERY (CARDIOTHORACIC VASCULAR SURGERY)

## 2023-01-01 PROCEDURE — 83970 ASSAY OF PARATHORMONE: CPT | Performed by: INTERNAL MEDICINE

## 2023-01-01 PROCEDURE — 93793 ANTICOAG MGMT PT WARFARIN: CPT | Mod: ,,,

## 2023-01-01 PROCEDURE — 99214 OFFICE O/P EST MOD 30 MIN: CPT | Mod: S$PBB,,, | Performed by: INTERNAL MEDICINE

## 2023-01-01 PROCEDURE — 21400001 HC TELEMETRY ROOM

## 2023-01-01 PROCEDURE — 83880 ASSAY OF NATRIURETIC PEPTIDE: CPT | Performed by: NURSE PRACTITIONER

## 2023-01-01 PROCEDURE — 3077F SYST BP >= 140 MM HG: CPT | Mod: CPTII,S$GLB,, | Performed by: FAMILY MEDICINE

## 2023-01-01 PROCEDURE — 84478 ASSAY OF TRIGLYCERIDES: CPT | Performed by: STUDENT IN AN ORGANIZED HEALTH CARE EDUCATION/TRAINING PROGRAM

## 2023-01-01 PROCEDURE — 1159F MED LIST DOCD IN RCRD: CPT | Mod: CPTII,,, | Performed by: INTERNAL MEDICINE

## 2023-01-01 PROCEDURE — 87502 INFLUENZA DNA AMP PROBE: CPT | Mod: QW,S$GLB,, | Performed by: FAMILY MEDICINE

## 2023-01-01 PROCEDURE — 3078F DIAST BP <80 MM HG: CPT | Mod: CPTII,,, | Performed by: THORACIC SURGERY (CARDIOTHORACIC VASCULAR SURGERY)

## 2023-01-01 PROCEDURE — 1160F PR REVIEW ALL MEDS BY PRESCRIBER/CLIN PHARMACIST DOCUMENTED: ICD-10-PCS | Mod: CPTII,S$GLB,, | Performed by: FAMILY MEDICINE

## 2023-01-01 PROCEDURE — 88112 PR  CYTOPATH, CELL ENHANCE TECH: ICD-10-PCS | Mod: 26,,, | Performed by: STUDENT IN AN ORGANIZED HEALTH CARE EDUCATION/TRAINING PROGRAM

## 2023-01-01 PROCEDURE — 1159F PR MEDICATION LIST DOCUMENTED IN MEDICAL RECORD: ICD-10-PCS | Mod: CPTII,S$GLB,, | Performed by: FAMILY MEDICINE

## 2023-01-01 PROCEDURE — 3008F PR BODY MASS INDEX (BMI) DOCUMENTED: ICD-10-PCS | Mod: CPTII,,, | Performed by: INTERNAL MEDICINE

## 2023-01-01 PROCEDURE — 3008F BODY MASS INDEX DOCD: CPT | Mod: CPTII,S$GLB,, | Performed by: FAMILY MEDICINE

## 2023-01-01 PROCEDURE — 78429 MYOCRD IMG PET 1 STD W/CT: CPT | Mod: 26,,, | Performed by: INTERNAL MEDICINE

## 2023-01-01 PROCEDURE — 3008F BODY MASS INDEX DOCD: CPT | Mod: CPTII,,, | Performed by: THORACIC SURGERY (CARDIOTHORACIC VASCULAR SURGERY)

## 2023-01-01 PROCEDURE — 3077F PR MOST RECENT SYSTOLIC BLOOD PRESSURE >= 140 MM HG: ICD-10-PCS | Mod: CPTII,S$GLB,, | Performed by: FAMILY MEDICINE

## 2023-01-01 PROCEDURE — 1159F MED LIST DOCD IN RCRD: CPT | Mod: CPTII,S$GLB,, | Performed by: FAMILY MEDICINE

## 2023-01-01 PROCEDURE — U0002 COVID-19 LAB TEST NON-CDC: HCPCS | Performed by: NURSE PRACTITIONER

## 2023-01-01 PROCEDURE — 85007 BL SMEAR W/DIFF WBC COUNT: CPT | Performed by: HOSPITALIST

## 2023-01-01 PROCEDURE — 96376 TX/PRO/DX INJ SAME DRUG ADON: CPT

## 2023-01-01 PROCEDURE — 85730 THROMBOPLASTIN TIME PARTIAL: CPT | Performed by: EMERGENCY MEDICINE

## 2023-01-01 PROCEDURE — 4010F ACE/ARB THERAPY RXD/TAKEN: CPT | Mod: CPTII,,, | Performed by: THORACIC SURGERY (CARDIOTHORACIC VASCULAR SURGERY)

## 2023-01-01 PROCEDURE — 99999 PR PBB SHADOW E&M-EST. PATIENT-LVL III: CPT | Mod: PBBFAC,,, | Performed by: INTERNAL MEDICINE

## 2023-01-01 PROCEDURE — 99212 OFFICE O/P EST SF 10 MIN: CPT | Mod: PBBFAC,PO | Performed by: INTERNAL MEDICINE

## 2023-01-01 RX ORDER — AMLODIPINE BESYLATE 5 MG/1
10 TABLET ORAL DAILY
Status: DISCONTINUED | OUTPATIENT
Start: 2023-01-01 | End: 2023-01-01 | Stop reason: HOSPADM

## 2023-01-01 RX ORDER — CLOPIDOGREL BISULFATE 75 MG/1
75 TABLET ORAL DAILY
Status: DISCONTINUED | OUTPATIENT
Start: 2023-01-01 | End: 2023-01-01 | Stop reason: HOSPADM

## 2023-01-01 RX ORDER — DEXTROSE 40 %
15 GEL (GRAM) ORAL
Status: DISCONTINUED | OUTPATIENT
Start: 2023-01-01 | End: 2023-01-01 | Stop reason: HOSPADM

## 2023-01-01 RX ORDER — FUROSEMIDE 10 MG/ML
120 INJECTION INTRAMUSCULAR; INTRAVENOUS
Status: COMPLETED | OUTPATIENT
Start: 2023-01-01 | End: 2023-01-01

## 2023-01-01 RX ORDER — NAPROXEN SODIUM 220 MG/1
81 TABLET, FILM COATED ORAL DAILY
Status: DISCONTINUED | OUTPATIENT
Start: 2023-01-01 | End: 2023-01-01 | Stop reason: HOSPADM

## 2023-01-01 RX ORDER — EPINEPHRINE 0.1 MG/ML
INJECTION INTRAVENOUS CODE/TRAUMA/SEDATION MEDICATION
Status: DISCONTINUED | OUTPATIENT
Start: 2023-01-01 | End: 2023-01-01 | Stop reason: HOSPADM

## 2023-01-01 RX ORDER — SODIUM BICARBONATE 1 MEQ/ML
SYRINGE (ML) INTRAVENOUS CODE/TRAUMA/SEDATION MEDICATION
Status: COMPLETED | OUTPATIENT
Start: 2023-01-01 | End: 2023-01-01

## 2023-01-01 RX ORDER — ATORVASTATIN CALCIUM 40 MG/1
40 TABLET, FILM COATED ORAL DAILY
Status: DISCONTINUED | OUTPATIENT
Start: 2023-01-01 | End: 2023-01-01 | Stop reason: HOSPADM

## 2023-01-01 RX ORDER — SACUBITRIL AND VALSARTAN 49; 51 MG/1; MG/1
1 TABLET, FILM COATED ORAL 2 TIMES DAILY
Qty: 60 TABLET | Refills: 11 | Status: ON HOLD | OUTPATIENT
Start: 2023-01-01 | End: 2023-01-01

## 2023-01-01 RX ORDER — AMLODIPINE BESYLATE 5 MG/1
5 TABLET ORAL DAILY
Status: DISCONTINUED | OUTPATIENT
Start: 2023-01-01 | End: 2023-01-01

## 2023-01-01 RX ORDER — FUROSEMIDE 40 MG/1
160 TABLET ORAL 2 TIMES DAILY PRN
Status: DISCONTINUED | OUTPATIENT
Start: 2023-01-01 | End: 2023-01-01 | Stop reason: HOSPADM

## 2023-01-01 RX ORDER — SODIUM CHLORIDE 9 MG/ML
INJECTION, SOLUTION INTRAVENOUS
Status: DISCONTINUED | OUTPATIENT
Start: 2023-01-01 | End: 2023-01-01 | Stop reason: HOSPADM

## 2023-01-01 RX ORDER — TALC
6 POWDER (GRAM) TOPICAL NIGHTLY PRN
Status: DISCONTINUED | OUTPATIENT
Start: 2023-01-01 | End: 2023-01-01 | Stop reason: HOSPADM

## 2023-01-01 RX ORDER — CLOPIDOGREL BISULFATE 75 MG/1
75 TABLET ORAL DAILY
Status: DISCONTINUED | OUTPATIENT
Start: 2023-01-01 | End: 2023-01-01

## 2023-01-01 RX ORDER — ACETAMINOPHEN 325 MG/1
650 TABLET ORAL EVERY 4 HOURS PRN
Status: DISCONTINUED | OUTPATIENT
Start: 2023-01-01 | End: 2023-01-01 | Stop reason: HOSPADM

## 2023-01-01 RX ORDER — DEXTROSE 40 %
30 GEL (GRAM) ORAL
Status: DISCONTINUED | OUTPATIENT
Start: 2023-01-01 | End: 2023-01-01 | Stop reason: HOSPADM

## 2023-01-01 RX ORDER — NOREPINEPHRINE BITARTRATE/D5W 4MG/250ML
0-3 PLASTIC BAG, INJECTION (ML) INTRAVENOUS CONTINUOUS
Status: DISCONTINUED | OUTPATIENT
Start: 2023-01-01 | End: 2023-01-01 | Stop reason: HOSPADM

## 2023-01-01 RX ORDER — HEPARIN SODIUM,PORCINE/D5W 25000/250
0-40 INTRAVENOUS SOLUTION INTRAVENOUS CONTINUOUS
Status: DISCONTINUED | OUTPATIENT
Start: 2023-01-01 | End: 2023-01-01

## 2023-01-01 RX ORDER — AZITHROMYCIN 250 MG/1
250 TABLET, FILM COATED ORAL DAILY
Status: COMPLETED | OUTPATIENT
Start: 2023-01-01 | End: 2023-01-01

## 2023-01-01 RX ORDER — FUROSEMIDE 10 MG/ML
40 INJECTION INTRAMUSCULAR; INTRAVENOUS
Status: COMPLETED | OUTPATIENT
Start: 2023-01-01 | End: 2023-01-01

## 2023-01-01 RX ORDER — LORATADINE 10 MG/1
10 TABLET ORAL DAILY
Qty: 30 TABLET | Refills: 2 | Status: SHIPPED | OUTPATIENT
Start: 2023-01-01 | End: 2023-01-01

## 2023-01-01 RX ORDER — SODIUM CHLORIDE 9 MG/ML
INJECTION, SOLUTION INTRAVENOUS ONCE
Status: DISCONTINUED | OUTPATIENT
Start: 2023-01-01 | End: 2023-01-01 | Stop reason: HOSPADM

## 2023-01-01 RX ORDER — FUROSEMIDE 10 MG/ML
80 INJECTION INTRAMUSCULAR; INTRAVENOUS ONCE
Status: COMPLETED | OUTPATIENT
Start: 2023-01-01 | End: 2023-01-01

## 2023-01-01 RX ORDER — HEPARIN SODIUM 1000 [USP'U]/ML
4000 INJECTION, SOLUTION INTRAVENOUS; SUBCUTANEOUS ONCE
Status: DISCONTINUED | OUTPATIENT
Start: 2023-01-01 | End: 2023-01-01 | Stop reason: HOSPADM

## 2023-01-01 RX ORDER — SEVELAMER CARBONATE 800 MG/1
1600 TABLET, FILM COATED ORAL
Status: DISCONTINUED | OUTPATIENT
Start: 2023-01-01 | End: 2023-01-01 | Stop reason: HOSPADM

## 2023-01-01 RX ORDER — NAPROXEN SODIUM 220 MG/1
81 TABLET, FILM COATED ORAL DAILY
Status: DISCONTINUED | OUTPATIENT
Start: 2023-01-01 | End: 2023-01-01

## 2023-01-01 RX ORDER — SODIUM CHLORIDE 9 MG/ML
INJECTION, SOLUTION INTRAVENOUS ONCE
Status: COMPLETED | OUTPATIENT
Start: 2023-01-01 | End: 2023-01-01

## 2023-01-01 RX ORDER — HEPARIN SODIUM 1000 [USP'U]/ML
4000 INJECTION, SOLUTION INTRAVENOUS; SUBCUTANEOUS
Status: DISCONTINUED | OUTPATIENT
Start: 2023-01-01 | End: 2023-01-01 | Stop reason: HOSPADM

## 2023-01-01 RX ORDER — PROCHLORPERAZINE MALEATE 5 MG
5 TABLET ORAL EVERY 6 HOURS PRN
Status: DISCONTINUED | OUTPATIENT
Start: 2023-01-01 | End: 2023-01-01 | Stop reason: HOSPADM

## 2023-01-01 RX ORDER — SEVELAMER CARBONATE 800 MG/1
1600 TABLET, FILM COATED ORAL
Qty: 180 TABLET | Refills: 11 | Status: ON HOLD | OUTPATIENT
Start: 2023-01-01 | End: 2023-01-01

## 2023-01-01 RX ORDER — ATROPINE SULFATE 0.1 MG/ML
INJECTION INTRAVENOUS CODE/TRAUMA/SEDATION MEDICATION
Status: DISCONTINUED | OUTPATIENT
Start: 2023-01-01 | End: 2023-01-01 | Stop reason: HOSPADM

## 2023-01-01 RX ORDER — LIDOCAINE HYDROCHLORIDE 10 MG/ML
1 INJECTION, SOLUTION EPIDURAL; INFILTRATION; INTRACAUDAL; PERINEURAL ONCE
Status: DISCONTINUED | OUTPATIENT
Start: 2023-01-01 | End: 2023-01-01

## 2023-01-01 RX ORDER — SODIUM CHLORIDE 0.9 % (FLUSH) 0.9 %
10 SYRINGE (ML) INJECTION
Status: DISCONTINUED | OUTPATIENT
Start: 2023-01-01 | End: 2023-01-01 | Stop reason: HOSPADM

## 2023-01-01 RX ORDER — LEVOFLOXACIN 500 MG/1
500 TABLET, FILM COATED ORAL EVERY OTHER DAY
Qty: 5 TABLET | Refills: 0 | Status: SHIPPED | OUTPATIENT
Start: 2023-01-01 | End: 2023-01-01

## 2023-01-01 RX ORDER — POLYETHYLENE GLYCOL 3350 17 G/17G
17 POWDER, FOR SOLUTION ORAL 2 TIMES DAILY PRN
Status: DISCONTINUED | OUTPATIENT
Start: 2023-01-01 | End: 2023-01-01 | Stop reason: HOSPADM

## 2023-01-01 RX ORDER — EPINEPHRINE 0.1 MG/ML
INJECTION INTRAVENOUS CODE/TRAUMA/SEDATION MEDICATION
Status: COMPLETED | OUTPATIENT
Start: 2023-01-01 | End: 2023-01-01

## 2023-01-01 RX ORDER — ACETAMINOPHEN 325 MG/1
650 TABLET ORAL EVERY 6 HOURS PRN
Status: DISCONTINUED | OUTPATIENT
Start: 2023-01-01 | End: 2023-01-01 | Stop reason: HOSPADM

## 2023-01-01 RX ORDER — GUAIFENESIN 100 MG/5ML
200 SOLUTION ORAL EVERY 4 HOURS PRN
Status: DISCONTINUED | OUTPATIENT
Start: 2023-01-01 | End: 2023-01-01 | Stop reason: HOSPADM

## 2023-01-01 RX ORDER — GLUCAGON 1 MG
1 KIT INJECTION
Status: DISCONTINUED | OUTPATIENT
Start: 2023-01-01 | End: 2023-01-01 | Stop reason: HOSPADM

## 2023-01-01 RX ORDER — SODIUM CHLORIDE 0.9 % (FLUSH) 0.9 %
10 SYRINGE (ML) INJECTION EVERY 12 HOURS PRN
Status: DISCONTINUED | OUTPATIENT
Start: 2023-01-01 | End: 2023-01-01 | Stop reason: HOSPADM

## 2023-01-01 RX ORDER — VERAPAMIL HYDROCHLORIDE 2.5 MG/ML
INJECTION, SOLUTION INTRAVENOUS
Status: DISCONTINUED | OUTPATIENT
Start: 2023-01-01 | End: 2023-01-01 | Stop reason: HOSPADM

## 2023-01-01 RX ORDER — CARVEDILOL 25 MG/1
25 TABLET ORAL 2 TIMES DAILY
Status: DISCONTINUED | OUTPATIENT
Start: 2023-01-01 | End: 2023-01-01 | Stop reason: HOSPADM

## 2023-01-01 RX ORDER — HYDRALAZINE HYDROCHLORIDE 10 MG/1
10 TABLET, FILM COATED ORAL DAILY PRN
Status: ON HOLD | COMMUNITY
Start: 2022-01-01 | End: 2023-01-01 | Stop reason: HOSPADM

## 2023-01-01 RX ORDER — FUROSEMIDE 80 MG/1
160 TABLET ORAL 2 TIMES DAILY PRN
Qty: 30 TABLET | Refills: 2 | Status: ON HOLD | OUTPATIENT
Start: 2023-01-01 | End: 2023-01-01

## 2023-01-01 RX ORDER — HEPARIN SODIUM 1000 [USP'U]/ML
1000 INJECTION, SOLUTION INTRAVENOUS; SUBCUTANEOUS
Status: DISCONTINUED | OUTPATIENT
Start: 2023-01-01 | End: 2023-01-01 | Stop reason: HOSPADM

## 2023-01-01 RX ORDER — SODIUM BICARBONATE 650 MG/1
650 TABLET ORAL 2 TIMES DAILY
Qty: 60 TABLET | Refills: 11 | Status: ON HOLD | OUTPATIENT
Start: 2023-01-01 | End: 2023-01-01

## 2023-01-01 RX ORDER — SEVELAMER HYDROCHLORIDE 800 MG/1
TABLET, FILM COATED ORAL
Status: ON HOLD | COMMUNITY
Start: 2023-01-01 | End: 2023-01-01

## 2023-01-01 RX ORDER — WARFARIN SODIUM 5 MG/1
5 TABLET ORAL DAILY
Status: DISCONTINUED | OUTPATIENT
Start: 2023-01-01 | End: 2023-01-01 | Stop reason: HOSPADM

## 2023-01-01 RX ORDER — WARFARIN SODIUM 5 MG/1
5 TABLET ORAL DAILY
Qty: 30 TABLET | Refills: 11 | Status: ON HOLD | OUTPATIENT
Start: 2023-01-01 | End: 2023-01-01

## 2023-01-01 RX ORDER — CINACALCET 30 MG/1
TABLET, FILM COATED ORAL
COMMUNITY
Start: 2022-01-01 | End: 2023-01-01

## 2023-01-01 RX ORDER — ACETAMINOPHEN 325 MG/1
650 TABLET ORAL EVERY 4 HOURS PRN
Status: DISCONTINUED | OUTPATIENT
Start: 2023-01-01 | End: 2023-01-01

## 2023-01-01 RX ORDER — LIDOCAINE HYDROCHLORIDE AND EPINEPHRINE 10; 10 MG/ML; UG/ML
50 INJECTION, SOLUTION INFILTRATION; PERINEURAL ONCE
Status: DISCONTINUED | OUTPATIENT
Start: 2023-01-01 | End: 2023-01-01

## 2023-01-01 RX ORDER — ATORVASTATIN CALCIUM 40 MG/1
40 TABLET, FILM COATED ORAL DAILY
Qty: 90 TABLET | Refills: 3 | Status: ON HOLD | OUTPATIENT
Start: 2023-01-01 | End: 2023-01-01

## 2023-01-01 RX ORDER — SODIUM BICARBONATE 1 MEQ/ML
SYRINGE (ML) INTRAVENOUS CODE/TRAUMA/SEDATION MEDICATION
Status: DISCONTINUED | OUTPATIENT
Start: 2023-01-01 | End: 2023-01-01 | Stop reason: HOSPADM

## 2023-01-01 RX ORDER — SODIUM BICARBONATE 650 MG/1
650 TABLET ORAL 2 TIMES DAILY
Status: DISCONTINUED | OUTPATIENT
Start: 2023-01-01 | End: 2023-01-01 | Stop reason: HOSPADM

## 2023-01-01 RX ORDER — MAG HYDROX/ALUMINUM HYD/SIMETH 200-200-20
30 SUSPENSION, ORAL (FINAL DOSE FORM) ORAL EVERY 6 HOURS PRN
Status: DISCONTINUED | OUTPATIENT
Start: 2023-01-01 | End: 2023-01-01 | Stop reason: HOSPADM

## 2023-01-01 RX ORDER — CARVEDILOL 25 MG/1
25 TABLET ORAL 2 TIMES DAILY
Qty: 60 TABLET | Refills: 5 | Status: ON HOLD | OUTPATIENT
Start: 2023-01-01 | End: 2023-01-01

## 2023-01-01 RX ORDER — MUPIROCIN 20 MG/G
OINTMENT TOPICAL 2 TIMES DAILY
Status: DISCONTINUED | OUTPATIENT
Start: 2023-01-01 | End: 2023-01-01 | Stop reason: HOSPADM

## 2023-01-01 RX ORDER — AZITHROMYCIN 250 MG/1
500 TABLET, FILM COATED ORAL
Status: COMPLETED | OUTPATIENT
Start: 2023-01-01 | End: 2023-01-01

## 2023-01-01 RX ORDER — ONDANSETRON 8 MG/1
8 TABLET, ORALLY DISINTEGRATING ORAL EVERY 8 HOURS PRN
Status: DISCONTINUED | OUTPATIENT
Start: 2023-01-01 | End: 2023-01-01 | Stop reason: HOSPADM

## 2023-01-01 RX ORDER — MUPIROCIN 20 MG/G
OINTMENT TOPICAL 2 TIMES DAILY
Status: COMPLETED | OUTPATIENT
Start: 2023-01-01 | End: 2023-01-01

## 2023-01-01 RX ORDER — IODIXANOL 320 MG/ML
INJECTION, SOLUTION INTRAVASCULAR
Status: DISCONTINUED | OUTPATIENT
Start: 2023-01-01 | End: 2023-01-01 | Stop reason: HOSPADM

## 2023-01-01 RX ORDER — LIDOCAINE HYDROCHLORIDE 10 MG/ML
5 INJECTION INFILTRATION; PERINEURAL ONCE
Status: DISCONTINUED | OUTPATIENT
Start: 2023-01-01 | End: 2023-01-01

## 2023-01-01 RX ORDER — HEPARIN SODIUM 200 [USP'U]/100ML
INJECTION, SOLUTION INTRAVENOUS
Status: DISCONTINUED | OUTPATIENT
Start: 2023-01-01 | End: 2023-01-01 | Stop reason: HOSPADM

## 2023-01-01 RX ORDER — CLOPIDOGREL BISULFATE 75 MG/1
75 TABLET ORAL DAILY
Qty: 30 TABLET | Refills: 11 | Status: SHIPPED | OUTPATIENT
Start: 2023-01-01 | End: 2023-01-01

## 2023-01-01 RX ORDER — HEPARIN SODIUM 5000 [USP'U]/ML
5000 INJECTION, SOLUTION INTRAVENOUS; SUBCUTANEOUS EVERY 8 HOURS
Status: DISCONTINUED | OUTPATIENT
Start: 2023-01-01 | End: 2023-01-01 | Stop reason: HOSPADM

## 2023-01-01 RX ORDER — CARVEDILOL 25 MG/1
25 TABLET ORAL 2 TIMES DAILY
Status: DISCONTINUED | OUTPATIENT
Start: 2023-01-01 | End: 2023-01-01

## 2023-01-01 RX ORDER — ASPIRIN 325 MG
325 TABLET ORAL DAILY
Status: DISCONTINUED | OUTPATIENT
Start: 2023-01-01 | End: 2023-01-01

## 2023-01-01 RX ORDER — CLOPIDOGREL BISULFATE 75 MG/1
TABLET ORAL
Qty: 30 TABLET | Refills: 11 | Status: ON HOLD | OUTPATIENT
Start: 2023-01-01 | End: 2023-01-01

## 2023-01-01 RX ORDER — FUROSEMIDE 10 MG/ML
80 INJECTION INTRAMUSCULAR; INTRAVENOUS ONCE
Status: DISCONTINUED | OUTPATIENT
Start: 2023-01-01 | End: 2023-01-01

## 2023-01-01 RX ORDER — REGADENOSON 0.08 MG/ML
0.4 INJECTION, SOLUTION INTRAVENOUS
Status: COMPLETED | OUTPATIENT
Start: 2023-01-01 | End: 2023-01-01

## 2023-01-01 RX ORDER — NAPROXEN SODIUM 220 MG/1
81 TABLET, FILM COATED ORAL DAILY
Qty: 30 TABLET | Refills: 11 | Status: ON HOLD | OUTPATIENT
Start: 2023-01-01 | End: 2023-01-01

## 2023-01-01 RX ORDER — ACETAMINOPHEN 500 MG
500 TABLET ORAL EVERY 6 HOURS PRN
Status: ON HOLD | COMMUNITY
End: 2023-01-01

## 2023-01-01 RX ORDER — LOSARTAN POTASSIUM 100 MG/1
100 TABLET ORAL
COMMUNITY
Start: 2022-01-01 | End: 2023-01-01

## 2023-01-01 RX ORDER — AMINOPHYLLINE 25 MG/ML
75 INJECTION, SOLUTION INTRAVENOUS ONCE
Status: COMPLETED | OUTPATIENT
Start: 2023-01-01 | End: 2023-01-01

## 2023-01-01 RX ORDER — SODIUM CHLORIDE 9 MG/ML
INJECTION, SOLUTION INTRAVENOUS ONCE
Status: DISCONTINUED | OUTPATIENT
Start: 2023-01-01 | End: 2023-01-01

## 2023-01-01 RX ORDER — LOPERAMIDE HYDROCHLORIDE 2 MG/1
2 CAPSULE ORAL 4 TIMES DAILY PRN
Status: DISCONTINUED | OUTPATIENT
Start: 2023-01-01 | End: 2023-01-01 | Stop reason: HOSPADM

## 2023-01-01 RX ORDER — BENZONATATE 100 MG/1
200 CAPSULE ORAL 3 TIMES DAILY PRN
Qty: 30 CAPSULE | Refills: 1 | Status: SHIPPED | OUTPATIENT
Start: 2023-01-01 | End: 2023-01-01

## 2023-01-01 RX ORDER — LIDOCAINE HYDROCHLORIDE 10 MG/ML
INJECTION, SOLUTION EPIDURAL; INFILTRATION; INTRACAUDAL; PERINEURAL
Status: DISCONTINUED | OUTPATIENT
Start: 2023-01-01 | End: 2023-01-01 | Stop reason: HOSPADM

## 2023-01-01 RX ORDER — CLOPIDOGREL BISULFATE 75 MG/1
600 TABLET ORAL ONCE
Status: COMPLETED | OUTPATIENT
Start: 2023-01-01 | End: 2023-01-01

## 2023-01-01 RX ORDER — LIDOCAINE HYDROCHLORIDE 10 MG/ML
5 INJECTION, SOLUTION EPIDURAL; INFILTRATION; INTRACAUDAL; PERINEURAL ONCE
Status: COMPLETED | OUTPATIENT
Start: 2023-01-01 | End: 2023-01-01

## 2023-01-01 RX ADMIN — SACUBITRIL AND VALSARTAN 1 TABLET: 24; 26 TABLET, FILM COATED ORAL at 09:02

## 2023-01-01 RX ADMIN — SODIUM BICARBONATE 650 MG TABLET 650 MG: at 08:06

## 2023-01-01 RX ADMIN — AZITHROMYCIN MONOHYDRATE 250 MG: 250 TABLET ORAL at 08:06

## 2023-01-01 RX ADMIN — SEVELAMER CARBONATE 1600 MG: 800 TABLET, FILM COATED ORAL at 04:06

## 2023-01-01 RX ADMIN — PIPERACILLIN AND TAZOBACTAM 4.5 G: 4; .5 INJECTION, POWDER, LYOPHILIZED, FOR SOLUTION INTRAVENOUS; PARENTERAL at 02:07

## 2023-01-01 RX ADMIN — SACUBITRIL AND VALSARTAN 1 TABLET: 49; 51 TABLET, FILM COATED ORAL at 08:07

## 2023-01-01 RX ADMIN — MUPIROCIN: 20 OINTMENT TOPICAL at 08:06

## 2023-01-01 RX ADMIN — ATORVASTATIN CALCIUM 40 MG: 40 TABLET, FILM COATED ORAL at 08:02

## 2023-01-01 RX ADMIN — SACUBITRIL AND VALSARTAN 1 TABLET: 24; 26 TABLET, FILM COATED ORAL at 08:02

## 2023-01-01 RX ADMIN — CLOPIDOGREL BISULFATE 75 MG: 75 TABLET ORAL at 09:02

## 2023-01-01 RX ADMIN — AMINOPHYLLINE 75 MG: 25 INJECTION, SOLUTION INTRAVENOUS at 08:03

## 2023-01-01 RX ADMIN — WARFARIN SODIUM 7.5 MG: 5 TABLET ORAL at 05:02

## 2023-01-01 RX ADMIN — MUPIROCIN: 20 OINTMENT TOPICAL at 08:07

## 2023-01-01 RX ADMIN — ATORVASTATIN CALCIUM 40 MG: 40 TABLET, FILM COATED ORAL at 08:07

## 2023-01-01 RX ADMIN — HEPARIN SODIUM 5000 UNITS: 5000 INJECTION INTRAVENOUS; SUBCUTANEOUS at 09:07

## 2023-01-01 RX ADMIN — CLOPIDOGREL BISULFATE 75 MG: 75 TABLET ORAL at 08:07

## 2023-01-01 RX ADMIN — CARVEDILOL 25 MG: 25 TABLET, FILM COATED ORAL at 08:02

## 2023-01-01 RX ADMIN — ATORVASTATIN CALCIUM 40 MG: 40 TABLET, FILM COATED ORAL at 10:02

## 2023-01-01 RX ADMIN — SEVELAMER CARBONATE 1600 MG: 800 TABLET, FILM COATED ORAL at 05:02

## 2023-01-01 RX ADMIN — ATROPINE SULFATE 1 MG: 0.1 INJECTION INTRAVENOUS at 02:07

## 2023-01-01 RX ADMIN — SACUBITRIL AND VALSARTAN 1 TABLET: 49; 51 TABLET, FILM COATED ORAL at 08:06

## 2023-01-01 RX ADMIN — HEPARIN SODIUM AND DEXTROSE 12 UNITS/KG/HR: 10000; 5 INJECTION INTRAVENOUS at 02:02

## 2023-01-01 RX ADMIN — SACUBITRIL AND VALSARTAN 1 TABLET: 24; 26 TABLET, FILM COATED ORAL at 10:02

## 2023-01-01 RX ADMIN — CEFTRIAXONE 1 G: 1 INJECTION, POWDER, FOR SOLUTION INTRAMUSCULAR; INTRAVENOUS at 11:02

## 2023-01-01 RX ADMIN — SEVELAMER CARBONATE 1600 MG: 800 TABLET, FILM COATED ORAL at 08:06

## 2023-01-01 RX ADMIN — LOPERAMIDE HYDROCHLORIDE 2 MG: 2 CAPSULE ORAL at 11:02

## 2023-01-01 RX ADMIN — MUPIROCIN: 20 OINTMENT TOPICAL at 09:07

## 2023-01-01 RX ADMIN — MUPIROCIN: 20 OINTMENT TOPICAL at 08:02

## 2023-01-01 RX ADMIN — CLOPIDOGREL BISULFATE 75 MG: 75 TABLET ORAL at 08:02

## 2023-01-01 RX ADMIN — SEVELAMER CARBONATE 1600 MG: 800 TABLET, FILM COATED ORAL at 05:07

## 2023-01-01 RX ADMIN — WARFARIN SODIUM 5 MG: 5 TABLET ORAL at 07:02

## 2023-01-01 RX ADMIN — CARVEDILOL 25 MG: 25 TABLET, FILM COATED ORAL at 10:02

## 2023-01-01 RX ADMIN — AMLODIPINE BESYLATE 10 MG: 5 TABLET ORAL at 12:02

## 2023-01-01 RX ADMIN — SEVELAMER CARBONATE 1600 MG: 800 TABLET, FILM COATED ORAL at 08:02

## 2023-01-01 RX ADMIN — ALUMINUM HYDROXIDE, MAGNESIUM HYDROXIDE, AND SIMETHICONE 30 ML: 200; 200; 20 SUSPENSION ORAL at 03:06

## 2023-01-01 RX ADMIN — SEVELAMER CARBONATE 1600 MG: 800 TABLET, FILM COATED ORAL at 08:07

## 2023-01-01 RX ADMIN — INSULIN HUMAN 1.24 UNITS: 100 INJECTION, SOLUTION PARENTERAL at 01:07

## 2023-01-01 RX ADMIN — SEVELAMER CARBONATE 1600 MG: 800 TABLET, FILM COATED ORAL at 11:07

## 2023-01-01 RX ADMIN — SODIUM BICARBONATE 50 MEQ: 84 INJECTION, SOLUTION INTRAVENOUS at 03:07

## 2023-01-01 RX ADMIN — SEVELAMER CARBONATE 1600 MG: 800 TABLET, FILM COATED ORAL at 03:02

## 2023-01-01 RX ADMIN — SEVELAMER CARBONATE 1600 MG: 800 TABLET, FILM COATED ORAL at 02:06

## 2023-01-01 RX ADMIN — SACUBITRIL AND VALSARTAN 1 TABLET: 49; 51 TABLET, FILM COATED ORAL at 09:07

## 2023-01-01 RX ADMIN — AMLODIPINE BESYLATE 10 MG: 5 TABLET ORAL at 08:02

## 2023-01-01 RX ADMIN — Medication 50 MEQ: at 02:07

## 2023-01-01 RX ADMIN — CLOPIDOGREL BISULFATE 75 MG: 75 TABLET ORAL at 08:06

## 2023-01-01 RX ADMIN — AMLODIPINE BESYLATE 10 MG: 5 TABLET ORAL at 08:06

## 2023-01-01 RX ADMIN — EPINEPHRINE 1 MG: 0.1 INJECTION INTRAVENOUS at 02:07

## 2023-01-01 RX ADMIN — ASPIRIN 81 MG CHEWABLE TABLET 81 MG: 81 TABLET CHEWABLE at 09:02

## 2023-01-01 RX ADMIN — LIDOCAINE HYDROCHLORIDE 50 MG: 10 INJECTION, SOLUTION EPIDURAL; INFILTRATION; INTRACAUDAL at 10:07

## 2023-01-01 RX ADMIN — WARFARIN SODIUM 5 MG: 5 TABLET ORAL at 05:02

## 2023-01-01 RX ADMIN — ATORVASTATIN CALCIUM 40 MG: 40 TABLET, FILM COATED ORAL at 09:02

## 2023-01-01 RX ADMIN — EPINEPHRINE 1 MG: 0.1 INJECTION INTRACARDIAC; INTRAVENOUS at 02:07

## 2023-01-01 RX ADMIN — CEFTRIAXONE 1 G: 1 INJECTION, POWDER, FOR SOLUTION INTRAMUSCULAR; INTRAVENOUS at 08:06

## 2023-01-01 RX ADMIN — AZITHROMYCIN MONOHYDRATE 500 MG: 500 INJECTION, POWDER, LYOPHILIZED, FOR SOLUTION INTRAVENOUS at 10:02

## 2023-01-01 RX ADMIN — AZITHROMYCIN MONOHYDRATE 500 MG: 500 INJECTION, POWDER, LYOPHILIZED, FOR SOLUTION INTRAVENOUS at 08:02

## 2023-01-01 RX ADMIN — ATORVASTATIN CALCIUM 40 MG: 40 TABLET, FILM COATED ORAL at 08:06

## 2023-01-01 RX ADMIN — PIPERACILLIN AND TAZOBACTAM 4.5 G: 4; .5 INJECTION, POWDER, LYOPHILIZED, FOR SOLUTION INTRAVENOUS; PARENTERAL at 03:07

## 2023-01-01 RX ADMIN — EPINEPHRINE 0.1 MG: 0.1 INJECTION INTRAVENOUS at 02:07

## 2023-01-01 RX ADMIN — ASPIRIN 81 MG CHEWABLE TABLET 81 MG: 81 TABLET CHEWABLE at 08:06

## 2023-01-01 RX ADMIN — DEXTROSE MONOHYDRATE 25 G: 25 INJECTION, SOLUTION INTRAVENOUS at 01:07

## 2023-01-01 RX ADMIN — SEVELAMER CARBONATE 1600 MG: 800 TABLET, FILM COATED ORAL at 11:06

## 2023-01-01 RX ADMIN — ASPIRIN 81 MG CHEWABLE TABLET 81 MG: 81 TABLET CHEWABLE at 08:02

## 2023-01-01 RX ADMIN — AMLODIPINE BESYLATE 5 MG: 5 TABLET ORAL at 08:07

## 2023-01-01 RX ADMIN — SODIUM BICARBONATE 650 MG: 650 TABLET ORAL at 10:02

## 2023-01-01 RX ADMIN — HEPARIN SODIUM 1000 UNITS: 1000 INJECTION, SOLUTION INTRAVENOUS; SUBCUTANEOUS at 12:06

## 2023-01-01 RX ADMIN — SEVELAMER CARBONATE 1600 MG: 800 TABLET, FILM COATED ORAL at 11:02

## 2023-01-01 RX ADMIN — HEPARIN SODIUM 1000 UNITS: 1000 INJECTION, SOLUTION INTRAVENOUS; SUBCUTANEOUS at 01:06

## 2023-01-01 RX ADMIN — AMLODIPINE BESYLATE 5 MG: 5 TABLET ORAL at 06:07

## 2023-01-01 RX ADMIN — DEXTROSE MONOHYDRATE 25 G: 500 INJECTION PARENTERAL at 03:07

## 2023-01-01 RX ADMIN — MUPIROCIN: 20 OINTMENT TOPICAL at 10:02

## 2023-01-01 RX ADMIN — REGADENOSON 0.4 MG: 0.08 INJECTION, SOLUTION INTRAVENOUS at 08:03

## 2023-01-01 RX ADMIN — VANCOMYCIN HYDROCHLORIDE 1750 MG: 10 INJECTION, POWDER, LYOPHILIZED, FOR SOLUTION INTRAVENOUS at 02:07

## 2023-01-01 RX ADMIN — CEFTRIAXONE 1 G: 1 INJECTION, POWDER, FOR SOLUTION INTRAMUSCULAR; INTRAVENOUS at 11:06

## 2023-01-01 RX ADMIN — LOPERAMIDE HYDROCHLORIDE 2 MG: 2 CAPSULE ORAL at 09:02

## 2023-01-01 RX ADMIN — WARFARIN SODIUM 5 MG: 5 TABLET ORAL at 04:02

## 2023-01-01 RX ADMIN — CARVEDILOL 25 MG: 25 TABLET, FILM COATED ORAL at 09:02

## 2023-01-01 RX ADMIN — ATORVASTATIN CALCIUM 40 MG: 40 TABLET, FILM COATED ORAL at 12:02

## 2023-01-01 RX ADMIN — MUPIROCIN: 20 OINTMENT TOPICAL at 09:02

## 2023-01-01 RX ADMIN — SEVELAMER CARBONATE 1600 MG: 800 TABLET, FILM COATED ORAL at 07:02

## 2023-01-01 RX ADMIN — HEPARIN SODIUM 5000 UNITS: 5000 INJECTION INTRAVENOUS; SUBCUTANEOUS at 02:07

## 2023-01-01 RX ADMIN — EPINEPHRINE 1 MG: 0.1 INJECTION INTRACARDIAC; INTRAVENOUS at 03:07

## 2023-01-01 RX ADMIN — CARVEDILOL 25 MG: 25 TABLET, FILM COATED ORAL at 03:02

## 2023-01-01 RX ADMIN — HEPARIN SODIUM AND DEXTROSE 14 UNITS/KG/HR: 10000; 5 INJECTION INTRAVENOUS at 07:02

## 2023-01-01 RX ADMIN — LOPERAMIDE HYDROCHLORIDE 2 MG: 2 CAPSULE ORAL at 08:02

## 2023-01-01 RX ADMIN — ASPIRIN 81 MG CHEWABLE TABLET 81 MG: 81 TABLET CHEWABLE at 12:02

## 2023-01-01 RX ADMIN — FUROSEMIDE 40 MG: 10 INJECTION, SOLUTION INTRAMUSCULAR; INTRAVENOUS at 06:02

## 2023-01-01 RX ADMIN — AZITHROMYCIN MONOHYDRATE 500 MG: 250 TABLET ORAL at 11:06

## 2023-01-01 RX ADMIN — FUROSEMIDE 80 MG: 10 INJECTION, SOLUTION INTRAVENOUS at 02:07

## 2023-01-01 RX ADMIN — SACUBITRIL AND VALSARTAN 1 TABLET: 24; 26 TABLET, FILM COATED ORAL at 03:02

## 2023-01-01 RX ADMIN — SODIUM BICARBONATE 650 MG: 650 TABLET ORAL at 09:02

## 2023-01-01 RX ADMIN — SEVELAMER CARBONATE 1600 MG: 800 TABLET, FILM COATED ORAL at 04:07

## 2023-01-01 RX ADMIN — ASPIRIN 81 MG CHEWABLE TABLET 81 MG: 81 TABLET CHEWABLE at 10:02

## 2023-01-01 RX ADMIN — SEVELAMER CARBONATE 1600 MG: 800 TABLET, FILM COATED ORAL at 05:06

## 2023-01-01 RX ADMIN — PIPERACILLIN AND TAZOBACTAM 4.5 G: 4; .5 INJECTION, POWDER, LYOPHILIZED, FOR SOLUTION INTRAVENOUS; PARENTERAL at 04:07

## 2023-01-01 RX ADMIN — CEFTRIAXONE 1 G: 1 INJECTION, POWDER, FOR SOLUTION INTRAMUSCULAR; INTRAVENOUS at 10:02

## 2023-01-01 RX ADMIN — HUMAN ALBUMIN MICROSPHERES AND PERFLUTREN 0.11 MG: 10; .22 INJECTION, SOLUTION INTRAVENOUS at 08:02

## 2023-01-01 RX ADMIN — AMLODIPINE BESYLATE 10 MG: 5 TABLET ORAL at 10:02

## 2023-01-01 RX ADMIN — AMLODIPINE BESYLATE 10 MG: 5 TABLET ORAL at 09:02

## 2023-01-01 RX ADMIN — AMLODIPINE BESYLATE 10 MG: 5 TABLET ORAL at 03:02

## 2023-01-01 RX ADMIN — ASPIRIN 81 MG CHEWABLE TABLET 81 MG: 81 TABLET CHEWABLE at 08:07

## 2023-01-01 RX ADMIN — SEVELAMER CARBONATE 1600 MG: 800 TABLET, FILM COATED ORAL at 01:07

## 2023-01-01 RX ADMIN — CEFTRIAXONE 1 G: 1 INJECTION, POWDER, FOR SOLUTION INTRAMUSCULAR; INTRAVENOUS at 12:02

## 2023-01-01 RX ADMIN — HEPARIN SODIUM AND DEXTROSE 12 UNITS/KG/HR: 10000; 5 INJECTION INTRAVENOUS at 07:02

## 2023-01-01 RX ADMIN — DEXTROSE 125 ML: 10 SOLUTION INTRAVENOUS at 06:02

## 2023-01-01 RX ADMIN — HEPARIN SODIUM 5000 UNITS: 5000 INJECTION INTRAVENOUS; SUBCUTANEOUS at 05:07

## 2023-01-01 RX ADMIN — CARVEDILOL 25 MG: 25 TABLET, FILM COATED ORAL at 12:02

## 2023-01-01 RX ADMIN — HEPARIN SODIUM AND DEXTROSE 16 UNITS/KG/HR: 10000; 5 INJECTION INTRAVENOUS at 07:02

## 2023-01-01 RX ADMIN — HEPARIN SODIUM 4000 UNITS: 1000 INJECTION, SOLUTION INTRAVENOUS; SUBCUTANEOUS at 02:02

## 2023-01-01 RX ADMIN — SODIUM BICARBONATE 650 MG: 650 TABLET ORAL at 08:02

## 2023-01-01 RX ADMIN — FUROSEMIDE 40 MG: 10 INJECTION, SOLUTION INTRAMUSCULAR; INTRAVENOUS at 05:02

## 2023-01-01 RX ADMIN — SODIUM CHLORIDE: 9 INJECTION, SOLUTION INTRAVENOUS at 03:06

## 2023-01-01 RX ADMIN — SEVELAMER CARBONATE 1600 MG: 800 TABLET, FILM COATED ORAL at 09:02

## 2023-01-01 RX ADMIN — GUAIFENESIN 200 MG: 200 SOLUTION ORAL at 08:02

## 2023-01-01 RX ADMIN — HEPARIN SODIUM AND DEXTROSE 16 UNITS/KG/HR: 10000; 5 INJECTION INTRAVENOUS at 06:02

## 2023-01-01 RX ADMIN — HEPARIN SODIUM 5000 UNITS: 5000 INJECTION INTRAVENOUS; SUBCUTANEOUS at 06:07

## 2023-01-01 RX ADMIN — FUROSEMIDE 120 MG: 10 INJECTION, SOLUTION INTRAMUSCULAR; INTRAVENOUS at 09:06

## 2023-01-01 RX ADMIN — HEPARIN SODIUM AND DEXTROSE 14 UNITS/KG/HR: 10000; 5 INJECTION INTRAVENOUS at 05:02

## 2023-01-01 RX ADMIN — SACUBITRIL AND VALSARTAN 1 TABLET: 49; 51 TABLET, FILM COATED ORAL at 01:06

## 2023-01-01 RX ADMIN — ASPIRIN 325 MG ORAL TABLET 325 MG: 325 PILL ORAL at 10:02

## 2023-01-01 RX ADMIN — CLOPIDOGREL BISULFATE 75 MG: 75 TABLET ORAL at 10:02

## 2023-01-01 RX ADMIN — CLOPIDOGREL BISULFATE 600 MG: 75 TABLET ORAL at 12:02

## 2023-01-01 RX ADMIN — NOREPINEPHRINE BITARTRATE 0.04 MCG/KG/MIN: 4 INJECTION, SOLUTION INTRAVENOUS at 02:07

## 2023-01-01 RX ADMIN — LOPERAMIDE HYDROCHLORIDE 2 MG: 2 CAPSULE ORAL at 05:02

## 2023-01-05 NOTE — LETTER
January 5, 2023      [facility]  [address] [address]  Phone: [phone]  Fax: [phone]       Patient: Ping Davenport   YOB: 1965  Date of Visit: 01/05/2023    To Whom It May Concern:    Antionette Davenport  was at [facility] on 01/05/2023. The patient may return to work/school on 1/8/23   with no restrictions. If you have any questions or concerns, or if I can be of further assistance, please do not hesitate to contact me.    Sincerely,    Korey Taylor MD

## 2023-01-06 NOTE — PATIENT INSTRUCTIONS
Your test was POSITIVE for COVID-19 (coronavirus).       Por favor, aíslese en mercado casa. Podrá salir de casa y/o regresar al trabajo cuando se cumplan las siguientes condiciones:    Si no ha sido hospitalizado/a y no está inmunodeprimido/a de forma moderada a grave:  Más de 5 días desde la aparición de los síntomas Y  Más de 24 horas sin fiebre sin medicamentos Y  Los síntomas hernandez neo  Continúe usando gabbi mascarilla cerca de otros verito 5 días adicionales     Si ha sido hospitalizado/a O está inmunocomprometido/a de forma moderada a grave:  Más de 20 días desde la aparición de los síntomas  Más de 24 horas sin fiebre sin medicamentos  Los síntomas hernandez neo     Si no tiene síntomas cristal obtiene un resultado positivo:  Más de 5 guzman desde la fecha de la primera prueba positiva (20 días si está inmunocomprometido/a de forma moderada a grave). Si desarrolla síntomas, utilice los lineamientos anteriores.  Continúe usando gabbi mascarilla cerca de otros verito 5 días adicionales.      Contact Tracing    As one of the next steps, you will receive a call or text from the [place] Department of Health ([facility]) COVID-19 Tracing Team. See the contact information below so you know not to ignore the health departments call. It is important that you contact them back immediately so they can help.      Contact Tracer Number:  [phone]  Caller ID for most carriers: [place] Dept Health     What is contact tracing?  Contact tracing is a process that helps identify everyone who has been in close contact with an infected person. Contact tracers let those people know they may have been exposed and guide them on next steps. Confidentiality is important for everyone; no one will be told who may have exposed them to the virus.  Your involvement is important. The more we know about where and how this virus is spreading, the better chance we have at stopping it from spreading further.  What does exposure mean?  Exposure means you  have been within 6 feet for more than 15 minutes with a person who has or had COVID-19.  What kind of questions do the contact tracers ask?  A contact tracer will confirm your basic contact information including name, address, phone number, and next of kin, as well as asking about any symptoms you may have had. Theyll also ask you how you think you may have gotten sick, such as places where you may have been exposed to the virus, and people you were with. Those names will never be shared with anyone outside of that call, and will only be used to help trace and stop the spread of the virus.   I have privacy concerns. How will the state use my information?  Your privacy about your health is important. All calls are completed using call centers that use the appropriate health privacy protection measures (HIPAA compliance), meaning that your patient information is safe. No one will ever ask you any questions related to immigration status. Your health comes first.   Do I have to participate?  You do not have to participate, but we strongly encourage you to. Contact tracing can help us catch and control new outbreaks as theyre developing to keep your friends and family safe.   What if I dont hear from anyone?  If you dont receive a call within 24 hours, you can call the number above right away to inquire about your status. That line is open from 8:00 am - 8:00 p.m., 7 days a week.  Contact tracing saves lives! Together, we have the power to beat this virus and keep our loved ones and neighbors safe.    For more information see CDC link below.      https://www.cdc.gov/coronavirus/2019-ncov/hcp/guidance-prevent-spread.html#precautions        Sources:  [facility], [place] [facility] and [facility]           Sincerely,     Korey Taylor MD

## 2023-01-06 NOTE — PROGRESS NOTES
"Subjective:       Patient ID: Ping Davenport is a 57 y.o. female.    Vitals:  height is 5' 2" (1.575 m) and weight is 69 kg (152 lb 1.9 oz). Her blood pressure is 161/71 (abnormal) and her pulse is 68. Her respiration is 18 and oxygen saturation is 96%.     Chief Complaint: Cough (Covid concerns)    C/o cough and fatigue feeling x  5 days or more, pt is on dialysis for kidney dz  Denies f/c , vaccinated x and boosted      Cough  This is a new problem. The current episode started in the past 7 days. The problem has been unchanged. The problem occurs constantly. The cough is Productive of sputum. Associated symptoms include myalgias. Nothing aggravates the symptoms. The treatment provided no relief. There is no history of bronchitis or pneumonia.     Respiratory:  Positive for cough.    Musculoskeletal:  Positive for muscle ache.     Objective:      Physical Exam   Constitutional: She is oriented to person, place, and time. She appears well-developed. She is cooperative.  Non-toxic appearance. She does not appear ill. No distress.   HENT:   Head: Normocephalic and atraumatic.   Ears:   Right Ear: Hearing, tympanic membrane, external ear and ear canal normal.   Left Ear: Hearing, tympanic membrane, external ear and ear canal normal.   Nose: Nose normal. No mucosal edema, rhinorrhea or nasal deformity. No epistaxis. Right sinus exhibits no maxillary sinus tenderness and no frontal sinus tenderness. Left sinus exhibits no maxillary sinus tenderness and no frontal sinus tenderness.   Mouth/Throat: Uvula is midline, oropharynx is clear and moist and mucous membranes are normal. Mucous membranes are moist. No trismus in the jaw. Normal dentition. No uvula swelling. No posterior oropharyngeal erythema. Oropharynx is clear.   Eyes: Conjunctivae and lids are normal. Pupils are equal, round, and reactive to light. Right eye exhibits no discharge. Left eye exhibits no discharge. No scleral icterus. Extraocular movement " intact   Neck: Trachea normal and phonation normal. Neck supple.   Cardiovascular: Normal rate, regular rhythm, normal heart sounds and normal pulses.   Pulmonary/Chest: Effort normal and breath sounds normal. No respiratory distress.   Abdominal: Normal appearance and bowel sounds are normal. She exhibits no distension and no mass. Soft. There is no abdominal tenderness.   Musculoskeletal: Normal range of motion.         General: No deformity. Normal range of motion.   Neurological: She is alert and oriented to person, place, and time. She exhibits normal muscle tone. Coordination normal.   Skin: Skin is warm, dry, intact, not diaphoretic and not pale.   Psychiatric: Her speech is normal and behavior is normal. Judgment and thought content normal.   Nursing note and vitals reviewed.      Assessment:       1. Body aches    2. Lab test positive for detection of COVID-19 virus            Plan:         Body aches  -     POCT Influenza A/B MOLECULAR  -     SARS Coronavirus 2 Antigen, POCT Manual Read    Lab test positive for detection of COVID-19 virus    Other orders  -     loratadine (CLARITIN) 10 mg tablet; Take 1 tablet (10 mg total) by mouth once daily.  Dispense: 30 tablet; Refill: 2  -     benzonatate (TESSALON PERLES) 100 MG capsule; Take 2 capsules (200 mg total) by mouth 3 (three) times daily as needed for Cough.  Dispense: 30 capsule; Refill: 1  -     Discontinue: nirmatrelvir-ritonavir 300 mg (150 mg x 2)-100 mg copackaged tablets (EUA); Take 3 tablets by mouth 2 (two) times daily for 5 days. Each dose contains 2 nirmatrelvir (pink tablets) and 1 ritonavir (white tablet). Take all 3 tablets together  Dispense: 30 tablet; Refill: 0               Results for orders placed or performed in visit on 01/05/23   POCT Influenza A/B MOLECULAR   Result Value Ref Range    POC Molecular Influenza A Ag Negative Negative, Not Reported    POC Molecular Influenza B Ag Negative Negative, Not Reported      Acceptable Yes    SARS Coronavirus 2 Antigen, POCT Manual Read   Result Value Ref Range    SARS Coronavirus 2 Antigen Positive (A) Negative     Acceptable Yes

## 2023-02-02 PROBLEM — Z99.2 ESRD (END STAGE RENAL DISEASE) ON DIALYSIS: Status: ACTIVE | Noted: 2020-09-10

## 2023-02-02 NOTE — Clinical Note
The catheter was repositioned into the and insertion attempt was made into the right coronary artery.

## 2023-02-02 NOTE — ED PROVIDER NOTES
Encounter Date: 2/2/2023    SCRIBE #1 NOTE: I, Connor Cohn, am scribing for, and in the presence of,  Edie Castano NP. I have scribed the following portions of the note - Other sections scribed: HPI, ROS, Physical Exam.     History     Chief Complaint   Patient presents with    Fatigue     Pt complains of cough/ fatigue, HA that began yesterday sats in triage on room air= 87%, pt has hx of dialysis, last dialysis was yesterday      Ping Davenport is a 57 y.o. female with a past medical history of ESRD who presents to the ED with fatigue and shortness of breath that started yesterday. The patient also complains of congestion and a productive cough with green sputum. Patient went to dialysis yesterday (MWF). Patient is still able to make small amounts of urine. She denies fever or n/v/d. She denies any recent travel. No known sick contacts.    The history is provided by the patient. No  was used.   Review of patient's allergies indicates:   Allergen Reactions    Phenergan [promethazine] Other (See Comments)     Restless legs    Reglan [metoclopramide hcl]     Zofran [ondansetron hcl (pf)] Other (See Comments)     Constipation     Ketorolac Palpitations     Past Medical History:   Diagnosis Date    Diabetes mellitus     Hypertension     Renal disorder      Past Surgical History:   Procedure Laterality Date    INSERTION OF CATHETER FOR HEMODIALYSIS Left 9/11/2020    Procedure: INSERTION, CATHETER, HEMODIALYSIS;  Surgeon: William Beltran MD;  Location: [facility] OR;  Service: General;  Laterality: Left;     No family history on file.  Social History     Tobacco Use    Smoking status: Never   Substance Use Topics    Alcohol use: Not Currently    Drug use: Never     Review of Systems   Constitutional:  Positive for fatigue. Negative for fever.   HENT:  Positive for congestion.    Respiratory:  Positive for cough and shortness of breath.    Gastrointestinal:  Negative for diarrhea, nausea and  vomiting.   All other systems reviewed and are negative.    Physical Exam     Initial Vitals [23 1605]   BP Pulse Resp Temp SpO2   134/74 76 (!) 24 98.5 °F (36.9 °C) (!) 87 %      MAP       --         Physical Exam    Constitutional: She appears well-developed and well-nourished.   HENT:   Head: Normocephalic and atraumatic.   +nasal congestion   Eyes: Conjunctivae and EOM are normal. Pupils are equal, round, and reactive to light.   Neck:   Normal range of motion.  Cardiovascular:  Normal rate, regular rhythm, normal heart sounds and intact distal pulses.           Pulmonary/Chest: Tachypnea noted. She has rhonchi.     Crackles, bilateral bases.  Dialysis Cath noted to the marked location.    Musculoskeletal:         General: Edema present. Normal range of motion.      Cervical back: Normal range of motion.      Right lower le+ Pitting Edema present.      Left lower le+ Pitting Edema present.     Neurological: She is alert and oriented to person, place, and time.   Skin: Skin is warm and dry.   Psychiatric: She has a normal mood and affect. Her behavior is normal. Judgment and thought content normal.       ED Course   Procedures  Labs Reviewed   CBC W/ AUTO DIFFERENTIAL - Abnormal; Notable for the following components:       Result Value    WBC 13.41 (*)     RBC 3.67 (*)     Hemoglobin 11.3 (*)     Hematocrit 34.9 (*)     RDW 17.4 (*)     Platelets 120 (*)     Immature Granulocytes 0.9 (*)     Gran # (ANC) 11.2 (*)     Immature Grans (Abs) 0.12 (*)     Lymph # 0.7 (*)     Mono # 1.3 (*)     Gran % 83.6 (*)     Lymph % 5.2 (*)     All other components within normal limits   COMPREHENSIVE METABOLIC PANEL - Abnormal; Notable for the following components:    Sodium 134 (*)     Chloride 94 (*)     Glucose 140 (*)     BUN 26 (*)     Creatinine 4.9 (*)     Alkaline Phosphatase 50 (*)     eGFR 10 (*)     All other components within normal limits   B-TYPE NATRIURETIC PEPTIDE - Abnormal; Notable for the  following components:    BNP >4,900 (*)     All other components within normal limits   TROPONIN I - Abnormal; Notable for the following components:    Troponin I 0.850 (*)     All other components within normal limits   INFLUENZA A & B BY MOLECULAR   MAGNESIUM   SARS-COV-2 RNA AMPLIFICATION, QUAL          Imaging Results              X-Ray Chest 1 View (Final result)  Result time 02/02/23 18:03:07      Final result by Jere Rankin MD (02/02/23 18:03:07)                   Impression:      1. Chronic appearing interstitial findings, superimposed edema is a consideration.  No large focal consolidation.      Electronically signed by: Jere Rankin MD  Date:    02/02/2023  Time:    18:03               Narrative:    EXAMINATION:  XR CHEST 1 VIEW    CLINICAL HISTORY:  Chest pain, unspecified    TECHNIQUE:  Single frontal view of the chest was performed.    COMPARISON:  04/13/2022    FINDINGS:  The cardiomediastinal silhouette is prominent, similar to the previous exam.  Left central venous catheter tip projects over the distal SVC.  There is calcification of the aorta..  There is no pleural effusion.  The trachea is midline.  The lungs are symmetrically expanded bilaterally with prominent interstitial attenuation bilaterally..  No large focal consolidation seen.  There is no pneumothorax.  The osseous structures are remarkable for degenerative change and osteopenia..                                       Medications   furosemide injection 40 mg (has no administration in time range)   furosemide injection 40 mg (40 mg Intravenous Given 2/2/23 1729)     Medical Decision Making:   Initial Assessment:   Ping Davenport is a 57 y.o. female who presents to the ED with fatigue and SOB x1 day. Patient tachypneic with bibasilar crackles and nasal congestion on exam. +1 pitting edema to bilateral lower extremities noted.  Differential Diagnosis:   Differential Diagnosis includes, but is not limited to:  PE, MI/ACS,  pneumothorax, pericardial effusion/tamonade, pneumonia, lung abscess, pericarditis/myocarditis, pleural effusion, lung mass, CHF exacerbation, asthma exacerbation, COPD exacerbation, aspirated/ingested foreign body, airway obstruction, CO poisoning, anemia, metabolic derangement, allergy/atopy, influenza, viral URI, viral syndrome.  Clinical Tests:   Lab Tests: Ordered and Reviewed  Medical Tests: Ordered and Reviewed        Scribe Attestation:   Scribe #1: I performed the above scribed service and the documentation accurately describes the services I performed. I attest to the accuracy of the note.      ED Course as of 02/02/23 1831   Thu Feb 02, 2023   1736 Attestation. The patient was seen independently by me. Evaluation and disposition were discussed with me.  The is a 57-year-old female who has end-stage renal disease.  She came to the emergency department today with shortness of breath and chest pain.  Her troponin is 0.85, this is elevated from her baseline troponin.  Her oxygen saturations on room air were 87%.  The patient went to dialysis yesterday.  COVID and flu were negative.  Patient takes Lasix 80 mg q.day and makes very little urine.  EKG: Flipped T waves in the inferiolateral leads that appear to be worsening changes from her previous EKG on 09/11/2020.  [ST]   1817 [facility] paged.  [DT]   1820 Spoke with  for admission. Recommend repeat cardiac enzymes being the pt reports some chest pain and is due for dialysis tomorrow morning.  [DT]      ED Course User Index  [DT] Edie Castano NP  [ST] Lilian Jhaveri MD                 Clinical Impression:   Final diagnoses:  [R06.02] SOB (shortness of breath)  [R07.9] Chest pain  [N18.6, Z99.2] ESRD (end stage renal disease) on dialysis (Primary)  [R77.8] Elevated troponin        ED Disposition Condition    Observation Stable           I, Edie Castano NP, personally performed the services described in this documentation.All medical record entries made  by the scribe were at my direction and in my presence.I have reviewed the chart and agree that the record reflects my personal performance and is accurate and complete.      Edie Castano NP  02/02/23 1831       Edie Castano NP  02/02/23 1831

## 2023-02-02 NOTE — ED NOTES
Patient sats at 88% and placed on 2L NC o2    Patient presents to the ER with CO fatigue and SOB with constant productive cough noted.    PMH: Dialysis--had dialysis 1 day ago.

## 2023-02-02 NOTE — Clinical Note
The catheter was inserted into the ostium   right coronary artery. An angiography was performed of the right coronary arteries. Multiple views were taken. The angiography was performed via hand injection with 15 mL of contrast.

## 2023-02-03 NOTE — HPI
Ms. Davenport is a 57 year old woman with a past medical history of ESRD on HD MWF seen by Dr. Jc who developed SOB and cough productive of green sputum yesterday. She was dialyzed yesterday. Flu and COVID tests negative. She has not had fevers or chills. Has noticed that sometimes when she is walking in the parking lot she develops chest tightness after going a short distance. Does not believe she has had a left heart cath before but was told that she has congestive heart failure.

## 2023-02-03 NOTE — BRIEF OP NOTE
Brief Operative Note:    : Remy Toscano MD     Referring Physician: Self,Aaareferral     All Operators: Surgeon(s):  MD Jatin Concepcion MD     Preoperative Diagnosis: Chest pain [R07.9]  Elevated troponin [R77.8]     Postop Diagnosis: Chest pain [R07.9]  Elevated troponin [R77.8]    Treatments/Procedures: Procedure(s) (LRB):  Left heart cath (Left)    Findings:  -80% stenosis in mid LAD, mid left circ. 99% stenosis in mid RCA  catheterization report for full details.    Recommendations:  -routine post cath care  -recommend medical management for now with aspirin, statin, Plavix, beta-blocker  -recommend dialysis  -CT surgery consult as outpatient for CABG eval versus high-risk PCI    Estimated Blood loss: 20 cc    Specimens removed: No    Signed:  Jatin Diaz MD  Cardiovascular Fellow  [facility]

## 2023-02-03 NOTE — ASSESSMENT & PLAN NOTE
Troponin up to 1.9 this AM  EKG without acute ischemic changes  Loaded with DAPT, heparin gtt  Continue statin, BB  TTE pending   NPO for LHC

## 2023-02-03 NOTE — PLAN OF CARE
2 mL of air removed from radial vasc band.  No hematoma or bleeding noted.  +2 laurie radial pulses palpated. Skin normal in color, warm to touch, < 3 sec cap refill. Will continue to monitor pt.

## 2023-02-03 NOTE — CONSULTS
[place] - Telemetry  Cardiology  Consult Note    Patient Name: Ping Davenport  MRN: [id number]  Admission Date: 2/2/2023  Hospital Length of Stay: 0 days  Code Status: Full Code   Attending Provider: Tae Salinas, *   Consulting Provider: Betito Gongora NP  Primary Care Physician: Primary Doctor No  Principal Problem:Hypervolemia    Patient information was obtained from patient, past medical records and ER records.     Inpatient consult to [facility]  Consult performed by: Betito Gongora NP  Consult ordered by: Alexandria Hobson MD        Subjective:     Chief Complaint:  Chest pain, SOB     HPI:   Ping Davenport is a 57 year old female with ESRD, HTN, HFpEF. Patient presented to the ED with SOB, cough, chest tightness. Patient reports chest tightness with minimal exertion for the past several months. Patient denies palpitations, diaphoresis, syncope, dizziness, edema. LVEF in 2019 50% with mild to moderate MR. Troponin up to 1.9 this AM. EKG SR without acute ischemic changes. Patient transferred to [place] for cardiology. Patient DAPT loaded and placed on a heparin gtt. NPO for LHC. TTE pending.       Past Medical History:   Diagnosis Date    Diabetes mellitus     Hypertension     Renal disorder        Past Surgical History:   Procedure Laterality Date    INSERTION OF CATHETER FOR HEMODIALYSIS Left 9/11/2020    Procedure: INSERTION, CATHETER, HEMODIALYSIS;  Surgeon: William Beltran MD;  Location: [facility];  Service: General;  Laterality: Left;       Review of patient's allergies indicates:   Allergen Reactions    Phenergan [promethazine] Other (See Comments)     Restless legs    Reglan [metoclopramide hcl]     Zofran [ondansetron hcl (pf)] Other (See Comments)     Constipation     Ketorolac Palpitations       No current facility-administered medications on file prior to encounter.     Current Outpatient Medications on File Prior to Encounter   Medication Sig     acetaminophen (TYLENOL) 500 MG tablet Take 500 mg by mouth every 6 (six) hours as needed for Pain.    amLODIPine (NORVASC) 10 MG tablet Take 1 tablet (10 mg total) by mouth once daily.    B complex-vitamin C-folic acid (SELENE-EMILIA/NEPHRO-EMILIA) 0.8 mg Tab TAKE 1 TABLET BY MOUTH ONCE A DAY (Patient taking differently: Take 1 tablet by mouth every evening.)    carvediloL (COREG) 25 MG tablet Take 1 tablet (25 mg total) by mouth 2 (two) times daily.    furosemide (LASIX) 80 MG tablet Take 2 tablets (160 mg total) by mouth 2 (two) times daily. (Patient taking differently: Take 160 mg by mouth 2 (two) times daily as needed.)    hydrALAZINE (APRESOLINE) 10 MG tablet Take 10 mg by mouth daily as needed.    sevelamer HCL (RENAGEL) 800 MG Tab Take 2 tablets by mouth three times daily with meals and 1 tablet by mouth twice daily with snacks     Family History    None       Tobacco Use    Smoking status: Never    Smokeless tobacco: Not on file   Substance and Sexual Activity    Alcohol use: Not Currently    Drug use: Never    Sexual activity: Not Currently     Partners: Male     Review of Systems   Constitutional: Negative for diaphoresis and fever.   HENT: Negative.     Eyes: Negative.    Cardiovascular:  Positive for chest pain and dyspnea on exertion. Negative for irregular heartbeat, leg swelling, near-syncope, orthopnea, palpitations, paroxysmal nocturnal dyspnea and syncope.   Respiratory:  Positive for cough, shortness of breath and sputum production.    Endocrine: Negative.    Hematologic/Lymphatic: Negative.    Skin: Negative.    Musculoskeletal: Negative.    Gastrointestinal:  Negative for nausea and vomiting.   Genitourinary: Negative.    Neurological: Negative.    Psychiatric/Behavioral: Negative.     Allergic/Immunologic: Negative.    Objective:     Vital Signs (Most Recent):  Temp: 98 °F (36.7 °C) (02/03/23 0730)  Pulse: 63 (02/03/23 0730)  Resp: 19 (02/03/23 0730)  BP: (!) 147/68 (02/03/23 0730)  SpO2:  (!) 93 % (02/03/23 0730)   Vital Signs (24h Range):  Temp:  [97.5 °F (36.4 °C)-98.8 °F (37.1 °C)] 98 °F (36.7 °C)  Pulse:  [62-77] 63  Resp:  [18-24] 19  SpO2:  [87 %-98 %] 93 %  BP: (134-147)/(65-98) 147/68     Weight: 68.9 kg (152 lb)  Body mass index is 27.8 kg/m².    SpO2: (!) 93 %         Intake/Output Summary (Last 24 hours) at 2/3/2023 1026  Last data filed at 2/3/2023 1024  Gross per 24 hour   Intake --   Output 1 ml   Net -1 ml       Lines/Drains/Airways       Central Venous Catheter Line  Duration             Permacath 09/11/20 0859 left internal jugular 875 days              Peripheral Intravenous Line  Duration                  Peripheral IV - Single Lumen 02/02/23 1622 20 G;1 1/4 in Right Antecubital <1 day                    Physical Exam  Constitutional:       General: She is not in acute distress.     Appearance: She is not diaphoretic.   HENT:      Head: Atraumatic.   Eyes:      General:         Right eye: No discharge.         Left eye: No discharge.   Cardiovascular:      Rate and Rhythm: Normal rate and regular rhythm.      Heart sounds: Murmur heard.   Pulmonary:      Effort: Pulmonary effort is normal.      Breath sounds: No rales.   Abdominal:      General: Bowel sounds are normal.      Palpations: Abdomen is soft.   Skin:     General: Skin is warm and dry.   Neurological:      Mental Status: She is alert and oriented to person, place, and time.       Significant Labs: BMP:   Recent Labs   Lab 02/02/23  1627 02/03/23  0244   * 129*   * 135*   K 4.0 3.9   CL 94* 97   CO2 27 27   BUN 26* 33*   CREATININE 4.9* 5.4*   CALCIUM 9.7 9.3   MG 2.0 2.0   , CMP   Recent Labs   Lab 02/02/23  1627 02/03/23  0244   * 135*   K 4.0 3.9   CL 94* 97   CO2 27 27   * 129*   BUN 26* 33*   CREATININE 4.9* 5.4*   CALCIUM 9.7 9.3   PROT 7.8  --    ALBUMIN 4.0  --    BILITOT 1.0  --    ALKPHOS 50*  --    AST 17  --    ALT 11  --    ANIONGAP 13 11   , CBC   Recent Labs   Lab 02/02/23  3671  "02/03/23  0124   WBC 13.41* 10.15   HGB 11.3* 9.9*   HCT 34.9* 30.6*   * 103*   , INR   Recent Labs   Lab 02/03/23  0124   INR 1.4*   , Lipid Panel No results for input(s): CHOL, HDL, LDLCALC, TRIG, CHOLHDL in the last 48 hours., Troponin   Recent Labs   Lab 02/02/23  1901 02/02/23  2352 02/03/23  0738   TROPONINI 1.208* 1.325* 1.980*   , and All pertinent lab results from the last 24 hours have been reviewed.    Significant Imaging: Echocardiogram: Transthoracic echo (TTE) complete ([facility] Only):   Results for orders placed or performed during the hospital encounter of 02/02/23   Echo   Result Value Ref Range    BSA 1.74 m2    TDI SEPTAL 0.03 m/s    LV LATERAL E/E' RATIO 18.67 m/s    LV SEPTAL E/E' RATIO 37.33 m/s    LA WIDTH 3.90 cm    IVC diameter 2.61 cm    Left Ventricular Outflow Tract Mean Velocity 0.63 cm/s    Left Ventricular Outflow Tract Mean Gradient 1.77 mmHg    Pulmonary Valve Mean Velocity 0.89 m/s    TDI LATERAL 0.06 m/s    PV PEAK VELOCITY 1.31 cm/s    LVIDd 5.41 3.5 - 6.0 cm    IVS 1.13 (A) 0.6 - 1.1 cm    Posterior Wall 1.18 (A) 0.6 - 1.1 cm    LVIDs 4.50 (A) 2.1 - 4.0 cm    FS 17 28 - 44 %    LA volume 74.25 cm3    LV mass 251.66 g    LA size 4.41 cm    RVDD 3.64 cm    Left Ventricle Relative Wall Thickness 0.44 cm    AV mean gradient 10 mmHg    AV valve area 1.24 cm2    AV Velocity Ratio 0.41     AV index (prosthetic) 0.39     MV mean gradient 1 mmHg    MV valve area p 1/2 method 3.90 cm2    MV valve area by continuity eq 2.09 cm2    E/A ratio 1.81     Mean e' 0.05 m/s    E wave deceleration time 194.58 msec    MV "A" wave duration [id number] msec    LVOT diameter 2.00 cm    LVOT area 3.1 cm2    LVOT peak bowen 0.84 m/s    LVOT peak VTI 17.20 cm    Ao peak bowen 2.07 m/s    Ao VTI 43.6 cm    LVOT stroke volume 54.01 cm3    AV peak gradient 17 mmHg    MV peak gradient 5 mmHg    E/E' ratio 24.89 m/s    MV Peak E Bowen 1.12 m/s    TR Max Bowen 3.39 m/s    MV VTI 25.9 cm    MV stenosis " pressure 1/2 time 56.43 ms    MV Peak A Bowen 0.62 m/s    LV Systolic Volume 92.63 mL    LV Systolic Volume Index 54.5 mL/m2    LV Diastolic Volume 141.97 mL    LV Diastolic Volume Index 83.51 mL/m2    LA Volume Index 43.7 mL/m2    LV Mass Index 148 g/m2    RA Major Axis 5.48 cm    Left Atrium Minor Axis 4.71 cm    Left Atrium Major Axis 5.51 cm    Triscuspid Valve Regurgitation Peak Gradient 46 mmHg    LA Volume Index (Mod) 34.4 mL/m2    LA volume (mod) 58.42 cm3    RA Width 4.77 cm    Right Atrial Pressure (from IVC) 15 mmHg    EF 20 %    TV rest pulmonary artery pressure 61 mmHg    Narrative    · The left ventricle is mildly enlarged with concentric hypertrophy and   severely decreased systolic function.  · The estimated ejection fraction is 20%.  · There is left ventricular global hypokinesis.  · A small spherical solid left ventricular thrombus is present. The   thrombus is fixed and located in the apex.  · Grade II left ventricular diastolic dysfunction.  · Normal right ventricular size with normal right ventricular systolic   function.  · Moderate left atrial enlargement.  · There is mild aortic valve stenosis.  · Aortic valve area is 1.24 cm2; peak velocity is 2.07 m/s; mean gradient   is 10 mmHg.  · Mild-to-moderate mitral regurgitation.  · Moderate tricuspid regurgitation.  · Moderate pulmonic regurgitation.  · There is pulmonary hypertension.  · Elevated central venous pressure (15 mmHg).  · The estimated PA systolic pressure is 61 mmHg.        Assessment and Plan:     * Hypervolemia  Volume status maintained by HD    Essential hypertension  SBP 130s-140s  Continue Norvasc and BB    ESRD (end stage renal disease) on dialysis  Nephrology following     NSTEMI (non-ST elevated myocardial infarction)  Troponin up to 1.9 this AM  EKG without acute ischemic changes  Loaded with DAPT, heparin gtt  Continue statin, BB  TTE pending   NPO for [facility]              VTE Risk Mitigation (From admission, onward)          Ordered     heparin 25,000 units in dextrose 5% (100 units/ml) IV bolus from bag - ADDITIONAL PRN BOLUS - 60 units/kg (max bolus 4000 units)  As needed (PRN)        Question:  Heparin Infusion Adjustment (DO NOT MODIFY ANSWER)  Answer:  [email or web address]\epic\Images\Pharmacy\HeparinInfusions\heparin LOW INTENSITY nomogram for [facility] [id number]    02/03/23 0116     heparin 25,000 units in dextrose 5% (100 units/ml) IV bolus from bag - ADDITIONAL PRN BOLUS - 30 units/kg (max bolus 4000 units)  As needed (PRN)        Question:  Heparin Infusion Adjustment (DO NOT MODIFY ANSWER)  Answer:  \\[email or web address]\epic\Images\Pharmacy\HeparinInfusions\heparin LOW INTENSITY nomogram for [facility] [id number].pdf    02/03/23 0116     heparin 25,000 units in dextrose 5% 250 mL (100 units/mL) infusion LOW INTENSITY nomogram - [facility]  Continuous        Question Answer Comment   Heparin Infusion Adjustment (DO NOT MODIFY ANSWER) \\[email or web address]\epic\Images\Pharmacy\HeparinInfusions\heparin LOW INTENSITY nomogram for [facility] [id number]    Begin at (in units/kg/hr) 12        02/03/23 0116     IP VTE HIGH RISK PATIENT  Once         02/02/23 1901     Place sequential compression device  Until discontinued         02/02/23 1901                Thank you for your consult. I will follow-up with patient. Please contact us if you have any additional questions.    Betito Gongora NP  Cardiology   [place] - Telemetry

## 2023-02-03 NOTE — CARE UPDATE
Severe cardiomyopathy   NSTEMI      Plan:  - LHC  - DAPT candidate     Full consult note to follow

## 2023-02-03 NOTE — PLAN OF CARE
SW met with pt at bedside to complete assessment. Pt is AxO x3  and able to verbally answer assessment questions. SW used  Shannen #[id number]. Pt able to confirm demographic information. Pt able to answer questions on own without . Pt reports to live at home with mother, sister, and daughter. Pt reports to feel safe and have support. Pt reports while at home being independent of Adl[facility]. Pt reported DME in the home to include wheelchair. Pt reported not having a primary care provider and open to PCC follow up. Pt confirmed attending [facility] this hospital for dialysis MWF 12pm. Pt drives herself to and from. At time of discharge pt to have transportation home by family. SW updated whiteboard with [facility] name and contact information. SW confirmed pt understanding of Observation unit and expected discharge plan. SW will continue to follow pt throughout care and assist with any discharge needs.         02/03/23 0951   Discharge Planning   Assessment Type Discharge Planning Brief Assessment   Resource/Environmental Concerns none   Support Systems Parent;Family members;Children   Equipment Currently Used at Home wheelchair   Current Living Arrangements home   Patient/Family Anticipates Transition to home with family   Patient/Family Anticipated Services at Transition none   Discharge Plan A Home with family     No future appointments.    GUILLERMO [facility] Case Management  [phone]

## 2023-02-03 NOTE — ASSESSMENT & PLAN NOTE
Consult Nephrology, as O2 sats are improved on 2 L NC and electrolytes OK no need for emergent HD    2/3/23: does HD MWF, labs stable, pending neprohology consult

## 2023-02-03 NOTE — SUBJECTIVE & OBJECTIVE
Past Medical History:   Diagnosis Date    Diabetes mellitus     Hypertension     Renal disorder        Past Surgical History:   Procedure Laterality Date    INSERTION OF CATHETER FOR HEMODIALYSIS Left 9/11/2020    Procedure: INSERTION, CATHETER, HEMODIALYSIS;  Surgeon: William Beltran MD;  Location: [facility] OR;  Service: General;  Laterality: Left;       Review of patient's allergies indicates:   Allergen Reactions    Phenergan [promethazine] Other (See Comments)     Restless legs    Reglan [metoclopramide hcl]     Zofran [ondansetron hcl (pf)] Other (See Comments)     Constipation     Ketorolac Palpitations       No current facility-administered medications on file prior to encounter.     Current Outpatient Medications on File Prior to Encounter   Medication Sig    acetaminophen (TYLENOL) 500 MG tablet Take 500 mg by mouth every 6 (six) hours as needed for Pain.    amLODIPine (NORVASC) 10 MG tablet Take 1 tablet (10 mg total) by mouth once daily.    B complex-vitamin C-folic acid (SELENE-EMILIA/NEPHRO-EMILIA) 0.8 mg Tab TAKE 1 TABLET BY MOUTH ONCE A DAY (Patient taking differently: Take 1 tablet by mouth every evening.)    carvediloL (COREG) 25 MG tablet Take 1 tablet (25 mg total) by mouth 2 (two) times daily.    furosemide (LASIX) 80 MG tablet Take 2 tablets (160 mg total) by mouth 2 (two) times daily. (Patient taking differently: Take 160 mg by mouth 2 (two) times daily as needed.)    hydrALAZINE (APRESOLINE) 10 MG tablet Take 10 mg by mouth daily as needed.    sevelamer HCL (RENAGEL) 800 MG Tab Take 2 tablets by mouth three times daily with meals and 1 tablet by mouth twice daily with snacks    [DISCONTINUED] benzonatate (TESSALON PERLES) 100 MG capsule Take 2 capsules (200 mg total) by mouth 3 (three) times daily as needed for Cough.    [DISCONTINUED] cinacalcet (SENSIPAR) 30 MG Tab Take by mouth.    [DISCONTINUED] clotrimazole-betamethasone 1-0.05% (LOTRISONE) cream Apply topically 2 (two) times daily.     [DISCONTINUED] docusate sodium (COLACE) 100 MG capsule Take 1 tablet by mouth every day    [DISCONTINUED] ergocalciferol (ERGOCALCIFEROL) 50,000 unit Cap Take 1 capsule by mouth once weekly    [DISCONTINUED] lactulose (CHRONULAC) 10 gram/15 mL solution Take 15ml by mouth every day    [DISCONTINUED] loratadine (CLARITIN) 10 mg tablet Take 1 tablet (10 mg total) by mouth once daily.    [DISCONTINUED] losartan (COZAAR) 100 MG tablet Take 100 mg by mouth.    [DISCONTINUED] losartan (COZAAR) 50 MG tablet Take 2 tablets (100 mg total) by mouth once daily.     Family History    None       Tobacco Use    Smoking status: Never    Smokeless tobacco: Not on file   Substance and Sexual Activity    Alcohol use: Not Currently    Drug use: Never    Sexual activity: Not Currently     Partners: Male     Review of Systems   Constitutional:  Negative for chills, fatigue and fever.   HENT:  Positive for congestion. Negative for sinus pain and sore throat.    Respiratory:  Positive for cough, chest tightness and shortness of breath.    Cardiovascular:  Negative for leg swelling.   Gastrointestinal:  Positive for diarrhea. Negative for abdominal pain, blood in stool and constipation.   Genitourinary:  Negative for dysuria, flank pain and frequency.   Musculoskeletal:  Negative for joint swelling, myalgias and neck pain.   Skin:  Negative for rash and wound.   Neurological:  Negative for tremors, syncope and weakness.   Objective:     Vital Signs (Most Recent):  Temp: 98.8 °F (37.1 °C) (02/02/23 1926)  Pulse: 70 (02/02/23 1926)  Resp: 20 (02/02/23 1926)  BP: (!) 134/98 (02/02/23 1926)  SpO2: (!) 93 % (02/02/23 1926)   Vital Signs (24h Range):  Temp:  [98.5 °F (36.9 °C)-98.8 °F (37.1 °C)] 98.8 °F (37.1 °C)  Pulse:  [70-77] 70  Resp:  [20-24] 20  SpO2:  [87 %-98 %] 93 %  BP: (134-141)/(66-98) 134/98     Weight: 69.2 kg (152 lb 8.9 oz)  Body mass index is 27.9 kg/m².    Physical Exam  Constitutional:       Appearance: Normal appearance.    HENT:      Head: Normocephalic and atraumatic.   Eyes:      Extraocular Movements: Extraocular movements intact.      Pupils: Pupils are equal, round, and reactive to light.   Cardiovascular:      Rate and Rhythm: Normal rate and regular rhythm.   Pulmonary:      Effort: No accessory muscle usage or retractions.      Breath sounds: Examination of the left-lower field reveals rales. Rhonchi and rales present. No decreased breath sounds or wheezing.   Abdominal:      General: Abdomen is flat. Bowel sounds are normal. There is no distension.      Palpations: Abdomen is soft.   Musculoskeletal:      Right lower leg: Edema present.      Left lower leg: Edema present.   Skin:     General: Skin is warm and dry.   Neurological:      General: No focal deficit present.      Mental Status: She is alert and oriented to person, place, and time.         CRANIAL NERVES     CN III, IV, VI   Pupils are equal, round, and reactive to light.     Significant Labs: All pertinent labs within the past 24 hours have been reviewed.  Troponin:   Recent Labs   Lab 02/02/23  1627 02/02/23  1901   TROPONINI 0.850* 1.208*     BNP >4900    Significant Imaging: I have reviewed all pertinent imaging results/findings within the past 24 hours.

## 2023-02-03 NOTE — ASSESSMENT & PLAN NOTE
Consult Nephrology, as O2 sats are improved on 2 L NC and electrolytes OK no need for emergent HD

## 2023-02-03 NOTE — PROGRESS NOTES
[facility] Medicine  Progress Note    Patient Name: Ping Davenport  MRN: [id number]  Patient Class: OP- Observation   Admission Date: 2/2/2023  Length of Stay: 0 days  Attending Physician: Tae Salinas, *  Primary Care Provider: Primary Doctor No        Subjective:     Principal Problem:Hypervolemia        HPI:  Ms. Davenport is a 57 year old woman with a past medical history of ESRD on HD MWF seen by Dr. Jc who developed SOB and cough productive of green sputum yesterday. She was dialyzed yesterday. Flu and COVID tests negative. She has not had fevers or chills. Has noticed that sometimes when she is walking in the parking lot she develops chest tightness after going a short distance. Does not believe she has had a left heart cath before but was told that she has congestive heart failure.       Overview/Hospital Course:  No notes on file    Interval History: Patient seen and examined. She is AAOx4, VSS, still with some SOB on 2L NC with adequate saturations of 93-95%. No distress noted. Troponin 0.85-1.2-1.3-1.9. Denies current chest pain. Elevation likely secondary to demand. ECHO completed yesterday still in process. Possible left heart cath today. Still on heparin gtt. HD MWF, labs without need for emergent dialysis. pending nephrology and cardiology consults.    Review of Systems   Constitutional:  Positive for fatigue.   HENT: Negative.     Eyes: Negative.    Respiratory:  Positive for cough and shortness of breath.    Cardiovascular: Negative.    Gastrointestinal: Negative.    Endocrine: Negative.    Genitourinary: Negative.    Musculoskeletal: Negative.    Skin: Negative.    Neurological: Negative.    Psychiatric/Behavioral: Negative.     Objective:     Vital Signs (Most Recent):  Temp: 98 °F (36.7 °C) (02/03/23 0730)  Pulse: 63 (02/03/23 0730)  Resp: 19 (02/03/23 0730)  BP: (!) 147/68 (02/03/23 0730)  SpO2: (!) 93 % (02/03/23 0730)   Vital Signs (24h Range):  Temp:  [97.5 °F  (36.4 °C)-98.8 °F (37.1 °C)] 98 °F (36.7 °C)  Pulse:  [62-77] 63  Resp:  [18-24] 19  SpO2:  [87 %-98 %] 93 %  BP: (134-147)/(65-98) 147/68     Weight: 68.9 kg (152 lb)  Body mass index is 27.8 kg/m².  No intake or output data in the 24 hours ending 02/03/23 0936   Physical Exam  Vitals and nursing note reviewed.   Constitutional:       Appearance: Normal appearance. She is not ill-appearing or diaphoretic.      Interventions: Nasal cannula in place.   HENT:      Head: Normocephalic and atraumatic.      Mouth/Throat:      Mouth: Mucous membranes are moist.   Eyes:      Pupils: Pupils are equal, round, and reactive to light.   Cardiovascular:      Rate and Rhythm: Normal rate and regular rhythm.      Pulses: Normal pulses.      Heart sounds: Normal heart sounds.   Pulmonary:      Effort: Pulmonary effort is normal. No respiratory distress.      Breath sounds: Normal breath sounds.   Abdominal:      General: Abdomen is flat. There is no distension.      Palpations: Abdomen is soft.      Tenderness: There is no abdominal tenderness.   Musculoskeletal:         General: Swelling (mild BLE) present. Normal range of motion.      Cervical back: Normal range of motion.   Skin:     General: Skin is warm and dry.      Capillary Refill: Capillary refill takes 2 to 3 seconds.   Neurological:      General: No focal deficit present.      Mental Status: She is alert and oriented to person, place, and time. Mental status is at baseline.   Psychiatric:         Mood and Affect: Mood normal.         Behavior: Behavior normal.         Thought Content: Thought content normal.         Judgment: Judgment normal.       Significant Labs: All pertinent labs within the past 24 hours have been reviewed.  BMP:   Recent Labs   Lab 02/03/23 0244   *   *   K 3.9   CL 97   CO2 27   BUN 33*   CREATININE 5.4*   CALCIUM 9.3   MG 2.0     Coagulation:   Recent Labs   Lab 02/03/23  0124 02/03/23  0244 02/03/23  0848   INR 1.4*  --   --     APTT 33.4*   < > 46.7*    < > = values in this interval not displayed.       Significant Imaging: I have reviewed all pertinent imaging results/findings within the past 24 hours.      Assessment/Plan:      * Hypervolemia  IV Lasix 80mg  Consult Dr. Jc Nephrology      Essential hypertension  Continue home meds      ESRD (end stage renal disease) on dialysis  Consult Nephrology, as O2 sats are improved on 2 L NC and electrolytes OK no need for emergent HD    2/3/23: does HD MWF, labs stable, pending neprohology consult      NSTEMI (non-ST elevated myocardial infarction)  Reports SOB with sputum, does reports ongoing exertional chest tightness and dyspnea when she walks in the parking lot  Prior echo with normal EF and grade I diastolic dysfunction (was on mechanical ventilation at the time)  Sats 87% SBP in 140s  Trop 0.85-->1.2-->1.3  BNP >4900 (no baseline)  EKG with 1st degree AV block, sinus rhythm, ST, T wave changes, no STEMI  No chest pain at rest. Appears most likely due to demand ischemia due to pneumonia, volume overload in the setting of ESRD, however patient could have ischemic heart disease  Give aspirin 325mg, IV Lasix 80mg, trend troponin, if trop further increasing or worsening CP will start a heparin gtt and consult Cardiology    2/3/23: troponin 1.9, remains on hep gtt, ECHO results in process. Cardiology consult pending      VTE Risk Mitigation (From admission, onward)         Ordered     heparin 25,000 units in dextrose 5% (100 units/ml) IV bolus from bag - ADDITIONAL PRN BOLUS - 60 units/kg (max bolus 4000 units)  As needed (PRN)        Question:  Heparin Infusion Adjustment (DO NOT MODIFY ANSWER)  Answer:  [email or web address]\epic\Images\Pharmacy\HeparinInfusions\heparin LOW INTENSITY nomogram for [facility] [id number].pdf    02/03/23 0116     heparin 25,000 units in dextrose 5% (100 units/ml) IV bolus from bag - ADDITIONAL PRN BOLUS - 30 units/kg (max bolus 4000 units)  As needed (PRN)        Question:   Heparin Infusion Adjustment (DO NOT MODIFY ANSWER)  Answer:  \\[email or web address]\epic\Images\Pharmacy\HeparinInfusions\heparin LOW INTENSITY nomogram for [facility] [id number].pdf    02/03/23 0116     heparin 25,000 units in dextrose 5% 250 mL (100 units/mL) infusion LOW INTENSITY nomogram - [facility]  Continuous        Question Answer Comment   Heparin Infusion Adjustment (DO NOT MODIFY ANSWER) \\[email or web address]\epic\Images\Pharmacy\HeparinInfusions\heparin LOW INTENSITY nomogram for [facility] [id number].pdf    Begin at (in units/kg/hr) 12        02/03/23 0116     IP VTE HIGH RISK PATIENT  Once         02/02/23 1901     Place sequential compression device  Until discontinued         02/02/23 1901                Discharge Planning   JACKELYN:      Code Status: Full Code   Is the patient medically ready for discharge?:     Reason for patient still in hospital (select all that apply): Patient trending condition, Treatment and Consult recommendations  Discharge Plan A: Home with family        Eugenia Ahumada DNP, AGACNP-BC  Hospitalist   Department of Hospital Medicine   [facility]   Office #: [phone]

## 2023-02-03 NOTE — PHARMACY MED REC
"  Admission Medication History     The home medication history was taken by Carrie Garrett CPhT.    Medication history obtained from, Patient Verified    You may go to "Admission" then "Reconcile Home Medications" tabs to review and/or act upon these items.     The home medication list has been updated by the Pharmacy department.   Please read ALL comments highlighted in yellow.   Please address this information as you see fit.    Feel free to contact us if you have any questions or require assistance.      The medications listed below were removed from the home medication list.  Please reorder if appropriate:  Patient reports no longer taking the following medication(s):    Benzonatate 100 mg  Cinacalcet 30 mg  Colace 100 mg  Vitamin D 50,000 units  Lactulose 10 gram/15 ml solution  Loratadine 10 mg  Losartan 100 mg        Carrie Garrett CPhT.  Ext [phone]             .        "

## 2023-02-03 NOTE — ASSESSMENT & PLAN NOTE
Reports SOB with sputum, does reports ongoing exertional chest tightness and dyspnea when she walks in the parking lot  Prior echo with normal EF and grade I diastolic dysfunction (was on mechanical ventilation at the time)  Sats 87% SBP in 140s  Trop 0.85-->1.2  BNP >4900 (no baseline)  EKG with 1st degree AV block, sinus rhythm, diffuse TWI   No chest pain at rest. Appears most likely due to demand ischemia due to pneumonia, volume overload in the setting of ESRD, however patient could have ischemic heart disease  Give aspirin 325mg, IV Lasix 80mg, trend troponin, if trop further increasing or worsening CP will start a heparin gtt and consult Cardiology

## 2023-02-03 NOTE — PROGRESS NOTES
02/03/23 1415 02/03/23 1420   Vital Signs   Pulse 60 62   /72 120/67   During Hemodialysis Assessment   Blood Flow Rate (mL/min) 250 mL/min  --    Dialysate Flow Rate (mL/min) 500 ml/min  --    Ultrafiltration Rate (mL/Hr) 1350 mL/Hr  --    Arteriovenous Lines Secure Yes  --    Arterial Pressure (mmHg) -110 mmHg  --    Venous Pressure (mmHg) 130  --    UF Removed (mL) 1294 mL  --    TMP 38  --    Venous Line in Air Detector Yes  --    Intake (mL) 250 mL  --    Transducer Dry Yes  --    Access Visible Yes  --     notified of access issue? N/A  --    Heparin given? N/A  --    Intra-Hemodialysis Comments tx complete  --    Post-Hemodialysis Assessment   Rinseback Volume (mL) 250 mL  --    Blood Volume Processed (Liters) 14 L  --    Dialyzer Clearance Clear  --    Duration of Treatment 60 minutes  --    Additional Fluid Intake (mL) 500 mL  --    Total UF (mL) 1300 mL  --    Net Fluid Removal 800  --    Patient Response to Treatment Pt tolerated tx, Pt off to Cathlab  --    Post-Hemodialysis Comments CVC capped and wrapped.  Pt tolerated tx well  --

## 2023-02-03 NOTE — HPI
Ping Davenport is a 57 year old female with ESRD, HTN, HFpEF. Patient presented to the ED with SOB, cough, chest tightness. Patient reports chest tightness with minimal exertion for the past several months. Patient denies palpitations, diaphoresis, syncope, dizziness, edema. LVEF in 2019 50% with mild to moderate MR. Troponin up to 1.9 this AM. EKG SR without acute ischemic changes. Patient transferred to [place] for cardiology. Patient DAPT loaded and placed on a heparin gtt. NPO for LHC. TTE pending.

## 2023-02-03 NOTE — H&P
[facility] Emergency Dept  [facility] Medicine  History & Physical    Patient Name: Ping Davenport  MRN: [id number]  Patient Class: OP- Observation  Admission Date: 2/2/2023  Attending Physician: Jazlyn  Primary Care Provider: Primary Doctor No         Patient information was obtained from patient, past medical records and ER records.     Subjective:     Principal Problem:Hypervolemia    Chief Complaint:   Chief Complaint   Patient presents with    Fatigue     Pt complains of cough/ fatigue, HA that began yesterday sats in triage on room air= 87%, pt has hx of dialysis, last dialysis was yesterday         HPI: Ms. Davenport is a 57 year old woman with a past medical history of ESRD on HD MWF seen by Dr. Jc who developed SOB and cough productive of green sputum yesterday. She was dialyzed yesterday. Flu and COVID tests negative. She has not had fevers or chills. Has noticed that sometimes when she is walking in the parking lot she develops chest tightness after going a short distance. Does not believe she has had a left heart cath before but was told that she has congestive heart failure.       No new subjective & objective note has been filed under this hospital service since the last note was generated.    Assessment/Plan:     * Hypervolemia  IV Lasix 80mg  Consult Dr. Jc Nephrology      Essential hypertension  Continue home meds      ESRD (end stage renal disease) on dialysis  Consult Nephrology, as O2 sats are improved on 2 L NC and electrolytes OK no need for emergent HD      NSTEMI (non-ST elevated myocardial infarction)  Reports SOB with sputum, does reports ongoing exertional chest tightness and dyspnea when she walks in the parking lot  Prior echo with normal EF and grade I diastolic dysfunction (was on mechanical ventilation at the time)  Sats 87% SBP in 140s  Trop 0.85-->1.2-->1.3  BNP >4900 (no baseline)  EKG with 1st degree AV block, sinus rhythm, ST, T wave changes, no STEMI  No chest pain at  rest. Appears most likely due to demand ischemia due to pneumonia, volume overload in the setting of ESRD, however patient could have ischemic heart disease  Give aspirin 325mg, IV Lasix 80mg, trend troponin, if trop further increasing or worsening CP will start a heparin gtt and consult Cardiology        VTE Risk Mitigation (From admission, onward)           Ordered     heparin 25,000 units in dextrose 5% (100 units/ml) IV bolus from bag - ADDITIONAL PRN BOLUS - 60 units/kg (max bolus 4000 units)  As needed (PRN)        Question:  Heparin Infusion Adjustment (DO NOT MODIFY ANSWER)  Answer:  \\[email or web address]\epic\Images\Pharmacy\HeparinInfusions\heparin LOW INTENSITY nomogram for [facility] [id number].pdf    02/03/23 0116     heparin 25,000 units in dextrose 5% (100 units/ml) IV bolus from bag - ADDITIONAL PRN BOLUS - 30 units/kg (max bolus 4000 units)  As needed (PRN)        Question:  Heparin Infusion Adjustment (DO NOT MODIFY ANSWER)  Answer:  \\[email or web address]\epic\Images\Pharmacy\HeparinInfusions\heparin LOW INTENSITY nomogram for [facility] [id number].pdf    02/03/23 0116     heparin 25,000 units in dextrose 5% (100 units/ml) IV bolus from bag INITIAL BOLUS (max bolus 4000 units)  Once        Question:  Heparin Infusion Adjustment (DO NOT MODIFY ANSWER)  Answer:  \\[email or web address]\epic\Images\Pharmacy\HeparinInfusions\heparin LOW INTENSITY nomogram for [facility] [id number].pdf    02/03/23 0116     heparin 25,000 units in dextrose 5% 250 mL (100 units/mL) infusion LOW INTENSITY nomogram - [facility]  Continuous        Question Answer Comment   Heparin Infusion Adjustment (DO NOT MODIFY ANSWER) \\[email or web address]\epic\Images\Pharmacy\HeparinInfusions\heparin LOW INTENSITY nomogram for [facility] [id number].pdf    Begin at (in units/kg/hr) 12        02/03/23 0116     IP VTE HIGH RISK PATIENT  Once         02/02/23 1901     Place sequential compression device  Until discontinued         02/02/23 1901                       Alexandria Hobson MD  Department of Hospital Medicine    [facility] - Emergency Dept

## 2023-02-03 NOTE — SUBJECTIVE & OBJECTIVE
Past Medical History:   Diagnosis Date    Diabetes mellitus     Hypertension     Renal disorder        Past Surgical History:   Procedure Laterality Date    INSERTION OF CATHETER FOR HEMODIALYSIS Left 9/11/2020    Procedure: INSERTION, CATHETER, HEMODIALYSIS;  Surgeon: William Beltran MD;  Location: [facility];  Service: General;  Laterality: Left;       Review of patient's allergies indicates:   Allergen Reactions    Phenergan [promethazine] Other (See Comments)     Restless legs    Reglan [metoclopramide hcl]     Zofran [ondansetron hcl (pf)] Other (See Comments)     Constipation     Ketorolac Palpitations       No current facility-administered medications on file prior to encounter.     Current Outpatient Medications on File Prior to Encounter   Medication Sig    acetaminophen (TYLENOL) 500 MG tablet Take 500 mg by mouth every 6 (six) hours as needed for Pain.    amLODIPine (NORVASC) 10 MG tablet Take 1 tablet (10 mg total) by mouth once daily.    B complex-vitamin C-folic acid (SELENE-EMILIA/NEPHRO-EMILIA) 0.8 mg Tab TAKE 1 TABLET BY MOUTH ONCE A DAY (Patient taking differently: Take 1 tablet by mouth every evening.)    carvediloL (COREG) 25 MG tablet Take 1 tablet (25 mg total) by mouth 2 (two) times daily.    furosemide (LASIX) 80 MG tablet Take 2 tablets (160 mg total) by mouth 2 (two) times daily. (Patient taking differently: Take 160 mg by mouth 2 (two) times daily as needed.)    hydrALAZINE (APRESOLINE) 10 MG tablet Take 10 mg by mouth daily as needed.    sevelamer HCL (RENAGEL) 800 MG Tab Take 2 tablets by mouth three times daily with meals and 1 tablet by mouth twice daily with snacks     Family History    None       Tobacco Use    Smoking status: Never    Smokeless tobacco: Not on file   Substance and Sexual Activity    Alcohol use: Not Currently    Drug use: Never    Sexual activity: Not Currently     Partners: Male     Review of Systems   Constitutional: Negative for diaphoresis and fever.   HENT:  Negative.     Eyes: Negative.    Cardiovascular:  Positive for chest pain and dyspnea on exertion. Negative for irregular heartbeat, leg swelling, near-syncope, orthopnea, palpitations, paroxysmal nocturnal dyspnea and syncope.   Respiratory:  Positive for cough, shortness of breath and sputum production.    Endocrine: Negative.    Hematologic/Lymphatic: Negative.    Skin: Negative.    Musculoskeletal: Negative.    Gastrointestinal:  Negative for nausea and vomiting.   Genitourinary: Negative.    Neurological: Negative.    Psychiatric/Behavioral: Negative.     Allergic/Immunologic: Negative.    Objective:     Vital Signs (Most Recent):  Temp: 98 °F (36.7 °C) (02/03/23 0730)  Pulse: 63 (02/03/23 0730)  Resp: 19 (02/03/23 0730)  BP: (!) 147/68 (02/03/23 0730)  SpO2: (!) 93 % (02/03/23 0730)   Vital Signs (24h Range):  Temp:  [97.5 °F (36.4 °C)-98.8 °F (37.1 °C)] 98 °F (36.7 °C)  Pulse:  [62-77] 63  Resp:  [18-24] 19  SpO2:  [87 %-98 %] 93 %  BP: (134-147)/(65-98) 147/68     Weight: 68.9 kg (152 lb)  Body mass index is 27.8 kg/m².    SpO2: (!) 93 %         Intake/Output Summary (Last 24 hours) at 2/3/2023 1026  Last data filed at 2/3/2023 1024  Gross per 24 hour   Intake --   Output 1 ml   Net -1 ml       Lines/Drains/Airways       Central Venous Catheter Line  Duration             Permacath 09/11/20 0859 left internal jugular 875 days              Peripheral Intravenous Line  Duration                  Peripheral IV - Single Lumen 02/02/23 1622 20 G;1 1/4 in Right Antecubital <1 day                    Physical Exam  Constitutional:       General: She is not in acute distress.     Appearance: She is not diaphoretic.   HENT:      Head: Atraumatic.   Eyes:      General:         Right eye: No discharge.         Left eye: No discharge.   Cardiovascular:      Rate and Rhythm: Normal rate and regular rhythm.      Heart sounds: Murmur heard.   Pulmonary:      Effort: Pulmonary effort is normal.      Breath sounds: No rales.    Abdominal:      General: Bowel sounds are normal.      Palpations: Abdomen is soft.   Skin:     General: Skin is warm and dry.   Neurological:      Mental Status: She is alert and oriented to person, place, and time.       Significant Labs: BMP:   Recent Labs   Lab 02/02/23  1627 02/03/23  0244   * 129*   * 135*   K 4.0 3.9   CL 94* 97   CO2 27 27   BUN 26* 33*   CREATININE 4.9* 5.4*   CALCIUM 9.7 9.3   MG 2.0 2.0   , CMP   Recent Labs   Lab 02/02/23  1627 02/03/23  0244   * 135*   K 4.0 3.9   CL 94* 97   CO2 27 27   * 129*   BUN 26* 33*   CREATININE 4.9* 5.4*   CALCIUM 9.7 9.3   PROT 7.8  --    ALBUMIN 4.0  --    BILITOT 1.0  --    ALKPHOS 50*  --    AST 17  --    ALT 11  --    ANIONGAP 13 11   , CBC   Recent Labs   Lab 02/02/23  1627 02/03/23  0124   WBC 13.41* 10.15   HGB 11.3* 9.9*   HCT 34.9* 30.6*   * 103*   , INR   Recent Labs   Lab 02/03/23  0124   INR 1.4*   , Lipid Panel No results for input(s): CHOL, HDL, LDLCALC, TRIG, CHOLHDL in the last 48 hours., Troponin   Recent Labs   Lab 02/02/23  1901 02/02/23  2352 02/03/23  0738   TROPONINI 1.208* 1.325* 1.980*   , and All pertinent lab results from the last 24 hours have been reviewed.    Significant Imaging: Echocardiogram: Transthoracic echo (TTE) complete ([facility] Only):   Results for orders placed or performed during the hospital encounter of 02/02/23   Echo   Result Value Ref Range    BSA 1.74 m2    TDI SEPTAL 0.03 m/s    LV LATERAL E/E' RATIO 18.67 m/s    LV SEPTAL E/E' RATIO 37.33 m/s    LA WIDTH 3.90 cm    IVC diameter 2.61 cm    Left Ventricular Outflow Tract Mean Velocity 0.63 cm/s    Left Ventricular Outflow Tract Mean Gradient 1.77 mmHg    Pulmonary Valve Mean Velocity 0.89 m/s    TDI LATERAL 0.06 m/s    PV PEAK VELOCITY 1.31 cm/s    LVIDd 5.41 3.5 - 6.0 cm    IVS 1.13 (A) 0.6 - 1.1 cm    Posterior Wall 1.18 (A) 0.6 - 1.1 cm    LVIDs 4.50 (A) 2.1 - 4.0 cm    FS 17 28 - 44 %    LA volume 74.25 cm3    LV mass 251.66 g  "   LA size 4.41 cm    RVDD 3.64 cm    Left Ventricle Relative Wall Thickness 0.44 cm    AV mean gradient 10 mmHg    AV valve area 1.24 cm2    AV Velocity Ratio 0.41     AV index (prosthetic) 0.39     MV mean gradient 1 mmHg    MV valve area p 1/2 method 3.90 cm2    MV valve area by continuity eq 2.09 cm2    E/A ratio 1.81     Mean e' 0.05 m/s    E wave deceleration time 194.58 msec    MV "A" wave duration [id number] msec    LVOT diameter 2.00 cm    LVOT area 3.1 cm2    LVOT peak bowen 0.84 m/s    LVOT peak VTI 17.20 cm    Ao peak bowen 2.07 m/s    Ao VTI 43.6 cm    LVOT stroke volume 54.01 cm3    AV peak gradient 17 mmHg    MV peak gradient 5 mmHg    E/E' ratio 24.89 m/s    MV Peak E Bowen 1.12 m/s    TR Max Bowen 3.39 m/s    MV VTI 25.9 cm    MV stenosis pressure 1/2 time 56.43 ms    MV Peak A Bowen 0.62 m/s    LV Systolic Volume 92.63 mL    LV Systolic Volume Index 54.5 mL/m2    LV Diastolic Volume 141.97 mL    LV Diastolic Volume Index 83.51 mL/m2    LA Volume Index 43.7 mL/m2    LV Mass Index 148 g/m2    RA Major Axis 5.48 cm    Left Atrium Minor Axis 4.71 cm    Left Atrium Major Axis 5.51 cm    Triscuspid Valve Regurgitation Peak Gradient 46 mmHg    LA Volume Index (Mod) 34.4 mL/m2    LA volume (mod) 58.42 cm3    RA Width 4.77 cm    Right Atrial Pressure (from IVC) 15 mmHg    EF 20 %    TV rest pulmonary artery pressure 61 mmHg    Narrative    · The left ventricle is mildly enlarged with concentric hypertrophy and   severely decreased systolic function.  · The estimated ejection fraction is 20%.  · There is left ventricular global hypokinesis.  · A small spherical solid left ventricular thrombus is present. The   thrombus is fixed and located in the apex.  · Grade II left ventricular diastolic dysfunction.  · Normal right ventricular size with normal right ventricular systolic   function.  · Moderate left atrial enlargement.  · There is mild aortic valve stenosis.  · Aortic valve area is 1.24 cm2; peak velocity " is 2.07 m/s; mean gradient   is 10 mmHg.  · Mild-to-moderate mitral regurgitation.  · Moderate tricuspid regurgitation.  · Moderate pulmonic regurgitation.  · There is pulmonary hypertension.  · Elevated central venous pressure (15 mmHg).  · The estimated PA systolic pressure is 61 mmHg.

## 2023-02-03 NOTE — HOSPITAL COURSE
02/03/2023 Per HPI  2/4/2023:  Had LHC yesterday with MV CAD. 2DE with newly depressed EF and LV thrombus. Cough, SOB, fatigued. BP stable. HD today.      2/5/2023:  HD yesterday. Looks and feels better today. Coumadin started for LV thrombus and INR today 1.3. No CP.     02/06/2023 No chest pain. INR 1.5. Lifevest ordered. Resting comfortably this AM.

## 2023-02-03 NOTE — CONSULTS
NEPHROLOGY CONSULT NOTE    HPI & INTERVAL HISTORY:    Past Medical History:   Diagnosis Date    Diabetes mellitus     Hypertension     Renal disorder       Past Surgical History:   Procedure Laterality Date    INSERTION OF CATHETER FOR HEMODIALYSIS Left 9/11/2020    Procedure: INSERTION, CATHETER, HEMODIALYSIS;  Surgeon: William Beltran MD;  Location: [facility];  Service: General;  Laterality: Left;      Review of patient's allergies indicates:   Allergen Reactions    Phenergan [promethazine] Other (See Comments)     Restless legs    Reglan [metoclopramide hcl]     Tromethamine Other (See Comments)    Zofran [ondansetron hcl (pf)] Other (See Comments)     Constipation     Ketorolac Palpitations      Medications Prior to Admission   Medication Sig Dispense Refill Last Dose    acetaminophen (TYLENOL) 500 MG tablet Take 500 mg by mouth every 6 (six) hours as needed for Pain.       amLODIPine (NORVASC) 10 MG tablet Take 1 tablet (10 mg total) by mouth once daily. 30 tablet 11 2/2/2023    B complex-vitamin C-folic acid (SELENE-EMILIA/NEPHRO-EMILIA) 0.8 mg Tab TAKE 1 TABLET BY MOUTH ONCE A DAY (Patient taking differently: Take 1 tablet by mouth every evening.) 30 tablet 9 Past Week    carvediloL (COREG) 25 MG tablet Take 1 tablet (25 mg total) by mouth 2 (two) times daily. 60 tablet 5 2/2/2023    furosemide (LASIX) 80 MG tablet Take 2 tablets (160 mg total) by mouth 2 (two) times daily. (Patient taking differently: Take 160 mg by mouth 2 (two) times daily as needed.) 120 tablet 11 2/2/2023    hydrALAZINE (APRESOLINE) 10 MG tablet Take 10 mg by mouth daily as needed.   2/1/2023    sevelamer HCL (RENAGEL) 800 MG Tab Take 2 tablets by mouth three times daily with meals and 1 tablet by mouth twice daily with snacks 240 tablet 11 2/1/2023       Social History     Socioeconomic History    Marital status:    Tobacco Use    Smoking status: Never   Substance and Sexual Activity    Alcohol use: Not Currently    Drug use:  Never    Sexual activity: Not Currently     Partners: Male        MEDS   sodium chloride 0.9%   Intravenous Once    sodium chloride 0.9%   Intravenous Once    amLODIPine  10 mg Oral Daily    aspirin  81 mg Oral Daily    atorvastatin  40 mg Oral Daily    azithromycin  500 mg Intravenous Q24H    carvediloL  25 mg Oral BID    cefTRIAXone (ROCEPHIN) IVPB  1 g Intravenous Q24H    [START ON 2/4/2023] clopidogreL  75 mg Oral Daily    heparin (porcine)  4,000 Units Intra-Catheter Once    mupirocin   Nasal BID    sevelamer carbonate  1,600 mg Oral TID WM             CONTINOUS INFUSIONS:      Intake/Output Summary (Last 24 hours) at 2/3/2023 1612  Last data filed at 2/3/2023 1415  Gross per 24 hour   Intake 500 ml   Output 1301 ml   Net -801 ml        HEMODYNAMICS:    Temp:  [96.6 °F (35.9 °C)-98.8 °F (37.1 °C)] 97.4 °F (36.3 °C)  Pulse:  [53-81] 58  Resp:  [18-28] 28  SpO2:  [88 %-98 %] 94 %  BP: (115-150)/(62-98) 134/63   General   Fatigue  Weakness  Nausea  SOB  Chest discomfort  Cough with green sputum  Nasal congestion   Cardiology : Pulse 58  Pulmonary - diminished breath sounds   Abdomen soft  Extremities : edema  Skin: dry   LABS   Lab Results   Component Value Date    WBC 10.15 02/03/2023    HGB 9.9 (L) 02/03/2023    HCT 30.6 (L) 02/03/2023    MCV 94 02/03/2023     (L) 02/03/2023        Recent Labs   Lab 02/02/23  1627 02/03/23  0244   * 129*   CALCIUM 9.7 9.3   ALBUMIN 4.0  --    PROT 7.8  --    * 135*   K 4.0 3.9   CO2 27 27   CL 94* 97   BUN 26* 33*   CREATININE 4.9* 5.4*   ALKPHOS 50*  --    ALT 11  --    AST 17  --    BILITOT 1.0  --       Lab Results   Component Value Date    .5 (H) 11/07/2019    CALCIUM 9.3 02/03/2023    PHOS 5.0 (H) 02/03/2023      No results found for: IRON, TIBC, FERRITIN     ABG  No results for input(s): PH, PO2, PCO2, HCO3, BE in the last 168 hours.      IMAGING:  CXR    ASSESSMENT / PLAN  ESRD  Left IJ tunneled catheter  Metabolic bone disease  Secondary  hyperparathyroidism  Hyperphosphatemia  Anemia secondary to ESRD  Hb 9.9  Albumin 4  Renal, ADA diet  BNP 4900  CXR-  Chronic appearing interstitial findings, superimposed edema is a consideration.  No large focal consolidation.  Hypertension  Blood pressure 134/63  Troponin 1980  Echo-  The left ventricle is mildly enlarged with concentric hypertrophy and severely decreased systolic function.  The estimated ejection fraction is 20%.  There is left ventricular global hypokinesis.  A small spherical solid left ventricular thrombus is present. The thrombus is fixed and located in the apex.  Grade II left ventricular diastolic dysfunction.  Normal right ventricular size with normal right ventricular systolic function.  Moderate left atrial enlargement.  There is mild aortic valve stenosis.  Aortic valve area is 1.24 cm2; peak velocity is 2.07 m/s; mean gradient is 10 mmHg.  Mild-to-moderate mitral regurgitation.  Moderate tricuspid regurgitation.  Moderate pulmonic regurgitation.  There is pulmonary hypertension.  Elevated central venous pressure (15 mmHg).  The estimated PA systolic pressure is 61 mmHg.  Consult cardiology  Dialysis today

## 2023-02-03 NOTE — ASSESSMENT & PLAN NOTE
Reports SOB with sputum, does reports ongoing exertional chest tightness and dyspnea when she walks in the parking lot  Prior echo with normal EF and grade I diastolic dysfunction (was on mechanical ventilation at the time)  Sats 87% SBP in 140s  Trop 0.85-->1.2-->1.3  BNP >4900 (no baseline)  EKG with 1st degree AV block, sinus rhythm, ST, T wave changes, no STEMI  No chest pain at rest. Appears most likely due to demand ischemia due to pneumonia, volume overload in the setting of ESRD, however patient could have ischemic heart disease  Give aspirin 325mg, IV Lasix 80mg, trend troponin, if trop further increasing or worsening CP will start a heparin gtt and consult Cardiology    2/3/23: troponin 1.9, remains on hep gtt, ECHO results in process. Cardiology consult pending

## 2023-02-03 NOTE — PLAN OF CARE
Patient transferred to recovery cath lab bay 7 via stretcher with side rails up x2 .  Pt drowsy but able to follow commands. Pt is stable when connecting to cardiac monitors.  Vital signs at baseline. Right radial vasc band in place with 10ml of air in band c.d.i. no bleeding or hematoma noted. +2 laurie radial pulses palpated. +2 laurie pedal pulses.Skin normal in color and warm to touch, <3 sec cap refill.  Fall risk precautions given and patient acknowledges.  AIDET completed to pt.  Will continue to monitor patient.

## 2023-02-03 NOTE — SUBJECTIVE & OBJECTIVE
Interval History: Patient seen and examined. She is AAOx4, VSS, still with some SOB on 2L NC with adequate saturations of 93-95%. No distress noted. Troponin 0.85-1.2-1.3-1.9. Denies current chest pain. Elevation likely secondary to demand. ECHO completed yesterday still in process. Possible left heart cath today. Still on heparin gtt. HD MWF, labs without need for emergent dialysis. pending nephrology and cardiology consults.    Review of Systems   Constitutional:  Positive for fatigue.   HENT: Negative.     Eyes: Negative.    Respiratory:  Positive for cough and shortness of breath.    Cardiovascular: Negative.    Gastrointestinal: Negative.    Endocrine: Negative.    Genitourinary: Negative.    Musculoskeletal: Negative.    Skin: Negative.    Neurological: Negative.    Psychiatric/Behavioral: Negative.     Objective:     Vital Signs (Most Recent):  Temp: 98 °F (36.7 °C) (02/03/23 0730)  Pulse: 63 (02/03/23 0730)  Resp: 19 (02/03/23 0730)  BP: (!) 147/68 (02/03/23 0730)  SpO2: (!) 93 % (02/03/23 0730)   Vital Signs (24h Range):  Temp:  [97.5 °F (36.4 °C)-98.8 °F (37.1 °C)] 98 °F (36.7 °C)  Pulse:  [62-77] 63  Resp:  [18-24] 19  SpO2:  [87 %-98 %] 93 %  BP: (134-147)/(65-98) 147/68     Weight: 68.9 kg (152 lb)  Body mass index is 27.8 kg/m².  No intake or output data in the 24 hours ending 02/03/23 0936   Physical Exam  Vitals and nursing note reviewed.   Constitutional:       Appearance: Normal appearance. She is not ill-appearing or diaphoretic.      Interventions: Nasal cannula in place.   HENT:      Head: Normocephalic and atraumatic.      Mouth/Throat:      Mouth: Mucous membranes are moist.   Eyes:      Pupils: Pupils are equal, round, and reactive to light.   Cardiovascular:      Rate and Rhythm: Normal rate and regular rhythm.      Pulses: Normal pulses.      Heart sounds: Normal heart sounds.   Pulmonary:      Effort: Pulmonary effort is normal. No respiratory distress.      Breath sounds: Normal breath  sounds.   Abdominal:      General: Abdomen is flat. There is no distension.      Palpations: Abdomen is soft.      Tenderness: There is no abdominal tenderness.   Musculoskeletal:         General: Swelling (mild BLE) present. Normal range of motion.      Cervical back: Normal range of motion.   Skin:     General: Skin is warm and dry.      Capillary Refill: Capillary refill takes 2 to 3 seconds.   Neurological:      General: No focal deficit present.      Mental Status: She is alert and oriented to person, place, and time. Mental status is at baseline.   Psychiatric:         Mood and Affect: Mood normal.         Behavior: Behavior normal.         Thought Content: Thought content normal.         Judgment: Judgment normal.       Significant Labs: All pertinent labs within the past 24 hours have been reviewed.  BMP:   Recent Labs   Lab 02/03/23  0244   *   *   K 3.9   CL 97   CO2 27   BUN 33*   CREATININE 5.4*   CALCIUM 9.3   MG 2.0     Coagulation:   Recent Labs   Lab 02/03/23  0124 02/03/23  0244 02/03/23  0848   INR 1.4*  --   --    APTT 33.4*   < > 46.7*    < > = values in this interval not displayed.       Significant Imaging: I have reviewed all pertinent imaging results/findings within the past 24 hours.

## 2023-02-03 NOTE — PLAN OF CARE
Belongings labeled; pt is npo; sinus jignesh on monitor; pt connected to pure wick urine system; pt is on nasal o2 and has shortness of breath and general weakness; iv access rt a/c and heparin drip on hold for cath lab procedure; fall socks applied

## 2023-02-03 NOTE — PROGRESS NOTES
[facility]  [facility]  Progress Note    SUMMARY       Recommendations    Recommendation/Intervention:   1.  Monitor npo status and provide alternate source of nutrition recommendation if oral diet is not able to advance    2. Monitor labs and weights   3.Collaboration with medical providers    Goals: Patient to advance with nutrition therapy prior to RD follow up.  Nutrition Goal Status: new  Communication of RD Recs: other (comment) (mst, poc)    Assessment and Plan    Nutrition Problem  Inadequate nutrition    Related to (etiology):   Condition associated with diagnosis: hypervolemia      Signs and Symptoms (as evidenced by):   NPO status   GI intolerance     Interventions/Recommendations (treatment strategy):  1.  Monitor npo status and provide alternate source of nutrition recommendation if oral diet is not able to advance    2. Monitor labs and weights   3.Collaboration with medical providers    Nutrition Diagnosis Status:   New         Malnutrition Assessment                                       Reason for Assessment    Reason For Assessment: identified at risk by screening criteria  Diagnosis: renal disease, cardiac disease  Patient Active Problem List   Diagnosis    Rash    Encounter to establish care    Screen for STD (sexually transmitted disease)    Hypertensive emergency    New onset of congestive heart failure    Acute respiratory failure with hypoxia and hypercarbia    Acute renal failure    NSTEMI (non-ST elevated myocardial infarction)    Nephrotic syndrome    Hypervolemia    ESRD (end stage renal disease) on dialysis    Essential hypertension       Interdisciplinary Rounds: did not attend (RD remote)    General Information Comments:   2/3/2023: Patient npo at this time.  Patient with ESRD on HD and hypervolemia.  Weight history.  Weight loss not found within the past year.  Labs reviewed.  NKFA.  Patient does not meet criteria of +NFPE.  RD to continue to monitor for risks upon follow  "ups.    Nutrition Discharge Planning: Patient to continue with recommended nutrition therapy post discharge.    Nutrition Risk Screen    Nutrition Risk Screen: no indicators present    Nutrition/Diet History    Spiritual, Cultural Beliefs, Yarsani Practices, Values that Affect Care: no  Food Allergies: NKFA  Factors Affecting Nutritional Intake: NPO    Anthropometrics    Temp: 98 °F (36.7 °C)  Height Method: Stated  Height: 5' 2" (157.5 cm)  Height (inches): 62 in  Weight Method: Bed Scale  Weight: 68.9 kg (152 lb)  Weight (lb): 152 lb  Ideal Body Weight (IBW), Female: 110 lb  % Ideal Body Weight, Female (lb): 138.18 %  BMI (Calculated): 27.8  BMI Grade: 25 - 29.9 - overweight       Lab/Procedures/Meds    Pertinent Labs Reviewed: reviewed  Pertinent Medications Reviewed: reviewed  BMP  Lab Results   Component Value Date     (L) 02/03/2023    K 3.9 02/03/2023    CL 97 02/03/2023    CO2 27 02/03/2023    BUN 33 (H) 02/03/2023    CREATININE 5.4 (H) 02/03/2023    CALCIUM 9.3 02/03/2023    ANIONGAP 11 02/03/2023    EGFRNORACEVR 9 (A) 02/03/2023     Lab Results   Component Value Date    HGBA1C 5.3 09/10/2020     Lab Results   Component Value Date    CALCIUM 9.3 02/03/2023    PHOS 5.0 (H) 02/03/2023     Scheduled Meds:   amLODIPine  10 mg Oral Daily    aspirin  81 mg Oral Daily    atorvastatin  40 mg Oral Daily    azithromycin  500 mg Intravenous Q24H    carvediloL  25 mg Oral BID    cefTRIAXone (ROCEPHIN) IVPB  1 g Intravenous Q24H    clopidogreL  600 mg Oral Once    [START ON 2/4/2023] clopidogreL  75 mg Oral Daily    sevelamer carbonate  1,600 mg Oral TID WM     Continuous Infusions:   heparin (porcine) in D5W 12 Units/kg/hr (02/03/23 0247)     PRN Meds:.acetaminophen, dextrose 10%, dextrose 10%, furosemide, glucagon (human recombinant), heparin (PORCINE), heparin (PORCINE), pneumoc 20-viet conj-dip cr(PF), sodium chloride 0.9%    Physical Findings/Assessment         Estimated/Assessed Needs    Weight Used For " Calorie Calculations: 68.9 kg (151 lb 14.4 oz)  Energy Calorie Requirements (kcal): 25-30kcals/kg  Energy Need Method: Kcal/kg  Protein Requirements: 1.0-1.2g/kg  Weight Used For Protein Calculations: 68.9 kg (151 lb 14.4 oz)  Fluid Requirements (mL): 1000+output  Estimated Fluid Requirement Method: other (see comments) (ESRD)  RDA Method (mL): 25  CHO Requirement: 250      Nutrition Prescription Ordered    Current Diet Order: NPO    Evaluation of Received Nutrient/Fluid Intake  Last Bowel Movement: 02/02/23  % Kcal Needs: NPO  % Protein Needs: NPO  Energy Calories Required: not meeting needs  Protein Required: not meeting needs  Fluid Required: not meeting needs  Tolerance: other (see comments)  % Intake of Estimated Energy Needs: Other: npo  % Meal Intake: NPO  Intake/Output - Last 3 Shifts         02/01 0700  02/02 0659 02/02 0700  02/03 0659 02/03 0700  02/04 0659    Urine (mL/kg/hr)   1 (0)    Emesis/NG output   0    Stool   0    Total Output   1    Net   -1           Urine Occurrence  1 x 1 x            Nutrition Risk    Level of Risk/Frequency of Follow-up: moderate - high     Monitor and Evaluation    Food and Nutrient Intake: energy intake, food and beverage intake  Food and Nutrient Adminstration: diet order  Knowledge/Beliefs/Attitudes: beliefs and attitudes, food and nutrition knowledge/skill  Physical Activity and Function: nutrition-related ADLs and IADLs, factors affecting access to physical activity  Anthropometric Measurements: height/length, weight, weight change, body mass index  Biochemical Data, Medical Tests and Procedures: glucose/endocrine profile, inflammatory profile, gastrointestinal profile, electrolyte and renal panel, lipid profile     Nutrition Follow-Up    RD Follow-up?: Yes  Danna Jacobs, MS, RDN, LDN

## 2023-02-03 NOTE — PLAN OF CARE
Nutrition Plan of Care:    Recommendations     Recommendation/Intervention:   1.  Monitor npo status and provide alternate source of nutrition recommendation if oral diet is not able to advance    2. Monitor labs and weights   3.Collaboration with medical providers     Goals: Patient to advance with nutrition therapy prior to RD follow up.  Nutrition Goal Status: new  Communication of RD Recs: other (comment) (mst, poc)     Assessment and Plan     Nutrition Problem  Inadequate nutrition     Related to (etiology):   Condition associated with diagnosis: hypervolemia        Signs and Symptoms (as evidenced by):   NPO status   GI intolerance      Interventions/Recommendations (treatment strategy):  1.  Monitor npo status and provide alternate source of nutrition recommendation if oral diet is not able to advance    2. Monitor labs and weights   3.Collaboration with medical providers     Nutrition Diagnosis Status:   New    Danna Jacobs, MS, RDN, LDN

## 2023-02-03 NOTE — PLAN OF CARE
Problem: Adult Inpatient Plan of Care  Goal: Plan of Care Review  2/2/2023 2254 by Jazlyn Rodriguez RN  Outcome: Ongoing, Progressing   VN cued into room to complete admit assessment. VIP model introduced; VN working alongside bedside treatment team.  Plan of care reviewed with patient. Patient informed of fall risk, fall precautions, call light within reach, side rails x2 elevated. Patient notified to ask staff for assistance. Patient verbalized complete understanding. Time allowed for questions. Will continue to monitor and intervene as needed. Chart reviewed.

## 2023-02-03 NOTE — PLAN OF CARE
Provided report to 4th floor nurse for Ms. Davenport. Relayed that patient had diagnostic LHC and right radial band in place. Patient will return to the floor once her recovery is complete in cath lab. No fluids ordered by Dr. Toscano. Per Dr. Toscano, resume heparin gtt 2 hours after radial band is removed, at same rate, with no bolus.

## 2023-02-04 PROBLEM — I50.43 ACUTE ON CHRONIC COMBINED SYSTOLIC AND DIASTOLIC HEART FAILURE: Status: ACTIVE | Noted: 2023-01-01

## 2023-02-04 PROBLEM — J18.9 PNEUMONIA: Status: ACTIVE | Noted: 2023-01-01

## 2023-02-04 PROBLEM — J96.21 ACUTE ON CHRONIC RESPIRATORY FAILURE WITH HYPOXIA: Status: ACTIVE | Noted: 2023-01-01

## 2023-02-04 PROBLEM — I51.3 LV (LEFT VENTRICULAR) MURAL THROMBUS: Status: ACTIVE | Noted: 2023-01-01

## 2023-02-04 NOTE — SUBJECTIVE & OBJECTIVE
Interval History:  She was seen at the bedside.  No acute distress noted.  Will continue treatment for pneumonia with ceftriaxone.  Heparin drip will be continue 48 hours post catheterization.    Review of Systems   Constitutional:  Negative for fatigue and fever.   HENT:  Negative for trouble swallowing.    Respiratory:  Positive for cough and shortness of breath. Negative for wheezing.    Cardiovascular:  Negative for chest pain.   Gastrointestinal:  Negative for diarrhea, nausea and vomiting.   Skin:  Negative for rash and wound.   Neurological:  Positive for weakness.   Hematological:  Does not bruise/bleed easily.   Psychiatric/Behavioral:  Negative for confusion.    Objective:     Vital Signs (Most Recent):  Temp: 97.1 °F (36.2 °C) (02/04/23 0739)  Pulse: (!) 59 (02/04/23 0739)  Resp: 17 (02/04/23 0739)  BP: (!) 145/56 (02/04/23 0739)  SpO2: 96 % (02/04/23 0907)   Vital Signs (24h Range):  Temp:  [95.7 °F (35.4 °C)-98.8 °F (37.1 °C)] 97.1 °F (36.2 °C)  Pulse:  [53-81] 59  Resp:  [15-28] 17  SpO2:  [90 %-97 %] 96 %  BP: ()/(55-76) 145/56     Weight: 68.9 kg (152 lb)  Body mass index is 27.8 kg/m².    Intake/Output Summary (Last 24 hours) at 2/4/2023 0952  Last data filed at 2/4/2023 0012  Gross per 24 hour   Intake 795.92 ml   Output 1301 ml   Net -505.08 ml      Physical Exam  Vitals and nursing note reviewed.   Constitutional:       Appearance: She is obese. She is ill-appearing.   HENT:      Head: Normocephalic and atraumatic.      Mouth/Throat:      Mouth: Mucous membranes are moist.   Eyes:      Extraocular Movements: Extraocular movements intact.      Conjunctiva/sclera: Conjunctivae normal.   Cardiovascular:      Rate and Rhythm: Regular rhythm. Bradycardia present.      Pulses: Normal pulses.      Heart sounds: Normal heart sounds.   Pulmonary:      Effort: Pulmonary effort is normal. No respiratory distress.      Breath sounds: Decreased breath sounds present.   Abdominal:      General: Bowel  sounds are normal. There is no distension.      Palpations: Abdomen is soft.      Tenderness: There is no abdominal tenderness. There is no guarding.   Musculoskeletal:         General: Normal range of motion.      Cervical back: Normal range of motion.   Skin:     General: Skin is dry.      Capillary Refill: Capillary refill takes 2 to 3 seconds.      Comments: Left IJ PermCath for dialysis noted   Neurological:      Mental Status: She is alert and oriented to person, place, and time. Mental status is at baseline.      Motor: Weakness present.   Psychiatric:         Mood and Affect: Mood normal.         Behavior: Behavior normal.       Significant Labs: All pertinent labs within the past 24 hours have been reviewed.    Significant Imaging: I have reviewed all pertinent imaging results/findings within the past 24 hours.

## 2023-02-04 NOTE — PLAN OF CARE
Problem: Adult Inpatient Plan of Care  Goal: Plan of Care Review  Outcome: Ongoing, Progressing   Patient is alert, oriented X4. Care plan explained to patient, she verbalized understanding.     On 2L of nasal cannula, O2 sat maintain 95%, no respiratory distress noted. On cardiac monitor, running NSTEMI and normal sinus rhythm. No chest pain complaint.     Deny nausea/vomiting/diarrhea. No pain complaint. Due medications given.    Maintain fall risk precaution, bed in lowest position, bed alarm on. Call light/personal items in reach. Purewick in place. Instructed patient call for help as needed. Will continue to monitor.

## 2023-02-04 NOTE — PROGRESS NOTES
[facility] Medicine  Progress Note    Patient Name: Ping Davenport  MRN: [id number]  Patient Class: IP- Inpatient   Admission Date: 2/2/2023  Length of Stay: 1 days  Attending Physician: Tae Salinas, *  Primary Care Provider: Primary Doctor No        Subjective:     Principal Problem:Acute on chronic respiratory failure with hypoxia        HPI:  Ms. Davenport is a 57 year old woman with a past medical history of ESRD on HD MWF seen by Dr. Jc who developed SOB and cough productive of green sputum yesterday. She was dialyzed yesterday. Flu and COVID tests negative. She has not had fevers or chills. Has noticed that sometimes when she is walking in the parking lot she develops chest tightness after going a short distance. Does not believe she has had a left heart cath before but was told that she has congestive heart failure.       Overview/Hospital Course:  She was admitted to the [facility] medicine service for further care.  Cardiology and nephrology was consulted.  Patient was noted with an NSTEMI on admission.  She was started on heparin drip.  2D echo completed on 02/03/2023 shows an EF of 20%---patient has combined systolic and diastolic congestive heart failure.  She underwent a cardiac catheterization on 02/03/2023.  Patient's heart catheterization shows mid LAD 80% stenosed, The 2nd Diag lesion was 60% stenosed, The Prox Cx to Mid Cx lesion was 70% stenosed, mid RCA lesion was 99% stenosed, and there is also three-vessel coronary artery disease noted.  Per her heart catheterization we see there is severe multivessel coronary artery disease, decompensated heart failure, and LV thrombosis.  Per cardiology's plan will continue HD for volume management and also treatment of pneumonia.  Patient will need CT as evaluation for CABG.  Will continue heparin drip for 48 hours post catheterization.  Patient's dialysis is Monday, Wednesday, Friday:  Appreciate nephrology.  Will continue  ceftriaxone for pneumonia at this time.  Her flu and COVID are negative.      Interval History:  She was seen at the bedside.  No acute distress noted.  Will continue treatment for pneumonia with ceftriaxone and azithromycin.  Will need to bridge heparin with coumadin 2/2 LV thrombus. INR goal of 2-3 per Cards.     Review of Systems   Constitutional:  Negative for fatigue and fever.   HENT:  Negative for trouble swallowing.    Respiratory:  Positive for cough and shortness of breath. Negative for wheezing.    Cardiovascular:  Negative for chest pain.   Gastrointestinal:  Negative for diarrhea, nausea and vomiting.   Skin:  Negative for rash and wound.   Neurological:  Positive for weakness.   Hematological:  Does not bruise/bleed easily.   Psychiatric/Behavioral:  Negative for confusion.    Objective:     Vital Signs (Most Recent):  Temp: 97.1 °F (36.2 °C) (02/04/23 0739)  Pulse: (!) 59 (02/04/23 0739)  Resp: 17 (02/04/23 0739)  BP: (!) 145/56 (02/04/23 0739)  SpO2: 96 % (02/04/23 0907)   Vital Signs (24h Range):  Temp:  [95.7 °F (35.4 °C)-98.8 °F (37.1 °C)] 97.1 °F (36.2 °C)  Pulse:  [53-81] 59  Resp:  [15-28] 17  SpO2:  [90 %-97 %] 96 %  BP: ()/(55-76) 145/56     Weight: 68.9 kg (152 lb)  Body mass index is 27.8 kg/m².    Intake/Output Summary (Last 24 hours) at 2/4/2023 0952  Last data filed at 2/4/2023 0012  Gross per 24 hour   Intake 795.92 ml   Output 1301 ml   Net -505.08 ml      Physical Exam  Vitals and nursing note reviewed.   Constitutional:       Appearance: She is obese. She is ill-appearing.   HENT:      Head: Normocephalic and atraumatic.      Mouth/Throat:      Mouth: Mucous membranes are moist.   Eyes:      Extraocular Movements: Extraocular movements intact.      Conjunctiva/sclera: Conjunctivae normal.   Cardiovascular:      Rate and Rhythm: Regular rhythm. Bradycardia present.      Pulses: Normal pulses.      Heart sounds: Normal heart sounds.   Pulmonary:      Effort: Pulmonary effort is  normal. No respiratory distress.      Breath sounds: Decreased breath sounds present.   Abdominal:      General: Bowel sounds are normal. There is no distension.      Palpations: Abdomen is soft.      Tenderness: There is no abdominal tenderness. There is no guarding.   Musculoskeletal:         General: Normal range of motion.      Cervical back: Normal range of motion.   Skin:     General: Skin is dry.      Capillary Refill: Capillary refill takes 2 to 3 seconds.      Comments: Left IJ PermCath for dialysis noted   Neurological:      Mental Status: She is alert and oriented to person, place, and time. Mental status is at baseline.      Motor: Weakness present.   Psychiatric:         Mood and Affect: Mood normal.         Behavior: Behavior normal.       Significant Labs: All pertinent labs within the past 24 hours have been reviewed.    Significant Imaging: I have reviewed all pertinent imaging results/findings within the past 24 hours.      Assessment/Plan:      * Acute on chronic respiratory failure with hypoxia  Patient with Hypoxic Respiratory failure which is Acute on chronic.  she is not on home oxygen. Supplemental oxygen was provided and noted-  .   Signs/symptoms of respiratory failure include- increased work of breathing, respiratory distress and use of accessory muscles. Contributing diagnoses includes - CHF and Pneumonia Labs and images were reviewed. Patient Has not had a recent ABG. Will treat underlying causes and adjust management of respiratory failure as follows- continue supplemental O2 and IV antibiotics.  Patient received dialysis Monday, Wednesday, Fridays---continue dialysis for volume management.    Acute on chronic combined systolic and diastolic heart failure  Echo from 02/03/2023 noted with acute on chronic systolic and diastolic heart failure.    Patient with an EF of 20%--Decompensated heart failure.    Also noted with an LV thrombus.  Appreciate Cardiology on this case   She will likely  need a LifeVest on discharge   Starting Entresto 24/26 twice a day  Volume management per hemodialysis.  Continue strict intake and output  Daily weights      LV (left ventricular) thrombus  As per recent echocardiogram  She is currently on heparin drip   As per cardiology we will need AC with warfarin--we will  bridge to warfarin with the heparin drip for INR goal of 2-3.    Pneumonia  Chest x-ray concerning for pneumonia   Started on ceftriaxone and azithromycin  Flu and COVID are negative   Continue to monitor      Essential hypertension  Continue home meds      ESRD (end stage renal disease) on dialysis  Consult Nephrology, as O2 sats are improved on 2 L NC and electrolytes OK no need for emergent HD    Appreciate nephrology on the case.  Dialysis was completed on 02/03/2023.  Continue dialysis for volume management.      Hypervolemia  She was given IV Lasix in the ED for a total of IV Lasix 80 mg at that time.  Nephrology was consulted  Will continue dialysis Monday, Wednesday, and Friday for volume management  She will be dialyzed again on today 02/04/2023    NSTEMI (non-ST elevated myocardial infarction)  Reports SOB with sputum, does reports ongoing exertional chest tightness and dyspnea when she walks in the parking lot  Prior echo with normal EF and grade I diastolic dysfunction (was on mechanical ventilation at the time)  Sats 87% SBP in 140s  Trop 0.85-->1.2-->1.3  BNP >4900 (no baseline)  EKG with 1st degree AV block, sinus rhythm, ST, T wave changes, no STEMI  No chest pain at rest. Appears most likely due to demand ischemia due to pneumonia, volume overload in the setting of ESRD, however patient could have ischemic heart disease  Give aspirin 325mg, IV Lasix 80mg, trend troponin, if trop further increasing or worsening CP will start a heparin gtt and consult Cardiology    2/3/23: troponin 1.9, remains on hep gtt, ECHO results in process. Cardiology consult pending  2/4/23:  No new multi vessel  disease per heart catheterization.  Cardiology recommends once optimized on medical therapy and out of decompensation will need to get evaluated for CABG. If not CABG candidate then will need high risk PCI. Medical management recs per Cardiology:  ASA 81mg. Plavix for one year. Statin therapy    Noted with a LV thrombus from echo cardiogram.  She will need Coumadin outpatient as per Cardiology with an INR goal of 2-3.  Will bridge Coumadin with heparin.      VTE Risk Mitigation (From admission, onward)           Ordered     heparin infusion 1,000 units/500 ml in 0.9% NaCl (pressure line flush)  Intra-op continuous PRN         02/03/23 1553     heparin (porcine) injection 4,000 Units  Once         02/03/23 1403     heparin 25,000 units in dextrose 5% (100 units/ml) IV bolus from bag - ADDITIONAL PRN BOLUS - 60 units/kg (max bolus 4000 units)  As needed (PRN)        Question:  Heparin Infusion Adjustment (DO NOT MODIFY ANSWER)  Answer:  \[email or web address]\epic\Images\Pharmacy\HeparinInfusions\heparin LOW INTENSITY nomogram for [facility] [id number].pdf    02/03/23 0116     heparin 25,000 units in dextrose 5% (100 units/ml) IV bolus from bag - ADDITIONAL PRN BOLUS - 30 units/kg (max bolus 4000 units)  As needed (PRN)        Question:  Heparin Infusion Adjustment (DO NOT MODIFY ANSWER)  Answer:  \[email or web address]\Images\Pharmacy\HeparinInfusions\heparin LOW INTENSITY nomogram for [facility] [id number].pdf    02/03/23 0116     heparin 25,000 units in dextrose 5% 250 mL (100 units/mL) infusion LOW INTENSITY nomogram - [facility]  Continuous        Question Answer Comment   Heparin Infusion Adjustment (DO NOT MODIFY ANSWER) \\[email or web address]\Images\Pharmacy\HeparinInfusions\heparin LOW INTENSITY nomogram for [facility] [id number]    Begin at (in units/kg/hr) 12        02/03/23 0116     IP VTE HIGH RISK PATIENT  Once         02/02/23 1901     Place sequential compression device  Until discontinued         02/02/23 1901                    Discharge Planning    JACKELYN:      Code Status: Full Code   Is the patient medically ready for discharge?:     Reason for patient still in hospital (select all that apply): Patient trending condition, Laboratory test, Treatment, Imaging and Consult recommendations  Discharge Plan A: Home with family                  Erickson Green NP  Department of [facility] Medicine   [facility]

## 2023-02-04 NOTE — ASSESSMENT & PLAN NOTE
On admission, reported SOB with sputum, does reports ongoing exertional chest tightness and dyspnea when she walks in the parking lot  Prior echo with normal EF and grade I diastolic dysfunction (was on mechanical ventilation at the time)  TTE repeated this admission with new reduced EF - 20%  Sats 87% SBP in 140s  Trop 0.85-->1.2-->1.3  BNP >4900 (no baseline)  EKG with 1st degree AV block, sinus rhythm, ST, T wave changes, no STEMI  No chest pain at rest. Appears most likely due to demand ischemia due to pneumonia, volume overload in the setting of ESRD, however patient could have ischemic heart disease  Give aspirin 325mg, IV Lasix 80mg, trend troponin, if trop further increasing or worsening CP will start a heparin gtt and consult Cardiology    2/3/23: troponin 1.9, remains on hep gtt, ECHO results in process. Cardiology consult pending  2/4/23:  No new multi vessel disease per heart catheterization.  Cardiology recommends once optimized on medical therapy and out of decompensation will need to get evaluated for CABG. If not CABG candidate then will need high risk PCI. Medical management recs per Cardiology:  ASA 81mg. Plavix for one year. Statin therapy    Noted with a LV thrombus from echo cardiogram.  She will need Coumadin outpatient as per Cardiology with an INR goal of 2-3.  Will bridge Coumadin with heparin - pharmacist is assisting with Coumadin dosing

## 2023-02-04 NOTE — PROGRESS NOTES
[facility]                   Pharmacy  Pharmacy Warfarin Monitoring       Inpatient Day of Therapy: 0  Indication:   LV thrombus .  INR Goal:  2.0-3.0  Home dose (if applicable):  Recent Labs   Lab Result Units 02/03/23  0124 02/04/23  1221   INR  1.4* 1.3*       Current Dosing Regimen:   Anticoagulants       Ordered     Route Frequency Start Stop    02/04/23 1254  warfarin (COUMADIN) tablet         Oral Once 02/04/23 1700 --    02/03/23 1553  heparin (porcine)          Intra-op continuous PRN 02/03/23 1530 --    02/03/23 1403  heparin (porcine)         Cath Once 02/03/23 1515 --    02/03/23 0116  heparin (PORCINE)  (LOW INTENSITY heparin infusion nomogram - [facility] (Recommended Indications: Acute Coronary Syndrome, Atrial Fibrillation, Prophylaxis in Prosthetic Heart Valves, and other indication where full anticoagulation is desired, bleeding risk is moderate, antiplatelet agents have been utilized, and time to therapeutic can be delayed minimizing the increased bleeding risk))         IV As needed (PRN) 02/03/23 0214 --    02/03/23 0116  heparin (PORCINE)  (LOW INTENSITY heparin infusion nomogram - [facility] (Recommended Indications: Acute Coronary Syndrome, Atrial Fibrillation, Prophylaxis in Prosthetic Heart Valves, and other indication where full anticoagulation is desired, bleeding risk is moderate, antiplatelet agents have been utilized, and time to therapeutic can be delayed minimizing the increased bleeding risk))         IV As needed (PRN) 02/03/23 0214 --    02/03/23 0116  heparin (porcine) in 5 % dex  (LOW INTENSITY heparin infusion nomogram - [facility] (Recommended Indications: Acute Coronary Syndrome, Atrial Fibrillation, Prophylaxis in Prosthetic Heart Valves, and other indication where full anticoagulation is desired, bleeding risk is moderate, antiplatelet agents have been utilized, and time to therapeutic can be delayed minimizing the increased bleeding risk))         IV Continuous 02/03/23  0130 --           Major Drug Interactions: None Identified  Anticoagulation bridge therapy: Heparin infusion  Recent Labs   Lab Result Units 02/04/23  0434   Hemoglobin g/dL 10.3*       Recommendations :     INR- 1.3  Will receive a one time dose of warfarin 7.5 mg.   Daily INRs ordered.   Continue heparin infusion until INR results within range x 48 hours.    Please call Pharmacy if you have any questions.    Jones Dumont PharmD, [facility]  [phone]

## 2023-02-04 NOTE — PROGRESS NOTES
02/04/23 1425   Handoff Report   Given To Jody Fernandez LPN   Vital Signs   Temp 97.9 °F (36.6 °C)   Temp src Temporal   Pulse (!) 55   Heart Rate Source Monitor   Resp 18   Flow (L/min) 5   Device (Oxygen Therapy) nasal cannula   /64   BP Location Left arm   BP Method Automatic   Patient Position Lying   Post-Hemodialysis Assessment   Rinseback Volume (mL) 250 mL   Blood Volume Processed (Liters) 58.5 L   Dialyzer Clearance Lightly streaked   Duration of Treatment 240 minutes   Additional Fluid Intake (mL) 500 mL   Total UF (mL) 3500 mL   Net Fluid Removal 3000   Patient Response to Treatment tolerated well   Post-Hemodialysis Comments Lines dc'd. CVC flushed and locked per P and P. VSS. Denies complaints.

## 2023-02-04 NOTE — ASSESSMENT & PLAN NOTE
Echo from 02/03/2023 noted with acute on chronic systolic and diastolic heart failure.    Patient with an EF of 20%--Decompensated heart failure.    Also noted with an LV thrombus.  Appreciate Cardiology on this case   She will likely need a LifeVest on discharge   Starting Entresto 24/26 BID  Volume management per hemodialysis.  Continue strict intake and output  Daily weights

## 2023-02-04 NOTE — ASSESSMENT & PLAN NOTE
Chest x-ray on admission with pulmonary edema and although no large focal consolidation, clinical concern for PNA  Started on ceftriaxone and azithromycin empirically for CAP  Improving with ABX (and volume management per HD)  She has received 3 days of Azithro 500mg qd -- thus will stop this (only need 3d for 500 mg dose)  Day #4 of CTX today - will continue this to complete 5d course - last dose on 2/6  Flu and COVID are negative      Patient arrived via EMS c/o of not being himself and generalized weakness. Per EMS, nursing home called and stated that his blood pressure was low and needed to be checked out. Patients blood pressure was normal.  
 
BS was 283 with hx of diabetes, HTN, COPD, TIA, Dementia and cardiac.

## 2023-02-04 NOTE — ASSESSMENT & PLAN NOTE
Chest x-ray concerning for pneumonia   Started on ceftriaxone   Flu and COVID are negative   Continue to monitor

## 2023-02-04 NOTE — PLAN OF CARE
Remaining air removed from right radial vasc band. Gauze and tegaderm dressing applied c.d.i. No drainage or shadowing noted. No bleeding or hematoma noted around site. +2 laurie radial pulses palpated. Skin normal in color, warm to touch, < 3 sec cap refill.   Pt tolerated well.  Will continue to monitor pt.

## 2023-02-04 NOTE — PLAN OF CARE
Problem: Adult Inpatient Plan of Care  Goal: Plan of Care Review  Outcome: Ongoing, Progressing     Problem: Fluid and Electrolyte Imbalance (Acute Kidney Injury/Impairment)  Goal: Fluid and Electrolyte Balance  Outcome: Ongoing, Progressing     Problem: Oral Intake Inadequate (Acute Kidney Injury/Impairment)  Goal: Optimal Nutrition Intake  Outcome: Ongoing, Progressing     Problem: Renal Function Impairment (Acute Kidney Injury/Impairment)  Goal: Effective Renal Function  Outcome: Ongoing, Progressing     Problem: Device-Related Complication Risk (Hemodialysis)  Goal: Safe, Effective Therapy Delivery  Outcome: Ongoing, Progressing     Problem: Hemodynamic Instability (Hemodialysis)  Goal: Effective Tissue Perfusion  Outcome: Ongoing, Progressing     Problem: Infection (Hemodialysis)  Goal: Absence of Infection Signs and Symptoms  Outcome: Ongoing, Progressing     Problem: Fall Injury Risk  Goal: Absence of Fall and Fall-Related Injury  Outcome: Ongoing, Progressing     Problem: Electrolyte Imbalance (Chronic Kidney Disease)  Goal: Electrolyte Balance  Outcome: Ongoing, Progressing     Problem: Fluid Volume Excess (Chronic Kidney Disease)  Goal: Fluid Balance  Outcome: Ongoing, Progressing     Problem: Renal Function Impairment (Chronic Kidney Disease)  Goal: Minimize Renal Failure Effects  Outcome: Ongoing, Progressing     Problem: Fluid Imbalance (Pneumonia)  Goal: Fluid Balance  Outcome: Ongoing, Progressing     Problem: Infection (Pneumonia)  Goal: Resolution of Infection Signs and Symptoms  Outcome: Ongoing, Progressing     Problem: Respiratory Compromise (Pneumonia)  Goal: Effective Oxygenation and Ventilation  Outcome: Ongoing, Progressing     Problem: Hypertension Comorbidity  Goal: Blood Pressure in Desired Range  Outcome: Ongoing, Progressing     Problem: Dysrhythmia (Acute Coronary Syndrome)  Goal: Normalized Cardiac Rhythm  Outcome: Ongoing, Progressing     Problem: Cardiac-Related Pain (Acute  Coronary Syndrome)  Goal: Absence of Cardiac-Related Pain  Outcome: Ongoing, Progressing     Problem: Hemodynamic Instability (Acute Coronary Syndrome)  Goal: Effective Cardiac Pump Function  Outcome: Ongoing, Progressing     Problem: Tissue Perfusion (Acute Coronary Syndrome)  Goal: Adequate Tissue Perfusion  Outcome: Ongoing, Progressing     Problem: Heart Failure Comorbidity  Goal: Maintenance of Heart Failure Symptom Control  Outcome: Ongoing, Progressing

## 2023-02-04 NOTE — ASSESSMENT & PLAN NOTE
She was given IV Lasix in the ED at for a total of IV Lasix 80 mg at that time.  Nephrology was consulted and is managing HD  Last HD was Saturday 2/4  Will continue dialysis Monday, Wednesday, and Friday for volume management

## 2023-02-04 NOTE — ASSESSMENT & PLAN NOTE
IV Lasix 80mg  Consult Dr. Jc Nephrology  Will continue dialysis Monday, Wednesday, and Friday for volume management

## 2023-02-04 NOTE — ASSESSMENT & PLAN NOTE
As per recent echocardiogram  She is currently on heparin drip   As per cardiology we will need AC with warfarin--we will  bridge to warfarin with the heparin drip for INR goal of 2-3. PharmD assisting with dosing.

## 2023-02-04 NOTE — HOSPITAL COURSE
She was admitted to the hospital medicine service for further care.  Cardiology and nephrology consulted.  Patient was noted with an NSTEMI on admission.  She was started on heparin drip.  2D echo completed on 02/03/2023 shows an EF of 20%---patient has combined systolic and diastolic congestive heart failure.  She underwent a cardiac catheterization on 02/03/2023.  Patient's heart catheterization shows mid LAD 80% stenosed, The 2nd Diag lesion was 60% stenosed, The Prox Cx to Mid Cx lesion was 70% stenosed, mid RCA lesion was 99% stenosed, and there is also three-vessel coronary artery disease noted.  Per her heart catheterization we see there is severe multivessel coronary artery disease, decompensated heart failure, and LV thrombosis.  Per cardiology's plan will continue HD for volume management and also treatment of pneumonia. She underwent HD MWF per routine schedule.  Patient will need CT as evaluation for CABG.  Will continue heparin drip for 48 hours post catheterization.  Patient's dialysis is Monday, Wednesday, Friday:  Appreciate nephrology.  Empiric tx for CAP with azithro and CTX.  Her flu and COVID are negative. She is recommended for Life Vest and outpatient follow up with Vascular surgery for possible CABG.  CTS referral outpatient has been sent.  Coumadin clinic ambulatory referral has been sent.  She will be discharged home with Entresto 24-26 mg twice a day, and Coumadin 5 mg daily. She will also need ASA 81 mg PO, atorvastatin 40 mg Po daily, and will cont [id number] mg PO bid.  Patient to follow-up with CTS, PCP, and Cardiology outpatient.  We have also ordered the NP at home program to follow the patient outpatient.

## 2023-02-04 NOTE — ASSESSMENT & PLAN NOTE
Reports SOB with sputum, does reports ongoing exertional chest tightness and dyspnea when she walks in the parking lot  Prior echo with normal EF and grade I diastolic dysfunction (was on mechanical ventilation at the time)  Sats 87% SBP in 140s  Trop 0.85-->1.2-->1.3  BNP >4900 (no baseline)  EKG with 1st degree AV block, sinus rhythm, ST, T wave changes, no STEMI  No chest pain at rest. Appears most likely due to demand ischemia due to pneumonia, volume overload in the setting of ESRD, however patient could have ischemic heart disease  Give aspirin 325mg, IV Lasix 80mg, trend troponin, if trop further increasing or worsening CP will start a heparin gtt and consult Cardiology    2/3/23: troponin 1.9, remains on hep gtt, ECHO results in process. Cardiology consult pending  2/4/23:  No new multi vessel disease per heart catheterization.  Cardiology recommends CTS evaluation for CABG.  Will continue heparin drip 48 hours post catheterization as per Cardiology.

## 2023-02-04 NOTE — ASSESSMENT & PLAN NOTE
Consult Nephrology, as O2 sats are improved on 2 L NC and electrolytes OK no need for emergent HD    Appreciate nephrology on the case.  Dialysis was completed on 02/03/2023.  Continue dialysis for volume management.

## 2023-02-04 NOTE — CARE UPDATE
INR 1.3 on today   Goal INR 2-3  Will continue heparin drip and bridge with Coumadin 7.5 mg by mouth--ordered once for today.    Checking INR daily.    Erickson Green NP

## 2023-02-04 NOTE — PROGRESS NOTES
[facility] - Telemetry  Cardiology  Progress Note    Patient Name: Ping Davenport  MRN: [id number]  Admission Date: 2/2/2023  Hospital Length of Stay: 1 days  Code Status: Full Code   Attending Provider: Tae Salinas, *   Consulting Provider: Shyam Dsouza MD  Primary Care Physician: Primary Doctor No  Principal Problem:Acute on chronic respiratory failure with hypoxia    Patient information was obtained from patient, past medical records and ER records.     Subjective:     Chief Complaint:  Chest pain, SOB     HPI:   Ping Davenport is a 57 year old female with ESRD, HTN, HFpEF. Patient presented to the ED with SOB, cough, chest tightness. Patient reports chest tightness with minimal exertion for the past several months. Patient denies palpitations, diaphoresis, syncope, dizziness, edema. LVEF in 2019 50% with mild to moderate MR. Troponin up to 1.9 this AM. EKG SR without acute ischemic changes. Patient transferred to [facility] for cardiology. Patient DAPT loaded and placed on a heparin gtt. NPO for LHC. TTE pending.     2/4/2023:  Had LHC yesterday with MV CAD. 2DE with newly depressed EF and LV thrombus. Cough, SOB, fatigued. BP stable. HD today.        Review of Systems   Constitutional: Negative for diaphoresis and fever.   HENT: Negative.     Eyes: Negative.    Cardiovascular:  Positive for chest pain and dyspnea on exertion. Negative for irregular heartbeat, leg swelling, near-syncope, orthopnea, palpitations, paroxysmal nocturnal dyspnea and syncope.   Respiratory:  Positive for cough, shortness of breath and sputum production.    Endocrine: Negative.    Hematologic/Lymphatic: Negative.    Skin: Negative.    Musculoskeletal: Negative.    Gastrointestinal:  Negative for nausea and vomiting.   Genitourinary: Negative.    Neurological: Negative.    Psychiatric/Behavioral: Negative.     Allergic/Immunologic: Negative.    Objective:     Vitals:    02/04/23 0907 02/04/23 1003 02/04/23 1015 02/04/23  1045   BP:  133/80 136/77 135/78   BP Location:       Patient Position:       Pulse:  60 62 60   Resp:  17     Temp:  97.4 °F (36.3 °C)     TempSrc:  Temporal     SpO2: 96%      Weight:       Height:         Intake/Output - Last 3 Shifts         02/02 0700  02/03 0659 02/03 0700  02/04 0659 02/04 0700  02/05 0659    Other  500     IV Piggyback  295.9     Total Intake(mL/kg)  795.9 (11.6)     Urine (mL/kg/hr)  1 (0)     Emesis/NG output  0     Other  1300     Stool  0     Total Output  1301     Net  -505.1            Urine Occurrence 1 x 1 x             Physical Exam  Constitutional:       General: She is not in acute distress.     Appearance: She is not diaphoretic.   HENT:      Head: Atraumatic.   Eyes:      General:         Right eye: No discharge.         Left eye: No discharge.   Cardiovascular:      Rate and Rhythm: Normal rate and regular rhythm.      Heart sounds: Murmur heard.   Pulmonary:      Effort: Pulmonary effort is normal.      Breath sounds: No rales.   Abdominal:      General: Bowel sounds are normal.      Palpations: Abdomen is soft.   Skin:     General: Skin is warm and dry.   Neurological:      Mental Status: She is alert and oriented to person, place, and time.      Recent Labs     02/04/23  0434   HGB 10.3*   HCT 31.6*   *   K 4.7           Assessment and Plan:     Essential hypertension  SBP 130s-140s  Continue Norvasc and BB    ESRD (end stage renal disease) on dialysis  Nephrology following     Hypervolemia  Volume status maintained by HD    NSTEMI (non-ST elevated myocardial infarction)  LHC with MV CAD  EKG without acute ischemic changes  Cont DAPT  Heparin gtt x 48 hours  Continue statin, BB    ICM       EF 20%       GDMT       On BB       Start Entresto 24/26 BID         [facility] as outpatient    CAD        MV        Outpt CTS evaluation for CABG        DAPT/statin/BB    LV thrombus        Noted on recent 2DE        AC with warfarin indicated - bridge with heparin while  inpatient          VTE Risk Mitigation (From admission, onward)           Ordered     heparin infusion 1,000 units/500 ml in 0.9% NaCl (pressure line flush)  Intra-op continuous PRN         02/03/23 1553     heparin (porcine) injection 4,000 Units  Once         02/03/23 1403     heparin 25,000 units in dextrose 5% (100 units/ml) IV bolus from bag - ADDITIONAL PRN BOLUS - 60 units/kg (max bolus 4000 units)  As needed (PRN)        Question:  Heparin Infusion Adjustment (DO NOT MODIFY ANSWER)  Answer:  \\[email or web address]\epic\Images\Pharmacy\HeparinInfusions\heparin LOW INTENSITY nomogram for [facility] [id number].pdf    02/03/23 0116     heparin 25,000 units in dextrose 5% (100 units/ml) IV bolus from bag - ADDITIONAL PRN BOLUS - 30 units/kg (max bolus 4000 units)  As needed (PRN)        Question:  Heparin Infusion Adjustment (DO NOT MODIFY ANSWER)  Answer:  \[email or web address]\epic\Images\Pharmacy\HeparinInfusions\heparin LOW INTENSITY nomogram for [facility] [id number].pdf    02/03/23 0116     heparin 25,000 units in dextrose 5% 250 mL (100 units/mL) infusion LOW INTENSITY nomogram - [facility]  Continuous        Question Answer Comment   Heparin Infusion Adjustment (DO NOT MODIFY ANSWER) \\[email or web address]\epic\Images\Pharmacy\HeparinInfusions\heparin LOW INTENSITY nomogram for [facility] [id number].pdf    Begin at (in units/kg/hr) 12        02/03/23 0116     IP VTE HIGH RISK PATIENT  Once         02/02/23 1901     Place sequential compression device  Until discontinued         02/02/23 1901                    Thank you for your consult. I will follow-up with patient. Please contact us if you have any additional questions.    Shyam Dsouza MD  Cardiology   [facility] - Telemetry

## 2023-02-04 NOTE — PROCEDURES
"Patient seen and examined on HD tolerating well. No complains.   /78   Pulse 60   Temp 97.4 °F (36.3 °C) (Temporal)   Resp 17   Ht 5' 2" (1.575 m)   Wt 68.9 kg (152 lb)   SpO2 96%   Breastfeeding No   BMI 27.80 kg/m²     With any question please call answering service [phone]  Junie Krishnan MD    [facility]  YAIR Donato MD, FACRANJITH AMIN MD,   MD JUSTIN Bose MD E. V. Harmon, NP    [address]  [place], [address]   "

## 2023-02-04 NOTE — NURSING
Patient returned form dialysis 3000 mL removed during dialysis. Report received from Antonia dialysis RN. Vitals stable. Safety maintained, bed in lowest position, bed alarm on, bed wheels locked, call light with in reach. Pure wick in place. Will continue to monitor.

## 2023-02-04 NOTE — PROGRESS NOTES
02/04/23 1215   Vital Signs   Pulse (!) 51   Heart Rate Source Monitor   /63   BP Location Left arm   BP Method Automatic   Patient Position Lying   During Hemodialysis Assessment   Blood Flow Rate (mL/min) 250 mL/min   Dialysate Flow Rate (mL/min) 500 ml/min   Ultrafiltration Rate (mL/Hr) 893 mL/Hr   Arteriovenous Lines Secure Yes   Arterial Pressure (mmHg) -98 mmHg   Venous Pressure (mmHg) 153   UF Removed (mL) 1777 mL   TMP 42   Venous Line in Air Detector Yes   Intake (mL) 0 mL   Transducer Dry Yes   Access Visible Yes    notified of access issue? N/A   Heparin given? N/A   Intra-Hemodialysis Comments MD notified HR 51;no change in orders at this time.

## 2023-02-04 NOTE — ASSESSMENT & PLAN NOTE
Patient with Hypoxic Respiratory failure which is Acute on chronic.  she is not on home oxygen. Supplemental oxygen was provided and noted-  .   Signs/symptoms of respiratory failure include- increased work of breathing, respiratory distress and use of accessory muscles. Contributing diagnoses includes - CHF and Pneumonia Labs and images were reviewed. Patient Has not had a recent ABG. Will treat underlying causes and adjust management of respiratory failure as follows- continue supplemental O2 and IV antibiotics.  Patient received dialysis Monday, Wednesday, Fridays---continue dialysis for volume management.

## 2023-02-05 PROBLEM — J96.21 ACUTE ON CHRONIC RESPIRATORY FAILURE WITH HYPOXIA: Status: RESOLVED | Noted: 2023-01-01 | Resolved: 2023-01-01

## 2023-02-05 NOTE — PLAN OF CARE
Problem: Adult Inpatient Plan of Care  Goal: Plan of Care Review  Outcome: Ongoing, Progressing   Patient is alert, oriented X4. Care plan explained to patient, she verbalized understanding.     On 3L of nasal cannula, O2 sat maintain 98%, no respiratory distress noted. But when she went to bathroom, she had very shortness of breath. On cardiac monitor, running NSTEMI. No chest pain complaint.     Deny nausea/vomiting/diarrhea. Had one bowel movement during the night shift. Still refused to have central line dressing change on her left permacath. Patient said she had allergic to it. And preferred to have gauze and tape as dressing. Explained risk to catch infection, pt verbalized understanding.     pt refused ACCU check. she said she is not diabetic. she had knee surgery before, she had been taking steroid for awhile, that's the reason why her blood sugar was high, but she hasn't been taking steroid anymore. her sugar has been running normal. she said she didn't want to be stick her finger anymore. Notified NP. Erika.       Maintain fall risk precaution, bed in lowest position, bed alarm on. Call light/personal items/purewick in place. Instructed patient call for help as needed. Will continue to monitor.

## 2023-02-05 NOTE — PROGRESS NOTES
[facility] - Telemetry  Cardiology  Progress Note    Patient Name: Ping Davenport  MRN: [id number]  Admission Date: 2/2/2023  Hospital Length of Stay: 2 days  Code Status: Full Code   Attending Provider: Tae Salinas, *   Consulting Provider: Shyam Dsouza MD  Primary Care Physician: Primary Doctor No  Principal Problem:Acute on chronic respiratory failure with hypoxia    Patient information was obtained from patient, past medical records and ER records.     Subjective:     Chief Complaint:  Chest pain, SOB     HPI:   Ping Davenport is a 57 year old female with ESRD, HTN, HFpEF. Patient presented to the ED with SOB, cough, chest tightness. Patient reports chest tightness with minimal exertion for the past several months. Patient denies palpitations, diaphoresis, syncope, dizziness, edema. LVEF in 2019 50% with mild to moderate MR. Troponin up to 1.9 this AM. EKG SR without acute ischemic changes. Patient transferred to [facility] for cardiology. Patient DAPT loaded and placed on a heparin gtt. NPO for LHC. TTE pending.     2/4/2023:  Had LHC yesterday with MV CAD. 2DE with newly depressed EF and LV thrombus. Cough, SOB, fatigued. BP stable. HD today.     2/5/2023:  HD yesterday. Looks and feels better today. Coumadin started for LV thrombus and INR today 1.3. No CP.        Review of Systems   Constitutional: Negative for diaphoresis and fever.   HENT: Negative.     Eyes: Negative.    Cardiovascular:  Positive for chest pain and dyspnea on exertion. Negative for irregular heartbeat, leg swelling, near-syncope, orthopnea, palpitations, paroxysmal nocturnal dyspnea and syncope.   Respiratory:  Positive for cough, shortness of breath and sputum production.    Endocrine: Negative.    Hematologic/Lymphatic: Negative.    Skin: Negative.    Musculoskeletal: Negative.    Gastrointestinal:  Negative for nausea and vomiting.   Genitourinary: Negative.    Neurological: Negative.    Psychiatric/Behavioral: Negative.      Allergic/Immunologic: Negative.    Objective:     Vitals:    02/05/23 1126 02/05/23 1130 02/05/23 1146 02/05/23 1557   BP:  (!) 141/65  133/62   BP Location:  Right arm     Patient Position:  Lying     Pulse: 62 62 62 63   Resp:  18  18   Temp:  98.5 °F (36.9 °C)  98.6 °F (37 °C)   TempSrc:  Oral     SpO2: 98% 97%  96%   Weight:       Height:         Intake/Output - Last 3 Shifts         02/03 0700  02/04 0659 02/04 0700 02/05 0659 02/05 0700 02/06 0659    Other 500 500     IV Piggyback 295.9 312.2     Total Intake(mL/kg) 795.9 (11.6) 812.2 (11.8)     Urine (mL/kg/hr) 1 (0)      Emesis/NG output 0      Other 1300 3500     Stool 0      Total Output 1301 3500     Net -505.1 -2687.8            Urine Occurrence 1 x      Stool Occurrence  1 x             Physical Exam  Constitutional:       General: She is not in acute distress.     Appearance: She is not diaphoretic.   HENT:      Head: Atraumatic.   Eyes:      General:         Right eye: No discharge.         Left eye: No discharge.   Cardiovascular:      Rate and Rhythm: Normal rate and regular rhythm.      Heart sounds: Murmur heard.   Pulmonary:      Effort: Pulmonary effort is normal.      Breath sounds: No rales.   Abdominal:      General: Bowel sounds are normal.      Palpations: Abdomen is soft.   Skin:     General: Skin is warm and dry.   Neurological:      Mental Status: She is alert and oriented to person, place, and time.      Recent Labs     02/05/23  0404   HGB 10.0*  10.0*   HCT 30.8*  30.8*   *  129*   K 4.3  4.3           Assessment and Plan:     Essential hypertension  SBP 120s-140s  Continue Norvasc and BB    ESRD (end stage renal disease) on dialysis  Nephrology following     Hypervolemia  Volume status maintained by HD    NSTEMI (non-ST elevated myocardial infarction)  LHC with MV CAD  EKG without acute ischemic changes  Cont DAPT  Continue statin, BB    ICM       EF 20%       GDMT       On BB       Cont Entresto 24/26 BID          [facility] as outpatient    CAD        MV        Outpt CTS evaluation for CABG        DAPT/statin/BB    LV thrombus        Noted on 2DE        AC with warfarin indicated - bridge with heparin while inpatient        INR 1.3 today          VTE Risk Mitigation (From admission, onward)           Ordered     heparin (porcine) injection 4,000 Units  As needed (PRN)         02/04/23 1343     heparin infusion 1,000 units/500 ml in 0.9% NaCl (pressure line flush)  Intra-op continuous PRN         02/03/23 1553     heparin (porcine) injection 4,000 Units  Once         02/03/23 1403     heparin 25,000 units in dextrose 5% (100 units/ml) IV bolus from bag - ADDITIONAL PRN BOLUS - 60 units/kg (max bolus 4000 units)  As needed (PRN)        Question:  Heparin Infusion Adjustment (DO NOT MODIFY ANSWER)  Answer:  \\[email or web address]\epic\Images\Pharmacy\HeparinInfusions\heparin LOW INTENSITY nomogram for [facility] [id number].pdf    02/03/23 0116     heparin 25,000 units in dextrose 5% (100 units/ml) IV bolus from bag - ADDITIONAL PRN BOLUS - 30 units/kg (max bolus 4000 units)  As needed (PRN)        Question:  Heparin Infusion Adjustment (DO NOT MODIFY ANSWER)  Answer:  \\[email or web address]\epic\Images\Pharmacy\HeparinInfusions\heparin LOW INTENSITY nomogram for [facility] [id number].pdf    02/03/23 0116     heparin 25,000 units in dextrose 5% 250 mL (100 units/mL) infusion LOW INTENSITY nomogram - [facility]  Continuous        Question Answer Comment   Heparin Infusion Adjustment (DO NOT MODIFY ANSWER) \\[email or web address]\epic\Images\Pharmacy\HeparinInfusions\heparin LOW INTENSITY nomogram for [facility] [id number].pdf    Begin at (in units/kg/hr) 12        02/03/23 0116     IP VTE HIGH RISK PATIENT  Once         02/02/23 1901     Place sequential compression device  Until discontinued         02/02/23 1901                    Thank you for your consult. I will follow-up with patient. Please contact us if you have any additional questions.    Shyam Dsouza MD  Cardiology   [facility]  Telemetry

## 2023-02-05 NOTE — PROGRESS NOTES
Nephrology Progress Note     Consult Requested By: Tae Salinas, *  Reason for Consult: ESRD    SUBJECTIVE:          Review of Systems   Constitutional:  Negative for chills and fever.   HENT:  Negative for congestion and sore throat.    Eyes:  Negative for blurred vision, double vision and photophobia.   Respiratory:  Positive for cough and shortness of breath (better).    Cardiovascular:  Negative for chest pain, palpitations and leg swelling.   Gastrointestinal:  Negative for abdominal pain, diarrhea, nausea and vomiting.   Genitourinary:  Negative for dysuria and urgency.   Musculoskeletal:  Negative for joint pain and myalgias.   Skin:  Negative for itching and rash.   Neurological:  Negative for dizziness, sensory change, weakness and headaches.   Endo/Heme/Allergies:  Negative for polydipsia. Does not bruise/bleed easily.   Psychiatric/Behavioral:  Negative for depression.      Past Medical History:   Diagnosis Date    Diabetes mellitus     Hypertension     Renal disorder      Past Surgical History:   Procedure Laterality Date    INSERTION OF CATHETER FOR HEMODIALYSIS Left 9/11/2020    Procedure: INSERTION, CATHETER, HEMODIALYSIS;  Surgeon: William Beltran MD;  Location: [facility];  Service: General;  Laterality: Left;     History reviewed. No pertinent family history.  Social History     Tobacco Use    Smoking status: Never   Substance Use Topics    Alcohol use: Not Currently    Drug use: Never       Review of patient's allergies indicates:   Allergen Reactions    Phenergan [promethazine] Other (See Comments)     Restless legs    Reglan [metoclopramide hcl]     Tromethamine Other (See Comments)    Zofran [ondansetron hcl (pf)] Other (See Comments)     Constipation     Ketorolac Palpitations            OBJECTIVE:     Vital Signs (Most Recent)  Vitals:    02/05/23 0542 02/05/23 0740 02/05/23 0751 02/05/23 0752   BP: 129/62  (!) 141/65    BP Location: Right arm      Patient Position: Lying  Lying     Pulse: (!) 55 (!) 54 60 60   Resp: 18  20    Temp: 97.3 °F (36.3 °C)  98.6 °F (37 °C)    TempSrc: Oral  Oral    SpO2: (!) 94%  97% 97%   Weight:       Height:                     Medications:   sodium chloride 0.9%   Intravenous Once    sodium chloride 0.9%   Intravenous Once    amLODIPine  10 mg Oral Daily    aspirin  81 mg Oral Daily    atorvastatin  40 mg Oral Daily    carvediloL  25 mg Oral BID    cefTRIAXone (ROCEPHIN) IVPB  1 g Intravenous Q24H    clopidogreL  75 mg Oral Daily    heparin (porcine)  4,000 Units Intra-Catheter Once    mupirocin   Nasal BID    sacubitriL-valsartan  1 tablet Oral BID    sevelamer carbonate  1,600 mg Oral TID WM           Physical Exam  Vitals and nursing note reviewed.   Constitutional:       General: She is not in acute distress.     Appearance: She is not diaphoretic.   HENT:      Head: Normocephalic and atraumatic.      Mouth/Throat:      Pharynx: No oropharyngeal exudate.   Eyes:      General: No scleral icterus.     Conjunctiva/sclera: Conjunctivae normal.      Pupils: Pupils are equal, round, and reactive to light.   Cardiovascular:      Rate and Rhythm: Normal rate and regular rhythm.      Heart sounds: Normal heart sounds. No murmur heard.  Pulmonary:      Effort: No respiratory distress.      Comments: On O2  Abdominal:      General: Bowel sounds are normal. There is no distension.      Palpations: Abdomen is soft.      Tenderness: There is no abdominal tenderness.   Musculoskeletal:         General: Normal range of motion.      Cervical back: Normal range of motion and neck supple.   Skin:     General: Skin is warm and dry.      Findings: No erythema.   Neurological:      Mental Status: She is alert and oriented to person, place, and time.      Cranial Nerves: No cranial nerve deficit.   Psychiatric:         Mood and Affect: Affect normal.         Cognition and Memory: Memory normal.         Judgment: Judgment normal.       Laboratory:  Recent Labs   Lab  02/03/23 0124 02/04/23 0434 02/05/23  0404   WBC 10.15 6.91 6.77  6.77   HGB 9.9* 10.3* 10.0*  10.0*   HCT 30.6* 31.6* 30.8*  30.8*   * 115* 127*  127*   MONO 10.5  1.1* 11.7  0.8 10.9  0.7     Recent Labs   Lab 02/03/23 0244 02/04/23 0434 02/05/23  0404   * 132* 129*  129*   K 3.9 4.7 4.3  4.3   CL 97 97 102  101   CO2 27 21* 16*  17*   BUN 33* 41* 22*  22*   CREATININE 5.4* 5.8* 4.1*  4.1*   CALCIUM 9.3 9.2 9.4  9.4   PHOS 5.0* 5.7* 4.4       Diagnostic Results:  X-Ray: Reviewed  US: Reviewed  Echo: Reviewed  ASSESSMENT/PLAN:     1. ESRD (N18.6 Z99.2) - usual HD on MWF     -- HD tomorrow   2. HTN (I10) - controlled   3. Anemia of chronic kidney disease treated with PRAVEEN (N18.9 D63.1) -    EPogen   with each HD for Hb goal 10   Recent Labs   Lab 02/03/23 0124 02/04/23 0434 02/05/23  0404   HGB 9.9* 10.3* 10.0*  10.0*   HCT 30.6* 31.6* 30.8*  30.8*   * 115* 127*  127*       Iron - check levels   No results found for: IRON, TIBC, FERRITIN, SATURATEDIRO    4. MBD (E88.9 M90.80) -  Recent Labs   Lab 02/05/23  0404   CALCIUM 9.4  9.4   PHOS 4.4     Recent Labs   Lab 02/03/23 0244 02/04/23 0434 02/05/23  0404   MG 2.0 2.2 2.1  2.1       Lab Results   Component Value Date    .5 (H) 11/07/2019    CALCIUM 9.4 02/05/2023    CALCIUM 9.4 02/05/2023    PHOS 4.4 02/05/2023     Lab Results   Component Value Date    NGIXRIUT34SF 7 (L) 11/07/2019       Lab Results   Component Value Date    CO2 16 (L) 02/05/2023    CO2 17 (L) 02/05/2023       5. Hemodialysis Access (Z99.2 V45.11)- no issues   6. Nutrition/Hypoalbuminemia (E88.09) -    Recent Labs   Lab 02/02/23  1627 02/03/23  0124 02/04/23  1221 02/05/23  0404   LABPROT  --    < > 13.0* 13.0*   ALBUMIN 4.0  --   --   --     < > = values in this interval not displayed.     Nepro with meals TID. Renal vitamins daily      Thank you for the consult, will follow  With any question please call [phone]  Junie Krishnan,  MD    [facility]  YAIR Donato MD, FACRANJITH AMIN MD,   MD JUSTIN Bose MD E. V. Harmon, NP    [address]  [place], [address] [phone]

## 2023-02-05 NOTE — SUBJECTIVE & OBJECTIVE
Interval History: No acute events o/n. She report cough improving, much less frequent and non-productive. Dialyzed yesterday. No SOB or CP. Remains on hep gtt also bridging with warfarin. Has some generalized weakness since being inpatient, noticed this when walking to the bathroom yesterday.    Review of Systems   Constitutional:  Negative for fatigue and fever.   HENT:  Negative for trouble swallowing.    Respiratory:  Positive for cough and shortness of breath. Negative for wheezing.    Cardiovascular:  Negative for chest pain.   Gastrointestinal:  Negative for diarrhea, nausea and vomiting.   Skin:  Negative for rash and wound.   Neurological:  Positive for weakness.   Hematological:  Does not bruise/bleed easily.   Psychiatric/Behavioral:  Negative for confusion.    Objective:     Vital Signs (Most Recent):  Temp: 98.6 °F (37 °C) (02/05/23 0751)  Pulse: 60 (02/05/23 0752)  Resp: 20 (02/05/23 0751)  BP: (!) 141/65 (02/05/23 0751)  SpO2: 97 % (02/05/23 0752)   Vital Signs (24h Range):  Temp:  [97.3 °F (36.3 °C)-98.6 °F (37 °C)] 98.6 °F (37 °C)  Pulse:  [50-60] 60  Resp:  [18-20] 20  SpO2:  [94 %-100 %] 97 %  BP: (128-141)/(58-78) 141/65     Weight: 68.9 kg (152 lb)  Body mass index is 27.8 kg/m².    Intake/Output Summary (Last 24 hours) at 2/5/2023 1029  Last data filed at 2/5/2023 0110  Gross per 24 hour   Intake 812.18 ml   Output 3500 ml   Net -2687.82 ml      Physical Exam  Vitals and nursing note reviewed.   Constitutional:       Appearance: She is obese.   HENT:      Head: Normocephalic and atraumatic.      Mouth/Throat:      Mouth: Mucous membranes are moist.   Eyes:      Conjunctiva/sclera: Conjunctivae normal.   Cardiovascular:      Rate and Rhythm: Normal rate and regular rhythm.      Comments: HR low 60s this morning, range 50s-60s  Pulmonary:      Effort: Pulmonary effort is normal. No respiratory distress.   Abdominal:      General: There is no distension.      Palpations: Abdomen is soft.       Tenderness: There is no abdominal tenderness.   Musculoskeletal:         General: Normal range of motion.      Cervical back: Normal range of motion.      Comments: Trace LE edema b/l   Skin:     General: Skin is warm and dry.      Comments: Left IJ PermCath for dialysis noted   Neurological:      Mental Status: She is alert and oriented to person, place, and time. Mental status is at baseline.   Psychiatric:         Mood and Affect: Mood normal.         Behavior: Behavior normal.       Significant Labs: All pertinent labs within the past 24 hours have been reviewed.    Significant Imaging: I have reviewed all pertinent imaging results/findings within the past 24 hours.

## 2023-02-05 NOTE — ASSESSMENT & PLAN NOTE
Nephrology consulted for inpatient HD management   Last HD Sat 2/4/2023  Her usual outpatient HD schedule is [facility]

## 2023-02-05 NOTE — PROGRESS NOTES
[facility]  Progress Note    Patient Name: Ping Davenport  MRN: [id number]  Patient Class: IP- Inpatient   Admission Date: 2/2/2023  Length of Stay: 2 days  Attending Physician: Tae Salinas, *  Primary Care Provider: Primary Doctor No        Subjective:     Principal Problem:Acute on chronic respiratory failure with hypoxia        HPI:  Ms. Davenport is a 57 year old woman with a past medical history of ESRD on HD MWF seen by Dr. Jc who developed SOB and cough productive of green sputum yesterday. She was dialyzed yesterday. Flu and COVID tests negative. She has not had fevers or chills. Has noticed that sometimes when she is walking in the parking lot she develops chest tightness after going a short distance. Does not believe she has had a left heart cath before but was told that she has congestive heart failure.       Overview/Hospital Course:  She was admitted to the hospital medicine service for further care.  Cardiology and nephrology was consulted.  Patient was noted with an NSTEMI on admission.  She was started on heparin drip.  2D echo completed on 02/03/2023 shows an EF of 20%---patient has combined systolic and diastolic congestive heart failure.  She underwent a cardiac catheterization on 02/03/2023.  Patient's heart catheterization shows mid LAD 80% stenosed, The 2nd Diag lesion was 60% stenosed, The Prox Cx to Mid Cx lesion was 70% stenosed, mid RCA lesion was 99% stenosed, and there is also three-vessel coronary artery disease noted.  Per her heart catheterization we see there is severe multivessel coronary artery disease, decompensated heart failure, and LV thrombosis.  Per cardiology's plan will continue HD for volume management and also treatment of pneumonia.  Patient will need CT as evaluation for CABG.  Will continue heparin drip for 48 hours post catheterization.  Patient's dialysis is Monday, Wednesday, Friday:  Appreciate nephrology.  Empiric tx for CAP  with azithro and CTX.  Her flu and COVID are negative.      Interval History: No acute events o/n. She report cough improving, much less frequent and non-productive. Dialyzed yesterday. No SOB or CP. Remains on hep gtt also bridging with warfarin. Has some generalized weakness since being inpatient, noticed this when walking to the bathroom yesterday.    Review of Systems   Constitutional:  Negative for fatigue and fever.   HENT:  Negative for trouble swallowing.    Respiratory:  Positive for cough and shortness of breath. Negative for wheezing.    Cardiovascular:  Negative for chest pain.   Gastrointestinal:  Negative for diarrhea, nausea and vomiting.   Skin:  Negative for rash and wound.   Neurological:  Positive for weakness.   Hematological:  Does not bruise/bleed easily.   Psychiatric/Behavioral:  Negative for confusion.    Objective:     Vital Signs (Most Recent):  Temp: 98.6 °F (37 °C) (02/05/23 0751)  Pulse: 60 (02/05/23 0752)  Resp: 20 (02/05/23 0751)  BP: (!) 141/65 (02/05/23 0751)  SpO2: 97 % (02/05/23 0752)   Vital Signs (24h Range):  Temp:  [97.3 °F (36.3 °C)-98.6 °F (37 °C)] 98.6 °F (37 °C)  Pulse:  [50-60] 60  Resp:  [18-20] 20  SpO2:  [94 %-100 %] 97 %  BP: (128-141)/(58-78) 141/65     Weight: 68.9 kg (152 lb)  Body mass index is 27.8 kg/m².    Intake/Output Summary (Last 24 hours) at 2/5/2023 1029  Last data filed at 2/5/2023 0110  Gross per 24 hour   Intake 812.18 ml   Output 3500 ml   Net -2687.82 ml      Physical Exam  Vitals and nursing note reviewed.   Constitutional:       Appearance: She is obese.   HENT:      Head: Normocephalic and atraumatic.      Mouth/Throat:      Mouth: Mucous membranes are moist.   Eyes:      Conjunctiva/sclera: Conjunctivae normal.   Cardiovascular:      Rate and Rhythm: Normal rate and regular rhythm.      Comments: HR low 60s this morning, range 50s-60s  Pulmonary:      Effort: Pulmonary effort is normal. No respiratory distress.   Abdominal:      General: There is  no distension.      Palpations: Abdomen is soft.      Tenderness: There is no abdominal tenderness.   Musculoskeletal:         General: Normal range of motion.      Cervical back: Normal range of motion.      Comments: Trace LE edema b/l   Skin:     General: Skin is warm and dry.      Comments: Left IJ PermCath for dialysis noted   Neurological:      Mental Status: She is alert and oriented to person, place, and time. Mental status is at baseline.   Psychiatric:         Mood and Affect: Mood normal.         Behavior: Behavior normal.       Significant Labs: All pertinent labs within the past 24 hours have been reviewed.    Significant Imaging: I have reviewed all pertinent imaging results/findings within the past 24 hours.      Assessment/Plan:      Acute on chronic combined systolic and diastolic heart failure  Echo from 02/03/2023 noted with acute on chronic systolic and diastolic heart failure.    Patient with an EF of 20%--Decompensated heart failure.    Also noted with an LV thrombus.  Appreciate Cardiology on this case   She will likely need a LifeVest on discharge   Starting Entresto 24/26 BID  Volume management per hemodialysis.  Continue strict intake and output  Daily weights      LV (left ventricular) thrombus  As per recent echocardiogram  She is currently on heparin drip   As per cardiology we will need AC with warfarin--we will  bridge to warfarin with the heparin drip for INR goal of 2-3. PharmD assisting with dosing.    Pneumonia  Chest x-ray on admission with pulmonary edema and although no large focal consolidation, clinical concern for PNA  Started on ceftriaxone and azithromycin empirically for CAP  Improving with ABX (and volume management per HD)  She has received 3 days of Azithro 500mg qd -- thus will stop this (only need 3d for 500 mg dose)  Day #4 of CTX today - will continue this to complete 5d course - last dose on 2/6  Flu and COVID are negative       Essential hypertension  Continue  home meds      ESRD (end stage renal disease) on dialysis  Nephrology consulted for inpatient HD management   Last HD Sat 2/4/2023  Her usual outpatient HD schedule is MWF      Hypervolemia  She was given IV Lasix in the ED at for a total of IV Lasix 80 mg at that time.  Nephrology was consulted and is managing HD  Last HD was Saturday 2/4  Will continue dialysis Monday, Wednesday, and Friday for volume management      NSTEMI (non-ST elevated myocardial infarction)  On admission, reported SOB with sputum, does reports ongoing exertional chest tightness and dyspnea when she walks in the parking lot  Prior echo with normal EF and grade I diastolic dysfunction (was on mechanical ventilation at the time)  TTE repeated this admission with new reduced EF - 20%  Sats 87% SBP in 140s  Trop 0.85-->1.2-->1.3  BNP >4900 (no baseline)  EKG with 1st degree AV block, sinus rhythm, ST, T wave changes, no STEMI  No chest pain at rest. Appears most likely due to demand ischemia due to pneumonia, volume overload in the setting of ESRD, however patient could have ischemic heart disease  Give aspirin 325mg, IV Lasix 80mg, trend troponin, if trop further increasing or worsening CP will start a heparin gtt and consult Cardiology    2/3/23: troponin 1.9, remains on hep gtt, ECHO results in process. Cardiology consult pending  2/4/23:  No new multi vessel disease per heart catheterization.  Cardiology recommends once optimized on medical therapy and out of decompensation will need to get evaluated for CABG. If not CABG candidate then will need high risk PCI. Medical management recs per Cardiology:  ASA 81mg. Plavix for one year. Statin therapy    Noted with a LV thrombus from echo cardiogram.  She will need Coumadin outpatient as per Cardiology with an INR goal of 2-3.  Will bridge Coumadin with heparin - pharmacist is assisting with Coumadin dosing       VTE Risk Mitigation (From admission, onward)         Ordered     heparin (porcine)  injection 4,000 Units  As needed (PRN)         02/04/23 1343     heparin infusion 1,000 units/500 ml in 0.9% NaCl (pressure line flush)  Intra-op continuous PRN         02/03/23 1553     heparin (porcine) injection 4,000 Units  Once         02/03/23 1403     heparin 25,000 units in dextrose 5% (100 units/ml) IV bolus from bag - ADDITIONAL PRN BOLUS - 60 units/kg (max bolus 4000 units)  As needed (PRN)        Question:  Heparin Infusion Adjustment (DO NOT MODIFY ANSWER)  Answer:  \[email or web address]\epic\Images\Pharmacy\HeparinInfusions\heparin LOW INTENSITY nomogram for [facility] [id number]    02/03/23 0116     heparin 25,000 units in dextrose 5% (100 units/ml) IV bolus from bag - ADDITIONAL PRN BOLUS - 30 units/kg (max bolus 4000 units)  As needed (PRN)        Question:  Heparin Infusion Adjustment (DO NOT MODIFY ANSWER)  Answer:  \[email or web address]\epic\Images\Pharmacy\HeparinInfusions\heparin LOW INTENSITY nomogram for [facility] [id number]    02/03/23 0116     heparin 25,000 units in dextrose 5% 250 mL (100 units/mL) infusion LOW INTENSITY nomogram - [facility]  Continuous        Question Answer Comment   Heparin Infusion Adjustment (DO NOT MODIFY ANSWER) \[email or web address]\epic\Images\Pharmacy\HeparinInfusions\heparin LOW INTENSITY nomogram for [facility] [id number]    Begin at (in units/kg/hr) 12        02/03/23 0116     IP VTE HIGH RISK PATIENT  Once         02/02/23 1901     Place sequential compression device  Until discontinued         02/02/23 1901                Discharge Planning   JACKELYN:      Code Status: Full Code   Is the patient medically ready for discharge?:     Reason for patient still in hospital (select all that apply): Patient trending condition, Treatment and Consult recommendations  Discharge Plan A: Home with family                  Clair Liang MD  Department of [facility] Medicine   [facility]

## 2023-02-06 PROBLEM — D69.6 THROMBOCYTOPENIA: Status: ACTIVE | Noted: 2023-01-01

## 2023-02-06 PROBLEM — I50.41 ACUTE COMBINED SYSTOLIC AND DIASTOLIC HEART FAILURE: Status: ACTIVE | Noted: 2023-01-01

## 2023-02-06 PROBLEM — N18.6 ESRD (END STAGE RENAL DISEASE): Status: ACTIVE | Noted: 2023-01-01

## 2023-02-06 PROBLEM — E78.5 HYPERLIPIDEMIA: Status: ACTIVE | Noted: 2023-01-01

## 2023-02-06 PROBLEM — E87.20 METABOLIC ACIDOSIS: Status: ACTIVE | Noted: 2023-01-01

## 2023-02-06 PROBLEM — I23.6 LV (LEFT VENTRICULAR) MURAL THROMBUS FOLLOWING MI: Status: ACTIVE | Noted: 2023-01-01

## 2023-02-06 PROBLEM — D63.1 ANEMIA DUE TO CHRONIC KIDNEY DISEASE, ON CHRONIC DIALYSIS: Status: ACTIVE | Noted: 2020-09-10

## 2023-02-06 PROBLEM — I25.10 CORONARY ARTERY DISEASE INVOLVING NATIVE CORONARY ARTERY OF NATIVE HEART WITHOUT ANGINA PECTORIS: Status: ACTIVE | Noted: 2023-01-01

## 2023-02-06 NOTE — ASSESSMENT & PLAN NOTE
Patient is chronically on statin.will continue for now. Monitor clinically. Last LDL was   Lab Results   Component Value Date    LDLCALC 151.4 11/06/2019

## 2023-02-06 NOTE — PROCEDURES
"Patient seen and examined on HD tolerating well.      BP (!) 127/58 (BP Location: Left arm, Patient Position: Sitting)   Pulse 60   Temp 98.6 °F (37 °C) (Oral)   Resp 19   Ht 5' 2" (1.575 m)   Wt 68.9 kg (152 lb)   SpO2 (!) 93%   Breastfeeding No   BMI 27.80 kg/m²     With any question please call answering service [phone]  Junie Krishnna MD    [facility]  YAIR Donato MD, RANJITH SHANNON MD,   MD JUSTIN Bose MD E. V. Harmon, NP    [address]  [place], [address]   "

## 2023-02-06 NOTE — ASSESSMENT & PLAN NOTE
S/p LHC - 80% stenosis in mid LAD, mid left circ. 99% stenosis in mid RCA, + LV thrombus  Continue medical management with aspirin, statin, Plavix, beta-blocker  CT surgery consult as outpatient for CABG eval versus high-risk PCI

## 2023-02-06 NOTE — PROGRESS NOTES
[facility]                   Pharmacy  Pharmacy Warfarin Monitoring       Inpatient Day of Therapy: 1  Indication:   LV thrombus .  INR Goal:  2.0-3.0  Home dose (if applicable):  Recent Labs   Lab Result Units 02/03/23  0124 02/04/23  1221 02/05/23  0404   INR  1.4* 1.3* 1.3*       Current Dosing Regimen:   Anticoagulants       Ordered     Route Frequency Start Stop    02/05/23 1751  warfarin (COUMADIN) tablet         Oral Daily 02/05/23 1800 --    02/04/23 1343  heparin (porcine)         Cath As needed (PRN) 02/04/23 1442 --    02/03/23 1553  heparin (porcine)          Intra-op continuous PRN 02/03/23 1530 --    02/03/23 1403  heparin (porcine)         Cath Once 02/03/23 1515 --    02/03/23 0116  heparin (PORCINE)  (LOW INTENSITY heparin infusion nomogram - [facility] (Recommended Indications: Acute Coronary Syndrome, Atrial Fibrillation, Prophylaxis in Prosthetic Heart Valves, and other indication where full anticoagulation is desired, bleeding risk is moderate, antiplatelet agents have been utilized, and time to therapeutic can be delayed minimizing the increased bleeding risk))         IV As needed (PRN) 02/03/23 0214 --    02/03/23 0116  heparin (PORCINE)  (LOW INTENSITY heparin infusion nomogram - [facility] (Recommended Indications: Acute Coronary Syndrome, Atrial Fibrillation, Prophylaxis in Prosthetic Heart Valves, and other indication where full anticoagulation is desired, bleeding risk is moderate, antiplatelet agents have been utilized, and time to therapeutic can be delayed minimizing the increased bleeding risk))         IV As needed (PRN) 02/03/23 0214 --    02/03/23 0116  heparin (porcine) in 5 % dex  (LOW INTENSITY heparin infusion nomogram - [facility] (Recommended Indications: Acute Coronary Syndrome, Atrial Fibrillation, Prophylaxis in Prosthetic Heart Valves, and other indication where full anticoagulation is desired, bleeding risk is moderate, antiplatelet agents have been utilized, and time  to therapeutic can be delayed minimizing the increased bleeding risk))         IV Continuous 02/03/23 0130 --           Major Drug Interactions: None Identified  Anticoagulation bridge therapy: Heparin infusion  Recent Labs   Lab Result Units 02/05/23  0404   Hemoglobin g/dL 10.0*  10.0*       Recommendations :     INR- 1.3  Continue warfarin 7.5 mg daily.  Continue heparin infusion until INR results within range x 48 hours.  Will not adjust dose until 72-96 hours after first dose.   HOLD warfarin if INR increases by greater than 0.5 in a 24 hour period.  Daily INRs ordered.        Please call Pharmacy if you have any questions.     Costa ElderD, BCCCP  [phone]

## 2023-02-06 NOTE — ASSESSMENT & PLAN NOTE
She was given IV Lasix in the ED at for a total of IV Lasix 80 mg at that time.  Nephrology was consulted and is managing HD  Will continue dialysis Monday, Wednesday, and Friday for volume management

## 2023-02-06 NOTE — PLAN OF CARE
Problem: Adult Inpatient Plan of Care  Goal: Plan of Care Review  Outcome: Ongoing, Progressing     Problem: Fluid and Electrolyte Imbalance (Acute Kidney Injury/Impairment)  Goal: Fluid and Electrolyte Balance  Outcome: Ongoing, Progressing     Problem: Oral Intake Inadequate (Acute Kidney Injury/Impairment)  Goal: Optimal Nutrition Intake  Outcome: Ongoing, Progressing     Problem: Device-Related Complication Risk (Hemodialysis)  Goal: Safe, Effective Therapy Delivery  Outcome: Ongoing, Progressing     Problem: Infection (Hemodialysis)  Goal: Absence of Infection Signs and Symptoms  Outcome: Ongoing, Progressing     Problem: Fall Injury Risk  Goal: Absence of Fall and Fall-Related Injury  Outcome: Ongoing, Progressing     Problem: Electrolyte Imbalance (Chronic Kidney Disease)  Goal: Electrolyte Balance  Outcome: Ongoing, Progressing     Problem: Fluid Volume Excess (Chronic Kidney Disease)  Goal: Fluid Balance  Outcome: Ongoing, Progressing     Problem: Fluid Imbalance (Pneumonia)  Goal: Fluid Balance  Outcome: Ongoing, Progressing

## 2023-02-06 NOTE — PROGRESS NOTES
[facility] Medicine  Progress Note    Patient Name: Ping Davenport  MRN: [id number]  Patient Class: IP- Inpatient   Admission Date: 2/2/2023  Length of Stay: 3 days  Attending Physician: Tae Salinas, *  Primary Care Provider: Primary Doctor No        Subjective:     Principal Problem:Acute on chronic respiratory failure with hypoxia        HPI:  Ms. Davenport is a 57 year old woman with a past medical history of ESRD on HD MWF seen by Dr. Jc who developed SOB and cough productive of green sputum yesterday. She was dialyzed yesterday. Flu and COVID tests negative. She has not had fevers or chills. Has noticed that sometimes when she is walking in the parking lot she develops chest tightness after going a short distance. Does not believe she has had a left heart cath before but was told that she has congestive heart failure.       Overview/Hospital Course:  She was admitted to the [facility] medicine service for further care.  Cardiology and nephrology consulted.  Patient was noted with an NSTEMI on admission.  She was started on heparin drip.  2D echo completed on 02/03/2023 shows an EF of 20%---patient has combined systolic and diastolic congestive heart failure.  She underwent a cardiac catheterization on 02/03/2023.  Patient's heart catheterization shows mid LAD 80% stenosed, The 2nd Diag lesion was 60% stenosed, The Prox Cx to Mid Cx lesion was 70% stenosed, mid RCA lesion was 99% stenosed, and there is also three-vessel coronary artery disease noted.  Per her heart catheterization we see there is severe multivessel coronary artery disease, decompensated heart failure, and LV thrombosis.  Per cardiology's plan will continue HD for volume management and also treatment of pneumonia. She underwent HD MWF  per routine schedule.  Patient will need CT as evaluation for CABG.  Will continue heparin drip for 48 hours post catheterization.  Patient's dialysis is Monday, Wednesday, Friday:   Appreciate nephrology.  Empiric tx for CAP with azithro and CTX.  Her flu and COVID are negative. She is recommended for Life Vest and outpatient follow up with Vascular surgery for possible CABG.       Interval History: No acute events overnight. She reports generalized fatigue. States improvement of dyspnea.  Presently on HD.  On heparin drip- bridging with warfarin.    INR 1.5  WBC 7.1, hgb 10.3, plts. 142.    Awaiting Life Vest. Reviewed cardiology note.       Review of Systems   Constitutional:  Positive for fatigue.   Respiratory:  Positive for shortness of breath. Negative for cough and wheezing.    Cardiovascular:  Negative for chest pain.   Gastrointestinal:  Negative for diarrhea, nausea and vomiting.   Neurological:  Positive for weakness.   Objective:     Vital Signs (Most Recent):  Temp: 98.6 °F (37 °C) (02/06/23 0727)  Pulse: 61 (02/06/23 1216)  Resp: 19 (02/06/23 0727)  BP: (!) 127/58 (02/06/23 0727)  SpO2:  (pt unavailable) (02/06/23 1236)   Vital Signs (24h Range):  Temp:  [97.7 °F (36.5 °C)-98.6 °F (37 °C)] 98.6 °F (37 °C)  Pulse:  [59-63] 61  Resp:  [18-22] 19  SpO2:  [88 %-96 %] 93 %  BP: (127-137)/(58-63) 127/58     Weight: 68.9 kg (152 lb)  Body mass index is 27.8 kg/m².    Intake/Output Summary (Last 24 hours) at 2/6/2023 1238  Last data filed at 2/6/2023 0600  Gross per 24 hour   Intake 110 ml   Output --   Net 110 ml        Physical Exam  Vitals and nursing note reviewed.   Cardiovascular:      Rate and Rhythm: Normal rate and regular rhythm.   Pulmonary:      Effort: Pulmonary effort is normal. No respiratory distress.      Breath sounds: Rales present.   Abdominal:      Palpations: Abdomen is soft.   Skin:     Comments: Left IJ PermCath for dialysis noted   Neurological:      Mental Status: She is alert and oriented to person, place, and time. Mental status is at baseline.       Significant Labs: All pertinent labs within the past 24 hours have been reviewed.    Significant Imaging: I have  reviewed all pertinent imaging results/findings within the past 24 hours.      Assessment/Plan:      Coronary artery disease involving native coronary artery of native heart without angina pectoris  Patient with known CAD s/p angiogram, which is uncontrolled Will continue ASA, Plavix and Statin and monitor for S/Sx of angina/ACS. Continue to monitor on telemetry.   S/p LHC - 80% stenosis in mid LAD, mid left circ. 99% stenosis in mid RCA, + LV thrombus  Continue medical management with aspirin, statin, Plavix, beta-blocker  CT surgery consult as outpatient for CABG eval versus high-risk PCI      Hyperlipidemia     Patient is chronically on statin.will continue for now. Monitor clinically. Last LDL was   Lab Results   Component Value Date    LDLCALC 151.4 11/06/2019       Metabolic acidosis  Secondary to renal etiology. Add sodium bicarb 650 mg bid. Defer further treatment to nephrology. Trend renal panel    Thrombocytopenia  Lab Results   Component Value Date     (L) 02/06/2023     Reviewed. Plt 142  Trend CBC      ESRD (end stage renal disease)    Nephrology consulted for inpatient HD management   Last HD Sat 2/4/2023  Her usual outpatient HD schedule is MWF    Acute combined systolic and diastolic heart failure  Echo from 02/03/2023 noted with acute on chronic systolic and diastolic heart failure.    Patient with an EF of 20%--Decompensated heart failure.    Also noted with an LV thrombus.  Appreciate Cardiology on this case   She will likely need a LifeVest on discharge   Starting Entresto 24/26 BID  Volume management per hemodialysis.  Continue strict intake and output  Daily weights      LV (left ventricular) mural thrombus following MI  As per recent echocardiogram  She is currently on heparin drip   As per cardiology we will need AC with warfarin--we will  bridge to warfarin with the heparin drip for INR goal of 2-3. PharmD assisting with dosing.    Pneumonia  Chest x-ray on admission with pulmonary  edema and although no large focal consolidation, clinical concern for PNA  Started on ceftriaxone and azithromycin empirically for CAP  Improving with ABX (and volume management per HD)  She has received 3 days of Azithro 500mg qd -- thus will stop this (only need 3d for 500 mg dose)  Day #4 of CTX today - will continue this to complete 5d course - last dose on 2/6  Flu and COVID are negative   Completed course of IV abx. Dc iv rocephin      Essential hypertension  Continue home meds      Anemia due to chronic kidney disease, on chronic dialysis  Patient's anemia is currently controlled. Has not received any PRBCs to date.. Etiology likely d/t ESRD.   Hgb 10.3    Current CBC reviewed-   Lab Results   Component Value Date    HGB 10.3 (L) 02/06/2023    HCT 31.4 (L) 02/06/2023     Monitor serial CBC and transfuse if patient becomes hemodynamically unstable, symptomatic or H/H drops below 7/21.         Hypervolemia  She was given IV Lasix in the ED at for a total of IV Lasix 80 mg at that time.  Nephrology was consulted and is managing HD  Will continue dialysis Monday, Wednesday, and Friday for volume management      NSTEMI (non-ST elevated myocardial infarction)  On admission, reported SOB with sputum, does reports ongoing exertional chest tightness and dyspnea when she walks in the parking lot  Prior echo with normal EF and grade I diastolic dysfunction (was on mechanical ventilation at the time)  TTE repeated this admission with new reduced EF - 20%  Sats 87% SBP in 140s  Trop 0.85-->1.2-->1.3  BNP >4900 (no baseline)  EKG with 1st degree AV block, sinus rhythm, ST, T wave changes, no STEMI  No chest pain at rest. Appears most likely due to demand ischemia due to pneumonia, volume overload in the setting of ESRD, however patient could have ischemic heart disease  Give aspirin 325mg, IV Lasix 80mg, trend troponin, if trop further increasing or worsening CP will start a heparin gtt and consult Cardiology    2/3/23:  troponin 1.9, remains on hep gtt, ECHO results in process. Cardiology consult pending  2/4/23:  No new multi vessel disease per heart catheterization.  Cardiology recommends once optimized on medical therapy and out of decompensation will need to get evaluated for CABG. If not CABG candidate then will need high risk PCI. Medical management recs per Cardiology:  ASA 81mg. Plavix for one year. Statin therapy    Noted with a LV thrombus from echo cardiogram.  She will need Coumadin outpatient as per Cardiology with an INR goal of 2-3.  Will bridge Coumadin with heparin - pharmacist is assisting with Coumadin dosing       VTE Risk Mitigation (From admission, onward)         Ordered     warfarin tablet 7.5 mg  Daily         02/05/23 1751     heparin (porcine) injection 4,000 Units  As needed (PRN)         02/04/23 1343     heparin infusion 1,000 units/500 ml in 0.9% NaCl (pressure line flush)  Intra-op continuous PRN         02/03/23 1553     heparin (porcine) injection 4,000 Units  Once         02/03/23 1403     heparin 25,000 units in dextrose 5% (100 units/ml) IV bolus from bag - ADDITIONAL PRN BOLUS - 60 units/kg (max bolus 4000 units)  As needed (PRN)        Question:  Heparin Infusion Adjustment (DO NOT MODIFY ANSWER)  Answer:  \\[email or web address]\epic\Images\Pharmacy\HeparinInfusions\heparin LOW INTENSITY nomogram for [facility] [id number]    02/03/23 0116     heparin 25,000 units in dextrose 5% (100 units/ml) IV bolus from bag - ADDITIONAL PRN BOLUS - 30 units/kg (max bolus 4000 units)  As needed (PRN)        Question:  Heparin Infusion Adjustment (DO NOT MODIFY ANSWER)  Answer:  \[email or web address]\epic\Images\Pharmacy\HeparinInfusions\heparin LOW INTENSITY nomogram for [facility] [id number].pdf    02/03/23 0116     heparin 25,000 units in dextrose 5% 250 mL (100 units/mL) infusion LOW INTENSITY nomogram - [facility]  Continuous        Question Answer Comment   Heparin Infusion Adjustment (DO NOT MODIFY ANSWER)  \\[email or web address]\epic\Images\Pharmacy\HeparinInfusions\heparin LOW INTENSITY nomogram for [facility] [id number]    Begin at (in units/kg/hr) 12        02/03/23 0116     IP VTE HIGH RISK PATIENT  Once         02/02/23 1901     Place sequential compression device  Until discontinued         02/02/23 1901                Discharge Planning   JACKELYN: 2/7/2023     Code Status: Full Code   Is the patient medically ready for discharge?:     Reason for patient still in hospital (select all that apply): Treatment and Consult recommendations  Discharge Plan A: Home with family                  Lauren Montiel NP  Department of Hospital Medicine   [facility]

## 2023-02-06 NOTE — MED STUDENT SUBJECTIVE & OBJECTIVE
Medical Student Subjective & Objective     Principal Problem: Acute on chronic respiratory failure with hypoxia    Chief Complaint:   Chief Complaint   Patient presents with    Fatigue     Pt complains of cough/ fatigue, HA that began yesterday sats in triage on room air= 87%, pt has hx of dialysis, last dialysis was yesterday        HPI: Ms. Davenport is a 57 year old woman with a past medical history of ESRD on HD MWF seen by Dr. Jc who developed SOB and cough productive of green sputum yesterday. She was dialyzed yesterday. Flu and COVID tests negative. She has not had fevers or chills. Has noticed that sometimes when she is walking in the parking lot she develops chest tightness after going a short distance. Does not believe she has had a left heart cath before but was told that she has congestive heart failure.     Hospital Course: She was admitted to the hospital medicine service for further care.  Cardiology and nephrology was consulted.  Patient was noted with an NSTEMI on admission.  She was started on heparin drip.  2D echo completed on 02/03/2023 shows an EF of 20%---patient has combined systolic and diastolic congestive heart failure.  She underwent a cardiac catheterization on 02/03/2023.  Patient's heart catheterization shows mid LAD 80% stenosed, The 2nd Diag lesion was 60% stenosed, The Prox Cx to Mid Cx lesion was 70% stenosed, mid RCA lesion was 99% stenosed, and there is also three-vessel coronary artery disease noted.  Per her heart catheterization we see there is severe multivessel coronary artery disease, decompensated heart failure, and LV thrombosis.  Per cardiology's plan will continue HD for volume management and also treatment of pneumonia.  Patient will need CT as evaluation for CABG.  Will continue heparin drip for 48 hours post catheterization.  Patient's dialysis is Monday, Wednesday, Friday:  Appreciate nephrology.  Empiric tx for CAP with azithro and CTX.  Her flu and COVID are  negative.    Interval History:   No acute events overnight. T INR 2.6 this am. HD this AM. Will cont Coumadin 5 mg Po daily. Goal INR is 2-3. Life Vest has been placed on the patient. Appreciate Cardiology. Patient evaluated by PT/OT. Declined HH and SNF,but desired outpatient PT/OT. Awaiting transportation for discharge.    Past Medical History:   Diagnosis Date    Diabetes mellitus     Hypertension     Renal disorder        Past Surgical History:   Procedure Laterality Date    INSERTION OF CATHETER FOR HEMODIALYSIS Left 9/11/2020    Procedure: INSERTION, CATHETER, HEMODIALYSIS;  Surgeon: William Beltran MD;  [facility];  Service: General;  Laterality: Left;       Review of patient's allergies indicates:   Allergen Reactions    Phenergan [promethazine] Other (See Comments)     Restless legs    Reglan [metoclopramide hcl]     Tromethamine Other (See Comments)    Zofran [ondansetron hcl (pf)] Other (See Comments)     Constipation     Ketorolac Palpitations       No current facility-administered medications on file prior to encounter.     Current Outpatient Medications on File Prior to Encounter   Medication Sig    acetaminophen (TYLENOL) 500 MG tablet Take 500 mg by mouth every 6 (six) hours as needed for Pain.    amLODIPine (NORVASC) 10 MG tablet Take 1 tablet (10 mg total) by mouth once daily.    B complex-vitamin C-folic acid (SELENE-EMILIA/NEPHRO-EMILIA) 0.8 mg Tab TAKE 1 TABLET BY MOUTH ONCE A DAY (Patient taking differently: Take 1 tablet by mouth every evening.)    carvediloL (COREG) 25 MG tablet Take 1 tablet (25 mg total) by mouth 2 (two) times daily.    furosemide (LASIX) 80 MG tablet Take 2 tablets (160 mg total) by mouth 2 (two) times daily. (Patient taking differently: Take 160 mg by mouth 2 (two) times daily as needed.)    hydrALAZINE (APRESOLINE) 10 MG tablet Take 10 mg by mouth daily as needed.    sevelamer HCL (RENAGEL) 800 MG Tab Take 2 tablets by mouth three times daily with meals and 1  tablet by mouth twice daily with snacks     Family History    None       Tobacco Use    Smoking status: Never    Smokeless tobacco: Not on file   Substance and Sexual Activity    Alcohol use: Not Currently    Drug use: Never    Sexual activity: Not Currently     Partners: Male     Review of Systems   Constitutional:  Positive for fatigue. Negative for fever.   HENT: Negative.     Eyes: Negative.    Respiratory:  Positive for cough and shortness of breath. Negative for wheezing.    Cardiovascular:  Negative for chest pain and leg swelling.   Gastrointestinal:  Negative for diarrhea, nausea and vomiting.   Skin:  Negative for rash.   Neurological: Negative.    Objective:     Vital Signs (Most Recent):  Temp: 98.6 °F (37 °C) (02/06/23 0727)  Pulse: 60 (02/06/23 0727)  Resp: 19 (02/06/23 0727)  BP: (!) 127/58 (02/06/23 0727)  SpO2: (!) 93 % (02/06/23 0855)   Vital Signs (24h Range):  Temp:  [97.7 °F (36.5 °C)-98.6 °F (37 °C)] 98.6 °F (37 °C)  Pulse:  [59-63] 60  Resp:  [18-22] 19  SpO2:  [88 %-98 %] 93 %  BP: (127-141)/(58-65) 127/58     Weight: 68.9 kg (152 lb)  Body mass index is 27.8 kg/m².    Intake/Output Summary (Last 24 hours) at 2/6/2023 1005  Last data filed at 2/6/2023 0600  Gross per 24 hour   Intake 110 ml   Output --   Net 110 ml      Physical Exam  Constitutional:       General: She is not in acute distress.     Appearance: Normal appearance.   HENT:      Head: Normocephalic and atraumatic.      Nose: Nose normal.      Mouth/Throat:      Mouth: Mucous membranes are moist.   Eyes:      Conjunctiva/sclera: Conjunctivae normal.   Cardiovascular:      Rate and Rhythm: Normal rate and regular rhythm.      Pulses: Normal pulses.      Heart sounds: Normal heart sounds.   Pulmonary:      Breath sounds: No wheezing or rales.   Abdominal:      General: Abdomen is flat.      Palpations: Abdomen is soft.   Musculoskeletal:      Right lower leg: No edema.      Left lower leg: No edema.   Skin:     General: Skin is warm  and dry.      Capillary Refill: Capillary refill takes less than 2 seconds.   Neurological:      Mental Status: She is alert and oriented to person, place, and time.   Psychiatric:         Mood and Affect: Mood normal.         Behavior: Behavior normal.       Significant Labs: All pertinent labs within the past 24 hours have been reviewed.  CMP:     Significant Imaging: I have reviewed all pertinent imaging results/findings within the past 24 hours.    Medical Student Subjective & Objective

## 2023-02-06 NOTE — ASSESSMENT & PLAN NOTE
Patient's anemia is currently controlled. Has not received any PRBCs to date.. Etiology likely d/t ESRD.   Hgb 10.3    Current CBC reviewed-   Lab Results   Component Value Date    HGB 10.3 (L) 02/06/2023    HCT 31.4 (L) 02/06/2023     Monitor serial CBC and transfuse if patient becomes hemodynamically unstable, symptomatic or H/H drops below 7/21.

## 2023-02-06 NOTE — ASSESSMENT & PLAN NOTE
Patient with known CAD s/p angiogram, which is uncontrolled Will continue ASA, Plavix and Statin and monitor for S/Sx of angina/ACS. Continue to monitor on telemetry.   S/p LHC - 80% stenosis in mid LAD, mid left circ. 99% stenosis in mid RCA, + LV thrombus  Continue medical management with aspirin, statin, Plavix, beta-blocker  CT surgery consult as outpatient for CABG eval versus high-risk PCI

## 2023-02-06 NOTE — SUBJECTIVE & OBJECTIVE
Past Medical History:   Diagnosis Date    Diabetes mellitus     Hypertension     Renal disorder        Past Surgical History:   Procedure Laterality Date    INSERTION OF CATHETER FOR HEMODIALYSIS Left 9/11/2020    Procedure: INSERTION, CATHETER, HEMODIALYSIS;  Surgeon: William Beltran MD;  Location: [facility];  Service: General;  Laterality: Left;       Review of patient's allergies indicates:   Allergen Reactions    Phenergan [promethazine] Other (See Comments)     Restless legs    Reglan [metoclopramide hcl]     Tromethamine Other (See Comments)    Zofran [ondansetron hcl (pf)] Other (See Comments)     Constipation     Ketorolac Palpitations       No current facility-administered medications on file prior to encounter.     Current Outpatient Medications on File Prior to Encounter   Medication Sig    acetaminophen (TYLENOL) 500 MG tablet Take 500 mg by mouth every 6 (six) hours as needed for Pain.    amLODIPine (NORVASC) 10 MG tablet Take 1 tablet (10 mg total) by mouth once daily.    B complex-vitamin C-folic acid (SELENE-EMILIA/NEPHRO-EMILIA) 0.8 mg Tab TAKE 1 TABLET BY MOUTH ONCE A DAY (Patient taking differently: Take 1 tablet by mouth every evening.)    carvediloL (COREG) 25 MG tablet Take 1 tablet (25 mg total) by mouth 2 (two) times daily.    furosemide (LASIX) 80 MG tablet Take 2 tablets (160 mg total) by mouth 2 (two) times daily. (Patient taking differently: Take 160 mg by mouth 2 (two) times daily as needed.)    hydrALAZINE (APRESOLINE) 10 MG tablet Take 10 mg by mouth daily as needed.    sevelamer HCL (RENAGEL) 800 MG Tab Take 2 tablets by mouth three times daily with meals and 1 tablet by mouth twice daily with snacks     Family History    None       Tobacco Use    Smoking status: Never    Smokeless tobacco: Not on file   Substance and Sexual Activity    Alcohol use: Not Currently    Drug use: Never    Sexual activity: Not Currently     Partners: Male     Review of Systems   Constitutional: Negative for  diaphoresis and fever.   HENT: Negative.     Eyes: Negative.    Cardiovascular:  Negative for chest pain, irregular heartbeat, leg swelling, near-syncope, orthopnea, palpitations, paroxysmal nocturnal dyspnea and syncope.   Respiratory:  Negative for cough and shortness of breath.    Endocrine: Negative.    Hematologic/Lymphatic: Negative.    Skin: Negative.    Musculoskeletal: Negative.    Gastrointestinal:  Negative for nausea and vomiting.   Genitourinary: Negative.    Neurological: Negative.    Psychiatric/Behavioral: Negative.     Allergic/Immunologic: Negative.    Objective:     Vital Signs (Most Recent):  Temp: 98.6 °F (37 °C) (02/06/23 0727)  Pulse: 60 (02/06/23 0727)  Resp: 19 (02/06/23 0727)  BP: (!) 127/58 (02/06/23 0727)  SpO2: (!) 93 % (02/06/23 0855)   Vital Signs (24h Range):  Temp:  [97.7 °F (36.5 °C)-98.6 °F (37 °C)] 98.6 °F (37 °C)  Pulse:  [59-63] 60  Resp:  [18-22] 19  SpO2:  [88 %-98 %] 93 %  BP: (127-141)/(58-65) 127/58     Weight: 68.9 kg (152 lb)  Body mass index is 27.8 kg/m².    SpO2: (!) 93 %         Intake/Output Summary (Last 24 hours) at 2/6/2023 0909  Last data filed at 2/6/2023 0600  Gross per 24 hour   Intake 110 ml   Output --   Net 110 ml         Lines/Drains/Airways       Central Venous Catheter Line  Duration             Permacath 09/11/20 0859 left internal jugular 878 days              Drain  Duration             Female External Urinary Catheter 02/03/23 2000 2 days              Peripheral Intravenous Line  Duration                  Peripheral IV - Single Lumen 02/02/23 1622 20 G;1 1/4 in Right Antecubital 3 days         Peripheral IV - Single Lumen 02/03/23 2102 20 G Anterior;Left Forearm 2 days                    Physical Exam  Constitutional:       General: She is not in acute distress.     Appearance: She is not diaphoretic.   HENT:      Head: Atraumatic.   Eyes:      General:         Right eye: No discharge.         Left eye: No discharge.   Cardiovascular:      Rate and  Rhythm: Normal rate and regular rhythm.      Heart sounds: Murmur heard.   Pulmonary:      Effort: Pulmonary effort is normal.      Breath sounds: No rales.   Abdominal:      General: Bowel sounds are normal.      Palpations: Abdomen is soft.   Skin:     General: Skin is warm and dry.   Neurological:      Mental Status: She is alert and oriented to person, place, and time.       Significant Labs: BMP:   Recent Labs   Lab 02/05/23  0404 02/06/23  0447   GLU 95  95 90   *  129* 128*   K 4.3  4.3 4.4     101 99   CO2 16*  17* 15*   BUN 22*  22* 35*   CREATININE 4.1*  4.1* 6.1*   CALCIUM 9.4  9.4 9.3   MG 2.1  2.1 2.2     , CMP   Recent Labs   Lab 02/05/23  0404 02/06/23  0447   *  129* 128*   K 4.3  4.3 4.4     101 99   CO2 16*  17* 15*   GLU 95  95 90   BUN 22*  22* 35*   CREATININE 4.1*  4.1* 6.1*   CALCIUM 9.4  9.4 9.3   ANIONGAP 11  11 14     , CBC   Recent Labs   Lab 02/05/23  0404 02/06/23  0447   WBC 6.77  6.77 7.14   HGB 10.0*  10.0* 10.3*   HCT 30.8*  30.8* 31.4*   *  127* 142*     , INR   Recent Labs   Lab 02/04/23  1221 02/05/23  0404 02/06/23  0810   INR 1.3* 1.3* 1.5*     , Lipid Panel No results for input(s): CHOL, HDL, LDLCALC, TRIG, CHOLHDL in the last 48 hours., Troponin   No results for input(s): TROPONINI in the last 48 hours.  , and All pertinent lab results from the last 24 hours have been reviewed.    Significant Imaging: Echocardiogram: Transthoracic echo (TTE) complete (Cupid Only):   Results for orders placed or performed during the hospital encounter of 02/02/23   Echo   Result Value Ref Range    BSA 1.74 m2    TDI SEPTAL 0.03 m/s    LV LATERAL E/E' RATIO 18.67 m/s    LV SEPTAL E/E' RATIO 37.33 m/s    LA WIDTH 3.90 cm    IVC diameter 2.61 cm    Left Ventricular Outflow Tract Mean Velocity 0.63 cm/s    Left Ventricular Outflow Tract Mean Gradient 1.77 mmHg    Pulmonary Valve Mean Velocity 0.89 m/s    TDI LATERAL 0.06 m/s    PV PEAK VELOCITY  "1.31 cm/s    LVIDd 5.41 3.5 - 6.0 cm    IVS 1.13 (A) 0.6 - 1.1 cm    Posterior Wall 1.18 (A) 0.6 - 1.1 cm    LVIDs 4.50 (A) 2.1 - 4.0 cm    FS 17 28 - 44 %    LA volume 74.25 cm3    LV mass 251.66 g    LA size 4.41 cm    RVDD 3.64 cm    Left Ventricle Relative Wall Thickness 0.44 cm    AV mean gradient 10 mmHg    AV valve area 1.24 cm2    AV Velocity Ratio 0.41     AV index (prosthetic) 0.39     MV mean gradient 1 mmHg    MV valve area p 1/2 method 3.90 cm2    MV valve area by continuity eq 2.09 cm2    E/A ratio 1.81     Mean e' 0.05 m/s    E wave deceleration time 194.58 msec    MV "A" wave duration [id number] msec    LVOT diameter 2.00 cm    LVOT area 3.1 cm2    LVOT peak bowen 0.84 m/s    LVOT peak VTI 17.20 cm    Ao peak bowen 2.07 m/s    Ao VTI 43.6 cm    LVOT stroke volume 54.01 cm3    AV peak gradient 17 mmHg    MV peak gradient 5 mmHg    E/E' ratio 24.89 m/s    MV Peak E Bowen 1.12 m/s    TR Max Bowen 3.39 m/s    MV VTI 25.9 cm    MV stenosis pressure 1/2 time 56.43 ms    MV Peak A Bowen 0.62 m/s    LV Systolic Volume 92.63 mL    LV Systolic Volume Index 54.5 mL/m2    LV Diastolic Volume 141.97 mL    LV Diastolic Volume Index 83.51 mL/m2    LA Volume Index 43.7 mL/m2    LV Mass Index 148 g/m2    RA Major Axis 5.48 cm    Left Atrium Minor Axis 4.71 cm    Left Atrium Major Axis 5.51 cm    Triscuspid Valve Regurgitation Peak Gradient 46 mmHg    LA Volume Index (Mod) 34.4 mL/m2    LA volume (mod) 58.42 cm3    RA Width 4.77 cm    Right Atrial Pressure (from IVC) 15 mmHg    EF 20 %    TV rest pulmonary artery pressure 61 mmHg    Narrative    · The left ventricle is mildly enlarged with concentric hypertrophy and   severely decreased systolic function.  · The estimated ejection fraction is 20%.  · There is left ventricular global hypokinesis.  · A small spherical solid left ventricular thrombus is present. The   thrombus is fixed and located in the apex.  · Grade II left ventricular diastolic dysfunction.  · Normal " right ventricular size with normal right ventricular systolic   function.  · Moderate left atrial enlargement.  · There is mild aortic valve stenosis.  · Aortic valve area is 1.24 cm2; peak velocity is 2.07 m/s; mean gradient   is 10 mmHg.  · Mild-to-moderate mitral regurgitation.  · Moderate tricuspid regurgitation.  · Moderate pulmonic regurgitation.  · There is pulmonary hypertension.  · Elevated central venous pressure (15 mmHg).  · The estimated PA systolic pressure is 61 mmHg.

## 2023-02-06 NOTE — ASSESSMENT & PLAN NOTE
Secondary to renal etiology. Add sodium bicarb 650 mg bid. Defer further treatment to nephrology. Trend renal panel

## 2023-02-06 NOTE — ASSESSMENT & PLAN NOTE
Chest x-ray on admission with pulmonary edema and although no large focal consolidation, clinical concern for PNA  Started on ceftriaxone and azithromycin empirically for CAP  Improving with ABX (and volume management per HD)  She has received 3 days of Azithro 500mg qd -- thus will stop this (only need 3d for 500 mg dose)  Day #4 of CTX today - will continue this to complete 5d course - last dose on 2/6  Flu and COVID are negative   Completed course of IV abx. Dc iv rocephin

## 2023-02-06 NOTE — MED STUDENT ASSESSMENT & PLAN
Acute on chronic combined systolic and diastolic heart failure  Echo from 02/03/2023 noted with acute on chronic systolic and diastolic heart failure.    Patient with an EF of 20%--Decompensated heart failure.    Also noted with an LV thrombus.  Appreciate Cardiology on this case   LifeVest ordered by Cardiology  Starting Entresto 24/26 BID  Volume management per hemodialysis.  Continue strict intake and output  Daily weights        LV (left ventricular) thrombus  As per recent echocardiogram  She was started on heparin drip   As per cardiology we will need AC with warfarin--we will  bridge to warfarin with the heparin drip for INR goal of 2-3. INR 2.7 today.     Pneumonia  Chest x-ray on admission with pulmonary edema and although no large focal consolidation, clinical concern for PNA  Started on ceftriaxone and azithromycin empirically for CAP  Improving with ABX (and volume management per HD)  She has received 3 days of Azithro 500mg qd -- thus will stop this (only need 3d for 500 mg dose)  Day #5 of CTX today - will continue this to complete 5d course -last dose on 02/06/2023  Flu and COVID are negative   Completed course of IV abx. Dc iv rocephin        Essential hypertension  Continue home meds        ESRD (end stage renal disease) on dialysis  Nephrology consulted for inpatient HD management   Her usual outpatient HD schedule is MWF       Anemia due to chronic kidney disease, on chronic dialysis  Patient's anemia is currently controlled. Has not received any PRBCs to date.. Etiology likely d/t ESRD.   Current CBC reviewed-         Lab Results   Component Value Date     HGB 10.1 (L) 02/07/2023     HCT 30.7 (L) 02/07/2023      Monitor serial CBC and transfuse if patient becomes hemodynamically unstable, symptomatic or H/H drops below 7/21.         NSTEMI (non-ST elevated myocardial infarction)  On admission, reported SOB with sputum, does reports ongoing exertional chest tightness and dyspnea when she walks in  the parking lot  Prior echo with normal EF and grade I diastolic dysfunction (was on mechanical ventilation at the time)  TTE repeated this admission with new reduced EF - 20%  Sats 87% SBP in 140s  Trop 0.85-->1.2-->1.3  BNP >4900 (no baseline)  EKG with 1st degree AV block, sinus rhythm, ST, T wave changes, no STEMI  No chest pain at rest. Appears most likely due to demand ischemia due to pneumonia, volume overload in the setting of ESRD, however patient could have ischemic heart disease  Give aspirin 325mg, IV Lasix 80mg, trend troponin, if trop further increasing or worsening CP will start a heparin gtt and consult Cardiology     2/3/23: troponin 1.9, remains on hep gtt, ECHO results in process. Cardiology consult pending  2/4/23:  No new multi vessel disease per heart catheterization.  Cardiology recommends once optimized on medical therapy and out of decompensation will need to get evaluated for CABG. If not CABG candidate then will need high risk PCI. Medical management recs per Cardiology:  ASA 81mg. Plavix for one year. Statin therapy  2/6/23: LifeVest ordered per Cardiology     Noted with a LV thrombus from echo cardiogram.  She will need Coumadin outpatient as per Cardiology with an INR goal of 2-3.  Will bridge Coumadin with heparin - pharmacist is assisting with Coumadin dosing       Coronary artery disease involving native coronary artery of native heart without angina pectoris  Patient with known CAD s/p angiogram, which is uncontrolled Will continue ASA, Plavix and Statin and monitor for S/Sx of angina/ACS. Continue to monitor on telemetry.   S/p LHC - 80% stenosis in mid LAD, mid left circ. 99% stenosis in mid RCA, + LV thrombus  Continue medical management with aspirin, statin, Plavix, beta-blocker  CT surgery consult as outpatient for CABG eval versus high-risk PCI  Life vest was delivered and is now in place        Hyperlipidemia      Patient is chronically on statin.will continue for now.  Monitor clinically. Last LDL was         Lab Results   Component Value Date     LDLCALC 151.4 11/06/2019         Metabolic acidosis  Secondary to renal etiology. Add sodium bicarb 650 mg bid. Defer further treatment to nephrology. Trend renal panel     Thrombocytopenia        Lab Results   Component Value Date      (L) 02/07/2023      Reviewed. Plt 138 Trend CBC

## 2023-02-06 NOTE — NURSING
Aptt results at 1043 40.3, therapeutic, results at 1700 37.1 per heparin nomogram increase by 2 units and give 30 unit bolus. Heparin now going at 16 units/kg/hr.

## 2023-02-06 NOTE — PROGRESS NOTES
[facility] - Telemetry  Cardiology  Progress Note    Patient Name: Ping Davenport  MRN: [id number]  Admission Date: 2/2/2023  Hospital Length of Stay: 3 days  Code Status: Full Code   Attending Physician: Tae Salinas, *   Primary Care Physician: Primary Doctor No  Expected Discharge Date:   Principal Problem:Acute on chronic respiratory failure with hypoxia    Subjective:     Hospital Course:   02/03/2023 Per HPI  2/4/2023:  Had LHC yesterday with MV CAD. 2DE with newly depressed EF and LV thrombus. Cough, SOB, fatigued. BP stable. HD today.      2/5/2023:  HD yesterday. Looks and feels better today. Coumadin started for LV thrombus and INR today 1.3. No CP.     02/06/2023 No chest pain. INR 1.5. Lifevest ordered. Resting comfortably this AM.       Past Medical History:   Diagnosis Date    Diabetes mellitus     Hypertension     Renal disorder        Past Surgical History:   Procedure Laterality Date    INSERTION OF CATHETER FOR HEMODIALYSIS Left 9/11/2020    Procedure: INSERTION, CATHETER, HEMODIALYSIS;  Surgeon: William Beltran MD;  Location: [facility];  Service: General;  Laterality: Left;       Review of patient's allergies indicates:   Allergen Reactions    Phenergan [promethazine] Other (See Comments)     Restless legs    Reglan [metoclopramide hcl]     Tromethamine Other (See Comments)    Zofran [ondansetron hcl (pf)] Other (See Comments)     Constipation     Ketorolac Palpitations       No current facility-administered medications on file prior to encounter.     Current Outpatient Medications on File Prior to Encounter   Medication Sig    acetaminophen (TYLENOL) 500 MG tablet Take 500 mg by mouth every 6 (six) hours as needed for Pain.    amLODIPine (NORVASC) 10 MG tablet Take 1 tablet (10 mg total) by mouth once daily.    B complex-vitamin C-folic acid (SELENE-EMILIA/NEPHRO-EMILIA) 0.8 mg Tab TAKE 1 TABLET BY MOUTH ONCE A DAY (Patient taking differently: Take 1 tablet by mouth every  evening.)    carvediloL (COREG) 25 MG tablet Take 1 tablet (25 mg total) by mouth 2 (two) times daily.    furosemide (LASIX) 80 MG tablet Take 2 tablets (160 mg total) by mouth 2 (two) times daily. (Patient taking differently: Take 160 mg by mouth 2 (two) times daily as needed.)    hydrALAZINE (APRESOLINE) 10 MG tablet Take 10 mg by mouth daily as needed.    sevelamer HCL (RENAGEL) 800 MG Tab Take 2 tablets by mouth three times daily with meals and 1 tablet by mouth twice daily with snacks     Family History    None       Tobacco Use    Smoking status: Never    Smokeless tobacco: Not on file   Substance and Sexual Activity    Alcohol use: Not Currently    Drug use: Never    Sexual activity: Not Currently     Partners: Male     Review of Systems   Constitutional: Negative for diaphoresis and fever.   HENT: Negative.     Eyes: Negative.    Cardiovascular:  Negative for chest pain, irregular heartbeat, leg swelling, near-syncope, orthopnea, palpitations, paroxysmal nocturnal dyspnea and syncope.   Respiratory:  Negative for cough and shortness of breath.    Endocrine: Negative.    Hematologic/Lymphatic: Negative.    Skin: Negative.    Musculoskeletal: Negative.    Gastrointestinal:  Negative for nausea and vomiting.   Genitourinary: Negative.    Neurological: Negative.    Psychiatric/Behavioral: Negative.     Allergic/Immunologic: Negative.    Objective:     Vital Signs (Most Recent):  Temp: 98.6 °F (37 °C) (02/06/23 0727)  Pulse: 60 (02/06/23 0727)  Resp: 19 (02/06/23 0727)  BP: (!) 127/58 (02/06/23 0727)  SpO2: (!) 93 % (02/06/23 0855)   Vital Signs (24h Range):  Temp:  [97.7 °F (36.5 °C)-98.6 °F (37 °C)] 98.6 °F (37 °C)  Pulse:  [59-63] 60  Resp:  [18-22] 19  SpO2:  [88 %-98 %] 93 %  BP: (127-141)/(58-65) 127/58     Weight: 68.9 kg (152 lb)  Body mass index is 27.8 kg/m².    SpO2: (!) 93 %         Intake/Output Summary (Last 24 hours) at 2/6/2023 0909  Last data filed at 2/6/2023 0600  Gross per 24 hour    Intake 110 ml   Output --   Net 110 ml         Lines/Drains/Airways       Central Venous Catheter Line  Duration             Permacath 09/11/20 0859 left internal jugular 878 days              Drain  Duration             Female External Urinary Catheter 02/03/23 2000 2 days              Peripheral Intravenous Line  Duration                  Peripheral IV - Single Lumen 02/02/23 1622 20 G;1 1/4 in Right Antecubital 3 days         Peripheral IV - Single Lumen 02/03/23 2102 20 G Anterior;Left Forearm 2 days                    Physical Exam  Constitutional:       General: She is not in acute distress.     Appearance: She is not diaphoretic.   HENT:      Head: Atraumatic.   Eyes:      General:         Right eye: No discharge.         Left eye: No discharge.   Cardiovascular:      Rate and Rhythm: Normal rate and regular rhythm.      Heart sounds: Murmur heard.   Pulmonary:      Effort: Pulmonary effort is normal.      Breath sounds: No rales.   Abdominal:      General: Bowel sounds are normal.      Palpations: Abdomen is soft.   Skin:     General: Skin is warm and dry.   Neurological:      Mental Status: She is alert and oriented to person, place, and time.       Significant Labs: BMP:   Recent Labs   Lab 02/05/23  0404 02/06/23  0447   GLU 95  95 90   *  129* 128*   K 4.3  4.3 4.4     101 99   CO2 16*  17* 15*   BUN 22*  22* 35*   CREATININE 4.1*  4.1* 6.1*   CALCIUM 9.4  9.4 9.3   MG 2.1  2.1 2.2     , CMP   Recent Labs   Lab 02/05/23  0404 02/06/23  0447   *  129* 128*   K 4.3  4.3 4.4     101 99   CO2 16*  17* 15*   GLU 95  95 90   BUN 22*  22* 35*   CREATININE 4.1*  4.1* 6.1*   CALCIUM 9.4  9.4 9.3   ANIONGAP 11  11 14     , CBC   Recent Labs   Lab 02/05/23  0404 02/06/23  0447   WBC 6.77  6.77 7.14   HGB 10.0*  10.0* 10.3*   HCT 30.8*  30.8* 31.4*   *  127* 142*     , INR   Recent Labs   Lab 02/04/23  1221 02/05/23  0404 02/06/23  0810   INR 1.3* 1.3* 1.5*  "    , Lipid Panel No results for input(s): CHOL, HDL, LDLCALC, TRIG, CHOLHDL in the last 48 hours., Troponin   No results for input(s): TROPONINI in the last 48 hours.  , and All pertinent lab results from the last 24 hours have been reviewed.    Significant Imaging: Echocardiogram: Transthoracic echo (TTE) complete ([facility] Only):   Results for orders placed or performed during the hospital encounter of 02/02/23   Echo   Result Value Ref Range    BSA 1.74 m2    TDI SEPTAL 0.03 m/s    LV LATERAL E/E' RATIO 18.67 m/s    LV SEPTAL E/E' RATIO 37.33 m/s    LA WIDTH 3.90 cm    IVC diameter 2.61 cm    Left Ventricular Outflow Tract Mean Velocity 0.63 cm/s    Left Ventricular Outflow Tract Mean Gradient 1.77 mmHg    Pulmonary Valve Mean Velocity 0.89 m/s    TDI LATERAL 0.06 m/s    PV PEAK VELOCITY 1.31 cm/s    LVIDd 5.41 3.5 - 6.0 cm    IVS 1.13 (A) 0.6 - 1.1 cm    Posterior Wall 1.18 (A) 0.6 - 1.1 cm    LVIDs 4.50 (A) 2.1 - 4.0 cm    FS 17 28 - 44 %    LA volume 74.25 cm3    LV mass 251.66 g    LA size 4.41 cm    RVDD 3.64 cm    Left Ventricle Relative Wall Thickness 0.44 cm    AV mean gradient 10 mmHg    AV valve area 1.24 cm2    AV Velocity Ratio 0.41     AV index (prosthetic) 0.39     MV mean gradient 1 mmHg    MV valve area p 1/2 method 3.90 cm2    MV valve area by continuity eq 2.09 cm2    E/A ratio 1.81     Mean e' 0.05 m/s    E wave deceleration time 194.58 msec    MV "A" wave duration 114.075239811278029 msec    LVOT diameter 2.00 cm    LVOT area 3.1 cm2    LVOT peak bowen 0.84 m/s    LVOT peak VTI 17.20 cm    Ao peak bowen 2.07 m/s    Ao VTI 43.6 cm    LVOT stroke volume 54.01 cm3    AV peak gradient 17 mmHg    MV peak gradient 5 mmHg    E/E' ratio 24.89 m/s    MV Peak E Bowen 1.12 m/s    TR Max Bowen 3.39 m/s    MV VTI 25.9 cm    MV stenosis pressure 1/2 time 56.43 ms    MV Peak A Bowen 0.62 m/s    LV Systolic Volume 92.63 mL    LV Systolic Volume Index 54.5 mL/m2    LV Diastolic Volume 141.97 mL    LV Diastolic Volume " Index 83.51 mL/m2    LA Volume Index 43.7 mL/m2    LV Mass Index 148 g/m2    RA Major Axis 5.48 cm    Left Atrium Minor Axis 4.71 cm    Left Atrium Major Axis 5.51 cm    Triscuspid Valve Regurgitation Peak Gradient 46 mmHg    LA Volume Index (Mod) 34.4 mL/m2    LA volume (mod) 58.42 cm3    RA Width 4.77 cm    Right Atrial Pressure (from IVC) 15 mmHg    EF 20 %    TV rest pulmonary artery pressure 61 mmHg    Narrative    · The left ventricle is mildly enlarged with concentric hypertrophy and   severely decreased systolic function.  · The estimated ejection fraction is 20%.  · There is left ventricular global hypokinesis.  · A small spherical solid left ventricular thrombus is present. The   thrombus is fixed and located in the apex.  · Grade II left ventricular diastolic dysfunction.  · Normal right ventricular size with normal right ventricular systolic   function.  · Moderate left atrial enlargement.  · There is mild aortic valve stenosis.  · Aortic valve area is 1.24 cm2; peak velocity is 2.07 m/s; mean gradient   is 10 mmHg.  · Mild-to-moderate mitral regurgitation.  · Moderate tricuspid regurgitation.  · Moderate pulmonic regurgitation.  · There is pulmonary hypertension.  · Elevated central venous pressure (15 mmHg).  · The estimated PA systolic pressure is 61 mmHg.        Assessment and Plan:     Brief HPI: patient seen this morning on rounds, resting comfortably without chest pain.     Acute combined systolic and diastolic heart failure  TTE   The left ventricle is mildly enlarged with concentric hypertrophy and severely decreased systolic function.   The estimated ejection fraction is 20%.   There is left ventricular global hypokinesis.   A small spherical solid left ventricular thrombus is present. The thrombus is fixed and located in the apex.   Grade II left ventricular diastolic dysfunction.   Normal right ventricular size with normal right ventricular systolic function.   Moderate left atrial  enlargement.   There is mild aortic valve stenosis.   Aortic valve area is 1.24 cm2; peak velocity is 2.07 m/s; mean gradient is 10 mmHg.   Mild-to-moderate mitral regurgitation.   Moderate tricuspid regurgitation.   Moderate pulmonic regurgitation.   There is pulmonary hypertension.   Elevated central venous pressure (15 mmHg).   The estimated PA systolic pressure is 61 mmHg.      Volume status maintained by HD  LifeVest ordered   GDMT with BB and Entresto   Repeat TTE in 90 days     LV (left ventricular) mural thrombus following MI  On Coumadin with heparin bridge  INR 1.5 today      Essential hypertension  Continue BB and Entresto - will up titrate as tolerated     ESRD (end stage renal disease) on dialysis  Nephrology following     Hypervolemia  Volume status maintained by HD    NSTEMI (non-ST elevated myocardial infarction)  S/p LHC - 80% stenosis in mid LAD, mid left circ. 99% stenosis in mid RCA, + LV thrombus  Continue medical management with aspirin, statin, Plavix, beta-blocker  CT surgery consult as outpatient for CABG eval versus high-risk PCI               VTE Risk Mitigation (From admission, onward)         Ordered     warfarin tablet 7.5 mg  Daily         02/05/23 1751     heparin (porcine) injection 4,000 Units  As needed (PRN)         02/04/23 1343     heparin infusion 1,000 units/500 ml in 0.9% NaCl (pressure line flush)  Intra-op continuous PRN         02/03/23 1553     heparin (porcine) injection 4,000 Units  Once         02/03/23 1403     heparin 25,000 units in dextrose 5% (100 units/ml) IV bolus from bag - ADDITIONAL PRN BOLUS - 60 units/kg (max bolus 4000 units)  As needed (PRN)        Question:  Heparin Infusion Adjustment (DO NOT MODIFY ANSWER)  Answer:  [email or web address]\epic\Images\Pharmacy\HeparinInfusions\heparin LOW INTENSITY nomogram for [facility] [id number]    02/03/23 0116     heparin 25,000 units in dextrose 5% (100 units/ml) IV bolus from bag - ADDITIONAL PRN BOLUS - 30 units/kg  (max bolus 4000 units)  As needed (PRN)        Question:  Heparin Infusion Adjustment (DO NOT MODIFY ANSWER)  Answer:  \\[email or web address]\epic\Images\Pharmacy\HeparinInfusions\heparin LOW INTENSITY nomogram for [facility] [id number]    02/03/23 0116     heparin 25,000 units in dextrose 5% 250 mL (100 units/mL) infusion LOW INTENSITY nomogram - [facility]  Continuous        Question Answer Comment   Heparin Infusion Adjustment (DO NOT MODIFY ANSWER) \\[email or web address]\epic\Images\Pharmacy\HeparinInfusions\heparin LOW INTENSITY nomogram for [facility] [id number]    Begin at (in units/kg/hr) 12        02/03/23 0116     IP VTE HIGH RISK PATIENT  Once         02/02/23 1901     Place sequential compression device  Until discontinued         02/02/23 1901                Betito Gongora NP  Cardiology  [facility] - Telemetry

## 2023-02-06 NOTE — SUBJECTIVE & OBJECTIVE
Interval History: No acute events overnight. She reports generalized fatigue. States improvement of dyspnea.  Presently on HD.  On heparin drip- bridging with warfarin.    INR 1.5  WBC 7.1, hgb 10.3, plts. 142.    Awaiting Life Vest. Reviewed cardiology note.       Review of Systems   Constitutional:  Positive for fatigue.   Respiratory:  Positive for shortness of breath. Negative for cough and wheezing.    Cardiovascular:  Negative for chest pain.   Gastrointestinal:  Negative for diarrhea, nausea and vomiting.   Neurological:  Positive for weakness.   Objective:     Vital Signs (Most Recent):  Temp: 98.6 °F (37 °C) (02/06/23 0727)  Pulse: 61 (02/06/23 1216)  Resp: 19 (02/06/23 0727)  BP: (!) 127/58 (02/06/23 0727)  SpO2:  (pt unavailable) (02/06/23 1236)   Vital Signs (24h Range):  Temp:  [97.7 °F (36.5 °C)-98.6 °F (37 °C)] 98.6 °F (37 °C)  Pulse:  [59-63] 61  Resp:  [18-22] 19  SpO2:  [88 %-96 %] 93 %  BP: (127-137)/(58-63) 127/58     Weight: 68.9 kg (152 lb)  Body mass index is 27.8 kg/m².    Intake/Output Summary (Last 24 hours) at 2/6/2023 1238  Last data filed at 2/6/2023 0600  Gross per 24 hour   Intake 110 ml   Output --   Net 110 ml        Physical Exam  Vitals and nursing note reviewed.   Cardiovascular:      Rate and Rhythm: Normal rate and regular rhythm.   Pulmonary:      Effort: Pulmonary effort is normal. No respiratory distress.      Breath sounds: Rales present.   Abdominal:      Palpations: Abdomen is soft.   Skin:     Comments: Left IJ PermCath for dialysis noted   Neurological:      Mental Status: She is alert and oriented to person, place, and time. Mental status is at baseline.       Significant Labs: All pertinent labs within the past 24 hours have been reviewed.    Significant Imaging: I have reviewed all pertinent imaging results/findings within the past 24 hours.

## 2023-02-06 NOTE — ASSESSMENT & PLAN NOTE
Nephrology consulted for inpatient HD management   Last HD Sat 2/4/2023  Her usual outpatient HD schedule is [facility]

## 2023-02-06 NOTE — ASSESSMENT & PLAN NOTE
TTE   The left ventricle is mildly enlarged with concentric hypertrophy and severely decreased systolic function.   The estimated ejection fraction is 20%.   There is left ventricular global hypokinesis.   A small spherical solid left ventricular thrombus is present. The thrombus is fixed and located in the apex.   Grade II left ventricular diastolic dysfunction.   Normal right ventricular size with normal right ventricular systolic function.   Moderate left atrial enlargement.   There is mild aortic valve stenosis.   Aortic valve area is 1.24 cm2; peak velocity is 2.07 m/s; mean gradient is 10 mmHg.   Mild-to-moderate mitral regurgitation.   Moderate tricuspid regurgitation.   Moderate pulmonic regurgitation.   There is pulmonary hypertension.   Elevated central venous pressure (15 mmHg).   The estimated PA systolic pressure is 61 mmHg.      Volume status maintained by HD  LifeVest ordered   GDMT with YURIDIA and Entresto   Repeat TTE in 90 days

## 2023-02-07 NOTE — ASSESSMENT & PLAN NOTE
As per recent echocardiogram  She was started on heparin drip   INR today, 2/7/23 is now 2.3  As per cardiology we will need AC with warfarin--we will  bridge to warfarin with the heparin drip for INR goal of 2-3. PharmD assisting with dosing.

## 2023-02-07 NOTE — ASSESSMENT & PLAN NOTE
Patient's anemia is currently controlled. Has not received any PRBCs to date.. Etiology likely d/t ESRD.   Current CBC reviewed-   Lab Results   Component Value Date    HGB 10.1 (L) 02/07/2023    HCT 30.7 (L) 02/07/2023     Monitor serial CBC and transfuse if patient becomes hemodynamically unstable, symptomatic or H/H drops below 7/21.

## 2023-02-07 NOTE — ASSESSMENT & PLAN NOTE
Echo from 02/03/2023 noted with acute on chronic systolic and diastolic heart failure.    Patient with an EF of 20%--Decompensated heart failure.    Also noted with an LV thrombus.  Appreciate Cardiology on this case   She will likely need a LifeVest on discharge   Starting Entresto 24/26 BID  Volume management per hemodialysis.  Continue strict intake and output  Daily weights     0

## 2023-02-07 NOTE — CARE UPDATE
INR 2.3 this AM. Therapeutic for LV thrombus. Agree with discontinuation of IV Heparin and dose of 5mg po tonight. May need lower than 7.5mg po daily given sharp rise of Coumadin but will defer to pharmacy/coumadin clinic. Awaiting Life Vest placement. Once Life Vest placed if no cardiac complaints then suitable for discharge from cardiac standpoint with plans for CTS follow up for discussion of CABG     1400 Received call from Zolwanda and patient approved with plan for Life Vest placement this afternoon

## 2023-02-07 NOTE — PLAN OF CARE
Patient received on   1 Lpm NC with SpO2    92%. Pt with no apparent distress noted. Will continue to monitor.

## 2023-02-07 NOTE — PLAN OF CARE
Problem: Adult Inpatient Plan of Care  Goal: Plan of Care Review  Outcome: Ongoing, Progressing     Chart check complete. Vitals, orders, labs, and progress notes reviewed. Care plan updated. Will monitor.

## 2023-02-07 NOTE — PROGRESS NOTES
[facility]  [facility]  Progress Note    SUMMARY       Recommendations    Recommendation:  1. Encourage intake at meals as tolerated.   2. Monitor weight/labs.   3. RD to continue to follow to monitor po intake    Goals:  Pt will tolerate diet with at least 50-75% intake at meals by RD follow up  Nutrition Goal Status: new  Communication of RD Recs: reviewed with RN    Assessment and Plan   Nutrition Problem  Inadequate nutrition     Related to (etiology):   Condition associated with diagnosis: hypervolemia        Signs and Symptoms (as evidenced by):   NPO status   GI intolerance      Interventions:  Collaboration with medical providers     Nutrition Diagnosis Status:   Improving    Malnutrition Assessment  Unable to assess NFPE at this time        Reason for Assessment  Reason For Assessment: RD follow-up  Diagnosis: renal disease, cardiac disease  Interdisciplinary Rounds: did not attend (RD remote)  General Information Comments: Pt on Renal diet with good intake at meals. Receives HD. Omar 20-skin intact. Weight stable. Unable toa ssess NFPE at this time  Nutrition Discharge Planning: pt to d/c on Renal diet    Nutrition Risk Screen  Nutrition Risk Screen: no indicators present    Nutrition/Diet History  Food Preferences: no Jain or cultural food prefs identified  Spiritual, Cultural Beliefs, Pentecostalism Practices, Values that Affect Care: no  Food Allergies: NKFA  Factors Affecting Nutritional Intake: None identified at this time    Anthropometrics  Temp: 98.9 °F (37.2 °C)  Height Method: Stated  Height: 5' 2" (157.5 cm)  Height (inches): 62 in  Weight Method: Bed Scale  Weight: 68.9 kg (151 lb 14.4 oz)  Weight (lb): 151.9 lb  Ideal Body Weight (IBW), Female: 110 lb  % Ideal Body Weight, Female (lb): 138.09 %  BMI (Calculated): 27.8  BMI Grade: 25 - 29.9 - overweight  Usual Body Weight (UBW), k kg (1/5)  % Usual Body Weight: 100.06  % Weight Change From Usual Weight: -0.15 %   "   Lab/Procedures/Meds  Pertinent Labs Reviewed: reviewed  Pertinent Labs Comments: Na 133L, Crea 3.8H, Ca 8.6L  Pertinent Medications Reviewed: reviewed  Pertinent Medications Comments: aspirin, carvedilol, heparin, coumadin    Estimated/Assessed Needs  Weight Used For Calorie Calculations: 68.9 kg (151 lb 14.4 oz)  Energy Calorie Requirements (kcal): 1929 (28 kcal/kg)  Energy Need Method: Kcal/kg  Protein Requirements: 75g (1.1g/kg)  Weight Used For Protein Calculations: 68.9 kg (151 lb 14.4 oz)  Fluid Requirements (mL): 1000+output  Estimated Fluid Requirement Method: RDA Method  RDA Method (mL): 1929  CHO Requirement: 250    Nutrition Prescription Ordered  Current Diet Order: Renal    Evaluation of Received Nutrient/Fluid Intake  % Kcal Needs: NPO  % Protein Needs: NPO  Energy Calories Required: meeting needs  Protein Required: meeting needs  Fluid Required: meeting needs  Comments: LBM 2/6  Tolerance: other (see comments)  % Intake of Estimated Energy Needs: 50 - 75 %  % Meal Intake: 50 - 75 %    Nutrition Risk  Level of Risk/Frequency of Follow-up:  (1xweekly)     Monitor and Evaluation  Food and Nutrient Intake: food and beverage intake  Food and Nutrient Adminstration: diet order  Knowledge/Beliefs/Attitudes: beliefs and attitudes, food and nutrition knowledge/skill  Physical Activity and Function: nutrition-related ADLs and IADLs  Anthropometric Measurements: weight  Biochemical Data, Medical Tests and Procedures: electrolyte and renal panel  Nutrition-Focused Physical Findings: overall appearance     Nutrition Follow-Up    RD Follow-up?: Yes

## 2023-02-07 NOTE — ASSESSMENT & PLAN NOTE
On admission, reported SOB with sputum, does reports ongoing exertional chest tightness and dyspnea when she walks in the parking lot  Prior echo with normal EF and grade I diastolic dysfunction (was on mechanical ventilation at the time)  TTE repeated this admission with new reduced EF - 20%  Sats 87% SBP in 140s  Trop 0.85-->1.2-->1.3  BNP >4900 (no baseline)  EKG with 1st degree AV block, sinus rhythm, ST, T wave changes, no STEMI  No chest pain at rest. Appears most likely due to demand ischemia due to pneumonia, volume overload in the setting of ESRD, however patient could have ischemic heart disease  Give aspirin 325mg, IV Lasix 80mg, trend troponin, if trop further increasing or worsening CP will start a heparin gtt and consult Cardiology    2/3/23: troponin 1.9, remains on hep gtt, ECHO results in process. Cardiology consult pending  2/4/23:  No new multi vessel disease per heart catheterization.  Cardiology recommends once optimized on medical therapy and out of decompensation will need to get evaluated for CABG. If not CABG candidate then will need high risk PCI. Medical management recs per Cardiology:  ASA 81mg. Plavix for one year. Statin therapy  2/7/23: INR 2.3--will stop Heparin gtt and cont Coumadin 5 mg PO on today. Recheck INR on tomorrow.     Noted with a LV thrombus from echo cardiogram.  She will need Coumadin outpatient as per Cardiology with an INR goal of 2-3.  We have bridged Coumadin with heparin.

## 2023-02-07 NOTE — PROGRESS NOTES
Nephrology Progress Note     Consult Requested By: Daphnie Jackson MD  Reason for Consult: ESRD    SUBJECTIVE:          Review of Systems   Constitutional:  Negative for chills and fever.   HENT:  Negative for congestion and sore throat.    Eyes:  Negative for blurred vision, double vision and photophobia.   Respiratory:  Positive for cough and shortness of breath (better).    Cardiovascular:  Negative for chest pain, palpitations and leg swelling.   Gastrointestinal:  Negative for abdominal pain, diarrhea, nausea and vomiting.   Genitourinary:  Negative for dysuria and urgency.   Musculoskeletal:  Negative for joint pain and myalgias.   Skin:  Negative for itching and rash.   Neurological:  Negative for dizziness, sensory change, weakness and headaches.   Endo/Heme/Allergies:  Negative for polydipsia. Does not bruise/bleed easily.   Psychiatric/Behavioral:  Negative for depression.      Past Medical History:   Diagnosis Date    Coronary artery disease involving native coronary artery of native heart without angina pectoris 2/6/2023    Diabetes mellitus     Hypertension     Renal disorder      Past Surgical History:   Procedure Laterality Date    INSERTION OF CATHETER FOR HEMODIALYSIS Left 9/11/2020    Procedure: INSERTION, CATHETER, HEMODIALYSIS;  Surgeon: William Beltran MD;  Location: [facility];  Service: General;  Laterality: Left;     History reviewed. No pertinent family history.  Social History     Tobacco Use    Smoking status: Never   Substance Use Topics    Alcohol use: Not Currently    Drug use: Never       Review of patient's allergies indicates:   Allergen Reactions    Phenergan [promethazine] Other (See Comments)     Restless legs    Reglan [metoclopramide hcl]     Tromethamine Other (See Comments)    Zofran [ondansetron hcl (pf)] Other (See Comments)     Constipation     Ketorolac Palpitations            OBJECTIVE:     Vital Signs (Most Recent)  Vitals:    02/07/23 0441 02/07/23 0727 02/07/23  0748 02/07/23 1118   BP: 135/62  (!) 150/66 (!) 144/65   BP Location: Right arm  Left arm Left arm   Patient Position: Lying  Lying Lying   Pulse: 63  68 69   Resp: 18  18 18   Temp: 96.8 °F (36 °C)  99 °F (37.2 °C) 98.9 °F (37.2 °C)   TempSrc: Oral  Oral Oral   SpO2: (!) 91% (!) 92% 97% (!) 93%   Weight:       Height:                     Medications:   sodium chloride 0.9%   Intravenous Once    sodium chloride 0.9%   Intravenous Once    amLODIPine  10 mg Oral Daily    aspirin  81 mg Oral Daily    atorvastatin  40 mg Oral Daily    carvediloL  25 mg Oral BID    clopidogreL  75 mg Oral Daily    heparin (porcine)  4,000 Units Intra-Catheter Once    mupirocin   Nasal BID    sacubitriL-valsartan  1 tablet Oral BID    sevelamer carbonate  1,600 mg Oral TID WM    sodium bicarbonate  650 mg Oral BID    warfarin  7.5 mg Oral Daily           Physical Exam  Vitals and nursing note reviewed.   Constitutional:       General: She is not in acute distress.     Appearance: She is not diaphoretic.   HENT:      Head: Normocephalic and atraumatic.      Mouth/Throat:      Pharynx: No oropharyngeal exudate.   Eyes:      General: No scleral icterus.     Conjunctiva/sclera: Conjunctivae normal.      Pupils: Pupils are equal, round, and reactive to light.   Cardiovascular:      Rate and Rhythm: Normal rate and regular rhythm.      Heart sounds: Normal heart sounds. No murmur heard.  Pulmonary:      Effort: No respiratory distress.      Comments: SOB better   Abdominal:      General: Bowel sounds are normal. There is no distension.      Palpations: Abdomen is soft.      Tenderness: There is no abdominal tenderness.   Musculoskeletal:         General: Normal range of motion.      Cervical back: Normal range of motion and neck supple.   Skin:     General: Skin is warm and dry.      Findings: No erythema.   Neurological:      Mental Status: She is alert and oriented to person, place, and time.      Cranial Nerves: No cranial nerve deficit.    Psychiatric:         Mood and Affect: Affect normal.         Cognition and Memory: Memory normal.         Judgment: Judgment normal.       Laboratory:  Recent Labs   Lab 02/05/23  0404 02/06/23 0447 02/07/23  0242   WBC 6.77  6.77 7.14 6.02   HGB 10.0*  10.0* 10.3* 10.1*   HCT 30.8*  30.8* 31.4* 30.7*   *  127* 142* 138*   MONO 10.9  0.7 14.3  1.0 18.3*  1.1*       Recent Labs   Lab 02/05/23  0404 02/06/23 0447 02/07/23  0242   *  129* 128* 133*   K 4.3  4.3 4.4 4.0     101 99 95   CO2 16*  17* 15* 27   BUN 22*  22* 35* 19   CREATININE 4.1*  4.1* 6.1* 3.8*   CALCIUM 9.4  9.4 9.3 8.6*   PHOS 4.4 4.6* 3.3         Diagnostic Results:  X-Ray: Reviewed  US: Reviewed  Echo: Reviewed  ASSESSMENT/PLAN:     1. ESRD (N18.6 Z99.2) - usual HD on [facility]     -- HD tomorrow   2. HTN (I10) - controlled   3. Anemia of chronic kidney disease treated with PRAVEEN (N18.9 D63.1) -    EPogen   with each HD for Hb goal 10   Recent Labs   Lab 02/05/23 0404 02/06/23 0447 02/07/23  0242   HGB 10.0*  10.0* 10.3* 10.1*   HCT 30.8*  30.8* 31.4* 30.7*   *  127* 142* 138*         Iron - check levels   No results found for: IRON, TIBC, FERRITIN, SATURATEDIRO    4. 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 Hemodialysis Access (Z99.2 V45.11)- no issues   6. Nutrition/Hypoalbuminemia (E88.09) -    Recent Labs   Lab 02/02/23  1627 02/03/23  0124 02/06/23  0810 02/07/23  0834   LABPROT  --    < > 15.1* 22.8*   ALBUMIN 4.0  --   --   --     < > = values in this interval not displayed.       Nepro with meals TID. Renal  vitamins daily      Thank you for the consult, will follow  With any question please call [phone]  Junie Krishnan MD    [facility]  YAIR Donato MD, RANJITH SHANNON MD,   MD JUSTIN Bose MD E. V. Harmon, NP    [address]  [place], [address]  [phone]

## 2023-02-07 NOTE — ASSESSMENT & PLAN NOTE
Nephrology consulted for inpatient HD management   Her usual outpatient HD schedule is [facility]

## 2023-02-07 NOTE — TREATMENT PLAN
Pt able to speak English. [facility] RN met with [facility] regarding [facility] hospital follow up upon discharge. Pt agreeable to hospital follow up .RN informed pt of scheduled appointment and that appointment will also appear on d/c AVS. Patient informed to bring all medication bottles to [facility] follow up appointment.  [facility] information handout, appointment letter and folder provided to patient. Pt states she is able to drive her self      Patient Contact info:[phone]     Person providing transportation contact info: pt drives    Barriers to attending [facility] visit: pt does not have a phone right now and is using her sister's phone, pt is a dialysis patient.    Future Appointments   Date Time Provider Department Center   2/23/2023  3:00 PM Lashawn Lowery MD [facility] [place]

## 2023-02-07 NOTE — SUBJECTIVE & OBJECTIVE
Interval History: No acute events overnight. She reports generalized fatigue. States improvement of dyspnea.  Presently on HD.  On heparin drip- bridging with warfarin.    INR 2.3 this am. Will Stop heparin gtt and cont Coumadin 5 mg Po on today. Goal INR is 2-3.   Awaiting Life Vest. Appreciate Cardiology       Review of Systems   Constitutional:  Positive for fatigue.   Respiratory:  Positive for shortness of breath. Negative for cough and wheezing.    Cardiovascular:  Negative for chest pain.   Gastrointestinal:  Negative for diarrhea, nausea and vomiting.   Neurological:  Positive for weakness.   Objective:     Vital Signs (Most Recent):  Temp: 99 °F (37.2 °C) (02/07/23 0748)  Pulse: 68 (02/07/23 0748)  Resp: 18 (02/07/23 0748)  BP: (!) 150/66 (02/07/23 0748)  SpO2: 97 % (02/07/23 0748)   Vital Signs (24h Range):  Temp:  [96.8 °F (36 °C)-99 °F (37.2 °C)] 99 °F (37.2 °C)  Pulse:  [60-79] 68  Resp:  [18-20] 18  SpO2:  [90 %-97 %] 97 %  BP: (111-150)/(56-90) 150/66     Weight: 68.9 kg (152 lb)  Body mass index is 27.8 kg/m².    Intake/Output Summary (Last 24 hours) at 2/7/2023 1048  Last data filed at 2/6/2023 1455  Gross per 24 hour   Intake --   Output 2500 ml   Net -2500 ml        Physical Exam  Vitals and nursing note reviewed.   Cardiovascular:      Rate and Rhythm: Normal rate and regular rhythm.   Pulmonary:      Effort: Pulmonary effort is normal. No respiratory distress.      Breath sounds: Rales present.   Abdominal:      Palpations: Abdomen is soft.   Skin:     Comments: Left IJ PermCath for dialysis noted   Neurological:      Mental Status: She is alert and oriented to person, place, and time. Mental status is at baseline.       Significant Labs: All pertinent labs within the past 24 hours have been reviewed.    Significant Imaging: I have reviewed all pertinent imaging results/findings within the past 24 hours.

## 2023-02-07 NOTE — PLAN OF CARE
Recommendation:  1. Encourage intake at meals as tolerated.   2. Monitor weight/labs.   3. RD to continue to follow to monitor po intake    Goals:  Pt will tolerate diet with at least 50-75% intake at meals by RD follow up  Nutrition Goal Status: new

## 2023-02-07 NOTE — ASSESSMENT & PLAN NOTE
Chest x-ray on admission with pulmonary edema and although no large focal consolidation, clinical concern for PNA  Started on ceftriaxone and azithromycin empirically for CAP  Improving with ABX (and volume management per HD)  She has received 3 days of Azithro 500mg qd -- thus will stop this (only need 3d for 500 mg dose)  Day #5 of CTX today - will continue this to complete 5d course -last dose on 02/06/2023  Flu and COVID are negative   Completed course of IV abx. Dc iv rocephin

## 2023-02-07 NOTE — ASSESSMENT & PLAN NOTE
Patient with known CAD s/p angiogram, which is uncontrolled Will continue ASA, Plavix and Statin and monitor for S/Sx of angina/ACS. Continue to monitor on telemetry.   S/p LHC - 80% stenosis in mid LAD, mid left circ. 99% stenosis in mid RCA, + LV thrombus  Continue medical management with aspirin, statin, Plavix, beta-blocker  CT surgery consult as outpatient for CABG eval versus high-risk PCI  Cardiology to order life as on DC

## 2023-02-07 NOTE — PROGRESS NOTES
[facility]  Progress Note    Patient Name: Ping Davenport  MRN: [id number]  Patient Class: IP- Inpatient   Admission Date: 2/2/2023  Length of Stay: 4 days  Attending Physician: Daphnie Jackson MD  Primary Care Provider: Primary Doctor No        Subjective:     Principal Problem:Acute combined systolic and diastolic heart failure        HPI:  Ms. Davenport is a 57 year old woman with a past medical history of ESRD on HD MWF seen by Dr. Jc who developed SOB and cough productive of green sputum yesterday. She was dialyzed yesterday. Flu and COVID tests negative. She has not had fevers or chills. Has noticed that sometimes when she is walking in the parking lot she develops chest tightness after going a short distance. Does not believe she has had a left heart cath before but was told that she has congestive heart failure.       Overview/Hospital Course:  She was admitted to the [facility] medicine service for further care.  Cardiology and nephrology consulted.  Patient was noted with an NSTEMI on admission.  She was started on heparin drip.  2D echo completed on 02/03/2023 shows an EF of 20%---patient has combined systolic and diastolic congestive heart failure.  She underwent a cardiac catheterization on 02/03/2023.  Patient's heart catheterization shows mid LAD 80% stenosed, The 2nd Diag lesion was 60% stenosed, The Prox Cx to Mid Cx lesion was 70% stenosed, mid RCA lesion was 99% stenosed, and there is also three-vessel coronary artery disease noted.  Per her heart catheterization we see there is severe multivessel coronary artery disease, decompensated heart failure, and LV thrombosis.  Per cardiology's plan will continue HD for volume management and also treatment of pneumonia. She underwent HD MWF  per routine schedule.  Patient will need CT as evaluation for CABG.  Will continue heparin drip for 48 hours post catheterization.  Patient's dialysis is Monday, Wednesday, Friday:   Appreciate nephrology.  Empiric tx for CAP with azithro and CTX.  Her flu and COVID are negative. She is recommended for Life Vest and outpatient follow up with Vascular surgery for possible CABG.       Interval History: No acute events overnight. She reports generalized fatigue. States improvement of dyspnea.  Presently on HD.  On heparin drip- bridging with warfarin.    INR 2.3 this am. Will Stop heparin gtt and cont Coumadin 5 mg Po on today. Goal INR is 2-3.   Awaiting Life Vest. Appreciate Cardiology       Review of Systems   Constitutional:  Positive for fatigue.   Respiratory:  Positive for shortness of breath. Negative for cough and wheezing.    Cardiovascular:  Negative for chest pain.   Gastrointestinal:  Negative for diarrhea, nausea and vomiting.   Neurological:  Positive for weakness.   Objective:     Vital Signs (Most Recent):  Temp: 99 °F (37.2 °C) (02/07/23 0748)  Pulse: 68 (02/07/23 0748)  Resp: 18 (02/07/23 0748)  BP: (!) 150/66 (02/07/23 0748)  SpO2: 97 % (02/07/23 0748)   Vital Signs (24h Range):  Temp:  [96.8 °F (36 °C)-99 °F (37.2 °C)] 99 °F (37.2 °C)  Pulse:  [60-79] 68  Resp:  [18-20] 18  SpO2:  [90 %-97 %] 97 %  BP: (111-150)/(56-90) 150/66     Weight: 68.9 kg (152 lb)  Body mass index is 27.8 kg/m².    Intake/Output Summary (Last 24 hours) at 2/7/2023 1048  Last data filed at 2/6/2023 1455  Gross per 24 hour   Intake --   Output 2500 ml   Net -2500 ml        Physical Exam  Vitals and nursing note reviewed.   Cardiovascular:      Rate and Rhythm: Normal rate and regular rhythm.   Pulmonary:      Effort: Pulmonary effort is normal. No respiratory distress.      Breath sounds: Rales present.   Abdominal:      Palpations: Abdomen is soft.   Skin:     Comments: Left IJ PermCath for dialysis noted   Neurological:      Mental Status: She is alert and oriented to person, place, and time. Mental status is at baseline.       Significant Labs: All pertinent labs within the past 24 hours have been  reviewed.    Significant Imaging: I have reviewed all pertinent imaging results/findings within the past 24 hours.      Assessment/Plan:      * Acute combined systolic and diastolic heart failure  Echo from 02/03/2023 noted with acute on chronic systolic and diastolic heart failure.    Patient with an EF of 20%--Decompensated heart failure.    Also noted with an LV thrombus.  Appreciate Cardiology on this case   She will likely need a LifeVest on discharge   Starting Entresto 24/26 BID  Volume management per hemodialysis.  Continue strict intake and output  Daily weights      Coronary artery disease involving native coronary artery of native heart without angina pectoris  Patient with known CAD s/p angiogram, which is uncontrolled Will continue ASA, Plavix and Statin and monitor for S/Sx of angina/ACS. Continue to monitor on telemetry.   S/p LHC - 80% stenosis in mid LAD, mid left circ. 99% stenosis in mid RCA, + LV thrombus  Continue medical management with aspirin, statin, Plavix, beta-blocker  CT surgery consult as outpatient for CABG eval versus high-risk PCI  Cardiology to order life as on DC      Hyperlipidemia     Patient is chronically on statin.will continue for now. Monitor clinically. Last LDL was   Lab Results   Component Value Date    LDLCALC 151.4 11/06/2019       Metabolic acidosis  Secondary to renal etiology. Add sodium bicarb 650 mg bid. Defer further treatment to nephrology. Trend renal panel    Thrombocytopenia  Lab Results   Component Value Date     (L) 02/07/2023     Reviewed. Plt 138 Trend CBC      ESRD (end stage renal disease)    Nephrology consulted for inpatient HD management   Her usual outpatient HD schedule is MWF    LV (left ventricular) mural thrombus following MI  As per recent echocardiogram  She was started on heparin drip   INR today, 2/7/23 is now 2.3  As per cardiology we will need AC with warfarin--we will  bridge to warfarin with the heparin drip for INR goal of 2-3.  PharmD assisting with dosing.    Pneumonia  Chest x-ray on admission with pulmonary edema and although no large focal consolidation, clinical concern for PNA  Started on ceftriaxone and azithromycin empirically for CAP  Improving with ABX (and volume management per HD)  She has received 3 days of Azithro 500mg qd -- thus will stop this (only need 3d for 500 mg dose)  Day #5 of CTX today - will continue this to complete 5d course -last dose on 02/06/2023  Flu and COVID are negative   Completed course of IV abx. Dc iv rocephin      Essential hypertension  Continue home meds      Anemia due to chronic kidney disease, on chronic dialysis  Patient's anemia is currently controlled. Has not received any PRBCs to date.. Etiology likely d/t ESRD.   Current CBC reviewed-   Lab Results   Component Value Date    HGB 10.1 (L) 02/07/2023    HCT 30.7 (L) 02/07/2023     Monitor serial CBC and transfuse if patient becomes hemodynamically unstable, symptomatic or H/H drops below 7/21.         Hypervolemia  She was given IV Lasix in the ED at for a total of IV Lasix 80 mg at that time.  Nephrology was consulted and is managing HD  Will continue dialysis Monday, Wednesday, and Friday for volume management      NSTEMI (non-ST elevated myocardial infarction)  On admission, reported SOB with sputum, does reports ongoing exertional chest tightness and dyspnea when she walks in the parking lot  Prior echo with normal EF and grade I diastolic dysfunction (was on mechanical ventilation at the time)  TTE repeated this admission with new reduced EF - 20%  Sats 87% SBP in 140s  Trop 0.85-->1.2-->1.3  BNP >4900 (no baseline)  EKG with 1st degree AV block, sinus rhythm, ST, T wave changes, no STEMI  No chest pain at rest. Appears most likely due to demand ischemia due to pneumonia, volume overload in the setting of ESRD, however patient could have ischemic heart disease  Give aspirin 325mg, IV Lasix 80mg, trend troponin, if trop further increasing  or worsening CP will start a heparin gtt and consult Cardiology    2/3/23: troponin 1.9, remains on hep gtt, ECHO results in process. Cardiology consult pending  2/4/23:  No new multi vessel disease per heart catheterization.  Cardiology recommends once optimized on medical therapy and out of decompensation will need to get evaluated for CABG. If not CABG candidate then will need high risk PCI. Medical management recs per Cardiology:  ASA 81mg. Plavix for one year. Statin therapy  2/7/23: INR 2.3--will stop Heparin gtt and cont Coumadin 5 mg PO on today. Recheck INR on tomorrow.     Noted with a LV thrombus from echo cardiogram.  She will need Coumadin outpatient as per Cardiology with an INR goal of 2-3.  We have bridged Coumadin with heparin.      VTE Risk Mitigation (From admission, onward)         Ordered     warfarin tablet 7.5 mg  Daily         02/05/23 1751     heparin (porcine) injection 4,000 Units  As needed (PRN)         02/04/23 1343     heparin infusion 1,000 units/500 ml in 0.9% NaCl (pressure line flush)  Intra-op continuous PRN         02/03/23 1553     heparin (porcine) injection 4,000 Units  Once         02/03/23 1403     IP VTE HIGH RISK PATIENT  Once         02/02/23 1901     Place sequential compression device  Until discontinued         02/02/23 1901                Discharge Planning   JACKELYN: 2/7/2023     Code Status: Full Code   Is the patient medically ready for discharge?:     Reason for patient still in hospital (select all that apply): Patient trending condition, Laboratory test, Treatment, Imaging and Consult recommendations  Discharge Plan A: Home with family                  Erickson Green NP  Department of Hospital Medicine   [facility]

## 2023-02-08 NOTE — PROGRESS NOTES
[facility] Medicine  Progress Note    Patient Name: Ping Davenport  MRN: [id number]  Patient Class: IP- Inpatient   Admission Date: 2/2/2023  Length of Stay: 5 days  Attending Physician: Daphnie Jackson MD  Primary Care Provider: Primary Doctor No        Subjective:     Principal Problem:Acute combined systolic and diastolic heart failure        HPI:  Ms. Davenport is a 57 year old woman with a past medical history of ESRD on HD MWF seen by Dr. Jc who developed SOB and cough productive of green sputum yesterday. She was dialyzed yesterday. Flu and COVID tests negative. She has not had fevers or chills. Has noticed that sometimes when she is walking in the parking lot she develops chest tightness after going a short distance. Does not believe she has had a left heart cath before but was told that she has congestive heart failure.       Overview/Hospital Course:  She was admitted to the [facility] medicine service for further care.  Cardiology and nephrology consulted.  Patient was noted with an NSTEMI on admission.  She was started on heparin drip.  2D echo completed on 02/03/2023 shows an EF of 20%---patient has combined systolic and diastolic congestive heart failure.  She underwent a cardiac catheterization on 02/03/2023.  Patient's heart catheterization shows mid LAD 80% stenosed, The 2nd Diag lesion was 60% stenosed, The Prox Cx to Mid Cx lesion was 70% stenosed, mid RCA lesion was 99% stenosed, and there is also three-vessel coronary artery disease noted.  Per her heart catheterization we see there is severe multivessel coronary artery disease, decompensated heart failure, and LV thrombosis.  Per cardiology's plan will continue HD for volume management and also treatment of pneumonia. She underwent HD MWF per routine schedule.  Patient will need CT as evaluation for CABG.  Will continue heparin drip for 48 hours post catheterization.  Patient's dialysis is Monday, Wednesday, Friday:   Appreciate nephrology.  Empiric tx for CAP with azithro and CTX.  Her flu and COVID are negative. She is recommended for Life Vest and outpatient follow up with Vascular surgery for possible CABG.       Interval History: No acute events overnight. Today is her HD day-- Heparin gtt was stopped on yesterday.   INR 2.7 this am. Will cont Coumadin 5 mg Po daily. Goal INR is 2-3.   Life Vest has been placed on the patient. Appreciate Cardiology   Awaiting PT/OT eval     Review of Systems   Constitutional:  Positive for fatigue.   Respiratory:  Positive for shortness of breath. Negative for cough and wheezing.    Cardiovascular:  Negative for chest pain.   Gastrointestinal:  Negative for diarrhea, nausea and vomiting.   Neurological:  Positive for weakness.   Objective:     Vital Signs (Most Recent):  Temp: 97.6 °F (36.4 °C) (02/08/23 0842)  Pulse: (!) 59 (02/08/23 0842)  Resp: 20 (02/08/23 0842)  BP: (!) 140/63 (02/08/23 0842)  SpO2: 97 % (02/08/23 0842)   Vital Signs (24h Range):  Temp:  [97.6 °F (36.4 °C)-99 °F (37.2 °C)] 97.6 °F (36.4 °C)  Pulse:  [59-71] 59  Resp:  [16-20] 20  SpO2:  [88 %-99 %] 97 %  BP: (135-148)/(61-81) 140/63     Weight: 69.7 kg (153 lb 10.6 oz)  Body mass index is 28.1 kg/m².    Intake/Output Summary (Last 24 hours) at 2/8/2023 0964  Last data filed at 2/8/2023 0628  Gross per 24 hour   Intake 125 ml   Output 0 ml   Net 125 ml        Physical Exam  Vitals and nursing note reviewed.   Cardiovascular:      Rate and Rhythm: Regular rhythm. Bradycardia present.      Comments: Life vest in place   Pulmonary:      Effort: Pulmonary effort is normal. No respiratory distress.      Breath sounds: Rales present.   Abdominal:      Palpations: Abdomen is soft.   Skin:     Comments: Left IJ PermCath for dialysis noted   Neurological:      Mental Status: She is alert and oriented to person, place, and time. Mental status is at baseline.   Psychiatric:         Mood and Affect: Mood normal.       Significant  Labs: All pertinent labs within the past 24 hours have been reviewed.    Significant Imaging: I have reviewed all pertinent imaging results/findings within the past 24 hours.      Assessment/Plan:      * Acute combined systolic and diastolic heart failure  Echo from 02/03/2023 noted with acute on chronic systolic and diastolic heart failure.    Patient with an EF of 20%--Decompensated heart failure.    Also noted with an LV thrombus.  Appreciate Cardiology on this case   She will likely need a LifeVest on discharge   Starting Entresto 24/26 BID  Volume management per hemodialysis.  Continue strict intake and output  Daily weights      Coronary artery disease involving native coronary artery of native heart without angina pectoris  Patient with known CAD s/p angiogram, which is uncontrolled Will continue ASA, Plavix and Statin and monitor for S/Sx of angina/ACS. Continue to monitor on telemetry.   S/p LHC - 80% stenosis in mid LAD, mid left circ. 99% stenosis in mid RCA, + LV thrombus  Continue medical management with aspirin, statin, Plavix, beta-blocker  CT surgery consult as outpatient for CABG eval versus high-risk PCI  Life vest was delivered and is now in place      Hyperlipidemia     Patient is chronically on statin.will continue for now. Monitor clinically. Last LDL was   Lab Results   Component Value Date    LDLCALC 151.4 11/06/2019       Metabolic acidosis  Secondary to renal etiology. Add sodium bicarb 650 mg bid. Defer further treatment to nephrology. Trend renal panel    Thrombocytopenia  Lab Results   Component Value Date     (L) 02/07/2023     Reviewed. Plt 138 Trend CBC      ESRD (end stage renal disease)  Nephrology consulted for inpatient HD management   Her usual outpatient HD schedule is MWF    LV (left ventricular) mural thrombus following MI  As per recent echocardiogram  She was started on heparin drip   INR today, 2/7/23 is now 2.3  As per cardiology we will need AC with warfarin--we  will  bridge to warfarin with the heparin drip for INR goal of 2-3.     Pneumonia  Chest x-ray on admission with pulmonary edema and although no large focal consolidation, clinical concern for PNA  Started on ceftriaxone and azithromycin empirically for CAP  Improving with ABX (and volume management per HD)  She has received 3 days of Azithro 500mg qd -- thus will stop this (only need 3d for 500 mg dose)  Day #5 of CTX today - will continue this to complete 5d course -last dose on 02/06/2023  Flu and COVID are negative   Completed course of IV abx. Dc iv rocephin      Essential hypertension  Continue home meds.      Anemia due to chronic kidney disease, on chronic dialysis  Patient's anemia is currently controlled. Has not received any PRBCs to date.. Etiology likely d/t ESRD.   Current CBC reviewed-   Lab Results   Component Value Date    HGB 10.1 (L) 02/07/2023    HCT 30.7 (L) 02/07/2023     Monitor serial CBC and transfuse if patient becomes hemodynamically unstable, symptomatic or H/H drops below 7/21.         Hypervolemia  She was given IV Lasix in the ED at for a total of IV Lasix 80 mg at that time.  Nephrology was consulted and is managing HD  Will continue dialysis Monday, Wednesday, and Friday for volume management      NSTEMI (non-ST elevated myocardial infarction)  On admission, reported SOB with sputum, does reports ongoing exertional chest tightness and dyspnea when she walks in the parking lot  Prior echo with normal EF and grade I diastolic dysfunction (was on mechanical ventilation at the time)  TTE repeated this admission with new reduced EF - 20%  Sats 87% SBP in 140s  Trop 0.85-->1.2-->1.3  BNP >4900 (no baseline)  EKG with 1st degree AV block, sinus rhythm, ST, T wave changes, no STEMI  No chest pain at rest. Appears most likely due to demand ischemia due to pneumonia, volume overload in the setting of ESRD, however patient could have ischemic heart disease  Give aspirin 325mg, IV Lasix 80mg,  trend troponin, if trop further increasing or worsening CP will start a heparin gtt and consult Cardiology    2/3/23: troponin 1.9, remains on hep gtt, ECHO results in process. Cardiology consult pending  2/4/23:  No new multi vessel disease per heart catheterization.  Cardiology recommends once optimized on medical therapy and out of decompensation will need to get evaluated for CABG. If not CABG candidate then will need high risk PCI. Medical management recs per Cardiology:  ASA 81mg. Plavix for one year. Statin therapy  2/7/23: INR 2.7--will stop Heparin gtt and cont Coumadin 5 mg PO on today. Recheck INR daily.     Noted with a LV thrombus from echo cardiogram.  She will need Coumadin outpatient as per Cardiology with an INR goal of 2-3.  We have bridged Coumadin with heparin.        VTE Risk Mitigation (From admission, onward)         Ordered     warfarin (COUMADIN) tablet 5 mg  Daily         02/07/23 1303     heparin (porcine) injection 4,000 Units  As needed (PRN)         02/04/23 1343     heparin infusion 1,000 units/500 ml in 0.9% NaCl (pressure line flush)  Intra-op continuous PRN         02/03/23 1553     heparin (porcine) injection 4,000 Units  Once         02/03/23 1403     IP VTE HIGH RISK PATIENT  Once         02/02/23 1901     Place sequential compression device  Until discontinued         02/02/23 1901                Discharge Planning   JACKELYN: 2/7/2023     Code Status: Full Code   Is the patient medically ready for discharge?:     Reason for patient still in hospital (select all that apply): Treatment and Imaging  Discharge Plan A: Home with family                  Erickson Green NP  Department of Hospital Medicine   [facility]

## 2023-02-08 NOTE — PLAN OF CARE
SW met with pt via [employer] to discuss dc planning. SW expressed no dc today. Pt expressed understanding. Pt reports that upon dc she will call taxi to transport her home. SW will continue to follow pt throughout her transitions of care and assist with any dc needs.     Future Appointments   Date Time Provider Department Center   2/23/2023  3:00 PM Lashawn Lowery MD [facility]        02/08/23 7089   Post-Acute Status   Post-Acute Authorization Other

## 2023-02-08 NOTE — TELEPHONE ENCOUNTER
----- Message from Soy Jackson sent at 2/8/2023 11:13 AM CST -----  Type:  Needs Medical Advice    Who Called: sister  Reason:sister is in the hospital so she needs to talk to you ASAP  Would the patient rather a call back or a response via [employer]? call  Best Call Back Number: [phone]  Additional Information:

## 2023-02-08 NOTE — ASSESSMENT & PLAN NOTE
As per recent echocardiogram  She was started on heparin drip   INR today, 2/7/23 is now 2.3  As per cardiology we will need AC with warfarin--we will  bridge to warfarin with the heparin drip for INR goal of 2-3.

## 2023-02-08 NOTE — SUBJECTIVE & OBJECTIVE
Interval History: No acute events overnight. Today is her HD day-- Heparin gtt was stopped on yesterday.   INR 2.7 this am. Will cont Coumadin 5 mg Po daily. Goal INR is 2-3.   Life Vest has been placed on the patient. Appreciate Cardiology   Awaiting PT/OT eval     Review of Systems   Constitutional:  Positive for fatigue.   Respiratory:  Positive for shortness of breath. Negative for cough and wheezing.    Cardiovascular:  Negative for chest pain.   Gastrointestinal:  Negative for diarrhea, nausea and vomiting.   Neurological:  Positive for weakness.   Objective:     Vital Signs (Most Recent):  Temp: 97.6 °F (36.4 °C) (02/08/23 0842)  Pulse: (!) 59 (02/08/23 0842)  Resp: 20 (02/08/23 0842)  BP: (!) 140/63 (02/08/23 0842)  SpO2: 97 % (02/08/23 0842)   Vital Signs (24h Range):  Temp:  [97.6 °F (36.4 °C)-99 °F (37.2 °C)] 97.6 °F (36.4 °C)  Pulse:  [59-71] 59  Resp:  [16-20] 20  SpO2:  [88 %-99 %] 97 %  BP: (135-148)/(61-81) 140/63     Weight: 69.7 kg (153 lb 10.6 oz)  Body mass index is 28.1 kg/m².    Intake/Output Summary (Last 24 hours) at 2/8/2023 0911  Last data filed at 2/8/2023 0628  Gross per 24 hour   Intake 125 ml   Output 0 ml   Net 125 ml        Physical Exam  Vitals and nursing note reviewed.   Cardiovascular:      Rate and Rhythm: Regular rhythm. Bradycardia present.      Comments: Life vest in place   Pulmonary:      Effort: Pulmonary effort is normal. No respiratory distress.      Breath sounds: Rales present.   Abdominal:      Palpations: Abdomen is soft.   Skin:     Comments: Left IJ PermCath for dialysis noted   Neurological:      Mental Status: She is alert and oriented to person, place, and time. Mental status is at baseline.   Psychiatric:         Mood and Affect: Mood normal.       Significant Labs: All pertinent labs within the past 24 hours have been reviewed.    Significant Imaging: I have reviewed all pertinent imaging results/findings within the past 24 hours.

## 2023-02-08 NOTE — PROGRESS NOTES
[facility]                   Pharmacy  Pharmacy Warfarin Education Note       Ping Davenport was offered Warfarin Education on 02/08/2023.  The patient and/or family/caregiver was present and accepted education, using teachback method. .    Patient specific concerns or situations: n/a    Warfarin education materials provided and information discussed during the education process included:  1. What warfarin is  2. Indication, current dose,how to take warfarin, what time of day to take warfarin,      Follow-up appointment for monitoring  3. What to do if you miss a dose  4. Drug interactions associated with the use of anticoagulants (I.e. Warfarin), such as        the use of over the counter medications (e.g. Aspirin, acetaminophen, ibuprofen),        prescription drugs, and herbal supplements. Notify prescribing doctor of any        changes in medications.  5.  Side effects of taking warfarin, increased bleeding risk, when to call the prescribing       physician, when to seek emergency help  6.  Monitoring blood levels (PT/INR)  7.  Eating habits and being consistent, Vitamin K rich foods, effects of diet on blood       Levels  8.  Recommendation of purchasing a medical bracelet or carrying a ID card to alert medical staff if you become ill  9.  Notifying physicians of procedures, medical or dental      Anticoagulants       Ordered     Route Frequency Start Stop    02/07/23 1303  warfarin         Oral Daily 02/07/23 1700 --    02/04/23 1343  heparin (porcine)         Cath As needed (PRN) 02/04/23 1442 --    02/03/23 1553  heparin (porcine)          Intra-op continuous PRN 02/03/23 1530 --    02/03/23 1403  heparin (porcine)         Cath Once 02/03/23 1515 --          Lab Results   Component Value Date/Time    INR 2.7 (H) 02/08/2023 03:08 AM    INR 2.3 (H) 02/07/2023 08:34 AM    INR 1.5 (H) 02/06/2023 08:10 AM   ;  Lab Results   Component Value Date/Time    HGB 10.1 (L) 02/07/2023 02:42 AM     HGB 10.3 (L) 02/06/2023 04:47 AM    HGB 10.0 (L) 02/05/2023 04:04 AM    HGB 10.0 (L) 02/05/2023 04:04 AM

## 2023-02-08 NOTE — ASSESSMENT & PLAN NOTE
Patient with known CAD s/p angiogram, which is uncontrolled Will continue ASA, Plavix and Statin and monitor for S/Sx of angina/ACS. Continue to monitor on telemetry.   S/p LHC - 80% stenosis in mid LAD, mid left circ. 99% stenosis in mid RCA, + LV thrombus  Continue medical management with aspirin, statin, Plavix, beta-blocker  CT surgery consult as outpatient for CABG eval versus high-risk PCI  Life vest was delivered and is now in place

## 2023-02-08 NOTE — PROGRESS NOTES
[facility]                          Pharmacy   New Medication Education    Patient and/or Caregiver ACCEPTED medication education.    Pharmacy has provided education on the name, indication, and possible side effects of the medication(s) prescribed, using teach-back method.     Learners of pharmacy medication education includes:  patient    The following medications have also been discussed, during this admission.     Current Facility-Administered Medications   Medication Frequency    0.9%  NaCl infusion PRN    0.9%  NaCl infusion Once    0.9%  NaCl infusion PRN    0.9%  NaCl infusion Once    acetaminophen tablet 650 mg Q4H PRN    amLODIPine tablet 10 mg Daily    aspirin chewable tablet 81 mg Daily    atorvastatin tablet 40 mg Daily    carvediloL tablet 25 mg BID    clopidogreL tablet 75 mg Daily    dextrose 10% bolus 125 mL 125 mL PRN    dextrose 10% bolus 250 mL 250 mL PRN    furosemide tablet 160 mg BID PRN    glucagon (human recombinant) injection 1 mg PRN    guaiFENesin 100 mg/5 ml syrup 200 mg Q4H PRN    heparin (porcine) injection 4,000 Units Once    heparin (porcine) injection 4,000 Units PRN    heparin infusion 1,000 units/500 ml in 0.9% NaCl (pressure line flush) Continuous PRN    loperamide capsule 2 mg QID PRN    ondansetron disintegrating tablet 8 mg Q8H PRN    pneumoc 20-viet conj-dip cr(PF) (PREVNAR-20 (PF)) injection Syrg 0.5 mL vaccine x 1 dose    sacubitriL-valsartan 24-26 mg per tablet 1 tablet BID    sevelamer carbonate tablet 1,600 mg TID WM    sodium bicarbonate tablet 650 mg BID    sodium chloride 0.9% bolus 250 mL 250 mL PRN    sodium chloride 0.9% bolus 250 mL 250 mL PRN    sodium chloride 0.9% flush 10 mL Q12H PRN    warfarin (COUMADIN) tablet 5 mg Daily        Thank you  Kodi Irizarry, PharmD

## 2023-02-08 NOTE — ASSESSMENT & PLAN NOTE
On admission, reported SOB with sputum, does reports ongoing exertional chest tightness and dyspnea when she walks in the parking lot  Prior echo with normal EF and grade I diastolic dysfunction (was on mechanical ventilation at the time)  TTE repeated this admission with new reduced EF - 20%  Sats 87% SBP in 140s  Trop 0.85-->1.2-->1.3  BNP >4900 (no baseline)  EKG with 1st degree AV block, sinus rhythm, ST, T wave changes, no STEMI  No chest pain at rest. Appears most likely due to demand ischemia due to pneumonia, volume overload in the setting of ESRD, however patient could have ischemic heart disease  Give aspirin 325mg, IV Lasix 80mg, trend troponin, if trop further increasing or worsening CP will start a heparin gtt and consult Cardiology    2/3/23: troponin 1.9, remains on hep gtt, ECHO results in process. Cardiology consult pending  2/4/23:  No new multi vessel disease per heart catheterization.  Cardiology recommends once optimized on medical therapy and out of decompensation will need to get evaluated for CABG. If not CABG candidate then will need high risk PCI. Medical management recs per Cardiology:  ASA 81mg. Plavix for one year. Statin therapy  2/7/23: INR 2.7--will stop Heparin gtt and cont Coumadin 5 mg PO on today. Recheck INR daily.     Noted with a LV thrombus from echo cardiogram.  She will need Coumadin outpatient as per Cardiology with an INR goal of 2-3.  We have bridged Coumadin with heparin.

## 2023-02-08 NOTE — PT/OT/SLP PROGRESS
Physical Therapy  Eval Attempt    Patient Name:  Ping Davenport   MRN:  [id number]    Patient not seen today secondary to Dialysis. Will follow-up.    2/8/2023

## 2023-02-08 NOTE — ASSESSMENT & PLAN NOTE
Nephrology consulted for inpatient HD management   Her usual outpatient HD schedule is [facility]

## 2023-02-08 NOTE — PROCEDURES
"Patient seen and examined on HD tolerating well. No complains.   /67   Pulse 62   Temp 97.5 °F (36.4 °C) (Temporal)   Resp 19   Ht 5' 2" (1.575 m)   Wt 69.7 kg (153 lb 10.6 oz)   SpO2 97%   Breastfeeding No   BMI 28.10 kg/m²     With any question please call answering service [phone]  Junie Krishnan MD    [facility]  YAIR Donato MD, FACRANJITH AMIN MD,   MD JUSTIN Bose MD E. V. Harmon, NP    [id number]  [place], [id number]   "

## 2023-02-09 PROBLEM — Z99.2 ESRD (END STAGE RENAL DISEASE) ON DIALYSIS: Status: ACTIVE | Noted: 2023-01-01

## 2023-02-09 PROBLEM — N18.6 ESRD (END STAGE RENAL DISEASE) ON DIALYSIS: Status: ACTIVE | Noted: 2023-01-01

## 2023-02-09 NOTE — PROGRESS NOTES
Nephrology Progress Note     Consult Requested By: Daphnie Jackson MD  Reason for Consult: ESRD    SUBJECTIVE:          Review of Systems   Constitutional:  Negative for chills and fever.   HENT:  Negative for congestion and sore throat.    Eyes:  Negative for blurred vision, double vision and photophobia.   Respiratory:  Positive for cough and shortness of breath (better).    Cardiovascular:  Negative for chest pain, palpitations and leg swelling.   Gastrointestinal:  Negative for abdominal pain, diarrhea, nausea and vomiting.   Genitourinary:  Negative for dysuria and urgency.   Musculoskeletal:  Negative for joint pain and myalgias.   Skin:  Negative for itching and rash.   Neurological:  Negative for dizziness, sensory change, weakness and headaches.   Endo/Heme/Allergies:  Negative for polydipsia. Does not bruise/bleed easily.   Psychiatric/Behavioral:  Negative for depression.      Past Medical History:   Diagnosis Date    Coronary artery disease involving native coronary artery of native heart without angina pectoris 2/6/2023    Diabetes mellitus     Hypertension     Renal disorder      Past Surgical History:   Procedure Laterality Date    INSERTION OF CATHETER FOR HEMODIALYSIS Left 9/11/2020    Procedure: INSERTION, CATHETER, HEMODIALYSIS;  Surgeon: William Beltran MD;  Location: [facility] OR;  Service: General;  Laterality: Left;    LEFT HEART CATHETERIZATION Left 2/3/2023    Procedure: Left heart cath;  Surgeon: Remy Toscano MD;  Location: [facility] CATH LAB/EP;  Service: Cardiology;  Laterality: Left;     History reviewed. No pertinent family history.  Social History     Tobacco Use    Smoking status: Never   Substance Use Topics    Alcohol use: Not Currently    Drug use: Never       Review of patient's allergies indicates:   Allergen Reactions    Phenergan [promethazine] Other (See Comments)     Restless legs    Reglan [metoclopramide hcl]     Tromethamine Other (See Comments)    Zofran [ondansetron  hcl (pf)] Other (See Comments)     Constipation     Ketorolac Palpitations            OBJECTIVE:     Vital Signs (Most Recent)  Vitals:    02/09/23 0403 02/09/23 0406 02/09/23 0824 02/09/23 0859   BP:   133/61    BP Location:   Right arm    Patient Position:   Lying    Pulse: 62 62 62 64   Resp: 18 20 20    Temp:   97 °F (36.1 °C)    TempSrc:   Oral    SpO2: (!) 86% (!) 92% 98% 98%   Weight:       Height:                     Medications:   sodium chloride 0.9%   Intravenous Once    sodium chloride 0.9%   Intravenous Once    amLODIPine  10 mg Oral Daily    aspirin  81 mg Oral Daily    atorvastatin  40 mg Oral Daily    carvediloL  25 mg Oral BID    clopidogreL  75 mg Oral Daily    heparin (porcine)  4,000 Units Intra-Catheter Once    sacubitriL-valsartan  1 tablet Oral BID    sevelamer carbonate  1,600 mg Oral TID WM    sodium bicarbonate  650 mg Oral BID    warfarin  5 mg Oral Daily           Physical Exam  Vitals and nursing note reviewed.   Constitutional:       General: She is not in acute distress.     Appearance: She is not diaphoretic.   HENT:      Head: Normocephalic and atraumatic.      Mouth/Throat:      Pharynx: No oropharyngeal exudate.   Eyes:      General: No scleral icterus.     Conjunctiva/sclera: Conjunctivae normal.      Pupils: Pupils are equal, round, and reactive to light.   Cardiovascular:      Rate and Rhythm: Normal rate and regular rhythm.      Heart sounds: Normal heart sounds. No murmur heard.  Pulmonary:      Effort: No respiratory distress.      Comments: SOB better   Abdominal:      General: Bowel sounds are normal. There is no distension.      Palpations: Abdomen is soft.      Tenderness: There is no abdominal tenderness.   Musculoskeletal:         General: Normal range of motion.      Cervical back: Normal range of motion and neck supple.   Skin:     General: Skin is warm and dry.      Findings: No erythema.   Neurological:      Mental Status: She is alert and oriented to person, place,  and time.      Cranial Nerves: No cranial nerve deficit.   Psychiatric:         Mood and Affect: Affect normal.         Cognition and Memory: Memory normal.         Judgment: Judgment normal.       Laboratory:  Recent Labs   Lab 02/05/23 0404 02/06/23 0447 02/07/23  0242   WBC 6.77  6.77 7.14 6.02   HGB 10.0*  10.0* 10.3* 10.1*   HCT 30.8*  30.8* 31.4* 30.7*   *  127* 142* 138*   MONO 10.9  0.7 14.3  1.0 18.3*  1.1*       Recent Labs   Lab 02/05/23 0404 02/06/23 0447 02/07/23  0242   *  129* 128* 133*   K 4.3  4.3 4.4 4.0     101 99 95   CO2 16*  17* 15* 27   BUN 22*  22* 35* 19   CREATININE 4.1*  4.1* 6.1* 3.8*   CALCIUM 9.4  9.4 9.3 8.6*   PHOS 4.4 4.6* 3.3         Diagnostic Results:  X-Ray: Reviewed  US: Reviewed  Echo: Reviewed  ASSESSMENT/PLAN:     1. ESRD (N18.6 Z99.2) - usual HD on [facility]     -- HD tomorrow   -- Pending discharge     2. HTN (I10) - controlled   3. Anemia of chronic kidney disease treated with PRAVEEN (N18.9 D63.1) -    EPogen   with each HD for Hb goal 10   Recent Labs   Lab 02/05/23 0404 02/06/23 0447 02/07/23  0242   HGB 10.0*  10.0* 10.3* 10.1*   HCT 30.8*  30.8* 31.4* 30.7*   *  127* 142* 138*         Iron - check levels   Lab Results   Component Value Date    IRON 70 02/08/2023    TIBC 221 (L) 02/08/2023    FERRITIN 2,021 (H) 02/08/2023       4. MBD (E88.9 M90.80) -  Recent Labs   Lab 02/07/23  0242   CALCIUM 8.6*   PHOS 3.3       Recent Labs   Lab 02/05/23 0404 02/06/23 0447 02/07/23  0242   MG 2.1  2.1 2.2 1.8         Lab Results   Component Value Date    .5 (H) 11/07/2019    CALCIUM 8.6 (L) 02/07/2023    PHOS 3.3 02/07/2023     Lab Results   Component Value Date    WQAKHTSS91UI 7 (L) 11/07/2019       Lab Results   Component Value Date    CO2 27 02/07/2023       5. Hemodialysis Access (Z99.2 V45.11)- no issues   6. Nutrition/Hypoalbuminemia (E88.09) -    Recent Labs   Lab 02/02/23  1627 02/03/23  0124 02/08/23  0308 02/09/23  0436    LABPROT  --    < > 26.1* 26.3*   ALBUMIN 4.0  --   --   --     < > = values in this interval not displayed.       Nepro with meals TID. Renal vitamins daily      Thank you for the consult, will follow  With any question please call [phone]  Junie Krishnan MD    [facility]  YAIR Donato MD, FACBRENNAN,   RANJITH Jc MD,   MD JUSTIN Bose MD E. V. Harmon, NP    [address]  [place], [id number] [phone]

## 2023-02-09 NOTE — NURSING
Home Oxygen Evaluation    Date Performed: 2023    1) Patient's Home O2 Sat on room air, while at rest: 95          If O2 sats on room air at rest are 88% or below, patient qualifies. No additional testing needed. Document N/A in steps 2 and 3. If 89% or above, complete steps 2.      2) Patient's O2 Sat on room air while exercisin          If O2 sats on room air while exercising remain 89% or above patient does not qualify, no further testing needed Document N/A in step 3. If O2 sats on room air while exercising are 88% or below, continue to step 3.      3) Patient's O2 Sat while exercising on O2:  at  LPM   NA      (Must show improvement from #2 for patients to qualify)    If O2 sats improve on oxygen, patient qualifies for portable oxygen. If not, the patient does not qualify.

## 2023-02-09 NOTE — PT/OT/SLP EVAL
"Occupational Therapy   Evaluation    Name: Ping Davenport  MRN: [id number]  Admitting Diagnosis: Acute combined systolic and diastolic heart failure  Recent Surgery: Procedure(s) (LRB):  Left heart cath (Left) 5 Days Post-Op    Recommendations:     Discharge Recommendations: nursing facility, skilled (but pt may decline and require HHOT/PT; pt reported relictance to have therapies at home 2/2 home set up but may not be able to tolerate ambulation to OP therapies)  Discharge Equipment Recommendations:   (TBD pending progress with therapies)  Barriers to discharge:  Decreased caregiver support (Pt reports that daughter is available to assist but that she is "skinny and weak" weighing 90 lbs)    Assessment:     Ping Davenport is a 57 y.o. female with a medical diagnosis of Acute combined systolic and diastolic heart failure.  She presents with weakness BLE, impaired balance and overall deconditioning. Performance deficits affecting function: weakness, impaired endurance, impaired self care skills, impaired functional mobility, gait instability, decreased lower extremity function, impaired balance, impaired coordination, impaired fine motor, impaired cardiopulmonary response to activity, decreased upper extremity function.      Rehab Prognosis: Good; patient would benefit from acute skilled OT services to address these deficits and reach maximum level of function.       Plan:     Patient to be seen 3 x/week to address the above listed problems via self-care/home management, therapeutic activities, therapeutic exercises  Plan of Care Expires: 03/08/23  Plan of Care Reviewed with:      Subjective     Chief Complaint: Pt reports that she wants to walk  Patient/Family Comments/goals: To return to PLOF, to return to driving as she is the only member of her family that drives    Occupational Profile:  Living Environment: Pt lives with mother, sister, and daughter in 2SH, bed/bath on first floor. Tub/sh is broken but pt " "uses sponge baths 2/2 reporting that she cannot get HD line wet  Previous level of function: Mod I ADLs and functional mobility   Roles and Routines: Caretaker to self and home. Light houseowrk and meal prep. Pt states she drives and brings sister to store for grocery shopping but that she does not have the endurance to tolerate going into grocery store  Equipment Used at Home: walker, rolling, bedside commode  Assistance upon Discharge: Family but pt reports that this may be limited as sister assists mother and daughter is "skinny and weak"    Pain/Comfort:  Pain Rating 1: 0/10    Patients cultural, spiritual, Lutheran conflicts given the current situation:      Objective:     Communicated with: nsg prior to session.  Patient found HOB elevated with bed alarm, telemetry, peripheral IV, oxygen (Life Vest, dialysis catheter) upon OT entry to room.    General Precautions: Standard, fall (cardiac)  Orthopedic Precautions: N/A  Braces: N/A  Respiratory Status: Nasal cannula, flow 1 L/min    Occupational Performance:    Bed Mobility:    Patient completed Rolling/Turning to Right with stand by assistance  Patient completed Scooting/Bridging with stand by assistance  Patient completed Supine to Sit with stand by assistance    Functional Mobility/Transfers:  Patient completed Sit <> Stand Transfer with stand by assistance and contact guard assistance  with  rolling walker   Patient completed Bed <> Chair Transfer using Step Transfer technique with contact guard assistance and minimum assistance with rolling walker  Functional Mobility: Pt with fair- dynamic seated and standing balance. Posterior lean in stance and pt with some B knee buckling with attempts to stand with no AD, increased tolerance for static and dynamic stance with use of RW    Activities of Daily Living:  Feeding:  independence to self feed from meal tray seated in bedside chair  Upper Body Dressing: minimum assistance to don gown as robe seated " EOB    Cognitive/Visual Perceptual:  Cognitive/Psychosocial Skills:     -       Oriented to: Person, Place, Time and Situation   -       Follows Commands/attention:Follows multistep  commands  -       Communication: clear/fluent, Korean speaking but fluent in English  -       Memory: No Deficits noted  -       Safety awareness/insight to disability: somewhat impaired  -       Mood/Affect/Coping skills/emotional control: Appropriate to situation    Physical Exam:  Postural examination/scapula alignment:    -       Rounded shoulders, forward head  Skin integrity: Pt reports intermittent BLE edema but little to no edema noted this session  Motor Planning:    -       WFL  Dominant hand:    -       R handed  Upper Extremity Range of Motion: BUE WFL     Upper Extremity Strength:  BUE grossly 3 to 3+/5   Strength:  B hands 4/5  Fine Motor Coordination:    -       Intact in functional task practice  Gross motor coordination:   WFL     AMPAC 6 Click ADL:  AMPAC Total Score: 20    Treatment & Education:  Pt educated on role of OT and POC.   Pt performing skills as listed above.     Patient left up in chair with all lines intact, call button in reach, and chair alarm present/placed but no power cord in room. Nsg ok'd pt tp remain seated upright in chair    GOALS:   Multidisciplinary Problems       Occupational Therapy Goals          Problem: Occupational Therapy    Goal Priority Disciplines Outcome Interventions   Occupational Therapy Goal     OT, PT/OT Ongoing, Progressing    Description: Goals to be met by: 03/08/2023      Patient will increase functional independence with ADLs by performing:    UE Dressing with Modified [place].  LE Dressing with Modified [place].  Grooming while standing with Modified [place].  Toileting from bedside commode with Modified [place] for hygiene and clothing management.   Toilet transfer to bedside commode with Modified [place].  Increased functional strength to  WFL for self care.  Upper extremity exercise program x10 reps per handout, with independence.                          History:     Past Medical History:   Diagnosis Date    Coronary artery disease involving native coronary artery of native heart without angina pectoris 2/6/2023    Diabetes mellitus     Hypertension     Renal disorder          Past Surgical History:   Procedure Laterality Date    INSERTION OF CATHETER FOR HEMODIALYSIS Left 9/11/2020    Procedure: INSERTION, CATHETER, HEMODIALYSIS;  Surgeon: William Beltran MD;  Location: [facility] OR;  Service: General;  Laterality: Left;    LEFT HEART CATHETERIZATION Left 2/3/2023    Procedure: Left heart cath;  Surgeon: Remy Toscano MD;  Location: [facility] CATH LAB/EP;  Service: Cardiology;  Laterality: Left;       Time Tracking:     OT Date of Treatment: 02/08/23  OT Start Time: 1725  OT Stop Time: 1759  OT Total Time (min): 34 min    Billable Minutes:Evaluation 20  Self Care/Home Management 14    2/8/2023

## 2023-02-09 NOTE — TELEPHONE ENCOUNTER
Per pt's sister she would like to talk to Dr. Dsouza and have him directly explain to her what is the next steps for her sister   She is also asking if pt is an ideal candidate for heart transplant but she needs to talk to Dr. Dsouza about everything directly     Pt had procedure done by Dr. Toscano and pt's sister does not want f/u appt w Dr. Toscano she wants her sister to have the f/u w Dr. Dsouza     She states the  in the hospital has told pt's sister that she already has an appt w cardiology scheduled   Explained to pt's sister the only appt I see is w Dr. Lowery in priority care for 2/23/23 not cardiology    Pt's sister stated she was told something different as stated above and she expressed that she feels I am lying to her     Advised her I am not sure what the pt's discharge instructions are and I do apologize for the confusion     Pt's sister stated to just provided her w the expansion line number to schedule appt and she will talk to hospital  for clarification and a message to be sent to Dr. Dsouza for provider to call her back   Provided her w Medicaid expansion line # [phone] for help w appt f/u     Message sent to Dr. Dsouza     V/a          daniela

## 2023-02-09 NOTE — PT/OT/SLP EVAL
"Physical Therapy Evaluation    Patient Name:  Ping Davenport   MRN:  [id number]    Recommendations:     Discharge Recommendations: home health PT, home health OT, other (see comments), outpatient PT, outpatient OT (Cardiac Rehab:  Home Health PT/OT vs. Outpatient PT/OT)   Discharge Equipment Recommendations: walker, rolling, rollator (RW or Rollator)   Barriers to discharge:  cardiac status;  decreased endurance    Assessment:     Ping Davenport is a 57 y.o. female admitted with a medical diagnosis of Acute combined systolic and diastolic heart failure.  She presents with the following impairments/functional limitations: weakness, impaired functional mobility, impaired cardiopulmonary response to activity, impaired endurance, gait instability, impaired balance, impaired self care skills, decreased lower extremity function     Patient seen for physical therapy evaluation on this date.  Patient is agreeable to therapy.  Patient performs bed mobility at SBA, transfers at SBA, and gait with RW x 150' with CG/SBA.  Patient educated on progressive walking program.  Patient will benefit from Home Health PT/OT vs. Outpatient Cardiac PT/OT to address functional limitations.  Patient would also benefit from a RW vs. Rollator for home use.  Full report to follow.    Rehab Prognosis: Good; patient would benefit from acute skilled PT services to address these deficits and reach maximum level of function.    Recent Surgery: Procedure(s) (LRB):  Left heart cath (Left) 6 Days Post-Op    Plan:     During this hospitalization, patient to be seen 3 x/week to address the identified rehab impairments via gait training, therapeutic activities, therapeutic exercises, neuromuscular re-education and progress toward the following goals:    Plan of Care Expires:  03/11/23    Subjective     Chief Complaint: "I want to be able to walk farther"  Patient/Family Comments/goals: To return home   Pain/Comfort:  Pain Rating 1: 0/10  Pain Rating " Post-Intervention 1: 0/10    Patients cultural, spiritual, Uatsdin conflicts given the current situation: no    Living Environment:  Patient lives with mother, sister, and daughter in a 2 SH, bed/bath on 1st floor.  T/S is broken, but patient uses sponge baths  Prior to admission, patients level of function was Modified Independent, + Driving, Gait distance limited by poor endurance, has difficulty walking around grocery store.  Equipment used at home: walker, standard, bedside commode.  DME owned (not currently used): none.  Upon discharge, patient will have assistance from family.    Objective:     Communicated with nurse [place] prior to session.  Patient found supine with peripheral IV, telemetry (Life Vest)  upon PT entry to room.    General Precautions: Standard, fall (cardiac - has Life Vest)  Orthopedic Precautions:N/A   Braces: N/A  Respiratory Status: Patient on Room Air For session - O2 sats above 95% on Room Air    Exams:  Cognitive Exam:  Patient is oriented to Person, Place, Time, and Situation  Gross Motor Coordination:  WFL  Postural Exam:  Patient presented with the following abnormalities:    -       Rounded shoulders  -       Forward head  Sensation:  Light Touch grossly intact  Skin Integrity/Edema:      -       Skin integrity: Visible skin intact  -       Edema: Mild B LEs  RLE ROM: WFL  RLE Strength: Deficits: 3+ to 4/5 grossly  LLE ROM: WFL  LLE Strength: Deficits: 3+ to 4/5 grossly    Functional Mobility:  Bed Mobility:     Sit to Supine: modified independence  Transfers:     Sit to Stand:  modified independence with rolling walker  Gait: x 150' with CG/SBA, slow pace, Life Vest Donned  Balance: Seated:  independent    Standing:  requires RW, no overt LOB, CGA      AM-PAC 6 CLICK MOBILITY  Total Score:22       Treatment & Education:  Patient educated on role of physical therapy and POC.  Patient educated on progressive walking program for home.  Discussed use of Life Vest at all times.   Patient to continue to rehabilitate with Home Health PT/OT vs. Outpatient Cardiac Rehab -  to discuss with patient.    Patient left HOB elevated with all lines intact, call button in reach, and nurse notified.    GOALS:   Multidisciplinary Problems       Physical Therapy Goals          Problem: Physical Therapy    Goal Priority Disciplines Outcome Goal Variances Interventions   Physical Therapy Goal     PT, PT/OT Ongoing, Progressing     Description: Goals to be met by: 3/11/2023     Patient will increase functional independence with mobility by performin. Supine to sit with Modified [place]  2. Sit to supine with Modified [place]  3. Sit to stand transfer with Modified [place]  4. Gait  x 200 feet with Modified [place] using Rolling Walker.                          History:     Past Medical History:   Diagnosis Date    Coronary artery disease involving native coronary artery of native heart without angina pectoris 2023    Diabetes mellitus     Hypertension     Renal disorder        Past Surgical History:   Procedure Laterality Date    INSERTION OF CATHETER FOR HEMODIALYSIS Left 2020    Procedure: INSERTION, CATHETER, HEMODIALYSIS;  Surgeon: William Beltran MD;  Location: [facility] OR;  Service: General;  Laterality: Left;    LEFT HEART CATHETERIZATION Left 2/3/2023    Procedure: Left heart cath;  Surgeon: Remy Toscano MD;  Location: [facility] CATH LAB/EP;  Service: Cardiology;  Laterality: Left;       Time Tracking:     PT Received On: 23  PT Start Time: 1028     PT Stop Time: 1046  PT Total Time (min): 18 min     Billable Minutes: Evaluation 10 and Gait Training 8      2023

## 2023-02-09 NOTE — PLAN OF CARE
Patient seen for physical therapy evaluation on this date.  Patient is agreeable to therapy.  Patient performs bed mobility at SBA, transfers at SBA, and gait with RW x 150' with CG/SBA.  Patient educated on progressive walking program.  Patient will benefit from Home Health PT/OT vs. Outpatient Cardiac PT/OT to address functional limitations.  Patient would also benefit from a RW vs. Rollator for home use.  Full report to follow.      Problem: Physical Therapy  Goal: Physical Therapy Goal  Description: Goals to be met by: 3/11/2023     Patient will increase functional independence with mobility by performin. Supine to sit with Modified [place]  2. Sit to supine with Modified [place]  3. Sit to stand transfer with Modified [place]  4. Gait  x 200 feet with Modified [place] using Rolling Walker.     Outcome: Ongoing, Progressing

## 2023-02-09 NOTE — TELEPHONE ENCOUNTER
----- Message from Nena Walker sent at 2/9/2023  9:18 AM CST -----  Type:  Needs Medical Advice    Who Called: Pt sister  Symptoms (please be specific): she is requesting to speak with Dr Dsouza  she stated that its Urgent      Would the patient rather a call back or a response via [employer]? call  Best Call Back Number: [phone]  Additional Information:

## 2023-02-09 NOTE — PLAN OF CARE
Problem: Occupational Therapy  Goal: Occupational Therapy Goal  Description: Goals to be met by: 03/08/2023      Patient will increase functional independence with ADLs by performing:    UE Dressing with Modified [place].  LE Dressing with Modified [place].  Grooming while standing with Modified [place].  Toileting from bedside commode with Modified [place] for hygiene and clothing management.   Toilet transfer to bedside commode with Modified [place].  Increased functional strength to WFL for self care.  Upper extremity exercise program x10 reps per handout, with independence.     Outcome: Ongoing, Progressing   Ping Davenport is a 57 y.o. female with a medical diagnosis of Acute combined systolic and diastolic heart failure.  Performance deficits affecting function are weakness, impaired endurance, impaired self care skills, impaired functional mobility, gait instability, impaired balance, decreased lower extremity function, decreased upper extremity function, impaired cardiopulmonary response to activity. Pt found in bed, agreeable to therapy. Pt continues with decreased overall strength and endurance however she is progressing towards goals. Continue OT services to address functional goals, progressing as able. Continue OT services to address functional goals, progressing as able.

## 2023-02-09 NOTE — PT/OT/SLP PROGRESS
Occupational Therapy   Treatment    Name: Ping Davenport  MRN: [id number]  Admitting Diagnosis:  Acute combined systolic and diastolic heart failure  6 Days Post-Op    Recommendations:     Discharge Recommendations: home, home health OT/Cardiac rehab  Discharge Equipment Recommendations:  none  Barriers to discharge:   none    Assessment:     Ping Davenport is a 57 y.o. female with a medical diagnosis of Acute combined systolic and diastolic heart failure.  Performance deficits affecting function are weakness, impaired endurance, impaired self care skills, impaired functional mobility, gait instability, impaired balance, decreased lower extremity function, decreased upper extremity function, impaired cardiopulmonary response to activity. Pt found in bed, agreeable to therapy. Pt continues with decreased overall strength and endurance however she is progressing towards goals. Continue OT services to address functional goals, progressing as able. Continue OT services to address functional goals, progressing as able.        Rehab Prognosis:  Good; patient would benefit from acute skilled OT services to address these deficits and reach maximum level of function.       Plan:     Patient to be seen 3 x/week to address the above listed problems via self-care/home management, therapeutic activities, therapeutic exercises  Plan of Care Expires: 03/08/23  Plan of Care Reviewed with: patient    Subjective     Pain/Comfort:  Pain Rating 1: 0/10  Pain Rating Post-Intervention 1: 0/10    Objective:     Communicated with: RN prior to session.  Patient found HOB elevated with peripheral IV, telemetry, Other (comments) (Life Vest) upon OT entry to room.    General Precautions: Standard, fall, other (see comments) (cardiac)    Orthopedic Precautions:N/A  Braces: N/A  Respiratory Status: Room air     Occupational Performance:     Bed Mobility:    Patient completed Supine to Sit with modified independence     Functional  Mobility/Transfers:  Patient completed Sit <> Stand Transfer with modified independence  with  rolling walker and vcs for hand placement   Functional Mobility: Pt ambulated with SBA using RW. Slow pace, no LOB.     Activities of Daily Living:  Grooming: stand by assistance standing at sink  Upper Body Dressing: stand by assistance  Lower Body Dressing: stand by assistance        AMPA 6 Click ADL: 20    Treatment & Education:  Pt educated on RW safety/mgmt during BADLs and transfers/mobility.      Patient left ambulatory in room/webber with  PT    GOALS:   Multidisciplinary Problems       Occupational Therapy Goals          Problem: Occupational Therapy    Goal Priority Disciplines Outcome Interventions   Occupational Therapy Goal     OT, PT/OT Ongoing, Progressing    Description: Goals to be met by: 03/08/2023      Patient will increase functional independence with ADLs by performing:    UE Dressing with Modified [place].  LE Dressing with Modified [place].  Grooming while standing with Modified [place].  Toileting from bedside commode with Modified [place] for hygiene and clothing management.   Toilet transfer to bedside commode with Modified [place].  Increased functional strength to WFL for self care.  Upper extremity exercise program x10 reps per handout, with independence.                          Time Tracking:     OT Date of Treatment: 02/09/23  OT Start Time: 1009  OT Stop Time: 1032  OT Total Time (min): 23 min    Billable Minutes:Self Care/Home Management 23 2/9/2023

## 2023-02-09 NOTE — PROGRESS NOTES
[facility]                    Pharmacy       Discharge Medication Education    Patient ACCEPTED medication education. Pharmacy has provided education on the name, indication, and possible side effects of the medication(s) prescribed, using teach-back method.     The following medications have also been discussed, during this admission.        Medication List        START taking these medications      atorvastatin 40 MG tablet  Commonly known as: LIPITOR  [place] gabbi tableta (40 mg en total) por vía oral diariamente.  (Take 1 tablet (40 mg total) by mouth once daily.)  Start taking on: February 10, 2023     CHILDREN'S ASPIRIN 81 MG Chew  Generic drug: aspirin  [place] 1 tableta (81 mg en total) por vía oral gabbi vez al día.  (Take 1 tablet (81 mg total) by mouth once daily.)  Start taking on: February 10, 2023     clopidogreL 75 mg tablet  Commonly known as: PLAVIX  [place] gabbi tableta (75 mg en total) por vía oral diariamente.  (Take 1 tablet (75 mg total) by mouth once daily.)  Start taking on: February 10, 2023     ENTRESTO 24-26 mg per tablet  Generic drug: sacubitriL-valsartan  [place] gabbi tableta por vía oral 2 veces al día.  (Take 1 tablet by mouth 2 (two) times daily.)     RENVELA 800 mg Tab  Generic drug: sevelamer carbonate  Take 2 tablets (1,600 mg total) by mouth 3 (three) times daily with meals.  Replaces: sevelamer  MG Tab     sodium bicarbonate 650 MG tablet  [place] gabbi tableta (650 mg en total) por vía oral 2 veces al día.  (Take 1 tablet (650 mg total) by mouth 2 (two) times daily.)     warfarin 5 MG tablet  Commonly known as: COUMADIN  [place] gabbi tableta (5 mg en total) por vía oral diariamente.  (Take 1 tablet (5 mg total) by mouth Daily.)            CHANGE how you take these medications      furosemide 80 MG tablet  Commonly known as: LASIX  Take 2 tablets (160 mg total) by mouth 2 (two) times daily as needed (Increased swelling to the lower ext and SHORTNESS OF BREATH).  What changed:    when to take this  reasons to take this     SELENE-EMILIA 0.8 mg Tab  Generic drug: B complex-vitamin C-folic acid  TAKE 1 TABLET BY MOUTH ONCE A DAY  What changed:   how much to take  how to take this  when to take this            CONTINUE taking these medications      acetaminophen 500 MG tablet  Commonly known as: TYLENOL     amLODIPine 10 MG tablet  Commonly known as: NORVASC  [place] gabbi tableta (10 mg en total) por vía oral diariamente.  (Take 1 tablet (10 mg total) by mouth once daily.)     carvediloL 25 MG tablet  Commonly known as: COREG  [place] gabbi tableta (25 mg en total) por vía oral 2 veces al día.  (Take 1 tablet (25 mg total) by mouth 2 (two) times daily.)            STOP taking these medications      hydrALAZINE 10 MG tablet  Commonly known as: APRESOLINE     sevelamer  MG Tab  Commonly known as: RENAGEL  Replaced by: RENVELA 800 mg Tab               Where to Get Your Medications        These medications were sent to [facility]  [address], [place] [address]      Hours: Mon-Fri, 8a-5:30p Phone: [phone]   atorvastatin 40 MG tablet  CHILDREN'S ASPIRIN 81 MG Chew  clopidogreL 75 mg tablet  ENTRESTO 24-26 mg per tablet  furosemide 80 MG tablet  RENVELA 800 mg Tab  sodium bicarbonate 650 MG tablet  warfarin 5 MG tablet          Thank you  Yari Lewis, PharmD  [phone]

## 2023-02-09 NOTE — PLAN OF CARE
Problem: Adult Inpatient Plan of Care  Goal: Plan of Care Review  2/8/2023 1906 by Eugenia Holley RN  Outcome: Ongoing, Progressing

## 2023-02-09 NOTE — NURSING

## 2023-02-09 NOTE — PLAN OF CARE
Problem: Occupational Therapy  Goal: Occupational Therapy Goal  Description: Goals to be met by: 03/08/2023      Patient will increase functional independence with ADLs by performing:    UE Dressing with Modified [place].  LE Dressing with Modified [place].  Grooming while standing with Modified [place].  Toileting from bedside commode with Modified [place] for hygiene and clothing management.   Toilet transfer to bedside commode with Modified [place].  Increased functional strength to WFL for self care.  Upper extremity exercise program x10 reps per handout, with independence.     Outcome: Ongoing, Progressing   Pt would benefit from continued OT to address deficits in self care and functional mobility. Recommending SNF but pt may decline requiring HHOT/PT. Pt may be reluctant to have HHOT/PT come to home because she states it is small and does not have much room to walk. Pt may have difficulty ambulating sufficient distance to attend OP therapies; DME needs TBD pending progress with therapies. Pt is asking for oxygen trail to determine need for continued oxygen therapy and asking about power scooter.

## 2023-02-09 NOTE — PLAN OF CARE
SW met with pt at bedside to discuss dc planning. Pt declined HH and SNF placement. Pt reports that she has support at home. Pt reports that she will call for a taxi upon dc. SW requested ambulatory referral for outpatient PT/OT from NP.     Pt was approved for rollator and it was delivered to bedside by [place].      SW will continue to follow pt throughout her transitions of care and assist with any dc needs.     Future Appointments   Date Time Provider Department Center   2/23/2023 11:00 AM Clara Lucio PT [facility] [place]   2/23/2023  3:00 PM Lashawn Lowery MD [facility]        02/09/23 4950   Final Note   Assessment Type Final Discharge Note   Anticipated Discharge Disposition Home   Hospital Resources/Appts/Education Provided Appointments scheduled by Navigator/Coordinator   Post-Acute Status   Discharge Delays None known at this time

## 2023-02-10 NOTE — DISCHARGE SUMMARY
[facility] Medicine  Discharge Summary      Patient Name: Ping Davenport  MRN: [id number]  INEZ: [id number]  Patient Class: IP- Inpatient  Admission Date: 2/2/2023  Hospital Length of Stay: 7 days  Discharge Date and Time:  02/10/2023 3:34 PM  Attending Physician: Daphnie Jackson MD   Discharging Provider: Erickson Green NP  Primary Care Provider: Primary Doctor Matilde    Primary Care Team: Networked reference to record PCT     HPI:   Ms. Davenport is a 57 year old woman with a past medical history of ESRD on HD MWF seen by Dr. Jc who developed SOB and cough productive of green sputum yesterday. She was dialyzed yesterday. Flu and COVID tests negative. She has not had fevers or chills. Has noticed that sometimes when she is walking in the parking lot she develops chest tightness after going a short distance. Does not believe she has had a left heart cath before but was told that she has congestive heart failure.       Procedure(s) (LRB):  Left heart cath (Left)      Hospital Course:   She was admitted to the hospital medicine service for further care.  Cardiology and nephrology consulted.  Patient was noted with an NSTEMI on admission.  She was started on heparin drip.  2D echo completed on 02/03/2023 shows an EF of 20%---patient has combined systolic and diastolic congestive heart failure.  She underwent a cardiac catheterization on 02/03/2023.  Patient's heart catheterization shows mid LAD 80% stenosed, The 2nd Diag lesion was 60% stenosed, The Prox Cx to Mid Cx lesion was 70% stenosed, mid RCA lesion was 99% stenosed, and there is also three-vessel coronary artery disease noted.  Per her heart catheterization we see there is severe multivessel coronary artery disease, decompensated heart failure, and LV thrombosis.  Per cardiology's plan will continue HD for volume management and also treatment of pneumonia. She underwent HD MWF per routine schedule.  Patient will need CT as  evaluation for CABG.  Will continue heparin drip for 48 hours post catheterization.  Patient's dialysis is Monday, Wednesday, Friday:  Appreciate nephrology.  Empiric tx for CAP with azithro and CTX.  Her flu and COVID are negative. She is recommended for Life Vest and outpatient follow up with Vascular surgery for possible CABG.  CTS referral outpatient has been sent.  Coumadin clinic ambulatory referral has been sent.  She will be discharged home with Entresto 24-26 mg twice a day, and Coumadin 5 mg daily. She will also need ASA 81 mg PO, atorvastatin 40 mg Po daily, and will cont uiayaudmxb82 mg PO bid.  Patient to follow-up with CTS, PCP, and Cardiology outpatient.  We have also ordered the NP at home program to follow the patient outpatient.       Goals of Care Treatment Preferences:  Code Status: Full Code      Consults:   Consults (From admission, onward)        Status Ordering Provider     Inpatient consult to Social Work/Case Management  Once        Provider:  (Not yet assigned)    Acknowledged GOPI MOROCHO     Inpatient consult to Social Work  Once        Provider:  (Not yet assigned)    Acknowledged DENISE ZENG     Inpatient consult to Cardiology[facility]  Once        Provider:  (Not yet assigned)    Completed KALYAN WHALEN     Inpatient consult to Nephrology-[facility] (Babita Donato Nimkevych)  Once        Provider:  Ana Luisa Jc MD    Completed KALYAN WHALEN          No new Assessment & Plan notes have been filed under this hospital service since the last note was generated.  Service: Hospital Medicine    Final Active Diagnoses:    Diagnosis Date Noted POA    PRINCIPAL PROBLEM:  Acute combined systolic and diastolic heart failure [I50.41] 02/04/2023 Yes    ESRD (end stage renal disease) [N18.6] 02/06/2023 Yes    Thrombocytopenia [D69.6] 02/06/2023 Yes    Metabolic acidosis [E87.20] 02/06/2023 Yes    Hyperlipidemia [E78.5] 02/06/2023 Yes    Coronary  "artery disease involving native coronary artery of native heart without angina pectoris [I25.10] 02/06/2023 Yes    Pneumonia [J18.9] 02/04/2023 Yes    LV (left ventricular) mural thrombus following MI [I23.6] 02/04/2023 Yes    Anemia due to chronic kidney disease, on chronic dialysis [N18.6, D63.1, Z99.2] 09/10/2020 Not Applicable    Essential hypertension [I10] 09/10/2020 Yes    NSTEMI (non-ST elevated myocardial infarction) [I21.4] 11/05/2019 Yes      Problems Resolved During this Admission:    Diagnosis Date Noted Date Resolved POA    Acute on chronic respiratory failure with hypoxia [J96.21] 02/04/2023 02/05/2023 Yes    Hypervolemia [E87.70]  02/09/2023 Yes       Discharged Condition: stable    Disposition:     Follow Up:   Follow-up Information     Remy Toscano MD. Schedule an appointment as soon as possible for a visit.    Specialties: Cardiology, Interventional Cardiology  Why: As needed, If symptoms worsen  Contact information:  [address]  [address]  [place] [address]  [phone]             Lashawn Lowery MD Follow up on 2/23/2023.    Specialty: Internal Medicine  Why: at 1:00pm; hospital follow up appointment in the [facility]  Contact information:  [address]  [address]  [place] [address]  [phone]             Sched[facility] Follow up.    Why: Scheduling Navigators will contact patient of futrue follow up appointments.           [facility] Follow up.    Why: Please review [facility] for future follow up appointments.                     Patient Instructions:      WALKER FOR HOME USE     Order Specific Question Answer Comments   Type of Walker: Rollator with brakes and/or seat    With wheels? Yes    Height: 5' 2" (1.575 m)    Weight: 69.7 kg (153 lb 10.6 oz)    Length of need (1-99 months): 99    Does patient have medical equipment at home? walker, rolling    Does patient have medical equipment at home? bedside commode    Please check all that apply: Patient's " condition impairs ambulation.      Ambulatory referral/consult to Physical/Occupational Therapy   Standing Status: Future   Referral Priority: Routine Referral Type: Physical Medicine   Referral Reason: Specialty Services Required   Number of Visits Requested: 1     Ambulatory referral/consult to Anticoagulation Monitoring (PHARMACIST MANAGED)   Standing Status: Future   Referral Priority: Routine Referral Type: Consultation   Referral Reason: Specialty Services Required   Requested Specialty: Cardiology   Number of Visits Requested: 1     Ambulatory referral/consult to Cardiothoracic Surgery   Standing Status: Future   Referral Priority: Routine Referral Type: Consultation   Referral Reason: Specialty Services Required   Requested Specialty: Cardiothoracic Surgery   Number of Visits Requested: 1     Ambulatory referral/consult to [facility]   Standing Status: Future   Referral Priority: Routine Referral Type: Consultation   Referral Reason: Specialty Services Required   Number of Visits Requested: 1     Ambulatory referral/consult to Cardiovascular Surgery   Standing Status: Future   Referral Priority: Routine Referral Type: Consultation   Referral Reason: Specialty Services Required   Requested Specialty: Cardiothoracic Surgery   Number of Visits Requested: 1     Echo   Standing Status: Future Standing Exp. Date: 02/09/24     Order Specific Question Answer Comments   Release to patient Immediate        Significant Diagnostic Studies: Labs: All labs within the past 24 hours have been reviewed    Pending Diagnostic Studies:     None         Medications:  Reconciled Home Medications:      Medication List      START taking these medications    atorvastatin 40 MG tablet  Commonly known as: LIPITOR  [place] gabbi tableta (40 mg en total) por vía oral diariamente.  (Take 1 tablet (40 mg total) by mouth once daily.)     CHILDREN'S ASPIRIN 81 MG Chew  Generic drug: aspirin  [place] 1 tableta (81 mg  en total) por vía oral gabbi vez al día.  (Take 1 tablet (81 mg total) by mouth once daily.)     clopidogreL 75 mg tablet  Commonly known as: PLAVIX  [place] gabbi tableta (75 mg en total) por vía oral diariamente.  (Take 1 tablet (75 mg total) by mouth once daily.)     ENTRESTO 24-26 mg per tablet  Generic drug: sacubitriL-valsartan  [place] gabbi tableta por vía oral 2 veces al día.  (Take 1 tablet by mouth 2 (two) times daily.)     RENVELA 800 mg Tab  Generic drug: sevelamer carbonate  Take 2 tablets (1,600 mg total) by mouth 3 (three) times daily with meals.  Replaces: sevelamer  MG Tab     sodium bicarbonate 650 MG tablet  [place] gabbi tableta (650 mg en total) por vía oral 2 veces al día.  (Take 1 tablet (650 mg total) by mouth 2 (two) times daily.)     warfarin 5 MG tablet  Commonly known as: COUMADIN  [place] gabbi tableta (5 mg en total) por vía oral diariamente.  (Take 1 tablet (5 mg total) by mouth Daily.)        CHANGE how you take these medications    furosemide 80 MG tablet  Commonly known as: LASIX  Take 2 tablets (160 mg total) by mouth 2 (two) times daily as needed (Increased swelling to the lower ext and SHORTNESS OF BREATH).  What changed:   · when to take this  · reasons to take this     SELENE-EMILIA 0.8 mg Tab  Generic drug: B complex-vitamin C-folic acid  TAKE 1 TABLET BY MOUTH ONCE A DAY  What changed:   · how much to take  · how to take this  · when to take this        CONTINUE taking these medications    acetaminophen 500 MG tablet  Commonly known as: TYLENOL  Take 500 mg by mouth every 6 (six) hours as needed for Pain.     amLODIPine 10 MG tablet  Commonly known as: NORVASC  [place] gabbi tableta (10 mg en total) por vía oral diariamente.  (Take 1 tablet (10 mg total) by mouth once daily.)     carvediloL 25 MG tablet  Commonly known as: COREG  [place] gabbi tableta (25 mg en total) por vía oral 2 veces al día.  (Take 1 tablet (25 mg total) by mouth 2 (two) times daily.)        STOP taking these medications     hydrALAZINE 10 MG tablet  Commonly known as: APRESOLINE     sevelamer  MG Tab  Commonly known as: RENAGEL  Replaced by: RENVELA 800 mg Tab            Indwelling Lines/Drains at time of discharge:   Lines/Drains/Airways     Central Venous Catheter Line  Duration           Permacath 09/11/20 0859 left internal jugular 882 days          Drain  Duration           Female External Urinary Catheter 02/03/23 2000 6 days                Time spent on the discharge of patient: 35 minutes         Erickson Green NP  Department of Hospital Medicine  [facility]

## 2023-02-10 NOTE — PROGRESS NOTES
[facility] Medicine  Progress Note    Patient Name: Ping Davenport  MRN: [id number]  Patient Class: IP- Inpatient   Admission Date: 2/2/2023  Length of Stay: 7 days  Attending Physician: Daphnie Jackson MD  Primary Care Provider: Primary Doctor No        Subjective:     Principal Problem:Acute combined systolic and diastolic heart failure        HPI:  Ms. Davenport is a 57 year old woman with a past medical history of ESRD on HD MWF seen by Dr. Jc who developed SOB and cough productive of green sputum yesterday. She was dialyzed yesterday. Flu and COVID tests negative. She has not had fevers or chills. Has noticed that sometimes when she is walking in the parking lot she develops chest tightness after going a short distance. Does not believe she has had a left heart cath before but was told that she has congestive heart failure.       Overview/Hospital Course:  She was admitted to the [facility] medicine service for further care.  Cardiology and nephrology consulted.  Patient was noted with an NSTEMI on admission.  She was started on heparin drip.  2D echo completed on 02/03/2023 shows an EF of 20%---patient has combined systolic and diastolic congestive heart failure.  She underwent a cardiac catheterization on 02/03/2023.  Patient's heart catheterization shows mid LAD 80% stenosed, The 2nd Diag lesion was 60% stenosed, The Prox Cx to Mid Cx lesion was 70% stenosed, mid RCA lesion was 99% stenosed, and there is also three-vessel coronary artery disease noted.  Per her heart catheterization we see there is severe multivessel coronary artery disease, decompensated heart failure, and LV thrombosis.  Per cardiology's plan will continue HD for volume management and also treatment of pneumonia. She underwent HD MWF per routine schedule.  Patient will need CT as evaluation for CABG.  Will continue heparin drip for 48 hours post catheterization.  Patient's dialysis is Monday, Wednesday, Friday:   Appreciate nephrology.  Empiric tx for CAP with azithro and CTX.  Her flu and COVID are negative. She is recommended for Life Vest and outpatient follow up with Vascular surgery for possible CABG.       Interval History: No acute events overnight. T INR 2.7 this am. Will cont Coumadin 5 mg Po daily. Goal INR is 2-3.   Life Vest has been placed on the patient. Appreciate Cardiology        Review of Systems   Constitutional:  Positive for fatigue.   Respiratory:  Positive for shortness of breath. Negative for cough and wheezing.    Cardiovascular:  Negative for chest pain.   Gastrointestinal:  Negative for diarrhea, nausea and vomiting.   Neurological:  Positive for weakness.   Objective:     Vital Signs (Most Recent):  Temp: 97.7 °F (36.5 °C) (02/10/23 0843)  Pulse: 63 (02/10/23 0843)  Resp: 20 (02/10/23 0843)  BP: (!) 143/68 (02/10/23 0843)  SpO2: (!) 92 % (02/10/23 0843)   Vital Signs (24h Range):  Temp:  [97 °F (36.1 °C)-97.8 °F (36.6 °C)] 97.7 °F (36.5 °C)  Pulse:  [52-84] 63  Resp:  [16-20] 20  SpO2:  [92 %-97 %] 92 %  BP: (126-143)/(60-68) 143/68     Weight: 69.7 kg (153 lb 10.6 oz)  Body mass index is 28.1 kg/m².  No intake or output data in the 24 hours ending 02/10/23 1013     Physical Exam  Vitals and nursing note reviewed.   Cardiovascular:      Rate and Rhythm: Regular rhythm. Bradycardia present.      Comments: Life vest in place   Pulmonary:      Effort: Pulmonary effort is normal. No respiratory distress.      Breath sounds: Rales present.   Abdominal:      Palpations: Abdomen is soft.   Skin:     Comments: Left IJ PermCath for dialysis noted   Neurological:      Mental Status: She is alert and oriented to person, place, and time. Mental status is at baseline.   Psychiatric:         Mood and Affect: Mood normal.       Significant Labs: All pertinent labs within the past 24 hours have been reviewed.    Significant Imaging: I have reviewed all pertinent imaging results/findings within the past 24  hours.      Assessment/Plan:      * Acute combined systolic and diastolic heart failure  Echo from 02/03/2023 noted with acute on chronic systolic and diastolic heart failure.    Patient with an EF of 20%--Decompensated heart failure.    Also noted with an LV thrombus.  Appreciate Cardiology on this case   She will likely need a LifeVest on discharge   Starting Entresto 24/26 BID  Volume management per hemodialysis.  Continue strict intake and output  Daily weights      Coronary artery disease involving native coronary artery of native heart without angina pectoris  Patient with known CAD s/p angiogram, which is uncontrolled Will continue ASA, Plavix and Statin and monitor for S/Sx of angina/ACS. Continue to monitor on telemetry.   S/p LHC - 80% stenosis in mid LAD, mid left circ. 99% stenosis in mid RCA, + LV thrombus  Continue medical management with aspirin, statin, Plavix, beta-blocker  CT surgery consult as outpatient for CABG eval versus high-risk PCI  Life vest was delivered and is now in place      Hyperlipidemia     Patient is chronically on statin.will continue for now. Monitor clinically. Last LDL was   Lab Results   Component Value Date    LDLCALC 151.4 11/06/2019       Metabolic acidosis  Secondary to renal etiology. Add sodium bicarb 650 mg bid. Defer further treatment to nephrology. Trend renal panel    Thrombocytopenia  Lab Results   Component Value Date     (L) 02/07/2023     Reviewed. Plt 138 Trend CBC      ESRD (end stage renal disease)  Nephrology consulted for inpatient HD management   Her usual outpatient HD schedule is MWF    LV (left ventricular) mural thrombus following MI  As per recent echocardiogram  She was started on heparin drip   INR today, 2.7  As per cardiology we will need AC with warfarin--we will  bridge to warfarin with the heparin drip for INR goal of 2-3.     Pneumonia  Chest x-ray on admission with pulmonary edema and although no large focal consolidation, clinical  concern for PNA  Started on ceftriaxone and azithromycin empirically for CAP  Improving with ABX (and volume management per HD)  She has received 3 days of Azithro 500mg qd -- thus will stop this (only need 3d for 500 mg dose)  Day #5 of CTX today - will continue this to complete 5d course -last dose on 02/06/2023  Flu and COVID are negative   Completed course of IV abx. Dc iv rocephin      Essential hypertension  Continue home meds.      Anemia due to chronic kidney disease, on chronic dialysis  Patient's anemia is currently controlled. Has not received any PRBCs to date.. Etiology likely d/t ESRD.   Current CBC reviewed-   Lab Results   Component Value Date    HGB 10.1 (L) 02/07/2023    HCT 30.7 (L) 02/07/2023     Monitor serial CBC and transfuse if patient becomes hemodynamically unstable, symptomatic or H/H drops below 7/21.         NSTEMI (non-ST elevated myocardial infarction)  On admission, reported SOB with sputum, does reports ongoing exertional chest tightness and dyspnea when she walks in the parking lot  Prior echo with normal EF and grade I diastolic dysfunction (was on mechanical ventilation at the time)  TTE repeated this admission with new reduced EF - 20%  Sats 87% SBP in 140s  Trop 0.85-->1.2-->1.3  BNP >4900 (no baseline)  EKG with 1st degree AV block, sinus rhythm, ST, T wave changes, no STEMI  No chest pain at rest. Appears most likely due to demand ischemia due to pneumonia, volume overload in the setting of ESRD, however patient could have ischemic heart disease  Give aspirin 325mg, IV Lasix 80mg, trend troponin, if trop further increasing or worsening CP will start a heparin gtt and consult Cardiology    2/3/23: troponin 1.9, remains on hep gtt, ECHO results in process. Cardiology consult pending  2/4/23:  No new multi vessel disease per heart catheterization.  Cardiology recommends once optimized on medical therapy and out of decompensation will need to get evaluated for CABG. If not CABG  candidate then will need high risk PCI. Medical management recs per Cardiology:  ASA 81mg. Plavix for one year. Statin therapy  2/7/23: INR 2.7--will stop Heparin gtt and cont Coumadin 5 mg PO on today. Recheck INR daily.     Noted with a LV thrombus from echo cardiogram.  She will need Coumadin outpatient as per Cardiology with an INR goal of 2-3.  We have bridged Coumadin with heparin.        VTE Risk Mitigation (From admission, onward)         Ordered     warfarin (COUMADIN) tablet 5 mg  Daily         02/07/23 1303     heparin (porcine) injection 4,000 Units  As needed (PRN)         02/04/23 1343     heparin infusion 1,000 units/500 ml in 0.9% NaCl (pressure line flush)  Intra-op continuous PRN         02/03/23 1553     heparin (porcine) injection 4,000 Units  Once         02/03/23 1403     IP VTE HIGH RISK PATIENT  Once         02/02/23 1901     Place sequential compression device  Until discontinued         02/02/23 1901                Discharge Planning   JACKELYN: 2/9/2023     Code Status: Full Code   Is the patient medically ready for discharge?:     Reason for patient still in hospital (select all that apply): Laboratory test, Treatment, Imaging, Consult recommendations and PT / OT recommendations  Discharge Plan A: Home with family   Discharge Delays: None known at this time              Erickson Green NP  Department of Hospital Medicine   [facility]

## 2023-02-10 NOTE — PLAN OF CARE
AVS and educational attachments shared with patient and sister via [employer]. Discussed thoroughly. Patient and sister verbalized clear understanding using teach back method. Notified bedside nurse of completion of education. At present no distress noted. Patient to be discharged via w/c with escort service and family with all of patient's belonings. Will cont to be available to patient and family and intervene prn.

## 2023-02-10 NOTE — ASSESSMENT & PLAN NOTE
Nephrology consulted for inpatient HD management   Her usual outpatient HD schedule is [facility]

## 2023-02-10 NOTE — NURSING
Shift assessment complete. Pt is alert and oriented x 4. Watching TV. Requests something for loose stool. Bed in lowest position, locked, and side rails up x 3. Bed alarm on. Call light in reach.

## 2023-02-10 NOTE — PLAN OF CARE
-- Message is from the [facility]--    Patient is requesting a medication refill - medication is on active medication list    Patient is currently OUT of the requested medication.    Duration: 90 days    Pharmacy  [id number] [address]    Patient confirmed the above pharmacy as correct?  Yes    Caller Information       Type Contact Phone    02/09/2019 11:57 AM Phone (Incoming) Myra Hooker (Self) [phone] (H)          Alternative phone number: n/a    Turnaround time given to caller:   \"This message will be sent to [state Provider's name]. The clinical team will fulfill your request as soon as they review your message.\"   Pt on RA, no respiratory distress noted. Will continue to monitor.

## 2023-02-10 NOTE — SUBJECTIVE & OBJECTIVE
Interval History: No acute events overnight. T INR 2.7 this am. Will cont Coumadin 5 mg Po daily. Goal INR is 2-3.   Life Vest has been placed on the patient. Appreciate Cardiology        Review of Systems   Constitutional:  Positive for fatigue.   Respiratory:  Positive for shortness of breath. Negative for cough and wheezing.    Cardiovascular:  Negative for chest pain.   Gastrointestinal:  Negative for diarrhea, nausea and vomiting.   Neurological:  Positive for weakness.   Objective:     Vital Signs (Most Recent):  Temp: 97.7 °F (36.5 °C) (02/10/23 0843)  Pulse: 63 (02/10/23 0843)  Resp: 20 (02/10/23 0843)  BP: (!) 143/68 (02/10/23 0843)  SpO2: (!) 92 % (02/10/23 0843)   Vital Signs (24h Range):  Temp:  [97 °F (36.1 °C)-97.8 °F (36.6 °C)] 97.7 °F (36.5 °C)  Pulse:  [52-84] 63  Resp:  [16-20] 20  SpO2:  [92 %-97 %] 92 %  BP: (126-143)/(60-68) 143/68     Weight: 69.7 kg (153 lb 10.6 oz)  Body mass index is 28.1 kg/m².  No intake or output data in the 24 hours ending 02/10/23 1013     Physical Exam  Vitals and nursing note reviewed.   Cardiovascular:      Rate and Rhythm: Regular rhythm. Bradycardia present.      Comments: Life vest in place   Pulmonary:      Effort: Pulmonary effort is normal. No respiratory distress.      Breath sounds: Rales present.   Abdominal:      Palpations: Abdomen is soft.   Skin:     Comments: Left IJ PermCath for dialysis noted   Neurological:      Mental Status: She is alert and oriented to person, place, and time. Mental status is at baseline.   Psychiatric:         Mood and Affect: Mood normal.       Significant Labs: All pertinent labs within the past 24 hours have been reviewed.    Significant Imaging: I have reviewed all pertinent imaging results/findings within the past 24 hours.

## 2023-02-10 NOTE — ASSESSMENT & PLAN NOTE
As per recent echocardiogram  She was started on heparin drip   INR today, 2.7  As per cardiology we will need AC with warfarin--we will  bridge to warfarin with the heparin drip for INR goal of 2-3.

## 2023-02-10 NOTE — PLAN OF CARE
SW met with pt in dialysis. Pt reported that she and her sister will catch taxi cab to dc home. Pt reports that her sister will meet her at the hospital after she gets out of HD. Pt declined for SW to setup transport for her to dc home. Rollator was delivered to pts bedside yesterday.      SW will continue to follow pt throughout her transitions of care and assist with any dc needs.    SW contacted Cardiothoracic Surgery f/u    Cleared from  . Bedside Nurse and VN notified.    Future Appointments   Date Time Provider Department Center   2/23/2023 11:00 AM Clara Lucio, PT [facility]   2/23/2023  3:00 PM Lashawn Lowery MD [facility]        02/10/23 1311   Final Note   Assessment Type Final Discharge Note   Anticipated Discharge Disposition Home   Hospital Resources/Appts/Education Provided Appointments scheduled by Navigator/Coordinator   Post-Acute Status   Discharge Delays None known at this time

## 2023-02-10 NOTE — PLAN OF CARE
VN note: VN completed AVS and attachments and notified bedside nurse, Keyla. Will cont to be available and intervene prn.

## 2023-02-10 NOTE — MEDICAL/APP STUDENT
[facility] - Telemetry  Progress Note    Patient Name: Ping Davenport  MRN: [id number]  Patient Class: IP- Inpatient   Admission Date: 2/2/2023  Length of Stay: 7 days  Attending Physician: Daphnie Jackson MD  Primary Care Provider: Primary Doctor No    Subjective:     Principal Problem: Acute on chronic respiratory failure with hypoxia    Chief Complaint:   Chief Complaint   Patient presents with    Fatigue     Pt complains of cough/ fatigue, HA that began yesterday sats in triage on room air= 87%, pt has hx of dialysis, last dialysis was yesterday        HPI: Ms. Davenport is a 57 year old woman with a past medical history of ESRD on HD MWF seen by Dr. Jc who developed SOB and cough productive of green sputum yesterday. She was dialyzed yesterday. Flu and COVID tests negative. She has not had fevers or chills. Has noticed that sometimes when she is walking in the parking lot she develops chest tightness after going a short distance. Does not believe she has had a left heart cath before but was told that she has congestive heart failure.     Hospital Course: She was admitted to the hospital medicine service for further care.  Cardiology and nephrology was consulted.  Patient was noted with an NSTEMI on admission.  She was started on heparin drip.  2D echo completed on 02/03/2023 shows an EF of 20%---patient has combined systolic and diastolic congestive heart failure.  She underwent a cardiac catheterization on 02/03/2023.  Patient's heart catheterization shows mid LAD 80% stenosed, The 2nd Diag lesion was 60% stenosed, The Prox Cx to Mid Cx lesion was 70% stenosed, mid RCA lesion was 99% stenosed, and there is also three-vessel coronary artery disease noted.  Per her heart catheterization we see there is severe multivessel coronary artery disease, decompensated heart failure, and LV thrombosis.  Per cardiology's plan will continue HD for volume management and also treatment of pneumonia.  Patient will need CT as  evaluation for CABG.  Will continue heparin drip for 48 hours post catheterization.  Patient's dialysis is Monday, Wednesday, Friday:  Appreciate nephrology.  Empiric tx for CAP with azithro and CTX.  Her flu and COVID are negative.    Interval History:   No acute events overnight. T INR 2.6 this am. HD this AM. Will cont Coumadin 5 mg Po daily. Goal INR is 2-3. Life Vest has been placed on the patient. Appreciate Cardiology. Patient evaluated by PT/OT. Declined HH and SNF,but desired outpatient PT/OT. Awaiting transportation for discharge.    Past Medical History:   Diagnosis Date    Diabetes mellitus     Hypertension     Renal disorder        Past Surgical History:   Procedure Laterality Date    INSERTION OF CATHETER FOR HEMODIALYSIS Left 9/11/2020    Procedure: INSERTION, CATHETER, HEMODIALYSIS;  Surgeon: William Beltran MD;  Location: [facility];  Service: General;  Laterality: Left;       Review of patient's allergies indicates:   Allergen Reactions    Phenergan [promethazine] Other (See Comments)     Restless legs    Reglan [metoclopramide hcl]     Tromethamine Other (See Comments)    Zofran [ondansetron hcl (pf)] Other (See Comments)     Constipation     Ketorolac Palpitations       No current facility-administered medications on file prior to encounter.     Current Outpatient Medications on File Prior to Encounter   Medication Sig    acetaminophen (TYLENOL) 500 MG tablet Take 500 mg by mouth every 6 (six) hours as needed for Pain.    amLODIPine (NORVASC) 10 MG tablet Take 1 tablet (10 mg total) by mouth once daily.    B complex-vitamin C-folic acid (SELENE-EMILIA/NEPHRO-EMILIA) 0.8 mg Tab TAKE 1 TABLET BY MOUTH ONCE A DAY (Patient taking differently: Take 1 tablet by mouth every evening.)    carvediloL (COREG) 25 MG tablet Take 1 tablet (25 mg total) by mouth 2 (two) times daily.    furosemide (LASIX) 80 MG tablet Take 2 tablets (160 mg total) by mouth 2 (two) times daily. (Patient taking differently: Take 160  mg by mouth 2 (two) times daily as needed.)    hydrALAZINE (APRESOLINE) 10 MG tablet Take 10 mg by mouth daily as needed.    sevelamer HCL (RENAGEL) 800 MG Tab Take 2 tablets by mouth three times daily with meals and 1 tablet by mouth twice daily with snacks     Family History    None       Tobacco Use    Smoking status: Never    Smokeless tobacco: Not on file   Substance and Sexual Activity    Alcohol use: Not Currently    Drug use: Never    Sexual activity: Not Currently     Partners: Male     Review of Systems   Constitutional:  Positive for fatigue. Negative for fever.   HENT: Negative.     Eyes: Negative.    Respiratory:  Positive for cough and shortness of breath. Negative for wheezing.    Cardiovascular:  Negative for chest pain and leg swelling.   Gastrointestinal:  Negative for diarrhea, nausea and vomiting.   Skin:  Negative for rash.   Neurological: Negative.    Objective:     Vital Signs (Most Recent):  Temp: 98.6 °F (37 °C) (02/06/23 0727)  Pulse: 60 (02/06/23 0727)  Resp: 19 (02/06/23 0727)  BP: (!) 127/58 (02/06/23 0727)  SpO2: (!) 93 % (02/06/23 0855)   Vital Signs (24h Range):  Temp:  [97.7 °F (36.5 °C)-98.6 °F (37 °C)] 98.6 °F (37 °C)  Pulse:  [59-63] 60  Resp:  [18-22] 19  SpO2:  [88 %-98 %] 93 %  BP: (127-141)/(58-65) 127/58     Weight: 68.9 kg (152 lb)  Body mass index is 27.8 kg/m².    Intake/Output Summary (Last 24 hours) at 2/6/2023 1005  Last data filed at 2/6/2023 0600  Gross per 24 hour   Intake 110 ml   Output --   Net 110 ml      Physical Exam  Constitutional:       General: She is not in acute distress.     Appearance: Normal appearance.   HENT:      Head: Normocephalic and atraumatic.      Nose: Nose normal.      Mouth/Throat:      Mouth: Mucous membranes are moist.   Eyes:      Conjunctiva/sclera: Conjunctivae normal.   Cardiovascular:      Rate and Rhythm: Normal rate and regular rhythm.      Pulses: Normal pulses.      Heart sounds: Normal heart sounds.   Pulmonary:      Breath  sounds: No wheezing or rales.   Abdominal:      General: Abdomen is flat.      Palpations: Abdomen is soft.   Musculoskeletal:      Right lower leg: No edema.      Left lower leg: No edema.   Skin:     General: Skin is warm and dry.      Capillary Refill: Capillary refill takes less than 2 seconds.   Neurological:      Mental Status: She is alert and oriented to person, place, and time.   Psychiatric:         Mood and Affect: Mood normal.         Behavior: Behavior normal.       Significant Labs: All pertinent labs within the past 24 hours have been reviewed.  CMP:     Significant Imaging: I have reviewed all pertinent imaging results/findings within the past 24 hours.        Assessment/Plan:      Acute on chronic combined systolic and diastolic heart failure  Echo from 02/03/2023 noted with acute on chronic systolic and diastolic heart failure.    Patient with an EF of 20%--Decompensated heart failure.    Also noted with an LV thrombus.  Appreciate Cardiology on this case   LifeVest ordered by Cardiology  Starting Entresto 24/26 BID  Volume management per hemodialysis.  Continue strict intake and output  Daily weights        LV (left ventricular) thrombus  As per recent echocardiogram  She was started on heparin drip   As per cardiology we will need AC with warfarin--we will  bridge to warfarin with the heparin drip for INR goal of 2-3. INR 2.7 today.     Pneumonia  Chest x-ray on admission with pulmonary edema and although no large focal consolidation, clinical concern for PNA  Started on ceftriaxone and azithromycin empirically for CAP  Improving with ABX (and volume management per HD)  She has received 3 days of Azithro 500mg qd -- thus will stop this (only need 3d for 500 mg dose)  Day #5 of CTX today - will continue this to complete 5d course -last dose on 02/06/2023  Flu and COVID are negative   Completed course of IV abx. Dc iv rocephin        Essential hypertension  Continue home meds        ESRD (end stage  renal disease) on dialysis  Nephrology consulted for inpatient HD management   Her usual outpatient HD schedule is MWF       Anemia due to chronic kidney disease, on chronic dialysis  Patient's anemia is currently controlled. Has not received any PRBCs to date.. Etiology likely d/t ESRD.   Current CBC reviewed-         Lab Results   Component Value Date     HGB 10.1 (L) 02/07/2023     HCT 30.7 (L) 02/07/2023      Monitor serial CBC and transfuse if patient becomes hemodynamically unstable, symptomatic or H/H drops below 7/21.         NSTEMI (non-ST elevated myocardial infarction)  On admission, reported SOB with sputum, does reports ongoing exertional chest tightness and dyspnea when she walks in the parking lot  Prior echo with normal EF and grade I diastolic dysfunction (was on mechanical ventilation at the time)  TTE repeated this admission with new reduced EF - 20%  Sats 87% SBP in 140s  Trop 0.85-->1.2-->1.3  BNP >4900 (no baseline)  EKG with 1st degree AV block, sinus rhythm, ST, T wave changes, no STEMI  No chest pain at rest. Appears most likely due to demand ischemia due to pneumonia, volume overload in the setting of ESRD, however patient could have ischemic heart disease  Give aspirin 325mg, IV Lasix 80mg, trend troponin, if trop further increasing or worsening CP will start a heparin gtt and consult Cardiology     2/3/23: troponin 1.9, remains on hep gtt, ECHO results in process. Cardiology consult pending  2/4/23:  No new multi vessel disease per heart catheterization.  Cardiology recommends once optimized on medical therapy and out of decompensation will need to get evaluated for CABG. If not CABG candidate then will need high risk PCI. Medical management recs per Cardiology:  ASA 81mg. Plavix for one year. Statin therapy  2/6/23: LifeVest ordered per Cardiology     Noted with a LV thrombus from echo cardiogram.  She will need Coumadin outpatient as per Cardiology with an INR goal of 2-3.  Will bridge  Coumadin with heparin - pharmacist is assisting with Coumadin dosing       Coronary artery disease involving native coronary artery of native heart without angina pectoris  Patient with known CAD s/p angiogram, which is uncontrolled Will continue ASA, Plavix and Statin and monitor for S/Sx of angina/ACS. Continue to monitor on telemetry.   S/p LHC - 80% stenosis in mid LAD, mid left circ. 99% stenosis in mid RCA, + LV thrombus  Continue medical management with aspirin, statin, Plavix, beta-blocker  CT surgery consult as outpatient for CABG eval versus high-risk PCI  Life vest was delivered and is now in place        Hyperlipidemia      Patient is chronically on statin.will continue for now. Monitor clinically. Last LDL was         Lab Results   Component Value Date     LDLCALC 151.4 11/06/2019         Metabolic acidosis  Secondary to renal etiology. Add sodium bicarb 650 mg bid. Defer further treatment to nephrology. Trend renal panel     Thrombocytopenia        Lab Results   Component Value Date      (L) 02/07/2023      Reviewed. Plt 138 Trend CBC      Active Diagnoses:    Diagnosis Date Noted POA    PRINCIPAL PROBLEM:  Acute combined systolic and diastolic heart failure [I50.41] 02/04/2023 Yes    ESRD (end stage renal disease) [N18.6] 02/06/2023 Yes    Thrombocytopenia [D69.6] 02/06/2023 Yes    Metabolic acidosis [E87.20] 02/06/2023 Yes    Hyperlipidemia [E78.5] 02/06/2023 Yes    Coronary artery disease involving native coronary artery of native heart without angina pectoris [I25.10] 02/06/2023 Yes    Pneumonia [J18.9] 02/04/2023 Yes    LV (left ventricular) mural thrombus following MI [I23.6] 02/04/2023 Yes    Anemia due to chronic kidney disease, on chronic dialysis [N18.6, D63.1, Z99.2] 09/10/2020 Not Applicable    Essential hypertension [I10] 09/10/2020 Yes    NSTEMI (non-ST elevated myocardial infarction) [I21.4] 11/05/2019 Yes      Problems Resolved During this Admission:    Diagnosis Date Noted Date  Resolved POA    Acute on chronic respiratory failure with hypoxia [J96.21] 02/04/2023 02/05/2023 Yes    Hypervolemia [E87.70]  02/09/2023 Yes     VTE Risk Mitigation (From admission, onward)           Ordered     warfarin (COUMADIN) tablet 5 mg  Daily         02/07/23 1303     heparin (porcine) injection 4,000 Units  As needed (PRN)         02/04/23 1343     heparin infusion 1,000 units/500 ml in 0.9% NaCl (pressure line flush)  Intra-op continuous PRN         02/03/23 1553     heparin (porcine) injection 4,000 Units  Once         02/03/23 1403     IP VTE HIGH RISK PATIENT  Once         02/02/23 1901     Place sequential compression device  Until discontinued         02/02/23 1901                       JAMARI Villanueva - Telemetry

## 2023-02-10 NOTE — PROCEDURES
"Patient seen and examined on HD tolerating well. No complains.   /85   Pulse 67   Temp 97.8 °F (36.6 °C)   Resp 20   Ht 5' 2" (1.575 m)   Wt 69.7 kg (153 lb 10.6 oz)   SpO2 (!) 92%   Breastfeeding No   BMI 28.10 kg/m²     With any question please call answering service [phone]  Junie Krishnan MD    [facility]  YAIR Donato MD, RANJITH SHANNON MD,   MD JUSTIN Bose MD E. V. Harmon, NP    [address]  [place], [address]   "

## 2023-02-10 NOTE — PT/OT/SLP PROGRESS
Occupational Therapy      Patient Name:  Ping Davenport   MRN:  [id number]    Patient not seen today secondary to Dialysis (AM: Dialysis.  Pt with planned dischage home after dialysis.). Will follow up at later time.    2/10/2023

## 2023-02-13 PROBLEM — Z79.01 LONG TERM (CURRENT) USE OF ANTICOAGULANTS: Status: ACTIVE | Noted: 2023-01-01

## 2023-02-13 NOTE — PROGRESS NOTES
Patient called back using  #[phone] to leave a voice message that Pharmacist requesting 2/14/23 STAT INR so she needs to go to the [facility] (not [facility]) to get INR so result received STAT/same day.  I didn't cancel the 2/14/23 [facility] appointment so if patient doesn't follow instructions at least an INR will be in process.  Pharmacist notified of attempt to reschedule patient to hospital lab for 2/14/23.    2/14/23  I used  #[phone] to leave a voice message on patient's phone for her to go to [facility] for STAT INR today 2/14/23 and she must call clinic back to complete enrollment.

## 2023-02-13 NOTE — PROGRESS NOTES
Pt requests call back on 2/14 to complete enrollment. She confirmed she is taking 5mg QHS. INR tomorrow

## 2023-02-13 NOTE — PROGRESS NOTES
"57 year old woman with a past medical history of ESRD on HD MWF. Patient admitted to [facility] hospital 2/2/23-2/10/23.     Hospital Course:    "Patient was noted with an NSTEMI on admission.  She was started on heparin drip.  2D echo completed on 02/03/2023 shows an EF of 20%---patient has combined systolic and diastolic congestive heart failure.  She underwent a cardiac catheterization on 02/03/2023.    Per her heart catheterization we see there is severe multivessel coronary artery disease, decompensated heart failure, and LV thrombosis.  Per cardiology's plan will continue HD for volume management and also treatment of pneumonia.   Empiric tx for CAP with azithro and CTX.  Her flu and COVID are negative. She is recommended for Life Vest and outpatient follow up with Vascular surgery for possible CABG.  Coumadin clinic ambulatory referral has been sent.  She will be discharged home with Entresto 24-26 mg twice a day, and Coumadin 5 mg daily. She will also need ASA 81 mg PO, atorvastatin 40 mg Po daily, and will cont [id number] mg PO bid.  Patient to follow-up with CTS, PCP, and Cardiology outpatient. "    We will need to reach patient and confirm coumadin dose taken since discharge and set up follow up INR for tomorrow STAT    "

## 2023-02-14 NOTE — TELEPHONE ENCOUNTER
Returned call to pt. Pt rescheduled to next available appointment.       ----- Message from Eugenia Ríos sent at 2/14/2023 10:34 AM CST -----  Regarding: Reschedule Appt  Contact: Patient  Patient is requesting a call back to reschedule appt with physician   Patient is scheduled to be seen on 02/15/2023   Patient stated she has dialysis and needs to reschedule   Please Assist     Patient can be reached at [phone]

## 2023-02-14 NOTE — PROGRESS NOTES
Spoke with pt. Pt speaks english and declined  services. She confirmed she has a peach, 5mg warfarin tablet. She has been taking 5mg daily since discharge. Pt will avoid vitamin K and does not drink alcohol. Pt's questions addressed and she verbalized understanding of all information provided. 'Diet' and 'When to call' information sheets provided via portal.     Pt had INR drawn today. Results pending. She has dialysis Mon, Wed, Fri. She reports that yesterday she had some excessive bleeding at port site during dialysis. Pharmacist recommended pt take 2.5mg warfairn tonight to be cautious but pt had already taking 5mg this morning. Pt advised to take warfarin at night.

## 2023-02-14 NOTE — PROGRESS NOTES
Spoke with pt's sister, Alla. She said pt will be going to hospital lab at 1pm. She requests callback later for education.

## 2023-02-15 NOTE — PROGRESS NOTES
INR at goal. Medications and chart reviewed. No changes noted to necessitate adjustment of warfarin or follow-up plan. See calendar. Bleeding has stopped

## 2023-02-16 NOTE — PROGRESS NOTES
Subjective:      Patient ID: Ping Davenport is a 57 y.o. female.    Chief Complaint: No chief complaint on file.      HPI:  Ping Davenport is a 57 y.o. female who presents  initial surgical CABG evaluation.Medical conditions include thrombocytopenia, ESRD on HD MWF, combined systolic and diastolic congestive heart failure (EF 20%), LV thrombus on Coumadin, HTN, HLD. Patient was recently admitted at [facility] on 2/2/23 for pneumonia and NSTEMI. Underwent LHC significant for mid LAD 80% stenosed, The 2nd Diag lesion was 60% stenosed, The Prox Cx to Mid Cx lesion was 70% stenosed, mid RCA lesion was 99% stenosed and LV thrombus. Echo demonstrated EF 20%, LV thrombosis at the apex, moderate LA enlargement, mild-mod MR, mod TR, mod VT, PAP 61. Patient was discharged with Life vest and on Coumadin for the LV thrombus. She endorse sob, unable to walk from the parking garage to clinic and has to be in a wheel chair. She is unable to perform ADL due to the dyspnea, has help from family. She is also unable to stand for know more than 1 hr before feeling fatigued. Uses a walker. Patient denies cp, palpitations, dizziness. Gibson hx of stroke, seizures, stents, blood clots.     Family and social history reviewed    Review of patient's allergies indicates:   Allergen Reactions    Phenergan [promethazine] Other (See Comments)     Restless legs    Reglan [metoclopramide hcl]     Tromethamine Other (See Comments)    Zofran [ondansetron hcl (pf)] Other (See Comments)     Constipation     Ketorolac Palpitations     Past Medical History:   Diagnosis Date    Coronary artery disease involving native coronary artery of native heart without angina pectoris 2/6/2023    Diabetes mellitus     Hypertension     Renal disorder      Past Surgical History:   Procedure Laterality Date    INSERTION OF CATHETER FOR HEMODIALYSIS Left 9/11/2020    Procedure: INSERTION, CATHETER, HEMODIALYSIS;  Surgeon: William Beltran MD;  Location:  [facility] OR;  Service: General;  Laterality: Left;    LEFT HEART CATHETERIZATION Left 2/3/2023    Procedure: Left heart cath;  Surgeon: Remy Toscano MD;  Location: [facility] CATH LAB/EP;  Service: Cardiology;  Laterality: Left;     Family History    None       Social History     Socioeconomic History    Marital status:    Tobacco Use    Smoking status: Never   Substance and Sexual Activity    Alcohol use: Not Currently    Drug use: Never    Sexual activity: Not Currently     Partners: Male       Current medications Reviewed    Review of Systems   Constitutional:  Positive for fatigue. Negative for activity change, appetite change and fever.   HENT:  Negative for nosebleeds.    Respiratory:  Positive for shortness of breath. Negative for cough.    Cardiovascular:  Positive for palpitations and leg swelling. Negative for chest pain.   Gastrointestinal:  Negative for abdominal distention, abdominal pain and nausea.   Genitourinary:  Negative for frequency.   Musculoskeletal:  Negative for arthralgias and myalgias.   Skin:  Negative for rash.   Neurological:  Negative for dizziness, seizures and numbness.   Hematological:  Does not bruise/bleed easily.   Objective:   Physical Exam  Vitals reviewed.   Constitutional:       Appearance: Normal appearance.   HENT:      Head: Normocephalic and atraumatic.   Eyes:      Pupils: Pupils are equal, round, and reactive to light.   Cardiovascular:      Rate and Rhythm: Normal rate.   Pulmonary:      Effort: Pulmonary effort is normal.   Abdominal:      General: Abdomen is flat.   Musculoskeletal:         General: Normal range of motion.      Cervical back: Normal range of motion.   Skin:     General: Skin is warm and dry.      Capillary Refill: Capillary refill takes less than 2 seconds.      Coloration: Skin is not pale.   Neurological:      General: No focal deficit present.      Mental Status: She is alert.     Diagnostic Results: reviewed   ECHO 2/3/23  The left ventricle  is mildly enlarged with concentric hypertrophy and severely decreased systolic function.  The estimated ejection fraction is 20%.  There is left ventricular global hypokinesis.  A small spherical solid left ventricular thrombus is present. The thrombus is fixed and located in the apex.  Grade II left ventricular diastolic dysfunction.  Normal right ventricular size with normal right ventricular systolic function.  Moderate left atrial enlargement.  There is mild aortic valve stenosis.  Aortic valve area is 1.24 cm2; peak velocity is 2.07 m/s; mean gradient is 10 mmHg.  Mild-to-moderate mitral regurgitation.  Moderate tricuspid regurgitation.  Moderate pulmonic regurgitation.  There is pulmonary hypertension.  Elevated central venous pressure (15 mmHg).  The estimated PA systolic pressure is 61 mmHg.    C 2/3/23    The Mid LAD lesion was 80% stenosed.    The 2nd Diag lesion was 60% stenosed.    The Prox Cx to Mid Cx lesion was 70% stenosed.    The Mid RCA lesion was 99% stenosed.    The estimated blood loss was none.    There was three vessel coronary artery disease.     - Severe multivessel coronary artery disease  - Decompensated heart failure  - LV thrombus   Assessment:     Plan:     CTS Attending Note:    I have personally taken the history and examined this patient and agree with the JUN's note as stated above.  Very pleasant 57-year-old woman with multiple medical issues including renal failure and ischemic cardiomyopathy.  She has three-vessel disease on angiogram.  She does have targets suitable for bypass.  However, it is unclear what the benefit would be given her low ejection fraction.  We will plan for a PET to evaluate for viability and potential surgical benefit of revascularization.  If the viability is limited then we will plan for heart kidney transplant eval.

## 2023-02-22 NOTE — PROGRESS NOTES
Above INR was elevated and now 2 days old. We will ask patient to hold coumadin today and repeat INR early tomorrow STAT. No bleeding issues reported. Patient was re-educated on situations that would require placing a call to the coumadin clinic, including bleeding or unusual bruising issues, changes in health, diet or medications, upcoming procedures that require warfarin interruption, and missed coumadin dose(s). Patient expressed understanding that avoidance of consistency with these parameters could cause fluctuations in INR, leading to more frequent visits and increase risk of adverse events.

## 2023-03-06 NOTE — TELEPHONE ENCOUNTER
----- Message from Natty King sent at 3/6/2023  6:57 AM CST -----  Regarding: Questions and concerns  Contact: [phone]  Patient Ping is calling. Patients red blood count is low, bruise all over body and very tired. Patient would like to talk to the office in ref to her warfarin medication and would like to change to Eliquis.  Please call patient at  [phone]    Thank You

## 2023-03-06 NOTE — PROGRESS NOTES
"   We received the following message this morning.    Message from Natty King sent at 3/6/2023  6:57 AM CST -----      " Patient Ping is calling. Patients red blood count is low, bruise all over body and very tired. Patient would like to talk to the office in ref to her warfarin medication and would like to change to Eliquis"        Medical assistant Komal aCrmona in our coumadin clinic called patient to question her further about bruising and ask if there is any other lab she is referring to since we have no recent CBC or lab that would reflect this. Patient initially replied NO when asked about unusual bleeding or bruising issues. Patient stated she did miss warfarin 3/5 dose but confirmed taking all other doses. Pt reports no other changes.  Addendum: sister reports blood count is low-told this Friday at dialysis, has bruising all over, noticed a bruise 4 fingers wide and 1 1/2 fingers long under L arm between armpit and elbow--getting bigger with a hard knot in it, thinks maybe she should be switched to Eliquis--has put in a call to Cardiologist.       Since this INR is 3 days old and was slightly elevated,  she is having extensive bruising, and she was told her red blood count was low, in addition to her missing yesterday's dose, we will also hold coumadin today and await further guidance from cardiologist since patient has already placed a call to them. In the meantime, I advise we get INR STAT tomorrow to see exactly where it is.   "

## 2023-03-06 NOTE — TELEPHONE ENCOUNTER
Reason for Disposition   [1] Bruise on head/face, chest, or abdomen AND [2]  taking Coumadin (warfarin) or other strong blood thinner, or known bleeding disorder (e.g., thrombocytopenia)    Additional Information   Negative: Bruises with fever   Negative: Tiny bruises (spots or dots) of unknown cause   Negative: Bruise(s) of forehead or head   Negative: Bruise(s) of face or jaw   Negative: Followed an injury, and triager doesn't know which injury guideline to use first   Negative: Post-operative bruising   Negative: Shock suspected (e.g., cold/pale/clammy skin, too weak to stand, low BP, rapid pulse)   Negative: Sounds like a life-threatening emergency to the triager   Negative: Dizziness or lightheadedness    Protocols used: Bruises-A-AH  Pt's sister Alla states the pt was recently diagnosed with blood clots and put on coumadin. States the pt goes to dialysis 3 times a week.    States the pt has large bruises all over her body. States the pt complains of feeling dizzy.    States she received a call today from the office nurse telling her to stop taking the coumadin. Advised to bring pt to the nearest ED now for evaluation. She verbalized understanding.

## 2023-03-07 NOTE — ED NOTES
Patient states having complete her dialysis session today and was also informed that she had low blood counts.

## 2023-03-07 NOTE — ED PROVIDER NOTES
Encounter Date: 3/6/2023    SCRIBE #1 NOTE: I, Nguyễn Boo, am scribing for, and in the presence of,  Ted Graff MD. I have scribed the following portions of the note - Other sections scribed: HPI, ROS, PE, MDM.     History     Chief Complaint   Patient presents with    Bleeding/Bruising     Patient presents to the ED with reports of having bruises over her body. State she is taking coumadin medication was told by providers last night to stop taking her medication.      Ping Davenport is a 58 y.o. female who has a past medical history of Coronary artery disease involving native coronary artery of native heart without angina pectoris (2/6/2023), Diabetes mellitus, Hypertension, and Renal disorder.    The patient presents to the ED for evaluation of diffuse bruising. The associated symptom includes generalized weakness. She explains that she bruises easily and shows several bruises on her bilateral arms and legs. She denies any chest pain or shortness of breath. She endorses a history of blood clots. She was on Coumadin medication but was advised by her cardiologist to stop for a few days due to increase in INR levels. . She denies any black stools, hematuria, and blood in vomit.    The history is provided by the patient. No  was used.   Review of patient's allergies indicates:   Allergen Reactions    Phenergan [promethazine] Other (See Comments)     Restless legs    Reglan [metoclopramide hcl]     Tromethamine Other (See Comments)    Zofran [ondansetron hcl (pf)] Other (See Comments)     Constipation     Ketorolac Palpitations     Past Medical History:   Diagnosis Date    Coronary artery disease involving native coronary artery of native heart without angina pectoris 2/6/2023    Diabetes mellitus     Hypertension     Renal disorder      Past Surgical History:   Procedure Laterality Date    INSERTION OF CATHETER FOR HEMODIALYSIS Left 9/11/2020    Procedure: INSERTION, CATHETER,  HEMODIALYSIS;  Surgeon: William Beltran MD;  Location: [facility] OR;  Service: General;  Laterality: Left;    LEFT HEART CATHETERIZATION Left 2/3/2023    Procedure: Left heart cath;  Surgeon: Remy Toscano MD;  Location: [facility] CATH LAB/EP;  Service: Cardiology;  Laterality: Left;     No family history on file.  Social History     Tobacco Use    Smoking status: Never   Substance Use Topics    Alcohol use: Not Currently    Drug use: Never     Review of Systems   Constitutional:  Negative for fatigue and fever.   Respiratory:  Negative for shortness of breath.    Cardiovascular:  Negative for chest pain.   Gastrointestinal:         Negative blood in vomit  Negative black stool   Genitourinary:  Negative for hematuria.   Neurological:  Positive for weakness.   Hematological:  Bruises/bleeds easily.   All other systems reviewed and are negative.    Physical Exam     Initial Vitals [03/06/23 2028]   BP Pulse Resp Temp SpO2   (!) 172/72 70 16 98.3 °F (36.8 °C) 96 %      MAP       --         Physical Exam    Nursing note and vitals reviewed.  Constitutional: She appears well-developed and well-nourished.   HENT:   Head: Normocephalic and atraumatic.   Eyes: Conjunctivae and EOM are normal.   Neck: Neck supple.   Normal range of motion.  Cardiovascular:  Normal rate, regular rhythm and normal heart sounds.           Pulmonary/Chest: Breath sounds normal. No respiratory distress.   Abdominal: Abdomen is soft. She exhibits no distension. There is no abdominal tenderness.   Musculoskeletal:      Cervical back: Normal range of motion and neck supple.     Neurological: She is alert and oriented to person, place, and time.   Skin: Skin is warm and dry. Bruising (diffuse bruising in various stages of healing) noted.   Contusions noted throughout the body in various stages of healing   Psychiatric: Her behavior is normal. Thought content normal.       ED Course   Procedures  Labs Reviewed   CBC W/ AUTO DIFFERENTIAL - Abnormal;  "Notable for the following components:       Result Value    RBC 3.20 (*)     Hemoglobin 9.8 (*)     Hematocrit 29.6 (*)     RDW 17.6 (*)     Platelets 116 (*)     Lymph # 0.5 (*)     Lymph % 10.6 (*)     Eosinophil % 8.9 (*)     All other components within normal limits   COMPREHENSIVE METABOLIC PANEL - Abnormal; Notable for the following components:    CO2 31 (*)     BUN 25 (*)     Creatinine 3.4 (*)     eGFR 15 (*)     All other components within normal limits   PROTIME-INR - Abnormal; Notable for the following components:    Prothrombin Time 22.5 (*)     INR 2.3 (*)     All other components within normal limits   APTT - Abnormal; Notable for the following components:    aPTT 39.2 (*)     All other components within normal limits          Imaging Results    None          Medications - No data to display  Medical Decision Making:   Initial Assessment:   This is a pleasant 58-year-old female with multiple medical problems including known coronary artery disease, currently being evaluated for CABG, dialysis patient, EF of 20%, currently on warfarin for her multivessel disease presents the ER for evaluation of bleeding/bruising.  Patient sources that she is been experiencing bruises throughout various parts of her body.  She also reports that she went to dialysis today and her "blood counts were low ".  And she was advised to come the ER for further evaluation.  She denies any overt bleeding.  She denies any blood in urine stools no nausea vomiting.  She otherwise feels well.  Concern for bleeding secondary to anticoagulation use.  Will plan blood work symptomatic support observation reassess.    Clinical Tests:   Lab Tests: Ordered and Reviewed           ED Course as of 03/07/23 0400   Mon Mar 06, 2023   0977 Patient resting comfortably in bed, no acute distress.  Labs reviewed.  INR 2.3, at goal.  Hemoglobin around baseline at 9.8, patient's platelets 116.  Discussed with patient.  Likely contusion from being " anticoagulated no overt bleeding.  I discussed with her plan to discharge home, go to her INR clinic and likely restart her Coumadin in the morning.  She will follow-up with her cardiologist to switched from warfarin to Eliquis if appropriate.  Return precautions discussed. [SE]      ED Course User Index  [SE] Melissa Graff MD                 Clinical Impression:   Final diagnoses:  [R23.3] Bruising, spontaneous (Primary)        ED Disposition Condition    Discharge Stable          ED Prescriptions    None       Follow-up Information    None          Melissa Graff MD  03/07/23 7672

## 2023-03-07 NOTE — DISCHARGE INSTRUCTIONS
Your INR is at 2.3 which is your goal.  I will discussed with Cardiology, if you can switch from warfarin to Eliquis.  It is common to have spontaneous contusions while on anticoagulation, your hemoglobin levels are stable.  Please follow-up with primary care, continue to monitor and observe your symptoms.

## 2023-03-08 NOTE — PROGRESS NOTES
"Per discharge note from [facility] 2/10/23, patient was supposed to f/u with Dr Everton herron. Patient has not followed up. She was in ED 3/6/23 with excessive bruising, some which have "hard knots" in them.  Patient missed her coumadin 3/5 and 3/6. INR in ED was therapeutic, we advised her to resume her coumadin 3/7 and schedule  f/u with Dr Toscano. I messaged Dr Toscano for advice on how to proceed dye to her hx of TCP, her excessive bruising, feeling weak and dizzy.     Per response from Dr Toscano,     "Please ask the patient to stop the plavix,  just continue Coumadin along with Asa 81 mg daily. Please schedule f/u. We can't stop the coumadin at this time given the LV thrombus.     Sincerely,   Remy Toscano MD.   Interventional Cardiologist   [facility] [place] "    We will call patient to give her this advice.     "

## 2023-03-09 NOTE — PROGRESS NOTES
Advised patient sister that pt needs to stop taking plavis and continue the 81mg of Asprin, continue taking coumadin, advised her to follow up with  and set appointment

## 2023-03-10 NOTE — PROGRESS NOTES
INR not at goal due to holding earlier in the week for bruising. Medications, chart, and patient findings reviewed. Patient had bruising evaluated in ED - CBC was stable and determined bruising likely not a result of active bleeding. Given active thrombus, will boost today. Close follow up recommended.

## 2023-03-10 NOTE — PROGRESS NOTES
Coumadin clinic scheduled INR for patient on 3/7/23 (pt no show), 3/9(no show). Patient and sister were advised that lab can ONLY be booked on Friday if they go before 11am since going later would result in us not getting results until Monday. She was advised of the importance of this lab due to her recent bruising issues. However, patient's sister just called to report they have not gone to the lab yet.  I will strongly urge that now she should get INR ASAP at [facility] lab, since going to [facility] lab at this time would result in us not getting results before the weekend.

## 2023-03-15 NOTE — DISCHARGE INSTRUCTIONS
Follow up with vascular surgeon regarding your dialysis access.    Thank you for coming in to see us at [facility]! It was nice to meet you, and I hope you feel better soon. Please feel free to return to the ER at any time should your symptoms get worse, or if you have different emergent concerns.    Our goal in the emergency department is to always give you outstanding care and exceptional service. You may receive a survey by mail or e-mail in the next week regarding your experience in our ED. We would greatly appreciate your completing and returning the survey. Your feedback provides us with a way to recognize our staff who give very good care and it helps us learn how to improve when your experience was below our aspiration of excellence.       Sincerely,    Long Solorio MD  Medical Director  Emergency Department  [facility]

## 2023-03-15 NOTE — FIRST PROVIDER EVALUATION
" Emergency Department TeleTriage Encounter Note      CHIEF COMPLAINT    Chief Complaint   Patient presents with    Vascular Access Problem     Pt was receiving dialysis and pt referred to ED to check placed of vascular left chest wall access, pt states dialysis nurse stated that " tube has moved slightly" pt voices no complaints        VITAL SIGNS   Initial Vitals [03/15/23 1401]   BP Pulse Resp Temp SpO2   (!) 161/70 62 20 98 °F (36.7 °C) 97 %      MAP       --            ALLERGIES    Review of patient's allergies indicates:   Allergen Reactions    Phenergan [promethazine] Other (See Comments)     Restless legs    Reglan [metoclopramide hcl]     Tromethamine Other (See Comments)    Zofran [ondansetron hcl (pf)] Other (See Comments)     Constipation     Ketorolac Palpitations       PROVIDER TRIAGE NOTE  Patient was at dialysis today and the nurse felt like her access has moved and recommend she come to the ED for evaluation.       ORDERS  Labs Reviewed - No data to display    ED Orders (720h ago, onward)      None              Virtual Visit Note: The provider triage portion of this emergency department evaluation and documentation was performed via [employer], a HIPAA-compliant telemedicine application, in concert with a tele-presenter in the room. A face to face patient evaluation with one of my colleagues will occur once the patient is placed in an emergency department room.      DISCLAIMER: This note was prepared with [employer] voice recognition transcription software. Garbled syntax, mangled pronouns, and other bizarre constructions may be attributed to that software system.    "

## 2023-03-15 NOTE — ED PROVIDER NOTES
"Encounter Date: 3/15/2023    SCRIBE #1 NOTE: I, Connor Cohn, am scribing for, and in the presence of,  Long Solorio MD. I have scribed the following portions of the note - Other sections scribed: HPI, ROS, Physical Exam.     History     Chief Complaint   Patient presents with    Vascular Access Problem     Pt was receiving dialysis and pt referred to ED to check placed of vascular left chest wall access, pt states dialysis nurse stated that " tube has moved slightly" pt voices no complaints      This is a 58 y.o. female who has a past medical history of Coronary artery disease involving native coronary artery of native heart without angina pectoris (2/6/2023), Diabetes mellitus, Hypertension, and Renal disorder.     The patient presents to the Emergency Department due to a vascular access problem.   Pt reports she was 15 minutes into dialysis today when she was referred to the ED to check her access placement because "it may have been pulled out some."  She denies any other complaints at this time.  Pt is currently on blood thinners (Warfarin).     The history is provided by the patient. No  was used.   Review of patient's allergies indicates:   Allergen Reactions    Phenergan [promethazine] Other (See Comments)     Restless legs    Reglan [metoclopramide hcl]     Tromethamine Other (See Comments)    Zofran [ondansetron hcl (pf)] Other (See Comments)     Constipation     Ketorolac Palpitations     Past Medical History:   Diagnosis Date    Coronary artery disease involving native coronary artery of native heart without angina pectoris 2/6/2023    Diabetes mellitus     Hypertension     Renal disorder      Past Surgical History:   Procedure Laterality Date    INSERTION OF CATHETER FOR HEMODIALYSIS Left 9/11/2020    Procedure: INSERTION, CATHETER, HEMODIALYSIS;  Surgeon: William Beltran MD;  [facility] OR;  Service: General;  Laterality: Left;    LEFT HEART CATHETERIZATION Left " 2/3/2023    Procedure: Left heart cath;  Surgeon: Remy Toscano MD;  Location: [facility] CATH LAB/EP;  Service: Cardiology;  Laterality: Left;     No family history on file.  Social History     Tobacco Use    Smoking status: Never   Substance Use Topics    Alcohol use: Not Currently    Drug use: Never     Review of Systems   Skin:         +dialysis access issue     Physical Exam     Initial Vitals [03/15/23 1401]   BP Pulse Resp Temp SpO2   (!) 161/70 62 20 98 °F (36.7 °C) 97 %      MAP       --         Physical Exam    Nursing note and vitals reviewed.  Constitutional: She appears well-developed and well-nourished. She is not diaphoretic. No distress.   HENT:   Head: Normocephalic and atraumatic.   Mouth/Throat: Oropharynx is clear and moist.   Eyes: Conjunctivae are normal.   Cardiovascular:  Normal rate, regular rhythm and intact distal pulses.           Pulmonary/Chest: Breath sounds normal. No respiratory distress.   Left upper chest tunneled dialysis catheter. Sutures have been removed.  Catheter appears to have been retracted 1-1.5in. No bleeding from the site. No erythema. Site is non tender. No purulence.   Musculoskeletal:         General: Normal range of motion.     Neurological: She is alert and oriented to person, place, and time.   Skin: Skin is warm and dry. Capillary refill takes less than 2 seconds. No rash noted. No erythema.   Psychiatric: She has a normal mood and affect.       ED Course   Procedures  Labs Reviewed - No data to display       Imaging Results              X-Ray Chest 1 View for Line/Tube Placement (Final result)  Result time 03/15/23 16:27:50      Final result by Berry Mistry MD (03/15/23 16:27:50)                   Impression:      No appreciable change in the position and configuration of the left-sided dialysis catheter.    Cardiomegaly with unchanged pulmonary interstitial edema.      Electronically signed by: Berry Mistry MD  Date:    03/15/2023  Time:    16:27                Narrative:    EXAMINATION:  XR CHEST 1 VIEW FOR LINE/TUBE PLACEMENT    CLINICAL HISTORY:  dialysis access pulled back about 1.5in;    TECHNIQUE:  Single frontal portable view of the chest was performed.    COMPARISON:  Examinations dated the 02/02/2023 and 04/13/2022    FINDINGS:  There is no significant change in the position of the left-sided double-lumen dialysis catheter.  The patient's remains in a life vest.    There are calcifications involving the tracheal wall.  There are calcifications of the aortic knob.  There is stable enlargement cardiomediastinal silhouette.  There is no evidence of free air beneath the hemidiaphragms.  There are no pleural effusions.  There is no evidence of a pneumothorax.  There is no evidence of pneumomediastinum.  There is stable appearance of mild pulmonary interstitial edema.  There is chronic deformity of the right proximal humerus.  There are degenerative changes in the osseous structures.                                       Medications - No data to display  Medical Decision Making:   Initial Assessment:   This is an emergent evaluation of a 58 y.o.female patient with presentation of retracted tunneled dialysis catheter in the left chest, placed about 2-1/2 years ago.  Patient states that it has been retracted for about 2 weeks.  Had no complaints during dialysis today.  No obvious infection, no bleeding, no swelling around the site or purulence.     Initial differentials include but are not limited to:  Dislodged hemodialysis access.     Plan: chest xray, consult surgery   Clinical Tests:   Radiological Study: Ordered and Reviewed  Other:   I have discussed this case with another health care provider.       <> Summary of the Discussion:   Dr. Pfeiffer:  Discussed placement of hemodialysis access on chest x-ray, adequate position, description of exam.  States that patient can get it either replaced or be consulted by vascular surgeon for fistula as an outpatient.             Scribe Attestation:   Scribe #1: I performed the above scribed service and the documentation accurately describes the services I performed. I attest to the accuracy of the note.                 I, Dr. Long Solorio, personally performed the services described in this documentation.   All medical record entries made by the scribe were at my direction and in my presence.   I have reviewed the chart and agree that the record is accurate and complete.   Long Solorio MD.         Clinical Impression:   Final diagnoses:  [Z78.9] Problem with vascular access (Primary)       Discharge process:  -- Clinical impression and plan including outpatient f/u with specialist was discussed with patient.   -- Pt is to return to the ED immediately for any new or worsening symptoms.  -- They are always welcome to return for any emergent concerns.    -- The patient had time for ask questions to which they were addressed.   -- The patient expressed understanding and agrees with my plan.        ED Disposition Condition    Discharge Stable          ED Prescriptions    None       Follow-up Information       Follow up With Specialties Details Why Contact Info    Mikal Davis MD General Surgery, Surgery  vascular access [address]  [address]  [facility] [address] [phone]               Long Solorio MD  03/15/23 4864

## 2023-03-15 NOTE — ED NOTES
Central line dressing with bio patch place over dialysis catheter in a sterile fashion. Hands washed with soap and water before dressing application. Noted that pt did not have sterile dressing prior to arrival, tape and gauze was used by dialysis center. Pt states she has not had a sterile dressing like this prior to this visit. Pt instructed not to change this dressing on her own and to have dialysis center change and maintain the sterile dressing type to help prevent infection and secure catheter since the sutures are no longer in place.

## 2023-03-15 NOTE — ED NOTES
Pt arrives to my care well appearing with out any complaints. Pt sent to Er from [facility] dialysis for a displaced shunt in her left anterior chest. Shunt has been reported to have been displaced by about 1in. Noted that sutures are not in place. Dressing is clean and dry. 10min of Dialysis treatment were preformed by Georgette with out difficulty.

## 2023-03-17 NOTE — PROGRESS NOTES
INR from this week was in therapeutic range. We are continuing this dose for now with continued close follow up. In trying to inquire about current status of bruising and to confirm if and when orders from Dr Toscano (stop plavix and take asa 81mg daily) were followed, we attempted to reach patient/sister over the last several days. In reaching patient's sister, she was unable to give specific information on when the plavix was stopped but says they stopped it when told to do so recently. She is also not sure when patient started the asa but says she is taking it. When asked to discuss with patient, patient's sister reports she would not know specifics. either.   Perhaps if this continues we can either ask for  to speak directly to patient or ask that patient's sister write down detailed instructions each time patient is re dosed.  Of note, patient does have appointment with Dr Toscano scheduled on 4/5/23. Importance of making this appointment will be emphasized.

## 2023-03-17 NOTE — PROGRESS NOTES
Pts sister said she does not remember when the pt's last dose of Plavix was. Pts sister does not know when pt started the 81mg Aspirin. Pt's sister stated when pt was told to stop taking the Plavix she doesn't pt to have a stroke. Last doses of Plavix are not known per pt and sister.

## 2023-03-23 NOTE — PROGRESS NOTES
INr has been overdue since 3/20 and coumadin clinic has left several daily messages for callback to reschedule

## 2023-03-24 NOTE — PROGRESS NOTES
INR at goal. Medications and chart reviewed. No changes noted to necessitate adjustment of warfarin or follow-up plan. See calendar. No complaints of bruising reported. Patient denies any changes in diet, medications, or health that would effect warfarin therapy.

## 2023-04-03 NOTE — PROGRESS NOTES
Subjective:   @Patient ID:  Ping Davenport is a 58 y.o. female who presents for evaluation of CAD, Cardiomyopathy, LV thrombus     HPI:   Hospital admission 2/2023 with NSTEMI s/p C with MVCAD. Echo with LV thrombus. [facility] with MVCAD. She was in heart failure and and had pneumonia as well. Once compensated she was referred for bypass eval as outpatient. She was seen by Dr. Stahl and PET stress test ordered as below to assess benefits from revascularization. She is Coumadin for LV thrombus and f/u with Coumadin clinic. Pending viability study     She is accompanied by her sister  She has been doing ok . Occasional angina with exertion.   On HD 3 times a week.   She has been compliant with her medications           [facility] ESRD on HD,   Prior cardiovascular  Hx  --------------------------------  [facility] 2/3/2023    The Mid LAD lesion was 80% stenosed.    The 2nd Diag lesion was 60% stenosed.    The Prox Cx to Mid Cx lesion was 70% stenosed.    The Mid RCA lesion was 99% stenosed.    The estimated blood loss was none.    There was three vessel coronary artery disease.     - Severe multivessel coronary artery disease  - Decompensated heart failure  - LV thrombus       Echo 2/2/2023   The left ventricle is mildly enlarged with concentric hypertrophy and severely decreased systolic function.  The estimated ejection fraction is 20%.  There is left ventricular global hypokinesis.  A small spherical solid left ventricular thrombus is present. The thrombus is fixed and located in the apex.  Grade II left ventricular diastolic dysfunction.  Normal right ventricular size with normal right ventricular systolic function.  Moderate left atrial enlargement.  There is mild aortic valve stenosis.  Aortic valve area is 1.24 cm2; peak velocity is 2.07 m/s; mean gradient is 10 mmHg.  Mild-to-moderate mitral regurgitation.  Moderate tricuspid regurgitation.  Moderate pulmonic regurgitation.  There is pulmonary hypertension.  Elevated  central venous pressure (15 mmHg).  The estimated PA systolic pressure is 61 mmHg.    PET stress test 3/30/2023  There is a large sized, moderate intensity basal to apical septal, inferior, inferoseptal and inferolateral resting perfusion abnormality involving 48% of LV myocardium in the distribution of the LAD, LCX and RCA. After pharmacologic stress, this perfusion abnormality is larger and more severe involving 64% of the LV myocardium, indicative of ischemia with underlying scar.        There are no other significant perfusion abnormalities.    The whole heart absolute myocardial perfusion values averaged 0.74 cc/min/g at rest, which is normal; 0.84 cc/min/g at stress, which is moderately reduced; and CFR is 1.07 , which is severely reduced.    CT attenuation images demonstrate moderate diffuse coronary calcifications in the LAD, LCX and RCA territory.    The gated perfusion images showed an ejection fraction of 28% at rest and 27% during stress. A normal ejection fraction is greater than 47%.    There is inferior, septal and apical wall akinesis at rest and during stress.    The LV cavity size is moderately enlarged at rest and stress.    The ECG portion of the study is abnormal but not diagnostic due to resting ST-T abnormalities.    There were no arrhythmias during stress.    The patient reported no chest pain during the stress test.    A PET viability is recommended depending on clinical conditions and judgement.    There are no prior studies for comparison.         Patient Active Problem List    Diagnosis Date Noted    Long term (current) use of anticoagulants 02/13/2023    ESRD (end stage renal disease) on dialysis 02/09/2023    ESRD (end stage renal disease) 02/06/2023    Thrombocytopenia 02/06/2023    Metabolic acidosis 02/06/2023    Hyperlipidemia 02/06/2023    Coronary artery disease of native artery of native heart with stable angina pectoris 02/06/2023    Pneumonia 02/04/2023    LV (left ventricular)  mural thrombus following MI 2023    Chronic combined systolic and diastolic heart failure 2023    Anemia due to chronic kidney disease, on chronic dialysis 09/10/2020    Essential hypertension 09/10/2020    Nephrotic syndrome     Hypertensive emergency 2019    New onset of congestive heart failure 2019    Acute respiratory failure with hypoxia and hypercarbia 2019    Acute renal failure 2019    NSTEMI (non-ST elevated myocardial infarction) 2019    Rash 10/13/2014    Encounter to establish care 10/13/2014    Screen for STD (sexually transmitted disease) 10/13/2014           Right Arm BP - Sittin/62  Left Arm BP - Sittin/64        LAST HbA1c  Lab Results   Component Value Date    HGBA1C 5.3 09/10/2020       Lipid panel  Lab Results   Component Value Date    CHOL 218 (H) 2019     Lab Results   Component Value Date    HDL 38 (L) 2019     Lab Results   Component Value Date    LDLCALC 151.4 2019     Lab Results   Component Value Date    TRIG 143 2019     Lab Results   Component Value Date    CHOLHDL 17.4 (L) 2019            Review of Systems   Constitutional: Positive for malaise/fatigue. Negative for chills and fever.   HENT:  Negative for hearing loss and nosebleeds.    Eyes:  Negative for blurred vision.   Cardiovascular:         As in HPI    Respiratory:  Negative for hemoptysis and shortness of breath.    Hematologic/Lymphatic: Negative for bleeding problem.   Skin:  Positive for color change. Negative for itching.   Musculoskeletal:  Negative for falls.   Gastrointestinal:  Negative for abdominal pain and hematochezia.   Genitourinary:  Negative for hematuria.   Neurological:  Negative for dizziness and loss of balance.   Psychiatric/Behavioral:  Negative for altered mental status and depression.      Objective:   Physical Exam  Constitutional:       Appearance: She is well-developed.   HENT:      Head: Normocephalic and atraumatic.    Eyes:      Conjunctiva/sclera: Conjunctivae normal.   Neck:      Vascular: No carotid bruit or JVD.   Cardiovascular:      Rate and Rhythm: Normal rate and regular rhythm.      Pulses: Decreased pulses.           Carotid pulses are 2+ on the right side and 2+ on the left side.       Radial pulses are 2+ on the right side and 2+ on the left side.      Heart sounds: Murmur heard.     No friction rub. No gallop.   Pulmonary:      Effort: Pulmonary effort is normal. No respiratory distress.      Breath sounds: Normal breath sounds. No stridor. No wheezing.   Musculoskeletal:      Cervical back: Neck supple.      Right lower leg: Edema (Trace) present.      Left lower leg: Edema (Trace) present.   Skin:     General: Skin is warm and dry.   Neurological:      Mental Status: She is alert and oriented to person, place, and time.   Psychiatric:         Behavior: Behavior normal.       Assessment:     1. LV (left ventricular) mural thrombus following MI    2. Hyperlipidemia, unspecified hyperlipidemia type    3. ESRD (end stage renal disease) on dialysis    4. Chronic combined systolic and diastolic heart failure    5. Coronary artery disease of native artery of native heart with stable angina pectoris    6. Essential hypertension    7. New onset of congestive heart failure    8. NSTEMI (non-ST elevated myocardial infarction)        Plan:   Severe multivessel coronary artery disease  He is being followed by Dr. Stahl.  Pending viability study stress test reviewed there was ischemia noted.  Discussion is about bypass versus consideration for heart and kidney transplant.  If the patient is not candidate for both then may consider PCI given the inferior ischemia noted in the PET stress test.    Increase Entresto  Continue aspirin and Coumadin  Continue Coreg  Continue high-intensity statin    Four weeks follow-up  Pertinent cardiac images and EKG reviewed independently.    Continue with current medical plan and lifestyle  changes.  Return sooner for concerns or questions. If symptoms persist go to the ED  I have reviewed all pertinent data including patient's medical history in detail and updated the computerized patient record.     No orders of the defined types were placed in this encounter.      Follow up as scheduled.     She expressed verbal understanding and agreed with the plan    Patient's Medications   New Prescriptions    SACUBITRIL-VALSARTAN (ENTRESTO) 49-51 MG PER TABLET    Take 1 tablet by mouth 2 (two) times daily.   Previous Medications    ACETAMINOPHEN (TYLENOL) 500 MG TABLET    Take 500 mg by mouth every 6 (six) hours as needed for Pain.    AMLODIPINE (NORVASC) 10 MG TABLET    Take 1 tablet (10 mg total) by mouth once daily.    ASPIRIN 81 MG CHEW    Take 1 tablet (81 mg total) by mouth once daily.    ATORVASTATIN (LIPITOR) 40 MG TABLET    Take 1 tablet (40 mg total) by mouth once daily.    B COMPLEX-VITAMIN C-FOLIC ACID (SELENE-EMILIA/NEPHRO-EMILIA) 0.8 MG TAB    TAKE 1 TABLET BY MOUTH ONCE A DAY    CARVEDILOL (COREG) 25 MG TABLET    Take 1 tablet (25 mg total) by mouth 2 (two) times daily.    FUROSEMIDE (LASIX) 80 MG TABLET    Take 2 tablets (160 mg total) by mouth 2 (two) times daily as needed (Increased swelling to the lower ext and SHORTNESS OF BREATH).    SEVELAMER CARBONATE (RENVELA) 800 MG TAB    Take 2 tablets (1,600 mg total) by mouth 3 (three) times daily with meals.    SODIUM BICARBONATE 650 MG TABLET    Take 1 tablet (650 mg total) by mouth 2 (two) times daily.    WARFARIN (COUMADIN) 5 MG TABLET    Take 1 tablet (5 mg total) by mouth Daily.   Modified Medications    No medications on file   Discontinued Medications    CLOPIDOGREL (PLAVIX) 75 MG TABLET    Take 1 tablet (75 mg total) by mouth once daily.    SACUBITRIL-VALSARTAN (ENTRESTO) 24-26 MG PER TABLET    Take 1 tablet by mouth 2 (two) times daily.

## 2023-04-03 NOTE — PROGRESS NOTES
Patient denies any changes in diet, medications, or health that would effect warfarin therapy.  INR from 3 days age. Repeat INR 4/5 with other appointment

## 2023-04-04 NOTE — TELEPHONE ENCOUNTER
Spoke with pt and her sister, Alla, about PET viability scan which has been scheduled for next Tuesday, April 11 at 7:30 a.m. Reviewed prep instructions (see below) which pt and her sister verbalized understanding of. Provided pt with my direct number to call if she needs to cancel this appointment but reinforced the importance of keeping this appointment so that a final recommendation can be made on surgery versus proceeding with transplant work up. Pt and her sister verbalized understanding of this.     HOW TO PREPARE FOR THE TEST      1. DO NOT eat or drink anything containing CAFFEINE for 12 hours before your test.This includes coffee and tea (including decaf), caffeinated soda, chocolate, cocoa, and certain pain medications that contain caffeine (examples are Excedrin, Anacin, Midol, Fiorinal).  2. DO NOT eat or drink for 6 hours before your test.You may take your medicines with small sips of water.  3. If you are taking Phyllocontin or Truphylline (Aminophylline), Persantine (Dipyridamole), Mesfin-Dur (Theophylline), or Aggrenox, STOP taking these medications 24 hours before the day of your test.  4. Wear a two-piece outfit. Do not put powder, lotion, or oil on your chest.  5. If you cannot keep your appointment or if you have questions, please call [phone]

## 2023-04-05 PROBLEM — I50.42 CHRONIC COMBINED SYSTOLIC AND DIASTOLIC HEART FAILURE: Chronic | Status: ACTIVE | Noted: 2023-01-01

## 2023-04-05 PROBLEM — I25.118 CORONARY ARTERY DISEASE OF NATIVE ARTERY OF NATIVE HEART WITH STABLE ANGINA PECTORIS: Chronic | Status: ACTIVE | Noted: 2023-01-01

## 2023-04-05 NOTE — PROGRESS NOTES
Patient was seen today by Dr Toscano. She was advised to continue on asa and coumadin.   Patient reports bruising on both arms and both legs. She had nose bleeds a couple times when pt is blowing her nose. No consistent bruising.  Patient states she has taken all correct doses of coumadin.  INR not at goal. Medications, chart, and patient findings reviewed. See calendar for adjustments to dose and follow up plan.  Patient was re-educated on situations that would require placing a call to the coumadin clinic, including bleeding or unusual bruising issues, changes in health, diet or medications, upcoming procedures that require warfarin interruption, and missed coumadin dose(s). Patient expressed understanding that avoidance of consistency with these parameters could cause fluctuations in INR, leading to more frequent visits and increase risk of adverse events.

## 2023-04-10 NOTE — TELEPHONE ENCOUNTER
Received message from HENRIK Stein that [facility] has denied request for PET  viability scan. Attempted to call patient and inform her she should not come to hospital tomorrow morning for test.    Pt only has one number on file. Called patient's daughter who did not answer and does not have VM set up. Will attempt to call again.    Julie Haase RN  CTS Nurse Navigator [phone]

## 2023-04-10 NOTE — TELEPHONE ENCOUNTER
Was able to reach patient's sister, Alla. Explained that insurance would not cover scan and that Nurse Connor would call her tomorrow to speak with her about surgery dates.    She verbalized understanding and will not report for test tomorrow.    Julie Haase RN  CTS Nurse Navigator [phone]

## 2023-04-12 NOTE — TELEPHONE ENCOUNTER
Returned call to pt. Explained to pt that after Dr. Stahl discussed with Dr. Ryan the insurance authorization denial it was decided amongst them that they should reorder and retry the authorization for the PET Viability scan. The scan has been reordered and I am waiting for an appointment date for this scan which will be provided by Dr. Ryan's office. I will call the pt with an update as soon as I have a date for the scan. Pt agreeable and verbalized understanding of this information.       ----- Message from Claire Nye sent at 4/12/2023  9:49 AM CDT -----  Contact: pt @ [phone]  Ping Davenport  calling regarding Appointment Access  (message) for #pt returning call from Evelyn about surgery date, asking for call back

## 2023-04-14 NOTE — TELEPHONE ENCOUNTER
Returned patient's call and left message. Will try to call again before end of business day.  Julie Haase RN  CTS Nurse Navigator [phone]        ----- Message from Fransico Hill sent at 4/14/2023 12:28 PM CDT -----  Regarding: adv  Contact: @[phone]  Pt calling to speak to aditi horn call and adv@[phone]

## 2023-04-14 NOTE — TELEPHONE ENCOUNTER
Spoke with Ms Davenport and updated her on PET viability scan.    Explained that the next open date was not until July, but the head doctor of that department was working on getting her in at a sooner time. Explained that our department had talked to that department and test had been reordered. Explained Dr Ryan, who is the head of cardiac imaging, would speak to the insurance on her behalf and schedule testing. I told her that unfortunately, we did not have a firm date yet but it is highly unlikely that she will have to wait until July. Explained Dr Stahl's nurse will call and check in with them once an appointment is secured.     No other questions at this time.    Julie Haase RN  CTS Nurse Navigator [phone]

## 2023-04-17 NOTE — TELEPHONE ENCOUNTER
----- Message from Nena Walker sent at 4/17/2023  1:32 PM CDT -----  Type:  Needs Medical Advice    Who Called: pt sister  Symptoms (please be specific): pt is requesting to get a return call to discuss discontinuing  a medication      Would the patient rather a call back or a response via [facility]? call  Best Call Back Number: [phone]  Additional Information: please laeve a message if no answer

## 2023-04-19 NOTE — TELEPHONE ENCOUNTER
----- Message from Luis BRADEN Route sent at 4/19/2023 10:46 AM CDT -----  Regarding: Medication  Pt. States she feels  heaviness and in pain  in her legs. Pt. States there is a lot of discoloration. Pt. States she has not been taking the medwarfarin (COUMADIN) 5 MG tablet  Patient Requesting Call Back @ pt.[phone]

## 2023-04-19 NOTE — TELEPHONE ENCOUNTER
Received return call from pt's sister. Confirmed new date for PET Viability on Friday, May 5 at 9:30 a.m. Provided preparation instructions (see below) and will mail hard copy of appointment slip to pt's confirmed mailing address. Pt's sister verbalized understanding of all information.     HOW TO PREPARE FOR THE TEST      1. DO NOT eat or drink anything containing CAFFEINE for 12 hours before your test.This includes coffee and tea (including decaf), caffeinated soda, chocolate, cocoa, and certain pain medications that contain caffeine (examples are Excedrin, Anacin, Midol, Fiorinal).  2. DO NOT eat or drink for 6 hours before your test.You may take your medicines with small sips of water.  3. If you are taking Phyllocontin or Truphylline (Aminophylline), Persantine (Dipyridamole), Mesfin-Dur (Theophylline), or Aggrenox, STOP taking these medications 24 hours before the day of your test.  4. Wear a two-piece outfit. Do not put powder, lotion, or oil on your chest.  5. If you cannot keep your appointment or if you have questions, please call [phone]

## 2023-04-19 NOTE — TELEPHONE ENCOUNTER
----- Message from Edouard Grissom sent at 4/19/2023  1:21 PM CDT -----  Contact: pt  .Type:  Needs Medical Advice  Who Called:  pt   Would the patient rather a call back or a response via [facility]?  Call back  Best Call Back Number: [phone]   Additional Information:  Pt. is requesting  a call back from Brittany.

## 2023-04-19 NOTE — TELEPHONE ENCOUNTER
Attempted call x2 to pt to advise of new appointment date for CV PET Viability scan. No answer, left detailed VM with request for call back and provided my direct line.

## 2023-04-19 NOTE — TELEPHONE ENCOUNTER
Patient calling back wanting to stay on Plavix and aspirin.  Explained to them that Dr Toscano said either she stays on coumadin or xarelto. No plavix.

## 2023-04-19 NOTE — TELEPHONE ENCOUNTER
----- Message from Chayo Manriquez sent at 4/19/2023 11:05 AM CDT -----  Type:  concerns about medication     Who Called: pt's sister   Would the patient rather a call back or a response via [facility]? call  Best Call Back Number: [phone] (detail lvm )  Additional Information:   Requesting a call back regarding medication she stop taking it and bleeding  Daquan trying to reach office but no one contact her back

## 2023-04-19 NOTE — TELEPHONE ENCOUNTER
Patient wants to stop the coumadin and change to xarelto.  Dr Toscano said that is ok but must continue her aspirin and stop her Plavix.  Patient verbalized understanding.

## 2023-04-19 NOTE — TELEPHONE ENCOUNTER
----- Message from Nena Walker sent at 4/19/2023 11:45 AM CDT -----  Type:  Patient Returning Call    Who Called:pt  Who Left Message for Patient:office  Does the patient know what this is regarding?:medication  Would the patient rather a call back or a response via [facility]? call  Best Call Back Number:[phone]   Additional Information:

## 2023-04-25 NOTE — TELEPHONE ENCOUNTER
Pts sister called and pt has been having a nose bleed and she had drip in the throat and it makes her tickle and blow nose and its blood. Pt also has bleeding from around her dialysis catheter. Pt is also c/o severe pain in one of her calves. Pt was recently changed from Warfarin to Xarelto and pt instructed on how to apply pressure to the nose and also on top of the catheter as was done before and triaged for all of these and the calf pain was to go to the ED now or office with PCP approval. Pt has had this going on for a couple of days. Pt's sister told to call back if any other questions or concerns or worsening. Pts sister wants the office but told that there is no way to get her to the office at this time 15 min prior to closing and that I will route a message to cardiology Dr Everton        Reason for Disposition   Bleeding present > 30 minutes and using correct method of direct pressure   Thigh or calf pain in only one leg and present > 1 hour    Additional Information   Negative: Fainted (passed out), or too weak to stand following large blood loss   Negative: Sounds like a life-threatening emergency to the triager   Negative: Looks like a broken bone or dislocated joint (e.g., crooked or deformed)   Negative: Sounds like a life-threatening emergency to the triager   Negative: Chest pain   Negative: Difficulty breathing   Negative: Entire foot is cool or blue in comparison to other side   Negative: Unable to walk   Negative: Fever and red area (or area very tender to touch)   Negative: Swollen joint and fever    Protocols used: Nosebleed-A-OH, Leg Pain-A-OH

## 2023-04-26 NOTE — TELEPHONE ENCOUNTER
Yes needs to get evaluated by ENT. It is not safe to stay off blood thinner due to the blood clot within the heart chamber. I would like to repeat echo to assess of the blood clot still present or have resolved. Please schedule the Echo Thanks

## 2023-04-26 NOTE — TELEPHONE ENCOUNTER
----- Message from Ashwini Peters sent at 4/26/2023  4:03 PM CDT -----  Contact: pt  Type:  Patient Returning Call    Who Called:pt  Who Left Message for Patient:Kayley  Does the patient know what this is regarding?:medication side affects   Would the patient rather a call back or a response via [facility]? Call   Best Call Back Number:[phone]  Additional Information:

## 2023-04-26 NOTE — TELEPHONE ENCOUNTER
----- Message from Rosey Russo sent at 4/25/2023  4:23 PM CDT -----  Type:  Needs Medical Advice    Who Called: pt's sister   Symptoms (please be specific): side effects from taking the xalreto   Bleeding nose   Bleeding from tube  How long has patient had these symptoms:  a few days ago  Would the patient rather a call back or a response via [facility]? call  Best Call Back Number:   Additional Information:

## 2023-04-27 NOTE — TELEPHONE ENCOUNTER
Dr Toscano would like an echo on patient to decide if she can be taken off the blood thinner.  Appointment 04/27/2023 at 0415 pm.

## 2023-04-27 NOTE — TELEPHONE ENCOUNTER
----- Message from Lauren Welsh sent at 4/27/2023 10:18 AM CDT -----  Type:  Needs Medical Advice    Who Called: pt  Symptoms (please be specific): pt returning call needs a call back as soon as possible very important on the rivaroxaban (XARELTO) 15 mg Tab is making her bleed she stop taking it yesterday and it stopped the bleeding need a call back so she knows what to do  Best Call Back Number: [phone]   Additional Information:

## 2023-04-28 NOTE — TELEPHONE ENCOUNTER
Returned call to Alla who reported pt stopped taking her xarelto because it was causing frequent nose bleeds, bleeding from her dialysis access, and pain in her legs. Advised Alla that pt should report to ED if she experiences excessive bleeding. Alla advises that she has notified Dr. Toscano's office of this. I advised Alla that I will also send a note to Dr. Toscano's office.    ----- Message from Marija Benjamin sent at 4/28/2023 10:56 AM CDT -----  Regarding: Patient Advice  Contact: Alla [phone]  Alla is calling to speak to Mehgan about pt was bleeding from nose and dialysis tube stated pt stop taking Rx: xerelto please call

## 2023-05-03 NOTE — PROGRESS NOTES
The repeat Echo showed the the blood clot resolved which is great. Ok to stay off  xarelto and coumadin. I want her to start Plavix along with aspirin though.  Follow up after the viability study. Thanks

## 2023-05-05 NOTE — PROGRESS NOTES
Pt in clinic today stating she went for her CV PET   VIABILITY scan and was told it was canceled. Pt states she was unaware and was not notified. Pt states she was told that scan was approved.Reached out to the cardiology Tech ,Asa Squires, who states scan was canceled after Dr. Rayn did the peer to peer and it was denied.  Informed pt of such and that Raul Stahl's nurse was out of the office today and will return Monday. At that time I will ask  Evelyn to touch bases with her. Pt verbalized understanding.

## 2023-05-12 NOTE — TELEPHONE ENCOUNTER
Spoke with pt's sister, Alla, and explained that although we were given verbal approval during peer to peer for PET Viability, the scan was never formally authorized and therefore canceled. Due to insurance refusing to authorize PET Viability we will not order a thallium viability study. Dr. Ryan is assisting us with this and I will update pt's sister once we have the test ordered and scheduled. Also provided pt's sister with my direct number for ease of access in the future.

## 2023-05-15 PROBLEM — I21.4 NSTEMI (NON-ST ELEVATED MYOCARDIAL INFARCTION): Status: RESOLVED | Noted: 2019-11-05 | Resolved: 2023-01-01

## 2023-05-15 PROBLEM — J18.9 PNEUMONIA: Status: RESOLVED | Noted: 2023-01-01 | Resolved: 2023-01-01

## 2023-05-22 NOTE — TELEPHONE ENCOUNTER
Returned call to pt's sister and advised I do not have an order yet to schedule pt's thallium study. I will call with an update and to schedule as soon as I have an order from the nuclear medicine department. Pt's sister verbalized understanding.

## 2023-05-24 PROBLEM — I16.1 HYPERTENSIVE EMERGENCY: Status: RESOLVED | Noted: 2019-11-05 | Resolved: 2023-01-01

## 2023-05-24 PROBLEM — I50.9 NEW ONSET OF CONGESTIVE HEART FAILURE: Status: RESOLVED | Noted: 2019-11-05 | Resolved: 2023-01-01

## 2023-05-29 NOTE — TELEPHONE ENCOUNTER
Returned call to pt's sister. Advised will follow up once thallium study is available for scheduling and/or I have any information or updates for pt. Pt's sister verbalized understanding. Have sent e-mail requesting update to nuclear cardiology team and will discuss with Dr. Stahl.

## 2023-06-21 NOTE — TELEPHONE ENCOUNTER
Spoke with pt's sister to confirm thallium test has been scheduled for Thursday, July 6 at 11:00 a.m. No preparation required per cardiology tech. Pt's sister verbalized understanding of appointment date, time, and location.

## 2023-06-21 NOTE — TELEPHONE ENCOUNTER
Spoke with pt's sister to discuss preferred days for thallium study. Pt has dialysis MWF so Tuesdays or Thursdays work best. Will follow up once coordinated with nuclear medicine department.

## 2023-06-24 NOTE — ASSESSMENT & PLAN NOTE
Missed HD contributing to hypoxic respiratory failure   Nephrology consulted for inpatient dialysis

## 2023-06-24 NOTE — ASSESSMENT & PLAN NOTE
Continue on home ASA/Plavix  No s/sx of ACS currently   Mildly elevated trop likely 2/2 hypoxia - will trend

## 2023-06-24 NOTE — CONSULTS
Nephrology Consult  Note     Consult Requested By: Moe Farnsworth MD  Reason for Consult: ESRD    SUBJECTIVE:      History of Present Illness:  Ping Davenport is a 58 y.o.   female who  has a past medical history of Coronary artery disease involving native coronary artery of native heart without angina pectoris (2/6/2023), Diabetes mellitus, Hypertension, and Renal disorder.. The patient presented to the [facility] on 6/23/2023 with a primary complaint of SOB missed her HD Friday due to not feeling well and taking care of her sister now SOB volume overload.       Review of Systems   Constitutional:  Negative for chills and fever.   HENT:  Negative for congestion and sore throat.    Eyes:  Negative for blurred vision, double vision and photophobia.   Respiratory:  Positive for cough and shortness of breath.    Cardiovascular:  Negative for chest pain, palpitations and leg swelling.   Gastrointestinal:  Negative for abdominal pain, diarrhea, nausea and vomiting.   Genitourinary:  Negative for dysuria and urgency.   Musculoskeletal:  Negative for joint pain and myalgias.   Skin:  Negative for itching and rash.   Neurological:  Negative for dizziness, sensory change, weakness and headaches.   Endo/Heme/Allergies:  Negative for polydipsia. Does not bruise/bleed easily.   Psychiatric/Behavioral:  Negative for depression.      Past Medical History:   Diagnosis Date    Coronary artery disease involving native coronary artery of native heart without angina pectoris 2/6/2023    Diabetes mellitus     Hypertension     Renal disorder      Past Surgical History:   Procedure Laterality Date    INSERTION OF CATHETER FOR HEMODIALYSIS Left 9/11/2020    Procedure: INSERTION, CATHETER, HEMODIALYSIS;  Surgeon: William Beltran MD;  Location: [facility] OR;  Service: General;  Laterality: Left;    LEFT HEART CATHETERIZATION Left 2/3/2023    Procedure: Left heart cath;  Surgeon: Remy Toscano MD;  Location: [facility] CATH  LAB/EP;  Service: Cardiology;  Laterality: Left;     History reviewed. No pertinent family history.  Social History     Tobacco Use    Smoking status: Never   Substance Use Topics    Alcohol use: Not Currently    Drug use: Never       Review of patient's allergies indicates:   Allergen Reactions    Phenergan [promethazine] Other (See Comments)     Restless legs    Reglan [metoclopramide hcl]     Tromethamine Other (See Comments)    Zofran [ondansetron hcl (pf)] Other (See Comments)     Constipation     Ketorolac Palpitations            OBJECTIVE:     Vital Signs (Most Recent)  Vitals:    06/24/23 0545 06/24/23 0724 06/24/23 0741 06/24/23 1202   BP: (!) 146/72  (!) 158/70    BP Location: Left arm  Left arm    Patient Position: Lying  Sitting    Pulse: (!) 57 (!) 56 (!) 57 61   Resp: 16  18    Temp: 97.7 °F (36.5 °C)  97.5 °F (36.4 °C)    TempSrc: Oral  Oral    SpO2: (!) 92% (!) 91% 97%    Weight:                     Medications:   sodium chloride 0.9%   Intravenous Once    amLODIPine  10 mg Oral Daily    aspirin  81 mg Oral Daily    atorvastatin  40 mg Oral Daily    azithromycin  250 mg Oral Daily    cefTRIAXone (ROCEPHIN) IVPB  1 g Intravenous Q24H    clopidogreL  75 mg Oral Daily    mupirocin   Nasal BID    sacubitriL-valsartan  1 tablet Oral BID    sevelamer carbonate  1,600 mg Oral TID WM    sodium bicarbonate  650 mg Oral BID           Physical Exam  Vitals and nursing note reviewed.   Constitutional:       General: She is not in acute distress.     Appearance: She is not diaphoretic.   HENT:      Head: Normocephalic and atraumatic.      Mouth/Throat:      Pharynx: No oropharyngeal exudate.   Eyes:      General: No scleral icterus.     Conjunctiva/sclera: Conjunctivae normal.      Pupils: Pupils are equal, round, and reactive to light.   Cardiovascular:      Rate and Rhythm: Normal rate and regular rhythm.      Heart sounds: Normal heart sounds. No murmur heard.  Pulmonary:      Effort: No respiratory distress.       Breath sounds: Stridor: bilateral. Rales present.      Comments:    Abdominal:      General: Bowel sounds are normal. There is no distension.      Palpations: Abdomen is soft.      Tenderness: There is no abdominal tenderness.   Musculoskeletal:         General: Normal range of motion.      Cervical back: Normal range of motion and neck supple.      Comments: CVC   Skin:     General: Skin is warm and dry.      Findings: No erythema.   Neurological:      Mental Status: She is alert and oriented to person, place, and time.      Cranial Nerves: No cranial nerve deficit.   Psychiatric:         Mood and Affect: Affect normal.         Cognition and Memory: Memory normal.         Judgment: Judgment normal.       Laboratory:  Recent Labs   Lab 06/23/23 2109 06/24/23  0924   WBC 5.04 5.37   HGB 11.1* 10.6*   HCT 34.3* 32.9*   * 128*   MONO 14.5  0.7 13.6  0.7       Recent Labs   Lab 06/23/23 2109 06/24/23  0924    139   K 4.5 4.6    101   CO2 21* 23   BUN 60* 67*   CREATININE 7.1* 7.7*   CALCIUM 10.8* 10.5   PHOS  --  6.4*         Diagnostic Results:  X-Ray: Reviewed  US: Reviewed  Echo: Reviewed  ASSESSMENT/PLAN:     1. ESRD (N18.6 Z99.2) - usual HD on MWF     -- HD today with UF   -- Volume overload bilateral crackles on exam   -- CXR ? PNA on Left already on IV abx     2. HTN (I10) - controlled   3. Anemia of chronic kidney disease treated with PRAVEEN (N18.9 D63.1)    EPogen   with each HD for Hb goal 10   Recent Labs   Lab 06/23/23 2109 06/24/23  0924   HGB 11.1* 10.6*   HCT 34.3* 32.9*   * 128*         Iron -    Lab Results   Component Value Date    IRON 70 02/08/2023    TIBC 221 (L) 02/08/2023    FERRITIN 2,021 (H) 02/08/2023       4. MBD (E88.9 M90.80) -  Recent Labs   Lab 06/24/23  0924   CALCIUM 10.5   PHOS 6.4*       Recent Labs   Lab 06/24/23  0924   MG 2.5         Lab Results   Component Value Date    .5 (H) 11/07/2019    CALCIUM 10.5 06/24/2023    PHOS 6.4 (H) 06/24/2023      Lab Results   Component Value Date    RCWIXWJF86CG 7 (L) 11/07/2019       Lab Results   Component Value Date    CO2 23 06/24/2023       5. Hemodialysis Access (Z99.2 V45.11)- no issues   6. Nutrition/Hypoalbuminemia (E88.09) -    Recent Labs   Lab 06/23/23 2109   LABPROT 14.6*   ALBUMIN 4.2       Nepro with meals TID. Renal vitamins daily      Thank you for the consult, will follow  With any question please call [phone]  Junie Krishnan MD    [facility]     RANJITH Jc MD,   OMD JUSTIN Oliveros MD E. V. Harmon, NP    [address]  [place], [address] [phone]

## 2023-06-24 NOTE — ED NOTES
PT HAS HOME MEDS THAT SHE IS CURRENTLY TAKING AT HOME.  HAS NOT TAKEN ANY DURING E.D. VISIT AT THIS TIME. 81 MG ASA, LAST DOSE TAKEN TODAY. BOTTLE IS NOW EMPTY.  AMLODIPINE 10 MG QDAY, CLOPIDOGREL 75MG QDAY, CARVEDILOL 25MG BID, RENAVITE CYP QDAY, RENVELA 800 MG TID W/ MEALS, ATORVASTATIN 40MG QDAY, ENTRESTO 49-51 BID, RAN OUT OF FUROSEMID THIS WEEK UNSURE OF DOSE AT THIS TIME.  DOES NOT HAVE THIS MEDICATION BOTTLE WITH HER DURING TODAYS VISIT.

## 2023-06-24 NOTE — ASSESSMENT & PLAN NOTE
BP nl to elevated on admission  Will hold BB due to mild bradycardia, but will resume other HTN/GDMT meds

## 2023-06-24 NOTE — PROGRESS NOTES
06/24/23 1620   Vital Signs   Temp 97.4 °F (36.3 °C)   Temp Source Temporal   Pulse 60   Heart Rate Source Left;Brachial;NIBP   Resp 20   Flow (L/min) 2   Device (Oxygen Therapy) nasal cannula   BP (!) 157/92   BP Location Left arm   BP Method Automatic   Patient Position Lying   Post-Hemodialysis Assessment   Rinseback Volume (mL) 250 mL   Blood Volume Processed (Liters) 61 L   Dialyzer Clearance Lightly streaked   Duration of Treatment 240 minutes   Additional Fluid Intake (mL) 500 mL   Total UF (mL) 4500 mL   Net Fluid Removal 4000   Patient Response to Treatment well   Post-Hemodialysis Comments Pt a+ox3, cardiac rrr, bbs clear, abd soft with + bowel sounds.  Cvc dressing changed with 2x2 gauze and paper tape as pt states she is allergic to adhesive in hospital provided dressing change kit.

## 2023-06-24 NOTE — ED NOTES
NONSKID SOCKS APPLIED, EXTERNAL CATHETER PLACED BY PATIENT PER HER REQUEST.  SUCTION ON AND CONNECTED TO CANNISTER.

## 2023-06-24 NOTE — SUBJECTIVE & OBJECTIVE
"Past Medical History:   Diagnosis Date    Coronary artery disease involving native coronary artery of native heart without angina pectoris 2/6/2023    Diabetes mellitus     Hypertension     Renal disorder        Past Surgical History:   Procedure Laterality Date    INSERTION OF CATHETER FOR HEMODIALYSIS Left 9/11/2020    Procedure: INSERTION, CATHETER, HEMODIALYSIS;  Surgeon: William Beltran MD;  Location: [facility] OR;  Service: General;  Laterality: Left;    LEFT HEART CATHETERIZATION Left 2/3/2023    Procedure: Left heart cath;  Surgeon: Remy Toscano MD;  Location: [facility] CATH LAB/EP;  Service: Cardiology;  Laterality: Left;       Review of patient's allergies indicates:   Allergen Reactions    Phenergan [promethazine] Other (See Comments)     Restless legs    Reglan [metoclopramide hcl]     Tromethamine Other (See Comments)    Zofran [ondansetron hcl (pf)] Other (See Comments)     Constipation     Ketorolac Palpitations       No current facility-administered medications on file prior to encounter.     Current Outpatient Medications on File Prior to Encounter   Medication Sig    acetaminophen (TYLENOL) 500 MG tablet Take 500 mg by mouth every 6 (six) hours as needed for Pain.    aspirin 81 MG Chew Take 1 tablet (81 mg total) by mouth once daily.    atorvastatin (LIPITOR) 40 MG tablet Take 1 tablet (40 mg total) by mouth once daily.    carvediloL (COREG) 25 MG tablet Take 1 tablet (25 mg total) by mouth 2 (two) times daily.    clopidogreL (PLAVIX) 75 mg tablet Take 1 tablet (75 mg total) by mouth once daily.    furosemide (LASIX) 80 MG tablet Take 2 tablets (160 mg total) by mouth 2 (two) times daily as needed (Increased swelling to the lower ext and SHORTNESS OF BREATH). (Patient taking differently: Take 160 mg by mouth 2 (two) times daily as needed (Increased swelling to the lower ext and SOB). Patient states "I need a refill")    sacubitriL-valsartan (ENTRESTO) 49-51 mg per tablet Take 1 tablet by mouth 2 " (two) times daily.    sevelamer carbonate (RENVELA) 800 mg Tab Take 2 tablets (1,600 mg total) by mouth 3 (three) times daily with meals.    amLODIPine (NORVASC) 10 MG tablet Take 1 tablet (10 mg total) by mouth once daily.    [DISCONTINUED] B complex-vitamin C-folic acid (SELENE-EMILIA/NEPHRO-EMILIA) 0.8 mg Tab TAKE 1 TABLET BY MOUTH ONCE A DAY    [DISCONTINUED] rivaroxaban (XARELTO) 15 mg Tab Take 1 tablet (15 mg total) by mouth daily with dinner or evening meal.    [DISCONTINUED] sevelamer HCL (RENAGEL) 800 MG Tab TAKE 3 TABLETS BY MOUTH THREE TIMES DAILY WITH MEALS    [DISCONTINUED] sodium bicarbonate 650 MG tablet Take 1 tablet (650 mg total) by mouth 2 (two) times daily.    [DISCONTINUED] warfarin (COUMADIN) 5 MG tablet Take 1 tablet (5 mg total) by mouth Daily.     Family History    None       Tobacco Use    Smoking status: Never    Smokeless tobacco: Not on file   Substance and Sexual Activity    Alcohol use: Not Currently    Drug use: Never    Sexual activity: Not Currently     Partners: Male     Review of Systems   Constitutional:  Positive for activity change and fatigue. Negative for chills and fever.   HENT:  Negative for congestion, sore throat and trouble swallowing.    Eyes:  Negative for pain and visual disturbance.   Respiratory:  Positive for cough and shortness of breath.    Cardiovascular:  Negative for chest pain, palpitations and leg swelling.   Gastrointestinal:  Negative for abdominal pain, nausea and vomiting.   Endocrine: Negative for polydipsia and polyuria.   Genitourinary:  Positive for decreased urine volume. Negative for flank pain.   Musculoskeletal:  Negative for gait problem and myalgias.   Skin:  Negative for rash and wound.   Neurological:  Negative for dizziness, tremors, syncope, weakness and light-headedness.   Psychiatric/Behavioral:  Negative for agitation and confusion.    Objective:     Vital Signs (Most Recent):  Temp: 97.7 °F (36.5 °C) (06/24/23 0545)  Pulse: (!) 57 (06/24/23  0545)  Resp: 16 (06/24/23 0545)  BP: (!) 146/72 (06/24/23 0545)  SpO2: (!) 92 % (06/24/23 0545) Vital Signs (24h Range):  Temp:  [96.7 °F (35.9 °C)-98 °F (36.7 °C)] 97.7 °F (36.5 °C)  Pulse:  [57-61] 57  Resp:  [16-26] 16  SpO2:  [85 %-97 %] 92 %  BP: (136-182)/(62-74) 146/72     Weight: 95.4 kg (210 lb 5.1 oz)  Body mass index is 38.47 kg/m².     Physical Exam  Vitals and nursing note reviewed.   Constitutional:       General: She is not in acute distress.     Appearance: She is obese. She is ill-appearing.   HENT:      Head: Normocephalic and atraumatic.      Mouth/Throat:      Mouth: Mucous membranes are moist.   Eyes:      Extraocular Movements: Extraocular movements intact.      Conjunctiva/sclera: Conjunctivae normal.   Pulmonary:      Comments: Increased wob  Dereased BS b/l lung bases  Abdominal:      General: There is no distension.      Palpations: Abdomen is soft.      Tenderness: There is no abdominal tenderness. There is no guarding.   Musculoskeletal:      Cervical back: Normal range of motion and neck supple.      Right lower leg: Edema present.      Left lower leg: Edema present.   Skin:     General: Skin is warm and dry.   Neurological:      Mental Status: She is alert and oriented to person, place, and time.   Psychiatric:         Mood and Affect: Mood normal.         Behavior: Behavior normal.              Significant Labs: All pertinent labs within the past 24 hours have been reviewed.    Significant Imaging: I have reviewed all pertinent imaging results/findings within the past 24 hours.

## 2023-06-24 NOTE — PLAN OF CARE
Problem: Adult Inpatient Plan of Care  Goal: Plan of Care Review  Outcome: Ongoing, Progressing  Goal: Patient-Specific Goal (Individualized)  Outcome: Ongoing, Progressing  Goal: Absence of Hospital-Acquired Illness or Injury  Outcome: Ongoing, Progressing  Goal: Optimal Comfort and Wellbeing  Outcome: Ongoing, Progressing  Goal: Readiness for Transition of Care  Outcome: Ongoing, Progressing     Problem: Fluid and Electrolyte Imbalance (Acute Kidney Injury/Impairment)  Goal: Fluid and Electrolyte Balance  Outcome: Ongoing, Progressing     Problem: Oral Intake Inadequate (Acute Kidney Injury/Impairment)  Goal: Optimal Nutrition Intake  Outcome: Ongoing, Progressing     Problem: Renal Function Impairment (Acute Kidney Injury/Impairment)  Goal: Effective Renal Function  Outcome: Ongoing, Progressing     Problem: Fall Injury Risk  Goal: Absence of Fall and Fall-Related Injury  Outcome: Ongoing, Progressing     Problem: Gas Exchange Impaired  Goal: Optimal Gas Exchange  Outcome: Ongoing, Progressing     Problem: Fluid Imbalance (Pneumonia)  Goal: Fluid Balance  Outcome: Ongoing, Progressing     Problem: Infection (Pneumonia)  Goal: Resolution of Infection Signs and Symptoms  Outcome: Ongoing, Progressing     Problem: Respiratory Compromise (Pneumonia)  Goal: Effective Oxygenation and Ventilation  Outcome: Ongoing, Progressing     Problem: Adjustment to Illness (Heart Failure)  Goal: Optimal Coping  Outcome: Ongoing, Progressing     Problem: Cardiac Output Decreased (Heart Failure)  Goal: Optimal Cardiac Output  Outcome: Ongoing, Progressing     Problem: Dysrhythmia (Heart Failure)  Goal: Stable Heart Rate and Rhythm  Outcome: Ongoing, Progressing     Problem: Oral Intake Inadequate (Heart Failure)  Goal: Optimal Nutrition Intake  Outcome: Ongoing, Progressing     Problem: Respiratory Compromise (Heart Failure)  Goal: Effective Oxygenation and Ventilation  Outcome: Ongoing, Progressing

## 2023-06-24 NOTE — PROGRESS NOTES
Evaluated patient.  Patient reports persistent shortness of breath presently on 3 L nasal cannula.  She reports dyspnea increases with minimal exertion.  She is awaiting hemodialysis treatment.  Her normal schedule is Monday Wednesday and Friday.  Last dialysis was Wednesday.    Physical exam-patient with bibasilar crackles.    Continue plan of care.

## 2023-06-24 NOTE — ED NOTES
"*Pt and family request only information be given to Alla, sister of patient.  Reports passcode to be "[id number]"  "

## 2023-06-24 NOTE — ASSESSMENT & PLAN NOTE
Patient with Hypoxic Respiratory failure which is Acute.  she is not on home oxygen. Supplemental oxygen was provided and noted-      .   Signs/symptoms of respiratory failure include- tachypnea, increased work of breathing, respiratory distress and use of accessory muscles. Contributing diagnoses includes - CHF, Pleural effusion and Pneumonia Labs and images were reviewed. Patient Has recent ABG, which has been reviewed. Will treat underlying causes and adjust management of respiratory failure as follows-     Multifactorial AFRF -- infection/PNA, CHF, and ESRD with missed HD all contributing  Wean supplemental O2  Treat infection with ABX and pt with be dialyzed

## 2023-06-24 NOTE — HPI
59yo F with CHF, CAD, hypertension, diabetes, end-stage renal disease on Monday Wednesday Friday dialysis presents the ER for evaluation of chest pain shortness of breath and hypoxia.  Patient reports for the last few days she is been having general malaise feeling very weak she endorses low-grade fevers.  She reports that she felt too weak she was unable to go to dialysis today.  She reports that her sister checked her pulse ox was as low as 78% which prompted her to come to the ER.    In the ED she was hypoxic but SpO2 improved with supplemental O2. VS with HR 50s-60s, BP normal to elevated. CXR concerning for PNA in right lung base and elevated procal. Trop mildly elevated. She was admitted to hospital medicine for acute hypoxic respiratory failure.

## 2023-06-24 NOTE — PROCEDURES
Patient seen and examined on HD tolerating well. No complains.   BP (!) 158/70 (BP Location: Left arm, Patient Position: Sitting)   Pulse 61   Temp 97.5 °F (36.4 °C) (Oral)   Resp 18   Wt 95.4 kg (210 lb 5.1 oz)   SpO2 97%   BMI 38.47 kg/m²     With any question please call answering service [phone]  Junie Krishnan MD    [facility]     RANJITH Jc MD,   MD JUSTIN Bose MD E. V. Harmon, NP    [address]  [place], [address]

## 2023-06-24 NOTE — ED PROVIDER NOTES
Encounter Date: 6/23/2023       History     Chief Complaint   Patient presents with    Shortness of Breath     Worsening sob, chest pressure, cough x24hrs. Reports sao2-79% at home. Pt. Missed dialysis treatment today. Pt. Appears moderately distressed with visible dyspnea.      HPI    This is a 58-year-old female with multiple medical problems including CAD, hypertension, diabetes, end-stage renal disease on Monday Wednesday Friday dialysis presents the ER for evaluation of chest pain shortness of breath and hypoxia.  Patient reports for the last few days she is been having general malaise feeling very weak she endorses low-grade fevers.  She reports that she felt too weak she was unable to go to dialysis today.  She reports that her sister checked her pulse ox was as low as 78% which prompted her to come to the ER.    Review of patient's allergies indicates:   Allergen Reactions    Phenergan [promethazine] Other (See Comments)     Restless legs    Reglan [metoclopramide hcl]     Tromethamine Other (See Comments)    Zofran [ondansetron hcl (pf)] Other (See Comments)     Constipation     Ketorolac Palpitations     Past Medical History:   Diagnosis Date    Coronary artery disease involving native coronary artery of native heart without angina pectoris 2/6/2023    Diabetes mellitus     Hypertension     Renal disorder      Past Surgical History:   Procedure Laterality Date    INSERTION OF CATHETER FOR HEMODIALYSIS Left 9/11/2020    Procedure: INSERTION, CATHETER, HEMODIALYSIS;  Surgeon: William Beltran MD;  Location: [facility] OR;  Service: General;  Laterality: Left;    LEFT HEART CATHETERIZATION Left 2/3/2023    Procedure: Left heart cath;  Surgeon: Remy Toscano MD;  Location: [facility] CATH LAB/EP;  Service: Cardiology;  Laterality: Left;     History reviewed. No pertinent family history.  Social History     Tobacco Use    Smoking status: Never   Substance Use Topics    Alcohol use: Not Currently    Drug use:  Never     Review of Systems   Constitutional:  Positive for fatigue.   Respiratory:  Positive for shortness of breath.    Cardiovascular:  Positive for chest pain.   All other systems reviewed and are negative.    Physical Exam     Initial Vitals   BP Pulse Resp Temp SpO2   06/23/23 2100 06/23/23 2100 06/23/23 2100 06/23/23 2113 06/23/23 2100   (!) 182/74 61 (!) 25 98 °F (36.7 °C) (!) 85 %      MAP       --                Physical Exam    Nursing note and vitals reviewed.  Constitutional:   Elderly chronically ill-appearing no distress   HENT:   Head: Normocephalic and atraumatic.   Eyes: Pupils are equal, round, and reactive to light.   Neck:   Normal range of motion.  Cardiovascular:  Normal rate and regular rhythm.           Pulmonary/Chest: No respiratory distress.   Diminished breath sounds bilaterally no accessory muscle use   Abdominal: Abdomen is soft. She exhibits no distension. There is no abdominal tenderness.   Musculoskeletal:         General: Edema present. Normal range of motion.      Cervical back: Normal range of motion.     Neurological: She is alert and oriented to person, place, and time. GCS score is 15. GCS eye subscore is 4. GCS verbal subscore is 5. GCS motor subscore is 6.   Skin: Skin is warm and dry. Capillary refill takes less than 2 seconds.   Psychiatric: Thought content normal.       ED Course   Procedures  Labs Reviewed   CBC W/ AUTO DIFFERENTIAL - Abnormal; Notable for the following components:       Result Value    RBC 3.58 (*)     Hemoglobin 11.1 (*)     Hematocrit 34.3 (*)     RDW 15.4 (*)     Platelets 122 (*)     Lymph # 0.6 (*)     Lymph % 11.3 (*)     All other components within normal limits   COMPREHENSIVE METABOLIC PANEL - Abnormal; Notable for the following components:    CO2 21 (*)     BUN 60 (*)     Creatinine 7.1 (*)     Calcium 10.8 (*)     Total Protein 8.5 (*)     ALT 8 (*)     eGFR 6 (*)     All other components within normal limits   TROPONIN I - Abnormal; Notable  for the following components:    Troponin I 0.406 (*)     All other components within normal limits   B-TYPE NATRIURETIC PEPTIDE - Abnormal; Notable for the following components:    BNP >4,900 (*)     All other components within normal limits   PROTIME-INR - Abnormal; Notable for the following components:    Prothrombin Time 14.6 (*)     INR 1.4 (*)     All other components within normal limits   PROCALCITONIN - Abnormal; Notable for the following components:    Procalcitonin 0.45 (*)     All other components within normal limits   ISTAT PROCEDURE - Abnormal; Notable for the following components:    POC SATURATED O2 79 (*)     All other components within normal limits   C-REACTIVE PROTEIN          Imaging Results              X-Ray Chest AP Portable (Final result)  Result time 06/23/23 21:46:58      Final result by Austin Lopez MD (06/23/23 21:46:58)                   Impression:      Developing infiltrate and effusion in the right lung base.  Correlate for developing pneumonia.      Electronically signed by: Austin Lopez  Date:    06/23/2023  Time:    21:46               Narrative:    EXAMINATION:  XR CHEST AP PORTABLE    CLINICAL HISTORY:  CHF;    TECHNIQUE:  Single frontal view of the chest was performed.    COMPARISON:  03/15/2023 at 16:02 hour    FINDINGS:  Cardiac silhouette remains enlarged.  New pleural and parenchymal opacity in the right lung base with persistent coarse reticular opacities throughout the remaining lungs.  Dialysis catheter remains.  Defibrillator vest projects over the lower chest.                                       Medications   aspirin chewable tablet 81 mg (has no administration in time range)   atorvastatin tablet 40 mg (has no administration in time range)   clopidogreL tablet 75 mg (has no administration in time range)   sacubitriL-valsartan 49-51 mg per tablet 1 tablet (1 tablet Oral Given 6/24/23 0102)   sevelamer carbonate tablet 1,600 mg (has no administration in  time range)   sodium bicarbonate tablet 650 mg (has no administration in time range)   amLODIPine tablet 10 mg (has no administration in time range)   dextrose 10% bolus 125 mL 125 mL (has no administration in time range)   dextrose 10% bolus 250 mL 250 mL (has no administration in time range)   dextrose 40 % gel 15,000 mg (has no administration in time range)   dextrose 40 % gel 30,000 mg (has no administration in time range)   mupirocin 2 % ointment ( Nasal Not Given 6/24/23 0100)   aluminum-magnesium hydroxide-simethicone 200-200-20 mg/5 mL suspension 30 mL (30 mLs Oral Given 6/24/23 0324)   furosemide injection 120 mg (120 mg Intravenous Given 6/23/23 2128)   cefTRIAXone (ROCEPHIN) 1 g in dextrose 5 % in water (D5W) 5 % 100 mL IVPB (MB+) (0 g Intravenous Stopped 6/23/23 2341)   azithromycin tablet 500 mg (500 mg Oral Given 6/23/23 2305)     Medical Decision Making:   Initial Assessment:   58-year-old female presents the ER for evaluation of chest pain shortness of breath missed dialysis hypoxia.  She is well appearing no acute distress her initial pulse ox was 83% on arrival but it improved with nasal cannula.  Differential includes pneumonia, CHF exacerbation, NSTEMI ACS other cause.  Will plan blood work symptomatic support Lasix likely admission.           ED Course as of 06/24/23 0507   Fri Jun 23, 2023 2258 Resting in bed no acute distress patient's symptoms improved with supplemental oxygen.  Labs reviewed.  No leukocytosis no left shift, pro calc is elevated.  X-ray concerning for right lower lobe infiltrate.  CMP consistent with end-stage renal disease.  Antibiotics given patient will be admitted for further treatment. [SE]      ED Course User Index  [SE] Melissa Graff MD               Critical Care Time: 45 minutes for acute hypoxia requiring supplemental oxygen  Critical care was time spent personally by me on the following activities: evaluating this patient's organ dysfunction, development of  treatment plan, discussing treatment plan with patient or surrogate and bedside caregivers, discussions with consultants, evaluation of patient's response to treatment, examination of patient, ordering and performing treatments and interventions, ordering and review of laboratory studies, ordering and review of radiographic studies, re-evaluation of patient's condition. This critical care time did not overlap with that of any other provider or involve time for any procedures.    Clinical Impression:   Final diagnoses:  [R06.02] Shortness of breath  [J96.01] Acute respiratory failure with hypoxia (Primary)  [J18.9] Pneumonia of right lower lobe due to infectious organism  [I50.9] Acute on chronic congestive heart failure, unspecified heart failure type        ED Disposition Condition    Admit Stable                Melissa Graff MD  06/24/23 3248

## 2023-06-24 NOTE — PLAN OF CARE
06/24/23 0104   Admission   Initial VN Admission Questions Complete   Communication Issues? None   Shift   Virtual Nurse - Rounding Complete   Virtual Nurse - Patient Verbalized Approval Of Camera Use;VN Rounding   Type of Frequent Check   Type Patient Rounds   Safety/Activity   Patient Rounds bed in low position;bed wheels locked;call light in patient/parent reach;ID band on;visualized patient;placement of personal items at bedside   Safety Promotion/Fall Prevention assistive device/personal item within reach;bed alarm set;Fall Risk reviewed with patient/family;side rails raised x 2;instructed to call staff for mobility   Activity Assistance Provided assistance, 1 person   Positioning   Body Position supine   Head of Bed (HOB) Positioning HOB elevated   Pain/Comfort/Sleep   Preferred Pain Scale number (Numeric Rating Pain Scale)   Comfort/Acceptable Pain Level 2   Pain Rating (0-10): Rest 0     Admission questions completed. Introduced patient to VIP model, patient verbalized understanding. Educated patient on fall prevention protocol, updated on plan of care. Opportunity given for pt's questions. All questions answered. Denies needs at this time.

## 2023-06-24 NOTE — ED NOTES
Pt states hasn't eaten today, missed lunch and dinner.  Rn provided patient w/ shrimp maira , garlic bread and zucchini meal from fridge.  Apple juice for beverage.

## 2023-06-24 NOTE — ASSESSMENT & PLAN NOTE
Acute on chronic resp failure 2/2 missed HD  Will consult nephrology for inpatient HD and wean O2 as able

## 2023-06-24 NOTE — ASSESSMENT & PLAN NOTE
Was on Xarelto and warfarin in the past but both have been discontinued due to resolution of LV thrombus per notes

## 2023-06-24 NOTE — H&P
[facility] Medicine  History & Physical    Patient Name: Ping Davenport  MRN: [id number]  Patient Class: IP- Inpatient  Admission Date: 6/23/2023  Attending Physician: Moe Farnsworth MD   Primary Care Provider: Primary Doctor No         Patient information was obtained from patient, past medical records and ER records.     Subjective:     Principal Problem:Acute respiratory failure with hypoxia    Chief Complaint:   Chief Complaint   Patient presents with    Shortness of Breath     Worsening sob, chest pressure, cough x24hrs. Reports sao2-79% at home. Pt. Missed dialysis treatment today. Pt. Appears moderately distressed with visible dyspnea.         HPI: 57yo F with CHF, CAD, hypertension, diabetes, end-stage renal disease on Monday Wednesday Friday dialysis presents the ER for evaluation of chest pain shortness of breath and hypoxia.  Patient reports for the last few days she is been having general malaise feeling very weak she endorses low-grade fevers.  She reports that she felt too weak she was unable to go to dialysis today.  She reports that her sister checked her pulse ox was as low as 78% which prompted her to come to the ER.    In the ED she was hypoxic but SpO2 improved with supplemental O2. VS with HR 50s-60s, BP normal to elevated. CXR concerning for PNA in right lung base and elevated procal. Trop mildly elevated. She was admitted to hospital medicine for acute hypoxic respiratory failure.         Past Medical History:   Diagnosis Date    Coronary artery disease involving native coronary artery of native heart without angina pectoris 2/6/2023    Diabetes mellitus     Hypertension     Renal disorder        Past Surgical History:   Procedure Laterality Date    INSERTION OF CATHETER FOR HEMODIALYSIS Left 9/11/2020    Procedure: INSERTION, CATHETER, HEMODIALYSIS;  Surgeon: William Beltran MD;  Location: [facility];  Service: General;  Laterality: Left;    LEFT HEART  "CATHETERIZATION Left 2/3/2023    Procedure: Left heart cath;  Surgeon: Remy Toscano MD;  Location: [facility] CATH LAB/EP;  Service: Cardiology;  Laterality: Left;       Review of patient's allergies indicates:   Allergen Reactions    Phenergan [promethazine] Other (See Comments)     Restless legs    Reglan [metoclopramide hcl]     Tromethamine Other (See Comments)    Zofran [ondansetron hcl (pf)] Other (See Comments)     Constipation     Ketorolac Palpitations       No current facility-administered medications on file prior to encounter.     Current Outpatient Medications on File Prior to Encounter   Medication Sig    acetaminophen (TYLENOL) 500 MG tablet Take 500 mg by mouth every 6 (six) hours as needed for Pain.    aspirin 81 MG Chew Take 1 tablet (81 mg total) by mouth once daily.    atorvastatin (LIPITOR) 40 MG tablet Take 1 tablet (40 mg total) by mouth once daily.    carvediloL (COREG) 25 MG tablet Take 1 tablet (25 mg total) by mouth 2 (two) times daily.    clopidogreL (PLAVIX) 75 mg tablet Take 1 tablet (75 mg total) by mouth once daily.    furosemide (LASIX) 80 MG tablet Take 2 tablets (160 mg total) by mouth 2 (two) times daily as needed (Increased swelling to the lower ext and SHORTNESS OF BREATH). (Patient taking differently: Take 160 mg by mouth 2 (two) times daily as needed (Increased swelling to the lower ext and SOB). Patient states "I need a refill")    sacubitriL-valsartan (ENTRESTO) 49-51 mg per tablet Take 1 tablet by mouth 2 (two) times daily.    sevelamer carbonate (RENVELA) 800 mg Tab Take 2 tablets (1,600 mg total) by mouth 3 (three) times daily with meals.    amLODIPine (NORVASC) 10 MG tablet Take 1 tablet (10 mg total) by mouth once daily.    [DISCONTINUED] B complex-vitamin C-folic acid (SELENE-EMILIA/NEPHRO-EMILIA) 0.8 mg Tab TAKE 1 TABLET BY MOUTH ONCE A DAY    [DISCONTINUED] rivaroxaban (XARELTO) 15 mg Tab Take 1 tablet (15 mg total) by mouth daily with dinner or evening " meal.    [DISCONTINUED] sevelamer HCL (RENAGEL) 800 MG Tab TAKE 3 TABLETS BY MOUTH THREE TIMES DAILY WITH MEALS    [DISCONTINUED] sodium bicarbonate 650 MG tablet Take 1 tablet (650 mg total) by mouth 2 (two) times daily.    [DISCONTINUED] warfarin (COUMADIN) 5 MG tablet Take 1 tablet (5 mg total) by mouth Daily.     Family History    None       Tobacco Use    Smoking status: Never    Smokeless tobacco: Not on file   Substance and Sexual Activity    Alcohol use: Not Currently    Drug use: Never    Sexual activity: Not Currently     Partners: Male     Review of Systems   Constitutional:  Positive for activity change and fatigue. Negative for chills and fever.   HENT:  Negative for congestion, sore throat and trouble swallowing.    Eyes:  Negative for pain and visual disturbance.   Respiratory:  Positive for cough and shortness of breath.    Cardiovascular:  Negative for chest pain, palpitations and leg swelling.   Gastrointestinal:  Negative for abdominal pain, nausea and vomiting.   Endocrine: Negative for polydipsia and polyuria.   Genitourinary:  Positive for decreased urine volume. Negative for flank pain.   Musculoskeletal:  Negative for gait problem and myalgias.   Skin:  Negative for rash and wound.   Neurological:  Negative for dizziness, tremors, syncope, weakness and light-headedness.   Psychiatric/Behavioral:  Negative for agitation and confusion.    Objective:     Vital Signs (Most Recent):  Temp: 97.7 °F (36.5 °C) (06/24/23 0545)  Pulse: (!) 57 (06/24/23 0545)  Resp: 16 (06/24/23 0545)  BP: (!) 146/72 (06/24/23 0545)  SpO2: (!) 92 % (06/24/23 0545) Vital Signs (24h Range):  Temp:  [96.7 °F (35.9 °C)-98 °F (36.7 °C)] 97.7 °F (36.5 °C)  Pulse:  [57-61] 57  Resp:  [16-26] 16  SpO2:  [85 %-97 %] 92 %  BP: (136-182)/(62-74) 146/72     Weight: 95.4 kg (210 lb 5.1 oz)  Body mass index is 38.47 kg/m².     Physical Exam  Vitals and nursing note reviewed.   Constitutional:       General: She is not in  acute distress.     Appearance: She is obese. She is ill-appearing.   HENT:      Head: Normocephalic and atraumatic.      Mouth/Throat:      Mouth: Mucous membranes are moist.   Eyes:      Extraocular Movements: Extraocular movements intact.      Conjunctiva/sclera: Conjunctivae normal.   Pulmonary:      Comments: Increased wob  Dereased BS b/l lung bases  Abdominal:      General: There is no distension.      Palpations: Abdomen is soft.      Tenderness: There is no abdominal tenderness. There is no guarding.   Musculoskeletal:      Cervical back: Normal range of motion and neck supple.      Right lower leg: Edema present.      Left lower leg: Edema present.   Skin:     General: Skin is warm and dry.   Neurological:      Mental Status: She is alert and oriented to person, place, and time.   Psychiatric:         Mood and Affect: Mood normal.         Behavior: Behavior normal.              Significant Labs: All pertinent labs within the past 24 hours have been reviewed.    Significant Imaging: I have reviewed all pertinent imaging results/findings within the past 24 hours.    Assessment/Plan:     * Acute respiratory failure with hypoxia  Patient with Hypoxic Respiratory failure which is Acute.  she is not on home oxygen. Supplemental oxygen was provided and noted-      .   Signs/symptoms of respiratory failure include- tachypnea, increased work of breathing, respiratory distress and use of accessory muscles. Contributing diagnoses includes - CHF, Pleural effusion and Pneumonia Labs and images were reviewed. Patient Has recent ABG, which has been reviewed. Will treat underlying causes and adjust management of respiratory failure as follows-     Multifactorial AFRF -- infection/PNA, CHF, and ESRD with missed HD all contributing  Wean supplemental O2  Treat infection with ABX and pt with be dialyzed     Long term (current) use of anticoagulants  Was on Xarelto and warfarin in the past but both have been discontinued due  to resolution of LV thrombus per notes       Coronary artery disease of native artery of native heart with stable angina pectoris  Continue on home ASA/Plavix  No s/sx of ACS currently   Mildly elevated trop likely 2/2 hypoxia - will trend     Hyperlipidemia  Continue home statin      ESRD (end stage renal disease)  Missed HD contributing to hypoxic respiratory failure   Nephrology consulted for inpatient dialysis       Acute on chronic combined systolic and diastolic heart failure  Acute on chronic resp failure 2/2 missed HD  Will consult nephrology for inpatient HD and wean O2 as able      Essential hypertension  BP nl to elevated on admission  Will hold BB due to mild bradycardia, but will resume other HTN/GDMT meds        VTE Risk Mitigation (From admission, onward)         Ordered     IP VTE HIGH RISK PATIENT  Once         06/24/23 0733     Place sequential compression device  Until discontinued         06/24/23 0733                           Clair Liang MD  Department of Hospital Medicine  [facility] - Telemetry

## 2023-06-25 NOTE — NURSING
RAPID RESPONSE NURSE PROACTIVE ROUNDING NOTE       Admit Date: 2023  LOS: 2  Code Status: Full Code   Date of Visit: 2023  : 1965  Age: 58 y.o.  Sex: female  Race: White  Bed: K471/K471 A:   MRN: [id number]  Was the patient discharged from an ICU this admission? No   Was the patient discharged from a PACU within last 24 hours? No   Did the patient receive conscious sedation/general anesthesia in last 24 hours? No   Was the patient in the ED within the past 24 hours? No   Was the patient on NIPPV within the past 24 hours? No   Attending Physician: Moe Farnsworth MD  Primary Service: Hospitalist,Family Medicine       SITUATION    Diagnosis: Acute respiratory failure with hypoxia   has a past medical history of Coronary artery disease involving native coronary artery of native heart without angina pectoris, Diabetes mellitus, Hypertension, and Renal disorder.    Last Vitals:  Temp: 97.7 °F (36.5 °C) (745)  Pulse: 61 (745)  Resp: 18 (745)  BP: 160/69 (0745)  SpO2: 96 % (745)    24 Hour Vitals Range:  Temp:  [97.4 °F (36.3 °C)-98.2 °F (36.8 °C)]   Pulse:  [55-65]   Resp:  [18-20]   BP: (131-164)/(65-94)   SpO2:  [96 %-98 %]     ASSESSMENT/INTERVENTIONS/RECOMMENDATIONS    Patient missed dialysis Friday, came in overloaded. Received 120mg of lasix with minimal output as reported by bedside RN. Received dialysis yesterday with 4 liters removed. Patient on 3LNC, otherwise stable. Will remove from team list but able to intervene if needed.     -wean O2 as tolerated  -monitor I&os    Discussed plan of care with bedside RNMamta    FOLLOW UP    Call back the Rapid Response Nurse, Yolanda Peterson at [phone] for additional questions or concerns.

## 2023-06-25 NOTE — PROGRESS NOTES
[facility] Medicine  Progress Note    Patient Name: Ping Davenport  MRN: [id number]  Patient Class: IP- Inpatient   Admission Date: 6/23/2023  Length of Stay: 2 days  Attending Physician: Moe Farnsworth MD  Primary Care Provider: Primary Doctor No        Subjective:     Principal Problem:Acute respiratory failure with hypoxia        HPI:  59yo F with CHF, CAD, hypertension, diabetes, end-stage renal disease on Monday Wednesday Friday dialysis presents the ER for evaluation of chest pain shortness of breath and hypoxia.  Patient reports for the last few days she is been having general malaise feeling very weak she endorses low-grade fevers.  She reports that she felt too weak she was unable to go to dialysis today.  She reports that her sister checked her pulse ox was as low as 78% which prompted her to come to the ER.    In the ED she was hypoxic but SpO2 improved with supplemental O2. VS with HR 50s-60s, BP normal to elevated. CXR concerning for PNA in right lung base and elevated procal. Trop mildly elevated. She was admitted to hospital medicine for acute hypoxic respiratory failure.         Overview/Hospital Course:  No notes on file    Interval History:  Patient reports persistent dyspnea which increases with minimal exertion.  Patient desats to 87% on room air without oxygen.  She presently is on 2 L nasal cannula.  She underwent hemodialysis yesterday with 4 L volume removal.    Labs reviewed hemoglobin 11, BUN 33 and creatinine 4.8     Review of Systems   Constitutional:  Positive for activity change and fatigue.   Respiratory:  Positive for cough and shortness of breath.    Cardiovascular:  Positive for leg swelling.   Objective:     Vital Signs (Most Recent):  Temp: 98.3 °F (36.8 °C) (06/25/23 1058)  Pulse: 62 (06/25/23 1150)  Resp: 18 (06/25/23 1058)  BP: (!) 150/66 (06/25/23 1058)  SpO2: 96 % (06/25/23 1150) Vital Signs (24h Range):  Temp:  [97.4 °F (36.3 °C)-98.3 °F (36.8 °C)] 98.3  °F (36.8 °C)  Pulse:  [55-65] 62  Resp:  [18-20] 18  SpO2:  [96 %-98 %] 96 %  BP: (141-164)/(65-94) 150/66     Weight: 95.4 kg (210 lb 5.1 oz)  Body mass index is 38.47 kg/m².    Intake/Output Summary (Last 24 hours) at 6/25/2023 1334  Last data filed at 6/24/2023 1620  Gross per 24 hour   Intake 500 ml   Output 4500 ml   Net -4000 ml         Physical Exam  Vitals and nursing note reviewed.   Constitutional:       Appearance: Normal appearance.   Pulmonary:      Breath sounds: Rales present.   Neurological:      Mental Status: She is alert.           Significant Labs: All pertinent labs within the past 24 hours have been reviewed.  CBC:   Recent Labs   Lab 06/23/23 2109 06/24/23 0924 06/25/23  0359   WBC 5.04 5.37 5.28   HGB 11.1* 10.6* 11.0*   HCT 34.3* 32.9* 34.2*   * 128* 118*     CMP:   Recent Labs   Lab 06/23/23 2109 06/24/23 0924 06/25/23  0400    139 137   K 4.5 4.6 4.4    101 102   CO2 21* 23 23    87 80   BUN 60* 67* 33*   CREATININE 7.1* 7.7* 4.8*   CALCIUM 10.8* 10.5 9.8   PROT 8.5*  --   --    ALBUMIN 4.2  --   --    BILITOT 0.7  --   --    ALKPHOS 67  --   --    AST 18  --   --    ALT 8*  --   --    ANIONGAP 16 15 12       Significant Imaging: I have reviewed all pertinent imaging results/findings within the past 24 hours.      Assessment/Plan:      * Acute respiratory failure with hypoxia  Patient with Hypoxic Respiratory failure which is Acute.  she is not on home oxygen. Supplemental oxygen was provided and noted-      .   Signs/symptoms of respiratory failure include- tachypnea, increased work of breathing, respiratory distress and use of accessory muscles. Contributing diagnoses includes - CHF, Pleural effusion and Pneumonia Labs and images were reviewed. Patient Has recent ABG, which has been reviewed. Will treat underlying causes and adjust management of respiratory failure as follows-     Multifactorial AFRF -- infection/PNA, CHF, and ESRD with missed HD all  contributing  Wean supplemental O2  Treat infection with ABX and pt with be dialyzed   Still with hypoxemia. desats to 87% on RA at rest.  Will need volume removal with HD tomorrow.     Long term (current) use of anticoagulants  Was on Xarelto and warfarin in the past but both have been discontinued due to resolution of LV thrombus per notes       Coronary artery disease of native artery of native heart with stable angina pectoris  Continue on home ASA/Plavix  No s/sx of ACS currently   Mildly elevated trop likely 2/2 hypoxia - will trend     Hyperlipidemia  Continue home statin      ESRD (end stage renal disease)  Missed HD contributing to hypoxic respiratory failure   Nephrology consulted for inpatient dialysis       Acute on chronic combined systolic and diastolic heart failure  Acute on chronic resp failure 2/2 missed HD  Will consult nephrology for inpatient HD and wean O2 as able      Essential hypertension  BP nl to elevated on admission  Will hold BB due to mild bradycardia, but will resume other HTN/GDMT meds        VTE Risk Mitigation (From admission, onward)         Ordered     IP VTE HIGH RISK PATIENT  Once         06/24/23 0733     Place sequential compression device  Until discontinued         06/24/23 0733                Discharge Planning   JACKELYN:      Code Status: Full Code   Is the patient medically ready for discharge?:     Reason for patient still in hospital (select all that apply): Laboratory test and Treatment                     Lauren Montiel NP  Department of Hospital Medicine   [place] - [facility]

## 2023-06-25 NOTE — PROGRESS NOTES
Nephrology Progress   Note     Consult Requested By: Moe Farnsworth MD  Reason for Consult: ESRD    SUBJECTIVE:            Review of Systems   Constitutional:  Negative for chills and fever.   HENT:  Negative for congestion and sore throat.    Eyes:  Negative for blurred vision, double vision and photophobia.   Respiratory:  Positive for cough and shortness of breath.    Cardiovascular:  Negative for chest pain, palpitations and leg swelling.   Gastrointestinal:  Negative for abdominal pain, diarrhea, nausea and vomiting.   Genitourinary:  Negative for dysuria and urgency.   Musculoskeletal:  Negative for joint pain and myalgias.   Skin:  Negative for itching and rash.   Neurological:  Negative for dizziness, sensory change, weakness and headaches.   Endo/Heme/Allergies:  Negative for polydipsia. Does not bruise/bleed easily.   Psychiatric/Behavioral:  Negative for depression.      Past Medical History:   Diagnosis Date    Coronary artery disease involving native coronary artery of native heart without angina pectoris 2/6/2023    Diabetes mellitus     Hypertension     Renal disorder           OBJECTIVE:     Vital Signs (Most Recent)  Vitals:    06/25/23 0400 06/25/23 0548 06/25/23 0744 06/25/23 0745   BP:  (!) 161/71  (!) 160/69   BP Location:  Right arm  Left arm   Patient Position:  Lying  Sitting   Pulse: (!) 57 63 61 61   Resp:  20  18   Temp:  97.9 °F (36.6 °C)  97.7 °F (36.5 °C)   TempSrc:  Oral  Oral   SpO2:  96% 96% 96%   Weight:                     Medications:   amLODIPine  10 mg Oral Daily    aspirin  81 mg Oral Daily    atorvastatin  40 mg Oral Daily    cefTRIAXone (ROCEPHIN) IVPB  1 g Intravenous Q24H    clopidogreL  75 mg Oral Daily    mupirocin   Nasal BID    sacubitriL-valsartan  1 tablet Oral BID    sevelamer carbonate  1,600 mg Oral TID WM    sodium bicarbonate  650 mg Oral BID           Physical Exam  Vitals and nursing note reviewed.   Constitutional:       General: She is not in acute  distress.     Appearance: She is not diaphoretic.   HENT:      Head: Normocephalic and atraumatic.      Mouth/Throat:      Pharynx: No oropharyngeal exudate.   Eyes:      General: No scleral icterus.     Conjunctiva/sclera: Conjunctivae normal.      Pupils: Pupils are equal, round, and reactive to light.   Cardiovascular:      Rate and Rhythm: Normal rate and regular rhythm.      Heart sounds: Normal heart sounds. No murmur heard.  Pulmonary:      Effort: No respiratory distress.      Breath sounds: Stridor: bilateral. Rales present.      Comments:    Abdominal:      General: Bowel sounds are normal. There is no distension.      Palpations: Abdomen is soft.      Tenderness: There is no abdominal tenderness.   Musculoskeletal:         General: Normal range of motion.      Cervical back: Normal range of motion and neck supple.      Comments: CVC   Skin:     General: Skin is warm and dry.      Findings: No erythema.   Neurological:      Mental Status: She is alert and oriented to person, place, and time.      Cranial Nerves: No cranial nerve deficit.   Psychiatric:         Mood and Affect: Affect normal.         Cognition and Memory: Memory normal.         Judgment: Judgment normal.       Laboratory:  Recent Labs   Lab 06/23/23  2109 06/24/23  0924 06/25/23  0359   WBC 5.04 5.37 5.28   HGB 11.1* 10.6* 11.0*   HCT 34.3* 32.9* 34.2*   * 128* 118*   MONO 14.5  0.7 13.6  0.7 15.2*  0.8       Recent Labs   Lab 06/23/23  2109 06/24/23  0924 06/25/23  0400    139 137   K 4.5 4.6 4.4    101 102   CO2 21* 23 23   BUN 60* 67* 33*   CREATININE 7.1* 7.7* 4.8*   CALCIUM 10.8* 10.5 9.8   PHOS  --  6.4* 5.3*         Diagnostic Results:  X-Ray: Reviewed  US: Reviewed  Echo: Reviewed  ASSESSMENT/PLAN:     1. ESRD  - usual HD on [facility]     -- HD 6/24 with UF 4L SOB better   -- Volume overload bilateral crackles on exam HD tomorrow 4L again   -- CXR ? PNA on Left already on IV abx     2. HTN (I10) - controlled   3.  Anemia of chronic kidney disease treated with PRAVEEN    EPogen   with each HD for Hb goal 10   Recent Labs   Lab 06/23/23  2109 06/24/23  0924 06/25/23  0359   HGB 11.1* 10.6* 11.0*   HCT 34.3* 32.9* 34.2*   * 128* 118*         Iron -    Lab Results   Component Value Date    IRON 70 02/08/2023    TIBC 221 (L) 02/08/2023    FERRITIN 2,021 (H) 02/08/2023       4. MBD (E88.9 M90.80) -  Recent Labs   Lab 06/25/23  0400   CALCIUM 9.8   PHOS 5.3*       Recent Labs   Lab 06/24/23  0924 06/25/23  0400   MG 2.5 2.3         Lab Results   Component Value Date    .5 (H) 11/07/2019    CALCIUM 9.8 06/25/2023    PHOS 5.3 (H) 06/25/2023   Sevelamer TIDWM   Lab Results   Component Value Date    LMLEMNAC58QW 7 (L) 11/07/2019       Lab Results   Component Value Date    CO2 23 06/25/2023       5. Hemodialysis Access (Z99.2 V45.11)- no issues   6. Nutrition/Hypoalbuminemia (E88.09) -    Recent Labs   Lab 06/23/23 2109   LABPROT 14.6*   ALBUMIN 4.2       Nepro with meals TID. Renal vitamins daily      Thank you for the consult, will follow  With any question please call [phone]  Junie Krishnan MD    [facility]     RANJITH Jc MD,   OMD JUSTIN Oliveros MD E. V. Harmon, NP    [address]  [place], [address] [phone]

## 2023-06-25 NOTE — PLAN OF CARE
Problem: Adult Inpatient Plan of Care  Goal: Plan of Care Review  Outcome: Ongoing, Progressing     Problem: Fall Injury Risk  Goal: Absence of Fall and Fall-Related Injury  Outcome: Ongoing, Progressing     Problem: Gas Exchange Impaired  Goal: Optimal Gas Exchange  Outcome: Ongoing, Progressing     Problem: Adjustment to Illness (Heart Failure)  Goal: Optimal Coping  Outcome: Ongoing, Progressing

## 2023-06-25 NOTE — NURSING

## 2023-06-25 NOTE — SUBJECTIVE & OBJECTIVE
Interval History:  Patient reports persistent dyspnea which increases with minimal exertion.  Patient desats to 87% on room air without oxygen.  She presently is on 2 L nasal cannula.  She underwent hemodialysis yesterday with 4 L volume removal.    Labs reviewed hemoglobin 11, BUN 33 and creatinine 4.8     Review of Systems   Constitutional:  Positive for activity change and fatigue.   Respiratory:  Positive for cough and shortness of breath.    Cardiovascular:  Positive for leg swelling.   Objective:     Vital Signs (Most Recent):  Temp: 98.3 °F (36.8 °C) (06/25/23 1058)  Pulse: 62 (06/25/23 1150)  Resp: 18 (06/25/23 1058)  BP: (!) 150/66 (06/25/23 1058)  SpO2: 96 % (06/25/23 1150) Vital Signs (24h Range):  Temp:  [97.4 °F (36.3 °C)-98.3 °F (36.8 °C)] 98.3 °F (36.8 °C)  Pulse:  [55-65] 62  Resp:  [18-20] 18  SpO2:  [96 %-98 %] 96 %  BP: (141-164)/(65-94) 150/66     Weight: 95.4 kg (210 lb 5.1 oz)  Body mass index is 38.47 kg/m².    Intake/Output Summary (Last 24 hours) at 6/25/2023 1334  Last data filed at 6/24/2023 1620  Gross per 24 hour   Intake 500 ml   Output 4500 ml   Net -4000 ml         Physical Exam  Vitals and nursing note reviewed.   Constitutional:       Appearance: Normal appearance.   Pulmonary:      Breath sounds: Rales present.   Neurological:      Mental Status: She is alert.           Significant Labs: All pertinent labs within the past 24 hours have been reviewed.  CBC:   Recent Labs   Lab 06/23/23 2109 06/24/23  0924 06/25/23  0359   WBC 5.04 5.37 5.28   HGB 11.1* 10.6* 11.0*   HCT 34.3* 32.9* 34.2*   * 128* 118*     CMP:   Recent Labs   Lab 06/23/23 2109 06/24/23  0924 06/25/23  0400    139 137   K 4.5 4.6 4.4    101 102   CO2 21* 23 23    87 80   BUN 60* 67* 33*   CREATININE 7.1* 7.7* 4.8*   CALCIUM 10.8* 10.5 9.8   PROT 8.5*  --   --    ALBUMIN 4.2  --   --    BILITOT 0.7  --   --    ALKPHOS 67  --   --    AST 18  --   --    ALT 8*  --   --    ANIONGAP 16 15 12        Significant Imaging: I have reviewed all pertinent imaging results/findings within the past 24 hours.

## 2023-06-26 NOTE — PROCEDURES
Patient seen and examined on HD tolerating well. No complains.   BP (!) 160/71 (BP Location: Left arm, Patient Position: Lying)   Pulse (!) 55   Temp 97.6 °F (36.4 °C) (Oral)   Resp 18   Wt 95.4 kg (210 lb 5.1 oz)   SpO2 95%   BMI 38.47 kg/m²     With any question please call answering service [phone]  Junie Krishnan MD    [facility]     RANJITH Jc MD,   MD JUSTIN Bose MD E. V. Harmon, NP    [phone]  [place], [address]

## 2023-06-26 NOTE — PLAN OF CARE
Problem: Adult Inpatient Plan of Care  Goal: Plan of Care Review  Outcome: Ongoing, Progressing     Problem: Fall Injury Risk  Goal: Absence of Fall and Fall-Related Injury  Outcome: Ongoing, Progressing     Problem: Adjustment to Illness (Heart Failure)  Goal: Optimal Coping  Outcome: Ongoing, Progressing     Problem: Cardiac Output Decreased (Heart Failure)  Goal: Optimal Cardiac Output  Outcome: Ongoing, Progressing

## 2023-06-26 NOTE — NURSING
Home Oxygen Evaluation    Date Performed: 2023    1) Patient's Home O2 Sat on room air, while at rest: 95%        If O2 sats on room air at rest are 88% or below, patient qualifies. No additional testing needed. Document N/A in steps 2 and 3. If 89% or above, complete steps 2.      2) Patient's O2 Sat on room air while exercisin% (standing at the edge of bed)        If O2 sats on room air while exercising remain 89% or above patient does not qualify, no further testing needed Document N/A in step 3. If O2 sats on room air while exercising are 88% or below, continue to step 3.      3) Patient's O2 Sat while exercising on O2                n/a         (Must show improvement from #2 for patients to qualify)    If O2 sats improve on oxygen, patient qualifies for portable oxygen. If not, the patient does not qualify.

## 2023-06-26 NOTE — PROGRESS NOTES
[facility] - Telemetry  Adult Nutrition  Progress Note    SUMMARY       Recommendations    1. Continue to encourage renal diet intake   2. Monitor labs   3. Collaboration with medical providers    Goals: Patient to continue to consume adequate po intake prior to RD follow ups  Nutrition Goal Status: goal met  Communication of RD Recs: other (comment) (poc)    Assessment and Plan    Nutrition Problem  Increased nutritional needs    Related to (etiology):   Condition associated with diagnosis: ESRD on HD    Signs and Symptoms (as evidenced by):   Higher nutrient demand     Interventions(treatment strategy):  Collaboration with medical providers     Nutrition Diagnosis Status:   Improving         Malnutrition Assessment       Patient does not meet positive risks for NFPE at this time.  RD to continue to monitor for nutrition risks at follow up visits.                                Reason for Assessment    Reason For Assessment: identified at risk by screening criteria    Diagnosis: pulmonary disease, renal disease, cardiac disease (ESRD on HD MWF)  Patient Active Problem List   Diagnosis    Rash    Encounter to establish care    Screen for STD (sexually transmitted disease)    Acute respiratory failure with hypoxia    Acute renal failure    Nephrotic syndrome    Anemia due to chronic kidney disease, on chronic dialysis    Essential hypertension    LV (left ventricular) mural thrombus following MI    Acute on chronic combined systolic and diastolic heart failure    ESRD (end stage renal disease)    Thrombocytopenia    Metabolic acidosis    Hyperlipidemia    Coronary artery disease of native artery of native heart with stable angina pectoris    ESRD (end stage renal disease) on dialysis    Long term (current) use of anticoagulants     Past Medical History:   Diagnosis Date    Coronary artery disease involving native coronary artery of native heart without angina pectoris 2/6/2023    Diabetes mellitus     Hypertension      "Renal disorder        Interdisciplinary Rounds: did not attend (RD remote)    General Information Comments:   6/26/2023: Patient is on a renal diet with 100% po intake noted for meal consumption.  No tolerance issues reported at this time. Patient is Russian speaking.  Labs reviewed.  NKFA.  On admit, patient reported decreased po intake and possible weight loss.  No weight loss found in medical chart weight history.  Appetite and intake has improved.  Patient does not meet positive risks for NFPE.  RD to continue to monitor po intake and tolerance.    Nutrition Discharge Planning: Patient to continue with renal diet post discharge    Nutrition Risk Screen    Nutrition Risk Screen: no indicators present    Nutrition/Diet History    Spiritual, Cultural Beliefs, Ep[employer]alian Practices, Values that Affect Care: no  Food Allergies: NKFA  Factors Affecting Nutritional Intake: decreased appetite    Anthropometrics    Temp: 97.3 °F (36.3 °C)  Height: 5' 2" (157.5 cm)  Height (inches): 62 in  Weight Method: Bed Scale  Weight: 95.4 kg (210 lb 5.1 oz)  Weight (lb): 210.32 lb  Ideal Body Weight (IBW), Female: 110 lb  % Ideal Body Weight, Female (lb): 191.2 %  BMI (Calculated): 38.5  BMI Grade: 35 - 39.9 - obesity - grade II     Wt Readings from Last 10 Encounters:   06/26/23 95.4 kg (210 lb 5.1 oz)   04/05/23 71 kg (156 lb 8.4 oz)   03/30/23 70.3 kg (155 lb)   03/15/23 70.3 kg (155 lb)   03/06/23 69.5 kg (153 lb 3.5 oz)   02/16/23 69.4 kg (153 lb)   02/08/23 69.7 kg (153 lb 10.6 oz)   01/05/23 69 kg (152 lb 1.9 oz)   09/13/20 72.8 kg (160 lb 7.9 oz)   09/09/20 74.7 kg (164 lb 10.9 oz)       Lab/Procedures/Meds    Pertinent Labs Reviewed: reviewed  BMP  Lab Results   Component Value Date     (L) 06/26/2023    K 4.5 06/26/2023    CL 96 06/26/2023    CO2 23 06/26/2023    BUN 59 (H) 06/26/2023    CREATININE 6.9 (H) 06/26/2023    CALCIUM 10.0 06/26/2023    ANIONGAP 14 06/26/2023    EGFRNORACEVR 6 (A) 06/26/2023     Lab Results "   Component Value Date    HGBA1C 5.3 09/10/2020     Glucose   Date Value Ref Range Status   06/26/2023 107 70 - 110 mg/dL Final     Lab Results   Component Value Date    CALCIUM 10.0 06/26/2023    PHOS 6.6 (H) 06/26/2023       Pertinent Medications Reviewed: reviewed  Scheduled Meds:   amLODIPine  10 mg Oral Daily    aspirin  81 mg Oral Daily    atorvastatin  40 mg Oral Daily    cefTRIAXone (ROCEPHIN) IVPB  1 g Intravenous Q24H    clopidogreL  75 mg Oral Daily    mupirocin   Nasal BID    sacubitriL-valsartan  1 tablet Oral BID    sevelamer carbonate  1,600 mg Oral TID WM    sodium bicarbonate  650 mg Oral BID     Continuous Infusions:  PRN Meds:.sodium chloride 0.9%, acetaminophen, aluminum-magnesium hydroxide-simethicone, dextrose 10%, dextrose 10%, dextrose, dextrose, heparin (porcine), melatonin, polyethylene glycol, sodium chloride 0.9%, sodium chloride 0.9%    Physical Findings/Assessment         Estimated/Assessed Needs    Weight Used For Calorie Calculations: 95.4 kg (210 lb 5.1 oz)  Energy Calorie Requirements (kcal): 25kcals/kg (2385kcals/day  ESRD on HD and obesity)  Energy Need Method: Kcal/kg  Protein Requirements: 1.0g/kg (95g/day)  Weight Used For Protein Calculations: 95.4 kg (210 lb 5.1 oz)  Fluid Requirements (mL): 1000+output or per md order  Estimated Fluid Requirement Method: other (see comments) (ESRD on HD)  RDA Method (mL): 25  CHO Requirement: 250-275      Nutrition Prescription Ordered    Current Diet Order: Renal    Evaluation of Received Nutrient/Fluid Intake    % Kcal Needs: %  % Protein Needs: %  I/O: ALDA  Energy Calories Required: meeting needs  Protein Required: meeting needs  Fluid Required: meeting needs  Comments: LBM:6/23/2023  Tolerance: tolerating  % Intake of Estimated Energy Needs: 75 - 100 %  % Meal Intake: 75 - 100 %    Nutrition Risk    Level of Risk/Frequency of Follow-up: low (f/u: 1x per week)     Monitor and Evaluation    Food and Nutrient Intake: energy  intake, food and beverage intake  Food and Nutrient Adminstration: diet order  Physical Activity and Function: nutrition-related ADLs and IADLs, factors affecting access to physical activity  Anthropometric Measurements: height/length, weight, weight change, body mass index  Biochemical Data, Medical Tests and Procedures: electrolyte and renal panel, gastrointestinal profile, glucose/endocrine profile, inflammatory profile, lipid profile     Nutrition Follow-Up    RD Follow-up?: Yes  Danna Jacobs MS, RDN, LDN

## 2023-06-26 NOTE — NURSING
Home Oxygen Evaluation    Date Performed: 2023    1) Patient's Home O2 Sat on room air, while at rest: 95%        If O2 sats on room air at rest are 88% or below, patient qualifies. No additional testing needed. Document N/A in steps 2 and 3. If 89% or above, complete steps 2.      2) Patient's O2 Sat on room air while exercisin%        If O2 sats on room air while exercising remain 89% or above patient does not qualify, no further testing needed Document N/A in step 3. If O2 sats on room air while exercising are 88% or below, continue to step 3.      3) Patient's O2 Sat while exercising on O2: 95 at 2 LPM         (Must show improvement from #2 for patients to qualify)    If O2 sats improve on oxygen, patient qualifies for portable oxygen. If not, the patient does not qualify.

## 2023-06-26 NOTE — PROGRESS NOTES
Discharge orders noted. Additional clinical references attached. Patient's discharge instructions given by bedside RN and reviewed via this VN.  Education provided on new medication, diagnosis, and follow-up appointments.  Teach back method used. Patient verbalized understanding. All questions answered. Patient awaiting family for . Bedside updated on patient status and to request transportation to [facility] when ready.   06/26/23 6981   AVS Confirmation   Discharge instructions and AVS given to and reviewed with patient and/or significant other. Yes

## 2023-06-26 NOTE — PLAN OF CARE
Nutrition Plan of Care:    Recommendations     1. Continue to encourage renal diet intake   2. Monitor labs   3. Collaboration with medical providers     Goals: Patient to continue to consume adequate po intake prior to RD follow ups  Nutrition Goal Status: goal met  Communication of RD Recs: other (comment) (poc)     Assessment and Plan     Nutrition Problem  Increased nutritional needs     Related to (etiology):   Condition associated with diagnosis: ESRD on HD     Signs and Symptoms (as evidenced by):   Higher nutrient demand      Interventions(treatment strategy):  Collaboration with medical providers      Nutrition Diagnosis Status:   Improving    Danna Jacobs MS, RDN, LDN

## 2023-06-26 NOTE — PROGRESS NOTES
CM spoke with David at [facility] - he will evaluate pt's orders for approval for home O2.    CM awaiting call back.    f/u apts in place.      Future Appointments   Date Time Provider Department Center   7/6/2023 11:00 AM NUCLEAR CAMERA, [facility]   7/6/2023 11:05 AM NUCLEAR CAMERA, [facility]   7/6/2023 11:10 AM NUCLEAR CAMERA, [facility]   7/11/2023 11:00 AM Lashawn Lowery MD [facility]   8/16/2023 10:00 AM Remy Toscano MD [facility]

## 2023-06-27 NOTE — PROGRESS NOTES
INR is 4 days old, out of range, and is an ER result. She presented to ER 6/23  for evaluation of chest pain shortness of breath missed dialysis hypoxia. We will need a current INR to make appropriate adjustments. INR tomorrow AM STAT

## 2023-06-27 NOTE — NURSING
Discharge instructions and education provided by VN. Patient voices understanding. IV sites removed, cath tip intact. Telemetry discontinued. Patient shows no acute distress. O2 delivered to bedside. Sister at bedside.

## 2023-06-27 NOTE — PROGRESS NOTES
C3 nurse attempted to contact Ping Davenport  for a TCC post hospital discharge follow up call. No answer. Left voicemail with callback information. The patient has a scheduled HOSFU appointment with Lashawn Lowery on 7/11/23 @ 1100. Message sent via portal.

## 2023-06-27 NOTE — PLAN OF CARE
CM met with pt per Ben   Pt lives with pilo Gold -   Pt has a tub bench; no hh   Currently gets HD  [facility] [facility] [place] [facility] - pt drives herself to HD   Extensive Cardiac Hx - CHF, CAD, hypertension, diabetes, end-stage renal disease  -   dx:  Acute Resp failure       Future Appointments   Date Time Provider Department Center   7/6/2023 11:00 AM NUCLEAR CAMERA, [facility]   7/6/2023 11:05 AM [facility] CAM[facility]A, [facility]   7/6/2023 11:10 AM NUCLEAR CAMERA, [facility]   7/11/2023 11:00 AM Lashawn Lowery MD [facility] [place] [facility]   8/16/2023 10:00 AM Remy Toscano MD [facility] CARDIO [place] [facility]        06/26/23 1909   Discharge Planning   Assessment Type Discharge Planning Assessment   Resource/Environmental Concerns none   Support Systems Family members   Equipment Currently Used at Home bath bench   Current Living Arrangements apartment   Patient/Family Anticipates Transition to home;home with family   DME Needed Upon Discharge  oxygen   Discharge Plan A Home;Home with family

## 2023-06-27 NOTE — PLAN OF CARE
Pt for d/c to home after HD   O2 test done - pt requires home O2   CM called and spoke with David Jhaveri at [facility] - O2 orders reviewed - ok given to pull portable O2 tank for pt - delivered to pt -- CM returned to bedside and instructed pt in use of tank.   Sister will drive pt home.     Sister and pt know to contact [facility] upon arrival to home for home O2 set up.          06/26/23 1916   Final Note   Assessment Type Final Discharge Note   Anticipated Discharge Disposition Home   What phone number can be called within the next 1-3 days to see how you are doing after discharge? [phone]   Hospital Resources/Appts/Education Provided Appointments scheduled and added to AVS;Post-Acute resouces added to AVS   Post-Acute Status   Discharge Delays None known at this time

## 2023-06-28 NOTE — PROGRESS NOTES
2nd Attempt made to reach patient for TCC call. Left voicemail please call [phone] leave first name, last name, and  for Sylwia I will return your call.

## 2023-06-28 NOTE — PROGRESS NOTES
"  Patient reports she is no longer on coumadin and that her coumadin was stopped by her cardiologist a few months ago.  This was confirmed with note from 4/19/23 as follows:   "  Patient wants to stop the coumadin and change to xarelto.  Dr Toscano said that is ok but must continue her aspirin and stop her Plavix.  Patient verbalized understanding.       Electronically signed by Brittany Franco MA at 4/19/2023  1:06 PM      Further notation on 5/3/23 as follows:     The repeat Echo showed the the blood clot resolved which is great. Ok to stay off  xarelto and coumadin. I want her to start Plavix along with aspirin though.  Follow up after the viability study. Thanks       Electronically signed by Remy Toscano MD at 5/3/2023  7:35 AM      We will discharge patient from coumadin clinic  "

## 2023-06-29 NOTE — TELEPHONE ENCOUNTER
Spoke to patients sister and notified of upcoming appointment with Dr. Lowery. Patients sister confirmed appointment. Notified to let us know .if we can help in any way before then

## 2023-06-29 NOTE — HOSPITAL COURSE
Patient monitored closely during hospitalization. She was placed on oxygen 3 Liters upon admission and IV abx for pneumonia. Nephrology consulted. She underwent HD with volume removal- total 8 L during admission. She tolerated HD well. She was noted to have improvement of pulmonary edema. Home oxygen obtained and she qualified with 88% oxygen sat with activity. Patient with know EF 20%. She is medically stable for DC from nephrology standpoint. She is instructed to follow up with cards outpatient.

## 2023-06-29 NOTE — PROGRESS NOTES
C3 nurse spoke with Ping Davenport  sister Alla for a TCC post hospital discharge follow up call. The patient has a scheduled HOSFU appointment with Lashawn Lowery on 7/11/23 @ 1100.

## 2023-06-29 NOTE — DISCHARGE SUMMARY
[facility] Medicine  Discharge Summary      Patient Name: Ping Davenport  MRN: [id number]  INEZ: [id number]  Patient Class: IP- Inpatient  Admission Date: 6/23/2023  Hospital Length of Stay: 3 days  Discharge Date and Time: 6/26/2023 10:06 PM  Attending Physician: Matilde att. providers found   Discharging Provider: Lauren Montiel NP  Primary Care Provider: Primary Doctor Matilde    Primary Care Team: Networked reference to record PCT     HPI:   57yo F with CHF, CAD, hypertension, diabetes, end-stage renal disease on Monday Wednesday Friday dialysis presents the ER for evaluation of chest pain shortness of breath and hypoxia.  Patient reports for the last few days she is been having general malaise feeling very weak she endorses low-grade fevers.  She reports that she felt too weak she was unable to go to dialysis today.  She reports that her sister checked her pulse ox was as low as 78% which prompted her to come to the ER.    In the ED she was hypoxic but SpO2 improved with supplemental O2. VS with HR 50s-60s, BP normal to elevated. CXR concerning for PNA in right lung base and elevated procal. Trop mildly elevated. She was admitted to hospital medicine for acute hypoxic respiratory failure.         * No surgery found *      Hospital Course:   Patient monitored closely during hospitalization. She was placed on oxygen 3 Liters upon admission and IV abx for pneumonia. Nephrology consulted. She underwent HD with volume removal- total 8 L during admission. She tolerated HD well. She was noted to have improvement of pulmonary edema. Home oxygen obtained and she qualified with 88% oxygen sat with activity. Patient with know EF 20%. She is medically stable for DC from nephrology standpoint. She is instructed to follow up with cards outpatient.        Goals of Care Treatment Preferences:  Code Status: Full Code      Consults:   Consults (From admission, onward)        Status Ordering Provider     IP consult  to case management  Once        Provider:  (Not yet assigned)    Completed MILAGROS LAURA          No new Assessment & Plan notes have been filed under this hospital service since the last note was generated.  Service: Hospital Medicine    Final Active Diagnoses:    Diagnosis Date Noted POA    PRINCIPAL PROBLEM:  Acute respiratory failure with hypoxia [J96.01] 11/05/2019 Yes    Long term (current) use of anticoagulants [Z79.01] 02/13/2023 Not Applicable    Coronary artery disease of native artery of native heart with stable angina pectoris [I25.118] 02/06/2023 Yes     Chronic    ESRD (end stage renal disease) [N18.6] 02/06/2023 Yes    Hyperlipidemia [E78.5] 02/06/2023 Yes    Acute on chronic combined systolic and diastolic heart failure [I50.43] 02/04/2023 Yes    Essential hypertension [I10] 09/10/2020 Yes      Problems Resolved During this Admission:       Discharged Condition: stable    Disposition: Home or Self Care    Follow Up:   Follow-up Information     Remy Toscano MD Follow up on 8/16/2023.    Specialties: Cardiology, Interventional Cardiology  Why: 10:00 AM  - THIS IS A WED   requires Tues/Thurs apt   YOU WILL BE CONTACTED WITH A SOONER  HOSPITAL FOLLOW UP APT  PER  AMMY DRUMMOND  Contact information:  [address]  [address]  [facility] [address]  [phone]             Lashawn Lowery MD Follow up on 7/11/2023.    Specialty: Internal Medicine  Why: 11:00  AM  Contact information:  [address]  [address]  [facility] [address]  [phone]             [facility] Follow up.    Specialty: NEIL Provider  Contact information:  [address]  [facility]  [facility] [address]  [phone]                       Patient Instructions:      OXYGEN FOR HOME USE     Order Specific Question Answer Comments   Liter Flow 2    Duration Continuous    Qualifying Test Performed at: Activity    Oxygen saturation at rest 95    Oxygen saturation with activity 88    Oxygen saturation with activity  "on oxygen 95    Portable mode: continuous    Route nasal cannula    Device: home concentrator with portable tanks    Length of need (in months): 99 mos    Patient condition with qualifying saturation CHF    Height: 5' 2" (1.575 m)    Weight: 95.4 kg (210 lb 5.1 oz)    Alternative treatment measures have been tried or considered and deemed clinically ineffective. Yes      Diet renal     Diet renal     Notify your health care provider if you experience any of the following:  difficulty breathing or increased cough     Notify your health care provider if you experience any of the following:  persistent dizziness, light-headedness, or visual disturbances     Activity as tolerated     Activity as tolerated       Significant Diagnostic Studies: N/A    Pending Diagnostic Studies:     None         Medications:  Reconciled Home Medications:      Medication List      START taking these medications    levoFLOXacin 500 MG tablet  Commonly known as: LEVAQUIN  Take 1 tablet (500 mg total) by mouth every other day. for 5 days        CONTINUE taking these medications    acetaminophen 500 MG tablet  Commonly known as: TYLENOL  Take 500 mg by mouth every 6 (six) hours as needed for Pain.     amLODIPine 10 MG tablet  Commonly known as: NORVASC  Haysi gabbi tableta (10 mg en total) por vía oral diariamente.  (Take 1 tablet (10 mg total) by mouth once daily.)     aspirin 81 MG Chew  Haysi 1 tableta (81 mg en total) por vía oral gabbi vez al día.  (Take 1 tablet (81 mg total) by mouth once daily.)     atorvastatin 40 MG tablet  Commonly known as: LIPITOR  Haysi gabbi tableta (40 mg en total) por vía oral diariamente.  (Take 1 tablet (40 mg total) by mouth once daily.)     carvediloL 25 MG tablet  Commonly known as: COREG  Haysi gabbi tableta (25 mg en total) por vía oral 2 veces al día.  (Take 1 tablet (25 mg total) by mouth 2 (two) times daily.)     clopidogreL 75 mg tablet  Commonly known as: PLAVIX  Haysi gabbi tableta (75 mg en total) por vía oral " diariamente.  (Take 1 tablet (75 mg total) by mouth once daily.)     ENTRESTO 49-51 mg per tablet  Generic drug: sacubitriL-valsartan  Take 1 tablet by mouth 2 (two) times daily.     sevelamer carbonate 800 mg Tab  Commonly known as: RENVELA  Take 2 tablets (1,600 mg total) by mouth 3 (three) times daily with meals.        ASK your doctor about these medications    furosemide 80 MG tablet  Commonly known as: LASIX  Take 2 tablets (160 mg total) by mouth 2 (two) times daily as needed (Increased swelling to the lower ext and SHORTNESS OF BREATH).            Indwelling Lines/Drains at time of discharge:   Lines/Drains/Airways     Central Venous Catheter Line  Duration           Permacath 09/11/20 0859 left internal jugular 1021 days                Time spent on the discharge of patient: 50 minutes         Lauren Montiel NP  Department of Hospital Medicine  [facility]

## 2023-07-05 NOTE — TELEPHONE ENCOUNTER
Called to ensure pt is aware that her PET Viability Scan has been authorized and rescheduled to July 10.  Spoke with pt's sister, Alla, who stated that Dr. Ryan's office called pt on Monday and informed her that her PET Viability Scan has been authorized and rescheduled to July 10.  Pt's sister denied questions at this time.

## 2023-07-11 NOTE — PROGRESS NOTES
[facility]   New Visit Progress Note   Recent Hospital Discharge     PRESENTING HISTORY     Chief Complaint/Reason for Admission:  Follow up Hospital Discharge   PCP: Primary Doctor No    History of Present Illness:  Ms. Ping Davenport is a 58 y.o. female who was recently admitted to the hospital.    [facility] Medicine  Discharge Summary        Patient Name: Ping Davenport  MRN: [id number]  INEZ: [id number]  Patient Class: IP- Inpatient  Admission Date: 6/23/2023  Hospital Length of Stay: 3 days  Discharge Date and Time: 6/26/2023 10:06 PM  Attending Physician: No att. providers found   Discharging Provider: Lauren Montiel NP  Primary Care Provider: Primary Doctor No  ___________________________________________________________________    Today:  [facility] for [facility] follow up.  Recently hospitalized for management of right lower lobe pneumonia and acute hypoxic respiratory failure.   Complicated by pulmonary edema.  Patient with ESRD and missed outpatient dialysis due to illness.   Admitted to [facility] Medicine service.  Required 3L/min NC oxygen.  Empiric ABX initiated for PNA.  Blood Cx NGTD.   Nephrology consulted for routine hemodialysis- 8L fluid removed with improvement in oxygenation.   Patient responded well to above interventions and supportive care.  Discharged to home with portable home oxygen.   Patient to complete course of Levaquin as outpatient.     Accompanied today by family.  In wheelchair.  Did not bring medication bottles for review.   Has life vest but not wearing.   Reports increasing difficulty managing ADL's and getting to dialysis due to weakness, debility, and shortness of breath.   Lives alone and family members check on her and provide rides but this is become a challenge.   She defers [facility].       Review of Systems  General ROS: negative for chills, fever or weight loss  + generalized  weakness, fatigue, decreased exercise tolerance   Psychological ROS: negative for hallucination, depression or suicidal ideation  Ophthalmic ROS: negative for blurry vision, photophobia or eye pain  ENT ROS: negative for epistaxis, sore throat or rhinorrhea  Respiratory ROS: no cough, shortness of breath, or wheezing  Cardiovascular ROS: no chest pain + dyspnea on exertion  Gastrointestinal ROS: no abdominal pain, change in bowel habits, or black/ bloody stools + N/V  Genito-Urinary ROS: no dysuria, trouble voiding, or hematuria  Musculoskeletal ROS: negative for gait disturbance or muscular weakness  Neurological ROS: no syncope or seizures; no ataxia  Dermatological ROS: negative for pruritis, rash and jaundice      PAST HISTORY:     Past Medical History:   Diagnosis Date    Coronary artery disease involving native coronary artery of native heart without angina pectoris 2/6/2023    Diabetes mellitus     Hypertension     Renal disorder        Past Surgical History:   Procedure Laterality Date    INSERTION OF CATHETER FOR HEMODIALYSIS Left 9/11/2020    Procedure: INSERTION, CATHETER, HEMODIALYSIS;  Surgeon: William Beltran MD;  Location: [facility] OR;  Service: General;  Laterality: Left;    LEFT HEART CATHETERIZATION Left 2/3/2023    Procedure: Left heart cath;  Surgeon: Remy Toscano MD;  Location: [facility] CATH LAB/EP;  Service: Cardiology;  Laterality: Left;       No family history on file.      MEDICATIONS & ALLERGIES:     Current Outpatient Medications on File Prior to Visit   Medication Sig Dispense Refill    acetaminophen (TYLENOL) 500 MG tablet Take 500 mg by mouth every 6 (six) hours as needed for Pain.      amLODIPine (NORVASC) 10 MG tablet Take 1 tablet (10 mg total) by mouth once daily. 30 tablet 11    aspirin 81 MG Chew Take 1 tablet (81 mg total) by mouth once daily. 30 tablet 11    atorvastatin (LIPITOR) 40 MG tablet Take 1 tablet (40 mg total) by mouth once daily. 90 tablet 3    carvediloL (COREG)  "25 MG tablet Take 1 tablet (25 mg total) by mouth 2 (two) times daily. 60 tablet 5    clopidogreL (PLAVIX) 75 mg tablet Take 1 tablet (75 mg total) by mouth once daily. 30 tablet 11    furosemide (LASIX) 80 MG tablet Take 2 tablets (160 mg total) by mouth 2 (two) times daily as needed (Increased swelling to the lower ext and SHORTNESS OF BREATH). (Patient taking differently: Take 160 mg by mouth 2 (two) times daily as needed (Increased swelling to the lower ext and SOB). Patient states "I need a refill") 30 tablet 2    sacubitriL-valsartan (ENTRESTO) 49-51 mg per tablet Take 1 tablet by mouth 2 (two) times daily. 60 tablet 11    sevelamer carbonate (RENVELA) 800 mg Tab Take 2 tablets (1,600 mg total) by mouth 3 (three) times daily with meals. 180 tablet 11     Current Facility-Administered Medications on File Prior to Visit   Medication Dose Route Frequency Provider Last Rate Last Admin    [COMPLETED] dextrose 50% injection 25 g  25 g Intravenous Once Tenzin Ryan MD   25 g at 07/10/23 1346    [COMPLETED] dextrose 50% injection 25 g  25 g Intravenous Once Tenzin Ryan MD   25 g at 07/10/23 1306    [COMPLETED] insulin regular injection 1.24 Units 0.0124 mL  1.24 Units Intravenous Once Tenzin Ryan MD   1.24 Units at 07/10/23 1346    perflutren protein-A microsphr 0.22 mg/mL IV susp  0.5 mL Intravenous Once Remy Toscano MD            Review of patient's allergies indicates:   Allergen Reactions    Phenergan [promethazine] Other (See Comments)     Restless legs    Reglan [metoclopramide hcl]     Tromethamine Other (See Comments)    Zofran [ondansetron hcl (pf)] Other (See Comments)     Constipation     Ketorolac Palpitations       OBJECTIVE:     Vital Signs:  /67 (BP Location: Left arm)   Pulse 63   Temp 98.3 °F (36.8 °C) (Oral)   Resp 16   SpO2 (!) 94%   Wt Readings from Last 3 Encounters:   07/10/23 1416 95.3 kg (210 lb)   06/26/23 1522 95.4 kg (210 lb 5.1 oz)   06/24/23 0101 95.4 kg (210 " lb 5.1 oz)   06/23/23 2303 78.5 kg (173 lb 1 oz)   04/05/23 1050 71 kg (156 lb 8.4 oz)     There is no height or weight on file to calculate BMI.        Physical Exam:  /67 (BP Location: Left arm)   Pulse 63   Temp 98.3 °F (36.8 °C) (Oral)   Resp 16   SpO2 (!) 94%   General appearance: alert, cooperative, no distress  Constitutional:Oriented to person, place, and time  +thin, frail appearing    HEENT: Normocephalic, atraumatic, neck symmetrical, no nasal discharge   Eyes: conjunctivae/corneas clear, PERRL, EOM's intact  Lungs: clear to auscultation bilaterally, no dullness to percussion bilaterally  Heart: regular rate and rhythm without rub; no displacement of the PMI   Abdomen: soft, non-tender; bowel sounds normoactive; no organomegaly  Extremities: extremities symmetric; no clubbing, cyanosis, or edema  Integument: Skin color, texture, turgor normal; no rashes; hair distrubution normal  Neurologic: Alert and oriented X 3, normal strength, normal coordination   Psychiatric: no pressured speech; normal affect; no evidence of impaired cognition     Laboratory  Lab Results   Component Value Date    WBC 6.48 06/26/2023    HGB 11.4 (L) 06/26/2023    HCT 35.9 (L) 06/26/2023    MCV 96 06/26/2023     (L) 06/26/2023     BMP  Lab Results   Component Value Date     (L) 06/26/2023    K 4.5 06/26/2023    CL 96 06/26/2023    CO2 23 06/26/2023    BUN 59 (H) 06/26/2023    CREATININE 6.9 (H) 06/26/2023    CALCIUM 10.0 06/26/2023    ANIONGAP 14 06/26/2023    EGFRNORACEVR 6 (A) 06/26/2023     Lab Results   Component Value Date    ALT 8 (L) 06/23/2023    AST 18 06/23/2023    ALKPHOS 67 06/23/2023    BILITOT 0.7 06/23/2023     Lab Results   Component Value Date    INR 1.4 (H) 06/23/2023    INR 2.3 (H) 04/13/2023    INR 2.3 (H) 04/13/2023     Lab Results   Component Value Date    HGBA1C 5.3 09/10/2020       Diagnostic Results:    Chest X Ray 6/23/23:  Developing infiltrate and effusion in the right lung base.   Correlate for developing pneumonia.      TRANSITION OF CARE:     [facility] Contact Note: 6/29/23     Family and/or Caretaker present at visit?  Yes.  Diagnostic tests reviewed/disposition: I have reviewed all completed as well as pending diagnostic tests at the time of discharge.  Disease/illness education: Yes   Home health/community services discussion/referrals: Patient does not have home health established from hospital visit.  They do not need home health.  If needed, we will set up home health for the patient.   Establishment or re-establishment of referral orders for community resources: No other necessary community resources.   Discussion with other health care providers: No discussion with other health care providers necessary.     ASSESSMENT & PLAN:       Acute on chronic respiratory failure with hypoxia  - on newly prescribed oxygen  - hypoxia combination of heart failure non optimized and recent pneumonia     Pneumonia of right lower lobe due to infectious organism  - complete empiric Levaquin as prescribed    ESRD (end stage renal disease) on dialysis  - has resumed outpatient HD schedule but having increasing difficulty complying with requirements due to debility and transportation barriers  - she previously drove herself but now relies on others for a ride  - discussed that she may need placement, she defers at this time    Chronic combined systolic and diastolic heart failure  - EF 25-30%  - prescribed life vest but not wearing  - counseling and education provided    Patient will be released from hospital follow up clinic.  Patient tells me she receives primary care from her Nephrology team and does not wish to establish separate PCP.       Instructions for the patient:      Scheduled Follow-up :  Future Appointments   Date Time Provider Department Center   8/16/2023 10:00 AM Remy Toscano MD [facility] CARDIO [facility]       Post Visit Medication List:     Medication List            Accurate  as of July 11, 2023 11:37 AM. If you have any questions, ask your nurse or doctor.                CHANGE how you take these medications      furosemide 80 MG tablet  Commonly known as: LASIX  Take 2 tablets (160 mg total) by mouth 2 (two) times daily as needed (Increased swelling to the lower ext and SHORTNESS OF BREATH).  What changed: additional instructions            CONTINUE taking these medications      acetaminophen 500 MG tablet  Commonly known as: TYLENOL     amLODIPine 10 MG tablet  Commonly known as: NORVASC  Dunning gabbi tableta (10 mg en total) por vía oral diariamente.  (Take 1 tablet (10 mg total) by mouth once daily.)     aspirin 81 MG Chew  Dunning 1 tableta (81 mg en total) por vía oral gabbi vez al día.  (Take 1 tablet (81 mg total) by mouth once daily.)     atorvastatin 40 MG tablet  Commonly known as: LIPITOR  Dunning gabbi tableta (40 mg en total) por vía oral diariamente.  (Take 1 tablet (40 mg total) by mouth once daily.)     carvediloL 25 MG tablet  Commonly known as: COREG  Dunning gabbi tableta (25 mg en total) por vía oral 2 veces al día.  (Take 1 tablet (25 mg total) by mouth 2 (two) times daily.)     clopidogreL 75 mg tablet  Commonly known as: PLAVIX  Dunning gabbi tableta (75 mg en total) por vía oral diariamente.  (Take 1 tablet (75 mg total) by mouth once daily.)     ENTRESTO 49-51 mg per tablet  Generic drug: sacubitriL-valsartan  Take 1 tablet by mouth 2 (two) times daily.     sevelamer carbonate 800 mg Tab  Commonly known as: RENVELA  Take 2 tablets (1,600 mg total) by mouth 3 (three) times daily with meals.              Signing Physician:  Lashawn Lowery MD

## 2023-07-14 NOTE — ED NOTES
Pt presents to ED from dialysis. Pt states experiencing SOB for days, wears 2-3L at home regularly however pt's sister has been having to increase home O2 due to pulse ox reading in the 70s. Pt arrived on 15L cont nonrebreather mask, in distress. Life vest noted with left chest wall port.

## 2023-07-14 NOTE — ED PROVIDER NOTES
Encounter Date: 7/14/2023       History     Chief Complaint   Patient presents with    Respiratory Distress     Brought to ED by [facility] from [facility] for resp distress. Pt received full HD treatment and arrived on NRB, labored breathing with accessory muscle use      58-year-old female with past medical history including ESRD on HD brought in by EMS from dialysis center for shortness of breath.  Per EMS, patient has been short of breath even prior to her dialysis.  After completion of dialysis EMS was called and brought her to the ER for further evaluation.  Patient says that she is been more short of breath over the past few days.  Her sister says that they have had to increase the oxygen from 2 up to 4 liters nasal cannula when she ambulates.  Says that she completed the levofloxacin that was prescribed to her after her last hospital discharge aside from the last pill because it fell on the floor    The history is provided by the patient, the EMS personnel and a relative. No  was used.   Review of patient's allergies indicates:   Allergen Reactions    Phenergan [promethazine] Other (See Comments)     Restless legs    Reglan [metoclopramide hcl]     Tromethamine Other (See Comments)    Zofran [ondansetron hcl (pf)] Other (See Comments)     Constipation     Ketorolac Palpitations     Past Medical History:   Diagnosis Date    Coronary artery disease involving native coronary artery of native heart without angina pectoris 2/6/2023    Diabetes mellitus     Hypertension     Renal disorder      Past Surgical History:   Procedure Laterality Date    INSERTION OF CATHETER FOR HEMODIALYSIS Left 9/11/2020    Procedure: INSERTION, CATHETER, HEMODIALYSIS;  Surgeon: William Beltran MD;  Location: [facility] OR;  Service: General;  Laterality: Left;    LEFT HEART CATHETERIZATION Left 2/3/2023    Procedure: Left heart cath;  Surgeon: Remy Toscano MD;  Location: [facility] CATH LAB/EP;  Service:  Cardiology;  Laterality: Left;     No family history on file.  Social History     Tobacco Use    Smoking status: Never   Substance Use Topics    Alcohol use: Not Currently    Drug use: Never     Review of Systems    Physical Exam     Initial Vitals   BP Pulse Resp Temp SpO2   07/14/23 1659 07/14/23 1656 07/14/23 1656 07/14/23 1659 07/14/23 1656   (!) 157/70 63 (!) 86 96.7 °F (35.9 °C) 100 %      MAP       --                Physical Exam    Nursing note and vitals reviewed.  Constitutional: She appears well-developed. She is not diaphoretic. She appears distressed.   HENT:   Head: Normocephalic and atraumatic.   Eyes: EOM are normal. Pupils are equal, round, and reactive to light.   Neck: Neck supple.   Normal range of motion.  Cardiovascular:  Normal rate.           Pulmonary/Chest: She is in respiratory distress.   Abdominal: Abdomen is soft. She exhibits no distension.   Musculoskeletal:         General: Normal range of motion.      Cervical back: Normal range of motion and neck supple.     Neurological: She is alert and oriented to person, place, and time.   Skin: Skin is warm and dry.   Psychiatric: She has a normal mood and affect.       ED Course   Critical Care    Date/Time: 7/15/2023 1:53 PM  Performed by: Silvia Carmen MD  Authorized by: Silvia Carmen MD   Total critical care time (exclusive of procedural time) : 45 minutes  Critical care was necessary to treat or prevent imminent or life-threatening deterioration of the following conditions: respiratory failure.  Critical care was time spent personally by me on the following activities: development of treatment plan with patient or surrogate, interpretation of cardiac output measurements, evaluation of patient's response to treatment, examination of patient, obtaining history from patient or surrogate, ordering and performing treatments and interventions, discussions with consultants, ordering and review of laboratory studies, pulse  oximetry, re-evaluation of patient's condition, ordering and review of radiographic studies and review of old charts.      Labs Reviewed   CBC W/ AUTO DIFFERENTIAL - Abnormal; Notable for the following components:       Result Value    RBC 3.42 (*)     Hemoglobin 10.3 (*)     Hematocrit 31.6 (*)     RDW 14.9 (*)     Platelets 110 (*)     Immature Granulocytes 1.0 (*)     Immature Grans (Abs) 0.05 (*)     Lymph # 0.5 (*)     Lymph % 9.5 (*)     Mono % 17.0 (*)     All other components within normal limits   COMPREHENSIVE METABOLIC PANEL - Abnormal; Notable for the following components:    BUN 32 (*)     Creatinine 3.8 (*)     eGFR 13 (*)     All other components within normal limits   TROPONIN I - Abnormal; Notable for the following components:    Troponin I 0.888 (*)     All other components within normal limits   B-TYPE NATRIURETIC PEPTIDE - Abnormal; Notable for the following components:    BNP >4,900 (*)     All other components within normal limits   MAGNESIUM   SARS-COV-2 RDRP GENE          Imaging Results              X-Ray Chest AP Portable (Final result)  Result time 07/14/23 17:10:07      Final result by Berry Mistry MD (07/14/23 17:10:07)                   Impression:      Interval increase in the right-sided pleural effusion.    Unchanged airspace opacity in the right lung base.      Electronically signed by: Berry Mistry MD  Date:    07/14/2023  Time:    17:10               Narrative:    EXAMINATION:  XR CHEST AP PORTABLE    CLINICAL HISTORY:  Dyspnea, unspecified    TECHNIQUE:  Single frontal view of the chest was performed.    COMPARISON:  06/23/2023.    FINDINGS:  There is unchanged appearance of left-sided central catheter tip within the cavoatrial junction.  Monitoring EKG leads are present.    The trachea is unremarkable.  The cardiomediastinal silhouette is enlarged.  There is no evidence of free air beneath hemidiaphragms.  There is increase in the right-sided pleural effusion.  The effusion is  large in nature.  There is no pleural effusion on the left.  There is no evidence of a pneumothorax.  There is no evidence of pneumomediastinum.  There is pulmonary vascular congestion.  There is unchanged airspace opacity in the right lung base.  There are degenerative changes in the osseous structures.                                       Medications   sodium chloride 0.9% flush 10 mL (has no administration in time range)   acetaminophen tablet 650 mg (has no administration in time range)   prochlorperazine tablet 5 mg (has no administration in time range)   melatonin tablet 6 mg (has no administration in time range)   heparin (porcine) injection 5,000 Units (has no administration in time range)   atorvastatin tablet 40 mg (has no administration in time range)   aspirin chewable tablet 81 mg (has no administration in time range)   clopidogreL tablet 75 mg (has no administration in time range)   carvediloL tablet 25 mg (has no administration in time range)   sacubitriL-valsartan 49-51 mg per tablet 1 tablet (has no administration in time range)   sevelamer carbonate tablet 1,600 mg (has no administration in time range)     Medical Decision Making:   Differential Diagnosis:   Pneumothorax, pulmonary edema, pleural effusion, pneumonia, PE  Clinical Tests:   Lab Tests: Ordered and Reviewed  Radiological Study: Ordered and Reviewed  Medical Tests: Reviewed and Ordered  ED Management:  58-year-old female with past medical history as above brought in by EMS after completing dialysis where she remained dyspneic both prior to and after completing her dialysis session.  Placed on BiPAP upon arrival.  Chest x-ray independently interpreted and reviewed by me, consistent with a large right-sided pleural effusion.  Discussed with patient and sister at bedside that she will require thoracentesis however patient was currently declining, wants to stay on BiPAP overnight.  Discussed with [facility] Medicine service and  admitted to ICU for further management.           ED Course as of 07/15/23 1356   Fri Jul 14, 2023   1655 Spoke with Dr. Jc, nephrology. Completed dialysis session today. Dr. Jc saw pt during dialysis, says pt was dyspneic throughout entire session, and was seen to be desatting to 85% on her home O2. Recently at [facility] and supposed to have bypass surgery in August.  [AT]   1659 Discharged 6/26/23 after admission for hypoxia, found to have pna and volume overload, discharged on home O2 and levofloxacin  [AT]   1730 BNP(!): >4,900  Elevated [AT]   1730 WBC: 4.94  Normal  [AT]   1735 Troponin I(!): 0.888  Likely demand  [AT]   1749 Potassium: 3.5  Normal  [AT]   1900 Pt declining thoracentesis on multiple reassessments including with sister at bedside. Understands she will remain on Bipap overnight, may still require thoracentesis at a later time.  [AT]      ED Course User Index  [AT] Silvia Carmen MD                 Clinical Impression:   Final diagnoses:  [R06.00] Dyspnea        ED Disposition Condition    Admit                 Silvia Carmen MD  07/15/23 0409

## 2023-07-14 NOTE — Clinical Note
Diagnosis: Dyspnea [365252]   Future Attending Provider: MILAGROS LAURA [[id number]]   Admitting Provider:: YESI ELAINE [[id number]]  
89

## 2023-07-14 NOTE — TELEPHONE ENCOUNTER
Pt sister calling. Requesitng message be routed to Dr. Stahl, pt vasc surgeon, that she was transported from dialysis to ER for sob.     Reason for Disposition   General information question, no triage required and triager able to answer question    Protocols used: Information Only Call - No Triage-A-

## 2023-07-14 NOTE — ED NOTES
Pt provided warm blankets and more pillows for comfort with positioning, BiPAP remains, SR x 2, sister Alla at bs, monitoring devices in place, call light remains in reach and instructed how to use.

## 2023-07-15 PROBLEM — R79.89 ELEVATED TROPONIN: Status: ACTIVE | Noted: 2023-01-01

## 2023-07-15 NOTE — RESPIRATORY THERAPY
Pt given break on Bipap due c/o bridge of nose hurting. Attempted to place pt back on with a bandage on her nose as well as trying a different size mask. Pt still refused. RN notified. Pt on 3L nasal cannula.

## 2023-07-15 NOTE — CARE UPDATE
Procal resulted as high. Given clinical presentation of AoC hypoxic resp failure despite complete HD tx prior to presenting to the ED, I will start broad spectrum ABX coverage with Zosyn for resp coverage.     Also, due to bradycardia, I will HOLD beta blocker.     Clair Liang. MD

## 2023-07-15 NOTE — CARE UPDATE
Brief update note     Patient seen and examined.  Chart reviewed.  Able to communicate in English.  Reports having brief hospital stay about a month ago for pneumonia for which she was discharged on oral antibiotics.  She was supposed to take 5 pills every other day of which she took 4 pills, the last 1 fell on the ground and she was unable to take it.  Since then reports worsening shortness of breath.  Denies any high-grade fevers but reports having low-grade fevers at night.  Has intermittent productive cough.  Has been compliant with dialysis, last dialysis yesterday. Patient reports having a full session. Chest x-ray showed interval increase in right-sided pleural effusion.  Patient declining BiPAP, on nasal cannula with mild accessory muscle use, tachypnea.  No leukocytosis, procalcitonin elevated.  Given recent history of pneumonia, end-stage renal disease -will need to rule out right parapneumonic effusion/empyema.  Broad-spectrum antibiotics for now-vancomycin, Zosyn. Otherwise, looks euvolemic on exam. Nephrology consult. Appreciate pulmonary/ critical care team assistance.     Daphnie Jackson MD   Internal Medicine Hospitalist

## 2023-07-15 NOTE — H&P
[facility] Medicine  History & Physical    Patient Name: Ping Davenport  MRN: [id number]  Patient Class: IP- Inpatient  Admission Date: 7/14/2023  Attending Physician: Daphnie Jackson MD   Primary Care Provider: Primary Doctor No         Patient information was obtained from EMS personnel, past medical records and ER records.     Subjective:     Principal Problem:Acute on chronic respiratory failure with hypoxia    Chief Complaint:   Chief Complaint   Patient presents with    Respiratory Distress     Brought to ED by [facility] from [facility] for resp distress. Pt received full HD treatment and arrived on NRB, labored breathing with accessory muscle use         HPI: 59yo F with CHF, CAD, hypertension, diabetes, end-stage renal disease on Monday Wednesday Friday dialysis presents the ER for evaluation of shortness of breath and hypoxia. Brought in by EMS from dialysis center for shortness of breath.  Per EMS, patient has been short of breath even prior to her dialysis.  After completion of dialysis EMS was called and brought her to the ER for further evaluation.  Patient says that she is been more short of breath over the past few days.  Her sister says that they have had to increase the oxygen from 2 up to 4 liters nasal cannula when she ambulates.  Says that she completed the levofloxacin that was prescribed to her after her last hospital discharge aside from the last pill because it fell on the floor. Hx from ED -- at the time of my eval pt was lethargic and on BiPAP. Aroused to voice or noxious stimuli and follows commands but went right back to sleep and would not answer my questions.     During very recent hospitalization she was discharged on home O2. She was placed on BiPAP in the ED and admitted to the ICU due to need for continuous NIPPV.       Past Medical History:   Diagnosis Date    Coronary artery disease involving native coronary artery of native heart without angina pectoris 2/6/2023  "   Diabetes mellitus     Hypertension     Renal disorder        Past Surgical History:   Procedure Laterality Date    INSERTION OF CATHETER FOR HEMODIALYSIS Left 9/11/2020    Procedure: INSERTION, CATHETER, HEMODIALYSIS;  Surgeon: William Beltran MD;  Location: [facility] OR;  Service: General;  Laterality: Left;    LEFT HEART CATHETERIZATION Left 2/3/2023    Procedure: Left heart cath;  Surgeon: Remy Toscano MD;  Location: [facility] CATH LAB/EP;  Service: Cardiology;  Laterality: Left;       Review of patient's allergies indicates:   Allergen Reactions    Phenergan [promethazine] Other (See Comments)     Restless legs    Reglan [metoclopramide hcl]     Tromethamine Other (See Comments)    Zofran [ondansetron hcl (pf)] Other (See Comments)     Constipation     Ketorolac Palpitations       No current facility-administered medications on file prior to encounter.     Current Outpatient Medications on File Prior to Encounter   Medication Sig    acetaminophen (TYLENOL) 500 MG tablet Take 500 mg by mouth every 6 (six) hours as needed for Pain.    amLODIPine (NORVASC) 10 MG tablet Take 1 tablet (10 mg total) by mouth once daily.    aspirin 81 MG Chew Take 1 tablet (81 mg total) by mouth once daily.    atorvastatin (LIPITOR) 40 MG tablet Take 1 tablet (40 mg total) by mouth once daily.    carvediloL (COREG) 25 MG tablet Take 1 tablet (25 mg total) by mouth 2 (two) times daily.    clopidogreL (PLAVIX) 75 mg tablet Take 1 tablet (75 mg total) by mouth once daily.    furosemide (LASIX) 80 MG tablet Take 2 tablets (160 mg total) by mouth 2 (two) times daily as needed (Increased swelling to the lower ext and SHORTNESS OF BREATH). (Patient taking differently: Take 160 mg by mouth 2 (two) times daily as needed (Increased swelling to the lower ext and SOB). Patient states "I need a refill")    sacubitriL-valsartan (ENTRESTO) 49-51 mg per tablet Take 1 tablet by mouth 2 (two) times daily.    sevelamer carbonate " (RENVELA) 800 mg Tab Take 2 tablets (1,600 mg total) by mouth 3 (three) times daily with meals.     Family History    None       Tobacco Use    Smoking status: Never    Smokeless tobacco: Not on file   Substance and Sexual Activity    Alcohol use: Not Currently    Drug use: Never    Sexual activity: Not Currently     Partners: Male     Review of Systems   Unable to perform ROS: Mental status change   Objective:     Vital Signs (Most Recent):  Temp: 97.7 °F (36.5 °C) (07/14/23 2301)  Pulse: (!) 54 (07/15/23 0039)  Resp: (!) 22 (07/14/23 2301)  BP: (!) 160/71 (07/14/23 2345)  SpO2: 98 % (07/15/23 0039) Vital Signs (24h Range):  Temp:  [96.7 °F (35.9 °C)-97.7 °F (36.5 °C)] 97.7 °F (36.5 °C)  Pulse:  [51-63] 54  Resp:  [20-86] 22  SpO2:  [96 %-100 %] 98 %  BP: (146-179)/(67-77) 160/71     Weight: 65 kg (143 lb 4.8 oz)  Body mass index is 26.21 kg/m².     Physical Exam  Vitals and nursing note reviewed.   HENT:      Head: Normocephalic and atraumatic.      Mouth/Throat:      Mouth: Mucous membranes are moist.   Eyes:      Conjunctiva/sclera: Conjunctivae normal.   Cardiovascular:      Rate and Rhythm: Normal rate.      Pulses: Normal pulses.   Pulmonary:      Breath sounds: Rales present. No wheezing.      Comments: Comfortable on BiPAP no longer in resp distress  Abdominal:      General: There is no distension.      Palpations: Abdomen is soft.      Tenderness: There is no abdominal tenderness. There is no guarding.   Musculoskeletal:      Cervical back: Normal range of motion and neck supple.      Right lower leg: Edema present.      Left lower leg: Edema present.   Skin:     General: Skin is warm and dry.   Neurological:      Comments: Lethargic, arouses to voice and to noxious stimuli but then falls right back asleep               Significant Labs: All pertinent labs within the past 24 hours have been reviewed.    Significant Imaging: I have reviewed all pertinent imaging results/findings within the past 24  hours.    Assessment/Plan:     Elevated troponin  Lkely 2/2 demand ischemia  No acute ischemic EKG changes   Will trend to peak    Coronary artery disease of native artery of native heart with stable angina pectoris  Pt was planned for CABG in 8/23  Cont Plavix, bASA, and coreg     Hyperlipidemia  Continue statin      ESRD (end stage renal disease)  Nephrology consulted for inpatient HD  Continue Sevelamer       Acute on chronic combined systolic and diastolic heart failure  P/w AoC HRF requiring BiPAP despite full HD session earlier today  No evidence of infxn  Pleural effusion - likely warrants thora - pt refused on multiple discussions with ED --will re-visit in the morning  Altthough pt is ESRD she does make urine and is on Lasix 80mg po BID at home  Given inability to take po meds now while on BiPAP-- will give one time dose of IV Lasix 80mg and assess UOP and O2 req    Essential hypertension  Continue home Entresto, coreg        VTE Risk Mitigation (From admission, onward)         Ordered     heparin (porcine) injection 5,000 Units  Every 8 hours         07/14/23 1908     IP VTE HIGH RISK PATIENT  Once         07/14/23 1908     Place sequential compression device  Until discontinued         07/14/23 1908              Critical care time spent on the evaluation and treatment of severe organ dysfunction, review of pertinent labs and imaging studies, discussions with consulting providers and discussions with patient/family: 40 minutes.             Clair Liang MD  Department of Hospital Medicine  [facility] - Intensive Care

## 2023-07-15 NOTE — PLAN OF CARE
Pt off the continuous Bipap this morning at shift change, remains on 3L nc maintaining O2 sats >90%, SOB/labored breathing noted with exertion with drop in O2 sats to 85-87% and takes about a minute to recover back to 90%>. Denies pain or discomfort. Seen by ICU pulm team. IV antibiotics administered as ordered tolerating well.     Problem: Adult Inpatient Plan of Care  Goal: Plan of Care Review  Outcome: Ongoing, Progressing  Goal: Patient-Specific Goal (Individualized)  Outcome: Ongoing, Progressing  Goal: Absence of Hospital-Acquired Illness or Injury  Outcome: Ongoing, Progressing  Goal: Optimal Comfort and Wellbeing  Outcome: Ongoing, Progressing  Goal: Readiness for Transition of Care  Outcome: Ongoing, Progressing     Problem: Fluid and Electrolyte Imbalance (Acute Kidney Injury/Impairment)  Goal: Fluid and Electrolyte Balance  Outcome: Ongoing, Progressing     Problem: Oral Intake Inadequate (Acute Kidney Injury/Impairment)  Goal: Optimal Nutrition Intake  Outcome: Ongoing, Progressing     Problem: Renal Function Impairment (Acute Kidney Injury/Impairment)  Goal: Effective Renal Function  Outcome: Ongoing, Progressing     Problem: Gas Exchange Impaired  Goal: Optimal Gas Exchange  Outcome: Ongoing, Progressing

## 2023-07-15 NOTE — SUBJECTIVE & OBJECTIVE
"Past Medical History:   Diagnosis Date    Coronary artery disease involving native coronary artery of native heart without angina pectoris 2/6/2023    Diabetes mellitus     Hypertension     Renal disorder        Past Surgical History:   Procedure Laterality Date    INSERTION OF CATHETER FOR HEMODIALYSIS Left 9/11/2020    Procedure: INSERTION, CATHETER, HEMODIALYSIS;  Surgeon: William Beltran MD;  Location: [facility] OR;  Service: General;  Laterality: Left;    LEFT HEART CATHETERIZATION Left 2/3/2023    Procedure: Left heart cath;  Surgeon: Remy Toscano MD;  Location: [facility] CATH LAB/EP;  Service: Cardiology;  Laterality: Left;       Review of patient's allergies indicates:   Allergen Reactions    Phenergan [promethazine] Other (See Comments)     Restless legs    Reglan [metoclopramide hcl]     Tromethamine Other (See Comments)    Zofran [ondansetron hcl (pf)] Other (See Comments)     Constipation     Ketorolac Palpitations       No current facility-administered medications on file prior to encounter.     Current Outpatient Medications on File Prior to Encounter   Medication Sig    acetaminophen (TYLENOL) 500 MG tablet Take 500 mg by mouth every 6 (six) hours as needed for Pain.    amLODIPine (NORVASC) 10 MG tablet Take 1 tablet (10 mg total) by mouth once daily.    aspirin 81 MG Chew Take 1 tablet (81 mg total) by mouth once daily.    atorvastatin (LIPITOR) 40 MG tablet Take 1 tablet (40 mg total) by mouth once daily.    carvediloL (COREG) 25 MG tablet Take 1 tablet (25 mg total) by mouth 2 (two) times daily.    clopidogreL (PLAVIX) 75 mg tablet Take 1 tablet (75 mg total) by mouth once daily.    furosemide (LASIX) 80 MG tablet Take 2 tablets (160 mg total) by mouth 2 (two) times daily as needed (Increased swelling to the lower ext and SHORTNESS OF BREATH). (Patient taking differently: Take 160 mg by mouth 2 (two) times daily as needed (Increased swelling to the lower ext and SOB). Patient states "I need a " "refill")    sacubitriL-valsartan (ENTRESTO) 49-51 mg per tablet Take 1 tablet by mouth 2 (two) times daily.    sevelamer carbonate (RENVELA) 800 mg Tab Take 2 tablets (1,600 mg total) by mouth 3 (three) times daily with meals.     Family History    None       Tobacco Use    Smoking status: Never    Smokeless tobacco: Not on file   Substance and Sexual Activity    Alcohol use: Not Currently    Drug use: Never    Sexual activity: Not Currently     Partners: Male     Review of Systems   Unable to perform ROS: Mental status change   Objective:     Vital Signs (Most Recent):  Temp: 97.7 °F (36.5 °C) (07/14/23 2301)  Pulse: (!) 54 (07/15/23 0039)  Resp: (!) 22 (07/14/23 2301)  BP: (!) 160/71 (07/14/23 2345)  SpO2: 98 % (07/15/23 0039) Vital Signs (24h Range):  Temp:  [96.7 °F (35.9 °C)-97.7 °F (36.5 °C)] 97.7 °F (36.5 °C)  Pulse:  [51-63] 54  Resp:  [20-86] 22  SpO2:  [96 %-100 %] 98 %  BP: (146-179)/(67-77) 160/71     Weight: 65 kg (143 lb 4.8 oz)  Body mass index is 26.21 kg/m².     Physical Exam  Vitals and nursing note reviewed.   HENT:      Head: Normocephalic and atraumatic.      Mouth/Throat:      Mouth: Mucous membranes are moist.   Eyes:      Conjunctiva/sclera: Conjunctivae normal.   Cardiovascular:      Rate and Rhythm: Normal rate.      Pulses: Normal pulses.   Pulmonary:      Breath sounds: Rales present. No wheezing.      Comments: Comfortable on BiPAP no longer in resp distress  Abdominal:      General: There is no distension.      Palpations: Abdomen is soft.      Tenderness: There is no abdominal tenderness. There is no guarding.   Musculoskeletal:      Cervical back: Normal range of motion and neck supple.      Right lower leg: Edema present.      Left lower leg: Edema present.   Skin:     General: Skin is warm and dry.   Neurological:      Comments: Lethargic, arouses to voice and to noxious stimuli but then falls right back asleep               Significant Labs: All pertinent labs within the past 24 " hours have been reviewed.    Significant Imaging: I have reviewed all pertinent imaging results/findings within the past 24 hours.

## 2023-07-15 NOTE — PROGRESS NOTES
Pharmacokinetic Initial Assessment: IV Vancomycin    Assessment/Plan:    Initiate intravenous vancomycin with loading dose of 1750 mg once with subsequent doses when random concentrations are less than 20 mcg/mL  Desired empiric serum trough concentration is 15 to 20 mcg/mL  Draw vancomycin random level on 7/19 at prior to 2nd dialysis session after initial vanc dose.  Pharmacy will continue to follow and monitor vancomycin.      Please contact pharmacy at extension [phone] with any questions regarding this assessment.     Thank you for the consult,   Vinay Chavez       Patient brief summary:  Ping Davenport is a 58 y.o. female initiated on antimicrobial therapy with IV Vancomycin for treatment of suspected  pnuemonia    Drug Allergies:   Review of patient's allergies indicates:   Allergen Reactions    Phenergan [promethazine] Other (See Comments)     Restless legs    Reglan [metoclopramide hcl]     Tromethamine Other (See Comments)    Zofran [ondansetron hcl (pf)] Other (See Comments)     Constipation     Ketorolac Palpitations       Actual Body Weight:   65    Renal Function:   Estimated Creatinine Clearance: 10.3 mL/min (A) (based on SCr of 5.2 mg/dL (H)).,     Dialysis Method (if applicable):  intermittent HD    CBC (last 72 hours):  Recent Labs   Lab Result Units 07/14/23  1700 07/15/23  0531   WBC K/uL 4.94 5.43   Hemoglobin g/dL 10.3* 10.5*   Hematocrit % 31.6* 32.4*   Platelets K/uL 110* 116*   Gran % % 69.1 67.7   Lymph % % 9.5* 9.0*   Mono % % 17.0* 17.9*   Eosinophil % % 2.6 3.7   Basophil % % 0.8 0.6   Differential Method  Automated Automated       Metabolic Panel (last 72 hours):  Recent Labs   Lab Result Units 07/14/23  1700 07/14/23  1703 07/15/23  0531   Sodium mmol/L 138  --  138   Potassium mmol/L 3.5  --  3.8   Chloride mmol/L 102  --  101   CO2 mmol/L 23  --  26   Glucose mg/dL 90  --  83   BUN mg/dL 32*  --  39*   Creatinine mg/dL 3.8*  --  5.2*   Albumin g/dL 3.6  --  3.3*   Total  Bilirubin mg/dL 0.7  --  0.7   Alkaline Phosphatase U/L 76  --  73   AST U/L 22  --  18   ALT U/L 12  --  10   Magnesium mg/dL  --  1.9 2.0   Phosphorus mg/dL  --   --  5.5*       Drug levels (last 3 results):  No results for input(s): VANCOMYCINRA, VANCORANDOM, VANCOMYCINPE, VANCOPEAK, VANCOMYCINTR, VANCOTROUGH in the last 72 hours.    Microbiologic Results:  Microbiology Results (last 7 days)       ** No results found for the last 168 hours. **

## 2023-07-15 NOTE — NURSING
Patient arrived on stretcher from ED on BIPAP with no distress noted. See flow sheet for further assessment.

## 2023-07-15 NOTE — EICU
e-ICU admit note                Reason for ICU admission:    Acute hypoxic resp failure    Large R effusion      HPI:    57 yo  diabetic female with pmx as below with SOB.  Brought to ED after HD session. Desaturated to 85% during HD. SOB was present even before HD, for a few days.On home O2 usually 2L now 4L.    Completed the levofloxacin that was prescribed to her after her last hospital discharge.    Initial /70    Discharged 6/26/23 after admission for hypoxia, found to have pna and volume overload, discharged on home O2 and levofloxacin    Pt declining thoracentesis on multiple reassessments.    Pt was planned for CABG in 8/23      PMx:    ESRD on HD  CAD  DM    Home Meds:    Lasix 80 mg bid  Atorvastatin  ASA  Clopidogrel  Carvedilol  Sevelamer  entresto    Significant labs:    BNP > 5000  Troponin 0.88  7.37/50/42/29    Significant images    CXR:    There is increase in the right-sided pleural effusion. There is pulmonary vascular congestion.  There is unchanged airspace opacity in the right lung base.       ECG:    Sinus 62/min, RBBB, poor R waves anterior        I viewed the pt via camera at  9:30 pm:    55 sinus, 100%, 159/68, R 27     BiPAP 12/6, FiO2 40%, R 10 making TVs of 475ml    Assessment/Plan:    Acute on chronic hypoxic resp failure  Components of vasc congestion and increased R effusion  BiPAP  Discuss for possible thoracentesis    ESRD  F/u by Nephro  Pt is on lasix 80 mg bid, continue    Elevated troponin  Demand more likely  trend    CAD  Home Clopidogrel, ASA, carvedilol    HTN   Entresto    HLD  statin    DVT ppx  Hep sc ( watch plts)

## 2023-07-15 NOTE — ASSESSMENT & PLAN NOTE
P/w AoC HRF requiring BiPAP despite full HD session earlier today  No evidence of infxn  Pleural effusion - likely warrants thora - pt refused on multiple discussions with ED --will re-visit in the morning  Altthough pt is ESRD she does make urine and is on Lasix 80mg po BID at home  Given inability to take po meds now while on BiPAP-- will give one time dose of IV Lasix 80mg and assess UOP and O2 req

## 2023-07-15 NOTE — CONSULTS
Pulm/CC Resident Consult Note    Attending Physician: Daphnie Jackson MD    Date of Admit: 7/14/2023  Hospital day: 1    Reason for Consult: NIPPV    History of Present Illness:  Ping Davenport is a 58 y.o.  female with a PMHx of ESRD on dailysis MWF and CAD (w/ scheduled triple bipass in August who presented to the ED with worsening shortness of breath. Large right lung opacity was noted on x-ray. She was placed on bipap and stepped up for NIPPV. She states she feels much better this morning. She states she did go to her dialysis last Friday. She states this shortness of breath has been ongoing for one month and slowly getting worse until she could not take it. She denies fever, chills, chest pain, or weight loss.     Past Medical History:  Past Medical History:   Diagnosis Date    Coronary artery disease involving native coronary artery of native heart without angina pectoris 2/6/2023    Diabetes mellitus     Hypertension     Renal disorder        Past Surgical History:  Past Surgical History:   Procedure Laterality Date    INSERTION OF CATHETER FOR HEMODIALYSIS Left 9/11/2020    Procedure: INSERTION, CATHETER, HEMODIALYSIS;  Surgeon: William Beltran MD;  Location: [facility] OR;  Service: General;  Laterality: Left;    LEFT HEART CATHETERIZATION Left 2/3/2023    Procedure: Left heart cath;  Surgeon: Remy Toscano MD;  Location: [facility] CATH LAB/EP;  Service: Cardiology;  Laterality: Left;       Allergies:  Review of patient's allergies indicates:   Allergen Reactions    Phenergan [promethazine] Other (See Comments)     Restless legs    Reglan [metoclopramide hcl]     Tromethamine Other (See Comments)    Zofran [ondansetron hcl (pf)] Other (See Comments)     Constipation     Ketorolac Palpitations       Family History:  No family history on file.    Social History:  Social History     Tobacco Use    Smoking status: Never   Substance Use Topics    Alcohol use: Not Currently    Drug use: Never       Review of  "Systems:  ROS  See HPI for pertinent positives and negatives      Objective:   Last 24 Hour Vital Signs:  BP  Min: 137/69  Max: 179/74  Temp  Av.5 °F (36.4 °C)  Min: 96.7 °F (35.9 °C)  Max: 97.8 °F (36.6 °C)  Pulse  Av.3  Min: 51  Max: 65  Resp  Av.5  Min: 14  Max: 86  SpO2  Av.3 %  Min: 93 %  Max: 100 %  Height  Av' 2" (157.5 cm)  Min: 5' 2" (157.5 cm)  Max: 5' 2" (157.5 cm)  Weight  Av.3 kg (144 lb 0.5 oz)  Min: 65 kg (143 lb 4.8 oz)  Max: 66 kg (145 lb 8.1 oz)  Body mass index is 26.21 kg/m².  I & O (Last 24H):  Intake/Output Summary (Last 24 hours) at 7/15/2023 0745  Last data filed at 7/15/2023 0606  Gross per 24 hour   Intake 74.68 ml   Output --   Net 74.68 ml       Physical Exam:  GEN: non-toxic appearing  HEENT: MMM, no scleral icterus, EOMI  NECK: Supple, midline trachea,   CV: RRR, no MRG, pulses equal and symmetric   PULM: Dullness to percussion on right compared to left, weak inspiratory effort, on 3L NC  ABDOMEN: Soft, non-tender, non-distended, no rebound or guarding  SKIN: Warm, dry, intact, no rashes  MSK: No deformity, no clubbing, cyanosis, or lower extremity edema  NEURO: awake and following commands, at neurologic baseline  LINES: Intact, no extravasation or induration, no erythema to PIV or support devices     Assessment & Plan:   Ping Davenport is a 58 y.o.  female with a PMHx of ESRD on dailysis MWF and CAD (w/ scheduled triple bipass in August who presented to the ED with worsening shortness of breath. Patient found to have significant sized pleural effusion of the right lung. Will plan for a diagnostic/ therapeutic tap after holding aspirin and plavix for 48 hours. Have confirmed the holding of ASA and Plavix w/ patient's cardiologist Dr. Toscano.     She is hemodynamically stable and not hypoxic or in respiratory distress on 3L Nasal Cannula. She is appropriate for step down to the floor.     NEURO:  No active issues     CV:  #Coronary Artery " Disease  -significant multivessel disease with scheduled CABG  -on aspirin and plavix     PULM:  #Pleural Effusion  #Hypoxemia  -bedside ultrasound revealed a significant effusion isolated to the right side and not on the left  -Unclear etiology but unlikely volume overload  -Plan to hold patient's ASA and Plavix for 48 hours and perform diagnostic/therapeutic thoracentesis     GI/FEN  -Normal renal diet    RENAL:   ESRD  -nephrology consult for dialysis Monday      HEME/ONC:  No active issues  -- Transfusion threshold <7g/dl per TRICC     ENDOCRINE:  -- Hypoglycemic precautions     INFECTIOUS DISEASE:  -No active issues    MSK/RHEUM:  -- PT/OT for early mobility    PSYCHOSOCIAL:  -- Lives at home with her daughter and granddaughter     DVT prophylaxis: Heparin TID    Code: Full     Case was discussed and rounded with Dr. Martinez.     Wellington Eastman MD   [facility] Internal Medicine, -II  Pulm/CC Consults    Pt seen and examined with Pulmonary/Critical Care team and this note reviewed and validated with the following additional comments:    I doubt pneumonia as there is no significant lung infiltrate.  This is mostly effusion which has a lot of cellular debris in it by POCUS.  I am worried about malignancy.  Discussed with Dr. Giordano.  Should be OK to hold her ASA + Plavix to perform diagnostic/therapeutic thoracentesis.    Daquan Martinez MD  Phone [phone]

## 2023-07-15 NOTE — HPI
59yo F with CHF, CAD, hypertension, diabetes, end-stage renal disease on Monday Wednesday Friday dialysis presents the ER for evaluation of shortness of breath and hypoxia. Brought in by EMS from dialysis center for shortness of breath.  Per EMS, patient has been short of breath even prior to her dialysis.  After completion of dialysis EMS was called and brought her to the ER for further evaluation.  Patient says that she is been more short of breath over the past few days.  Her sister says that they have had to increase the oxygen from 2 up to 4 liters nasal cannula when she ambulates.  Says that she completed the levofloxacin that was prescribed to her after her last hospital discharge aside from the last pill because it fell on the floor. Hx from ED -- at the time of my eval pt was lethargic and on BiPAP. Aroused to voice or noxious stimuli and follows commands but went right back to sleep and would not answer my questions.     During very recent hospitalization she was discharged on home O2. She was placed on BiPAP in the ED and admitted to the ICU due to need for continuous NIPPV.

## 2023-07-15 NOTE — NURSING
Patient wanted the BIPAP mask off and is now on 3L/NC. No respiratory distress noted.

## 2023-07-16 PROBLEM — I50.42 CHRONIC COMBINED SYSTOLIC AND DIASTOLIC HEART FAILURE: Status: ACTIVE | Noted: 2023-01-01

## 2023-07-16 NOTE — ASSESSMENT & PLAN NOTE
Elevated BP, respiratory distress maybe contributing  Continue entresto  Coreg on hold due to borderline low HR  Consider adding amlodipine

## 2023-07-16 NOTE — NURSING
RAPID RESPONSE NURSE PROACTIVE ROUNDING NOTE       Time of Visit: 1045    Admit Date: 2023  LOS: 2  Code Status: Full Code   Date of Visit: 2023  : 1965  Age: 58 y.o.  Sex: female  Race: White  Bed: K530/K530 A:   MRN: [id number]  Was the patient discharged from an ICU this admission? Yes   Was the patient discharged from a PACU within last 24 hours? No   Did the patient receive conscious sedation/general anesthesia in last 24 hours? No   Was the patient in the ED within the past 24 hours? No   Was the patient on NIPPV within the past 24 hours? No   Attending Physician: Daphnie Jackson MD  Primary Service: Hospitalist   Time spent at the bedside: 15 -30 min    SITUATION    Notified by   Reason for alert:     Diagnosis: Acute on chronic respiratory failure with hypoxia   has a past medical history of CHF (congestive heart failure), Coronary artery disease involving native coronary artery of native heart without angina pectoris, Diabetes mellitus, Hypertension, On home O2, and Renal disorder.    Last Vitals:  Temp: 97.6 °F (36.4 °C) ( 1603)  Pulse: 69 ( 1603)  Resp: 20 ( 1603)  BP: 171/79 ( 1603)  SpO2: 95 % ( 1603)    24 Hour Vitals Range:  Temp:  [97.6 °F (36.4 °C)-98.6 °F (37 °C)]   Pulse:  [64-75]   Resp:  [16-42]   BP: (122-178)/(69-90)   SpO2:  [89 %-97 %]     Clinical Issues: Respiratory    ASSESSMENT/INTERVENTIONS    Pt having some SOB.  On 4L.NC.  xray shows R pleural effusion.    RECOMMENDATIONS  Will have pulm crit team come assess     Discussed plan of care with charge RNPromise    PROVIDER ESCALATION    Physician escalation: Yes    Orders received and case discussed with Dr. Valdivia .    Disposition:Remain in room 530    FOLLOW UP    Call back the Rapid Response NurseSuzy at [phone] for additional questions or concerns.

## 2023-07-16 NOTE — PLAN OF CARE
BIPAP SETTINGS:    Mode Of Delivery: BiPAP  Equipment Type: ElectroJet   Airway Device Type: large full face mask     Ipap: 12  EPAP (cm H2O): 6  Pressure Support (cm H2O): 6     Set Rate (Breaths/Min): 10     ITime (sec): 1  Rise Time (sec): 2    RR Total (Breaths/Min): 26  VE Minute Ventilation (L/min): 10.9 L/min  Peak Inspiratory Pressure (cm H2O): 12  Total Leak (L/Min): 28.8    PLAN OF CARE: Will continue to monitor.

## 2023-07-16 NOTE — CONSULTS
[facility] Pulmonary & Critical Care Medicine Consult Note    Primary Attending Physician: Dr. Jackson  Primary Team: [facility] Hospitalist Team   Consultant Attending: Dr. Valdivia   Consultant Fellow: Dr. Annalisa Mast     Reason for Consult:     Pleural Effusion     Subjective:      History of Present Illness:  Ping Davenport is a 58 y.o. female with h/o HFrEF, CAD (scheduled for CABG in Aug 2023), ESRD on HD (MWF), T2DM, chronic hypoxic respiratory failure (since recent admission for possible pna) here with respiratory distress after dialysis on Friday. She was initially admitted to the ICU for bipap which improved her respiratory distress. CXR c/w large pleural effusion. Bedside ultrasound with evidence of plankton sign present.     Overnight, she didn't use BIPAP because it made her uncomfortable. This morning she had some inc resp distress attributed to being off of bipap.     Past Medical History:  Past Medical History:   Diagnosis Date    CHF (congestive heart failure)     Coronary artery disease involving native coronary artery of native heart without angina pectoris 02/06/2023    Diabetes mellitus     Hypertension     On home O2     Renal disorder     ESRD-HD M W F       Past Surgical History:  Past Surgical History:   Procedure Laterality Date    INSERTION OF CATHETER FOR HEMODIALYSIS Left 9/11/2020    Procedure: INSERTION, CATHETER, HEMODIALYSIS;  Surgeon: William Beltran MD;  Location: [facility] OR;  Service: General;  Laterality: Left;    LEFT HEART CATHETERIZATION Left 2/3/2023    Procedure: Left heart cath;  Surgeon: Remy Toscano MD;  Location: [facility] CATH LAB/EP;  Service: Cardiology;  Laterality: Left;       Allergies:  Review of patient's allergies indicates:   Allergen Reactions    Phenergan [promethazine] Other (See Comments)     Restless legs    Reglan [metoclopramide hcl]     Tromethamine Other (See Comments)    Zofran [ondansetron hcl (pf)] Other (See Comments)     Constipation      "Ketorolac Palpitations       Medications:   In-Hospital Scheduled Medications:   sodium chloride 0.9%   Intravenous Once    atorvastatin  40 mg Oral Daily    heparin (porcine)  5,000 Units Subcutaneous Q8H    LIDOcaine-EPINEPHrine 1%-1:100,000  50 mL Intradermal Once    mupirocin   Nasal BID    piperacillin-tazobactam (Zosyn) IV (PEDS and ADULTS) (extended infusion is not appropriate)  4.5 g Intravenous Q12H    sacubitriL-valsartan  1 tablet Oral BID    sevelamer carbonate  1,600 mg Oral TID WM      In-Hospital PRN Medications:  acetaminophen, melatonin, prochlorperazine, sodium chloride 0.9%, sodium chloride 0.9%, Pharmacy to dose Vancomycin consult **AND** vancomycin - pharmacy to dose   In-Hospital IV Infusion Medications:     Home Medications:  Prior to Admission medications    Medication Sig Start Date End Date Taking? Authorizing Provider   acetaminophen (TYLENOL) 500 MG tablet Take 500 mg by mouth every 6 (six) hours as needed for Pain.    Historical Provider   amLODIPine (NORVASC) 10 MG tablet Take 1 tablet (10 mg total) by mouth once daily. 11/15/19 7/11/23  Neri Haas MD   aspirin 81 MG Chew Take 1 tablet (81 mg total) by mouth once daily. 2/10/23 2/10/24  Erickson Green NP   atorvastatin (LIPITOR) 40 MG tablet Take 1 tablet (40 mg total) by mouth once daily. 2/10/23 2/10/24  Erickson Green NP   carvediloL (COREG) 25 MG tablet Take 1 tablet (25 mg total) by mouth 2 (two) times daily. 5/29/23 5/28/24  Remy Toscano MD   clopidogreL (PLAVIX) 75 mg tablet Take 1 tablet (75 mg total) by mouth once daily. 5/30/23   Remy Toscano MD   furosemide (LASIX) 80 MG tablet Take 2 tablets (160 mg total) by mouth 2 (two) times daily as needed (Increased swelling to the lower ext and SHORTNESS OF BREATH).  Patient taking differently: Take 160 mg by mouth 2 (two) times daily as needed (Increased swelling to the lower ext and SOB). Patient states "I need a refill" 2/9/23 2/9/24  " Erickson Green NP   sacubitriL-valsartan (ENTRESTO) 49-51 mg per tablet Take 1 tablet by mouth 2 (two) times daily. 23   Remy Toscano MD   sevelamer carbonate (RENVELA) 800 mg Tab Take 2 tablets (1,600 mg total) by mouth 3 (three) times daily with meals. 23  Erickson Green NP       Family History:  History reviewed. No pertinent family history.    Social History:  Social History     Tobacco Use    Smoking status: Never   Substance Use Topics    Alcohol use: Not Currently    Drug use: Never       Review of Systems:  Pertinent items are noted in HPI. All other systems are reviewed and are negative.     Objective:   Last 24 Hour Vital Signs:  BP  Min: 122/90  Max: 178/79  Temp  Av.2 °F (36.8 °C)  Min: 97.6 °F (36.4 °C)  Max: 98.6 °F (37 °C)  Pulse  Av.3  Min: 64  Max: 75  Resp  Av.4  Min: 16  Max: 42  SpO2  Av.8 %  Min: 89 %  Max: 97 %  I/O last 3 completed shifts:  In: 1301.4 [P.O.:600; IV Piggyback:701.4]  Out: 100 [Urine:100]    Physical Examination:  General: Awake and alert, cooperative, interactive on exam, able to speak in short sentences/phrases without losing her breath   HEENT: Normocephalic, atraumatic, EOMI, clear conjunctiva, moist mucous membranes  CV: RRR, no murmurs   Resp: No wheezing, mildly inc WOB (although improved from admission), dec breath sounds on R side, lungs with scattered crackles on L side   Abd: +BS, soft and full, non-tender, non-distended, slight bleeding from sites of prior heparin injections   Extremities: 2+ distal pulses in bilateral upper and lower extremities, warm and well perfused   Skin: Warm and dry, no rashes   Neuro: Alert and oriented x4, no focal deficits   Psych: Appropriate affect and insight      Laboratory:  Trended Lab Data:  Recent Labs     23  1700 23  1703 07/15/23  0531 23  0716   WBC 4.94  --  5.43 6.56   HGB 10.3*  --  10.5* 10.5*   HCT 31.6*  --  32.4* 32.3*   *  --   116* 142*     --  138 135*   K 3.5  --  3.8 4.1     --  101 97   CO2 23  --  26 23   BUN 32*  --  39* 56*   CREATININE 3.8*  --  5.2* 6.7*   GLU 90  --  83 96   BILITOT 0.7  --  0.7 0.7   AST 22  --  18 18   ALT 12  --  10 8*   ALKPHOS 76  --  73 67   CALCIUM 8.8  --  9.2 9.8   ALBUMIN 3.6  --  3.3* 3.4*   PROT 8.2  --  7.4 7.9   MG  --  1.9 2.0 2.1   PHOS  --   --  5.5* 6.1*       Cardiac:   Recent Labs   Lab 07/14/23  1700 07/14/23  2332 07/15/23  0531   TROPONINI 0.888* 0.913* 0.837*   BNP >4,900*  --   --        Urinalysis:   Lab Results   Component Value Date    COLORU Yellow 11/05/2019    SPECGRAV 1.025 11/05/2019    NITRITE Negative 11/05/2019    KETONESU Negative 11/05/2019    UROBILINOGEN Negative 11/05/2019       Microbiology:  Microbiology Results (last 7 days)       ** No results found for the last 168 hours. **          Radiology:  CXR on admission with large R pleural effusion     I have personally reviewed the above labs and imaging.    Current Medications:     Infusions:       Scheduled:   sodium chloride 0.9%   Intravenous Once    atorvastatin  40 mg Oral Daily    heparin (porcine)  5,000 Units Subcutaneous Q8H    LIDOcaine-EPINEPHrine 1%-1:100,000  50 mL Intradermal Once    mupirocin   Nasal BID    piperacillin-tazobactam (Zosyn) IV (PEDS and ADULTS) (extended infusion is not appropriate)  4.5 g Intravenous Q12H    sacubitriL-valsartan  1 tablet Oral BID    sevelamer carbonate  1,600 mg Oral TID WM        PRN:  acetaminophen, melatonin, prochlorperazine, sodium chloride 0.9%, sodium chloride 0.9%, Pharmacy to dose Vancomycin consult **AND** vancomycin - pharmacy to dose     Assessment:     Ping Davenport is a 58 y.o. female with h/o HFrEF, CAD (scheduled for 3v CABG in Aug 2023), ESRD on HD (MWF), T2DM, chronic hypoxic respiratory failure (since recent admission for possible pna) here with respiratory distress after dialysis on Friday.Improved initially with BIPAP. Discussed risks  and benefits of thora with patient and spoke with pt's cardiologist who agreed to hold aspirin/plavix (last received 7/15 in am). Bedside ultrasound with plankton sign present, concern for exudate effusion with concern for possible malignancy. Will plan for thoracentesis Monday or Tuesday of this week after next dialysis session, continue holding asa/plavix.      Plan:     #Large R sided Pleural Effusion  - Previously seen during last admission, here and tx for PNA but pt never improved, now larger on admission  - Plankton sign present, concerning for exudative effusion (malignancy in the differential)  - Agree that tapping fluid will likely improve shortness of breath significantlly, discussed risks/benefits with pt on 7/15 and again on 7/16 and with shared decision making agreed to hold aspirin/plavix and wait for that to clear her system some prior to thoracentesis   - Will plan for thora early this week, continue to hold aspirin or plavix     #Acute on Chronic Hypoxemic Respiratory Failure:  - Improved after bipap, recommend bipap nightly and intermittently with naps or respiratory distress during day  - Ultimately, believe thoracentesis will help some as well       Thank you for allowing us to participate in the care of this patient. Pulmonology will continue to follow. Please contact me if you have any questions regarding this consult.    Annalisa Mast MD  [employer] Pulmonary & Critical Care Medicine Fellow    Pt seen and examined with Pulmonary/Critical Care team and this note reviewed and validated with the following additional comments:    Bleeding from heaprin puncture sites on abdomen.  We will hold heparin and ASA and Plavix and perform thoracentesis in AM.    Daquan Martinez MD  Phone [phone]

## 2023-07-16 NOTE — NURSING
Report called to Rachell GILBERT. Full report given and time allowed for questions. Informed pt likes to keep hob upright to help breathing. Also informed of planned probable  Right thoracentesis in 48 hours and we are holding ASA and Plavix for now. Pt is oliguric. Makes some urine and we have purwick in place. Will transfer to room 530 per bed with telebox and portable oxygen 3 liters. Bipap machine has been brought to new room already but patient said she does not want to have it  on tonight due to claustrophobia. Will transfer to 530 soon.

## 2023-07-16 NOTE — CONSULTS
Nephrology Consult  Note     Consult Requested By: Daphnie Jackson MD  Reason for Consult: ESRD    SUBJECTIVE:      History of Present Illness:  Ping Davenport is a 58 y.o.   female who  has a past medical history of CHF (congestive heart failure), Coronary artery disease involving native coronary artery of native heart without angina pectoris (02/06/2023), Diabetes mellitus, Hypertension, On home O2, and Renal disorder.. The patient presented to the [facility] on 7/14/2023 with a primary complaint of SOB she completed her dialysis on Friday  She has worsening right-sided pleural effusion, nearly 3/4 of the R lung is completely opacified    Review of Systems   Constitutional:  Negative for chills and fever.   HENT:  Negative for congestion and sore throat.    Eyes:  Negative for blurred vision, double vision and photophobia.   Respiratory:  Positive for cough and shortness of breath.    Cardiovascular:  Negative for chest pain, palpitations and leg swelling.   Gastrointestinal:  Negative for abdominal pain, diarrhea, nausea and vomiting.   Genitourinary:  Negative for dysuria and urgency.   Musculoskeletal:  Negative for joint pain and myalgias.   Skin:  Negative for itching and rash.   Neurological:  Negative for dizziness, sensory change, weakness and headaches.   Endo/Heme/Allergies:  Negative for polydipsia. Does not bruise/bleed easily.   Psychiatric/Behavioral:  Negative for depression.      Past Medical History:   Diagnosis Date    CHF (congestive heart failure)     Coronary artery disease involving native coronary artery of native heart without angina pectoris 02/06/2023    Diabetes mellitus     Hypertension     On home O2     Renal disorder     ESRD-HD M W F     Past Surgical History:   Procedure Laterality Date    INSERTION OF CATHETER FOR HEMODIALYSIS Left 9/11/2020    Procedure: INSERTION, CATHETER, HEMODIALYSIS;  Surgeon: William Beltran MD;  Location: [facility] OR;  Service: General;   Laterality: Left;    LEFT HEART CATHETERIZATION Left 2/3/2023    Procedure: Left heart cath;  Surgeon: Remy Toscano MD;  Location: [facility] CATH LAB/EP;  Service: Cardiology;  Laterality: Left;     History reviewed. No pertinent family history.  Social History     Tobacco Use    Smoking status: Never   Substance Use Topics    Alcohol use: Not Currently    Drug use: Never       Review of patient's allergies indicates:   Allergen Reactions    Phenergan [promethazine] Other (See Comments)     Restless legs    Reglan [metoclopramide hcl]     Tromethamine Other (See Comments)    Zofran [ondansetron hcl (pf)] Other (See Comments)     Constipation     Ketorolac Palpitations            OBJECTIVE:     Vital Signs (Most Recent)  Vitals:    07/16/23 0735 07/16/23 0834 07/16/23 1300 07/16/23 1429   BP: (!) 157/69  (!) 178/79    BP Location:       Patient Position:       Pulse: 68 64 66 68   Resp: 18 16 18    Temp: 97.9 °F (36.6 °C)  97.6 °F (36.4 °C)    TempSrc:       SpO2: (!) 93% 97% 95%    Weight:       Height:           Medications:   atorvastatin  40 mg Oral Daily    heparin (porcine)  5,000 Units Subcutaneous Q8H    LIDOcaine-EPINEPHrine 1%-1:100,000  50 mL Intradermal Once    mupirocin   Nasal BID    piperacillin-tazobactam (Zosyn) IV (PEDS and ADULTS) (extended infusion is not appropriate)  4.5 g Intravenous Q12H    sacubitriL-valsartan  1 tablet Oral BID    sevelamer carbonate  1,600 mg Oral TID WM       Physical Exam  Vitals and nursing note reviewed.   Constitutional:       General: She is not in acute distress.     Appearance: She is not diaphoretic.   HENT:      Head: Normocephalic and atraumatic.      Mouth/Throat:      Pharynx: No oropharyngeal exudate.   Eyes:      General: No scleral icterus.     Conjunctiva/sclera: Conjunctivae normal.      Pupils: Pupils are equal, round, and reactive to light.   Cardiovascular:      Rate and Rhythm: Normal rate and regular rhythm.      Heart sounds: Normal heart sounds. No  murmur heard.  Pulmonary:      Effort: No respiratory distress.      Breath sounds: Stridor: bilateral. Rales present.      Comments:    Abdominal:      General: Bowel sounds are normal. There is no distension.      Palpations: Abdomen is soft.      Tenderness: There is no abdominal tenderness.   Musculoskeletal:         General: Normal range of motion.      Cervical back: Normal range of motion and neck supple.      Comments: CVC   Skin:     General: Skin is warm and dry.      Findings: No erythema.   Neurological:      Mental Status: She is alert and oriented to person, place, and time.      Cranial Nerves: No cranial nerve deficit.   Psychiatric:         Mood and Affect: Affect normal.         Cognition and Memory: Memory normal.         Judgment: Judgment normal.       Laboratory:  Recent Labs   Lab 07/14/23  1700 07/15/23  0531 07/16/23  0716   WBC 4.94 5.43 6.56   HGB 10.3* 10.5* 10.5*   HCT 31.6* 32.4* 32.3*   * 116* 142*   MONO 17.0*  0.8 17.9*  1.0 12.2  0.8       Recent Labs   Lab 07/14/23  1700 07/15/23  0531 07/16/23  0716    138 135*   K 3.5 3.8 4.1    101 97   CO2 23 26 23   BUN 32* 39* 56*   CREATININE 3.8* 5.2* 6.7*   CALCIUM 8.8 9.2 9.8   PHOS  --  5.5* 6.1*         Diagnostic Results:  X-Ray: Reviewed  US: Reviewed  Echo: Reviewed  ASSESSMENT/PLAN:     1. ESRD (N18.6 Z99.2) - usual HD on MWF     Plan for dialysis tomorrow as per regular schedule   Noticed 3/4 of the right lung opacified and worsening pleural effusion despite this time patient is actually being compliant with her dialysis.  Please consult IR for thoracentesis  It will take forever to try to improve this patient's symptoms with dialysis especially since she is not tolerating fluid pull too well with her severe coronary artery disease    2. HTN (I10) - controlled   3. Anemia of chronic kidney disease treated with PRAVEEN (N18.9 D63.1)    EPogen   with each HD for Hb goal 10   Recent Labs   Lab 07/14/23  1700  07/15/23  0531 07/16/23  0716   HGB 10.3* 10.5* 10.5*   HCT 31.6* 32.4* 32.3*   * 116* 142*         Iron -    Lab Results   Component Value Date    IRON 70 02/08/2023    TIBC 221 (L) 02/08/2023    FERRITIN 2,021 (H) 02/08/2023       4. MBD (E88.9 M90.80) - Renvela 1600 with each meal, recheck vitamin-D    Recent Labs   Lab 07/14/23  1703 07/15/23  0531 07/16/23  0716   MG 1.9 2.0 2.1         Lab Results   Component Value Date    .5 (H) 11/07/2019    CALCIUM 9.8 07/16/2023    PHOS 6.1 (H) 07/16/2023     Lab Results   Component Value Date    [id number] 7 (L) 11/07/2019       Lab Results   Component Value Date    CO2 23 07/16/2023       5. Hemodialysis Access (Z99.2 V45.11)- left IJ tunneled CVC  6. Nutrition/Hypoalbuminemia (E88.09) -    Recent Labs   Lab 07/15/23  0531 07/16/23  0716   ALBUMIN 3.3* 3.4*       Nepro with meals TID. Renal vitamins daily      Thank you for the consult, will follow  With any question please call [phone]  Wendy Lawton MD    [facility]     RANJITH Jc MD,   MD JUSTIN Bose MD E. V. Harmon, NP    [address]  [place], [address] [phone]

## 2023-07-16 NOTE — ASSESSMENT & PLAN NOTE
I spoke with the patient and she has to have some cardiac procedures done first before the upper endoscopy with Dr. Rohan Pulliam. She will call me when ready to schedule.   Patient with Hypoxic Respiratory failure which is Acute.  she is not on home oxygen. Supplemental oxygen was provided and noted-      .   Signs/symptoms of respiratory failure include- tachypnea, increased work of breathing, respiratory distress and use of accessory muscles. Contributing diagnoses includes - unclear etiology -possible parapneumonic effusion given recent h/o pneumonia and worsening of symptoms since then Labs and images were reviewed. Patient Has recent ABG, which has been reviewed. Will treat underlying causes and adjust management of respiratory failure as follows-     On broad spectrum antibiotics for now. Aspirin/ plavix on hold. Tentative plan for thoracentesis early next week. Pulmonology on board. Appreciate assistance.   Volume removal with HD per nephrology

## 2023-07-16 NOTE — PLAN OF CARE
Problem: Fluid and Electrolyte Imbalance (Acute Kidney Injury/Impairment)  Goal: Fluid and Electrolyte Balance  Outcome: Ongoing, Progressing     Problem: Gas Exchange Impaired  Goal: Optimal Gas Exchange  Outcome: Ongoing, Progressing     Problem: Activity Intolerance (Pulmonary Impairment)  Goal: Improved Activity Tolerance  Outcome: Ongoing, Progressing     Problem: Fall Injury Risk  Goal: Absence of Fall and Fall-Related Injury  Outcome: Ongoing, Progressing     Problem: Skin Injury Risk Increased  Goal: Skin Health and Integrity  Outcome: Ongoing, Progressing

## 2023-07-16 NOTE — ASSESSMENT & PLAN NOTE
Known LVEF 20%, ischemic cardiomyopathy with severe multivessel disease   On entresto. Coreg on hold due to borderline low HR  Volume removal with HD, has lasix PRN on home medication list

## 2023-07-16 NOTE — PROGRESS NOTES
[facility] Medicine  Progress Note    Patient Name: Ping Davenport  MRN: [id number]  Patient Class: IP- Inpatient   Admission Date: 7/14/2023  Length of Stay: 2 days  Attending Physician: Daphnie Jackson MD  Primary Care Provider: Primary Doctor No        Subjective:     Principal Problem:Acute on chronic respiratory failure with hypoxia      HPI:  59yo F with CHF, CAD, hypertension, diabetes, end-stage renal disease on Monday Wednesday Friday dialysis presents the ER for evaluation of shortness of breath and hypoxia. Brought in by EMS from dialysis center for shortness of breath.  Per EMS, patient has been short of breath even prior to her dialysis.  After completion of dialysis EMS was called and brought her to the ER for further evaluation.  Patient says that she is been more short of breath over the past few days.  Her sister says that they have had to increase the oxygen from 2 up to 4 liters nasal cannula when she ambulates.  Says that she completed the levofloxacin that was prescribed to her after her last hospital discharge aside from the last pill because it fell on the floor. Hx from ED -- at the time of my eval pt was lethargic and on BiPAP. Aroused to voice or noxious stimuli and follows commands but went right back to sleep and would not answer my questions.     During very recent hospitalization she was discharged on home O2. She was placed on BiPAP in the ED and admitted to the ICU due to need for continuous NIPPV.       Overview/Hospital Course:  No notes on file    Interval History: Afebrile. reports having shortness of breath. Understands antiplatelets are on hold with tentative plan of thoracentesis early next week to determine etiology of effusion. Denies chest pain, N/V/D.     Review of Systems  Objective:     Vital Signs (Most Recent):  Temp: 97.9 °F (36.6 °C) (07/16/23 0735)  Pulse: 64 (07/16/23 0834)  Resp: 16 (07/16/23 0834)  BP: (!) 157/69 (07/16/23 0735)  SpO2: 97 %  (07/16/23 0834) Vital Signs (24h Range):  Temp:  [97.9 °F (36.6 °C)-98.6 °F (37 °C)] 97.9 °F (36.6 °C)  Pulse:  [61-75] 64  Resp:  [16-42] 16  SpO2:  [89 %-97 %] 97 %  BP: (122-168)/(68-90) 157/69     Weight: 65 kg (143 lb 4.8 oz)  Body mass index is 26.21 kg/m².    Intake/Output Summary (Last 24 hours) at 7/16/2023 1236  Last data filed at 7/16/2023 0503  Gross per 24 hour   Intake 961.4 ml   Output 0 ml   Net 961.4 ml         Physical Exam  Vitals and nursing note reviewed.   Constitutional:       General: She is not in acute distress.  Cardiovascular:      Rate and Rhythm: Normal rate and regular rhythm.      Pulses: Normal pulses.      Heart sounds: Normal heart sounds. No murmur heard.     Comments: chest permcath present  Pulmonary:      Comments: Tachypnea, mild respiratory distress. Decreased breath sounds R side, no obvious wheezing or rales on the left   Abdominal:      Palpations: Abdomen is soft.      Tenderness: There is no abdominal tenderness.   Musculoskeletal:      Right lower leg: No edema.      Left lower leg: No edema.   Skin:     General: Skin is warm and dry.      Findings: No bruising or rash.   Neurological:      Mental Status: She is alert and oriented to person, place, and time.           Significant Labs: All pertinent labs within the past 24 hours have been reviewed.    Significant Imaging: I have reviewed all pertinent imaging results/findings within the past 24 hours.      Assessment/Plan:      * Acute on chronic respiratory failure with hypoxia  Patient with Hypoxic Respiratory failure which is Acute.  she is not on home oxygen. Supplemental oxygen was provided and noted-      .   Signs/symptoms of respiratory failure include- tachypnea, increased work of breathing, respiratory distress and use of accessory muscles. Contributing diagnoses includes - unclear etiology -possible parapneumonic effusion given recent h/o pneumonia and worsening of symptoms since then Labs and images were  reviewed. Patient Has recent ABG, which has been reviewed. Will treat underlying causes and adjust management of respiratory failure as follows-     On broad spectrum antibiotics for now. Aspirin/ plavix on hold. Tentative plan for thoracentesis early next week. Pulmonology on board. Appreciate assistance.   Volume removal with HD per nephrology    Elevated troponin  Lkely 2/2 demand ischemia  Flat trend  No acute ischemic EKG changes       Coronary artery disease of native artery of native heart with stable angina pectoris  Severe multivessel disease, plan for CABG in 8/23  Continue coreg  ASA/ plavix on hold for 48hrs for thoracentesis. Case discussed by ICU team with primary caridiologist    Hyperlipidemia  Continue statin      ESRD (end stage renal disease)  Nephrology consulted for inpatient HD  Continue Sevelamer       Chronic combined systolic and diastolic heart failure  Known LVEF 20%, ischemic cardiomyopathy with severe multivessel disease   On entresto. Coreg on hold due to borderline low HR  Volume removal with HD, has lasix PRN on home medication list    LV (left ventricular) mural thrombus following MI  Currently no anticoagulation noted on home medications      Essential hypertension  Elevated BP, respiratory distress maybe contributing  Continue entresto  Coreg on hold due to borderline low HR  Consider adding amlodipine        VTE Risk Mitigation (From admission, onward)         Ordered     heparin (porcine) injection 5,000 Units  Every 8 hours         07/14/23 1908     IP VTE HIGH RISK PATIENT  Once         07/14/23 1908     Place sequential compression device  Until discontinued         07/14/23 1908                Discharge Planning   JACKELYN:      Code Status: Full Code   Is the patient medically ready for discharge?:     Reason for patient still in hospital (select all that apply): Patient trending condition, Treatment and Consult recommendations             Daphnie Jackson MD  Department of  [facility]

## 2023-07-16 NOTE — SUBJECTIVE & OBJECTIVE
Interval History: Afebrile. reports having shortness of breath. Understands antiplatelets are on hold with tentative plan of thoracentesis early next week to determine etiology of effusion. Denies chest pain, N/V/D.     Review of Systems  Objective:     Vital Signs (Most Recent):  Temp: 97.9 °F (36.6 °C) (07/16/23 0735)  Pulse: 64 (07/16/23 0834)  Resp: 16 (07/16/23 0834)  BP: (!) 157/69 (07/16/23 0735)  SpO2: 97 % (07/16/23 0834) Vital Signs (24h Range):  Temp:  [97.9 °F (36.6 °C)-98.6 °F (37 °C)] 97.9 °F (36.6 °C)  Pulse:  [61-75] 64  Resp:  [16-42] 16  SpO2:  [89 %-97 %] 97 %  BP: (122-168)/(68-90) 157/69     Weight: 65 kg (143 lb 4.8 oz)  Body mass index is 26.21 kg/m².    Intake/Output Summary (Last 24 hours) at 7/16/2023 1236  Last data filed at 7/16/2023 0503  Gross per 24 hour   Intake 961.4 ml   Output 0 ml   Net 961.4 ml         Physical Exam  Vitals and nursing note reviewed.   Constitutional:       General: She is not in acute distress.  Cardiovascular:      Rate and Rhythm: Normal rate and regular rhythm.      Pulses: Normal pulses.      Heart sounds: Normal heart sounds. No murmur heard.     Comments: chest permcath present  Pulmonary:      Comments: Tachypnea, mild respiratory distress. Decreased breath sounds R side, no obvious wheezing or rales on the left   Abdominal:      Palpations: Abdomen is soft.      Tenderness: There is no abdominal tenderness.   Musculoskeletal:      Right lower leg: No edema.      Left lower leg: No edema.   Skin:     General: Skin is warm and dry.      Findings: No bruising or rash.   Neurological:      Mental Status: She is alert and oriented to person, place, and time.           Significant Labs: All pertinent labs within the past 24 hours have been reviewed.    Significant Imaging: I have reviewed all pertinent imaging results/findings within the past 24 hours.

## 2023-07-16 NOTE — ASSESSMENT & PLAN NOTE
Severe multivessel disease, plan for CABG in 8/23  Continue coreg  ASA/ plavix on hold for 48hrs for thoracentesis. Case discussed by ICU team with primary caridiologist

## 2023-07-16 NOTE — NURSING
Pt transferred per bed. Pt stood in ICU and assisted to pivot to new bed that we brought over to ICU to transfer pt with. Pt does desat to 85 percent with exertion and oxygen up to 5 liters and pt sat up to 92 percent after just a few mins. Hob elevated 60 to 90 for pt. Pt transferred to 530 with portable oxygen/portable o2 sat machine and telebox monitor. Pt put on 5L in new room and I titrated to 3 liters and sat 92 percent. Fan placed at   and purwick resumed after Rachell toroom and accepted patient. Pt comfortable sat 92 percent on 4 liters when I left. Bed alarm on and call light in reach. Pts belonging bags x 2 brought to room also.

## 2023-07-17 NOTE — PROGRESS NOTES
Pharmacokinetic Assessment Follow Up: IV Vancomycin    Vancomycin serum concentration assessment(s):  The random level was drawn correctly and can be used to guide therapy at this time. The measurement is above the desired definitive target range of 15 to 20 mcg/mL.      Vancomycin Regimen Plan:    Will HOLD vancomycin today and follow up random level on 7/19.    Drug levels (last 3 results):  Recent Labs   Lab Result Units 07/17/23  0557   Vancomycin, Random ug/mL 25.2       Pharmacy will continue to follow and monitor vancomycin.    Please contact pharmacy at extension [phone] for questions regarding this assessment.    Thank you for the consult,   Clair Tillman       Patient brief summary:  Ping Davenport is a 58 y.o. female initiated on antimicrobial therapy with IV Vancomycin for treatment of lower respiratory infection    The patient's current regimen is pulse dose    Drug Allergies:   Review of patient's allergies indicates:   Allergen Reactions    Phenergan [promethazine] Other (See Comments)     Restless legs    Reglan [metoclopramide hcl]     Tromethamine Other (See Comments)    Zofran [ondansetron hcl (pf)] Other (See Comments)     Constipation     Ketorolac Palpitations       Actual Body Weight:   70.3 kg    Renal Function:   Estimated Creatinine Clearance: 7.1 mL/min (A) (based on SCr of 7.8 mg/dL (H)).,     Dialysis Method (if applicable):  intermittent HD    CBC (last 72 hours):  Recent Labs   Lab Result Units 07/14/23  1700 07/15/23  0531 07/16/23  0716 07/17/23  0557   WBC K/uL 4.94 5.43 6.56 6.39   Hemoglobin g/dL 10.3* 10.5* 10.5* 9.5*   Hematocrit % 31.6* 32.4* 32.3* 29.7*   Platelets K/uL 110* 116* 142* 133*   Gran % % 69.1 67.7 77.6* 72.0   Lymph % % 9.5* 9.0* 7.0* 8.0*   Mono % % 17.0* 17.9* 12.2 14.4   Eosinophil % % 2.6 3.7 2.1 4.2   Basophil % % 0.8 0.6 0.3 0.6   Differential Method  Automated Automated Automated Automated       Metabolic Panel (last 72 hours):  Recent Labs   Lab  Result Units 07/14/23  1700 07/14/23  1703 07/15/23  0531 07/16/23  0716 07/17/23  0557   Sodium mmol/L 138  --  138 135* 134*   Potassium mmol/L 3.5  --  3.8 4.1 4.1   Chloride mmol/L 102  --  101 97 94*   CO2 mmol/L 23  --  26 23 23   Glucose mg/dL 90  --  83 96 84   BUN mg/dL 32*  --  39* 56* 67*   Creatinine mg/dL 3.8*  --  5.2* 6.7* 7.8*   Albumin g/dL 3.6  --  3.3* 3.4* 3.3*   Total Bilirubin mg/dL 0.7  --  0.7 0.7 0.7   Alkaline Phosphatase U/L 76  --  73 67 58   AST U/L 22  --  18 18 17   ALT U/L 12  --  10 8* 7*   Magnesium mg/dL  --  1.9 2.0 2.1 2.2   Phosphorus mg/dL  --   --  5.5* 6.1* 6.7*       Vancomycin Administrations:  vancomycin given in the last 96 hours                     vancomycin (VANCOCIN) 1,750 mg in dextrose 5 % (D5W) 500 mL IVPB (mg) 1,750 mg New Bag 07/15/23 1426                    Microbiologic Results:  Microbiology Results (last 7 days)       ** No results found for the last 168 hours. **

## 2023-07-17 NOTE — PROGRESS NOTES
[facility] Medicine Consult Note    Primary Attending Physician: Dr. Jackson  Primary Team: [facility] Hospitalist Team   Consultant Attending: Dr. Olivares  Consultant Fellow: Dr. Torrez    Reason for Consult:     Pleural Effusion     Subjective:      History of Present Illness:  Ping Davenport is a 58 y.o. female with h/o HFrEF, CAD (scheduled for CABG in Aug 2023), ESRD on HD (MWF), T2DM, chronic hypoxic respiratory failure (since recent admission for possible pna) here with respiratory distress after dialysis on Friday. She was initially admitted to the ICU for bipap which improved her respiratory distress. CXR c/w large pleural effusion. Bedside ultrasound with evidence of plankton sign present.     Interval Events  Overnight, patient was on BiPAP and explained that she feels well. Denied any feeling of shortness of breathe of chest pain. Vital sighs stable and patient oxygenating will on BiPAP. Plan for dialysis today. Following dialysis patient explaining that she feels tired so decision made for thoracentesis to be done tomorrow (2023)     Medications:   In-Hospital Scheduled Medications:   sodium chloride 0.9%   Intravenous Once    amLODIPine  5 mg Oral Daily    atorvastatin  40 mg Oral Daily    heparin (porcine)  5,000 Units Subcutaneous Q8H    LIDOcaine-EPINEPHrine 1%-1:100,000  50 mL Intradermal Once    mupirocin   Nasal BID    piperacillin-tazobactam (Zosyn) IV (PEDS and ADULTS) (extended infusion is not appropriate)  4.5 g Intravenous Q12H    sacubitriL-valsartan  1 tablet Oral BID    sevelamer carbonate  1,600 mg Oral TID WM     Review of Systems:  Pertinent items are noted in HPI. All other systems are reviewed and are negative.     Objective:   Last 24 Hour Vital Signs:  BP  Min: 101/82  Max: 172/93  Temp  Av.4 °F (36.3 °C)  Min: 96.4 °F (35.8 °C)  Max: 98.3 °F (36.8 °C)  Pulse  Av.3  Min: 63  Max: 80  Resp  Av  Min: 16  Max: 22  SpO2  Av.2 %  Min: 91 %   Max: 96 %  Weight  Av.3 kg (154 lb 15.7 oz)  Min: 70.3 kg (154 lb 15.7 oz)  Max: 70.3 kg (154 lb 15.7 oz)  I/O last 3 completed shifts:  In: 720.3 [P.O.:480; IV Piggyback:240.3]  Out: 1 [Urine:1]    Physical Examination:  General: Awake and alert, cooperative, interactive on exam, able to speak in short sentences/phrases without losing her breath   HEENT: Normocephalic, atraumatic, EOMI, clear conjunctiva, moist mucous membranes  CV: RRR, no murmurs   Resp: No wheezing, mildly inc WOB (although improved from admission), dec breath sounds on R side, lungs with scattered crackles on L side   Abd: +BS, soft and full, non-tender, non-distended,  Extremities: 2+ distal pulses in bilateral upper and lower extremities, warm and well perfused   Skin: Warm and dry, no rashes   Neuro: Alert and oriented x4, no focal deficits   Psych: Appropriate affect and insight    Laboratory:  Trended Lab Data:  Recent Labs     07/15/23  0531 23  0716 23  0557   WBC 5.43 6.56 6.39   HGB 10.5* 10.5* 9.5*   HCT 32.4* 32.3* 29.7*   * 142* 133*    135* 134*   K 3.8 4.1 4.1    97 94*   CO2 26 23 23   BUN 39* 56* 67*   CREATININE 5.2* 6.7* 7.8*   GLU 83 96 84   BILITOT 0.7 0.7 0.7   AST 18 18 17   ALT 10 8* 7*   ALKPHOS 73 67 58   CALCIUM 9.2 9.8 9.5   ALBUMIN 3.3* 3.4* 3.3*   PROT 7.4 7.9 7.7   MG 2.0 2.1 2.2   PHOS 5.5* 6.1* 6.7*     Cardiac:   Recent Labs   Lab 23  1700 23  2332 07/15/23  0531   TROPONINI 0.888* 0.913* 0.837*   BNP >4,900*  --   --      Urinalysis:   Lab Results   Component Value Date    COLORU Yellow 2019    SPECGRAV 1.025 2019    NITRITE Negative 2019    KETONESU Negative 2019    UROBILINOGEN Negative 2019       Microbiology:  Microbiology Results (last 7 days)       ** No results found for the last 168 hours. **          Radiology:  CXR on admission with large R pleural effusion     I have personally reviewed the above labs and  imaging.    Current Medications:     Infusions:       Scheduled:   sodium chloride 0.9%   Intravenous Once    amLODIPine  5 mg Oral Daily    atorvastatin  40 mg Oral Daily    heparin (porcine)  5,000 Units Subcutaneous Q8H    LIDOcaine-EPINEPHrine 1%-1:100,000  50 mL Intradermal Once    mupirocin   Nasal BID    piperacillin-tazobactam (Zosyn) IV (PEDS and ADULTS) (extended infusion is not appropriate)  4.5 g Intravenous Q12H    sacubitriL-valsartan  1 tablet Oral BID    sevelamer carbonate  1,600 mg Oral TID WM        PRN:  acetaminophen, heparin (porcine), melatonin, prochlorperazine, sodium chloride 0.9%, sodium chloride 0.9%, Pharmacy to dose Vancomycin consult **AND** vancomycin - pharmacy to dose     Assessment:     Ping Davenport is a 58 y.o. female with h/o HFrEF, CAD (scheduled for 3v CABG in Aug 2023), ESRD on HD (MWF), T2DM, chronic hypoxic respiratory failure (since recent admission for possible pna) here with respiratory distress after dialysis on Friday.Improved initially with BIPAP. Discussed risks and benefits of thora with patient and spoke with pt's cardiologist who agreed to hold aspirin/plavix (last received 7/15 in am). Bedside ultrasound with plankton sign present, concern for exudate effusion with concern for possible malignancy. Will plan for thoracentesis on Tuesday. Continue holding asa/plavix and heparin at this time.      Plan:     #Large R sided Pleural Effusion  - Previously seen during last admission, here and tx for PNA but pt never improved, now larger on admission  - Plankton sign present, concerning for exudative effusion (malignancy in the differential)  - Agree that tapping fluid will likely improve shortness of breath significantlly, discussed risks/benefits with pt on 7/15 and again on 7/16 and with shared decision making agreed to hold aspirin/plavix and wait for that to clear her system some prior to thoracentesis   Plan:  - Will plan for thora on Tuesday  -continue  holding ASA/Plavix (last given on 7/15/2023) as well as Heparin (last given 07/16/2023)    #Acute on Chronic Hypoxemic Respiratory Failure:  - Improved after bipap, recommend bipap nightly and intermittently with naps or respiratory distress during day  - Ultimately, believe thoracentesis will help some as well     Thank you for allowing us to participate in the care of this patient. Pulmonology will continue to follow. Please contact me if you have any questions regarding this consult.    Jae Colunga MD  [facility] Fellow

## 2023-07-17 NOTE — PLAN OF CARE
Patient AAOx4, VSS, patient c/o SOB with NC in place. Remains on 4L, O2 Sat 92-93%. Patient free from falls. Patient c/o nausea, states she only feels better with sprite, sprite provided to patient. Patient refusing thoracentesis after dialysis, states she feels too tired for procedure and would like to do it tomorrow. Call bell in reach. Safety maintained.   Problem: Adult Inpatient Plan of Care  Goal: Plan of Care Review  Outcome: Ongoing, Progressing  Goal: Patient-Specific Goal (Individualized)  Outcome: Ongoing, Progressing  Goal: Absence of Hospital-Acquired Illness or Injury  Outcome: Ongoing, Progressing  Goal: Optimal Comfort and Wellbeing  Outcome: Ongoing, Progressing  Goal: Readiness for Transition of Care  Outcome: Ongoing, Progressing     Problem: Fluid and Electrolyte Imbalance (Acute Kidney Injury/Impairment)  Goal: Fluid and Electrolyte Balance  Outcome: Ongoing, Progressing     Problem: Oral Intake Inadequate (Acute Kidney Injury/Impairment)  Goal: Optimal Nutrition Intake  Outcome: Ongoing, Progressing     Problem: Renal Function Impairment (Acute Kidney Injury/Impairment)  Goal: Effective Renal Function  Outcome: Ongoing, Progressing     Problem: Gas Exchange Impaired  Goal: Optimal Gas Exchange  Outcome: Ongoing, Progressing     Problem: Activity Intolerance (Pulmonary Impairment)  Goal: Improved Activity Tolerance  Outcome: Ongoing, Progressing     Problem: Airway Clearance Ineffective (Pulmonary Impairment)  Goal: Effective Airway Clearance  Outcome: Ongoing, Progressing     Problem: Gas Exchange Impaired (Pulmonary Impairment)  Goal: Optimal Gas Exchange  Outcome: Ongoing, Progressing     Problem: Hypertension Comorbidity  Goal: Blood Pressure in Desired Range  Outcome: Ongoing, Progressing     Problem: Fall Injury Risk  Goal: Absence of Fall and Fall-Related Injury  Outcome: Ongoing, Progressing     Problem: Device-Related Complication Risk (Hemodialysis)  Goal: Safe, Effective Therapy  Delivery  Outcome: Ongoing, Progressing     Problem: Hemodynamic Instability (Hemodialysis)  Goal: Effective Tissue Perfusion  Outcome: Ongoing, Progressing     Problem: Infection (Hemodialysis)  Goal: Absence of Infection Signs and Symptoms  Outcome: Ongoing, Progressing     Problem: Adjustment to Illness (Chronic Kidney Disease)  Goal: Optimal Coping with Chronic Illness  Outcome: Ongoing, Progressing

## 2023-07-17 NOTE — PROGRESS NOTES
[facility] Medicine  Progress Note    Patient Name: Ping Davenport  MRN: [id number]  Patient Class: IP- Inpatient   Admission Date: 7/14/2023  Length of Stay: 3 days  Attending Physician: Daphnie Jackson MD  Primary Care Provider: Primary Doctor No        Subjective:     Principal Problem:Acute on chronic respiratory failure with hypoxia      HPI:  57yo F with CHF, CAD, hypertension, diabetes, end-stage renal disease on Monday Wednesday Friday dialysis presents the ER for evaluation of shortness of breath and hypoxia. Brought in by EMS from dialysis center for shortness of breath.  Per EMS, patient has been short of breath even prior to her dialysis.  After completion of dialysis EMS was called and brought her to the ER for further evaluation.  Patient says that she is been more short of breath over the past few days.  Her sister says that they have had to increase the oxygen from 2 up to 4 liters nasal cannula when she ambulates.  Says that she completed the levofloxacin that was prescribed to her after her last hospital discharge aside from the last pill because it fell on the floor. Hx from ED -- at the time of my eval pt was lethargic and on BiPAP. Aroused to voice or noxious stimuli and follows commands but went right back to sleep and would not answer my questions.     During very recent hospitalization she was discharged on home O2. She was placed on BiPAP in the ED and admitted to the ICU due to need for continuous NIPPV.       Overview/Hospital Course:  No notes on file    Interval History:  Afebrile.  Complains of subjective dyspnea, though not hypoxic.  Remains on 3 L oxygen via NC.  Discussed plan for hemodialysis today and tentative thoracentesis after hemodialysis which should help with her dyspnea.  Denies chest pain, lightheadedness, nausea.  Eating breakfast.    Review of Systems  Objective:     Vital Signs (Most Recent):  Temp: 97.1 °F (36.2 °C) (07/17/23 0710)  Pulse: 68  (07/17/23 1415)  Resp: 16 (07/17/23 0710)  BP: (!) 161/89 (07/17/23 1415)  SpO2: (!) 93 % (07/17/23 0933) Vital Signs (24h Range):  Temp:  [96.4 °F (35.8 °C)-98 °F (36.7 °C)] 97.1 °F (36.2 °C)  Pulse:  [63-80] 68  Resp:  [16-19] 16  SpO2:  [91 %-96 %] 93 %  BP: (101-168)/(71-99) 161/89     Weight: 70.3 kg (154 lb 15.7 oz)  Body mass index is 28.35 kg/m².    Intake/Output Summary (Last 24 hours) at 7/17/2023 1633  Last data filed at 7/17/2023 0534  Gross per 24 hour   Intake 360.26 ml   Output 1 ml   Net 359.26 ml           Physical Exam  Vitals and nursing note reviewed.   Constitutional:       General: She is not in acute distress.  Cardiovascular:      Rate and Rhythm: Normal rate and regular rhythm.      Pulses: Normal pulses.      Heart sounds: Normal heart sounds. No murmur heard.     Comments: chest permcath present  Pulmonary:      Comments: Tachypnea, mild respiratory distress. Decreased breath sounds R side, no obvious wheezing or rales on the left   Abdominal:      Palpations: Abdomen is soft.      Tenderness: There is no abdominal tenderness.   Musculoskeletal:      Right lower leg: No edema.      Left lower leg: No edema.   Skin:     General: Skin is warm and dry.      Findings: No bruising or rash.   Neurological:      Mental Status: She is alert and oriented to person, place, and time.           Significant Labs: All pertinent labs within the past 24 hours have been reviewed.    Significant Imaging: I have reviewed all pertinent imaging results/findings within the past 24 hours.      Assessment/Plan:      * Acute on chronic respiratory failure with hypoxia  Patient with Hypoxic Respiratory failure which is Acute.  she is not on home oxygen. Supplemental oxygen was provided and noted- Oxygen Concentration (%):  [30] 30    .   Signs/symptoms of respiratory failure include- tachypnea, increased work of breathing, respiratory distress and use of accessory muscles. Contributing diagnoses includes - unclear  etiology -possible parapneumonic effusion given recent h/o pneumonia and worsening of symptoms since then Labs and images were reviewed. Patient Has recent ABG, which has been reviewed. Will treat underlying causes and adjust management of respiratory failure as follows-     On broad spectrum antibiotics for now given elevated procalcitonin and recent h/o pneumonia.     Aspirin/ plavix on hold. Tentative plan for thoracentesis today after hemodialysis. Pulmonology on board. Appreciate assistance.   Volume removal with HD per nephrology    Elevated troponin  Lkely 2/2 demand ischemia  Flat trend  No acute ischemic EKG changes       Coronary artery disease of native artery of native heart with stable angina pectoris  Severe multivessel disease, plan for CABG in 8/23  Continue coreg  ASA/ plavix on hold for 48hrs for thoracentesis. Case discussed by ICU team with primary caridiologist    Hyperlipidemia  Continue statin      ESRD (end stage renal disease)  Nephrology consulted for inpatient HD  Continue Sevelamer       Chronic combined systolic and diastolic heart failure  Known LVEF 20%, ischemic cardiomyopathy with severe multivessel disease   On entresto. Coreg on hold due to borderline low HR  Volume removal with HD, has lasix PRN on home medication list    LV (left ventricular) mural thrombus following MI  Currently no anticoagulation noted on home medications      Essential hypertension  Elevated BP, respiratory distress maybe contributing  Continue entresto  Coreg on hold due to borderline low HR  Amlodipine added      VTE Risk Mitigation (From admission, onward)         Ordered     heparin (porcine) injection 4,000 Units  As needed (PRN)         07/17/23 1535     heparin (porcine) injection 5,000 Units  Every 8 hours         07/14/23 1908     IP VTE HIGH RISK PATIENT  Once         07/14/23 1908     Place sequential compression device  Until discontinued         07/14/23 1908                Discharge Planning    JACKELYN:      Code Status: Full Code   Is the patient medically ready for discharge?:     Reason for patient still in hospital (select all that apply): Patient trending condition, Treatment and Consult recommendations               Daphnie Jackson MD  Department of Hospital Medicine   [facility]

## 2023-07-17 NOTE — PLAN OF CARE
Patient on oxygen with documented flow.  Will attempt to wean per O2 order protocol. Pt will wear Bipap at night.

## 2023-07-17 NOTE — PROCEDURES
"Patient seen and examined on HD tolerating well. No complains.   BP (!) 161/89   Pulse 68   Temp 97.1 °F (36.2 °C) (Oral)   Resp 16   Ht 5' 2" (1.575 m)   Wt 70.3 kg (154 lb 15.7 oz)   SpO2 (!) 93%   BMI 28.35 kg/m²     With any question please call answering service [phone]  Junie Krishnan MD    [facility]      RANJITH Jc MD,   MD JUSTIN Bose MD E. V. Harmon, NP    [address]  [place], [phone]   "

## 2023-07-17 NOTE — ASSESSMENT & PLAN NOTE
Patient with Hypoxic Respiratory failure which is Acute.  she is not on home oxygen. Supplemental oxygen was provided and noted- Oxygen Concentration (%):  [30] 30    .   Signs/symptoms of respiratory failure include- tachypnea, increased work of breathing, respiratory distress and use of accessory muscles. Contributing diagnoses includes - unclear etiology -possible parapneumonic effusion given recent h/o pneumonia and worsening of symptoms since then Labs and images were reviewed. Patient Has recent ABG, which has been reviewed. Will treat underlying causes and adjust management of respiratory failure as follows-     On broad spectrum antibiotics for now given elevated procalcitonin and recent h/o pneumonia.     Aspirin/ plavix on hold. Tentative plan for thoracentesis today after hemodialysis. Pulmonology on board. Appreciate assistance.   Volume removal with HD per nephrology

## 2023-07-17 NOTE — NURSING
RAPID RESPONSE NURSE PROACTIVE ROUNDING NOTE       Time of Visit: 0900    Admit Date: 2023  LOS: 3  Code Status: Full Code   Date of Visit: 2023  : 1965  Age: 58 y.o.  Sex: female  Race: White  Bed: K530/K530 A:   MRN: [id number]  Was the patient discharged from an ICU this admission? Yes   Was the patient discharged from a PACU within last 24 hours? No   Did the patient receive conscious sedation/general anesthesia in last 24 hours? No   Was the patient in the ED within the past 24 hours? No   Was the patient on NIPPV within the past 24 hours? No   Attending Physician: Daphnie Jackson MD  Primary Service: Hospitalist   Time spent at the bedside: < 15 min    SITUATION    Notified by   Reason for alert:     Diagnosis: Acute on chronic respiratory failure with hypoxia   has a past medical history of CHF (congestive heart failure), Coronary artery disease involving native coronary artery of native heart without angina pectoris, Diabetes mellitus, Hypertension, On home O2, and Renal disorder.    Last Vitals:  Temp: 97.1 °F (36.2 °C) ( 0710)  Pulse: 71 ( 0857)  Resp: 16 ( 0710)  BP: 167/85 ( 0710)  SpO2: 93 % ( 0933)    24 Hour Vitals Range:  Temp:  [96.4 °F (35.8 °C)-98 °F (36.7 °C)]   Pulse:  [63-75]   Resp:  [16-20]   BP: (147-178)/(71-85)   SpO2:  [91 %-96 %]     Clinical Issues: Respiratory    ASSESSMENT/INTERVENTIONS    Pt sitting up in bed on 3L NC.  VSS. Pt to be dialyzed today and have a thoracentesis at the bedside this afternoon.     RECOMMENDATIONS  Continue to monitor    Discussed plan of care with bedside RNDayana    PROVIDER ESCALATION    Physician escalation: No    Orders received and case discussed with NA.    Disposition:Remain in room 530    FOLLOW UP    Call back the Rapid Response NurseSuzy at [phone] for additional questions or concerns.

## 2023-07-17 NOTE — PLAN OF CARE
Patient is AAOx4. No complaints of pain or N/V. Tele monitored. BIPAP in place. CHG bath given. Patient refused to allow central line dressing to be put on permacath. Safety maintained. Medications administered per MAR. Instructed to call for assistance.

## 2023-07-17 NOTE — SUBJECTIVE & OBJECTIVE
Interval History:  Afebrile.  Complains of subjective dyspnea, though not hypoxic.  Remains on 3 L oxygen via NC.  Discussed plan for hemodialysis today and tentative thoracentesis after hemodialysis which should help with her dyspnea.  Denies chest pain, lightheadedness, nausea.  Eating breakfast.    Review of Systems  Objective:     Vital Signs (Most Recent):  Temp: 97.1 °F (36.2 °C) (07/17/23 0710)  Pulse: 68 (07/17/23 1415)  Resp: 16 (07/17/23 0710)  BP: (!) 161/89 (07/17/23 1415)  SpO2: (!) 93 % (07/17/23 0933) Vital Signs (24h Range):  Temp:  [96.4 °F (35.8 °C)-98 °F (36.7 °C)] 97.1 °F (36.2 °C)  Pulse:  [63-80] 68  Resp:  [16-19] 16  SpO2:  [91 %-96 %] 93 %  BP: (101-168)/(71-99) 161/89     Weight: 70.3 kg (154 lb 15.7 oz)  Body mass index is 28.35 kg/m².    Intake/Output Summary (Last 24 hours) at 7/17/2023 1633  Last data filed at 7/17/2023 0534  Gross per 24 hour   Intake 360.26 ml   Output 1 ml   Net 359.26 ml           Physical Exam  Vitals and nursing note reviewed.   Constitutional:       General: She is not in acute distress.  Cardiovascular:      Rate and Rhythm: Normal rate and regular rhythm.      Pulses: Normal pulses.      Heart sounds: Normal heart sounds. No murmur heard.     Comments: chest permcath present  Pulmonary:      Comments: Tachypnea, mild respiratory distress. Decreased breath sounds R side, no obvious wheezing or rales on the left   Abdominal:      Palpations: Abdomen is soft.      Tenderness: There is no abdominal tenderness.   Musculoskeletal:      Right lower leg: No edema.      Left lower leg: No edema.   Skin:     General: Skin is warm and dry.      Findings: No bruising or rash.   Neurological:      Mental Status: She is alert and oriented to person, place, and time.           Significant Labs: All pertinent labs within the past 24 hours have been reviewed.    Significant Imaging: I have reviewed all pertinent imaging results/findings within the past 24 hours.

## 2023-07-17 NOTE — ASSESSMENT & PLAN NOTE
Elevated BP, respiratory distress maybe contributing  Continue entresto  Coreg on hold due to borderline low HR  Amlodipine added

## 2023-07-18 PROBLEM — J90 PLEURAL EFFUSION: Status: ACTIVE | Noted: 2023-01-01

## 2023-07-18 PROBLEM — I46.9 PEA (PULSELESS ELECTRICAL ACTIVITY): Status: ACTIVE | Noted: 2023-01-01

## 2023-07-18 PROBLEM — T88.4XXA HARD TO INTUBATE: Status: ACTIVE | Noted: 2023-01-01

## 2023-07-18 PROBLEM — J96.01 ACUTE RESPIRATORY FAILURE WITH HYPOXIA: Status: RESOLVED | Noted: 2019-11-05 | Resolved: 2023-01-01

## 2023-07-18 NOTE — PLAN OF CARE
SW met with pt at bedside in attempts to complete assessment. Pt asked if SW can come back at a later time. SW expressed understanding. SW will continue to follow pt throughout her transitions of care and assist with any dc needs.     Future Appointments   Date Time Provider Department Center   8/8/2023  8:45 AM [facility] OIC-XRAY [facility] XRAY [facility]   8/8/2023  9:30 AM LAB, APPOINTMENT [facility] LAB VNP [facility]   8/8/2023 10:00 AM PULMONARY FUNCTION [facility] PULMLAB [facility]   8/8/2023 10:15 AM PULMONARY FUNCTION [facility] PULMLAB [facility]   8/8/2023 10:30 AM EKG, APPT [facility] EKG [facility]   8/8/2023 10:45 AM PULMONARY FUNCTION [facility] PULMLAB [facility]   8/8/2023 11:00 AM VASCULAR, LAB [facility] VASCLAB [facility]   8/8/2023 11:30 [facility]   8/16/2023 10:00 AM Remy Toscano MD [facility] CARDIO [facility]        07/18/23 1101   Post-Acute Status   Post-Acute Authorization Other

## 2023-07-18 NOTE — PLAN OF CARE
Pt AAOx3. Medications administered per order. 4L NC; bipap QHS. Cardiac monitor maintained. Encouraged to call with questions/concerns/assistance. Safety.

## 2023-07-18 NOTE — PROGRESS NOTES
[facility]monary [facility] Critical [facility] Medicine Consult Note    Primary Attending Physician: Dr. Jackson  [facility]   Consultant Attending: Dr. Olivares  Consultant Fellow: Dr. Torrez    Reason for Consult:     Pleural Effusion     Subjective:      History of Present Illness:  Ping Davenport is a 58 y.o. female with h/o HFrEF, CAD (scheduled for CABG in Aug 2023), ESRD on HD (MWF), T2DM, chronic hypoxic respiratory failure (since recent admission for possible pna) here with respiratory distress after dialysis on Friday. She was initially admitted to the ICU for bipap which improved her respiratory distress. CXR c/w large pleural effusion. Bedside ultrasound with evidence of plankton sign present.     Interval Events  On interview this morning patient with no new complaints. Still using the BiPAP overnight and only complaining of mild-moderate shortness of breathe. Ms. Davenport explained that she understands the plan for the thoracentesis this afternoon. Otherwise of note patient has been complaining of intermittent N/V. Emesis is non-bloody/non-bilious.     Medications:   In-Hospital Scheduled Medications:   sodium chloride 0.9%   Intravenous Once    amLODIPine  5 mg Oral Daily    atorvastatin  40 mg Oral Daily    heparin (porcine)  5,000 Units Subcutaneous Q8H    LIDOcaine-EPINEPHrine 1%-1:100,000  50 mL Intradermal Once    mupirocin   Nasal BID    piperacillin-tazobactam (Zosyn) IV (PEDS and ADULTS) (extended infusion is not appropriate)  4.5 g Intravenous Q12H    sacubitriL-valsartan  1 tablet Oral BID    sevelamer carbonate  1,600 mg Oral TID WM     Review of Systems:  Pertinent items are noted in HPI. All other systems are reviewed and are negative.     Objective:   Last 24 Hour Vital Signs:  BP  Min: 101/82  Max: 184/78  Temp  Av.6 °F (36.4 °C)  Min: 97.1 °F (36.2 °C)  Max: 98.3 °F (36.8 °C)  Pulse  Av.6  Min: 61  Max: 80  Resp  Av.6  Min: 16  Max: 22  SpO2  Av.9 %  Min:  92 %  Max: 95 %  I/O last 3 completed shifts:  In: 740.3 [Other:500; IV Piggyback:240.3]  Out: 2500 [Other:2500]    Physical Examination:  General: Awake and alert, cooperative, interactive on exam, able to speak in short sentences/phrases without losing her breath   HEENT: Normocephalic, atraumatic, EOMI, clear conjunctiva, moist mucous membranes  CV: RRR, no murmurs   Resp: No wheezing, mildly inc WOB (although improved from admission), dec breath sounds on R side, lungs with scattered crackles on L side   Abd: +BS, soft and full, non-tender, non-distended,  Extremities: 2+ distal pulses in bilateral upper and lower extremities, warm and well perfused   Skin: Warm and dry, no rashes   Neuro: Alert and oriented x4, no focal deficits   Psych: Appropriate affect and insight    Laboratory:  Trended Lab Data:  Recent Labs     07/16/23  0716 07/17/23  0557 07/18/23  0440   WBC 6.56 6.39 6.29   HGB 10.5* 9.5* 10.0*   HCT 32.3* 29.7* 31.2*   * 133* 143*   * 134* 133*   K 4.1 4.1 4.3   CL 97 94* 101   CO2 23 23 19*   BUN 56* 67* 30*   CREATININE 6.7* 7.8* 4.7*   GLU 96 84 84   BILITOT 0.7 0.7 0.7   AST 18 17 18   ALT 8* 7* 9*   ALKPHOS 67 58 58   CALCIUM 9.8 9.5 9.6   ALBUMIN 3.4* 3.3* 3.3*   PROT 7.9 7.7 7.9   MG 2.1 2.2 2.1   PHOS 6.1* 6.7* 5.1*       Cardiac:   Recent Labs   Lab 07/14/23  1700 07/14/23  2332 07/15/23  0531   TROPONINI 0.888* 0.913* 0.837*   BNP >4,900*  --   --        Urinalysis:   Lab Results   Component Value Date    COLORU Yellow 11/05/2019    SPECGRAV 1.025 11/05/2019    NITRITE Negative 11/05/2019    KETONESU Negative 11/05/2019    UROBILINOGEN Negative 11/05/2019     Radiology:  CXR on admission with large R pleural effusion     I have personally reviewed the above labs and imaging.    Current Medications:     Infusions:       Scheduled:   sodium chloride 0.9%   Intravenous Once    amLODIPine  5 mg Oral Daily    atorvastatin  40 mg Oral Daily    heparin (porcine)  5,000 Units  Subcutaneous Q8H    LIDOcaine-EPINEPHrine 1%-1:100,000  50 mL Intradermal Once    mupirocin   Nasal BID    piperacillin-tazobactam (Zosyn) IV (PEDS and ADULTS) (extended infusion is not appropriate)  4.5 g Intravenous Q12H    sacubitriL-valsartan  1 tablet Oral BID    sevelamer carbonate  1,600 mg Oral TID WM        PRN:  acetaminophen, heparin (porcine), melatonin, prochlorperazine, sodium chloride 0.9%, sodium chloride 0.9%, Pharmacy to dose Vancomycin consult **AND** vancomycin - pharmacy to dose     Assessment:     Ping Davenport is a 58 y.o. female with h/o HFrEF, CAD (scheduled for 3v CABG in Aug 2023), ESRD on HD (MWF), T2DM, chronic hypoxic respiratory failure (since recent admission for possible pna) here with respiratory distress after dialysis on Friday.Improved initially with BIPAP. Discussed risks and benefits of thora with patient and spoke with pt's cardiologist who agreed to hold aspirin/plavix (last received 7/15 in am). Bedside ultrasound with plankton sign present, concern for exudate effusion with concern for possible malignancy. Will plan for thoracentesis on today. Continue holding asa/plavix and heparin at this time.      Plan:     #Large R sided Pleural Effusion  - Previously seen during last admission, here and tx for PNA but pt never improved, now larger on admission  - Plankton sign present, concerning for exudative effusion (malignancy in the differential)  - Agree that tapping fluid will likely improve shortness of breath significantlly, discussed risks/benefits with pt on 7/15 and again on 7/16 and with shared decision making agreed to hold aspirin/plavix and wait for that to clear her system some prior to thoracentesis   Plan:  - Will plan for thora on today; ok to restart anticoagulation and antiplatlet within 24hours following low-risk procedure  -ASA/Plavix (last given on 7/15/2023) as well as Heparin (last given 07/16/2023)  -fluid studies ordered, will follow-up results  -cxr  stat ordered following thoracentesis    #Acute on Chronic Hypoxemic Respiratory Failure:  - Improved after bipap, recommend bipap nightly and intermittently with naps or respiratory distress during day  - Ultimately, believe thoracentesis will help some as well     Thank you for allowing us to participate in the care of this patient. Pulmonology will continue to follow. Please contact me if you have any questions regarding this consult.    Jae Colunga MD  [facility]

## 2023-07-18 NOTE — PROCEDURES
Pulmonary & Critical Care Medicine Thoracentesis Procedure Note  Procedure: Ultrasound Assisted Thoracentesis    07/18/2023    Pre-operative Diagnosis:   Pleural Effusion on Right    Post-operative Diagnosis:  Same    Indications:   Pleural Effusion on Right  Dyspnea    Procedure :  Shan Hurst MD    Procedure Supervisor:  Dr. Duane Olivares    Consent:   Informed consent was obtained. Risks of the procedure were discussed including infection, bleeding (hemothorax), pain, and pneumothorax. The patient signed the consent freely in the presence of a witness (nursing staff) after all questions were answered.     Procedure Details:  A time out was performed. After the patient was positioned, an acceptable site for fluid aspiration was visualized and marked under ultrasound guidance. The usual sterile field was created using Chlorhexadine solution and sterile drapes. 1% plain lidocaine was then used to create a wheal, and then given via deeper infiltration between the rib space.  A small nick was created using a #11 blade scalpel. The thoracentesis catheter was inserted without difficulty, and fluid was obtained via aspiration with 60mL syringe. The catheter was then withdrawn and a clean dressing was applied. A pleural ultrasound was performed over the anterior 2nd rib space which showed satisfactory lung slide and no pneumothorax on M-mode exam.     No immediate complications were noted during the procedure. Dr. Olivares was present during the entire procedure. The fluid will be sent for several studies.    Findings:  800 ml of bloody pleural fluid was obtained. Samples were sent for microbiology, cell count, and cytology.     Complications:   None; patient tolerated the procedure well.    Estimated Blood Loss: less than 10 mL          Condition:  stable    Plan:   A follow up chest x-ray was ordered.  Bed Rest for 2 hours.    Shan Hurst MD  Pulmonary & Critical Care Medicine Fellow

## 2023-07-18 NOTE — CODE/ RAPID DOCUMENTATION
Dr dang performed right ct needle decompression with 18 gauge needle. 10cc of bloody drainage withdrawn.

## 2023-07-18 NOTE — PROGRESS NOTES
[facility] Medicine  Progress Note    Patient Name: Ping Davenport  MRN: [id number]  Patient Class: IP- Inpatient   Admission Date: 7/14/2023  Length of Stay: 4 days  Attending Physician: Tae Salinas, *  Primary Care Provider: Primary Doctor No        Subjective:     Principal Problem:Acute on chronic respiratory failure with hypoxia        HPI:  57yo F with CHF, CAD, hypertension, diabetes, end-stage renal disease on Monday Wednesday Friday dialysis presents the ER for evaluation of shortness of breath and hypoxia. Brought in by EMS from dialysis center for shortness of breath.  Per EMS, patient has been short of breath even prior to her dialysis.  After completion of dialysis EMS was called and brought her to the ER for further evaluation.  Patient says that she is been more short of breath over the past few days.  Her sister says that they have had to increase the oxygen from 2 up to 4 liters nasal cannula when she ambulates.  Says that she completed the levofloxacin that was prescribed to her after her last hospital discharge aside from the last pill because it fell on the floor. Hx from ED -- at the time of my eval pt was lethargic and on BiPAP. Aroused to voice or noxious stimuli and follows commands but went right back to sleep and would not answer my questions.     During very recent hospitalization she was discharged on home O2. She was placed on BiPAP in the ED and admitted to the ICU due to need for continuous NIPPV.       Overview/Hospital Course:  No notes on file    Interval History:  Doing well today, states she feels better than yesterday.  Less fatigued after having her dialysis.  We discussed thoracentesis which is planned for later this afternoon with pulmonology.  She is agreeable.  Discussed with Nephrology, who are in agreement with current plan.  Hopefully will be able to maintain her volume status with dialysis once fluid removed from effusion.    Review of  Systems  Objective:     Vital Signs (Most Recent):  Temp: 97.7 °F (36.5 °C) (07/18/23 1134)  Pulse: 83 (07/18/23 1134)  Resp: 18 (07/18/23 1134)  BP: (!) 172/79 (07/18/23 1134)  SpO2: (!) 93 % (07/18/23 1134) Vital Signs (24h Range):  Temp:  [97.4 °F (36.3 °C)-98.3 °F (36.8 °C)] 97.7 °F (36.5 °C)  Pulse:  [61-83] 83  Resp:  [18-22] 18  SpO2:  [92 %-96 %] 93 %  BP: (101-184)/(72-99) 172/79     Weight: 70.3 kg (154 lb 15.7 oz)  Body mass index is 28.35 kg/m².    Intake/Output Summary (Last 24 hours) at 7/18/2023 1235  Last data filed at 7/18/2023 0800  Gross per 24 hour   Intake 620 ml   Output 2500 ml   Net -1880 ml           Physical Exam  Vitals and nursing note reviewed.   Constitutional:       General: She is not in acute distress.  Cardiovascular:      Rate and Rhythm: Normal rate and regular rhythm.      Pulses: Normal pulses.      Heart sounds: Normal heart sounds. No murmur heard.     Comments: chest permcath present  Pulmonary:      Comments: Tachypnea, mild respiratory distress. Decreased breath sounds R side, no obvious wheezing or rales on the left   Abdominal:      Palpations: Abdomen is soft.      Tenderness: There is no abdominal tenderness.   Musculoskeletal:      Right lower leg: No edema.      Left lower leg: No edema.   Skin:     General: Skin is warm and dry.      Findings: No bruising or rash.   Neurological:      Mental Status: She is alert and oriented to person, place, and time.           Significant Labs: All pertinent labs within the past 24 hours have been reviewed.    Significant Imaging: I have reviewed all pertinent imaging results/findings within the past 24 hours.      Assessment/Plan:      * Acute on chronic respiratory failure with hypoxia  Patient with Hypoxic Respiratory failure which is Acute.  she is not on home oxygen. Supplemental oxygen was provided and noted- Oxygen Concentration (%):  [30] 30    .   Signs/symptoms of respiratory failure include- tachypnea, increased work of  breathing, respiratory distress and use of accessory muscles. Contributing diagnoses includes - unclear etiology -possible parapneumonic effusion given recent h/o pneumonia and worsening of symptoms since then Labs and images were reviewed. Patient Has recent ABG, which has been reviewed. Will treat underlying causes and adjust management of respiratory failure as follows-     On broad spectrum antibiotics for now given elevated procalcitonin and recent h/o pneumonia.     Aspirin/ plavix on hold. Tentative plan for thoracentesis today after hemodialysis. Pulmonology on board. Appreciate assistance.   Volume removal with HD per nephrology    Pleural effusion  -large right-sided pleural effusion   -plan for thoracentesis today      Elevated troponin  Lkely 2/2 demand ischemia  Flat trend  No acute ischemic EKG changes       Coronary artery disease of native artery of native heart with stable angina pectoris  Severe multivessel disease, plan for CABG in 8/23  Continue coreg  ASA/ plavix on hold for 48hrs for thoracentesis. Case discussed by ICU team with primary caridiologist    Hyperlipidemia  Continue statin      ESRD (end stage renal disease)  Nephrology consulted for inpatient HD  Continue Sevelamer       Chronic combined systolic and diastolic heart failure  Known LVEF 20%, ischemic cardiomyopathy with severe multivessel disease   On entresto. Coreg on hold due to borderline low HR  Volume removal with HD, has lasix PRN on home medication list    LV (left ventricular) mural thrombus following MI  Currently no anticoagulation noted on home medications      Pneumonia  -recent history of right-sided pneumonia; treating with vancomycin and Zosyn for now for possible hospital-acquired pneumonia   -plan for thoracentesis of right-sided effusion; must ensure that this is not empyema prior to deescalating antibiotics      Essential hypertension  Elevated BP, respiratory distress maybe contributing  Continue  entresto  Coreg on hold due to borderline low HR  Amlodipine added        VTE Risk Mitigation (From admission, onward)         Ordered     heparin (porcine) injection 4,000 Units  As needed (PRN)         07/17/23 1535     heparin (porcine) injection 5,000 Units  Every 8 hours         07/14/23 1908     IP VTE HIGH RISK PATIENT  Once         07/14/23 1908     Place sequential compression device  Until discontinued         07/14/23 1908                Discharge Planning   JACKELYN:      Code Status: Full Code   Is the patient medically ready for discharge?:     Reason for patient still in hospital (select all that apply): Treatment and Consult recommendations                     Tae Salinas MD  Department of Hospital Medicine   [facility] - Med Surg

## 2023-07-18 NOTE — HOSPITAL COURSE
Ms. Davenport presented with acute on chronic hypoxemic respiratory failure due to large right-sided pleural effusion.  She had recently had a right-sided pneumonia and so was treated with broad-spectrum antibiotics in case of possible infection.  Pulmonology was consulted for thoracentesis; antiplatelet therapy was held for procedure. Initially patient delayed procedure after dialysis and it was ultimately performed on .  Unfortunately, shortly after her procedure she decompensated with hypotension and significant dyspnea.  Extensive investigation undergone urgently; no evidence of pneumothorax on POCUS.  Unfortunately while undergoing workup she went into PEA arrest.  See code documentation for further discussion.  Suspect that etiology of arrest may have been cardiogenic in the setting antiplatelets held for procedure although unable to know for sure.  Unfortunately, she  at 1510.  Discussed with her sister who is caretaker both over the phone and at bedside.

## 2023-07-18 NOTE — ANESTHESIA PROCEDURE NOTES
Ad Hoc Intubation    Date/Time: 7/18/2023 2:48 PM  Performed by: Romulo Banks MD  Authorized by: Romulo Banks MD     Indications:  Respiratory failure  Diagnosis:  Asystole  Patient Location:  Floor  Timeout:  7/18/2023 2:40 PM  Procedure Start Time:  7/18/2023 2:41 PM  Procedure End Time:  7/18/2023 2:48 PM  Staff:     Anesthesiologist Present: Yes    Intubation:     Induction:  Rapid sequence induction    Intubated:  N/a    Mask Ventilation:  N/a    Attempts:  More than 3    Attempted By:  Staff anesthesiologist    Method of Intubation:  Video laryngoscopy    Blade:  Karen 3    Laryngeal View Grade: Grade III - only epiglottis visible      Attempted By (2nd Attempt):  Staff anesthesiologist    Method of Intubation (2nd Attempt):  Video laryngoscopy    Blade (2nd Attempt):  Glidescope 3    Laryngeal View Grade (2nd Attempt): Grade IIa - cords partially seen      Attempted By (3rd Attempt):  Staff anesthesiologist    Method of Intubation (3rd Attempt):  Video laryngoscopy    Blade (3rd Attempt):  Glidescope 3    Laryngeal View Grade (3rd Attempt): Grade IIa - cords partially seen      Difficult Airway Encountered?: Yes      Complications:  None    Airway Device:  Oral endotracheal tube    Airway Device Size:  7.0    Style/Cuff Inflation:  Cuffed    Inflation Amount (mL):  6    Tube secured:  22    Secured at:  The lips    Placement Verified By:  Colorimetric ETCO2 device and Revisualization with laryngoscopy    Complicating Factors:  Short neck and obesity    Findings Post-Intubation:  BS equal bilateral and atraumatic/condition of teeth unchanged  Notes:      Code blue intubation. Gastric contents spewing from oral cavity. Chest compressions continued throughout attempts. Stanton unsuccessful x 2. Glidescope success on first attempt with bougie

## 2023-07-18 NOTE — CODE/ RAPID DOCUMENTATION
Code Blue Response    ICU team responded to MET on the floor at 1355. Patient called RN into the room given worsening dyspnea. Generalized pain. Upon initial evaluation, patients vitals were SpO2 98% on 4L, Pulse 82, BP 77/ 53. She did appear uncomfortable, diaphoretic and had some increased work of breathing. Bolus fluids immediately order and initiated shortly after. She had just undergone uneventful Right thoracentesis with removal of 800 ml straw colored fluid. She tolerated the procedure well and puncture site bleeding was minimal. Follow up CXR was pending at the time of response to MET. eICU button activated and stat orders for labs and EKG were given verbally. Critical care Attending physician Dr. Olivares and Primary Attending Dr. Salinas present at bedside and immediately evaluated the patient. Ultrasound brought into the room and pleural effusion on the Right side was visualized, although smaller than before thoracentesis. No signs of pneumothorax on POCUS. Upon arrival of XR tech, patient became unresponsive and pulse could not be felt. A code blue was then activated crash cart immediately brought into the room. Chest compressions started immediately. Initially oxygenated with BVM 2 operators approach. Anesthesiology attending present for securing the airway. With CPR, had episodes of emesis which made laryngoscopy difficult. After 2 unsuccessful attempts (patient BV masked in between attempts), an ETT was able to be passed with the assistance of a bougie. She then continued to be ventilated with Bag. CPR was continued while obtaining the airway. Initial rhythm was PEA. EKG obtained right before coding did show some T wave change in the anterolateral leads but no major ST segment changes.   After several rounds of CPR and meds given, a pulse was felt and pt had organized rhythm on the moment. She was then immediately shifted to an ICU bed. POCUS performed which showed poor LV contractility without any  evidence of pericardial effusion or tamponade physiology.Shortly after arrival to the ICU, patient again lost pulses and CPR initially initiated. ROSC again achieved after a few more rounds. An XR was able to be obtained which did not show a pneumothorax and actually showed improvement in her Right sided pleural effusion. Unfortunately, patient lost pulses again and CPR was reinitiated. An ETCO2 monitor was attached to her MV circuit and the readings were in the low 20s, with good waveform capnography, suggestive of adequate CPR. It also indicates that etiology for patient's cardiac arrest unlikely to be secondary to massive PE or hemorrhage. It was then determined that etiology likely to be from Acute coronary symptoms which would be consistent with her history of Coronary artery disease. At 1510, after several more rounds of CPR and ACLS algorithm, it was determined that patient's condition was not survivable and resuscitation attempts were stopped d/t to inability to achieve ROSC after discussing with all the members of the multidisciplinary team that were present during the code.   Patient family was updated by attending physicians.    Please refer to eICU documentation for details on timing of the medications administered during the code.     Shan Hurst MD  [employer] [facility] Critical Care Medicine Fellow

## 2023-07-18 NOTE — PROGRESS NOTES
Nephrology Progress   Note     Consult Requested By: Tae Salinas, *  Reason for Consult: ESRD    SUBJECTIVE:            Review of Systems   Constitutional:  Negative for chills and fever.   HENT:  Negative for congestion and sore throat.    Eyes:  Negative for blurred vision, double vision and photophobia.   Respiratory:  Positive for cough and shortness of breath.    Cardiovascular:  Negative for chest pain, palpitations and leg swelling.   Gastrointestinal:  Negative for abdominal pain, diarrhea, nausea and vomiting.   Genitourinary:  Negative for dysuria and urgency.   Musculoskeletal:  Negative for joint pain and myalgias.   Skin:  Negative for itching and rash.   Neurological:  Negative for dizziness, sensory change, weakness and headaches.   Endo/Heme/Allergies:  Negative for polydipsia. Does not bruise/bleed easily.   Psychiatric/Behavioral:  Negative for depression.      Past Medical History:   Diagnosis Date    CHF (congestive heart failure)     Coronary artery disease involving native coronary artery of native heart without angina pectoris 02/06/2023    Diabetes mellitus     Hypertension     On home O2     Renal disorder     ESRD-HD M W F          OBJECTIVE:     Vital Signs (Most Recent)  Vitals:    07/18/23 0630 07/18/23 0850 07/18/23 0900 07/18/23 1134   BP: (!) 164/72  (!) 161/88 (!) 172/79   BP Location:   Right arm    Patient Position:   Lying    Pulse:  73 71 83   Resp:   18 18   Temp:   97.6 °F (36.4 °C) 97.7 °F (36.5 °C)   TempSrc:       SpO2:  95% 96% (!) 93%   Weight:       Height:             Date 07/18/23 0700 - 07/19/23 0659   Shift 3407-9181 9266-9814 1093-9421 24 Hour Total   INTAKE   P.O. 120   120   Shift Total(mL/kg) 120(1.7)   120(1.7)   OUTPUT   Shift Total(mL/kg)       Weight (kg) 70.3 70.3 70.3 70.3           Medications:   sodium chloride 0.9%   Intravenous Once    amLODIPine  5 mg Oral Daily    atorvastatin  40 mg Oral Daily    heparin (porcine)  5,000 Units Subcutaneous  Q8H    LIDOcaine (PF) 10 mg/ml (1%)  5 mL Intradermal Once    mupirocin   Nasal BID    piperacillin-tazobactam (Zosyn) IV (PEDS and ADULTS) (extended infusion is not appropriate)  4.5 g Intravenous Q12H    sacubitriL-valsartan  1 tablet Oral BID    sevelamer carbonate  1,600 mg Oral TID WM           Physical Exam  Vitals and nursing note reviewed.   Constitutional:       General: She is not in acute distress.     Appearance: She is not diaphoretic.   HENT:      Head: Normocephalic and atraumatic.      Mouth/Throat:      Pharynx: No oropharyngeal exudate.   Eyes:      General: No scleral icterus.     Conjunctiva/sclera: Conjunctivae normal.      Pupils: Pupils are equal, round, and reactive to light.   Cardiovascular:      Rate and Rhythm: Normal rate and regular rhythm.      Heart sounds: Normal heart sounds. No murmur heard.  Pulmonary:      Effort: No respiratory distress.      Breath sounds: Stridor: bilateral. Decreased breath sounds and rales present.      Comments:    Abdominal:      General: Bowel sounds are normal. There is no distension.      Palpations: Abdomen is soft.      Tenderness: There is no abdominal tenderness.   Musculoskeletal:         General: Normal range of motion.      Cervical back: Normal range of motion and neck supple.      Comments: CVC   Skin:     General: Skin is warm and dry.      Findings: No erythema.   Neurological:      Mental Status: She is alert and oriented to person, place, and time.      Cranial Nerves: No cranial nerve deficit.   Psychiatric:         Mood and Affect: Affect normal.         Cognition and Memory: Memory normal.         Judgment: Judgment normal.       Laboratory:  Recent Labs   Lab 07/16/23  0716 07/17/23  0557 07/18/23  0440   WBC 6.56 6.39 6.29   HGB 10.5* 9.5* 10.0*   HCT 32.3* 29.7* 31.2*   * 133* 143*   MONO 12.2  0.8 14.4  0.9 18.8*  1.2*       Recent Labs   Lab 07/16/23  0716 07/17/23  0557 07/18/23  0440   * 134* 133*   K 4.1 4.1 4.3    CL 97 94* 101   CO2 23 23 19*   BUN 56* 67* 30*   CREATININE 6.7* 7.8* 4.7*   CALCIUM 9.8 9.5 9.6   PHOS 6.1* 6.7* 5.1*         Diagnostic Results:  X-Ray: Reviewed  US: Reviewed  Echo: Reviewed  ASSESSMENT/PLAN:     1. ESRD  - usual HD on MWF     -- HD 7/17  with UF 2.5L SOB slightly better   -- Has large Pulmonary effusion - pending Pulm   possible thora today   -- HD tomorrow with UF     2. HTN (I10) - controlled   3. Anemia of chronic kidney disease treated with PRAVEEN    EPogen   with each HD for Hb goal 10   Recent Labs   Lab 07/16/23  0716 07/17/23  0557 07/18/23  0440   HGB 10.5* 9.5* 10.0*   HCT 32.3* 29.7* 31.2*   * 133* 143*         Iron -    Lab Results   Component Value Date    IRON 70 02/08/2023    TIBC 221 (L) 02/08/2023    FERRITIN 2,021 (H) 02/08/2023       4. MBD (E88.9 M90.80) -  Recent Labs   Lab 07/17/23  0557 07/18/23  0440   .2*  --    CALCIUM 9.5 9.6   PHOS 6.7* 5.1*       Recent Labs   Lab 07/16/23  0716 07/17/23  0557 07/18/23  0440   MG 2.1 2.2 2.1         Lab Results   Component Value Date    .2 (H) 07/17/2023    CALCIUM 9.6 07/18/2023    PHOS 5.1 (H) 07/18/2023     Sevelamer TIDWM   Lab Results   Component Value Date    [id number] 22 (L) 07/17/2023       Lab Results   Component Value Date    CO2 19 (L) 07/18/2023       5. Hemodialysis Access (Z99.2 V45.11)- no issues   6. Nutrition/Hypoalbuminemia (E88.09) -    Recent Labs   Lab 07/17/23  0557 07/18/23  0440   ALBUMIN 3.3* 3.3*       Nepro with meals TID. Renal vitamins daily      Thank you for the consult, will follow  With any question please call [phone]  Junie Krishnan MD    [facility]     RANJITH Jc MD,   MD JUSTIN Bose MD E. V. Harmon, NP    [address]  [place], [address] [phone]    with patient

## 2023-07-18 NOTE — PROGRESS NOTES
Called into room by patient, stated she felt SOB and complaining of generalized pain. O2 checked, 98% on 4L, Pulse 82, BP 77/ 53, patient sitting up on side of the bed. Floor nurse checked puncture site on R back, dressing clean, dry, and intact. No bruising around area noted. Rechecked BP; 66/44 on dinamap, MET initiated at 1355.

## 2023-07-18 NOTE — SIGNIFICANT EVENT
MET called due to worsening shortness of breath and hypotension at 1355.  Initial concern for pneumothorax given that this appeared following thoracentesis; however focus without any evidence of pneumothorax.  X-ray ordered but patient became unresponsive and pulseless.  Code blue initiated; initial rhythm was PEA.  Eventually able to obtain weak pulse, but not sustained. Patient coded another 2 times.  Unfortunately  during code at 1510.  See extensive code/rapid response documentation for further details.  I discussed these findings with the patient's sister both over the phone, and then in person when she was able to come to the hospital.    75 minutes of critical care time was spent personally by me on the following activities: development of treatment plan with patient or surrogate and bedside caregivers, discussions with consultants, evaluation of patient's response to treatment, examination of patient, ordering and performing treatments and interventions, ordering and review of laboratory studies, ordering and review of radiographic studies, pulse oximetry, re-evaluation of patient's condition. This critical care time did not overlap with that of any other provider or involve time for any procedures.    Tae Salinas MD  [facility]

## 2023-07-18 NOTE — NURSING
RAPID RESPONSE NURSE NOTE        Admit Date: 2023  LOS: 4  Code Status: Full Code   Date of Consult: 2023  : 1965  Age: 58 y.o.  Weight:   Wt Readings from Last 1 Encounters:   23 70.3 kg (154 lb 15.7 oz)     Sex: female  Race: White   Bed: K562/K562 A:   MRN: [id number]  Time Rapid Response Team page Received: 1400  Time Rapid Response Team at Bedside: 1401  Time Rapid Response Team left Bedside:   Was the patient discharged from an ICU this admission? Yes   Was the patient discharged from a PACU within last 24 hours? No   Did the patient receive conscious sedation/general anesthesia in last 24 hours? No   Was the patient in the ED within the past 24 hours? No   Was the patient on NIPPV within the past 24 hours? No   Did this progress into an ARC or CPA:  Cardio Pulmonary Arrest  Attending Physician: Tae Salinas, *  Primary Service: Hospitalist     SITUATION     Notified by overhead page.  Reason for alert: SOB and increase work of breathing  Called to evaluate the patient for Respiratory    Why is the patient in the hospital?: Acute on chronic respiratory failure with hypoxia    Patient has a past medical history of CHF (congestive heart failure), Coronary artery disease involving native coronary artery of native heart without angina pectoris, Diabetes mellitus, Hypertension, On home O2, and Renal disorder.    Last Vitals:  Temp: 97.7 °F (36.5 °C) ( 1134)  Pulse: 87 ( 1403)  Resp: 18 ( 1134)  BP: 62/36 ( 1506) Comment: levophed infusing  SpO2: 98 % ( 1403)    24 Hours Vitals Range:  Temp:  [97.4 °F (36.3 °C)-98.3 °F (36.8 °C)]   Pulse:  [61-87]   Resp:  [18-22]   BP: ()/(36-88)   SpO2:  [92 %-100 %]     Labs:  Recent Labs     23  0557 23  0440 23  1504   WBC 6.39 6.29 4.96   HGB 9.5* 10.0* 6.4*   HCT 29.7* 31.2* 20.9*   * 143* 93*       Recent Labs     23  0716 23  0557 23  0440 23  1504   * 134*  133* 137   K 4.1 4.1 4.3 5.6*   CL 97 94* 101 108   CO2 23 23 19* 13*   CREATININE 6.7* 7.8* 4.7* 4.7*   GLU 96 84 84 122*   PHOS 6.1* 6.7* 5.1*  --    MG 2.1 2.2 2.1  --         No results for input(s): PH, PCO2, PO2, HCO3, POCSATURATED, BE in the last 72 hours.     ASSESSMENT/INTERVENTIONS    The patient was seen for a Respiratory problem. Staff concerns included new onset of difficulty breathing. The following interventions were performed: continued pulse ox monitoring, EtCO2 monitoring, supplemental oxygen, PRN suctioning, and portable chest x-ray.    RECOMMENDATIONS    We recommend: Pt coded prior to chest xray being done.  CPR started and code blue called overhead at 1416.    Discussed plan of care with       PROVIDER ESCALATION    Orders received and case discussed with Dr. Olivares  .    Disposition: Tx in ICU bed 562    FOLLOW UP  Call the Rapid Response NurseSuzy at x [phone] for additional questions or concerns.

## 2023-07-18 NOTE — DISCHARGE SUMMARY
[facility] Medicine  Discharge Summary      Patient Name: Ping Davenport  MRN: [id number]  INEZ: [id number]  Patient Class: IP- Inpatient  Admission Date: 7/14/2023  Hospital Length of Stay: 4 days  Discharge Date and Time: 7/18/2023     Attending Physician: Matilde att. providers found   Discharging Provider: Tae Salinas MD  Primary Care Provider: Primary Doctor Matilde    Primary Care Team: Networked reference to record PCT     HPI:   57yo F with CHF, CAD, hypertension, diabetes, end-stage renal disease on Monday Wednesday Friday dialysis presents the ER for evaluation of shortness of breath and hypoxia. Brought in by EMS from dialysis center for shortness of breath.  Per EMS, patient has been short of breath even prior to her dialysis.  After completion of dialysis EMS was called and brought her to the ER for further evaluation.  Patient says that she is been more short of breath over the past few days.  Her sister says that they have had to increase the oxygen from 2 up to 4 liters nasal cannula when she ambulates.  Says that she completed the levofloxacin that was prescribed to her after her last hospital discharge aside from the last pill because it fell on the floor. Hx from ED -- at the time of my eval pt was lethargic and on BiPAP. Aroused to voice or noxious stimuli and follows commands but went right back to sleep and would not answer my questions.     During very recent hospitalization she was discharged on home O2. She was placed on BiPAP in the ED and admitted to the ICU due to need for continuous NIPPV.       * No surgery found *      Hospital Course:   Ms. Davenport presented with acute on chronic hypoxemic respiratory failure due to large right-sided pleural effusion.  She had recently had a right-sided pneumonia and so was treated with broad-spectrum antibiotics in case of possible infection.  Pulmonology was consulted for thoracentesis; antiplatelet therapy was held for  procedure. Initially patient delayed procedure after dialysis and it was ultimately performed on .  Unfortunately, shortly after her procedure she decompensated with hypotension and significant dyspnea.  Extensive investigation undergone urgently; no evidence of pneumothorax on POCUS.  Unfortunately while undergoing workup she went into PEA arrest.  See code documentation for further discussion.  Suspect that etiology of arrest may have been cardiogenic in the setting antiplatelets held for procedure although unable to know for sure.  Unfortunately, she  at 1510.  Discussed with her sister who is caretaker both over the phone and at bedside.         Goals of Care Treatment Preferences:  Code Status: Full Code      Consults:   Consults (From admission, onward)        Status Ordering Provider     Inpatient consult to Pulmonology  Once        Provider:  Shan Hurst MD    Completed MILAGROS LAURA     Pharmacy to dose Vancomycin consult  Once        Provider:  (Not yet assigned)   See [facility] for full Linked Orders Report.    Acknowledged MILAGROS LAURA     Inpatient consult to [facility]  Once        Provider:  (Not yet assigned)    Acknowledged YVES MEDELLIN          No new Assessment & Plan notes have been filed under this hospital service since the last note was generated.  Service: Hospital Medicine    Final Active Diagnoses:    Diagnosis Date Noted POA    PRINCIPAL PROBLEM:  Acute on chronic respiratory failure with hypoxia [J96.21] 2023 Yes    Pleural effusion [J90] 2023 Yes    PEA (Pulseless electrical activity) [I46.9] 2023 No    Elevated troponin [R77.8] 07/15/2023 Yes    Coronary artery disease of native artery of native heart with stable angina pectoris [I25.118] 2023 Yes     Chronic    ESRD (end stage renal disease) [N18.6] 2023 Yes    Hyperlipidemia [E78.5] 2023 Yes    LV (left ventricular) mural  thrombus following MI [I23.6] 2023 Yes    Chronic combined systolic and diastolic heart failure [I50.42] 2023 Yes    Pneumonia due to infectious organism [J18.9] 2023 Yes    Essential hypertension [I10] 09/10/2020 Yes      Problems Resolved During this Admission:       Discharged Condition:     Disposition:     Follow Up:    Patient Instructions:   No discharge procedures on file.    Significant Diagnostic Studies: N/A    Pending Diagnostic Studies:     Procedure Component Value Units Date/Time    Cytology, Pulmonary [[id number]] Collected: 23 1310    Order Status: Sent Lab Status: In process Updated: 23 1449    Glucose, Body Fluid (Reference Lab or Non-[facility]) [facility]; Pleural Fluid, Right [[id number]] Collected: 23 132    Order Status: Sent Lab Status: In process Updated: 23 134    Specimen: Body Fluid     LDH, Body Fluid (Reference Lab or Non[facility]) [facility]; Pleural Fluid, Right [[id number]] Collected: 23 132    Order Status: Sent Lab Status: In process Updated: 23 1341    Specimen: Body Fluid     Lipid Analysis, body fluid Pleural Fluid [[id number]] Collected: 23 1344    Order Status: Sent Lab Status: In process Updated: 23 1355    Specimen: Body Fluid     Protein, Body Fluid (Reference Lab or Non[facility]) [facility]; Pleural Fluid, Right [[id number]] Collected: 23 1326    Order Status: Sent Lab Status: In process Updated: 23 1341    Specimen: Body Fluid     pH, body fluid Pleural Fluid, Right [[id number]] Collected: 23 132    Order Status: Sent Lab Status: In process Updated: 23 1341    Specimen: Body Fluid          Medications:  None    Indwelling Lines/Drains at time of discharge:   Lines/Drains/Airways     Central Venous Catheter Line  Duration           Permacath 20 0859 left internal jugular 1040 days          Drain  Duration           Female External Urinary Catheter 07/15/23 0730 3 days                 Time spent on the discharge of patient: 75 minutes    Critical care time spent on the evaluation and treatment of severe organ dysfunction, review of pertinent labs and imaging studies, discussions with consulting providers and discussions with patient/family: 75 minutes.     Tae Salinas MD  Department of Hospital Medicine  [facility] - Intensive Care

## 2023-07-18 NOTE — SUBJECTIVE & OBJECTIVE
Interval History:  Doing well today, states she feels better than yesterday.  Less fatigued after having her dialysis.  We discussed thoracentesis which is planned for later this afternoon with pulmonology.  She is agreeable.  Discussed with Nephrology, who are in agreement with current plan.  Hopefully will be able to maintain her volume status with dialysis once fluid removed from effusion.    Review of Systems  Objective:     Vital Signs (Most Recent):  Temp: 97.7 °F (36.5 °C) (07/18/23 1134)  Pulse: 83 (07/18/23 1134)  Resp: 18 (07/18/23 1134)  BP: (!) 172/79 (07/18/23 1134)  SpO2: (!) 93 % (07/18/23 1134) Vital Signs (24h Range):  Temp:  [97.4 °F (36.3 °C)-98.3 °F (36.8 °C)] 97.7 °F (36.5 °C)  Pulse:  [61-83] 83  Resp:  [18-22] 18  SpO2:  [92 %-96 %] 93 %  BP: (101-184)/(72-99) 172/79     Weight: 70.3 kg (154 lb 15.7 oz)  Body mass index is 28.35 kg/m².    Intake/Output Summary (Last 24 hours) at 7/18/2023 1235  Last data filed at 7/18/2023 0800  Gross per 24 hour   Intake 620 ml   Output 2500 ml   Net -1880 ml           Physical Exam  Vitals and nursing note reviewed.   Constitutional:       General: She is not in acute distress.  Cardiovascular:      Rate and Rhythm: Normal rate and regular rhythm.      Pulses: Normal pulses.      Heart sounds: Normal heart sounds. No murmur heard.     Comments: chest permcath present  Pulmonary:      Comments: Tachypnea, mild respiratory distress. Decreased breath sounds R side, no obvious wheezing or rales on the left   Abdominal:      Palpations: Abdomen is soft.      Tenderness: There is no abdominal tenderness.   Musculoskeletal:      Right lower leg: No edema.      Left lower leg: No edema.   Skin:     General: Skin is warm and dry.      Findings: No bruising or rash.   Neurological:      Mental Status: She is alert and oriented to person, place, and time.           Significant Labs: All pertinent labs within the past 24 hours have been reviewed.    Significant Imaging:  I have reviewed all pertinent imaging results/findings within the past 24 hours.

## 2023-07-18 NOTE — ASSESSMENT & PLAN NOTE
-recent history of right-sided pneumonia; treating with vancomycin and Zosyn for now for possible hospital-acquired pneumonia   -plan for thoracentesis of right-sided effusion; must ensure that this is not empyema prior to deescalating antibiotics

## 2023-07-18 NOTE — NURSING
"RAPID RESPONSE NURSE PROACTIVE ROUNDING NOTE       Time of Visit: 0830    Admit Date: 2023  LOS: 4  Code Status: Full Code   Date of Visit: 2023  : 1965  Age: 58 y.o.  Sex: female  Race: White  Bed: K562/K562 A:   MRN: [id number]  Was the patient discharged from an ICU this admission? Yes   Was the patient discharged from a PACU within last 24 hours? No   Did the patient receive conscious sedation/general anesthesia in last 24 hours? No   Was the patient in the ED within the past 24 hours? No   Was the patient on NIPPV within the past 24 hours? No   Attending Physician: Tae Salinas, *  Primary Service: Hospitalist   Time spent at the bedside: < 15 min    SITUATION    Notified by charge RN during rounding  Reason for alert:     Diagnosis: Acute on chronic respiratory failure with hypoxia   has a past medical history of CHF (congestive heart failure), Coronary artery disease involving native coronary artery of native heart without angina pectoris, Diabetes mellitus, Hypertension, On home O2, and Renal disorder.    Last Vitals:  Temp: 97.7 °F (36.5 °C) ( 1134)  Pulse: 87 ( 1403)  Resp: 18 ( 1134)  BP: 84/62 ( 1405)  SpO2: 98 % ( 1403)    24 Hour Vitals Range:  Temp:  [97.4 °F (36.3 °C)-98.3 °F (36.8 °C)]   Pulse:  [61-87]   Resp:  [18-22]   BP: ()/(42-88)   SpO2:  [92 %-100 %]     Clinical Issues: Respiratory    ASSESSMENT/INTERVENTIONS    Pt sitting up in bed eating breakfast.  Pt states, "i'm feeling good this morning."  Pt to have thoracentesis later this morning.     RECOMMENDATIONS      Discussed plan of care with Charge RNVesna RN    PROVIDER ESCALATION    Physician escalation: No    Orders received and case discussed with .    Disposition:Remain in room 530    FOLLOW UP    Call back the Rapid Response NurseSuzy at [phone] for additional questions or concerns.            "

## 2023-07-19 NOTE — PLAN OF CARE
SW made aware that pt has .      23 0745   Final Note   Assessment Type Final Discharge Note   Anticipated Discharge Disposition    Post-Acute Status   Discharge Delays None known at this time

## 2023-07-20 LAB — LACTATE DEHYDROGENASE FLUID: 130 U/L

## 2023-07-21 LAB
BODY FLD TYPE: NORMAL
CHOLEST FLD-MCNC: 62 MG/DL
FINAL PATHOLOGIC DIAGNOSIS: NORMAL
GLUCOSE FLD-MCNC: 94 MG/DL
Lab: NORMAL
PATH INTERP FLD-IMP: NORMAL
PROT FLD-MCNC: 5 G/DL
TRIGL FLD-MCNC: 36 MG/DL

## 2023-07-23 LAB — BACTERIA FLD CULT: NORMAL

## 2023-09-05 LAB
ACID FAST MOD KINY STN SPEC: NORMAL
MYCOBACTERIUM SPEC QL CULT: NORMAL

## (undated) DEVICE — SEE MEDLINE ITEM 156955

## (undated) DEVICE — SUT 2-0 ETHILON 18 FS

## (undated) DEVICE — SYR 30CC LUER LOCK

## (undated) DEVICE — DEVICE PICC SECURE SORBA VIEW

## (undated) DEVICE — SET DECANTER MEDICHOICE

## (undated) DEVICE — COVER PROBE 6X48

## (undated) DEVICE — DRAPE C-ARM/MOBILE XRAY 44X80

## (undated) DEVICE — GUIDEWIRE WHOLEY STD STR 260CM

## (undated) DEVICE — TUBING HPCIL ROT M/F ADPT 48IN

## (undated) DEVICE — ELECTRODE REM PLYHSV RETURN 9

## (undated) DEVICE — CATH INFINITI JUDKINS JR4

## (undated) DEVICE — SUT MCRYL PLUS 4-0 PS2 27IN

## (undated) DEVICE — CATH OPTITORQUE TIGER 5F 100CM

## (undated) DEVICE — SEE MEDLINE ITEM 157117

## (undated) DEVICE — SYR 10CC LUER LOCK

## (undated) DEVICE — CONTRAST VISIPAQUE 150ML

## (undated) DEVICE — SHEET THYROID W/ISO-BAC

## (undated) DEVICE — APPLICATOR CHLORAPREP ORN 26ML

## (undated) DEVICE — SEE MEDLINE ITEM 157116

## (undated) DEVICE — PACK BASIC

## (undated) DEVICE — ADHESIVE DERMABOND ADVANCED

## (undated) DEVICE — TRANSDUCER TRU WAVE

## (undated) DEVICE — GLIDESHEATH SLENDER SS 5FR10CM

## (undated) DEVICE — Device

## (undated) DEVICE — GLOVE PROTEXIS LTX MICRO  7.5

## (undated) DEVICE — DRESSING SORBAVIEW 2X2.5

## (undated) DEVICE — GLOVE SURG BIOGEL LATEX SZ 7.5

## (undated) DEVICE — SEE MEDLINE ITEM 152622

## (undated) DEVICE — DRESSING ANTIMICROBIAL 1 INCH

## (undated) DEVICE — DRESSING SORBAVIEW ULTIMATE IJ

## (undated) DEVICE — COVER OVERHEAD SURG LT BLUE

## (undated) DEVICE — PAD DEFIB CADENCE ADULT R2

## (undated) DEVICE — SUT VICRYL 3-0 27 SH

## (undated) DEVICE — GUIDEWIRE 3CM FLEX .035IN 150

## (undated) DEVICE — GLOVE 8 PROTEXIS PI BLUE

## (undated) DEVICE — NDL HYPDRM 23X15

## (undated) DEVICE — HEMOSTAT VASC BAND SHORT 21CM

## (undated) DEVICE — GLOVE BIOGEL PI MICRO INDIC 8